# Patient Record
Sex: MALE | Race: BLACK OR AFRICAN AMERICAN | Employment: UNEMPLOYED | URBAN - METROPOLITAN AREA
[De-identification: names, ages, dates, MRNs, and addresses within clinical notes are randomized per-mention and may not be internally consistent; named-entity substitution may affect disease eponyms.]

---

## 2017-02-16 ENCOUNTER — APPOINTMENT (OUTPATIENT)
Dept: GENERAL RADIOLOGY | Age: 47
DRG: 638 | End: 2017-02-16
Attending: EMERGENCY MEDICINE
Payer: COMMERCIAL

## 2017-02-16 ENCOUNTER — HOSPITAL ENCOUNTER (INPATIENT)
Age: 47
LOS: 2 days | Discharge: LEFT BEFORE DISCHARGE | DRG: 638 | End: 2017-02-18
Attending: EMERGENCY MEDICINE | Admitting: INTERNAL MEDICINE
Payer: COMMERCIAL

## 2017-02-16 ENCOUNTER — APPOINTMENT (OUTPATIENT)
Dept: ULTRASOUND IMAGING | Age: 47
DRG: 638 | End: 2017-02-16
Attending: EMERGENCY MEDICINE
Payer: COMMERCIAL

## 2017-02-16 DIAGNOSIS — E78.00 HYPERCHOLESTEREMIA: ICD-10-CM

## 2017-02-16 DIAGNOSIS — E11.628 DIABETIC FOOT INFECTION (HCC): Primary | ICD-10-CM

## 2017-02-16 DIAGNOSIS — I10 ESSENTIAL HYPERTENSION: ICD-10-CM

## 2017-02-16 DIAGNOSIS — L08.9 DIABETIC FOOT INFECTION (HCC): Primary | ICD-10-CM

## 2017-02-16 DIAGNOSIS — R60.0 LEG EDEMA, LEFT: ICD-10-CM

## 2017-02-16 LAB
ALBUMIN SERPL BCP-MCNC: 2.3 G/DL (ref 3.5–5)
ALBUMIN/GLOB SERPL: 0.5 {RATIO} (ref 1.1–2.2)
ALP SERPL-CCNC: 87 U/L (ref 45–117)
ALT SERPL-CCNC: 27 U/L (ref 12–78)
ANION GAP BLD CALC-SCNC: 7 MMOL/L (ref 5–15)
AST SERPL W P-5'-P-CCNC: 35 U/L (ref 15–37)
BASOPHILS # BLD AUTO: 0 K/UL (ref 0–0.1)
BASOPHILS # BLD: 0 % (ref 0–1)
BILIRUB SERPL-MCNC: 0.2 MG/DL (ref 0.2–1)
BUN SERPL-MCNC: 17 MG/DL (ref 6–20)
BUN/CREAT SERPL: 12 (ref 12–20)
CALCIUM SERPL-MCNC: 8.1 MG/DL (ref 8.5–10.1)
CHLORIDE SERPL-SCNC: 107 MMOL/L (ref 97–108)
CO2 SERPL-SCNC: 26 MMOL/L (ref 21–32)
CREAT SERPL-MCNC: 1.39 MG/DL (ref 0.7–1.3)
EOSINOPHIL # BLD: 0.2 K/UL (ref 0–0.4)
EOSINOPHIL NFR BLD: 2 % (ref 0–7)
ERYTHROCYTE [DISTWIDTH] IN BLOOD BY AUTOMATED COUNT: 13.8 % (ref 11.5–14.5)
ERYTHROCYTE [SEDIMENTATION RATE] IN BLOOD: 48 MM/HR (ref 0–15)
GLOBULIN SER CALC-MCNC: 4.4 G/DL (ref 2–4)
GLUCOSE BLD STRIP.AUTO-MCNC: 135 MG/DL (ref 65–100)
GLUCOSE SERPL-MCNC: 138 MG/DL (ref 65–100)
HCT VFR BLD AUTO: 39.4 % (ref 36.6–50.3)
HGB BLD-MCNC: 12.6 G/DL (ref 12.1–17)
LACTATE SERPL-SCNC: 1.5 MMOL/L (ref 0.4–2)
LYMPHOCYTES # BLD AUTO: 26 % (ref 12–49)
LYMPHOCYTES # BLD: 2.8 K/UL (ref 0.8–3.5)
MCH RBC QN AUTO: 25.5 PG (ref 26–34)
MCHC RBC AUTO-ENTMCNC: 32 G/DL (ref 30–36.5)
MCV RBC AUTO: 79.8 FL (ref 80–99)
MONOCYTES # BLD: 0.6 K/UL (ref 0–1)
MONOCYTES NFR BLD AUTO: 6 % (ref 5–13)
NEUTS SEG # BLD: 7.3 K/UL (ref 1.8–8)
NEUTS SEG NFR BLD AUTO: 66 % (ref 32–75)
PLATELET # BLD AUTO: 453 K/UL (ref 150–400)
POTASSIUM SERPL-SCNC: 4.8 MMOL/L (ref 3.5–5.1)
PROT SERPL-MCNC: 6.7 G/DL (ref 6.4–8.2)
RBC # BLD AUTO: 4.94 M/UL (ref 4.1–5.7)
SERVICE CMNT-IMP: ABNORMAL
SODIUM SERPL-SCNC: 140 MMOL/L (ref 136–145)
WBC # BLD AUTO: 10.9 K/UL (ref 4.1–11.1)

## 2017-02-16 PROCEDURE — 80053 COMPREHEN METABOLIC PANEL: CPT | Performed by: EMERGENCY MEDICINE

## 2017-02-16 PROCEDURE — 36415 COLL VENOUS BLD VENIPUNCTURE: CPT | Performed by: EMERGENCY MEDICINE

## 2017-02-16 PROCEDURE — 87040 BLOOD CULTURE FOR BACTERIA: CPT | Performed by: EMERGENCY MEDICINE

## 2017-02-16 PROCEDURE — 83605 ASSAY OF LACTIC ACID: CPT | Performed by: EMERGENCY MEDICINE

## 2017-02-16 PROCEDURE — 73630 X-RAY EXAM OF FOOT: CPT

## 2017-02-16 PROCEDURE — 82962 GLUCOSE BLOOD TEST: CPT

## 2017-02-16 PROCEDURE — 99285 EMERGENCY DEPT VISIT HI MDM: CPT

## 2017-02-16 PROCEDURE — 93971 EXTREMITY STUDY: CPT

## 2017-02-16 PROCEDURE — 85025 COMPLETE CBC W/AUTO DIFF WBC: CPT | Performed by: EMERGENCY MEDICINE

## 2017-02-16 PROCEDURE — 65270000029 HC RM PRIVATE

## 2017-02-16 PROCEDURE — 85652 RBC SED RATE AUTOMATED: CPT | Performed by: EMERGENCY MEDICINE

## 2017-02-16 RX ORDER — SODIUM CHLORIDE 0.9 % (FLUSH) 0.9 %
5-10 SYRINGE (ML) INJECTION AS NEEDED
Status: DISCONTINUED | OUTPATIENT
Start: 2017-02-16 | End: 2017-02-18 | Stop reason: HOSPADM

## 2017-02-16 RX ORDER — MAGNESIUM SULFATE 100 %
4 CRYSTALS MISCELLANEOUS AS NEEDED
Status: DISCONTINUED | OUTPATIENT
Start: 2017-02-16 | End: 2017-02-18 | Stop reason: HOSPADM

## 2017-02-16 RX ORDER — FACIAL-BODY WIPES
10 EACH TOPICAL DAILY PRN
Status: DISCONTINUED | OUTPATIENT
Start: 2017-02-16 | End: 2017-02-18 | Stop reason: HOSPADM

## 2017-02-16 RX ORDER — GLUCOSAMINE SULFATE 1500 MG
2000 POWDER IN PACKET (EA) ORAL DAILY
COMMUNITY
End: 2017-08-24

## 2017-02-16 RX ORDER — GABAPENTIN 100 MG/1
100 CAPSULE ORAL 3 TIMES DAILY
Status: DISCONTINUED | OUTPATIENT
Start: 2017-02-17 | End: 2017-02-18 | Stop reason: HOSPADM

## 2017-02-16 RX ORDER — LISINOPRIL 20 MG/1
40 TABLET ORAL DAILY
Status: DISCONTINUED | OUTPATIENT
Start: 2017-02-17 | End: 2017-02-17

## 2017-02-16 RX ORDER — SODIUM CHLORIDE 0.9 % (FLUSH) 0.9 %
5-10 SYRINGE (ML) INJECTION EVERY 8 HOURS
Status: DISCONTINUED | OUTPATIENT
Start: 2017-02-16 | End: 2017-02-18 | Stop reason: HOSPADM

## 2017-02-16 RX ORDER — DEXTROSE 50 % IN WATER (D50W) INTRAVENOUS SYRINGE
12.5-25 AS NEEDED
Status: DISCONTINUED | OUTPATIENT
Start: 2017-02-16 | End: 2017-02-18 | Stop reason: HOSPADM

## 2017-02-16 RX ORDER — ENOXAPARIN SODIUM 100 MG/ML
40 INJECTION SUBCUTANEOUS DAILY
Status: DISCONTINUED | OUTPATIENT
Start: 2017-02-17 | End: 2017-02-18 | Stop reason: HOSPADM

## 2017-02-16 RX ORDER — ATORVASTATIN CALCIUM 40 MG/1
40 TABLET, FILM COATED ORAL DAILY
Status: DISCONTINUED | OUTPATIENT
Start: 2017-02-17 | End: 2017-02-18 | Stop reason: HOSPADM

## 2017-02-16 RX ORDER — INSULIN LISPRO 100 [IU]/ML
6 INJECTION, SOLUTION INTRAVENOUS; SUBCUTANEOUS
Status: DISCONTINUED | OUTPATIENT
Start: 2017-02-17 | End: 2017-02-18 | Stop reason: HOSPADM

## 2017-02-16 RX ORDER — INSULIN LISPRO 100 [IU]/ML
INJECTION, SOLUTION INTRAVENOUS; SUBCUTANEOUS
Status: DISCONTINUED | OUTPATIENT
Start: 2017-02-17 | End: 2017-02-18 | Stop reason: HOSPADM

## 2017-02-16 RX ORDER — ONDANSETRON 2 MG/ML
4 INJECTION INTRAMUSCULAR; INTRAVENOUS
Status: DISCONTINUED | OUTPATIENT
Start: 2017-02-16 | End: 2017-02-18 | Stop reason: HOSPADM

## 2017-02-16 RX ORDER — SODIUM CHLORIDE 9 MG/ML
75 INJECTION, SOLUTION INTRAVENOUS CONTINUOUS
Status: DISCONTINUED | OUTPATIENT
Start: 2017-02-16 | End: 2017-02-18 | Stop reason: HOSPADM

## 2017-02-16 RX ORDER — MELATONIN
1000 DAILY
Status: DISCONTINUED | OUTPATIENT
Start: 2017-02-17 | End: 2017-02-18 | Stop reason: HOSPADM

## 2017-02-16 RX ORDER — INSULIN GLARGINE 100 [IU]/ML
60 INJECTION, SOLUTION SUBCUTANEOUS
Status: DISCONTINUED | OUTPATIENT
Start: 2017-02-16 | End: 2017-02-18 | Stop reason: HOSPADM

## 2017-02-16 RX ORDER — NALOXONE HYDROCHLORIDE 0.4 MG/ML
0.4 INJECTION, SOLUTION INTRAMUSCULAR; INTRAVENOUS; SUBCUTANEOUS AS NEEDED
Status: DISCONTINUED | OUTPATIENT
Start: 2017-02-16 | End: 2017-02-18 | Stop reason: HOSPADM

## 2017-02-16 RX ORDER — ACETAMINOPHEN 325 MG/1
650 TABLET ORAL
Status: DISCONTINUED | OUTPATIENT
Start: 2017-02-16 | End: 2017-02-18 | Stop reason: HOSPADM

## 2017-02-17 ENCOUNTER — APPOINTMENT (OUTPATIENT)
Dept: MRI IMAGING | Age: 47
DRG: 638 | End: 2017-02-17
Attending: INTERNAL MEDICINE
Payer: COMMERCIAL

## 2017-02-17 PROBLEM — R60.0 EDEMA OF LEFT LOWER EXTREMITY: Status: ACTIVE | Noted: 2017-02-17

## 2017-02-17 LAB
ALBUMIN SERPL BCP-MCNC: 2 G/DL (ref 3.5–5)
ALBUMIN/GLOB SERPL: 0.5 {RATIO} (ref 1.1–2.2)
ALP SERPL-CCNC: 84 U/L (ref 45–117)
ALT SERPL-CCNC: 19 U/L (ref 12–78)
ANION GAP BLD CALC-SCNC: 8 MMOL/L (ref 5–15)
AST SERPL W P-5'-P-CCNC: 14 U/L (ref 15–37)
BASOPHILS # BLD AUTO: 0 K/UL (ref 0–0.1)
BASOPHILS # BLD: 1 % (ref 0–1)
BILIRUB SERPL-MCNC: 0.1 MG/DL (ref 0.2–1)
BUN SERPL-MCNC: 17 MG/DL (ref 6–20)
BUN/CREAT SERPL: 15 (ref 12–20)
CALCIUM SERPL-MCNC: 8 MG/DL (ref 8.5–10.1)
CHLORIDE SERPL-SCNC: 109 MMOL/L (ref 97–108)
CO2 SERPL-SCNC: 26 MMOL/L (ref 21–32)
CREAT SERPL-MCNC: 1.16 MG/DL (ref 0.7–1.3)
EOSINOPHIL # BLD: 0.2 K/UL (ref 0–0.4)
EOSINOPHIL NFR BLD: 2 % (ref 0–7)
ERYTHROCYTE [DISTWIDTH] IN BLOOD BY AUTOMATED COUNT: 13.8 % (ref 11.5–14.5)
EST. AVERAGE GLUCOSE BLD GHB EST-MCNC: 258 MG/DL
GLOBULIN SER CALC-MCNC: 3.9 G/DL (ref 2–4)
GLUCOSE BLD STRIP.AUTO-MCNC: 138 MG/DL (ref 65–100)
GLUCOSE BLD STRIP.AUTO-MCNC: 155 MG/DL (ref 65–100)
GLUCOSE BLD STRIP.AUTO-MCNC: 231 MG/DL (ref 65–100)
GLUCOSE BLD STRIP.AUTO-MCNC: 95 MG/DL (ref 65–100)
GLUCOSE SERPL-MCNC: 81 MG/DL (ref 65–100)
HBA1C MFR BLD: 10.6 % (ref 4.2–6.3)
HCT VFR BLD AUTO: 38 % (ref 36.6–50.3)
HGB BLD-MCNC: 12.1 G/DL (ref 12.1–17)
LYMPHOCYTES # BLD AUTO: 37 % (ref 12–49)
LYMPHOCYTES # BLD: 3.1 K/UL (ref 0.8–3.5)
MCH RBC QN AUTO: 25.4 PG (ref 26–34)
MCHC RBC AUTO-ENTMCNC: 31.8 G/DL (ref 30–36.5)
MCV RBC AUTO: 79.7 FL (ref 80–99)
MONOCYTES # BLD: 0.4 K/UL (ref 0–1)
MONOCYTES NFR BLD AUTO: 5 % (ref 5–13)
NEUTS SEG # BLD: 4.5 K/UL (ref 1.8–8)
NEUTS SEG NFR BLD AUTO: 55 % (ref 32–75)
PLATELET # BLD AUTO: 382 K/UL (ref 150–400)
POTASSIUM SERPL-SCNC: 3.5 MMOL/L (ref 3.5–5.1)
PROT SERPL-MCNC: 5.9 G/DL (ref 6.4–8.2)
RBC # BLD AUTO: 4.77 M/UL (ref 4.1–5.7)
SERVICE CMNT-IMP: ABNORMAL
SERVICE CMNT-IMP: NORMAL
SODIUM SERPL-SCNC: 143 MMOL/L (ref 136–145)
WBC # BLD AUTO: 8.2 K/UL (ref 4.1–11.1)

## 2017-02-17 PROCEDURE — 74011250637 HC RX REV CODE- 250/637: Performed by: INTERNAL MEDICINE

## 2017-02-17 PROCEDURE — 74011250637 HC RX REV CODE- 250/637: Performed by: PODIATRIST

## 2017-02-17 PROCEDURE — 85025 COMPLETE CBC W/AUTO DIFF WBC: CPT | Performed by: INTERNAL MEDICINE

## 2017-02-17 PROCEDURE — 80053 COMPREHEN METABOLIC PANEL: CPT | Performed by: INTERNAL MEDICINE

## 2017-02-17 PROCEDURE — 74011636637 HC RX REV CODE- 636/637: Performed by: INTERNAL MEDICINE

## 2017-02-17 PROCEDURE — 74011250636 HC RX REV CODE- 250/636: Performed by: INTERNAL MEDICINE

## 2017-02-17 PROCEDURE — 36415 COLL VENOUS BLD VENIPUNCTURE: CPT | Performed by: INTERNAL MEDICINE

## 2017-02-17 PROCEDURE — 73718 MRI LOWER EXTREMITY W/O DYE: CPT

## 2017-02-17 PROCEDURE — 82962 GLUCOSE BLOOD TEST: CPT

## 2017-02-17 PROCEDURE — 65270000029 HC RM PRIVATE

## 2017-02-17 PROCEDURE — 93922 UPR/L XTREMITY ART 2 LEVELS: CPT

## 2017-02-17 PROCEDURE — 83036 HEMOGLOBIN GLYCOSYLATED A1C: CPT | Performed by: INTERNAL MEDICINE

## 2017-02-17 RX ORDER — PANTOPRAZOLE SODIUM 40 MG/1
20 GRANULE, DELAYED RELEASE ORAL
Status: COMPLETED | OUTPATIENT
Start: 2017-02-17 | End: 2017-02-17

## 2017-02-17 RX ORDER — ZOLPIDEM TARTRATE 5 MG/1
5 TABLET ORAL
Status: DISCONTINUED | OUTPATIENT
Start: 2017-02-17 | End: 2017-02-18 | Stop reason: HOSPADM

## 2017-02-17 RX ORDER — PANTOPRAZOLE SODIUM 20 MG/1
20 TABLET, DELAYED RELEASE ORAL
Status: DISCONTINUED | OUTPATIENT
Start: 2017-02-18 | End: 2017-02-17

## 2017-02-17 RX ORDER — VALSARTAN 80 MG/1
80 TABLET ORAL DAILY
Status: DISCONTINUED | OUTPATIENT
Start: 2017-02-17 | End: 2017-02-18 | Stop reason: HOSPADM

## 2017-02-17 RX ORDER — PANTOPRAZOLE SODIUM 40 MG/1
40 TABLET, DELAYED RELEASE ORAL
Status: DISCONTINUED | OUTPATIENT
Start: 2017-02-17 | End: 2017-02-17

## 2017-02-17 RX ORDER — PANTOPRAZOLE SODIUM 40 MG/1
20 GRANULE, DELAYED RELEASE ORAL
Status: DISCONTINUED | OUTPATIENT
Start: 2017-02-18 | End: 2017-02-18 | Stop reason: SDUPTHER

## 2017-02-17 RX ADMIN — INSULIN LISPRO 6 UNITS: 100 INJECTION, SOLUTION INTRAVENOUS; SUBCUTANEOUS at 16:12

## 2017-02-17 RX ADMIN — VITAMIN D, TAB 1000IU (100/BT) 1000 UNITS: 25 TAB at 11:40

## 2017-02-17 RX ADMIN — INSULIN LISPRO 2 UNITS: 100 INJECTION, SOLUTION INTRAVENOUS; SUBCUTANEOUS at 12:28

## 2017-02-17 RX ADMIN — ENOXAPARIN SODIUM 40 MG: 40 INJECTION SUBCUTANEOUS at 11:39

## 2017-02-17 RX ADMIN — ZOLPIDEM TARTRATE 5 MG: 5 TABLET, FILM COATED ORAL at 22:13

## 2017-02-17 RX ADMIN — INSULIN GLARGINE 60 UNITS: 100 INJECTION, SOLUTION SUBCUTANEOUS at 22:13

## 2017-02-17 RX ADMIN — Medication 10 ML: at 22:14

## 2017-02-17 RX ADMIN — PANTOPRAZOLE SODIUM 20 MG: 40 GRANULE, DELAYED RELEASE ORAL at 20:38

## 2017-02-17 RX ADMIN — Medication 5 ML: at 06:13

## 2017-02-17 RX ADMIN — ZOLPIDEM TARTRATE 5 MG: 5 TABLET, FILM COATED ORAL at 00:56

## 2017-02-17 RX ADMIN — GABAPENTIN 100 MG: 100 CAPSULE ORAL at 22:13

## 2017-02-17 RX ADMIN — INSULIN LISPRO 2 UNITS: 100 INJECTION, SOLUTION INTRAVENOUS; SUBCUTANEOUS at 22:12

## 2017-02-17 RX ADMIN — SODIUM CHLORIDE 75 ML/HR: 900 INJECTION, SOLUTION INTRAVENOUS at 00:30

## 2017-02-17 RX ADMIN — VALSARTAN 80 MG: 80 TABLET, FILM COATED ORAL at 11:40

## 2017-02-17 RX ADMIN — INSULIN GLARGINE 60 UNITS: 100 INJECTION, SOLUTION SUBCUTANEOUS at 00:27

## 2017-02-17 RX ADMIN — ATORVASTATIN CALCIUM 40 MG: 40 TABLET, FILM COATED ORAL at 11:40

## 2017-02-17 RX ADMIN — INSULIN LISPRO 6 UNITS: 100 INJECTION, SOLUTION INTRAVENOUS; SUBCUTANEOUS at 11:39

## 2017-02-17 NOTE — ED NOTES
TRANSFER - OUT REPORT:    Verbal report given to Ben Rn(name) on Mirta [place]  being transferred to Ortho (unit) for routine progression of care       Report consisted of patients Situation, Background, Assessment and   Recommendations(SBAR). Information from the following report(s) SBAR, Kardex, ED Summary and MAR was reviewed with the receiving nurse. Lines:   Peripheral IV 02/16/17 Left Hand (Active)   Site Assessment Clean, dry, & intact 2/16/2017  9:58 PM   Phlebitis Assessment 0 2/16/2017  9:58 PM   Infiltration Assessment 0 2/16/2017  9:58 PM   Dressing Status Clean, dry, & intact 2/16/2017  9:58 PM   Dressing Type Tape;Transparent 2/16/2017  9:58 PM   Hub Color/Line Status Blue;Patent 2/16/2017  9:58 PM        Opportunity for questions and clarification was provided.       Patient transported with:   [employer]

## 2017-02-17 NOTE — PROGRESS NOTES
Occupational Therapy  Chart reviewed. Discussed pt with PT who reported pt is up ad mona and independent. Met with patient, introduced self, and explained role of OT. Pt indicated no difficulty with ADLs and is not in need of acute OT services. Will sign off at this time.  Carmenza Duarte MS, OTR/L

## 2017-02-17 NOTE — ED PROVIDER NOTES
HPI Comments: Virginia House is a 51 y.o. male with a history of diabetes, hyperlipidemia, hypertension, hypercholesterolemia, and amputation of the left toes who presents ambulatory to [facility] ED with a chief complaint of blisters to his left foot for the past week, which patient is concerned may be infected. Patient also complains of swelling to his left foot and ankle for the past few days. Patient notes his left toes were surgically removed over a number of operations two years ago, and has been having difficulties with blisters and callouses ever since that time due to shoe inserts and special shoes he wears due to his amputations. Patient reports he had a PE following his surgery two years ago, and notes he was on Eliquis for a while but has not been on it in over a year. Patient notes he wears heavy work boots at work. Patient reports his blood sugars typically run between 200-280, and notes his last HgBA1C was 14. Patient states he is on both insulin and oral medication for his diabetes, and denies any recent changes to his medications. Patient denies any recent use of antibiotics. Patient denies being evaluated prior to today for his symptoms. Patient denies any fevers, drainage from the wounds, or any other symptoms at this time. PCP: [address] [place], [place]    There are no other complaints, changes or physical findings at this time. The history is provided by the patient.         Past Medical History:   Diagnosis Date    Diabetes ([address]      since age 23    DM (diabetes mellitus) ([address]     HTN (hypertension)     Hypercholesteremia     Hyperlipidemia        Past Surgical History:   Procedure Laterality Date    Hx orthopaedic       boutinerre deformity    Hx bunionectomy      Hx amputation       toes- left foot    Hx tonsillectomy      Hx wisdom teeth extraction      Hx orthopaedic       fascia removed left foot         Family History:   Problem Relation Age of Onset    Hypertension Mother  High Cholesterol Mother        Social History     Social History    Marital status:      Spouse name: N/A    Number of children: N/A    Years of education: N/A     Occupational History    Not on file. Social History Main Topics    Smoking status: Former Smoker     Types: Cigars     Quit date: 4/22/2011    Smokeless tobacco: Never Used    Alcohol use 0.0 oz/week     1 Glasses of wine, 0 Standard drinks or equivalent per week      Comment: stop drinking 5 months ago    Drug use: No    Sexual activity: Yes     Partners: Female     Birth control/ protection: None     Other Topics Concern    Not on file     Social History Narrative    ** Merged History Encounter **          ALLERGIES: Morphine    Review of Systems   Constitutional: Negative. Negative for activity change, appetite change, chills and fever. HENT: Negative. Negative for congestion, rhinorrhea, sinus pressure, sneezing and sore throat. Eyes: Negative. Negative for pain, discharge and visual disturbance. Respiratory: Negative. Negative for cough and shortness of breath. Cardiovascular: Negative. Negative for chest pain. Gastrointestinal: Negative. Negative for abdominal pain, diarrhea, nausea and vomiting. Endocrine: Negative. Genitourinary: Negative. Negative for dysuria, flank pain, frequency and urgency. Musculoskeletal: Positive for joint swelling (left foot and ankle). Negative for arthralgias, back pain, gait problem, myalgias and neck pain. Skin: Positive for wound (see HPI). Negative for color change and rash. Allergic/Immunologic: Negative. Neurological: Negative. Negative for dizziness, speech difficulty, weakness, light-headedness, numbness and headaches. Hematological: Negative. Psychiatric/Behavioral: Negative. Negative for agitation, behavioral problems and confusion. All other systems reviewed and are negative.     Patient Vitals for the past 12 hrs:   Temp Pulse Resp BP SpO2 02/16/17 2230 - - - - 100 %   02/16/17 2215 - - - (!) 179/94 96 %   02/16/17 2149 - - - (!) 176/99 99 %   02/16/17 2135 - - - - 99 %   02/16/17 2134 - - - (!) 171/98 -   02/16/17 2045 - - - (!) 189/93 99 %   02/16/17 1928 99.6 °F (37.6 °C) 78 16 (!) 170/102 99 %      Physical Exam   Constitutional: He is oriented to person, place, and time. He appears well-developed and well-nourished. No distress. HENT:   Head: Normocephalic. Eyes: Conjunctivae are normal. Pupils are equal, round, and reactive to light. Neck: Normal range of motion. Neck supple. Cardiovascular: Normal rate, regular rhythm, normal heart sounds and intact distal pulses. Exam reveals no gallop and no friction rub. No murmur heard. Pulmonary/Chest: Effort normal and breath sounds normal. No stridor. No respiratory distress. He has no wheezes. He has no rales. He exhibits no tenderness. Abdominal: Soft. Bowel sounds are normal. He exhibits no distension. There is no tenderness. There is no rebound and no guarding. Genitourinary: Rectal exam shows guaiac negative stool. No penile tenderness. Musculoskeletal: He exhibits tenderness. Feet:    New ulcer right great toe with exposed tendon, foul smell    New ulcer medial left foot and chronic ulcer ball of left foot    Left leg diffuse swelling   Neurological: He is alert and oriented to person, place, and time. He has normal reflexes. He displays normal reflexes. No cranial nerve deficit. He exhibits normal muscle tone. Coordination normal.   Skin: Skin is warm and dry. No rash noted. He is not diaphoretic. No erythema. No pallor. Nursing note and vitals reviewed. MDM  Number of Diagnoses or Management Options  Diabetic foot infection [facility]):   Diagnosis management comments: DDx: DVT, ischemic extremity, diabetic foot ulcer, osteomyelitis, sepsis    Impression/Plan: History of diabetic foot infections with prior amputation two years ago.  Presents with one week of developing new ulcers on both feet and an xray concerning for osteomyelitis on the right foot. Not septic appearing, but will admit to the hospitalist and initiate broad-spectrum antibiotics. Amount and/or Complexity of Data Reviewed  Clinical lab tests: ordered and reviewed  Tests in the radiology section of CPT®: reviewed and ordered  Review and summarize past medical records: yes  Discuss the patient with other providers: yes (Hospitalist)  Independent visualization of images, tracings, or specimens: yes    Risk of Complications, Morbidity, and/or Mortality  Presenting problems: moderate  Diagnostic procedures: moderate  Management options: moderate    Patient Progress  Patient progress: stable      Procedures     Progress Note:  8:20 PM  Patient is declining pain medication at this time. Consult Note:  10:52 PM  Nayeli Hernandes MD spoke with Dr. An Dallas,   Specialty: Hospitalist  Discussed patient's history, disposition, and available diagnostic and imaging results. Reviewed care plans. Consultant will evaluate patient for admission. Progress Note:  10:53 PM  Paged patient's PCP to inform of admission, no response. LABORATORY TESTS:  Recent Results (from the past 12 hour(s))   CBC WITH AUTOMATED DIFF    Collection Time: 02/16/17  9:15 PM   Result Value Ref Range    WBC 10.9 4.1 - 11.1 K/uL    RBC 4.94 4.10 - 5.70 M/uL    HGB 12.6 12.1 - 17.0 g/dL    HCT 39.4 36.6 - 50.3 %    MCV 79.8 (L) 80.0 - 99.0 FL    MCH 25.5 (L) 26.0 - 34.0 PG    MCHC 32.0 30.0 - 36.5 g/dL    RDW 13.8 11.5 - 14.5 %    PLATELET 021 (H) 578 - 400 K/uL    NEUTROPHILS 66 32 - 75 %    LYMPHOCYTES 26 12 - 49 %    MONOCYTES 6 5 - 13 %    EOSINOPHILS 2 0 - 7 %    BASOPHILS 0 0 - 1 %    ABS. NEUTROPHILS 7.3 1.8 - 8.0 K/UL    ABS. LYMPHOCYTES 2.8 0.8 - 3.5 K/UL    ABS. MONOCYTES 0.6 0.0 - 1.0 K/UL    ABS. EOSINOPHILS 0.2 0.0 - 0.4 K/UL    ABS.  BASOPHILS 0.0 0.0 - 0.1 K/UL   METABOLIC PANEL, COMPREHENSIVE    Collection Time: 02/16/17  9:15 PM   Result Value Ref Range    Sodium 140 136 - 145 mmol/L    Potassium 4.8 3.5 - 5.1 mmol/L    Chloride 107 97 - 108 mmol/L    CO2 26 21 - 32 mmol/L    Anion gap 7 5 - 15 mmol/L    Glucose 138 (H) 65 - 100 mg/dL    BUN 17 6 - 20 MG/DL    Creatinine 1.39 (H) 0.70 - 1.30 MG/DL    BUN/Creatinine ratio 12 12 - 20      GFR est AA >60 >60 ml/min/1.73m2    GFR est non-AA 55 (L) >60 ml/min/1.73m2    Calcium 8.1 (L) 8.5 - 10.1 MG/DL    Bilirubin, total 0.2 0.2 - 1.0 MG/DL    ALT (SGPT) 27 12 - 78 U/L    AST (SGOT) 35 15 - 37 U/L    Alk. phosphatase 87 45 - 117 U/L    Protein, total 6.7 6.4 - 8.2 g/dL    Albumin 2.3 (L) 3.5 - 5.0 g/dL    Globulin 4.4 (H) 2.0 - 4.0 g/dL    A-G Ratio 0.5 (L) 1.1 - 2.2     LACTIC ACID, PLASMA    Collection Time: 02/16/17  9:15 PM   Result Value Ref Range    Lactic acid 1.5 0.4 - 2.0 MMOL/L   SED RATE (ESR)    Collection Time: 02/16/17  9:15 PM   Result Value Ref Range    Sed rate, automated 48 (H) 0 - 15 mm/hr   GLUCOSE, POC    Collection Time: 02/16/17 11:13 PM   Result Value Ref Range    Glucose (POC) 135 (H) 65 - 100 mg/dL    Performed by TJ ULRICH        IMAGING RESULTS:  XR FOOT RT MIN 3 V   Final Result     EXAM: XR FOOT RT MIN 3 V     INDICATION: infection.     COMPARISON: None.     FINDINGS: Three views of the right foot demonstrate degenerative and erosive  changes right first MTP joint. . There is soft tissue swelling.     IMPRESSION  IMPRESSION: There are erosive/cystic and degenerative changes right first MTP  joint which could represent osteomyelitis. XR FOOT LT MIN 3 V   Final Result     EXAM: XR FOOT LT MIN 3 V     INDICATION: infection. Diabetes and ulcers on the feet     COMPARISON: 2016     FINDINGS: Three views of the left foot demonstrate patient status post  transmetatarsal amputation. There is a small lucency distal aspect of the first  metatarsal where there is an ulceration.     IMPRESSION  IMPRESSION: She is status post transmetatarsal amputation. There is a soft  tissue ulcer left first digit where there is a small lucency in the distal first  metatarsal which could represent chronic osteomyelitis     DUPLEX LOWER EXT VENOUS LEFT   Final Result     History: Left lower extremity swelling.     Duplex venous Doppler examination was performed from the left inguinal ligament  to the mid-calf. Deep venous structures were well imaged and appeared  compressible throughout. Both wave form and color flow analysis demonstrated  normal flow patterns. Augmentation was demonstrable.     IMPRESSION  IMPRESSION:  NORMAL DUPLEX VENOUS DOPPLER EXAMINATION. MEDICATIONS GIVEN:  Medications   sodium chloride (NS) flush 5-10 mL (not administered)   sodium chloride (NS) flush 5-10 mL (not administered)   insulin glargine (LANTUS) injection 60 Units (not administered)   insulin lispro (HUMALOG) injection (not administered)   glucose chewable tablet 16 g (not administered)   dextrose (D50W) injection syrg 12.5-25 g (not administered)   glucagon (GLUCAGEN) injection 1 mg (not administered)   sodium chloride (NS) flush 5-10 mL (not administered)   sodium chloride (NS) flush 5-10 mL (not administered)   acetaminophen (TYLENOL) tablet 650 mg (not administered)   naloxone (NARCAN) injection 0.4 mg (not administered)   ondansetron (ZOFRAN) injection 4 mg (not administered)   bisacodyl (DULCOLAX) suppository 10 mg (not administered)   enoxaparin (LOVENOX) injection 40 mg (not administered)   gabapentin (NEURONTIN) capsule 100 mg (not administered)   cholecalciferol (VITAMIN D3) tablet 1,000 Units (not administered)   atorvastatin (LIPITOR) tablet 40 mg (not administered)   lisinopril (PRINIVIL, ZESTRIL) tablet 40 mg (not administered)   insulin lispro (HUMALOG) injection 6 Units (not administered)   0.9% sodium chloride infusion (not administered)       IMPRESSION:  1.  Diabetic foot infection ([facility] [address]        PLAN:  Admit to the main hospital    Admit Note:  10:52 PM  Patient is being admitted to the hospital by Dr. Dieter Huang. The results of their tests and reasons for their admission has been discussed with the patient and/or available family. They convey agreement and understanding for the need to be admitted and for their admission diagnosis. This note is prepared by Lawyer Thurman, acting as Scribe for Sabino Weber MD.    Sabino Weber MD: The scribe's documentation has been prepared under my direction and personally reviewed by me in its entirety. I confirm that the note above accurately reflects all work, treatment, procedures, and medical decision making performed by me.

## 2017-02-17 NOTE — DIABETES MGMT
[facility]    Elevated A1C Visit Note    Recommendations/ Comments: Met with pt for A1C >9%. Pt stated that he has difficulty managing his DM due to his job. He is away from home 4 days per week and sometimes runs out of insulin while he is away. Discussed ways to help pt have enough supplies while away from home. Pt also stated that he would like to lose 100# and is considering vegetarian diet. Reviewed vegetarian diet. Reinforced that taking insulin doses as prescribed is most important for improving glucose control. Pt also stated that he is not able to have routine eating pattern due to work schedule and frequently misses meals. Patient is a 51 y.o. male with known history of Type 2 Diabetes on intensive insulin injection program at home.         A1c:   Lab Results   Component Value Date/Time    Hemoglobin A1c 10.6 02/17/2017 04:25 AM    Hemoglobin A1c 12.8 10/06/2016 10:16 AM       Recent Glucose Results:   Lab Results   Component Value Date/Time    GLU 81 02/17/2017 04:25 AM     (H) 02/16/2017 09:15 PM    GLUCPOC 155 (H) 02/17/2017 11:48 AM    GLUCPOC 138 (H) 02/17/2017 07:44 AM    GLUCPOC 135 (H) 02/16/2017 11:13 PM        Lab Results   Component Value Date/Time    Creatinine 1.16 02/17/2017 04:25 AM       Active Orders   Diet    DIET DIABETIC CONSISTENT CARB Regular          Assessed and instructed patient on the following:   ·  blood sugar goals, complications of diabetes mellitus, hypoglycemia prevention and treatment, exercise, SMBG skills, nutrition, referred to Diabetes Educator, site rotation and use of insulin pen    Provided patient with the following: [x]          Diabetes Self-Care Guide               [x]          Insulin education materials               []          Diabetes survival skills handout               []          BG guidelines for post-op patients               []          \"Decreasing  the Cost of Diabetes Care\"               [x]          Outpatient DTC contact number          Patient to follow up with DTC after discharge. Will continue to follow as needed. Thank you.     Ban Zhong, [facility]  Office: [phone]

## 2017-02-17 NOTE — PROCEDURES
[facility]  *** FINAL REPORT ***    Name: Marquis Dias  MRN: [id number]    Inpatient  : 24 Aug 1970  HIS Order #: [id number]  [address] [facility] Visit #: [id number]  Date: 2017    TYPE OF TEST: Peripheral Arterial Testing    REASON FOR TEST  Extremity ulceration (both sides)    Right Leg  Segmentals: Noncompressible                     mmHg  Brachial         177  High thigh  Low thigh  Calf  Posterior tibial  Dorsalis pedis  Peroneal  Metatarsal       173  Toe pressure     173  Doppler:    Normal  PVR:  Ankle/Brachial:    Left Leg  Segmentals: Noncompressible                     mmHg  Brachial  High thigh  Low thigh  Calf  Posterior tibial  Dorsalis pedis  Peroneal  Metatarsal  Toe pressure  Doppler:    Normal  PVR:  Ankle/Brachial:    INTERPRETATION/FINDINGS  PROCEDURE:  Ankle, brachial and digital arterial pressures, CW Doppler   waveforms and digital PPG waveforms were performed. 1. Segmental pressures in both legs are not measurable due to  extensive arterial calcification. The ankle/brachial indexes are  therefore unreliable predictors of distal arterial perfusion. 2. The right great toe/brachial index is 0.98. ADDITIONAL COMMENTS    I have personally reviewed the data relevant to the interpretation of  this  study. TECHNOLOGIST: Cheyanne Cummins.  ALVARADO Augustin, RDMS  Signed: 2017 03:53 PM    PHYSICIAN: Abril Sesay MD  Signed: 2017 04:04 PM

## 2017-02-17 NOTE — PROGRESS NOTES
Physical Therapy    PT consult order rec'd. Chart review complete. Pt is currently off the floor for MRI. Pt's RN states pt is independent with ambulation without an assistive device at this time. Will f/u later this PM as able for PT screen/eval as appropriate. Thank you. Pt returned from [address] around 1115. Pt observed ambulating from transport chair and in room. Pt reports no mobility limitations at this time. Pt describes issues with new diabetic shoes and inserts. Pt would benefit from orthotist consult to assess for new diabetic shoes. Will complete PT order at this time. Please re-consult if mobility needs change (surgery, WB status changes). Thank you.     Mary Pugh, MS, PT

## 2017-02-17 NOTE — PROGRESS NOTES
This is a 51 yr old AAM who has been admitted for treatment of diabetic foot infection with ulcers and osteomyelitis of both feet with a history of HTN, DM and neuropathy. Patient has been going to the wound care clinic and is being followed by Dr. Rula Nascimento. Patient is self pay and has been provided the care card. Patient reports complete independence and PCP/demographics verified. Will follow should patient require discharge services to be arranged. Care Management Interventions  PCP Verified by CM: Yes  Mode of Transport at Discharge: Self  MyChart Signup: No  Discharge Durable Medical Equipment: No  Health Maintenance Reviewed: Yes  Physical Therapy Consult: Yes  Occupational Therapy Consult: Yes  Speech Therapy Consult: No  Current Support Network: Lives with Spouse  Confirm Follow Up Transport: Self  Plan discussed with Pt/Family/Caregiver: Yes  Freedom of Choice Offered:  Yes

## 2017-02-17 NOTE — H&P
Hayden Dwain Bunch, [address]   HISTORY AND PHYSICAL       Name:  Almas Up   MR#:  [id number]   :  1970   Account #:  [de-identified]        Date of Adm:  2017       MD RUFINO Shafer / Rajni Rainey   D:  2017   23:51   T:  2017   00:11   Job #:  [id number]

## 2017-02-17 NOTE — ED NOTES
Pt remains sleeping in ED bed at this time. Call light within reach. VS WNL. Pt in no sign of distress. Bed in low position. Side rails up x2.

## 2017-02-17 NOTE — ED NOTES
Pt reports \"he has diabetic foot ulcers bilaterally that have gotten worse with time and he has not been able to go see his diabetic wound care doctor in the past month due to issues with being able to pay his bill. \" Pt also reports he has been having issues having money to have diabetic management medications and blood glucose test strips.

## 2017-02-17 NOTE — ED NOTES
Pt sleeping in ED bed at this time. Call light within reach. Pt in no sign of distress or discomfort.

## 2017-02-17 NOTE — PROGRESS NOTES
Aware of admission will see pt at lunch time  Call during the day (before 5 pm) directly with any concerns [phone]  Sayra Cameron M.D.   Family Medicine       General Daily Progress Note    Admit Date: 2/16/2017  Hospital day 2    Subjective:     Patient has no complaint of     Medication side effects: none    Current Facility-Administered Medications   Medication Dose Route Frequency    zolpidem (AMBIEN) tablet 5 mg  5 mg Oral QHS PRN    valsartan (DIOVAN) tablet 80 mg  80 mg Oral DAILY    sodium chloride (NS) flush 5-10 mL  5-10 mL IntraVENous Q8H    sodium chloride (NS) flush 5-10 mL  5-10 mL IntraVENous PRN    insulin glargine (LANTUS) injection 60 Units  60 Units SubCUTAneous QHS    insulin lispro (HUMALOG) injection   SubCUTAneous AC&HS    glucose chewable tablet 16 g  4 Tab Oral PRN    dextrose (D50W) injection syrg 12.5-25 g  12.5-25 g IntraVENous PRN    glucagon (GLUCAGEN) injection 1 mg  1 mg IntraMUSCular PRN    sodium chloride (NS) flush 5-10 mL  5-10 mL IntraVENous Q8H    sodium chloride (NS) flush 5-10 mL  5-10 mL IntraVENous PRN    acetaminophen (TYLENOL) tablet 650 mg  650 mg Oral Q6H PRN    naloxone (NARCAN) injection 0.4 mg  0.4 mg IntraVENous PRN    ondansetron (ZOFRAN) injection 4 mg  4 mg IntraVENous Q4H PRN    bisacodyl (DULCOLAX) suppository 10 mg  10 mg Rectal DAILY PRN    enoxaparin (LOVENOX) injection 40 mg  40 mg SubCUTAneous DAILY    gabapentin (NEURONTIN) capsule 100 mg  100 mg Oral TID    cholecalciferol (VITAMIN D3) tablet 1,000 Units  1,000 Units Oral DAILY    atorvastatin (LIPITOR) tablet 40 mg  40 mg Oral DAILY    insulin lispro (HUMALOG) injection 6 Units  6 Units SubCUTAneous TIDAC    0.9% sodium chloride infusion  75 mL/hr IntraVENous CONTINUOUS        Review of Systems  Musculoskeletal:positive for arthralgias and edema states he has been using his tense hose     Objective:     Patient Vitals for the past 8 hrs:   BP Temp Pulse Resp SpO2   02/17/17 1146 143/85 98.6 °F (37 °C) 65 18 99 %   02/17/17 0834 158/83 98.6 °F (37 °C) 68 16 97 %   02/17/17 0615 150/90 97.9 °F (36.6 °C) 61 16 100 %             Physical Exam: General appearance: alert, cooperative, no distress, appears stated age, moderately obese  Lungs: clear to auscultation bilaterally  Abdomen: soft, non-tender. Bowel sounds normal. No masses,  no organomegaly  Extremities: toes amputated on left foot  venous stasis dermatitis noted, edema left >> right and pedal pulses  Skin: normal, temperature normal and chronic statis dermatitis noted in feet  or hyperpigmentation - feet bilateral           Data Review   Recent Results (from the past 24 hour(s))   CBC WITH AUTOMATED DIFF    Collection Time: 02/16/17  9:15 PM   Result Value Ref Range    WBC 10.9 4.1 - 11.1 K/uL    RBC 4.94 4.10 - 5.70 M/uL    HGB 12.6 12.1 - 17.0 g/dL    HCT 39.4 36.6 - 50.3 %    MCV 79.8 (L) 80.0 - 99.0 FL    MCH 25.5 (L) 26.0 - 34.0 PG    MCHC 32.0 30.0 - 36.5 g/dL    RDW 13.8 11.5 - 14.5 %    PLATELET 035 (H) 047 - 400 K/uL    NEUTROPHILS 66 32 - 75 %    LYMPHOCYTES 26 12 - 49 %    MONOCYTES 6 5 - 13 %    EOSINOPHILS 2 0 - 7 %    BASOPHILS 0 0 - 1 %    ABS. NEUTROPHILS 7.3 1.8 - 8.0 K/UL    ABS. LYMPHOCYTES 2.8 0.8 - 3.5 K/UL    ABS. MONOCYTES 0.6 0.0 - 1.0 K/UL    ABS. EOSINOPHILS 0.2 0.0 - 0.4 K/UL    ABS. BASOPHILS 0.0 0.0 - 0.1 K/UL   METABOLIC PANEL, COMPREHENSIVE    Collection Time: 02/16/17  9:15 PM   Result Value Ref Range    Sodium 140 136 - 145 mmol/L    Potassium 4.8 3.5 - 5.1 mmol/L    Chloride 107 97 - 108 mmol/L    CO2 26 21 - 32 mmol/L    Anion gap 7 5 - 15 mmol/L    Glucose 138 (H) 65 - 100 mg/dL    BUN 17 6 - 20 MG/DL    Creatinine 1.39 (H) 0.70 - 1.30 MG/DL    BUN/Creatinine ratio 12 12 - 20      GFR est AA >60 >60 ml/min/1.73m2    GFR est non-AA 55 (L) >60 ml/min/1.73m2    Calcium 8.1 (L) 8.5 - 10.1 MG/DL    Bilirubin, total 0.2 0.2 - 1.0 MG/DL    ALT (SGPT) 27 12 - 78 U/L    AST (SGOT) 35 15 - 37 U/L    Alk. phosphatase 87 45 - 117 U/L    Protein, total 6.7 6.4 - 8.2 g/dL    Albumin 2.3 (L) 3.5 - 5.0 g/dL    Globulin 4.4 (H) 2.0 - 4.0 g/dL    A-G Ratio 0.5 (L) 1.1 - 2.2     CULTURE, BLOOD, PAIRED    Collection Time: 02/16/17  9:15 PM   Result Value Ref Range    Special Requests: NO SPECIAL REQUESTS      Culture result: NO GROWTH AFTER 9 HOURS     LACTIC ACID, PLASMA    Collection Time: 02/16/17  9:15 PM   Result Value Ref Range    Lactic acid 1.5 0.4 - 2.0 MMOL/L   SED RATE (ESR)    Collection Time: 02/16/17  9:15 PM   Result Value Ref Range    Sed rate, automated 48 (H) 0 - 15 mm/hr   GLUCOSE, POC    Collection Time: 02/16/17 11:13 PM   Result Value Ref Range    Glucose (POC) 135 (H) 65 - 100 mg/dL    Performed by Delroy Pena    METABOLIC PANEL, COMPREHENSIVE    Collection Time: 02/17/17  4:25 AM   Result Value Ref Range    Sodium 143 136 - 145 mmol/L    Potassium 3.5 3.5 - 5.1 mmol/L    Chloride 109 (H) 97 - 108 mmol/L    CO2 26 21 - 32 mmol/L    Anion gap 8 5 - 15 mmol/L    Glucose 81 65 - 100 mg/dL    BUN 17 6 - 20 MG/DL    Creatinine 1.16 0.70 - 1.30 MG/DL    BUN/Creatinine ratio 15 12 - 20      GFR est AA >60 >60 ml/min/1.73m2    GFR est non-AA >60 >60 ml/min/1.73m2    Calcium 8.0 (L) 8.5 - 10.1 MG/DL    Bilirubin, total 0.1 (L) 0.2 - 1.0 MG/DL    ALT (SGPT) 19 12 - 78 U/L    AST (SGOT) 14 (L) 15 - 37 U/L    Alk.  phosphatase 84 45 - 117 U/L    Protein, total 5.9 (L) 6.4 - 8.2 g/dL    Albumin 2.0 (L) 3.5 - 5.0 g/dL    Globulin 3.9 2.0 - 4.0 g/dL    A-G Ratio 0.5 (L) 1.1 - 2.2     CBC WITH AUTOMATED DIFF    Collection Time: 02/17/17  4:25 AM   Result Value Ref Range    WBC 8.2 4.1 - 11.1 K/uL    RBC 4.77 4.10 - 5.70 M/uL    HGB 12.1 12.1 - 17.0 g/dL    HCT 38.0 36.6 - 50.3 %    MCV 79.7 (L) 80.0 - 99.0 FL    MCH 25.4 (L) 26.0 - 34.0 PG    MCHC 31.8 30.0 - 36.5 g/dL    RDW 13.8 11.5 - 14.5 %    PLATELET 047 496 - 042 K/uL    NEUTROPHILS 55 32 - 75 %    LYMPHOCYTES 37 12 - 49 %    MONOCYTES 5 5 - 13 % EOSINOPHILS 2 0 - 7 %    BASOPHILS 1 0 - 1 %    ABS. NEUTROPHILS 4.5 1.8 - 8.0 K/UL    ABS. LYMPHOCYTES 3.1 0.8 - 3.5 K/UL    ABS. MONOCYTES 0.4 0.0 - 1.0 K/UL    ABS. EOSINOPHILS 0.2 0.0 - 0.4 K/UL    ABS. BASOPHILS 0.0 0.0 - 0.1 K/UL   HEMOGLOBIN A1C WITH EAG    Collection Time: 02/17/17  4:25 AM   Result Value Ref Range    Hemoglobin A1c 10.6 (H) 4.2 - 6.3 %    Est. average glucose 258 mg/dL   GLUCOSE, POC    Collection Time: 02/17/17  7:44 AM   Result Value Ref Range    Glucose (POC) 138 (H) 65 - 100 mg/dL    Performed by Pauly Russ    GLUCOSE, POC    Collection Time: 02/17/17 11:48 AM   Result Value Ref Range    Glucose (POC) 155 (H) 65 - 100 mg/dL    Performed by Pauly Russ            Assessment:     Active Problems:    Diabetic infection of right foot ([address] (2/16/2017)        Plan: Will need picc no abx running yet picc team already aware    bp goal is <130/80 monitoring bp may need to adjust arb  bs have been under good control so far and improving a1c since November continue diabetic diet mri results pending at this time may need iv abx for discharge home  Significant edema in left leg pt travels for work to [employer]  No  d dimer may reviewed doppler neg for dvt would like to obtain vascular studies on pt as well   S/w case management pt will need application for care card to help with deductible Will monitor will likely discharge over the weekend   S/w PT as well no issues pt has equipment as well from last ov will need new rx for tense hose will address at follow up appt in office next week if discharged over the weekend  Kush Morales M.D.   Family Medicine call directly if needed  [phone] before 5 pm

## 2017-02-17 NOTE — PROGRESS NOTES
[facility]  Preliminary Report: Ankle Brachial Index    Pressure (mmHg) Right    Left    Brachial:  177   175  Ankle PTA:  nc   nc  Ankle DPA:  nc   nc  Great Toe:  173       Waveform:  Right   Left  CFA:   Triphasic  Triphasic  Popliteal:  Triphasic  Triphasic  PTA:   Triphasic  Triphasic  DPA:   Triphasic  Triphasic  Great Toe:  Pulsatile  amputated    Right JESSICA:   nc  Left JESSICA: Nc   Right TBI: 0.98  Left TBI:     Final report to follow.     CFA = Common Femoral Artery  PTA = Posterior Tibial Artery  DPA = Dorsalis Pedis Artery  JESSICA = Ankle Brachial Index  TBI = Toe Brachial Index  NC = Non-compressible

## 2017-02-17 NOTE — H&P
Hospitalist Admission Note    NAME:  Kimi Munoz   :   1970   MRN:   [id number]     Date of admit:  2017    PCP: [address] [place], MD    Assessment/Plan:     Diabetic foot infection with ulcers left foot  POA  Osteomyelitis changes of both feet on x-rays POA  Right foot with ulcer on dorsum of right foot with ? Osteomyelitis changes on X-ray   I am less concerned about the right foot being infected  Left foot with older deep ulcer below TMA site and newer ulcer on left lateral foot, positive foul smell   Leg is swollen with no DVT on US   X-ray with possible chronic osteo   I am more concerned about the left foot being infected  Admit  IVF  IV vanco, cefepime, flagyl  Culture wounds on the left  Check MRI left and right feet to assess for osteo  Ask podiatry to see in Am    DM type 2 with neuropathy, chronic insulin POA  Continue long acting insulin 60 units qHS  Reduce short acting insulin for 12-15 units to 6 units plus SSI  Check HgBa1c  Diabetic diet    Essential HTN POA uncontrolled  Stopping taking lisinopril due to cough  Start valsartan  PRN Hydralazine    Diabetic neuropathy POA  Not taking neurontin regularly     Hyperlipidemia POA statin    DVT prophylaxis: lovenox SQ     Code status: Full code    Next of Kin: wife, spoke with her in room    Subjective:   CHIEF COMPLAINT:  Ulcers on my feet and my left leg is swollen for 1 week    HISTORY OF PRESENT ILLNESS:  A 70-year-old    gentleman with history of diabetes, peripheral neuropathy, and prior   diabetic foot infections requiring a left transmetatarsal amputation in   the past. He also has hypertension, hyperlipidemia. He says he   stopped taking his lisinopril recently because of persistent cough and   he currently is not taking any antihypertensive medications. He said he   currently is compliant with his insulin.  He has had a chronic ulcer on   the dorsum of his left foot below his TMA wound that has been present for several months. It initially had healed up but began to recur. He has   been only intermittently following over at the wound care clinic. He was   wearing some shoe inserts that began to cause some foot trauma on   his feet bilaterally. About 10 days ago, he developed blisters on the   dorsum of his right foot above the first metatarsal. In addition, he   developed a larger ulcer with drainage over the lateral aspect of his left   foot. His left leg began to become increasingly swollen and mildly   warm. He became concerned about the swelling and the concern for a   possible infection that might require more surgery and he decided to   come to the emergency room. He denies any fevers or chills, chest   pain, shortness of breath, abdominal pain, nausea, vomiting, diarrhea,   melena, or bright red blood per rectum.       In the emergency room, the patient had foul-smelling ulcers on his left   foot. The left leg was swollen. Lower extremity Dopplers were negative   for DVT in the left leg as he does have a prior history of pulmonary   embolism following surgery. X-rays of the feet showed possible   osteomyelitis changes in both feet. We were called to admit the   patient.     Past Medical History   Diagnosis Date    Diabetes ([facility]      since age 23    DM (diabetes mellitus) ([facility]     HTN (hypertension)     Hypercholesteremia     Hyperlipidemia         Past Surgical History   Procedure Laterality Date    Hx orthopaedic       boutinerre deformity    Hx bunionectomy      Hx amputation       toes- left foot    Hx tonsillectomy      Hx wisdom teeth extraction      Hx orthopaedic       fascia removed left foot       Social History:  Social History   Substance Use Topics    Smoking status: Former Smoker     Types: Cigars     Quit date: 4/22/2011    Smokeless tobacco: Never Used    Alcohol use 0.0 oz/week     Drinks several days per week,.  Not a heavy drinker per his report        FAMILY HISTORY:  Family History   Problem Relation Age of Onset    Hypertension Mother     High Cholesterol Mother        Allergies   Allergen Reactions    Morphine Angioedema        Prior to Admission medications    Medication Sig Start Date End Date Taking? Authorizing Provider   cholecalciferol (VITAMIN D3) 1,000 unit cap Take  by mouth daily. Yes Trung Suazo MD   metFORMIN (GLUCOPHAGE) 500 mg tablet TAKE 1 TABLET BY MOUTH TWICE A DAY WITH MEALS AND INCREASE AS DIRECTED 1/3/17  Yes [address] [place], MD   gabapentin (NEURONTIN) 100 mg capsule Take 1 Cap by mouth three (3) times daily. Take as needed for pain -caution this may make you drowsy 12/16/16  Yes [address] [place], MD   insulin lispro (HUMALOG) 100 unit/mL kwikpen 12 Units by SubCUTAneous route Before breakfast, lunch, and dinner. DX: E11.65 11/18/16  Yes [address] [place], MD   insulin detemir (LEVEMIR FLEXTOUCH) 100 unit/mL (3 mL) inpn 0.6 mL by SubCUTAneous route nightly. 60 units QHS 10/6/16  Yes [address] [place], MD   esomeprazole (NEXIUM) 40 mg capsule Take 1 Cap by mouth daily. 10/6/16  Yes [address] [place], MD   lisinopril (PRINIVIL, ZESTRIL) 40 mg tablet Take 1 Tab by mouth daily. 5/24/16  Yes Willard Santana MD   Insulin Needles, Disposable, (LASHA PEN NEEDLE) 32 gauge x 5/32\" ndle USe to inject insulin Dx: 250.02 10/6/16   Erasmo Del Castillo MD   sodium chloride (SALINE NASAL) 0.65 % nasal spray 1 [place] by Both Nostrils route as needed for Congestion. 10/6/16   Erasmo Del Castillo MD   atorvastatin (LIPITOR) 40 mg tablet Take 1 Tab by mouth daily. 5/24/16   Willard Santana MD   glucose blood VI test strips (BLOOD GLUCOSE TEST) strip Checks qac and qhs.  Dx: E11.65 Pt uses the True-Trak strips 5/20/16   Ольга Law MD   insulin syringe-needle U-100 Dispense ultrafine 1 mL syringes with 31 gauge needle 9/1/15   Chloe Farr MD   Blood-Glucose Meter monitoring kit Use to check BS qid 2/23/15   Chloe Farr MD   Lancets Misc Use to check sugars 13   Lauryn Tariq MD       REVIEW OF SYSTEMS:  Bold equals positive   Constitutional: fever Chills  weight loss  Eyes:   Diplopia visual changes  eye pain  ENT:   coryza  Sore throat Dysphagia  Respiratory:  cough without sputum, stopped lisinopril    Hemoptysis  dyspnea  Cards:  chest pain  orthopnea LE edema on left  GI:   Nausea/vomiting Diarrhea abdominal pain    melena  BRBPR  Genitourinary:  frequency  dysuria hematuria  Integument:  Rash Ulcers On feet bilaterally Nodules  Hematologic:  Easy bruising, negative gum/nose bleeding  Endocrine: Polyuria/poldipsia heat/cold intolerance  Musculoskel: Myalgias Joint pain/swelling/redness Gout  Neurological:  Headaches  focal motor or sensory changes    Objective:   VITALS:    Patient Vitals for the past 24 hrs:   Temp Pulse Resp BP SpO2   17 2230 - - - - 100 %   17 2215 - - - (!) 179/94 96 %   17 2149 - - - (!) 176/99 99 %   17 - - - - 99 %   17 - - - (!) 171/98 -   17 - - - (!) 189/93 99 %   17 1928 99.6 °F (37.6 °C) 78 16 (!) 170/102 99 %     Temp (24hrs), Av.6 °F (37.6 °C), Min:99.6 °F (37.6 °C), Max:99.6 °F (37.6 °C)      O2 Device: Room air    PHYSICAL EXAM:   General:    Alert, cooperative in no distress     HEENT: Normocephalic, atraumatic    PERRL, EOMI  Sclera no icterus    Nasal mucosa without masses or discharge  Hearing intact to voice    Oropharynx without erythema or exudate  Neck:  No meningismus, trachea midline, no carotid bruits     Thyroid not enlarged, no nodules or tenderness  Lungs:   Clear to auscultation bilaterally. No wheezing or rales    No accessory muscle use or retractions. Heart:   Regular rate and rhythm,  no murmur or gallop. 2+  LE edema on left    Peripheral pulses 2+, symmetric  Abdomen:   Soft, non-tender. Not distended.   Bowel sounds normal.     No masses, No Hepatosplenomegaly, No Rebound or guarding  Lymph nodes: No cervical or inguinal PARKER  Musculoskeletal:  S/p left foot TMA  Skin:      Left calf and foot swollen, mildly warm    1-2 inch round full thickness ulcer extending into SQ tissues at bottom of left TMA wound, foul smell    1-2 inch oblong full thickness ulcer with drainage, left lateral mid-foot    < 1 inch superfical ulcer on dorsum of right 1st MTP    Not Jaundiced      Normal capillary refill  Neurologic: Alert and oriented X 3, follows commands     Cranial nerves 2 to 12 intact    Symmetric motor strength bilaterally         LAB DATA REVIEWED:    Recent Results (from the past 24 hour(s))   CBC WITH AUTOMATED DIFF    Collection Time: 02/16/17  9:15 PM   Result Value Ref Range    WBC 10.9 4.1 - 11.1 K/uL    RBC 4.94 4.10 - 5.70 M/uL    HGB 12.6 12.1 - 17.0 g/dL    HCT 39.4 36.6 - 50.3 %    MCV 79.8 (L) 80.0 - 99.0 FL    MCH 25.5 (L) 26.0 - 34.0 PG    MCHC 32.0 30.0 - 36.5 g/dL    RDW 13.8 11.5 - 14.5 %    PLATELET 037 (H) 076 - 400 K/uL    NEUTROPHILS 66 32 - 75 %    LYMPHOCYTES 26 12 - 49 %    MONOCYTES 6 5 - 13 %    EOSINOPHILS 2 0 - 7 %    BASOPHILS 0 0 - 1 %    ABS. NEUTROPHILS 7.3 1.8 - 8.0 K/UL    ABS. LYMPHOCYTES 2.8 0.8 - 3.5 K/UL    ABS. MONOCYTES 0.6 0.0 - 1.0 K/UL    ABS. EOSINOPHILS 0.2 0.0 - 0.4 K/UL    ABS. BASOPHILS 0.0 0.0 - 0.1 K/UL   METABOLIC PANEL, COMPREHENSIVE    Collection Time: 02/16/17  9:15 PM   Result Value Ref Range    Sodium 140 136 - 145 mmol/L    Potassium 4.8 3.5 - 5.1 mmol/L    Chloride 107 97 - 108 mmol/L    CO2 26 21 - 32 mmol/L    Anion gap 7 5 - 15 mmol/L    Glucose 138 (H) 65 - 100 mg/dL    BUN 17 6 - 20 MG/DL    Creatinine 1.39 (H) 0.70 - 1.30 MG/DL    BUN/Creatinine ratio 12 12 - 20      GFR est AA >60 >60 ml/min/1.73m2    GFR est non-AA 55 (L) >60 ml/min/1.73m2    Calcium 8.1 (L) 8.5 - 10.1 MG/DL    Bilirubin, total 0.2 0.2 - 1.0 MG/DL    ALT (SGPT) 27 12 - 78 U/L    AST (SGOT) 35 15 - 37 U/L    Alk.  phosphatase 87 45 - 117 U/L    Protein, total 6.7 6.4 - 8.2 g/dL    Albumin 2.3 (L) 3.5 - 5.0 g/dL    Globulin 4.4 (H) 2.0 - 4.0 g/dL    A-G Ratio 0.5 (L) 1.1 - 2.2     LACTIC ACID, PLASMA    Collection Time: 02/16/17  9:15 PM   Result Value Ref Range    Lactic acid 1.5 0.4 - 2.0 MMOL/L   SED RATE (ESR)    Collection Time: 02/16/17  9:15 PM   Result Value Ref Range    Sed rate, automated 48 (H) 0 - 15 mm/hr   GLUCOSE, POC    Collection Time: 02/16/17 11:13 PM   Result Value Ref Range    Glucose (POC) 135 (H) 65 - 100 mg/dL    Performed by Anamaria Clemens        I have reviewed the results of all available imaging studies. Yes I have personally reviewed the actual    xray      Left foot x-ray FINDINGS:   Three views of the left foot demonstrate patient status post  transmetatarsal amputation. There is a small lucency distal aspect of the first  metatarsal where there is an ulceration. IMPRESSION: She is status post transmetatarsal amputation. There is a soft  tissue ulcer left first digit where there is a small lucency in the distal first  metatarsal which could represent chronic osteomyelitis    Right foot X-ray FINDINGS:   Three views of the right foot demonstrate degenerative and erosive  changes right first MTP joint. . There is soft tissue swelling. IMPRESSION: There are erosive/cystic and degenerative changes right first MTP  joint which could represent osteomyelitis. .  ___________________________________________________    Inpatient is warranted for this patient because they presents with left leg swelling, multiple foot ulcers  I have a high level of concern for diabetic foot infection  I anticipate the stay in the hospital will span at least 2 midnights. My assessment of the clinical condition and my plan of care is as outlined above  __________________________________________________    Care Plan discussed with:    Patient, wife, ED Doc     I saw the patient personally, took a history and did a complete physical exam at the bedside.  I performed complex decision making in coming up with a diagnostic and treatment plan for the patient. I reviewed the patient's past medical records, current laboratory and radiology results, and actual Xray films/EKG. I have also discussed this case with the involved ED physician.     Risk of deterioration:  High    Total Time Coordinating Admission:   65   minutes    Total Critical Care Time:     Admitting Physician: Avelino Meyer MD

## 2017-02-18 VITALS
DIASTOLIC BLOOD PRESSURE: 84 MMHG | WEIGHT: 296.3 LBS | RESPIRATION RATE: 18 BRPM | SYSTOLIC BLOOD PRESSURE: 175 MMHG | BODY MASS INDEX: 36.84 KG/M2 | TEMPERATURE: 98 F | OXYGEN SATURATION: 96 % | HEART RATE: 69 BPM | HEIGHT: 75 IN

## 2017-02-18 LAB
GLUCOSE BLD STRIP.AUTO-MCNC: 134 MG/DL (ref 65–100)
GLUCOSE BLD STRIP.AUTO-MCNC: 72 MG/DL (ref 65–100)
GLUCOSE BLD STRIP.AUTO-MCNC: 98 MG/DL (ref 65–100)
SERVICE CMNT-IMP: ABNORMAL
SERVICE CMNT-IMP: NORMAL
SERVICE CMNT-IMP: NORMAL

## 2017-02-18 PROCEDURE — 74011250637 HC RX REV CODE- 250/637: Performed by: PODIATRIST

## 2017-02-18 PROCEDURE — 74011250636 HC RX REV CODE- 250/636: Performed by: INTERNAL MEDICINE

## 2017-02-18 PROCEDURE — 74011250637 HC RX REV CODE- 250/637: Performed by: INTERNAL MEDICINE

## 2017-02-18 PROCEDURE — 74011636637 HC RX REV CODE- 636/637: Performed by: INTERNAL MEDICINE

## 2017-02-18 PROCEDURE — 82962 GLUCOSE BLOOD TEST: CPT

## 2017-02-18 RX ORDER — PANTOPRAZOLE SODIUM 40 MG/1
40 TABLET, DELAYED RELEASE ORAL
Status: DISCONTINUED | OUTPATIENT
Start: 2017-02-18 | End: 2017-02-18 | Stop reason: HOSPADM

## 2017-02-18 RX ADMIN — ENOXAPARIN SODIUM 40 MG: 40 INJECTION SUBCUTANEOUS at 09:14

## 2017-02-18 RX ADMIN — SODIUM CHLORIDE 75 ML/HR: 900 INJECTION, SOLUTION INTRAVENOUS at 04:13

## 2017-02-18 RX ADMIN — Medication 10 ML: at 13:02

## 2017-02-18 RX ADMIN — VITAMIN D, TAB 1000IU (100/BT) 1000 UNITS: 25 TAB at 09:13

## 2017-02-18 RX ADMIN — INSULIN LISPRO 6 UNITS: 100 INJECTION, SOLUTION INTRAVENOUS; SUBCUTANEOUS at 12:59

## 2017-02-18 RX ADMIN — ATORVASTATIN CALCIUM 40 MG: 40 TABLET, FILM COATED ORAL at 09:13

## 2017-02-18 RX ADMIN — GABAPENTIN 100 MG: 100 CAPSULE ORAL at 09:15

## 2017-02-18 RX ADMIN — PANTOPRAZOLE SODIUM 40 MG: 40 TABLET, DELAYED RELEASE ORAL at 09:14

## 2017-02-18 RX ADMIN — VALSARTAN 80 MG: 80 TABLET, FILM COATED ORAL at 09:13

## 2017-02-18 NOTE — PROGRESS NOTES
Dr Genoveva Marin called back and stated that he will call Dr Cj Marcano to inquire about the patients. 1430  Paged Dr Genoveva Marin again and waiting for call back    1500  Dr Genoveva Marin called back and stated that he talked to Dr Cj Marcano who stated that he never received the consult. Dr Genoveva Marin stated that he can only come to see the patient tomorrow. Patient is unwilling to wait until tomorrow he stated \" I am not waiting until tomorrow, I work tomorrow, I work in [place] I am calling my wife now. Will paged Dr Randall John covering for Dr walker to inform him. [address]  Dr Randall John paged    [phone]  Dr Randall John called back and acknowledged the information.

## 2017-02-18 NOTE — PROGRESS NOTES
Bedside and Verbal shift change report given to Madelin Ring (oncoming nurse) by Bridgette Shah RN (offgoing nurse). Report included the following information SBAR, Kardex, MAR, Recent Results and Med Rec Status.

## 2017-02-18 NOTE — PROGRESS NOTES
Bedside and Verbal shift change report given to Sarah Francisco RN (oncoming nurse) by Daniela Phillip RN (offgoing nurse). Report included the following information SBAR, Kardex, Intake/Output and MAR.

## 2017-02-18 NOTE — PROGRESS NOTES
Paged Dr Yanely Rosales to inquire about patient and talk to answering office and paged will be assigned to Dr Meghna Liz. Waiting for his call.

## 2017-02-18 NOTE — PROGRESS NOTES
S: Mr. Margarita Holm is a patient of Dr. Sumeet Meehan, who was seen by me today during rounds. At this time, he is resting comfortably. The patient has no new complaints today. Insists he wants to go home. ROS otherwise unremarkable except as noted elsewhere. O: Blood pressure 164/90, pulse 66, temperature 98.6 °F (37 °C), resp. rate 18, height 6' 3\" (1.905 m), weight 296 lb 4.8 oz (134.4 kg), SpO2 97 %. Gen: Patient is an obese AAM, in no acute distress. Affect flat to slightly hostile. Lungs: CTAB. Heart: RRR. Abd: S, NT, ND, BS present. Extremities: Warm. Ulcer R dorsal foot. Ulcer L lateral foot. Recent Results (from the past 12 hour(s))   GLUCOSE, POC    Collection Time: 02/17/17  9:37 PM   Result Value Ref Range    Glucose (POC) 231 (H) 65 - 100 mg/dL    Performed by Rhett Cruz    GLUCOSE, POC    Collection Time: 02/18/17  7:30 AM   Result Value Ref Range    Glucose (POC) 72 65 - 100 mg/dL    Performed by Rio Wetzel    GLUCOSE, POC    Collection Time: 02/18/17  7:58 AM   Result Value Ref Range    Glucose (POC) 98 65 - 100 mg/dL    Performed by Rio Wetzel         XR foot Left: She is status post transmetatarsal amputation. There is a soft tissue ulcer left first digit where there is a small lucency in the distal first metatarsal which could represent chronic osteomyelitis    XR foot Right: There are erosive/cystic and degenerative changes right first MTP joint which could represent osteomyelitis. Jamie Jud MRI left foot: 1. Curvilinear collection distal to the residual first metatarsal which may represent abscess versus bursal collection 2. Reactive marrow changes residual distal first metatarsal tarsal and entire fifth metatarsal    MRI right foot: Findings first MTP joint are favored to be degenerative. No drainable abscess or evidence of osteomyelitis      A / P:  1. DM infection/ulcer of foot--left, POA: Concerning for osteomyelitis on initial imaging--follow up MRI less concerning. Continue IV antibiotics. Dr. Aby Munoz consulted. Patient advised to remain in hospital for now, on IV antibiotics. May still need surgery. 2. Diabetic infection of right foot ([facility] (2/16/2017): As above. 3. HTN (hypertension) (7/23/2013): BP high this morning. 4. Hypercholesteremia ()  5. IDDM (insulin dependent diabetes mellitus) ([facility] (10/6/2016): Glc improved.    6. Edema of left lower extremity (2/17/2017)

## 2017-02-20 ENCOUNTER — PATIENT OUTREACH (OUTPATIENT)
Dept: INTERNAL MEDICINE CLINIC | Age: 47
End: 2017-02-20

## 2017-02-20 NOTE — PROGRESS NOTES
[facility] Discharge Follow-Up      Date/Time:  2017 4:15 PM    Patient listed on discharge [facility]) report on 2017  Patient discharged from [facility] for left foot infection. RRAT score: 10 Low    Medical History:     Past Medical History   Diagnosis Date    Diabetes ([facility]      since age 23    DM (diabetes mellitus) ([facility]     HTN (hypertension)     Hypercholesteremia     Hyperlipidemia        Nurse Navigator(NN) contacted the patient by telephone to perform post hospital discharge assessment. Verified  and address with patient as identifiers. Provided introduction to self, and explanation of the Nurses Navigator role. The patient states that he is feeling a little bit better today. He has not had an opportunity to check his blood sugar. Diet:   Patient reports: Diabetic Diet    Activity:    Patient reports: mostly moving around the house    Medication:   Performed medication reconciliation with patient, and patient verbalizes understanding of administration of home medications. There were no barriers to obtaining medications identified at this time. Support system:  patient and other:  family    Discharge Instructions :  Reviewed discharge instructions with patient. Patient verbalizes understanding of discharge instructions and follow-up care. Red Flags:  New onset foot pain, increased drainage from Left foot    Labs Reviewed:  Results for Joni Linda (MRN [id number]) as of 2017 16:17   Ref.  Range 2017 04:25   WBC Latest Ref Range: 4.1 - 11.1 K/uL 8.2   RBC Latest Ref Range: 4.10 - 5.70 M/uL 4.77   HGB Latest Ref Range: 12.1 - 17.0 g/dL 12.1   HCT Latest Ref Range: 36.6 - 50.3 % 38.0   MCV Latest Ref Range: 80.0 - 99.0 FL 79.7 (L)   MCH Latest Ref Range: 26.0 - 34.0 PG 25.4 (L)   MCHC Latest Ref Range: 30.0 - 36.5 g/dL 31.8   RDW Latest Ref Range: 11.5 - 14.5 % 13.8   PLATELET Latest Ref Range: 150 - 400 K/uL 382     Results for [facility] (MRN [id number]) as of 2/20/2017 16:17   Ref. Range 2/17/2017 04:25   Sodium Latest Ref Range: 136 - 145 mmol/L 143   Potassium Latest Ref Range: 3.5 - 5.1 mmol/L 3.5   Chloride Latest Ref Range: 97 - 108 mmol/L 109 (H)   CO2 Latest Ref Range: 21 - 32 mmol/L 26   Anion gap Latest Ref Range: 5 - 15 mmol/L 8   Glucose Latest Ref Range: 65 - 100 mg/dL 81   BUN Latest Ref Range: 6 - 20 MG/DL 17   Creatinine Latest Ref Range: 0.70 - 1.30 MG/DL 1.16   BUN/Creatinine ratio Latest Ref Range: 12 - 20   15   Calcium Latest Ref Range: 8.5 - 10.1 MG/DL 8.0 (L)   GFR est non-AA Latest Ref Range: >60 ml/min/1.73m2 >60   GFR est AA Latest Ref Range: >60 ml/min/1.73m2 >60       Imaging results reviewed:  [id number] MRI LEFT FOOT  FINDINGS: Patient is post transmetatarsal amputation. There is reactive marrow  edema within the remaining residual fifth metatarsal without cortical  destruction and minimal reactive marrow change within the distal first  metatarsal. Mild degenerative changes are noted in the midfoot.     Distal to the first metatarsal is a 8 x 25 x 29 mm crescentic complex  collection. There is dorsal edema. No fluid extending proximally within the  tendon sheaths. Reactive marrow changes are noted within the muscles likely due  to denervation      IMPRESSION:     1. Curvilinear collection distal to the residual first metatarsal which may  represent abscess versus bursal collection  2. Reactive marrow changes residual distal first metatarsal tarsal and entire  fifth metatarsal     PCP/Specialist follow up: Patient scheduled to follow up with Beni Means MD on [id number]. The patient states that he is aware of scheduled wound care appointment on . Reviewed red flags with patient, and patient verbalizes understanding. Patient given an opportunity to ask questions. No other clinical/social/functional needs noted.    The patient agrees to contact the PCP office for questions related to their healthcare. The patient expressed thanks, offered no additional questions and ended the call. Case management plan: Attempt to contact the patient by telephone or during office visit within the next 7-10 days. Will continue to follow as necessary for the next 30 days. Will reassess for case management needs prior to discharge from case management service on or about 30 days.

## 2017-02-21 LAB
BACTERIA SPEC CULT: NORMAL
SERVICE CMNT-IMP: NORMAL

## 2017-02-24 ENCOUNTER — ANESTHESIA (OUTPATIENT)
Dept: SURGERY | Age: 47
DRG: 478 | End: 2017-02-24
Payer: COMMERCIAL

## 2017-02-24 ENCOUNTER — HOSPITAL ENCOUNTER (INPATIENT)
Age: 47
LOS: 6 days | Discharge: HOME | DRG: 478 | End: 2017-03-02
Attending: EMERGENCY MEDICINE | Admitting: INTERNAL MEDICINE
Payer: COMMERCIAL

## 2017-02-24 ENCOUNTER — APPOINTMENT (OUTPATIENT)
Dept: GENERAL RADIOLOGY | Age: 47
DRG: 478 | End: 2017-02-24
Attending: PHYSICIAN ASSISTANT
Payer: COMMERCIAL

## 2017-02-24 ENCOUNTER — HOSPITAL ENCOUNTER (OUTPATIENT)
Dept: WOUND CARE | Age: 47
End: 2017-02-24

## 2017-02-24 ENCOUNTER — ANESTHESIA EVENT (OUTPATIENT)
Dept: SURGERY | Age: 47
DRG: 478 | End: 2017-02-24
Payer: COMMERCIAL

## 2017-02-24 DIAGNOSIS — R60.0 EDEMA OF LEFT LOWER EXTREMITY: Primary | ICD-10-CM

## 2017-02-24 DIAGNOSIS — I10 ESSENTIAL HYPERTENSION: ICD-10-CM

## 2017-02-24 DIAGNOSIS — R22.0 JAW SWELLING: ICD-10-CM

## 2017-02-24 DIAGNOSIS — M86.172 OSTEOMYELITIS OF FOOT, LEFT, ACUTE (HCC): ICD-10-CM

## 2017-02-24 DIAGNOSIS — M86.672 CHRONIC OSTEOMYELITIS OF LEFT FOOT (HCC): ICD-10-CM

## 2017-02-24 DIAGNOSIS — E11.628 DIABETIC INFECTION OF RIGHT FOOT (HCC): ICD-10-CM

## 2017-02-24 DIAGNOSIS — L08.9 DIABETIC INFECTION OF RIGHT FOOT (HCC): ICD-10-CM

## 2017-02-24 PROBLEM — M86.9 OSTEOMYELITIS (HCC): Status: ACTIVE | Noted: 2017-02-24

## 2017-02-24 LAB
ALBUMIN SERPL BCP-MCNC: 2 G/DL (ref 3.5–5)
ALBUMIN/GLOB SERPL: 0.4 {RATIO} (ref 1.1–2.2)
ALP SERPL-CCNC: 78 U/L (ref 45–117)
ALT SERPL-CCNC: 23 U/L (ref 12–78)
ANION GAP BLD CALC-SCNC: 10 MMOL/L (ref 5–15)
AST SERPL W P-5'-P-CCNC: 21 U/L (ref 15–37)
BASOPHILS # BLD AUTO: 0 K/UL (ref 0–0.1)
BASOPHILS # BLD: 0 % (ref 0–1)
BILIRUB SERPL-MCNC: 0.5 MG/DL (ref 0.2–1)
BUN SERPL-MCNC: 13 MG/DL (ref 6–20)
BUN/CREAT SERPL: 10 (ref 12–20)
CALCIUM SERPL-MCNC: 8.3 MG/DL (ref 8.5–10.1)
CHLORIDE SERPL-SCNC: 103 MMOL/L (ref 97–108)
CO2 SERPL-SCNC: 26 MMOL/L (ref 21–32)
CREAT SERPL-MCNC: 1.28 MG/DL (ref 0.7–1.3)
DIFFERENTIAL METHOD BLD: ABNORMAL
EOSINOPHIL # BLD: 0.1 K/UL (ref 0–0.4)
EOSINOPHIL NFR BLD: 1 % (ref 0–7)
ERYTHROCYTE [DISTWIDTH] IN BLOOD BY AUTOMATED COUNT: 13.7 % (ref 11.5–14.5)
GLOBULIN SER CALC-MCNC: 5.1 G/DL (ref 2–4)
GLUCOSE BLD STRIP.AUTO-MCNC: 137 MG/DL (ref 65–100)
GLUCOSE BLD STRIP.AUTO-MCNC: 139 MG/DL (ref 65–100)
GLUCOSE BLD STRIP.AUTO-MCNC: 64 MG/DL (ref 65–100)
GLUCOSE BLD STRIP.AUTO-MCNC: 85 MG/DL (ref 65–100)
GLUCOSE SERPL-MCNC: 100 MG/DL (ref 65–100)
HCT VFR BLD AUTO: 36.3 % (ref 36.6–50.3)
HGB BLD-MCNC: 11.4 G/DL (ref 12.1–17)
LYMPHOCYTES # BLD AUTO: 21 % (ref 12–49)
LYMPHOCYTES # BLD: 2.4 K/UL (ref 0.8–3.5)
MCH RBC QN AUTO: 24.9 PG (ref 26–34)
MCHC RBC AUTO-ENTMCNC: 31.4 G/DL (ref 30–36.5)
MCV RBC AUTO: 79.4 FL (ref 80–99)
MONOCYTES # BLD: 0.9 K/UL (ref 0–1)
MONOCYTES NFR BLD AUTO: 8 % (ref 5–13)
NEUTS SEG # BLD: 7.8 K/UL (ref 1.8–8)
NEUTS SEG NFR BLD AUTO: 70 % (ref 32–75)
PLATELET # BLD AUTO: 311 K/UL (ref 150–400)
POTASSIUM SERPL-SCNC: 3.9 MMOL/L (ref 3.5–5.1)
PROT SERPL-MCNC: 7.1 G/DL (ref 6.4–8.2)
RBC # BLD AUTO: 4.57 M/UL (ref 4.1–5.7)
RBC MORPH BLD: ABNORMAL
SERVICE CMNT-IMP: ABNORMAL
SERVICE CMNT-IMP: NORMAL
SODIUM SERPL-SCNC: 139 MMOL/L (ref 136–145)
WBC # BLD AUTO: 11.2 K/UL (ref 4.1–11.1)

## 2017-02-24 PROCEDURE — 76010000138 HC OR TIME 0.5 TO 1 HR: Performed by: PODIATRIST

## 2017-02-24 PROCEDURE — 76060000032 HC ANESTHESIA 0.5 TO 1 HR: Performed by: PODIATRIST

## 2017-02-24 PROCEDURE — 74011250636 HC RX REV CODE- 250/636: Performed by: ANESTHESIOLOGY

## 2017-02-24 PROCEDURE — 87205 SMEAR GRAM STAIN: CPT | Performed by: PHYSICIAN ASSISTANT

## 2017-02-24 PROCEDURE — 87075 CULTR BACTERIA EXCEPT BLOOD: CPT | Performed by: EMERGENCY MEDICINE

## 2017-02-24 PROCEDURE — 65270000029 HC RM PRIVATE

## 2017-02-24 PROCEDURE — 74011000258 HC RX REV CODE- 258: Performed by: PHYSICIAN ASSISTANT

## 2017-02-24 PROCEDURE — 77030018836 HC SOL IRR NACL ICUM -A: Performed by: PODIATRIST

## 2017-02-24 PROCEDURE — 36415 COLL VENOUS BLD VENIPUNCTURE: CPT | Performed by: EMERGENCY MEDICINE

## 2017-02-24 PROCEDURE — 82962 GLUCOSE BLOOD TEST: CPT

## 2017-02-24 PROCEDURE — 77030031139 HC SUT VCRL2 J&J -A: Performed by: PODIATRIST

## 2017-02-24 PROCEDURE — 77030035129: Performed by: PODIATRIST

## 2017-02-24 PROCEDURE — 74011000258 HC RX REV CODE- 258: Performed by: INTERNAL MEDICINE

## 2017-02-24 PROCEDURE — 96361 HYDRATE IV INFUSION ADD-ON: CPT

## 2017-02-24 PROCEDURE — 87205 SMEAR GRAM STAIN: CPT | Performed by: EMERGENCY MEDICINE

## 2017-02-24 PROCEDURE — 74011250637 HC RX REV CODE- 250/637: Performed by: INTERNAL MEDICINE

## 2017-02-24 PROCEDURE — 74011250636 HC RX REV CODE- 250/636

## 2017-02-24 PROCEDURE — 74011000250 HC RX REV CODE- 250: Performed by: PHYSICIAN ASSISTANT

## 2017-02-24 PROCEDURE — 74011000250 HC RX REV CODE- 250

## 2017-02-24 PROCEDURE — 88311 DECALCIFY TISSUE: CPT | Performed by: PODIATRIST

## 2017-02-24 PROCEDURE — 87147 CULTURE TYPE IMMUNOLOGIC: CPT | Performed by: EMERGENCY MEDICINE

## 2017-02-24 PROCEDURE — 76210000000 HC OR PH I REC 2 TO 2.5 HR: Performed by: PODIATRIST

## 2017-02-24 PROCEDURE — 99285 EMERGENCY DEPT VISIT HI MDM: CPT

## 2017-02-24 PROCEDURE — 77030018846 HC SOL IRR STRL H20 ICUM -A: Performed by: PODIATRIST

## 2017-02-24 PROCEDURE — 80053 COMPREHEN METABOLIC PANEL: CPT | Performed by: EMERGENCY MEDICINE

## 2017-02-24 PROCEDURE — 96365 THER/PROPH/DIAG IV INF INIT: CPT

## 2017-02-24 PROCEDURE — 96375 TX/PRO/DX INJ NEW DRUG ADDON: CPT

## 2017-02-24 PROCEDURE — 77030011640 HC PAD GRND REM COVD -A: Performed by: PODIATRIST

## 2017-02-24 PROCEDURE — 77030019895 HC PCKNG STRP IODO -A: Performed by: PODIATRIST

## 2017-02-24 PROCEDURE — 77030012961 HC IRR KT CYSTO/TUR ICUM -A: Performed by: PODIATRIST

## 2017-02-24 PROCEDURE — 74011250636 HC RX REV CODE- 250/636: Performed by: INTERNAL MEDICINE

## 2017-02-24 PROCEDURE — 74011250636 HC RX REV CODE- 250/636: Performed by: PHYSICIAN ASSISTANT

## 2017-02-24 PROCEDURE — 73630 X-RAY EXAM OF FOOT: CPT

## 2017-02-24 PROCEDURE — 85025 COMPLETE CBC W/AUTO DIFF WBC: CPT | Performed by: EMERGENCY MEDICINE

## 2017-02-24 PROCEDURE — 88307 TISSUE EXAM BY PATHOLOGIST: CPT | Performed by: PODIATRIST

## 2017-02-24 PROCEDURE — 87147 CULTURE TYPE IMMUNOLOGIC: CPT | Performed by: PHYSICIAN ASSISTANT

## 2017-02-24 RX ORDER — ZOLPIDEM TARTRATE 5 MG/1
5 TABLET ORAL
Status: DISCONTINUED | OUTPATIENT
Start: 2017-02-24 | End: 2017-03-02 | Stop reason: HOSPADM

## 2017-02-24 RX ORDER — ATORVASTATIN CALCIUM 40 MG/1
40 TABLET, FILM COATED ORAL DAILY
Status: DISCONTINUED | OUTPATIENT
Start: 2017-02-25 | End: 2017-03-02 | Stop reason: HOSPADM

## 2017-02-24 RX ORDER — DEXTROSE 50 % IN WATER (D50W) INTRAVENOUS SYRINGE
12.5-25 AS NEEDED
Status: DISCONTINUED | OUTPATIENT
Start: 2017-02-24 | End: 2017-03-02 | Stop reason: HOSPADM

## 2017-02-24 RX ORDER — MIDAZOLAM HYDROCHLORIDE 1 MG/ML
INJECTION, SOLUTION INTRAMUSCULAR; INTRAVENOUS AS NEEDED
Status: DISCONTINUED | OUTPATIENT
Start: 2017-02-24 | End: 2017-02-24 | Stop reason: HOSPADM

## 2017-02-24 RX ORDER — OXYCODONE AND ACETAMINOPHEN 10; 325 MG/1; MG/1
1 TABLET ORAL
Status: DISCONTINUED | OUTPATIENT
Start: 2017-02-24 | End: 2017-03-02 | Stop reason: HOSPADM

## 2017-02-24 RX ORDER — DIPHENHYDRAMINE HYDROCHLORIDE 50 MG/ML
12.5 INJECTION, SOLUTION INTRAMUSCULAR; INTRAVENOUS AS NEEDED
Status: DISCONTINUED | OUTPATIENT
Start: 2017-02-24 | End: 2017-02-24 | Stop reason: HOSPADM

## 2017-02-24 RX ORDER — SODIUM CHLORIDE 0.9 % (FLUSH) 0.9 %
5-10 SYRINGE (ML) INJECTION EVERY 8 HOURS
Status: DISCONTINUED | OUTPATIENT
Start: 2017-02-24 | End: 2017-02-24 | Stop reason: HOSPADM

## 2017-02-24 RX ORDER — PROPOFOL 10 MG/ML
INJECTION, EMULSION INTRAVENOUS AS NEEDED
Status: DISCONTINUED | OUTPATIENT
Start: 2017-02-24 | End: 2017-02-24 | Stop reason: HOSPADM

## 2017-02-24 RX ORDER — DEXTROSE 50 % IN WATER (D50W) INTRAVENOUS SYRINGE
25
Status: COMPLETED | OUTPATIENT
Start: 2017-02-24 | End: 2017-02-24

## 2017-02-24 RX ORDER — SODIUM CHLORIDE, SODIUM LACTATE, POTASSIUM CHLORIDE, CALCIUM CHLORIDE 600; 310; 30; 20 MG/100ML; MG/100ML; MG/100ML; MG/100ML
25 INJECTION, SOLUTION INTRAVENOUS CONTINUOUS
Status: DISCONTINUED | OUTPATIENT
Start: 2017-02-24 | End: 2017-02-24 | Stop reason: HOSPADM

## 2017-02-24 RX ORDER — SODIUM CHLORIDE 0.9 % (FLUSH) 0.9 %
5-10 SYRINGE (ML) INJECTION AS NEEDED
Status: DISCONTINUED | OUTPATIENT
Start: 2017-02-24 | End: 2017-02-24 | Stop reason: HOSPADM

## 2017-02-24 RX ORDER — PANTOPRAZOLE SODIUM 40 MG/1
40 TABLET, DELAYED RELEASE ORAL
Status: DISCONTINUED | OUTPATIENT
Start: 2017-02-25 | End: 2017-03-02 | Stop reason: HOSPADM

## 2017-02-24 RX ORDER — HYDROMORPHONE HYDROCHLORIDE 1 MG/ML
0.2 INJECTION, SOLUTION INTRAMUSCULAR; INTRAVENOUS; SUBCUTANEOUS
Status: DISCONTINUED | OUTPATIENT
Start: 2017-02-24 | End: 2017-02-24 | Stop reason: HOSPADM

## 2017-02-24 RX ORDER — INSULIN LISPRO 100 [IU]/ML
INJECTION, SOLUTION INTRAVENOUS; SUBCUTANEOUS
Status: DISCONTINUED | OUTPATIENT
Start: 2017-02-24 | End: 2017-03-02 | Stop reason: HOSPADM

## 2017-02-24 RX ORDER — FENTANYL CITRATE 50 UG/ML
25 INJECTION, SOLUTION INTRAMUSCULAR; INTRAVENOUS
Status: DISCONTINUED | OUTPATIENT
Start: 2017-02-24 | End: 2017-02-24 | Stop reason: HOSPADM

## 2017-02-24 RX ORDER — SODIUM CHLORIDE 0.9 % (FLUSH) 0.9 %
5-10 SYRINGE (ML) INJECTION AS NEEDED
Status: DISCONTINUED | OUTPATIENT
Start: 2017-02-24 | End: 2017-03-02 | Stop reason: HOSPADM

## 2017-02-24 RX ORDER — INSULIN GLARGINE 100 [IU]/ML
30 INJECTION, SOLUTION SUBCUTANEOUS
Status: DISCONTINUED | OUTPATIENT
Start: 2017-02-24 | End: 2017-03-02 | Stop reason: HOSPADM

## 2017-02-24 RX ORDER — LIDOCAINE HYDROCHLORIDE 20 MG/ML
INJECTION, SOLUTION EPIDURAL; INFILTRATION; INTRACAUDAL; PERINEURAL AS NEEDED
Status: DISCONTINUED | OUTPATIENT
Start: 2017-02-24 | End: 2017-02-24 | Stop reason: HOSPADM

## 2017-02-24 RX ORDER — FENTANYL CITRATE 50 UG/ML
INJECTION, SOLUTION INTRAMUSCULAR; INTRAVENOUS AS NEEDED
Status: DISCONTINUED | OUTPATIENT
Start: 2017-02-24 | End: 2017-02-24 | Stop reason: HOSPADM

## 2017-02-24 RX ORDER — LIDOCAINE HYDROCHLORIDE 10 MG/ML
0.1 INJECTION, SOLUTION EPIDURAL; INFILTRATION; INTRACAUDAL; PERINEURAL AS NEEDED
Status: DISCONTINUED | OUTPATIENT
Start: 2017-02-24 | End: 2017-02-24 | Stop reason: HOSPADM

## 2017-02-24 RX ORDER — LISINOPRIL 20 MG/1
40 TABLET ORAL DAILY
Status: DISCONTINUED | OUTPATIENT
Start: 2017-02-25 | End: 2017-03-02 | Stop reason: HOSPADM

## 2017-02-24 RX ORDER — DOCUSATE SODIUM 100 MG/1
100 CAPSULE, LIQUID FILLED ORAL
Status: DISCONTINUED | OUTPATIENT
Start: 2017-02-24 | End: 2017-03-02 | Stop reason: HOSPADM

## 2017-02-24 RX ORDER — SODIUM CHLORIDE 0.9 % (FLUSH) 0.9 %
5-10 SYRINGE (ML) INJECTION EVERY 8 HOURS
Status: DISCONTINUED | OUTPATIENT
Start: 2017-02-24 | End: 2017-03-02 | Stop reason: HOSPADM

## 2017-02-24 RX ORDER — ONDANSETRON 2 MG/ML
4 INJECTION INTRAMUSCULAR; INTRAVENOUS
Status: DISCONTINUED | OUTPATIENT
Start: 2017-02-24 | End: 2017-03-02 | Stop reason: HOSPADM

## 2017-02-24 RX ORDER — ACETAMINOPHEN 325 MG/1
650 TABLET ORAL
Status: DISCONTINUED | OUTPATIENT
Start: 2017-02-24 | End: 2017-03-02 | Stop reason: HOSPADM

## 2017-02-24 RX ORDER — MAGNESIUM SULFATE 100 %
4 CRYSTALS MISCELLANEOUS AS NEEDED
Status: DISCONTINUED | OUTPATIENT
Start: 2017-02-24 | End: 2017-03-02 | Stop reason: HOSPADM

## 2017-02-24 RX ORDER — GABAPENTIN 100 MG/1
100 CAPSULE ORAL 3 TIMES DAILY
Status: DISCONTINUED | OUTPATIENT
Start: 2017-02-24 | End: 2017-03-02 | Stop reason: HOSPADM

## 2017-02-24 RX ORDER — SODIUM CHLORIDE 9 MG/ML
100 INJECTION, SOLUTION INTRAVENOUS CONTINUOUS
Status: DISCONTINUED | OUTPATIENT
Start: 2017-02-24 | End: 2017-03-02 | Stop reason: HOSPADM

## 2017-02-24 RX ADMIN — PROPOFOL 30 MG: 10 INJECTION, EMULSION INTRAVENOUS at 17:07

## 2017-02-24 RX ADMIN — PROPOFOL 30 MG: 10 INJECTION, EMULSION INTRAVENOUS at 16:59

## 2017-02-24 RX ADMIN — SODIUM CHLORIDE 100 ML/HR: 900 INJECTION, SOLUTION INTRAVENOUS at 15:53

## 2017-02-24 RX ADMIN — PROPOFOL 30 MG: 10 INJECTION, EMULSION INTRAVENOUS at 17:03

## 2017-02-24 RX ADMIN — GABAPENTIN 100 MG: 100 CAPSULE ORAL at 22:40

## 2017-02-24 RX ADMIN — FENTANYL CITRATE 25 MCG: 50 INJECTION, SOLUTION INTRAMUSCULAR; INTRAVENOUS at 18:35

## 2017-02-24 RX ADMIN — FENTANYL CITRATE 50 MCG: 50 INJECTION, SOLUTION INTRAMUSCULAR; INTRAVENOUS at 16:52

## 2017-02-24 RX ADMIN — PROPOFOL 30 MG: 10 INJECTION, EMULSION INTRAVENOUS at 17:16

## 2017-02-24 RX ADMIN — Medication 10 ML: at 22:45

## 2017-02-24 RX ADMIN — MIDAZOLAM HYDROCHLORIDE 2 MG: 1 INJECTION, SOLUTION INTRAMUSCULAR; INTRAVENOUS at 16:46

## 2017-02-24 RX ADMIN — ZOLPIDEM TARTRATE 5 MG: 5 TABLET, FILM COATED ORAL at 22:40

## 2017-02-24 RX ADMIN — LIDOCAINE HYDROCHLORIDE 40 MG: 20 INJECTION, SOLUTION EPIDURAL; INFILTRATION; INTRACAUDAL; PERINEURAL at 16:52

## 2017-02-24 RX ADMIN — PROPOFOL 30 MG: 10 INJECTION, EMULSION INTRAVENOUS at 16:55

## 2017-02-24 RX ADMIN — FENTANYL CITRATE 25 MCG: 50 INJECTION, SOLUTION INTRAMUSCULAR; INTRAVENOUS at 18:50

## 2017-02-24 RX ADMIN — SODIUM CHLORIDE 100 ML/HR: 900 INJECTION, SOLUTION INTRAVENOUS at 18:55

## 2017-02-24 RX ADMIN — PROPOFOL 30 MG: 10 INJECTION, EMULSION INTRAVENOUS at 17:10

## 2017-02-24 RX ADMIN — SODIUM CHLORIDE 1000 ML: 900 INJECTION, SOLUTION INTRAVENOUS at 12:28

## 2017-02-24 RX ADMIN — PIPERACILLIN SODIUM,TAZOBACTAM SODIUM 3.38 G: 3; .375 INJECTION, POWDER, FOR SOLUTION INTRAVENOUS at 11:47

## 2017-02-24 RX ADMIN — SODIUM CHLORIDE, POTASSIUM CHLORIDE, SODIUM LACTATE AND CALCIUM CHLORIDE: 600; 310; 30; 20 INJECTION, SOLUTION INTRAVENOUS at 16:45

## 2017-02-24 RX ADMIN — PROPOFOL 30 MG: 10 INJECTION, EMULSION INTRAVENOUS at 17:13

## 2017-02-24 RX ADMIN — OXYCODONE HYDROCHLORIDE AND ACETAMINOPHEN 1 TABLET: 10; 325 TABLET ORAL at 22:40

## 2017-02-24 RX ADMIN — PROPOFOL 30 MG: 10 INJECTION, EMULSION INTRAVENOUS at 16:53

## 2017-02-24 RX ADMIN — PIPERACILLIN SODIUM,TAZOBACTAM SODIUM 3.38 G: 3; .375 INJECTION, POWDER, FOR SOLUTION INTRAVENOUS at 22:39

## 2017-02-24 RX ADMIN — FENTANYL CITRATE 25 MCG: 50 INJECTION, SOLUTION INTRAMUSCULAR; INTRAVENOUS at 18:44

## 2017-02-24 RX ADMIN — PROPOFOL 20 MG: 10 INJECTION, EMULSION INTRAVENOUS at 17:19

## 2017-02-24 RX ADMIN — FENTANYL CITRATE 50 MCG: 50 INJECTION, SOLUTION INTRAMUSCULAR; INTRAVENOUS at 16:57

## 2017-02-24 RX ADMIN — DEXTROSE MONOHYDRATE 25 G: 25 INJECTION, SOLUTION INTRAVENOUS at 15:53

## 2017-02-24 NOTE — IP AVS SNAPSHOT
[address] [facility] 
[phone] Patient: Liborio Varela MRN: [id number] :1970 You are allergic to the following Allergen Reactions Morphine Angioedema Recent Documentation Height  
  
  
  
  
  
 1.905 m Emergency Contacts Name Discharge Info Relation Home Work Mobile Cori Díaz  Spouse [3] [phone] About your hospitalization You were admitted on:  2017 You last received care in the:  [facility] 2 GENERAL SURGERY You were discharged on:  2017 Unit phone number:  [phone] Why you were hospitalized Your primary diagnosis was:  Not on File Your diagnoses also included:  Osteomyelitis (Hcc), Iddm (Insulin Dependent Diabetes Mellitus) (Hcc), Diabetic Infection Of Right Foot (Hcc), Htn (Hypertension) Providers Seen During Your Hospitalizations Provider Role Specialty Primary office phone Eb Watts MD Attending Provider Emergency Medicine [phone] Jaymie Lopez MD Attending Provider Internal Medicine [phone] Paul Jang MD Attending Provider [facility] [phone] Your Primary Care Physician (PCP) Primary Care Physician Office Phone Office Fax Harriett Meigs [facility] [phone] Follow-up Information Follow up With Details Comments Contact Info [address] [place] [address] 
[phone] In 1 week recheck labs Dia Howard DPM   [address] [facility] 
[phone] Current Discharge Medication List  
  
START taking these medications Dose & Instructions Dispensing Information Comments Morning Noon Evening Bedtime  
 ampicillin 2 g, ADDaptor 1 Device Your next dose is: Today, Tomorrow Other:  _________ Dose:  2 g  
2 g by IntraVENous route every six (6) hours for 7 days. Quantity:  21 Dose Refills:  0  
     
   
   
   
  
 oxyCODONE-acetaminophen  mg per tablet Commonly known as:  PERCOCET 10 Your next dose is: Today, Tomorrow Other:  _________ Dose:  1 Tab Take 1 Tab by mouth every six (6) hours as needed. Max Daily Amount: 4 Tabs. Quantity:  14 Tab Refills:  0  
     
   
   
   
  
 vancomycin in 0.9% Sodium Cl 2 gram/500 mL Soln Commonly known as:  [employer] Your next dose is: Today, Tomorrow Other:  _________ Dose:  2000 mg  
500 mL by IntraVENous route every twelve (12) hours for 42 days. Quantity:  00583 mL Refills:  0 CONTINUE these medications which have CHANGED Dose & Instructions Dispensing Information Comments Morning Noon Evening Bedtime  
 gabapentin 100 mg capsule Commonly known as:  NEURONTIN What changed:  Another medication with the same name was removed. Continue taking this medication, and follow the directions you see here. Your next dose is: Today, Tomorrow Other:  _________ Dose:  100 mg Take 100 mg by mouth two (2) times daily as needed for Pain. Refills:  0  
     
   
   
   
  
 sodium chloride 0.65 % nasal spray Commonly known as:  SALINE NASAL What changed:  reasons to take this Your next dose is: Today, Tomorrow Other:  _________ Dose:  1 Spray 1 [place] by Both Nostrils route as needed for Congestion. Quantity:  15 mL Refills:  0 CONTINUE these medications which have NOT CHANGED Dose & Instructions Dispensing Information Comments Morning Noon Evening Bedtime  
 atorvastatin 40 mg tablet Commonly known as:  LIPITOR Your next dose is: Today, Tomorrow Other:  _________ Dose:  40 mg Take 1 Tab by mouth daily. Quantity:  90 Tab Refills:  3 esomeprazole 40 mg capsule Commonly known as:  NexIUM Your next dose is: Today, Tomorrow Other:  _________ Dose:  40 mg Take 1 Cap by mouth daily. Quantity:  90 Cap Refills:  3  
     
   
   
   
  
 insulin detemir 100 unit/mL (3 mL) Inpn Commonly known as:  Andres Calvillo Your next dose is: Today, Tomorrow Other:  _________ Dose:  60 Units 0.6 mL by SubCUTAneous route nightly. 60 units QHS Quantity:  20 Pen Refills:  11  
     
   
   
   
  
 insulin lispro 100 unit/mL kwikpen Commonly known as:  HUMALOG Your next dose is: Today, Tomorrow Other:  _________ Dose:  12 Units 12 Units by SubCUTAneous route Before breakfast, lunch, and dinner. DX: E11.65 Quantity:  1 Package Refills:  11 LIPASE CONCENTRATE-HP PO Your next dose is: Today, Tomorrow Other:  _________ Take  by mouth daily. Refills:  0  
     
   
   
   
  
 lisinopril 40 mg tablet Commonly known as:  Kaci Swift Your next dose is: Today, Tomorrow Other:  _________ Dose:  40 mg Take 1 Tab by mouth daily. Quantity:  90 Tab Refills:  3  
     
   
   
   
  
 metFORMIN 500 mg tablet Commonly known as:  GLUCOPHAGE Your next dose is: Today, Tomorrow Other:  _________ TAKE 1 TABLET BY MOUTH TWICE A DAY WITH MEALS AND INCREASE AS DIRECTED Quantity:  75 Tab Refills:  0  
     
   
   
   
  
 VITAMIN D3 1,000 unit Cap Generic drug:  cholecalciferol Your next dose is: Today, Tomorrow Other:  _________ Dose:  2000 Units Take 2,000 Units by mouth daily. Refills:  0 Where to Get Your Medications Information on where to get these meds will be given to you by the nurse or doctor. ! Ask your nurse or doctor about these medications ampicillin 2 g, ADDaptor 1 Device  
 oxyCODONE-acetaminophen  mg per tablet  
 vancomycin in 0.9% Sodium Cl 2 gram/500 mL Soln Discharge Instructions PATIENT DISCHARGE INSTRUCTIONS PATIENT DISCHARGE INSTRUCTIONS Wong Young / [id number] : 1970 Admitted 2017 Discharged: 3/2/2017 · It is important that you take the medication exactly as they are prescribed. · Keep your medication in the bottles provided by the pharmacist and keep a list of the medication names, dosages, and times to be taken in your wallet. · Do not take other medications without consulting your doctor. What to do at [facility] Recommended Diet: Cardiac Diet and Diabetic Diet Recommended Activity: Activity as tolerated, no working for 4 weeks  Or per your surgeon If you experience any concerns please contact your doctor. Call Dr Mana Curiel office at  to schedule wound care dressing changes forT Signed By: Tati Palmer MD   
 2017 Discharge Orders None [employer]2us Announcement We are excited to announce that we are making your provider's discharge notes available to you in Vir2us. You will see these notes when they are completed and signed by the physician that discharged you from your recent hospital stay. If you have any questions or concerns about any information you see in Vir2us, please call the Health Information Department where you were seen or reach out to your Primary Care Provider for more information about your plan of care. Introducing [facility]! Dear Vanesa Gomez: Thank you for requesting a Vir2us account. Our records indicate that you already have an active Vir2us account. You can access your account anytime at [email or web address] Did you know that you can access your hospital and ER discharge instructions at any time in MyChart? You can also review all of your test results from your hospital stay or ER visit. Additional Information If you have questions, please visit the Frequently Asked Questions section of the [employer] website at [email or web address] Remember, MyChart is NOT to be used for urgent needs. For medical emergencies, dial 911. Now available from your iPhone and Android! General Information Please provide this summary of care documentation to your next provider. Patient Signature:  ____________________________________________________________ Date:  ____________________________________________________________  
  
Jennifer Quinnesec Provider Signature:  ____________________________________________________________ Date:  ____________________________________________________________

## 2017-02-24 NOTE — ANESTHESIA POSTPROCEDURE EVALUATION
Post-Anesthesia Evaluation and Assessment    Patient: Bailee Javier MRN: [id number]  SSN: [id number]    YOB: 1970  Age: 51 y.o. Sex: male       Cardiovascular Function/Vital Signs  Visit Vitals    /77    Pulse 71    Temp 37.2 °C (98.9 °F)    Resp 19    Ht 6' 3\" (1.905 m)    Wt 132 kg (291 lb 0.1 oz)    SpO2 100%    BMI 36.37 kg/m2       Patient is status post MAC anesthesia for Procedure(s):  Incision and Drainage with Debridement of left foot with bone bx. Nausea/Vomiting: None    Postoperative hydration reviewed and adequate. Pain:  Pain Scale 1: Numeric (0 - 10) (02/24/17 1629)  Pain Intensity 1: 0 (02/24/17 1629)   Managed    Neurological Status:   Neuro (WDL): Within Defined Limits (02/24/17 1628)   At baseline    Mental Status and Level of Consciousness: Arousable    Pulmonary Status:   O2 Device: Nasal cannula (02/24/17 6613)   Adequate oxygenation and airway patent    Complications related to anesthesia: None    Post-anesthesia assessment completed.  No concerns    Signed By: Diony Duenas MD     February 24, 2017

## 2017-02-24 NOTE — ANESTHESIA PREPROCEDURE EVALUATION
Anesthetic History   No history of anesthetic complications            Review of Systems / Medical History  Patient summary reviewed, nursing notes reviewed and pertinent labs reviewed    Pulmonary                Comments: Former smoker   Neuro/Psych   Within defined limits           Cardiovascular    Hypertension: well controlled          Hyperlipidemia    Exercise tolerance: <4 METS     GI/Hepatic/Renal  Within defined limits              Endo/Other    Diabetes: poorly controlled, type 2, using insulin    Obesity and anemia     Other Findings            Physical Exam    Airway  Mallampati: I  TM Distance: > 6 cm  Neck ROM: normal range of motion   Mouth opening: Normal     Cardiovascular    Rhythm: regular  Rate: normal         Dental  No notable dental hx       Pulmonary  Breath sounds clear to auscultation               Abdominal  GI exam deferred       Other Findings            Anesthetic Plan    ASA: 3  Anesthesia type: MAC and total IV anesthesia    Monitoring Plan: BIS      Induction: Intravenous  Anesthetic plan and risks discussed with: Patient

## 2017-02-24 NOTE — BRIEF OP NOTE
BRIEF OPERATIVE NOTE    Date of Procedure: 2/24/2017   Preoperative Diagnosis: infected left foot  Postoperative Diagnosis: abscess left foot    Procedure(s):  Incision and Drainage with Debridement of left foot with bone bx  Surgeon(s) and Role:     * Modesto Montenegro DPM - Primary            Surgical Staff:  Circ-1: Sohan Olson RN  Scrub Tech-1: Derek Pleaez  Event Time In   Incision Start 1702   Incision Close 94 20 56     Anesthesia: MAC   Estimated Blood Loss: 50mL  Specimens:   ID Type Source Tests Collected by Time Destination   1 : bone bx left foot Fresh   Matthew Hall DPM 2/24/2017 1719 Pathology   1 : abscess left foot Wound  CULTURE, ANAEROBIC, CULTURE, WOUND W [address] [place] [address] 2/24/2017 1724 Microbiology      Findings: Soft tissue necrosis and wet gangrene of the left lateral midfoot and left plantar distal medial foot. Noted 5cc of purulent drainage and non viable tissues. Healthy bleeding to all skin edges upon excision. Noted exposed 5th met base which had bone biopsy from that site.    Complications: None  Implants: * No implants in log *

## 2017-02-24 NOTE — ED NOTES
TRANSFER - OUT REPORT:    Verbal report given to Giuliana Stevens RN(name) on Judy Ship  being transferred to OR(unit) for ordered procedure       Report consisted of patients Situation, Background, Assessment and   Recommendations(SBAR). Information from the following report(s) SBAR and ED Summary was reviewed with the receiving nurse. Lines:   Peripheral IV 02/24/17 Right Hand (Active)   Site Assessment Clean, dry, & intact 2/24/2017 11:52 AM   Phlebitis Assessment 0 2/24/2017 11:52 AM   Infiltration Assessment 0 2/24/2017 11:52 AM   Dressing Status Clean, dry, & intact 2/24/2017 11:52 AM   Hub Color/Line Status Pink 2/24/2017 11:52 AM        Opportunity for questions and clarification was provided.       Patient transported with:   [employer]

## 2017-02-24 NOTE — PERIOP NOTES
Patient: Danielle Taveras MRN: [id number]  SSN: [id number]   YOB: 1970  Age: 51 y.o. Sex: male     Patient is status post Procedure(s):  Incision and Drainage with Debridement of left foot with bone bx. Surgeon(s) and Role:     * Linwood Almaraz, DPM - Primary    Local/Dose/Irrigation:                  Peripheral IV 02/24/17 Right Hand (Active)   Site Assessment Clean, dry, & intact 2/24/2017  4:38 PM   Phlebitis Assessment 0 2/24/2017  4:38 PM   Infiltration Assessment 0 2/24/2017  4:38 PM   Dressing Status Clean, dry, & intact 2/24/2017  4:38 PM   Dressing Type Tape;Transparent 2/24/2017  4:38 PM   Hub Color/Line Status Pink; Infusing;Flushed;Patent 2/24/2017  4:38 PM                           Dressing/Packing:  Wound Foot Left-DRESSING TYPE: 4 x 4;Elastic bandage;Gauze wrap (padmaja) (1/2 inch iodoform packing) (02/24/17 1700)  Splint/Cast:  ]    Other:        Patient: Danielle Taveras MRN: [id number]  SSN: [id number]   YOB: 1970  Age: 51 y.o. Sex: male     Patient is status post Procedure(s):  Incision and Drainage with Debridement of left foot with bone bx. Surgeon(s) and Role:     * Linwood Almaraz DPM - Primary    Local/Dose/Irrigation:                    Peripheral IV 02/24/17 Right Hand (Active)   Site Assessment Clean, dry, & intact 2/24/2017  4:38 PM   Phlebitis Assessment 0 2/24/2017  4:38 PM   Infiltration Assessment 0 2/24/2017  4:38 PM   Dressing Status Clean, dry, & intact 2/24/2017  4:38 PM   Dressing Type Tape;Transparent 2/24/2017  4:38 PM   Hub Color/Line Status Pink; Infusing;Flushed;Patent 2/24/2017  4:38 PM                           Dressing/Packing:  Wound Foot Left-DRESSING TYPE: 4 x 4;Elastic bandage;Gauze wrap (padmaja) (1/2 inch iodoform packing) (02/24/17 1700)  Splint/Cast:  ]    Other:

## 2017-02-24 NOTE — PERIOP NOTES
1845: Paged on call hospitalist to notify in pacu and need for orders; awaiting return call. 1855: Spoke with Dr Keren Rivera and made aware that patient is out of surgery. She states that he may go to room. IVF ordered. Hospitalist will see as soon as they are able. Nursing supervisor made aware.

## 2017-02-24 NOTE — PERIOP NOTES
TRANSFER - IN REPORT:    Verbal report received from Kevin Dale RN on Margrett Breath  being received from [facility] for ordered procedure      Report consisted of patients Situation, Background, Assessment and   Recommendations(SBAR). Information from the following report(s) SBAR, Kardex, Intake/Output, MAR and Recent Results was reviewed with the receiving nurse. Opportunity for questions and clarification was provided. Assessment completed upon patients arrival to unit and care assumed.

## 2017-02-24 NOTE — IP AVS SNAPSHOT
Current Discharge Medication List  
  
Take these medications at their scheduled times Dose & Instructions Dispensing Information Comments Morning Noon Evening Bedtime  
 ampicillin 2 g, ADDaptor 1 Device Your next dose is: Today, Tomorrow Other:  ____________ Dose:  2 g  
2 g by IntraVENous route every six (6) hours for 7 days. Quantity:  21 Dose Refills:  0  
     
   
   
   
  
 atorvastatin 40 mg tablet Commonly known as:  LIPITOR Your next dose is: Today, Tomorrow Other:  ____________ Dose:  40 mg Take 1 Tab by mouth daily. Quantity:  90 Tab Refills:  3  
     
   
   
   
  
 esomeprazole 40 mg capsule Commonly known as:  NexIUM Your next dose is: Today, Tomorrow Other:  ____________ Dose:  40 mg Take 1 Cap by mouth daily. Quantity:  90 Cap Refills:  3  
     
   
   
   
  
 insulin detemir 100 unit/mL (3 mL) Inpn Commonly known as:  Floy Dace Your next dose is: Today, Tomorrow Other:  ____________ Dose:  60 Units 0.6 mL by SubCUTAneous route nightly. 60 units QHS Quantity:  20 Pen Refills:  11  
     
   
   
   
  
 insulin lispro 100 unit/mL kwikpen Commonly known as:  HUMALOG Your next dose is: Today, Tomorrow Other:  ____________ Dose:  12 Units 12 Units by SubCUTAneous route Before breakfast, lunch, and dinner. DX: E11.65 Quantity:  1 Package Refills:  11 LIPASE CONCENTRATE-HP PO Your next dose is: Today, Tomorrow Other:  ____________ Take  by mouth daily. Refills:  0  
     
   
   
   
  
 lisinopril 40 mg tablet Commonly known as:  Yuly Primmer Your next dose is: Today, Tomorrow Other:  ____________ Dose:  40 mg Take 1 Tab by mouth daily. Quantity:  90 Tab Refills:  3 vancomycin in 0.9% Sodium Cl 2 gram/500 mL Soln Commonly known as:  [employer] Your next dose is: Today, Tomorrow Other:  ____________ Dose:  2000 mg  
500 mL by IntraVENous route every twelve (12) hours for 42 days. Quantity:  12281 mL Refills:  0  
     
   
   
   
  
 VITAMIN D3 1,000 unit Cap Generic drug:  cholecalciferol Your next dose is: Today, Tomorrow Other:  ____________ Dose:  2000 Units Take 2,000 Units by mouth daily. Refills:  0 Take these medications as needed Dose & Instructions Dispensing Information Comments Morning Noon Evening Bedtime  
 gabapentin 100 mg capsule Commonly known as:  NEURONTIN Your next dose is: Today, Tomorrow Other:  ____________ Dose:  100 mg Take 100 mg by mouth two (2) times daily as needed for Pain. Refills:  0  
     
   
   
   
  
 oxyCODONE-acetaminophen  mg per tablet Commonly known as:  PERCOCET 10 Your next dose is: Today, Tomorrow Other:  ____________ Dose:  1 Tab Take 1 Tab by mouth every six (6) hours as needed. Max Daily Amount: 4 Tabs. Quantity:  14 Tab Refills:  0  
     
   
   
   
  
 sodium chloride 0.65 % nasal spray Commonly known as:  SALINE NASAL Your next dose is: Today, Tomorrow Other:  ____________ Dose:  1 Spray 1 [place] by Both Nostrils route as needed for Congestion. Quantity:  15 mL Refills:  0 Take these medications as directed Dose & Instructions Dispensing Information Comments Morning Noon Evening Bedtime  
 metFORMIN 500 mg tablet Commonly known as:  GLUCOPHAGE Your next dose is: Today, Tomorrow Other:  ____________ TAKE 1 TABLET BY MOUTH TWICE A DAY WITH MEALS AND INCREASE AS DIRECTED Quantity:  75 Tab Refills:  0 Where to Get Your Medications Information about where to get these medications is not yet available ! Ask your nurse or doctor about these medications  
  ampicillin 2 g, ADDaptor 1 Device  
 oxyCODONE-acetaminophen  mg per tablet  
 vancomycin in 0.9% Sodium Cl 2 gram/500 mL Soln

## 2017-02-24 NOTE — ED NOTES
When obtaining wound culture, swab went into tunneling wound to L. Great toe area. Foul smell very apparent.

## 2017-02-25 LAB
ANION GAP BLD CALC-SCNC: 7 MMOL/L (ref 5–15)
BUN SERPL-MCNC: 10 MG/DL (ref 6–20)
BUN/CREAT SERPL: 9 (ref 12–20)
CALCIUM SERPL-MCNC: 7.5 MG/DL (ref 8.5–10.1)
CHLORIDE SERPL-SCNC: 107 MMOL/L (ref 97–108)
CO2 SERPL-SCNC: 25 MMOL/L (ref 21–32)
CREAT SERPL-MCNC: 1.12 MG/DL (ref 0.7–1.3)
ERYTHROCYTE [DISTWIDTH] IN BLOOD BY AUTOMATED COUNT: 13.9 % (ref 11.5–14.5)
GLUCOSE BLD STRIP.AUTO-MCNC: 106 MG/DL (ref 65–100)
GLUCOSE BLD STRIP.AUTO-MCNC: 177 MG/DL (ref 65–100)
GLUCOSE BLD STRIP.AUTO-MCNC: 203 MG/DL (ref 65–100)
GLUCOSE BLD STRIP.AUTO-MCNC: 219 MG/DL (ref 65–100)
GLUCOSE SERPL-MCNC: 189 MG/DL (ref 65–100)
HCT VFR BLD AUTO: 32.7 % (ref 36.6–50.3)
HGB BLD-MCNC: 10.4 G/DL (ref 12.1–17)
MCH RBC QN AUTO: 25.3 PG (ref 26–34)
MCHC RBC AUTO-ENTMCNC: 31.8 G/DL (ref 30–36.5)
MCV RBC AUTO: 79.6 FL (ref 80–99)
PLATELET # BLD AUTO: 290 K/UL (ref 150–400)
POTASSIUM SERPL-SCNC: 3.7 MMOL/L (ref 3.5–5.1)
RBC # BLD AUTO: 4.11 M/UL (ref 4.1–5.7)
SERVICE CMNT-IMP: ABNORMAL
SODIUM SERPL-SCNC: 139 MMOL/L (ref 136–145)
WBC # BLD AUTO: 8.5 K/UL (ref 4.1–11.1)

## 2017-02-25 PROCEDURE — 36415 COLL VENOUS BLD VENIPUNCTURE: CPT | Performed by: INTERNAL MEDICINE

## 2017-02-25 PROCEDURE — 74011250636 HC RX REV CODE- 250/636: Performed by: INTERNAL MEDICINE

## 2017-02-25 PROCEDURE — 74011000258 HC RX REV CODE- 258: Performed by: FAMILY MEDICINE

## 2017-02-25 PROCEDURE — 65270000029 HC RM PRIVATE

## 2017-02-25 PROCEDURE — 74011000258 HC RX REV CODE- 258: Performed by: INTERNAL MEDICINE

## 2017-02-25 PROCEDURE — 74011636637 HC RX REV CODE- 636/637: Performed by: INTERNAL MEDICINE

## 2017-02-25 PROCEDURE — 80048 BASIC METABOLIC PNL TOTAL CA: CPT | Performed by: INTERNAL MEDICINE

## 2017-02-25 PROCEDURE — 0QBP0ZX EXCISION OF LEFT METATARSAL, OPEN APPROACH, DIAGNOSTIC: ICD-10-PCS | Performed by: PODIATRIST

## 2017-02-25 PROCEDURE — 74011250637 HC RX REV CODE- 250/637: Performed by: INTERNAL MEDICINE

## 2017-02-25 PROCEDURE — 74011250636 HC RX REV CODE- 250/636: Performed by: FAMILY MEDICINE

## 2017-02-25 PROCEDURE — 0KBW0ZZ EXCISION OF LEFT FOOT MUSCLE, OPEN APPROACH: ICD-10-PCS | Performed by: PODIATRIST

## 2017-02-25 PROCEDURE — 85027 COMPLETE CBC AUTOMATED: CPT | Performed by: INTERNAL MEDICINE

## 2017-02-25 PROCEDURE — 82962 GLUCOSE BLOOD TEST: CPT

## 2017-02-25 RX ORDER — VANCOMYCIN 2 GRAM/500 ML IN 0.9 % SODIUM CHLORIDE INTRAVENOUS
2000 EVERY 12 HOURS
Status: DISCONTINUED | OUTPATIENT
Start: 2017-02-25 | End: 2017-02-26

## 2017-02-25 RX ADMIN — INSULIN GLARGINE 30 UNITS: 100 INJECTION, SOLUTION SUBCUTANEOUS at 01:51

## 2017-02-25 RX ADMIN — VANCOMYCIN HYDROCHLORIDE 3000 MG: 10 INJECTION, POWDER, LYOPHILIZED, FOR SOLUTION INTRAVENOUS at 13:42

## 2017-02-25 RX ADMIN — GABAPENTIN 100 MG: 100 CAPSULE ORAL at 15:43

## 2017-02-25 RX ADMIN — PANTOPRAZOLE SODIUM 40 MG: 40 TABLET, DELAYED RELEASE ORAL at 06:43

## 2017-02-25 RX ADMIN — INSULIN LISPRO 3 UNITS: 100 INJECTION, SOLUTION INTRAVENOUS; SUBCUTANEOUS at 16:40

## 2017-02-25 RX ADMIN — LISINOPRIL 40 MG: 20 TABLET ORAL at 08:57

## 2017-02-25 RX ADMIN — Medication 10 ML: at 06:44

## 2017-02-25 RX ADMIN — INSULIN LISPRO 3 UNITS: 100 INJECTION, SOLUTION INTRAVENOUS; SUBCUTANEOUS at 12:55

## 2017-02-25 RX ADMIN — OXYCODONE HYDROCHLORIDE AND ACETAMINOPHEN 1 TABLET: 10; 325 TABLET ORAL at 22:40

## 2017-02-25 RX ADMIN — PIPERACILLIN SODIUM,TAZOBACTAM SODIUM 3.38 G: 3; .375 INJECTION, POWDER, FOR SOLUTION INTRAVENOUS at 03:49

## 2017-02-25 RX ADMIN — ACETAMINOPHEN 650 MG: 325 TABLET, FILM COATED ORAL at 08:57

## 2017-02-25 RX ADMIN — Medication 10 ML: at 22:43

## 2017-02-25 RX ADMIN — PIPERACILLIN SODIUM,TAZOBACTAM SODIUM 3.38 G: 3; .375 INJECTION, POWDER, FOR SOLUTION INTRAVENOUS at 22:40

## 2017-02-25 RX ADMIN — ATORVASTATIN CALCIUM 40 MG: 40 TABLET, FILM COATED ORAL at 08:58

## 2017-02-25 RX ADMIN — ZOLPIDEM TARTRATE 5 MG: 5 TABLET, FILM COATED ORAL at 22:40

## 2017-02-25 RX ADMIN — OXYCODONE HYDROCHLORIDE AND ACETAMINOPHEN 1 TABLET: 10; 325 TABLET ORAL at 12:47

## 2017-02-25 RX ADMIN — INSULIN GLARGINE 30 UNITS: 100 INJECTION, SOLUTION SUBCUTANEOUS at 22:47

## 2017-02-25 RX ADMIN — GABAPENTIN 100 MG: 100 CAPSULE ORAL at 08:57

## 2017-02-25 RX ADMIN — ACETAMINOPHEN 650 MG: 325 TABLET, FILM COATED ORAL at 15:41

## 2017-02-25 RX ADMIN — GABAPENTIN 100 MG: 100 CAPSULE ORAL at 22:40

## 2017-02-25 NOTE — PROGRESS NOTES
End of Shift Nursing Note    Bedside shift change report given to Melinda Cortez (oncoming nurse) by Walter Tavarez RN (offgoing nurse). Report included the following information SBAR, Kardex and Intake/Output. Zone Phone:       Significant changes during shift:    none   Non-emergent issues for physician to address:   none     Number times ambulated in hallway past shift: 0      Number of times OOB to chair past shift: 1    POD #:      Vital Signs:    Temp: 99.1 °F (37.3 °C)     Pulse (Heart Rate): 66     BP: 131/89     Resp Rate: 16     O2 Sat (%): 99 %    Lines & Drains:     Urinary Catheter? No   Placement Date:    Medical Necessity:   Central Line? No   Placement Date:    Medical Necessity:   PICC Line? No   Placement Date:    Medical Necessity:     NG tube [] in [] removed [x] not applicable   Drains [] in [] removed [x] not applicable     Skin Integrity:      Wounds: yes   Dressings Present: yes    Wound Concerns: no      GI:    Current diet:  DIET REGULAR    Nausea: NO  Vomiting: NO  Bowel Sounds: YES  Flatus: YES  Last Bowel Movement: yesterday   Appearance:     Respiratory:  Supplemental O2: No      Device:    via  Liters/min     Incentive Spirometer: YES  Volume:   Coughing and Deep Breathing: YES  Oral Care: YES  Understanding (patient/family education): YES   Getting out of bed: YES  Head of bed elevation: YES    Patient Safety:    Falls Score: 2  Mobility Score: 2  Bed Alarm On? No  Sitter? No      Opportunity for questions and clarification was given to oncoming nurse. Patient bed is in low position, side rails are up x 2, door & observation blinds open as needed, call bell within reach and patient not in distress.     Cecilia Damon

## 2017-02-25 NOTE — PROGRESS NOTES
End of Shift Nursing Note    Bedside shift change report given to Verena Chun RN (oncoming nurse) by Leandra Mathur RN (offgoing nurse). Report included the following information SBAR, Kardex, Intake/Output, MAR and Recent Results. Zone Phone:       Significant changes during shift:    none   Non-emergent issues for physician to address:   none     Number times ambulated in hallway past shift: 0      Number of times OOB to chair past shift: 0    POD #:      Vital Signs:    Temp: 99.1 °F (37.3 °C)     Pulse (Heart Rate): 66     BP: 131/89     Resp Rate: 16     O2 Sat (%): 99 %    Lines & Drains:     Urinary Catheter? No   Placement Date:    Medical Necessity:   Central Line? No   Placement Date:    Medical Necessity:   PICC Line? No   Placement Date:    Medical Necessity:     NG tube [] in [] removed [x] not applicable   Drains [] in [] removed [x] not applicable     Skin Integrity:      Wounds: yes   Dressings Present: yes    Wound Concerns: no      GI:    Current diet:  DIET REGULAR    Nausea: NO  Vomiting: NO  Bowel Sounds: YES  Flatus: YES  Last Bowel Movement:    Appearance:     Respiratory:  Supplemental O2: No      Device:    via  Liters/min     Incentive Spirometer: YES  Volume:   Coughing and Deep Breathing: YES  Oral Care: YES  Understanding (patient/family education): YES   Getting out of bed: YES  Head of bed elevation: YES    Patient Safety:    Falls Score: 1  Mobility Score: 3  Bed Alarm On? No  Sitter? No      Opportunity for questions and clarification was given to oncoming nurse. Patient bed is in low position, side rails are up x 3, door & observation blinds open as needed, call bell within reach and patient not in distress.     Es Reddy RN

## 2017-02-25 NOTE — OP NOTES
[id number]   OP NOTE       Name:  Fouzia Wayne   MR#:  [id number]   :  1970   Account #:  [de-identified]    Surgery Date:  2017   Date of Adm:  2017       PREOPERATIVE DIAGNOSIS: Left transmetatarsal amputation, with   necrosis to 2 wound sites of the left foot. POSTOPERATIVE DIAGNOSIS: Left transmetatarsal amputation, with   necrosis to 2 wound sites of the left foot. PROCEDURES PERFORMED: Debridement and irrigation of left foot   wounds x2 to level of muscle on the first one and bone on the second. SURGEON: Yuni Pepe DPM    ANESTHESIA: MAC, with no local.    ESTIMATED BLOOD LOSS: 50 mL. SPECIMENS REMOVED: Intraoperative cultures were obtained from   each wound, and they were sent for aerobic, anaerobic, Gram stain,   and culture. There was also a percutaneous bone biopsy of the left fifth   metatarsal, which was obtained intraoperative and sent for pathology   for evaluation of osteomyelitis. PREOPERATIVE INDICATIONS: The patient is a 40-year-old male   diabetic patient who has a history of a left transmetatarsal amputation   over the last week. Had been admitted at one point for possible foot   infection at this time. Had been evaluated and seen, for which a   consult was successfully placed to my office and was never relayed or   transmitted to me. The patient was never seen, was discharged home,   and returned back to the ER after worsening symptoms or infection of   the left foot. Upon evaluation of the patient yesterday in the emergency   room, there was noted to be a recent history of chills, as well as local   swelling to the left lower extremity up to the level of the knee, with   worsening pain, edema, cellulitis, and noted malodor and necrosis to   the left foot wound x2.  Discussed with the patient about the need for   urgent operative intervention for evaluation of the wounds and the   depth level. Discussed with the patient about the need for possible bone   biopsy, if exposed, to evaluate for osteomyelitis as the x-rays were   clean; they did not show any signs of that. After all risks, benefits, and   alternatives were discussed with the patient, the patient agreed to   proceed with the elective procedure. OPERATIVE PROCEDURE: The patient was rolled to the operating   room, was laid on the table in supine position. A first preoperative   timeout was performed and confirmed that the left foot was the correct   operative site. Local anesthesia was not used. A tourniquet was used. The patient was then sedated with MAC. The patient was then prepped   with Betadine paint and scrub. Next, our second timeout was   performed to confirm that the left foot was the correct operative site. Next, our attention was directed to the first wound on the left distal   plantar foot. There was noted to be a 4 cm round full-thickness wound   with muscle exposed. There was noted to be no bleeding tissues. There was malodorous, purulent drainage coming from within the   wound. This did probe deep to the dorsal foot, with probing to bone. Upon full-thickness excision of the wound edges with a 15   blade, healthy bleeding skin edges were observed, and a rongeur was   used to remove all nonviable tissues from within the wound bed itself   until healthy bleeding tissue was observed. We debrided muscle with   the blade. There was no bone debrided from this first wound. After this   site was copiously irrigated with Pulsavac and 1500 mL of saline, the   site was packed with one-half-inch iodoform packing and then 4 x 4   gauze. Next, our attention was directed to the left lateral midfoot wound,   where the skin was necrotic and malodorous, with purulent drainage. A 15 blade was then used to make a full-thickness excision of the   wound until healthy bleeding skin edges were observed.  A rongeur   was used to remove all nonviable tissues from the wound. There was   exposed fifth metatarsal lateral prominence. After all bleeding tissues   were observed, we were able to debride some of the fifth metatarsal,   which was necrotic, as well as the periosteum from within the bone. All   of this was debrided and a percutaneous bone biopsy was obtained of   the left fifth metatarsal lateral prominence. This was sent for evaluation   for osteomyelitis of the left foot fifth metatarsal base. We will likely also   obtain an MRI, if that comes back positive, to obtain for further   osteomyelitis of the foot. The site was copiously irrigated with Pulsavac   with 1500 mL of normal saline. The patient was then covered up with   half-inch iodoform packing, 4 x 4 gauze, ABD wrap, 4 x 4's, Kerlix, and   an Ace wrap, applied to the left lower extremity. The patient was then   awakened and taken to the PACU with all vitals stable and intact. DISPOSITION: The patient will remain heel weightbearing to the left   lower extremity as needed. He was transferred to the hospitalist   service. Will follow with Dr. Katerin Alfonso once admitted,   and we will continue to follow up. The patient will likely need a wound   VAC in the postoperative state. We will plan for application of this on   Monday, as well as application for a home health wound VAC on   Monday as well. COMPLICATIONS: There were no complications. IMPLANTS: None.         WIL Esquivel / CO   D:  02/25/2017   09:22   T:  02/25/2017   10:17   Job #:  [id number]

## 2017-02-25 NOTE — PROGRESS NOTES
General Daily Progress Note    Admit Date: 2/24/2017  Hospital day 2    Subjective:     Patient has improving rt foot pain. .   Medication side effects: none    Current Facility-Administered Medications   Medication Dose Route Frequency    0.9% sodium chloride infusion  100 mL/hr IntraVENous CONTINUOUS    atorvastatin (LIPITOR) tablet 40 mg  40 mg Oral DAILY    pantoprazole (PROTONIX) tablet 40 mg  40 mg Oral ACB    gabapentin (NEURONTIN) capsule 100 mg  100 mg Oral TID    insulin glargine (LANTUS) injection 30 Units  30 Units SubCUTAneous QHS    lisinopril (PRINIVIL, ZESTRIL) tablet 40 mg  40 mg Oral DAILY    zolpidem (AMBIEN) tablet 5 mg  5 mg Oral QHS PRN    sodium chloride (NS) flush 5-10 mL  5-10 mL IntraVENous Q8H    sodium chloride (NS) flush 5-10 mL  5-10 mL IntraVENous PRN    acetaminophen (TYLENOL) tablet 650 mg  650 mg Oral Q4H PRN    ondansetron (ZOFRAN) injection 4 mg  4 mg IntraVENous Q4H PRN    docusate sodium (COLACE) capsule 100 mg  100 mg Oral BID PRN    insulin lispro (HUMALOG) injection   SubCUTAneous AC&HS    glucose chewable tablet 16 g  4 Tab Oral PRN    dextrose (D50W) injection syrg 12.5-25 g  12.5-25 g IntraVENous PRN    glucagon (GLUCAGEN) injection 1 mg  1 mg IntraMUSCular PRN    oxyCODONE-acetaminophen (PERCOCET 10)  mg per tablet 1 Tab  1 Tab Oral Q6H PRN    piperacillin-tazobactam (ZOSYN) 3.375 g in 0.9% sodium chloride (MBP/ADV) 100 mL  3.375 g IntraVENous Q6H        Review of Systems  Constitutional: negative  Respiratory: negative  Cardiovascular: negative  Gastrointestinal: negative  Musculoskeletal:positive for postop pain    Objective:     Patient Vitals for the past 8 hrs:   BP Temp Pulse Resp SpO2   02/25/17 0726 131/89 99.1 °F (37.3 °C) 66 16 99 %   02/25/17 0411 127/81 98.9 °F (37.2 °C) 60 18 100 %   02/25/17 0351 127/81 98.9 °F (37.2 °C) 60 18 100 %        02/23 1901 - 02/25 0700  In: 900 [I.V.:900]  Out: [address] [Urine:1050]    Physical Exam:   Visit Vitals    /89 (BP 1 Location: Right arm, BP Patient Position: At rest)    Pulse 66    Temp 99.1 °F (37.3 °C)    Resp 16    Ht 6' 3\" (1.905 m)    Wt 291 lb 0.1 oz (132 kg)    SpO2 99%    BMI 36.37 kg/m2     General appearance: alert, fatigued, cooperative, no distress, appears stated age  Lungs: clear to auscultation bilaterally  Heart: regular rate and rhythm  Abdomen: soft, non-tender. Bowel sounds normal. No masses,  no organomegaly  Extremities: extremities normal, bandaged foot not inspected          Data Review   Recent Results (from the past 24 hour(s))   CBC WITH AUTOMATED DIFF    Collection Time: 02/24/17 10:16 AM   Result Value Ref Range    WBC 11.2 (H) 4.1 - 11.1 K/uL    RBC 4.57 4.10 - 5.70 M/uL    HGB 11.4 (L) 12.1 - 17.0 g/dL    HCT 36.3 (L) 36.6 - 50.3 %    MCV 79.4 (L) 80.0 - 99.0 FL    MCH 24.9 (L) 26.0 - 34.0 PG    MCHC 31.4 30.0 - 36.5 g/dL    RDW 13.7 11.5 - 14.5 %    PLATELET 038 824 - 512 K/uL    NEUTROPHILS 70 32 - 75 %    LYMPHOCYTES 21 12 - 49 %    MONOCYTES 8 5 - 13 %    EOSINOPHILS 1 0 - 7 %    BASOPHILS 0 0 - 1 %    ABS. NEUTROPHILS 7.8 1.8 - 8.0 K/UL    ABS. LYMPHOCYTES 2.4 0.8 - 3.5 K/UL    ABS. MONOCYTES 0.9 0.0 - 1.0 K/UL    ABS. EOSINOPHILS 0.1 0.0 - 0.4 K/UL    ABS. BASOPHILS 0.0 0.0 - 0.1 K/UL    RBC COMMENTS NORMOCYTIC, NORMOCHROMIC      DF SMEAR SCANNED     METABOLIC PANEL, COMPREHENSIVE    Collection Time: 02/24/17 10:16 AM   Result Value Ref Range    Sodium 139 136 - 145 mmol/L    Potassium 3.9 3.5 - 5.1 mmol/L    Chloride 103 97 - 108 mmol/L    CO2 26 21 - 32 mmol/L    Anion gap 10 5 - 15 mmol/L    Glucose 100 65 - 100 mg/dL    BUN 13 6 - 20 MG/DL    Creatinine 1.28 0.70 - 1.30 MG/DL    BUN/Creatinine ratio 10 (L) 12 - 20      GFR est AA >60 >60 ml/min/1.73m2    GFR est non-AA >60 >60 ml/min/1.73m2    Calcium 8.3 (L) 8.5 - 10.1 MG/DL    Bilirubin, total 0.5 0.2 - 1.0 MG/DL    ALT (SGPT) 23 12 - 78 U/L    AST (SGOT) 21 15 - 37 U/L    Alk.  phosphatase 78 45 - 117 U/L    Protein, total 7.1 6.4 - 8.2 g/dL    Albumin 2.0 (L) 3.5 - 5.0 g/dL    Globulin 5.1 (H) 2.0 - 4.0 g/dL    A-G Ratio 0.4 (L) 1.1 - 2.2     CULTURE, WOUND W GRAM STAIN    Collection Time: 02/24/17 11:24 AM   Result Value Ref Range    Special Requests: NO SPECIAL REQUESTS      GRAM STAIN RARE  WBCS SEEN        GRAM STAIN 4+  APPARENT  GRAM NEGATIVE RODS        GRAM STAIN        OCCASIONAL  APPARENT  GRAM POSITIVE COCCI  IN PAIRS AND SHORT CHAINS      Culture result: PENDING    GLUCOSE, POC    Collection Time: 02/24/17  3:44 PM   Result Value Ref Range    Glucose (POC) 64 (L) 65 - 100 mg/dL    Performed by Duffy Collette    GLUCOSE, POC    Collection Time: 02/24/17  4:15 PM   Result Value Ref Range    Glucose (POC) 137 (H) 65 - 100 mg/dL    Performed by Duffy Collette    CULTURE, WOUND [place] STAIN    Collection Time: 02/24/17  5:24 PM   Result Value Ref Range    Special Requests: NO SPECIAL REQUESTS      GRAM STAIN NO WBC'S SEEN      GRAM STAIN NO ORGANISMS SEEN      Culture result: PENDING    GLUCOSE, POC    Collection Time: 02/24/17  5:50 PM   Result Value Ref Range    Glucose (POC) 85 65 - 100 mg/dL    Performed by [facility] [address], POC    Collection Time: 02/24/17  9:48 PM   Result Value Ref Range    Glucose (POC) 139 (H) 65 - 100 mg/dL    Performed by Tony Bueno    METABOLIC PANEL, BASIC    Collection Time: 02/25/17  3:53 AM   Result Value Ref Range    Sodium 139 136 - 145 mmol/L    Potassium 3.7 3.5 - 5.1 mmol/L    Chloride 107 97 - 108 mmol/L    CO2 25 21 - 32 mmol/L    Anion gap 7 5 - 15 mmol/L    Glucose 189 (H) 65 - 100 mg/dL    BUN 10 6 - 20 MG/DL    Creatinine 1.12 0.70 - 1.30 MG/DL    BUN/Creatinine ratio 9 (L) 12 - 20      GFR est AA >60 >60 ml/min/1.73m2    GFR est non-AA >60 >60 ml/min/1.73m2    Calcium 7.5 (L) 8.5 - 10.1 MG/DL   CBC W/O DIFF    Collection Time: 02/25/17  3:53 AM   Result Value Ref Range    WBC 8.5 4.1 - 11.1 K/uL    RBC 4.11 4.10 - 5.70 M/uL    HGB 10.4 (L) 12.1 - 17.0 g/dL    HCT 32.7 (L) 36.6 - 50.3 %    MCV 79.6 (L) 80.0 - 99.0 FL    MCH 25.3 (L) 26.0 - 34.0 PG    MCHC 31.8 30.0 - 36.5 g/dL    RDW 13.9 11.5 - 14.5 %    PLATELET 238 794 - 673 K/uL   GLUCOSE, POC    Collection Time: 02/25/17  7:30 AM   Result Value Ref Range    Glucose (POC) 106 (H) 65 - 100 mg/dL    Performed by Kash Fleming            Assessment:     Active Problems:    HTN (hypertension) (7/23/2013)      IDDM (insulin dependent diabetes mellitus) ([facility] (10/6/2016)      Diabetic infection of right foot ([facility] (2/16/2017)      Osteomyelitis ([facility] (2/24/2017)        Plan:     Feeling better,will add vanc. Awaiting culture

## 2017-02-25 NOTE — PROGRESS NOTES
Primary Nurse Aaron Robert RN and Althea Azevedo RN performed a dual skin assessment on this patient Impairment noted- see wound doc flow sheet  Noé score is 20

## 2017-02-25 NOTE — H&P
Hospitalist Admission Note    NAME: August Chatman   :  1970   MRN:  [id number]     Date/Time:  2017 9:27 PM    Patient PCP: Charles Sarmiento MD  ________________________________________________________________________    My assessment of this patient's clinical condition and my plan of care is as follows. Assessment / Plan:  Osteomyelitis of left foot, admitted with wet gangrene now s/p debridment   -will start zosyn, gram stain showing GNR and gram pos cocci  -f/u bone biopsy  -continue IV fluids  -podiatry to follow  -percocet prn pain    DM2  -on detemir  -glucose lower here 80s  -will hold long acting tonight and order lantus for tomorrow  -SSI  -hold metformin  -cont gabapentin    HTN  -lisinopril    HLD  -statin    Code Status: full  Surrogate Decision Maker: wife    DVT Prophylaxis: SCD  GI Prophylaxis: not indicated    Baseline: independent        Subjective:   CHIEF COMPLAINT: foot infection    HISTORY OF PRESENT ILLNESS:     Sidra Owens is a 51 y.o.  male with history of DM2, HTN who presents with worsening foot infection. Patient was admitted last week for diabetic foot ulcers and concern for osteomyelitis, per chart plan was to continue IV abx and eval for surgery, however patient left AMA. Patient said that he was admitted for days and did not receive any antibiotics and the podiatrist did not see him, so that is why he left. His left foot infection has gotten worse, he says it started to hurt more with foul smell and swelling of his entire left lower leg. Has had some chills, no nausea or vomiting. Was following in wound care clinic. Has ulcer of right foot also but that is not as symptomatic. Patient was taken to OR from the ED for debridement and biopsy. We were asked to admit for work up and evaluation of the above problems.      Past Medical History:   Diagnosis Date    Diabetes [facility])     since age 23    [facility]     HTN (hypertension)     Hypercholesteremia     Hyperlipidemia         Past Surgical History:   Procedure Laterality Date    HX AMPUTATION      toes- left foot    HX BUNIONECTOMY      HX ORTHOPAEDIC      boutinerre deformity    HX ORTHOPAEDIC      fascia removed left foot    HX TONSILLECTOMY      HX WISDOM TEETH EXTRACTION         Social History   Substance Use Topics    Smoking status: Former Smoker     Types: Cigars     Quit date: 4/22/2011    Smokeless tobacco: Never Used    Alcohol use 0.0 oz/week     1 Glasses of wine, 0 Standard drinks or equivalent per week      Comment: stop drinking 5 months ago        Family History   Problem Relation Age of Onset    Hypertension Mother     High Cholesterol Mother      Allergies   Allergen Reactions    Morphine Angioedema        Prior to Admission medications    Medication Sig Start Date End Date Taking? Authorizing Provider   cholecalciferol (VITAMIN D3) 1,000 unit cap Take  by mouth daily. Yes Trung Suazo MD   metFORMIN (GLUCOPHAGE) 500 mg tablet TAKE 1 TABLET BY MOUTH TWICE A DAY WITH MEALS AND INCREASE AS DIRECTED 1/3/17  Yes Sancho Bates MD   gabapentin (NEURONTIN) 100 mg capsule Take 1 Cap by mouth three (3) times daily. Take as needed for pain -caution this may make you drowsy 12/16/16  Yes Sancho Bates MD   insulin lispro (HUMALOG) 100 unit/mL kwikpen 12 Units by SubCUTAneous route Before breakfast, lunch, and dinner. DX: E11.65 11/18/16  Yes Sancho Bates MD   insulin detemir (LEVEMIR FLEXTOUCH) 100 unit/mL (3 mL) inpn 0.6 mL by SubCUTAneous route nightly. 60 units QHS 10/6/16  Yes Sancho Bates MD   esomeprazole (NEXIUM) 40 mg capsule Take 1 Cap by mouth daily. 10/6/16  Yes Sancho Bates MD   sodium chloride (SALINE NASAL) 0.65 % nasal spray 1 [place] by Both Nostrils route as needed for Congestion.  10/6/16  Yes Sancho Bates MD   lisinopril (PRINIVIL, ZESTRIL) 40 mg tablet Take 1 Tab by mouth daily. 5/24/16  Yes EMILIA Santo MD   atorvastatin (LIPITOR) 40 mg tablet Take 1 Tab by mouth daily. 5/24/16  Yes EMILIA Santo MD   Insulin Needles, Disposable, (LASHA PEN NEEDLE) 32 gauge x 5/32\" ndle USe to inject insulin Dx: 250.02 10/6/16   [address] [place] [address] MD Immanuel   glucose blood VI test strips (BLOOD GLUCOSE TEST) strip Checks qac and qhs.  Dx: E11.65 Pt uses the True-Trak strips 5/20/16   Abdiel Mcdaniels MD   insulin syringe-needle U-100 Dispense ultrafine 1 mL syringes with 31 gauge needle 9/1/15   Kamryn Booth MD   Blood-Glucose Meter monitoring kit Use to check BS qid 2/23/15   Kamryn Booth MD   Lancets Misc Use to check sugars 11/1/13   Kamryn Booth MD       REVIEW OF SYSTEMS:         Total of 12 systems reviewed as follows:         General: + fever, + chills, no sweats, no generalized weakness, no weight loss, no weight gain, no loss of appetite   Eyes: no blurred vision, no eye pain, no loss of vision, no double vision  ENT: no rhinorrhea, no pharyngitis   Respiratory: no cough, no sputum production, no SOB, no GARCIA, no wheezing, no pleuritic pain   Cardiology: no chest pain, no palpitations, no orthopnea, no PND, +edema, no syncope   Gastrointestinal: no abdominal pain, no N/V, no diarrhea, no dysphagia, no constipation, no bleeding   Genitourinary: no frequency, no urgency, no dysuria, no hematuria, no incontinence   Muskuloskeletal : no arthralgia, no myalgia, no back pain  Hematology: no easy bruising, no nose or gum bleeding, no lymphadenopathy   Dermatological: +rash, +ulceration, no pruritis, no color change / jaundice  Endocrine: no hot flashes, no polydipsia   Neurological: no headache, no dizziness, no confusion, no focal weakness, no paresthesia, no speech difficulties, no memory loss, no gait difficulty  Psychological: no anxiety, no depression, no agitation      Objective:   VITALS:    Patient Vitals for the past 12 hrs:   Temp Pulse Resp BP SpO2   02/24/17 2019 99.4 °F (37.4 °C) 78 22 152/89 99 %   02/24/17 1956 - 80 22 - 98 %   02/24/17 1945 - 72 30 146/76 99 %   02/24/17 1930 99.1 °F (37.3 °C) 71 27 141/74 100 %   02/24/17 1915 - 83 (!) 34 145/77 99 %   02/24/17 1900 - 73 24 135/84 99 %   02/24/17 1845 - 71 27 142/83 100 %   02/24/17 1830 - 73 27 150/76 100 %   02/24/17 1815 - 72 23 145/87 100 %   02/24/17 1800 - 71 19 136/77 100 %   02/24/17 1755 - 74 19 136/74 100 %   02/24/17 1750 - 74 17 130/77 100 %   02/24/17 1745 - 73 14 130/75 100 %   02/24/17 1743 98.9 °F (37.2 °C) 75 24 130/71 99 %   02/24/17 1742 - - - 130/71 -   02/24/17 1629 99.5 °F (37.5 °C) 76 16 - 97 %   02/24/17 1552 - 85 16 (!) 144/91 100 %   02/24/17 1230 - 77 16 153/89 98 %         PHYSICAL EXAM:    General:    Alert, cooperative, no distress, appears stated age. HEENT: Atraumatic, anicteric sclerae, pink conjunctivae     No oral ulcers, oral mucosa moist, throat clear, dentition fair  Neck:  Supple, symmetrical  Lungs:   Clear to auscultation bilaterally, no wheezing, rhonchi, or rales. Chest wall:  No tenderness, no accessory muscle use. Heart:   Regular rhythm, no murmur, unilateral left leg edema  Abdomen:   Soft, non-tender, not distended, bowel sounds normal  Extremities: No cyanosis, no clubbing, warm, well perfused, radial pulse 2+, left foot in dressing, clear no drainage, non tender, right dorsal ulcer is dressed, LLE warm with mild edema  Skin:     Not pale, not jaundiced, no rashes   Psych:  Good insight, not depressed, not anxious or agitated.   Neurologic: EOMs intact, face symmetric, no aphasia or slurred speech, strength 5/5 in BUE, 5/5 in BLE, sensation grossly intact, alert and oriented x 4.     _______________________________________________________________________  Care Plan discussed with:    Comments   Patient x    Family  x    RN     Care Manager                    Consultant:      _______________________________________________________________________  Expected  Disposition: Home with Family x   HH/PT/OT/RN    SNF/LTC    CHARLIE    ________________________________________________________________________  TOTAL TIME:  72 Minutes    Critical Care Provided     Minutes non procedure based      Comments     Reviewed previous records   >50% of visit spent in counseling and coordination of care  Discussion with patient and/or family and questions answered       ________________________________________________________________________  Chidi Erickson MD    Procedures: see electronic medical records for all procedures/Xrays and details which were not copied into this note but were reviewed prior to creation of Plan. LAB DATA REVIEWED:    Recent Results (from the past 24 hour(s))   CBC WITH AUTOMATED DIFF    Collection Time: 02/24/17 10:16 AM   Result Value Ref Range    WBC 11.2 (H) 4.1 - 11.1 K/uL    RBC 4.57 4.10 - 5.70 M/uL    HGB 11.4 (L) 12.1 - 17.0 g/dL    HCT 36.3 (L) 36.6 - 50.3 %    MCV 79.4 (L) 80.0 - 99.0 FL    MCH 24.9 (L) 26.0 - 34.0 PG    MCHC 31.4 30.0 - 36.5 g/dL    RDW 13.7 11.5 - 14.5 %    PLATELET 288 735 - 826 K/uL    NEUTROPHILS 70 32 - 75 %    LYMPHOCYTES 21 12 - 49 %    MONOCYTES 8 5 - 13 %    EOSINOPHILS 1 0 - 7 %    BASOPHILS 0 0 - 1 %    ABS. NEUTROPHILS 7.8 1.8 - 8.0 K/UL    ABS. LYMPHOCYTES 2.4 0.8 - 3.5 K/UL    ABS. MONOCYTES 0.9 0.0 - 1.0 K/UL    ABS. EOSINOPHILS 0.1 0.0 - 0.4 K/UL    ABS.  BASOPHILS 0.0 0.0 - 0.1 K/UL    RBC COMMENTS NORMOCYTIC, NORMOCHROMIC      DF SMEAR SCANNED     METABOLIC PANEL, COMPREHENSIVE    Collection Time: 02/24/17 10:16 AM   Result Value Ref Range    Sodium 139 136 - 145 mmol/L    Potassium 3.9 3.5 - 5.1 mmol/L    Chloride 103 97 - 108 mmol/L    CO2 26 21 - 32 mmol/L    Anion gap 10 5 - 15 mmol/L    Glucose 100 65 - 100 mg/dL    BUN 13 6 - 20 MG/DL    Creatinine 1.28 0.70 - 1.30 MG/DL    BUN/Creatinine ratio 10 (L) 12 - 20      GFR est AA >60 >60 ml/min/1.73m2    GFR est non-AA >60 >60 ml/min/1.73m2    Calcium 8.3 (L) 8.5 - 10.1 MG/DL Bilirubin, total 0.5 0.2 - 1.0 MG/DL    ALT (SGPT) 23 12 - 78 U/L    AST (SGOT) 21 15 - 37 U/L    Alk.  phosphatase 78 45 - 117 U/L    Protein, total 7.1 6.4 - 8.2 g/dL    Albumin 2.0 (L) 3.5 - 5.0 g/dL    Globulin 5.1 (H) 2.0 - 4.0 g/dL    A-G Ratio 0.4 (L) 1.1 - 2.2     CULTURE, WOUND W GRAM STAIN    Collection Time: 02/24/17 11:24 AM   Result Value Ref Range    Special Requests: NO SPECIAL REQUESTS      GRAM STAIN RARE  WBCS SEEN        GRAM STAIN 4+  APPARENT  GRAM NEGATIVE RODS        GRAM STAIN        OCCASIONAL  APPARENT  GRAM POSITIVE COCCI  IN PAIRS AND SHORT CHAINS      Culture result: PENDING    GLUCOSE, POC    Collection Time: 02/24/17  3:44 PM   Result Value Ref Range    Glucose (POC) 64 (L) 65 - 100 mg/dL    Performed by [id number] [address], POC    Collection Time: 02/24/17  4:15 PM   Result Value Ref Range    Glucose (POC) 137 (H) 65 - 100 mg/dL    Performed by Duffy Collette    GLUCOSE, POC    Collection Time: 02/24/17  5:50 PM   Result Value Ref Range    Glucose (POC) 85 65 - 100 mg/dL    Performed by Veronica Adrian

## 2017-02-25 NOTE — PROGRESS NOTES
Pharmacy Automatic Renal Dosing Protocol - Antimicrobials    Indication for Antimicrobials: Osteomyelitis Left Foot, Wet gangrene, w/p Debridement     Current Regimen of Each Antimicrobial (Start Day & Day of Therapy):  Zosyn 3.375gm IV q6h; start ; Day #1  Vancomycin; start ; Day #1    Goal Vancomycin Level (if needed): 15-20  Level(s) Ordered (if needed): n/a  Measured / Extrapolated Vancomycin Levels (if drawn): n/a   / n/a    Significant Cultures:   : Foot Wound: GPC pairs & short chains (pending)  : Abscess: (pending)  : Abscess-Anaerobic: (pending)  : XRay Left Foot: Transmetatarsal amputations with soft tissue swelling and large  ulcer. No radiographic evidence of osteomyelitis. CAPD, Hemodialysis or Renal Replacement Therapy: n/a   Paralysis, amputations, malnutrition: n/a    Recent Labs      17   0353  17   1016   CREA  1.12  1.28   BUN  10  13   WBC  8.5  11.2*     Temp (24hrs), Av.1 °F (37.3 °C), Min:98.9 °F (37.2 °C), Max:99.5 °F (37.5 °C)    Creatinine Clearance (Creatinine Clearance (ml/min)): 97    Impression/Plan: Vancomycin 3gm IV x1, then 2gm IV q12h  Change Zosyn to 3.375gm IV q8h to infuse over 4hrs  Daily BMP       Pharmacy will follow daily and adjust medications as appropriate for renal function and/or serum levels.     Thank you,  Bob Le, [facility]     Renal Dosing Tables on [employer]

## 2017-02-25 NOTE — PROGRESS NOTES
Progress Note    Patient: Mandi Guillen MRN: [id number]  SSN: [id number]    YOB: 1970  Age: 51 y.o. Sex: male      Admit Date: 2/24/2017  1 Day Post-Op     Procedure:   Procedure(s):  Incision and Drainage with Debridement of left foot with bone bx    Subjective:     Patient seen resting quiet and comfortably and no apparent distress. Pain on scale of 4. Status post: ulcer and full thickness bone exposed to the left foot wounds x2    Objective:     Visit Vitals    /89 (BP 1 Location: Right arm, BP Patient Position: At rest)    Pulse 66    Temp 99.1 °F (37.3 °C)    Resp 16    Ht 6' 3\" (1.905 m)    Wt 132 kg (291 lb 0.1 oz)    SpO2 99%    BMI 36.37 kg/m2        Physical Exam:  Left foot lateral midfoot wound with 5th metatarsal base exposed. 80% granular. No purulence or malodor today. The left distal foot ulcer is full thickness and 80% granular. No signs of infection, or exposed bone. Healthy bleeding to tissues during surgery and today. Labs/Radiology Review: images and reports reviewed    Assessment:     Hospital Problems  Date Reviewed: 2/25/2017          Codes Class Noted POA    Osteomyelitis [facility]) ICD-10-CM: M86.9  ICD-9-CM: 730.20  2/24/2017 Unknown        Diabetic infection of right foot [facility]) ICD-10-CM: E11.69, L08.9  ICD-9-CM: 250.80, 686.9  2/16/2017 Yes        IDDM (insulin dependent diabetes mellitus) ([facility] ICD-10-CM: E11.9, Z79.4  ICD-9-CM: 250.00, V58.67  10/6/2016 Yes        HTN (hypertension) ICD-10-CM: I10  ICD-9-CM: 401.9  7/23/2013 Yes              Plan/Recommendations/Medical Decision Making:     Continue present treatment    Activity: left foot heel weight bearing in postop shoe    -Pending operative cultures of soft tissues, and bone biopsy of the left 5th metatarsal for osteomyelitis. -Bandage changed today.   -Will plan for wound vac on Monday  -Will likely need home wound vac upon discharge also.      Signed By: Ventura Kendall DPM February 25, 2017

## 2017-02-25 NOTE — PERIOP NOTES
TRANSFER - OUT REPORT:    Verbal report given to Brenna EDUARDO(name) on Ivan Rodriguez  being transferred to 2134  (unit) for routine post - op       Report consisted of patients Situation, Background, Assessment and   Recommendations(SBAR). Information from the following report(s) OR Summary, Intake/Output and MAR was reviewed with the receiving nurse. Opportunity for questions and clarification was provided.       Patient transported with:   O2 @ 2 liters  Tech

## 2017-02-26 LAB
ANION GAP BLD CALC-SCNC: 11 MMOL/L (ref 5–15)
BACTERIA SPEC CULT: ABNORMAL
BACTERIA SPEC CULT: NORMAL
BUN SERPL-MCNC: 7 MG/DL (ref 6–20)
BUN/CREAT SERPL: 6 (ref 12–20)
CALCIUM SERPL-MCNC: 7.3 MG/DL (ref 8.5–10.1)
CHLORIDE SERPL-SCNC: 109 MMOL/L (ref 97–108)
CO2 SERPL-SCNC: 23 MMOL/L (ref 21–32)
CREAT SERPL-MCNC: 1.13 MG/DL (ref 0.7–1.3)
GLUCOSE BLD STRIP.AUTO-MCNC: 110 MG/DL (ref 65–100)
GLUCOSE BLD STRIP.AUTO-MCNC: 124 MG/DL (ref 65–100)
GLUCOSE BLD STRIP.AUTO-MCNC: 157 MG/DL (ref 65–100)
GLUCOSE BLD STRIP.AUTO-MCNC: 200 MG/DL (ref 65–100)
GLUCOSE BLD STRIP.AUTO-MCNC: 217 MG/DL (ref 65–100)
GLUCOSE SERPL-MCNC: 197 MG/DL (ref 65–100)
GRAM STN SPEC: ABNORMAL
POTASSIUM SERPL-SCNC: 3.9 MMOL/L (ref 3.5–5.1)
SERVICE CMNT-IMP: ABNORMAL
SERVICE CMNT-IMP: NORMAL
SODIUM SERPL-SCNC: 143 MMOL/L (ref 136–145)

## 2017-02-26 PROCEDURE — 65270000029 HC RM PRIVATE

## 2017-02-26 PROCEDURE — 74011250636 HC RX REV CODE- 250/636: Performed by: FAMILY MEDICINE

## 2017-02-26 PROCEDURE — 80048 BASIC METABOLIC PNL TOTAL CA: CPT | Performed by: FAMILY MEDICINE

## 2017-02-26 PROCEDURE — 74011250637 HC RX REV CODE- 250/637: Performed by: FAMILY MEDICINE

## 2017-02-26 PROCEDURE — 82962 GLUCOSE BLOOD TEST: CPT

## 2017-02-26 PROCEDURE — 74011636637 HC RX REV CODE- 636/637: Performed by: INTERNAL MEDICINE

## 2017-02-26 PROCEDURE — 74011000258 HC RX REV CODE- 258: Performed by: FAMILY MEDICINE

## 2017-02-26 PROCEDURE — 74011250637 HC RX REV CODE- 250/637: Performed by: INTERNAL MEDICINE

## 2017-02-26 PROCEDURE — 74011250636 HC RX REV CODE- 250/636: Performed by: PHYSICIAN ASSISTANT

## 2017-02-26 PROCEDURE — 36415 COLL VENOUS BLD VENIPUNCTURE: CPT | Performed by: FAMILY MEDICINE

## 2017-02-26 RX ORDER — DIPHENHYDRAMINE HCL 25 MG
50 CAPSULE ORAL
Status: DISCONTINUED | OUTPATIENT
Start: 2017-02-26 | End: 2017-03-02 | Stop reason: HOSPADM

## 2017-02-26 RX ADMIN — ACETAMINOPHEN 650 MG: 325 TABLET, FILM COATED ORAL at 11:28

## 2017-02-26 RX ADMIN — INSULIN GLARGINE 30 UNITS: 100 INJECTION, SOLUTION SUBCUTANEOUS at 22:37

## 2017-02-26 RX ADMIN — GABAPENTIN 100 MG: 100 CAPSULE ORAL at 15:49

## 2017-02-26 RX ADMIN — PIPERACILLIN SODIUM,TAZOBACTAM SODIUM 3.38 G: 3; .375 INJECTION, POWDER, FOR SOLUTION INTRAVENOUS at 06:46

## 2017-02-26 RX ADMIN — Medication 10 ML: at 06:46

## 2017-02-26 RX ADMIN — PIPERACILLIN SODIUM,TAZOBACTAM SODIUM 3.38 G: 3; .375 INJECTION, POWDER, FOR SOLUTION INTRAVENOUS at 22:36

## 2017-02-26 RX ADMIN — DOCUSATE SODIUM 100 MG: 100 CAPSULE, LIQUID FILLED ORAL at 13:37

## 2017-02-26 RX ADMIN — PIPERACILLIN SODIUM,TAZOBACTAM SODIUM 3.38 G: 3; .375 INJECTION, POWDER, FOR SOLUTION INTRAVENOUS at 17:32

## 2017-02-26 RX ADMIN — GABAPENTIN 100 MG: 100 CAPSULE ORAL at 22:38

## 2017-02-26 RX ADMIN — PANTOPRAZOLE SODIUM 40 MG: 40 TABLET, DELAYED RELEASE ORAL at 06:48

## 2017-02-26 RX ADMIN — DIPHENHYDRAMINE HYDROCHLORIDE 50 MG: 25 CAPSULE ORAL at 20:34

## 2017-02-26 RX ADMIN — ZOLPIDEM TARTRATE 5 MG: 5 TABLET, FILM COATED ORAL at 22:38

## 2017-02-26 RX ADMIN — VANCOMYCIN HYDROCHLORIDE 2000 MG: 10 INJECTION, POWDER, LYOPHILIZED, FOR SOLUTION INTRAVENOUS at 13:15

## 2017-02-26 RX ADMIN — Medication 10 ML: at 22:42

## 2017-02-26 RX ADMIN — GABAPENTIN 100 MG: 100 CAPSULE ORAL at 08:23

## 2017-02-26 RX ADMIN — OXYCODONE HYDROCHLORIDE AND ACETAMINOPHEN 1 TABLET: 10; 325 TABLET ORAL at 15:49

## 2017-02-26 RX ADMIN — ATORVASTATIN CALCIUM 40 MG: 40 TABLET, FILM COATED ORAL at 08:23

## 2017-02-26 RX ADMIN — LISINOPRIL 40 MG: 20 TABLET ORAL at 08:23

## 2017-02-26 RX ADMIN — SODIUM CHLORIDE 100 ML/HR: 900 INJECTION, SOLUTION INTRAVENOUS at 13:37

## 2017-02-26 RX ADMIN — VANCOMYCIN HYDROCHLORIDE 2000 MG: 10 INJECTION, POWDER, LYOPHILIZED, FOR SOLUTION INTRAVENOUS at 03:28

## 2017-02-26 RX ADMIN — OXYCODONE HYDROCHLORIDE AND ACETAMINOPHEN 1 TABLET: 10; 325 TABLET ORAL at 08:23

## 2017-02-26 RX ADMIN — INSULIN LISPRO 3 UNITS: 100 INJECTION, SOLUTION INTRAVENOUS; SUBCUTANEOUS at 17:31

## 2017-02-26 RX ADMIN — OXYCODONE HYDROCHLORIDE AND ACETAMINOPHEN 1 TABLET: 10; 325 TABLET ORAL at 22:38

## 2017-02-26 NOTE — PROGRESS NOTES
End of Shift Nursing Note    Bedside shift change report given to Siomara Contreras (oncoming nurse) by Flor Yun RN (offgoing nurse). Report included the following information SBAR, Kardex and Intake/Output. Zone Phone:       Significant changes during shift:    none   Non-emergent issues for physician to address:   none     Number times ambulated in hallway past shift: 0, ambulates in room     Number of times OOB to chair past shift: 1    POD #:      Vital Signs:    Temp: 98.7 °F (37.1 °C)     Pulse (Heart Rate): 62     BP: 148/83     Resp Rate: 18     O2 Sat (%): 96 %    Lines & Drains:     Urinary Catheter? No   Placement Date:    Medical Necessity:   Central Line? No   Placement Date:    Medical Necessity:   PICC Line? No   Placement Date:    Medical Necessity:     NG tube [] in [] removed [x] not applicable   Drains [] in [] removed [x] not applicable     Skin Integrity:      Wounds: yes   Dressings Present: yes    Wound Concerns: no      GI:    Current diet:  DIET REGULAR    Nausea: NO  Vomiting: NO  Bowel Sounds: YES  Flatus: YES  Last Bowel Movement: yesterday   Appearance:     Respiratory:  Supplemental O2: No      Device:    via  Liters/min     Incentive Spirometer: YES  Volume:   Coughing and Deep Breathing: YES  Oral Care: YES  Understanding (patient/family education): YES   Getting out of bed: YES  Head of bed elevation: YES    Patient Safety:    Falls Score: 1  Mobility Score: 1  Bed Alarm On? No  Sitter? No      Opportunity for questions and clarification was given to oncoming nurse. Patient bed is in LOW position, side rails are up x 2, door & observation blinds open as needed, call bell within reach and patient not in distress.     Lili Gil

## 2017-02-26 NOTE — PROGRESS NOTES
General Daily Progress Note    Admit Date: 2/24/2017  Hospital day 3    Subjective:     Patient has improving pain. .   Medication side effects: none    Current Facility-Administered Medications   Medication Dose Route Frequency    vancomycin (VANCOCIN) 2000 mg in  ml infusion  2,000 mg IntraVENous Q12H    piperacillin-tazobactam (ZOSYN) 3.375 g in 0.9% sodium chloride (MBP/ADV) 100 mL  3.375 g IntraVENous Q8H    0.9% sodium chloride infusion  100 mL/hr IntraVENous CONTINUOUS    atorvastatin (LIPITOR) tablet 40 mg  40 mg Oral DAILY    pantoprazole (PROTONIX) tablet 40 mg  40 mg Oral ACB    gabapentin (NEURONTIN) capsule 100 mg  100 mg Oral TID    insulin glargine (LANTUS) injection 30 Units  30 Units SubCUTAneous QHS    lisinopril (PRINIVIL, ZESTRIL) tablet 40 mg  40 mg Oral DAILY    zolpidem (AMBIEN) tablet 5 mg  5 mg Oral QHS PRN    sodium chloride (NS) flush 5-10 mL  5-10 mL IntraVENous Q8H    sodium chloride (NS) flush 5-10 mL  5-10 mL IntraVENous PRN    acetaminophen (TYLENOL) tablet 650 mg  650 mg Oral Q4H PRN    ondansetron (ZOFRAN) injection 4 mg  4 mg IntraVENous Q4H PRN    docusate sodium (COLACE) capsule 100 mg  100 mg Oral BID PRN    insulin lispro (HUMALOG) injection   SubCUTAneous AC&HS    glucose chewable tablet 16 g  4 Tab Oral PRN    dextrose (D50W) injection syrg 12.5-25 g  12.5-25 g IntraVENous PRN    glucagon (GLUCAGEN) injection 1 mg  1 mg IntraMUSCular PRN    oxyCODONE-acetaminophen (PERCOCET 10)  mg per tablet 1 Tab  1 Tab Oral Q6H PRN        Review of Systems  Constitutional: negative  Cardiovascular: negative  Gastrointestinal: negative  Musculoskeletal:positive for postop pain    Objective:     Patient Vitals for the past 8 hrs:   BP Temp Pulse Resp SpO2   02/26/17 0728 159/86 98.6 °F (37 °C) 60 16 100 %   02/26/17 0334 (!) 141/91 98 °F (36.7 °C) 65 16 95 %     02/26 0701 - 02/26 1900  In: 240 [P.O.:240]  Out: 600 [Urine:600]  02/24 1901 - 02/26 0700  In: 2488.3 [I.V.:2488.3]  Out: 3200 [Urine:3200]    Physical Exam:   Visit Vitals    /86 (BP 1 Location: Left arm, BP Patient Position: At rest)    Pulse 60    Temp 98.6 °F (37 °C)    Resp 16    Ht 6' 3\" (1.905 m)    Wt 291 lb 0.1 oz (132 kg)    SpO2 100%    BMI 36.37 kg/m2     General appearance: alert, fatigued, cooperative, no distress, appears stated age  Lungs: clear to auscultation bilaterally  Heart: regular rate and rhythm, S1, S2 normal, no murmur, click, rub or gallop  Abdomen: soft, non-tender.  Bowel sounds normal. No masses,  no organomegaly  Extremities: l foot bandage not removed          Data Review   Recent Results (from the past 24 hour(s))   GLUCOSE, POC    Collection Time: 02/25/17 12:50 PM   Result Value Ref Range    Glucose (POC) 203 (H) 65 - 100 mg/dL    Performed by Alexandria Silva, POC    Collection Time: 02/25/17  4:30 PM   Result Value Ref Range    Glucose (POC) 219 (H) 65 - 100 mg/dL    Performed by [address] [place] [address], POC    Collection Time: 02/25/17 10:46 PM   Result Value Ref Range    Glucose (POC) 177 (H) 65 - 100 mg/dL    Performed by Katelyn Walker    METABOLIC PANEL, BASIC    Collection Time: 02/26/17  3:43 AM   Result Value Ref Range    Sodium 143 136 - 145 mmol/L    Potassium 3.9 3.5 - 5.1 mmol/L    Chloride 109 (H) 97 - 108 mmol/L    CO2 23 21 - 32 mmol/L    Anion gap 11 5 - 15 mmol/L    Glucose 197 (H) 65 - 100 mg/dL    BUN 7 6 - 20 MG/DL    Creatinine 1.13 0.70 - 1.30 MG/DL    BUN/Creatinine ratio 6 (L) 12 - 20      GFR est AA >60 >60 ml/min/1.73m2    GFR est non-AA >60 >60 ml/min/1.73m2    Calcium 7.3 (L) 8.5 - 10.1 MG/DL   GLUCOSE, POC    Collection Time: 02/26/17  8:13 AM   Result Value Ref Range    Glucose (POC) 124 (H) 65 - 100 mg/dL    Performed by Brandi Duvall*            Assessment:     Active Problems:    HTN (hypertension) (7/23/2013)      IDDM (insulin dependent diabetes mellitus) ([facility] (10/6/2016)      Diabetic infection of right foot ([facility] (2/16/2017)      [facility] (2/24/2017)        Plan:     Doing well,discomfort improving. Continue current meds and treatments.

## 2017-02-26 NOTE — PROGRESS NOTES
End of Shift Nursing Note    Bedside shift change report given to Jose (oncoming nurse) by Earle (offgoing nurse). Report included the following information SBAR. Zone Phone:   [phone]    Significant changes during shift:    none   Non-emergent issues for physician to address:  Pt had questions regarding flu status      Number times ambulated in hallway past shift: 0      Number of times OOB to chair past shift:     POD #:      Vital Signs:    Temp: 98.7 °F (37.1 °C)     Pulse (Heart Rate): 62     BP: 148/83     Resp Rate: 18     O2 Sat (%): 96 %    Lines & Drains:     Urinary Catheter? No   Placement Date:    Medical Necessity:   Central Line? No   Placement Date:    Medical Necessity:   PICC Line? No   Placement Date:    Medical Necessity:     NG tube [] in [] removed [] not applicable   Drains [] in [] removed [] not applicable     Skin Integrity:      Wounds: no   Dressings Present: no    Wound Concerns: no      GI:    Current diet:  DIET REGULAR    Nausea: NO  Vomiting: NO  Bowel Sounds: NO  Flatus: NO  Last Bowel Movement: yesterday   Appearance:     Respiratory:  Supplemental O2: No      Device:    via  Liters/min     Incentive Spirometer: YES  Volume:   Coughing and Deep Breathing: YES  Oral Care: YES  Understanding (patient/family education): YES   Getting out of bed: YES  Head of bed elevation: YES    Patient Safety:    Falls Score: 1  Mobility Score: 1  Bed Alarm On? No  Sitter? No      Opportunity for questions and clarification was given to oncoming nurse. Patient bed is in low position, side rails are up x 2, door & observation blinds open as needed, call bell within reach and patient not in distress.     Rowena Mitchell RN

## 2017-02-26 NOTE — PROGRESS NOTES
Problem: Falls - Risk of  Goal: *Absence of falls  Outcome: Progressing Towards Goal  Call bell within reach, bed in low position, room clutter free

## 2017-02-27 LAB
ANION GAP BLD CALC-SCNC: 9 MMOL/L (ref 5–15)
BUN SERPL-MCNC: 8 MG/DL (ref 6–20)
BUN/CREAT SERPL: 8 (ref 12–20)
CALCIUM SERPL-MCNC: 7.8 MG/DL (ref 8.5–10.1)
CHLORIDE SERPL-SCNC: 111 MMOL/L (ref 97–108)
CO2 SERPL-SCNC: 23 MMOL/L (ref 21–32)
CREAT SERPL-MCNC: 1.03 MG/DL (ref 0.7–1.3)
GLUCOSE BLD STRIP.AUTO-MCNC: 122 MG/DL (ref 65–100)
GLUCOSE BLD STRIP.AUTO-MCNC: 196 MG/DL (ref 65–100)
GLUCOSE BLD STRIP.AUTO-MCNC: 214 MG/DL (ref 65–100)
GLUCOSE BLD STRIP.AUTO-MCNC: 320 MG/DL (ref 65–100)
GLUCOSE SERPL-MCNC: 110 MG/DL (ref 65–100)
POTASSIUM SERPL-SCNC: 4.3 MMOL/L (ref 3.5–5.1)
SERVICE CMNT-IMP: ABNORMAL
SODIUM SERPL-SCNC: 143 MMOL/L (ref 136–145)

## 2017-02-27 PROCEDURE — 36415 COLL VENOUS BLD VENIPUNCTURE: CPT | Performed by: FAMILY MEDICINE

## 2017-02-27 PROCEDURE — 74011250637 HC RX REV CODE- 250/637: Performed by: INTERNAL MEDICINE

## 2017-02-27 PROCEDURE — 74011250636 HC RX REV CODE- 250/636: Performed by: PHYSICIAN ASSISTANT

## 2017-02-27 PROCEDURE — 74011250636 HC RX REV CODE- 250/636: Performed by: FAMILY MEDICINE

## 2017-02-27 PROCEDURE — 77030018836 HC SOL IRR NACL ICUM -A

## 2017-02-27 PROCEDURE — 80048 BASIC METABOLIC PNL TOTAL CA: CPT | Performed by: FAMILY MEDICINE

## 2017-02-27 PROCEDURE — 74011250637 HC RX REV CODE- 250/637: Performed by: FAMILY MEDICINE

## 2017-02-27 PROCEDURE — 74011636637 HC RX REV CODE- 636/637: Performed by: INTERNAL MEDICINE

## 2017-02-27 PROCEDURE — 77030019934 HC DRSG VAC ASST KCON -B

## 2017-02-27 PROCEDURE — 77030025162 HC DRSG VAC ASST 1 KCON -B

## 2017-02-27 PROCEDURE — 97605 NEG PRS WND THER DME<=50SQCM: CPT

## 2017-02-27 PROCEDURE — 86735 MUMPS ANTIBODY: CPT | Performed by: FAMILY MEDICINE

## 2017-02-27 PROCEDURE — 77030019952 HC CANSTR VAC ASST KCON -B

## 2017-02-27 PROCEDURE — 65270000029 HC RM PRIVATE

## 2017-02-27 PROCEDURE — 82962 GLUCOSE BLOOD TEST: CPT

## 2017-02-27 RX ORDER — GABAPENTIN 100 MG/1
100 CAPSULE ORAL
COMMUNITY
End: 2017-07-12 | Stop reason: SDUPTHER

## 2017-02-27 RX ADMIN — GABAPENTIN 100 MG: 100 CAPSULE ORAL at 15:11

## 2017-02-27 RX ADMIN — LISINOPRIL 40 MG: 20 TABLET ORAL at 08:33

## 2017-02-27 RX ADMIN — GABAPENTIN 100 MG: 100 CAPSULE ORAL at 08:33

## 2017-02-27 RX ADMIN — ATORVASTATIN CALCIUM 40 MG: 40 TABLET, FILM COATED ORAL at 08:34

## 2017-02-27 RX ADMIN — Medication 10 ML: at 03:52

## 2017-02-27 RX ADMIN — DIPHENHYDRAMINE HYDROCHLORIDE 50 MG: 25 CAPSULE ORAL at 03:16

## 2017-02-27 RX ADMIN — ZOLPIDEM TARTRATE 5 MG: 5 TABLET, FILM COATED ORAL at 22:37

## 2017-02-27 RX ADMIN — INSULIN GLARGINE 30 UNITS: 100 INJECTION, SOLUTION SUBCUTANEOUS at 22:27

## 2017-02-27 RX ADMIN — INSULIN LISPRO 2 UNITS: 100 INJECTION, SOLUTION INTRAVENOUS; SUBCUTANEOUS at 12:42

## 2017-02-27 RX ADMIN — Medication 10 ML: at 15:12

## 2017-02-27 RX ADMIN — OXYCODONE HYDROCHLORIDE AND ACETAMINOPHEN 1 TABLET: 10; 325 TABLET ORAL at 22:36

## 2017-02-27 RX ADMIN — METHYLPREDNISOLONE SODIUM SUCCINATE 40 MG: 40 INJECTION, POWDER, FOR SOLUTION INTRAMUSCULAR; INTRAVENOUS at 03:51

## 2017-02-27 RX ADMIN — SODIUM CHLORIDE 100 ML/HR: 900 INJECTION, SOLUTION INTRAVENOUS at 12:42

## 2017-02-27 RX ADMIN — PANTOPRAZOLE SODIUM 40 MG: 40 TABLET, DELAYED RELEASE ORAL at 06:41

## 2017-02-27 RX ADMIN — Medication 10 ML: at 06:42

## 2017-02-27 RX ADMIN — OXYCODONE HYDROCHLORIDE AND ACETAMINOPHEN 1 TABLET: 10; 325 TABLET ORAL at 15:11

## 2017-02-27 RX ADMIN — GABAPENTIN 100 MG: 100 CAPSULE ORAL at 22:27

## 2017-02-27 RX ADMIN — INSULIN LISPRO 4 UNITS: 100 INJECTION, SOLUTION INTRAVENOUS; SUBCUTANEOUS at 22:26

## 2017-02-27 RX ADMIN — INSULIN LISPRO 3 UNITS: 100 INJECTION, SOLUTION INTRAVENOUS; SUBCUTANEOUS at 17:00

## 2017-02-27 RX ADMIN — SODIUM CHLORIDE 100 ML/HR: 900 INJECTION, SOLUTION INTRAVENOUS at 22:36

## 2017-02-27 RX ADMIN — ACETAMINOPHEN 650 MG: 325 TABLET, FILM COATED ORAL at 17:00

## 2017-02-27 NOTE — PROGRESS NOTES
General Daily Progress Note    Admit Date: 2/24/2017  Hospital day several     Subjective:     Patient denies any pain in jaw but very swollen pain controlled s/w nursing   Medication side effects: none    Current Facility-Administered Medications   Medication Dose Route Frequency    diphenhydrAMINE (BENADRYL) capsule 50 mg  50 mg Oral Q6H PRN    0.9% sodium chloride infusion  100 mL/hr IntraVENous CONTINUOUS    atorvastatin (LIPITOR) tablet 40 mg  40 mg Oral DAILY    pantoprazole (PROTONIX) tablet 40 mg  40 mg Oral ACB    gabapentin (NEURONTIN) capsule 100 mg  100 mg Oral TID    insulin glargine (LANTUS) injection 30 Units  30 Units SubCUTAneous QHS    lisinopril (PRINIVIL, ZESTRIL) tablet 40 mg  40 mg Oral DAILY    zolpidem (AMBIEN) tablet 5 mg  5 mg Oral QHS PRN    sodium chloride (NS) flush 5-10 mL  5-10 mL IntraVENous Q8H    sodium chloride (NS) flush 5-10 mL  5-10 mL IntraVENous PRN    acetaminophen (TYLENOL) tablet 650 mg  650 mg Oral Q4H PRN    ondansetron (ZOFRAN) injection 4 mg  4 mg IntraVENous Q4H PRN    docusate sodium (COLACE) capsule 100 mg  100 mg Oral BID PRN    insulin lispro (HUMALOG) injection   SubCUTAneous AC&HS    glucose chewable tablet 16 g  4 Tab Oral PRN    dextrose (D50W) injection syrg 12.5-25 g  12.5-25 g IntraVENous PRN    glucagon (GLUCAGEN) injection 1 mg  1 mg IntraMUSCular PRN    oxyCODONE-acetaminophen (PERCOCET 10)  mg per tablet 1 Tab  1 Tab Oral Q6H PRN        Review of Systems  Constitutional: negative  Cardiovascular: negative  Gastrointestinal: negative  Musculoskeletal:positive for postop pain    Objective:     Patient Vitals for the past 8 hrs:   BP Temp Pulse Resp SpO2   02/27/17 0318 (!) 162/100 98.1 °F (36.7 °C) 61 16 97 %        02/25 1901 - 02/27 0700  In: 3085 [P.O.:240;  I.V.:2845]  Out: 3700 [Urine:3700]    Physical Exam:   Visit Vitals    BP (!) 162/100    Pulse 61    Temp 98.1 °F (36.7 °C)    Resp 16    Ht 6' 3\" (1.905 m)    Wt 291 lb 0.1 oz (132 kg)    SpO2 97%    BMI 36.37 kg/m2     General appearance: alert, fatigued, cooperative, no distress, appears stated age  Lungs: clear to auscultation bilaterally  Heart: regular rate and rhythm, S1, S2 normal, no murmur, click, rub or gallop  Abdomen: soft, non-tender.  Bowel sounds normal. No masses,  no organomegaly  Extremities: l foot bandage not removed          Data Review   Recent Results (from the past 24 hour(s))   GLUCOSE, POC    Collection Time: 02/26/17  8:13 AM   Result Value Ref Range    Glucose (POC) 124 (H) 65 - 100 mg/dL    Performed by Ashleigh Zhao*    GLUCOSE, POC    Collection Time: 02/26/17 11:19 AM   Result Value Ref Range    Glucose (POC) 110 (H) 65 - 100 mg/dL    Performed by Ashleigh Zhao*    GLUCOSE, POC    Collection Time: 02/26/17  3:29 PM   Result Value Ref Range    Glucose (POC) 217 (H) 65 - 100 mg/dL    Performed by Madelin Ring, POC    Collection Time: 02/26/17  5:19 PM   Result Value Ref Range    Glucose (POC) 200 (H) 65 - 100 mg/dL    Performed by Shabana Lucas    GLUCOSE, POC    Collection Time: 02/26/17  7:39 PM   Result Value Ref Range    Glucose (POC) 157 (H) 65 - 100 mg/dL    Performed by Mike BASIC    Collection Time: 02/27/17  3:43 AM   Result Value Ref Range    Sodium 143 136 - 145 mmol/L    Potassium 4.3 3.5 - 5.1 mmol/L    Chloride 111 (H) 97 - 108 mmol/L    CO2 23 21 - 32 mmol/L    Anion gap 9 5 - 15 mmol/L    Glucose 110 (H) 65 - 100 mg/dL    BUN 8 6 - 20 MG/DL    Creatinine 1.03 0.70 - 1.30 MG/DL    BUN/Creatinine ratio 8 (L) 12 - 20      GFR est AA >60 >60 ml/min/1.73m2    GFR est non-AA >60 >60 ml/min/1.73m2    Calcium 7.8 (L) 8.5 - 10.1 MG/DL           Assessment:     Active Problems:    HTN (hypertension) (7/23/2013)      IDDM (insulin dependent diabetes mellitus) ([facility] (10/6/2016)      Diabetic infection of right foot ([facility] (2/16/2017)      Osteomyelitis ([facility] (2/24/2017)        Plan:   Solumedrol given for swollen jaw  Will check xray and mumps titers  Doing well,discomfort improving. Continue current meds and treatments. Will consult case managment to help with home abx   Silvio Mahajan M.D.   Family Medicine call directly during the day  before 5 pm [phone]

## 2017-02-27 NOTE — PROGRESS NOTES
End of Shift Nursing Note    Bedside shift change report given to Miguel Ángel Lott RN (oncoming nurse) by Jose Rivas RN (offgoing nurse). Report included the following information SBAR, Kardex, Intake/Output, MAR and Recent Results. Zone Phone:       Significant changes during shift:    Allergic reaction to antibiotics, unclear which antibiotics caused reaction. Doctor was paged and orders were given to discontinue antibiotics. Non-emergent issues for physician to address:   none     Number times ambulated in hallway past shift: 0      Number of times OOB to chair past shift: 0    POD #:      Vital Signs:    Temp: 98.4 °F (36.9 °C)     Pulse (Heart Rate): 60     BP: 158/86     Resp Rate: 16     O2 Sat (%): 98 %    Lines & Drains:     Urinary Catheter? No   Placement Date:    Medical Necessity:   Central Line? No   Placement Date:    Medical Necessity:   PICC Line? No   Placement Date:    Medical Necessity:     NG tube [] in [] removed [x] not applicable   Drains [] in [] removed [] not applicable     Skin Integrity:      Wounds: yes   Dressings Present: yes    Wound Concerns: yes      GI:    Current diet:  DIET REGULAR    Nausea: NO  Vomiting: NO  Bowel Sounds: YES  Flatus: YES  Last Bowel Movement: yesterday   Appearance:     Respiratory:  Supplemental O2: No      Device:    via  Liters/min     Incentive Spirometer: YES  Volume:   Coughing and Deep Breathing: YES  Oral Care: YES  Understanding (patient/family education): YES   Getting out of bed: YES  Head of bed elevation: YES    Patient Safety:    Falls Score: 1  Mobility Score: 4  Bed Alarm On? No  Sitter? No      Opportunity for questions and clarification was given to oncoming nurse. Patient bed is in low position, side rails are up x 2, door & observation blinds open as needed, call bell within reach and patient not in distress.     Rembrandt ALESHA Knox

## 2017-02-27 NOTE — PROGRESS NOTES
End of Shift Nursing Note    Bedside shift change report given to Eduard Avila RN (oncoming nurse) by Donna Cabrera RN (offgoing nurse). Report included the following information SBAR, Kardex, Intake/Output, MAR and Recent Results. Zone Phone:   [phone]    Significant changes during shift:       Non-emergent issues for physician to address:        Number times ambulated in hallway past shift: 0. Up in room. Number of times OOB to chair past shift: 2    POD #:      Vital Signs:    Temp: 98.3 °F (36.8 °C)     Pulse (Heart Rate): 61     BP: 159/90     Resp Rate: 17     O2 Sat (%): 97 %    Lines & Drains:     Urinary Catheter? No   Placement Date:    Medical Necessity:   Central Line? No   Placement Date:    Medical Necessity:   PICC Line? No   Placement Date:    Medical Necessity:     NG tube [] in [] removed [] not applicable   Drains [] in [] removed [] not applicable     Skin Integrity:      Wounds: yes   Dressings Present: yes    Wound Concerns: no      GI:    Current diet:  DIET REGULAR    Nausea: NO  Vomiting: NO  Bowel Sounds: YES  Flatus: YES  Last Bowel Movement: today   Appearance:     Respiratory:  Supplemental O2: No      Device:    via  Liters/min     Incentive Spirometer: YES  Volume:   Coughing and Deep Breathing: YES  Oral Care: YES  Understanding (patient/family education): YES   Getting out of bed: YES  Head of bed elevation: YES    Patient Safety:    Falls Score: 1  Mobility Score: 0  Bed Alarm On? No  Sitter? No      Opportunity for questions and clarification was given to oncoming nurse. Patient bed is in upright position, side rails are up x 2, door & observation blinds open as needed, call bell within reach and patient not in distress.     Fredo Gómez

## 2017-02-27 NOTE — PROGRESS NOTES
Left side of patient's jaw along with left side of bottom lip are now swollen. Paged dr. Braden Canavan, order given, one time order of  solumedrol 40 mg IV.

## 2017-02-27 NOTE — PROGRESS NOTES
Patient stated around 2 or 3 hours ago when zosyn antibiotics was hung he noticed the left side of his lower lips became swollen. Upon further assessment swelling of the lower lip was noted. Patient is requesting Benadryl. Dr. Tj Rice was paged.  Dr. Taty Celaya called back order given, Benadryl 50mg PO QID PRN and Discontinue Vancomycin

## 2017-02-27 NOTE — PROGRESS NOTES
Pt was admitted through the ED for osteomyelitis of left foot. Pt was recently her for treatment of diabetic foot infection with ulcers and osteomyelitis of both feet. Pt has been going to the wound care clinic being seen by Dr. Galo Culp. Pt lives in a multi-level home with his spouse. He reports being independent with all ADLs and IADLs. He works outside the home. He drives. He is active with his PCP. Pt stated he has had [facility] in the past and been on home IV ABX. He does not recall the name of the agencies. He has a RW at home. Consult received to look into home IV ABX. Pt stated he has Rx coverage with his insurance. CM will be able to look into insurance coverage once home IV ABX therapy has been determined with name of med, dose and duration. CM will follow for d/c needs. Care Management Interventions  PCP Verified by CM:  Yes  Transition of Care Consult (CM Consult): Discharge Planning  Discharge Durable Medical Equipment: No (has a RW at home.)  Physical Therapy Consult: No  Occupational Therapy Consult: No  Speech Therapy Consult: No  Current Support Network: Lives with Spouse, Own Home (mulit level home)  Confirm Follow Up Transport: Self  Plan discussed with Pt/Family/Caregiver: Yes  Freedom of Choice Offered: Yes  Discharge Location  Discharge Placement: [address], [address] [facility]

## 2017-02-27 NOTE — WOUND CARE
Wound VAC consult from Dr. Sharlene Garcia to place wound VAC dressing left foot x2 wounds with settings at 150 mm/hg, continuous. 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 PMHX: Uncontrolled DM (2/17/17 HgbA1c=10.6), Htn., left TMA, former cigar smoker. Patient is 6'3\", 291 lbs also. Patient was admitted to [facility] aprox a week ago, for these left foot wounds, and left AMA. Patient said that he was admitted for days and did not receive any antibiotics and the podiatrist did not see him, so that is why he left. Dr Sharlene Garcia took patient from ER to OR on 2/24/17 to do an I&D of left TMA x2 wounds and a bone biopsy. Patient has a plantar wound and a lateral 5th metatarsal wound. There is bone exposure on lateral wound, both are full thickness. Wound Foot Left;Lateral (Active)   DRESSING STATUS Removed 2/27/2017  4:38 PM   DRESSING TYPE Wet to dry;Packing 2/27/2017  4:38 PM   Non-Pressure Ulcer Full thickness (subcut/muscle) 2/27/2017  4:38 PM   Wound Length (cm) 4 cm 2/27/2017  4:38 PM   Wound Width (cm) 4 cm 2/27/2017  4:38 PM   Wound Depth (cm) 1.2 2/27/2017  4:38 PM   Wound Surface area (cm^3) 19.2 cm^2 2/27/2017  4:38 PM   Condition of Base Bone exposed;Granulation 2/27/2017  4:38 PM   Condition of Edges Open 2/27/2017  4:38 PM   Epithelialization (%) 0 2/27/2017  4:38 PM   Tissue Type Pink; Other (comment) 2/27/2017  4:38 PM   Tissue Type Percent Pink 80 2/27/2017  4:38 PM   Tissue Type Percent Other (comment) 20 2/27/2017  4:38 PM   Direction of Undermining 5    oclock 2/27/2017  4:38 PM   Depth of Undermining (cm) 1 2/27/2017  4:38 PM   Drainage Amount  Small  2/27/2017  4:38 PM   Drainage Color Serosanguinous 2/27/2017  4:38 PM   Wound Odor None 2/27/2017  4:38 PM   Periwound Skin Condition Calloused 2/27/2017  4:38 PM   Cleansing and Cleansing Agents  Normal saline 2/27/2017  4:38 PM   Dressing Type Applied Vacuum dressing 2/27/2017  4:38 PM   Procedure Tolerated Well 2/27/2017  4:38 PM   Number of days:3       Wound Foot Left;Plantar (Active)   DRESSING STATUS Removed 2/27/2017  4:38 PM   DRESSING TYPE Wet to dry;Packing 2/27/2017  4:38 PM   Incision site well approximated? No 2/27/2017  4:38 PM   Non-Pressure Ulcer Full thickness (subcut/muscle) 2/27/2017  4:38 PM   Wound Length (cm) 3 cm 2/27/2017  4:38 PM   Wound Width (cm) 4 cm 2/27/2017  4:38 PM   Wound Depth (cm) 1 2/27/2017  4:38 PM   Wound Surface area (cm^3) 12 cm^2 2/27/2017  4:38 PM   Condition of Base Pink; Other (comment) 2/27/2017  4:38 PM   Condition of Edges Open 2/27/2017  4:38 PM   Epithelialization (%) 0 2/27/2017  4:38 PM   Tissue Type Pink 2/27/2017  4:38 PM   Tissue Type Percent Pink 90 2/27/2017  4:38 PM   Tissue Type Percent Other (comment) 10 2/27/2017  4:38 PM   Drainage Amount  Scant 2/27/2017  4:38 PM   Drainage Color Serosanguinous 2/27/2017  4:38 PM   Wound Odor None 2/27/2017  4:38 PM   Periwound Skin Condition Calloused 2/27/2017  4:38 PM   Cleansing and Cleansing Agents  Normal saline 2/27/2017  4:38 PM   Dressing Type Applied Vacuum dressing 2/27/2017  4:38 PM   Procedure Tolerated Well 2/27/2017  4:38 PM   Number of days:0          Wound VAC dressing application:  Skin prep and drape picture framing done around wounds  x1 piece of white over exposed bone in lateral wound  x4 pieces of black foam  To cover x2 wounds, bridge wounds together and bridge to left lower leg to place suction pad. Occlusively sealed and suction set to 150 mm/hg, continuous as ordered by MD.  Plan: Wound VAC application for [place] [id number] use filled out and placed on chart. Next dressing change scheduled for Thursday, unless Dr Basil Coffey wants to change him Wednesday.   Rose Flood RN, CWON, zone ph# [phone]

## 2017-02-27 NOTE — DIABETES MGMT
DTC Progress Note    Recommendations/ Comments: If appropriate, please consider  1) adding consistent carb restriction to diet to better manage prandial response to po intakes. 2) when po intakes are consistently >50% of po intakes, consider restarting prandial insulin coverage at 1/2 home dose (Lispro 6 units) to manage increasing BG values throughout day. Chart reviewed on [facility] Cintron. Patient is a 51 y.o. male with known DM Levemir 60 units daily HS and Lispro 12 units ac tid at home. A1c:   Lab Results   Component Value Date/Time    Hemoglobin A1c 10.6 02/17/2017 04:25 AM    Hemoglobin A1c 12.8 10/06/2016 10:16 AM       Recent Glucose Results: Lab Results   Component Value Date/Time     (H) 02/27/2017 03:43 AM    GLUCPOC 196 (H) 02/27/2017 11:48 AM    GLUCPOC 122 (H) 02/27/2017 08:23 AM    GLUCPOC 157 (H) 02/26/2017 07:39 PM        Lab Results   Component Value Date/Time    Creatinine 1.03 02/27/2017 03:43 AM       Active Orders   Diet    DIET REGULAR        PO intake: Patient Vitals for the past 72 hrs:   % Diet Eaten   02/26/17 0815 100 %       Current hospital DM medication: Lantus 30 units bid, Lispro correctional insulin - normal sensitivity. Will continue to follow as needed.     Thank you  Harvey Mcdowell RD, CDE

## 2017-02-27 NOTE — PROGRESS NOTES
Sandra Coon 2/27/2017 11:14     Pharmacy Medication Reconciliation      The patient was interviewed regarding current PTA medication list, use and drug allergies. The patient and [facility] were questioned regarding use of any other inhalers, topical products, over the counter medications, herbal medications, vitamin products or ophthalmic/nasal/otic medication use. Recommendations/Findings: The following amendments were made to the patient's active medication list on file at [address]:      1) Additions:  - Lipase Supplement: Patient reports taking an OTC supplement daily for enhancing metabolic breakdown of fat. Patient claims initiating this regimen approximately 10-20 days prior to today. Patient is not able to recall the components of this capsulated supplement, rather stating it's a lipase supplement advertised as a \"fat burner used by teachers\". 2) Changes:  - Dose for Vitamin D3 1000 unit caps was updated from \"Take PO daily\" to \"Take 2 capsules PO daily\" to reflect how patient actually takes this medication.  - Instructions for Gabapentin 100 mg was updated from written instructions of \"1 cap TID PRN pain\" to \"Takes 1 cap PO BID PRN pain\" to reflect how patient actually takes this medication.  - Instructions for saline nasal spray were updated to include PRN comment: \"Congestion (dry sinus symptoms, especially during winter time). \"        Clarified PTA med list with patient and [facility] [phone]. PTA medication list was corrected to the following:     Prior to Admission Medications   Prescriptions Last Dose Informant Patient Reported? Taking? ENZYMES,DIGESTIVE, PLANT, (LIPASE CONCENTRATE-HP PO) 2/24/2017 Self Yes Yes   Sig: Take  by mouth daily. atorvastatin (LIPITOR) 40 mg tablet 2/24/2017 at 0700  No Yes   Sig: Take 1 Tab by mouth daily. cholecalciferol (VITAMIN D3) 1,000 unit cap 2/24/2017 at 0700  Yes Yes   Sig: Take 2,000 Units by mouth daily.    esomeprazole (NEXIUM) 40 mg capsule 2017 at Unknown time  No Yes   Sig: Take 1 Cap by mouth daily. gabapentin (NEURONTIN) 100 mg capsule   Yes Yes   Sig: Take 100 mg by mouth two (2) times daily as needed for Pain. insulin detemir (LEVEMIR FLEXTOUCH) 100 unit/mL (3 mL) inpn 2017 at 0700  No Yes   Si.6 mL by SubCUTAneous route nightly. 60 units QHS   insulin lispro (HUMALOG) 100 unit/mL kwikpen 2017 at Unknown time Self No Yes   Si Units by SubCUTAneous route Before breakfast, lunch, and dinner. DX: E11.65   lisinopril (PRINIVIL, ZESTRIL) 40 mg tablet 2017 at 0700  No Yes   Sig: Take 1 Tab by mouth daily. metFORMIN (GLUCOPHAGE) 500 mg tablet 2017 at 0700  No Yes   Sig: TAKE 1 TABLET BY MOUTH TWICE A DAY WITH MEALS AND INCREASE AS DIRECTED   sodium chloride (SALINE NASAL) 0.65 % nasal spray Unknown at Unknown time  No No   Si [place] by Both Nostrils route as needed for Congestion. Patient taking differently: 1 Spray by Both Nostrils route as needed for Congestion (dry sinus symptoms, especially during winter time. ).       Facility-Administered Medications: None          Thank you,  Romana Sale, [facility]

## 2017-02-28 ENCOUNTER — APPOINTMENT (OUTPATIENT)
Dept: GENERAL RADIOLOGY | Age: 47
DRG: 478 | End: 2017-02-28
Attending: FAMILY MEDICINE
Payer: COMMERCIAL

## 2017-02-28 LAB
ANION GAP BLD CALC-SCNC: 10 MMOL/L (ref 5–15)
BUN SERPL-MCNC: 8 MG/DL (ref 6–20)
BUN/CREAT SERPL: 8 (ref 12–20)
CALCIUM SERPL-MCNC: 7.6 MG/DL (ref 8.5–10.1)
CHLORIDE SERPL-SCNC: 110 MMOL/L (ref 97–108)
CO2 SERPL-SCNC: 23 MMOL/L (ref 21–32)
CREAT SERPL-MCNC: 1.04 MG/DL (ref 0.7–1.3)
GLUCOSE BLD STRIP.AUTO-MCNC: 184 MG/DL (ref 65–100)
GLUCOSE BLD STRIP.AUTO-MCNC: 193 MG/DL (ref 65–100)
GLUCOSE BLD STRIP.AUTO-MCNC: 226 MG/DL (ref 65–100)
GLUCOSE BLD STRIP.AUTO-MCNC: 258 MG/DL (ref 65–100)
GLUCOSE SERPL-MCNC: 230 MG/DL (ref 65–100)
MUV IGG SER IA-ACNC: 49.3 AU/ML
MUV IGM SER-ACNC: <0.8 AU (ref 0–0.79)
POTASSIUM SERPL-SCNC: 3.9 MMOL/L (ref 3.5–5.1)
SERVICE CMNT-IMP: ABNORMAL
SODIUM SERPL-SCNC: 143 MMOL/L (ref 136–145)

## 2017-02-28 PROCEDURE — 36415 COLL VENOUS BLD VENIPUNCTURE: CPT | Performed by: FAMILY MEDICINE

## 2017-02-28 PROCEDURE — 65270000029 HC RM PRIVATE

## 2017-02-28 PROCEDURE — 82962 GLUCOSE BLOOD TEST: CPT

## 2017-02-28 PROCEDURE — C1751 CATH, INF, PER/CENT/MIDLINE: HCPCS

## 2017-02-28 PROCEDURE — 80048 BASIC METABOLIC PNL TOTAL CA: CPT | Performed by: FAMILY MEDICINE

## 2017-02-28 PROCEDURE — 77030018786 HC NDL GD F/USND BARD -B

## 2017-02-28 PROCEDURE — 76937 US GUIDE VASCULAR ACCESS: CPT

## 2017-02-28 PROCEDURE — 70110 X-RAY EXAM OF JAW 4/> VIEWS: CPT

## 2017-02-28 PROCEDURE — 74011636637 HC RX REV CODE- 636/637: Performed by: INTERNAL MEDICINE

## 2017-02-28 PROCEDURE — 36569 INSJ PICC 5 YR+ W/O IMAGING: CPT | Performed by: FAMILY MEDICINE

## 2017-02-28 PROCEDURE — 74011250636 HC RX REV CODE- 250/636: Performed by: FAMILY MEDICINE

## 2017-02-28 PROCEDURE — 74011000258 HC RX REV CODE- 258: Performed by: FAMILY MEDICINE

## 2017-02-28 PROCEDURE — 74011250637 HC RX REV CODE- 250/637: Performed by: INTERNAL MEDICINE

## 2017-02-28 RX ORDER — SODIUM CHLORIDE 0.9 % (FLUSH) 0.9 %
10-30 SYRINGE (ML) INJECTION AS NEEDED
Status: DISCONTINUED | OUTPATIENT
Start: 2017-02-28 | End: 2017-03-02 | Stop reason: HOSPADM

## 2017-02-28 RX ORDER — SODIUM CHLORIDE 0.9 % (FLUSH) 0.9 %
10-40 SYRINGE (ML) INJECTION EVERY 8 HOURS
Status: DISCONTINUED | OUTPATIENT
Start: 2017-02-28 | End: 2017-03-02 | Stop reason: HOSPADM

## 2017-02-28 RX ORDER — SODIUM CHLORIDE 0.9 % (FLUSH) 0.9 %
10 SYRINGE (ML) INJECTION EVERY 24 HOURS
Status: DISCONTINUED | OUTPATIENT
Start: 2017-02-28 | End: 2017-03-02 | Stop reason: HOSPADM

## 2017-02-28 RX ORDER — HEPARIN 100 UNIT/ML
300 SYRINGE INTRAVENOUS AS NEEDED
Status: DISCONTINUED | OUTPATIENT
Start: 2017-02-28 | End: 2017-03-02 | Stop reason: HOSPADM

## 2017-02-28 RX ORDER — VANCOMYCIN 2 GRAM/500 ML IN 0.9 % SODIUM CHLORIDE INTRAVENOUS
2000 EVERY 12 HOURS
Status: DISCONTINUED | OUTPATIENT
Start: 2017-02-28 | End: 2017-03-02 | Stop reason: HOSPADM

## 2017-02-28 RX ORDER — SODIUM CHLORIDE 0.9 % (FLUSH) 0.9 %
10 SYRINGE (ML) INJECTION AS NEEDED
Status: DISCONTINUED | OUTPATIENT
Start: 2017-02-28 | End: 2017-03-02 | Stop reason: HOSPADM

## 2017-02-28 RX ADMIN — LISINOPRIL 40 MG: 20 TABLET ORAL at 09:23

## 2017-02-28 RX ADMIN — Medication 10 ML: at 05:52

## 2017-02-28 RX ADMIN — Medication 10 ML: at 16:01

## 2017-02-28 RX ADMIN — AMPICILLIN SODIUM 2 G: 2 INJECTION, POWDER, FOR SOLUTION INTRAMUSCULAR; INTRAVENOUS at 11:38

## 2017-02-28 RX ADMIN — INSULIN LISPRO 2 UNITS: 100 INJECTION, SOLUTION INTRAVENOUS; SUBCUTANEOUS at 11:50

## 2017-02-28 RX ADMIN — INSULIN LISPRO 3 UNITS: 100 INJECTION, SOLUTION INTRAVENOUS; SUBCUTANEOUS at 21:21

## 2017-02-28 RX ADMIN — INSULIN LISPRO 3 UNITS: 100 INJECTION, SOLUTION INTRAVENOUS; SUBCUTANEOUS at 16:00

## 2017-02-28 RX ADMIN — Medication 10 ML: at 00:01

## 2017-02-28 RX ADMIN — VANCOMYCIN HYDROCHLORIDE 3000 MG: 10 INJECTION, POWDER, LYOPHILIZED, FOR SOLUTION INTRAVENOUS at 12:40

## 2017-02-28 RX ADMIN — Medication 10 ML: at 21:25

## 2017-02-28 RX ADMIN — GABAPENTIN 100 MG: 100 CAPSULE ORAL at 16:01

## 2017-02-28 RX ADMIN — INSULIN LISPRO 2 UNITS: 100 INJECTION, SOLUTION INTRAVENOUS; SUBCUTANEOUS at 09:22

## 2017-02-28 RX ADMIN — PANTOPRAZOLE SODIUM 40 MG: 40 TABLET, DELAYED RELEASE ORAL at 09:23

## 2017-02-28 RX ADMIN — AMPICILLIN SODIUM 2 G: 2 INJECTION, POWDER, FOR SOLUTION INTRAMUSCULAR; INTRAVENOUS at 18:23

## 2017-02-28 RX ADMIN — INSULIN GLARGINE 30 UNITS: 100 INJECTION, SOLUTION SUBCUTANEOUS at 21:20

## 2017-02-28 RX ADMIN — ZOLPIDEM TARTRATE 5 MG: 5 TABLET, FILM COATED ORAL at 21:21

## 2017-02-28 RX ADMIN — GABAPENTIN 100 MG: 100 CAPSULE ORAL at 22:00

## 2017-02-28 RX ADMIN — GABAPENTIN 100 MG: 100 CAPSULE ORAL at 09:23

## 2017-02-28 RX ADMIN — ATORVASTATIN CALCIUM 40 MG: 40 TABLET, FILM COATED ORAL at 09:23

## 2017-02-28 RX ADMIN — OXYCODONE HYDROCHLORIDE AND ACETAMINOPHEN 1 TABLET: 10; 325 TABLET ORAL at 21:21

## 2017-02-28 RX ADMIN — VANCOMYCIN HYDROCHLORIDE 2000 MG: 10 INJECTION, POWDER, LYOPHILIZED, FOR SOLUTION INTRAVENOUS at 21:36

## 2017-02-28 NOTE — PROGRESS NOTES
Consult to Pharmacy:  dosage recommendations for home vancomycin and pipercillin please    Per discussion with Dr. Aston Hernandez:    Change Zosyn to Ampicillin 2gm IV q6h    Vancomycin 2gm IV q12h to start this evening--to receive a 3gm load before discharge today (rec'd 3gm IV x1 only on Sat 2/25)    Thank you.   Gina Maldonado, [facility]

## 2017-02-28 NOTE — PROGRESS NOTES
12pm-- Called Dr. Yang Pay office to find out if he could sign the wound vac application, but had to leave a message. Then called Dr. Colonel Franco to ask how long the antbx. were being prescribed. She was in rounds and told me she would call me back. I also need to ask about getting a PICC  for the pt. I then , went in to ask the pt. about his choice of [facility] agencies. I told him that [facility] could put his home wound vac on here and he could go home with it on. If it is another agency, they may want to see the wound first, and put the vac on themselves, so he would go home with a wet to dry dsg. He told me that [facility] didn't take his insurance. I called and asked, and they do. He then asked which agency was cheaper. I told him I didn't know if there was a difference. I told him he would have to check with his insur. co.  He stated he would have to talk to his PCP, Dr. Colonel Franco. I did tell him that I did not think we could get everything set up today for him to be discharged. -- Michelle Can MT,CARMENN--[phone]  415pm-- Called Dr. Colonel Franco and she has ordered the PICC line. The Vancomycin 2 GMS Q12H will continue at home until 4/12/17 (6 wks. ), and the Ampicillin, 2GMS. Q6H will continue for 10 more days, through 3/10/17. [facility] has been sent a referral for home IV infusion and At [facility] was sent referral for [facility].  Dr. Colonel Franco stated she could sign the wound vac--[employer] form in the am.-- Erendira Corrigan

## 2017-02-28 NOTE — PROGRESS NOTES
PICC (Peripherally Inserted Central Catheter) line insertion  procedure note :     Procedure explained to patient along with risks and benefits  and patient agreed to proceed. Informed consent obtained from  patient. Patient teaching completed. Timeout completed. Pre-procedure assessment done. Maximum sterile barrier precautions observed throughout procedure. Lidocaine 1%  3.0    ml sq given prior to cannulation. Cannulated basilic  vein using ultrasound guidance and modified seldinger technique. Inserted 5  [place] double  lumen PICC to right arm using [employer] Tip Location System and  [address]. Pt has    sinus   rhythm. PICC tip location was confirmed by 3 CG tip positioning system, indicating tall P wave and no negative deflection before P wave which would indicate that the PICC tip is properly placed in the distal SVC or at the [place]. PICC tip location was  confirmed by 2 PICC nurses and 3CG printout placed on patient's chart. Blood return verified and flushed with 20 ml normal saline in each port. Sterile dressing applied with biopatch, statLock and occlusive dressing as per protocol. Curos caps applied to each port. Patient tolerated procedure well with minimal blood loss ( less than 5 ml.)   Patient education material provided. PICC procedure performed by  :  Miguel Galvan RN. PICC nurse  Assisted by : Velma Pack RN  PICC nurse  Reason for access : reliable access / MD order /  Josphine Sos term IV medication administration   Complications related to insertion  : none  X-Ray : not applicable  Notified primary nurse    RN  that  PICC line can be used. Total Trimmed Length :  47   cm   External Length : 0 cm   PICC line site arm circumference:  37    cm   PICC catheter occupies  27   % of vein  Type of PICC: Bard Solo Power PICC   Ref # :     [id number]     Lot # :  [id number]   Expiration Date :    2018-03-31     Miguel Galvan RN. BSN. CRNI,CMSRN. Clinician IV .  PICC Nurse, Vascular Access Team.

## 2017-02-28 NOTE — WOUND CARE
Wound Care follow up: Pt seen today for Wound VAC check to left foot. Pt and staff report no problems or concerns over night. Dressing remains occlusive, suction maintained at 150 mmhg and there is a scant amount  of sero/sang drainage in cannister. Extra cannisters available in CCU 3 if needed or in pt's room if replacement needed. VAC troubleshooting tip-sheet in room with instructions how to change cannister if needed. Plan: Change VAC dressing on Thursday with Dr Marci Bee present if possible. Chata Tracey RN.  BSN, Wound Care Nurse

## 2017-02-28 NOTE — PROGRESS NOTES
End of Shift Nursing Note    Bedside shift change report given to Juan Dhillon  (oncoming nurse) by Dennie Burn (offgoing nurse). Report included the following information SBAR, Kardex, Procedure Summary, Intake/Output, MAR and Recent Results. Zone Phone:   [phone]    Significant changes during shift:    0   Non-emergent issues for physician to address:   0     Number times ambulated in hallway past shift: 0      Number of times OOB to chair past shift: 1    POD #:      Vital Signs:    Temp: 98.4 °F (36.9 °C)     Pulse (Heart Rate): 61     BP: (!) 157/95     Resp Rate: 18     O2 Sat (%): 95 %    Lines & Drains:     Urinary Catheter? No   Placement Date:    Medical Necessity:   Central Line? No   Placement Date:    Medical Necessity:   PICC Line? No   Placement Date:    Medical Necessity:     NG tube [] in [] removed [] not applicable   Drains [] in [] removed [] not applicable     Skin Integrity:      Wounds: yes   Dressings Present: yes    Wound Concerns: no      GI:    Current diet:  DIET REGULAR    Nausea: NO  Vomiting: NO  Bowel Sounds: YES  Flatus: YES  Last Bowel Movement:    Appearance: wnl    Respiratory:  Supplemental O2: No      Device:    via  Liters/min     Incentive Spirometer: YES  Volume:   Coughing and Deep Breathing: YES  Oral Care: YES  Understanding (patient/family education): YES   Getting out of bed: YES  Head of bed elevation: YES    Patient Safety:    Falls Score: 1  Mobility Score: 1  Bed Alarm On? Not applicable  Sitter? Not applicable      Opportunity for questions and clarification was given to oncoming nurse. Patient bed is in semi position, side rails are up x 2, door & observation blinds open as needed, call bell within reach and patient not in distress.     Marc Barrett

## 2017-02-28 NOTE — PROGRESS NOTES
General Daily Progress Note    Admit Date: 2/24/2017  Hospital day several     Subjective:     Patient denies any pain in jaw less swollen pain controlled wound vac in place s/w nursing   Medication side effects: none    Current Facility-Administered Medications   Medication Dose Route Frequency    diphenhydrAMINE (BENADRYL) capsule 50 mg  50 mg Oral Q6H PRN    0.9% sodium chloride infusion  100 mL/hr IntraVENous CONTINUOUS    atorvastatin (LIPITOR) tablet 40 mg  40 mg Oral DAILY    pantoprazole (PROTONIX) tablet 40 mg  40 mg Oral ACB    gabapentin (NEURONTIN) capsule 100 mg  100 mg Oral TID    insulin glargine (LANTUS) injection 30 Units  30 Units SubCUTAneous QHS    lisinopril (PRINIVIL, ZESTRIL) tablet 40 mg  40 mg Oral DAILY    zolpidem (AMBIEN) tablet 5 mg  5 mg Oral QHS PRN    sodium chloride (NS) flush 5-10 mL  5-10 mL IntraVENous Q8H    sodium chloride (NS) flush 5-10 mL  5-10 mL IntraVENous PRN    acetaminophen (TYLENOL) tablet 650 mg  650 mg Oral Q4H PRN    ondansetron (ZOFRAN) injection 4 mg  4 mg IntraVENous Q4H PRN    docusate sodium (COLACE) capsule 100 mg  100 mg Oral BID PRN    insulin lispro (HUMALOG) injection   SubCUTAneous AC&HS    glucose chewable tablet 16 g  4 Tab Oral PRN    dextrose (D50W) injection syrg 12.5-25 g  12.5-25 g IntraVENous PRN    glucagon (GLUCAGEN) injection 1 mg  1 mg IntraMUSCular PRN    oxyCODONE-acetaminophen (PERCOCET 10)  mg per tablet 1 Tab  1 Tab Oral Q6H PRN        Review of Systems  Constitutional: negative  Cardiovascular: negative  Gastrointestinal: negative  Musculoskeletal:positive for postop pain    Objective:     No data found.        02/26 1901 - 02/28 0700  In: -   Out: 2350 [Urine:2350]    Physical Exam:   Visit Vitals    BP (!) 157/95 (BP 1 Location: Right arm, BP Patient Position: At rest)    Pulse 61    Temp 98.4 °F (36.9 °C)    Resp 18    Ht 6' 3\" (1.905 m)    Wt 291 lb 0.1 oz (132 kg)    SpO2 95%    BMI 36.37 kg/m2 General appearance: alert, fatigued, cooperative, no distress, appears stated age  Lungs: clear to auscultation bilaterally  heent jaw decreased swelling   Heart: regular rate and rhythm, S1, S2 normal, no murmur, click, rub or gallop  Abdomen: soft, non-tender.  Bowel sounds normal. No masses,  no organomegaly  Extremities: l foot bandage not removed wound vac in place           Data Review   Recent Results (from the past 24 hour(s))   GLUCOSE, POC    Collection Time: 02/27/17  8:23 AM   Result Value Ref Range    Glucose (POC) 122 (H) 65 - 100 mg/dL    Performed by Wellington Oneal (PCT)    MUMPS AB, IGG    Collection Time: 02/27/17 10:54 AM   Result Value Ref Range    Mumps Abs, IgG 49.3 Immune >10.9 AU/mL   GLUCOSE, POC    Collection Time: 02/27/17 11:48 AM   Result Value Ref Range    Glucose (POC) 196 (H) 65 - 100 mg/dL    Performed by Wellington Oneal (PCT)    GLUCOSE, POC    Collection Time: 02/27/17  3:50 PM   Result Value Ref Range    Glucose (POC) 214 (H) 65 - 100 mg/dL    Performed by Wellington Oneal (PCT)    GLUCOSE, POC    Collection Time: 02/27/17  9:30 PM   Result Value Ref Range    Glucose (POC) 320 (H) 65 - 100 mg/dL    [facility]    METABOLIC PANEL, BASIC    Collection Time: 02/28/17  6:00 AM   Result Value Ref Range    Sodium 143 136 - 145 mmol/L    Potassium 3.9 3.5 - 5.1 mmol/L    Chloride 110 (H) 97 - 108 mmol/L    CO2 23 21 - 32 mmol/L    Anion gap 10 5 - 15 mmol/L    Glucose 230 (H) 65 - 100 mg/dL    BUN 8 6 - 20 MG/DL    Creatinine 1.04 0.70 - 1.30 MG/DL    BUN/Creatinine ratio 8 (L) 12 - 20      GFR est AA >60 >60 ml/min/1.73m2    GFR est non-AA >60 >60 ml/min/1.73m2    Calcium 7.6 (L) 8.5 - 10.1 MG/DL           Assessment:     Active Problems:    HTN (hypertension) (7/23/2013)      IDDM (insulin dependent diabetes mellitus) ([facility] (10/6/2016)      Diabetic infection of right foot ([facility] (2/16/2017)      Osteomyelitis ([facility] (2/24/2017)        Plan:   Improved swollen jaw  Doing well Continue current meds and treatments. Appreciate case managment to help with home abx   Diet noncompliance addressed with pt large bag of chips in room empty! Deferred return to work questions to his surgeon   dispo today if ok with surgeon   Divya Foss M.D.   Family Medicine call directly during the day  before 5 pm [phone]

## 2017-02-28 NOTE — PROGRESS NOTES
End of Shift Nursing Note    Bedside shift change report given to Carlos Manuel Moran RN (oncoming nurse) by Naman Evans RN (offgoing nurse). Report included the following information SBAR, Kardex, Procedure Summary, Intake/Output and MAR. Zone Phone:   [phone]    Significant changes during shift:    PICC line done on shift; Patient denies of any adverse reactions to scheduled Antibiotic; No s/s of distress noted during administration   Non-emergent issues for physician to address:   none     Number times ambulated in hallway past shift: 0      Number of times OOB to chair past shift: 0    POD #: admitted 2-24-17     Vital Signs:    Temp: 98.4 °F (36.9 °C)     Pulse (Heart Rate): 65     BP: 162/84     Resp Rate: 18     O2 Sat (%): 100 %    Lines & Drains:     Urinary Catheter? No     Central Line? No     PICC Line? Yes   Placement Date: 2-28-17   Medical Necessity: IV antibiotic administration    NG tube [] in [] removed [x] not applicable   Drains [] in [] removed [x] not applicable     Skin Integrity:      Wounds: yes   Dressings Present: yes    Wound Concerns: no      GI:    Current diet:  DIET REGULAR    Nausea: NO  Vomiting: NO  Bowel Sounds: YES  Flatus: NO  Last Bowel Movement: 2-26-17   Respiratory:  Supplemental O2: No     Incentive Spirometer: YES    Coughing and Deep Breathing: YES  Oral Care: YES  Understanding (patient/family education): YES   Getting out of bed: YES  Head of bed elevation: YES    Patient Safety:    Falls Score: 1  Mobility Score: 1  Bed Alarm On? No  Sitter? No      Opportunity for questions and clarification was given to oncoming nurse. Patient bed is in supine position with HOB elevated  side rails are up x 2, call bell within reach and patient not in distress.     Kj Blanco

## 2017-03-01 LAB
ANION GAP BLD CALC-SCNC: 8 MMOL/L (ref 5–15)
BUN SERPL-MCNC: 10 MG/DL (ref 6–20)
BUN/CREAT SERPL: 10 (ref 12–20)
CALCIUM SERPL-MCNC: 8 MG/DL (ref 8.5–10.1)
CHLORIDE SERPL-SCNC: 110 MMOL/L (ref 97–108)
CO2 SERPL-SCNC: 27 MMOL/L (ref 21–32)
CREAT SERPL-MCNC: 1.02 MG/DL (ref 0.7–1.3)
GLUCOSE BLD STRIP.AUTO-MCNC: 122 MG/DL (ref 65–100)
GLUCOSE BLD STRIP.AUTO-MCNC: 138 MG/DL (ref 65–100)
GLUCOSE BLD STRIP.AUTO-MCNC: 189 MG/DL (ref 65–100)
GLUCOSE BLD STRIP.AUTO-MCNC: 200 MG/DL (ref 65–100)
GLUCOSE SERPL-MCNC: 133 MG/DL (ref 65–100)
POTASSIUM SERPL-SCNC: 4.2 MMOL/L (ref 3.5–5.1)
SERVICE CMNT-IMP: ABNORMAL
SODIUM SERPL-SCNC: 145 MMOL/L (ref 136–145)

## 2017-03-01 PROCEDURE — 74011250636 HC RX REV CODE- 250/636: Performed by: FAMILY MEDICINE

## 2017-03-01 PROCEDURE — 74011250637 HC RX REV CODE- 250/637: Performed by: INTERNAL MEDICINE

## 2017-03-01 PROCEDURE — 80048 BASIC METABOLIC PNL TOTAL CA: CPT | Performed by: FAMILY MEDICINE

## 2017-03-01 PROCEDURE — 36415 COLL VENOUS BLD VENIPUNCTURE: CPT | Performed by: FAMILY MEDICINE

## 2017-03-01 PROCEDURE — 74011000258 HC RX REV CODE- 258: Performed by: FAMILY MEDICINE

## 2017-03-01 PROCEDURE — 74011636637 HC RX REV CODE- 636/637: Performed by: INTERNAL MEDICINE

## 2017-03-01 PROCEDURE — 82962 GLUCOSE BLOOD TEST: CPT

## 2017-03-01 PROCEDURE — 74011250636 HC RX REV CODE- 250/636: Performed by: PHYSICIAN ASSISTANT

## 2017-03-01 PROCEDURE — 65270000029 HC RM PRIVATE

## 2017-03-01 RX ADMIN — ZOLPIDEM TARTRATE 5 MG: 5 TABLET, FILM COATED ORAL at 21:07

## 2017-03-01 RX ADMIN — AMPICILLIN SODIUM 2 G: 2 INJECTION, POWDER, FOR SOLUTION INTRAMUSCULAR; INTRAVENOUS at 12:50

## 2017-03-01 RX ADMIN — GABAPENTIN 100 MG: 100 CAPSULE ORAL at 09:18

## 2017-03-01 RX ADMIN — PANTOPRAZOLE SODIUM 40 MG: 40 TABLET, DELAYED RELEASE ORAL at 09:18

## 2017-03-01 RX ADMIN — GABAPENTIN 100 MG: 100 CAPSULE ORAL at 21:06

## 2017-03-01 RX ADMIN — LISINOPRIL 40 MG: 20 TABLET ORAL at 09:18

## 2017-03-01 RX ADMIN — ACETAMINOPHEN 650 MG: 325 TABLET, FILM COATED ORAL at 21:16

## 2017-03-01 RX ADMIN — GABAPENTIN 100 MG: 100 CAPSULE ORAL at 17:23

## 2017-03-01 RX ADMIN — AMPICILLIN SODIUM 2 G: 2 INJECTION, POWDER, FOR SOLUTION INTRAMUSCULAR; INTRAVENOUS at 17:14

## 2017-03-01 RX ADMIN — ATORVASTATIN CALCIUM 40 MG: 40 TABLET, FILM COATED ORAL at 09:18

## 2017-03-01 RX ADMIN — AMPICILLIN SODIUM 2 G: 2 INJECTION, POWDER, FOR SOLUTION INTRAMUSCULAR; INTRAVENOUS at 06:14

## 2017-03-01 RX ADMIN — OXYCODONE HYDROCHLORIDE AND ACETAMINOPHEN 1 TABLET: 10; 325 TABLET ORAL at 21:07

## 2017-03-01 RX ADMIN — Medication 10 ML: at 12:56

## 2017-03-01 RX ADMIN — Medication 10 ML: at 06:14

## 2017-03-01 RX ADMIN — VANCOMYCIN HYDROCHLORIDE 2000 MG: 10 INJECTION, POWDER, LYOPHILIZED, FOR SOLUTION INTRAVENOUS at 09:31

## 2017-03-01 RX ADMIN — INSULIN LISPRO 3 UNITS: 100 INJECTION, SOLUTION INTRAVENOUS; SUBCUTANEOUS at 12:54

## 2017-03-01 RX ADMIN — AMPICILLIN SODIUM 2 G: 2 INJECTION, POWDER, FOR SOLUTION INTRAMUSCULAR; INTRAVENOUS at 00:15

## 2017-03-01 RX ADMIN — INSULIN GLARGINE 30 UNITS: 100 INJECTION, SOLUTION SUBCUTANEOUS at 21:16

## 2017-03-01 RX ADMIN — INSULIN LISPRO 2 UNITS: 100 INJECTION, SOLUTION INTRAVENOUS; SUBCUTANEOUS at 17:23

## 2017-03-01 RX ADMIN — SODIUM CHLORIDE 100 ML/HR: 900 INJECTION, SOLUTION INTRAVENOUS at 17:27

## 2017-03-01 NOTE — PROGRESS NOTES
DEV faxed orders signed by Dr. Chelsi Kitchen for wound vac. DEV is now looking for a home health agency who has a contract with his insurance. [facility] has accepted the patient for IV infusions. The patient may be discharged tomorrow.  Lisa Joshi RN #[id number]

## 2017-03-01 NOTE — WOUND CARE
Wound VAC check: patient is being set up for discharge home today or tomorrow. Home wound VAC application filled out again for Dr Kwasi Odonnell and  looking for a [facility] [place]  Agency that will accept him and his insurance. Plan: wound VAC dressing change due tomorrow. If discharged today will need to do a wet to dry dressing/packing for discharge home and [facility] to accept vac and place on patient. Or possibly get home wound VAC here tomorrow and change dressing and place wound vac on him for discharge home.   Tammy Campo, RN, CWON, zone ph# [phone]

## 2017-03-01 NOTE — PROGRESS NOTES
I have filled out the wound vac [employer] form. Obviously the bone cultures came back as osteomyelitis and will need the 6 weeks of IV abx. I will change the vac dressing Thursday morning, and if good will clear for discharge from my standpoint. Please feel free to call or text me with any concerns.      Naomy Loya DPM     [phone]:RY

## 2017-03-01 NOTE — PROGRESS NOTES
At [facility] is out of network. CM spoke with the patient and sent referrals to [facility] and [facility]. The [facility] order for wound care vac will be sent once signed by Dr Connie Khan. They will require insurance pre authorization. CM informed the patient and attempted to contact Dr Angella Lind left a message to call CM. The patient had spoken to HR at his job who recommended FMLA. CM informed him he can have it faxed to the unit prior to discharge CM will bring it to him. CM will follow for discharge planning.  Charli Graham RN #[id number]

## 2017-03-02 VITALS
BODY MASS INDEX: 36.18 KG/M2 | HEIGHT: 75 IN | SYSTOLIC BLOOD PRESSURE: 175 MMHG | RESPIRATION RATE: 18 BRPM | OXYGEN SATURATION: 100 % | TEMPERATURE: 98.4 F | DIASTOLIC BLOOD PRESSURE: 90 MMHG | HEART RATE: 63 BPM | WEIGHT: 291.01 LBS

## 2017-03-02 LAB
ANION GAP BLD CALC-SCNC: 7 MMOL/L (ref 5–15)
BUN SERPL-MCNC: 15 MG/DL (ref 6–20)
BUN/CREAT SERPL: 13 (ref 12–20)
CALCIUM SERPL-MCNC: 7.8 MG/DL (ref 8.5–10.1)
CHLORIDE SERPL-SCNC: 108 MMOL/L (ref 97–108)
CO2 SERPL-SCNC: 27 MMOL/L (ref 21–32)
CREAT SERPL-MCNC: 1.17 MG/DL (ref 0.7–1.3)
GLUCOSE BLD STRIP.AUTO-MCNC: 140 MG/DL (ref 65–100)
GLUCOSE BLD STRIP.AUTO-MCNC: 194 MG/DL (ref 65–100)
GLUCOSE BLD STRIP.AUTO-MCNC: 247 MG/DL (ref 65–100)
GLUCOSE SERPL-MCNC: 175 MG/DL (ref 65–100)
POTASSIUM SERPL-SCNC: 3.8 MMOL/L (ref 3.5–5.1)
SERVICE CMNT-IMP: ABNORMAL
SODIUM SERPL-SCNC: 142 MMOL/L (ref 136–145)

## 2017-03-02 PROCEDURE — 74011636637 HC RX REV CODE- 636/637: Performed by: INTERNAL MEDICINE

## 2017-03-02 PROCEDURE — 74011000258 HC RX REV CODE- 258: Performed by: FAMILY MEDICINE

## 2017-03-02 PROCEDURE — 74011250636 HC RX REV CODE- 250/636: Performed by: FAMILY MEDICINE

## 2017-03-02 PROCEDURE — 82962 GLUCOSE BLOOD TEST: CPT

## 2017-03-02 PROCEDURE — 80048 BASIC METABOLIC PNL TOTAL CA: CPT | Performed by: FAMILY MEDICINE

## 2017-03-02 PROCEDURE — 74011250636 HC RX REV CODE- 250/636: Performed by: PHYSICIAN ASSISTANT

## 2017-03-02 PROCEDURE — 74011250637 HC RX REV CODE- 250/637: Performed by: INTERNAL MEDICINE

## 2017-03-02 PROCEDURE — 36415 COLL VENOUS BLD VENIPUNCTURE: CPT | Performed by: FAMILY MEDICINE

## 2017-03-02 RX ORDER — VANCOMYCIN 2 GRAM/500 ML IN 0.9 % SODIUM CHLORIDE INTRAVENOUS
2000 EVERY 12 HOURS
Qty: 42000 ML | Refills: 0 | Status: SHIPPED | OUTPATIENT
Start: 2017-03-02 | End: 2017-04-13

## 2017-03-02 RX ORDER — OXYCODONE AND ACETAMINOPHEN 10; 325 MG/1; MG/1
1 TABLET ORAL
Qty: 14 TAB | Refills: 0 | Status: ON HOLD | OUTPATIENT
Start: 2017-03-02 | End: 2017-09-02

## 2017-03-02 RX ADMIN — SODIUM CHLORIDE 100 ML/HR: 900 INJECTION, SOLUTION INTRAVENOUS at 06:51

## 2017-03-02 RX ADMIN — INSULIN LISPRO 3 UNITS: 100 INJECTION, SOLUTION INTRAVENOUS; SUBCUTANEOUS at 14:00

## 2017-03-02 RX ADMIN — PANTOPRAZOLE SODIUM 40 MG: 40 TABLET, DELAYED RELEASE ORAL at 06:52

## 2017-03-02 RX ADMIN — AMPICILLIN SODIUM 2 G: 2 INJECTION, POWDER, FOR SOLUTION INTRAMUSCULAR; INTRAVENOUS at 14:01

## 2017-03-02 RX ADMIN — VANCOMYCIN HYDROCHLORIDE 2000 MG: 10 INJECTION, POWDER, LYOPHILIZED, FOR SOLUTION INTRAVENOUS at 09:04

## 2017-03-02 RX ADMIN — AMPICILLIN SODIUM 2 G: 2 INJECTION, POWDER, FOR SOLUTION INTRAMUSCULAR; INTRAVENOUS at 00:16

## 2017-03-02 RX ADMIN — VANCOMYCIN HYDROCHLORIDE 2000 MG: 10 INJECTION, POWDER, LYOPHILIZED, FOR SOLUTION INTRAVENOUS at 00:16

## 2017-03-02 RX ADMIN — AMPICILLIN SODIUM 2 G: 2 INJECTION, POWDER, FOR SOLUTION INTRAMUSCULAR; INTRAVENOUS at 05:53

## 2017-03-02 RX ADMIN — LISINOPRIL 40 MG: 20 TABLET ORAL at 08:58

## 2017-03-02 RX ADMIN — ATORVASTATIN CALCIUM 40 MG: 40 TABLET, FILM COATED ORAL at 08:58

## 2017-03-02 RX ADMIN — GABAPENTIN 100 MG: 100 CAPSULE ORAL at 08:58

## 2017-03-02 NOTE — PROGRESS NOTES
General Daily Progress Note    Admit Date: 2/24/2017  Hospital day several     Subjective:     Patient denies any pain in jaw less swollen pain controlled wound vac in place s/w nursing   Medication side effects: none    Current Facility-Administered Medications   Medication Dose Route Frequency    ampicillin (OMNIPEN) 2 g in 0.9% sodium chloride (MBP/ADV) 50 mL  2 g IntraVENous Q6H    vancomycin (VANCOCIN) 2000 mg in  ml infusion  2,000 mg IntraVENous Q12H    sodium chloride (NS) flush 10-30 mL  10-30 mL InterCATHeter PRN    sodium chloride (NS) flush 10 mL  10 mL InterCATHeter Q24H    sodium chloride (NS) flush 10 mL  10 mL InterCATHeter PRN    sodium chloride (NS) flush 10-40 mL  10-40 mL InterCATHeter Q8H    heparin (porcine) pf 300 Units  300 Units InterCATHeter PRN    diphenhydrAMINE (BENADRYL) capsule 50 mg  50 mg Oral Q6H PRN    0.9% sodium chloride infusion  100 mL/hr IntraVENous CONTINUOUS    atorvastatin (LIPITOR) tablet 40 mg  40 mg Oral DAILY    pantoprazole (PROTONIX) tablet 40 mg  40 mg Oral ACB    gabapentin (NEURONTIN) capsule 100 mg  100 mg Oral TID    insulin glargine (LANTUS) injection 30 Units  30 Units SubCUTAneous QHS    lisinopril (PRINIVIL, ZESTRIL) tablet 40 mg  40 mg Oral DAILY    zolpidem (AMBIEN) tablet 5 mg  5 mg Oral QHS PRN    sodium chloride (NS) flush 5-10 mL  5-10 mL IntraVENous Q8H    sodium chloride (NS) flush 5-10 mL  5-10 mL IntraVENous PRN    acetaminophen (TYLENOL) tablet 650 mg  650 mg Oral Q4H PRN    ondansetron (ZOFRAN) injection 4 mg  4 mg IntraVENous Q4H PRN    docusate sodium (COLACE) capsule 100 mg  100 mg Oral BID PRN    insulin lispro (HUMALOG) injection   SubCUTAneous AC&HS    glucose chewable tablet 16 g  4 Tab Oral PRN    dextrose (D50W) injection syrg 12.5-25 g  12.5-25 g IntraVENous PRN    glucagon (GLUCAGEN) injection 1 mg  1 mg IntraMUSCular PRN    oxyCODONE-acetaminophen (PERCOCET 10)  mg per tablet 1 Tab  1 Tab Oral Q6H PRN        Review of Systems  Constitutional: negative  Cardiovascular: negative  Gastrointestinal: negative  Musculoskeletal:positive for postop pain    Objective:     Patient Vitals for the past 8 hrs:   BP Temp Pulse Resp SpO2   03/02/17 0410 160/82 99 °F (37.2 °C) 61 18 100 %        02/28 1901 - 03/02 0700  In: 4738.3 [P.O.:1110; I.V.:3628.3]  Out: 2925 [Urine:2900; Drains:25]    Physical Exam:   Visit Vitals    /82 (BP 1 Location: Left arm, BP Patient Position: At rest)    Pulse 61    Temp 99 °F (37.2 °C)    Resp 18    Ht 6' 3\" (1.905 m)    Wt 291 lb 0.1 oz (132 kg)    SpO2 100%    BMI 36.37 kg/m2     General appearance: alert, fatigued, cooperative, no distress, appears stated age  Lungs: clear to auscultation bilaterally  heent jaw decreased swelling   Heart: regular rate and rhythm, S1, S2 normal, no murmur, click, rub or gallop  Abdomen: soft, non-tender.  Bowel sounds normal. No masses,  no organomegaly  Extremities: l foot bandage not removed wound vac in place           Data Review   Recent Results (from the past 24 hour(s))   GLUCOSE, POC    Collection Time: 03/01/17  8:22 AM   Result Value Ref Range    Glucose (POC) 122 (H) 65 - 100 mg/dL    Performed by Chitra Coto (PCT)    GLUCOSE, POC    Collection Time: 03/01/17 12:10 PM   Result Value Ref Range    Glucose (POC) 200 (H) 65 - 100 mg/dL    Performed by Chitra Coto (PCT)    GLUCOSE, POC    Collection Time: 03/01/17  4:33 PM   Result Value Ref Range    Glucose (POC) 189 (H) 65 - 100 mg/dL    Performed by Chitra Rodman (PCT)    GLUCOSE, POC    Collection Time: 03/01/17  9:10 PM   Result Value Ref Range    Glucose (POC) 138 (H) 65 - 100 mg/dL    Performed by Teodora Townsend    METABOLIC PANEL, BASIC    Collection Time: 03/02/17  3:51 AM   Result Value Ref Range    Sodium 142 136 - 145 mmol/L    Potassium 3.8 3.5 - 5.1 mmol/L    Chloride 108 97 - 108 mmol/L    CO2 27 21 - 32 mmol/L    Anion gap 7 5 - 15 mmol/L    Glucose 175 (H) 65 - 100 mg/dL    BUN 15 6 - 20 MG/DL    Creatinine 1.17 0.70 - 1.30 MG/DL    BUN/Creatinine ratio 13 12 - 20      GFR est AA >60 >60 ml/min/1.73m2    GFR est non-AA >60 >60 ml/min/1.73m2    Calcium 7.8 (L) 8.5 - 10.1 MG/DL           Assessment:     Active Problems:    HTN (hypertension) (7/23/2013)      IDDM (insulin dependent diabetes mellitus) ([facility] (10/6/2016)      Diabetic infection of right foot ([facility] (2/16/2017)      Osteomyelitis ([facility] (2/24/2017)        Plan:   progressing toward d/c appreciate case managment helping with arranging iv abx xray reviewed   Stoney Blood M.D.   Family Medicine call directly during the day  before 5 pm [phone]

## 2017-03-02 NOTE — PROGRESS NOTES
Nutrition Services      Nutrition Screen:  Wt Readings from Last 10 Encounters:   02/24/17 132 kg (291 lb 0.1 oz)   02/16/17 134.4 kg (296 lb 4.8 oz)   02/17/17 133.4 kg (294 lb)   12/16/16 137.3 kg (302 lb 9.6 oz)   12/09/16 135 kg (297 lb 9.6 oz)   11/18/16 135.1 kg (297 lb 12.8 oz)   10/28/16 131.5 kg (290 lb)   10/06/16 131.8 kg (290 lb 8 oz)   04/22/16 138.3 kg (305 lb)   09/01/15 120.4 kg (265 lb 8 oz)     Body mass index is 36.37 kg/(m^2). Supplements:                        _____ ordered ______  declined. __ __  Pt is nutritionally stable at this time, will rescreen in 7 days. __ __    Pt is at nutritional risk and will be rescreened in  days. __x __  Pt is at moderate or high nutritional risk, will refer to RD for assessment.        Dirk Triana  Dietetic Technician, Registered

## 2017-03-02 NOTE — PROGRESS NOTES
End of Shift Nursing Note    Bedside shift change report given to Miriam Lynch RN (oncoming nurse) by Perla Desir RN (offgoing nurse). Report included the following information SBAR, Kardex, Procedure Summary, Intake/Output and MAR. Zone Phone:   [phone]    Significant changes during shift:    none   Non-emergent issues for physician to address:   None     Number times ambulated in hallway past shift: 0      Number of times OOB to chair past shift: 2    POD #: admitted 2-24-17     Vital Signs:    Temp: 99 °F (37.2 °C)     Pulse (Heart Rate): 62     BP: (!) 168/92     Resp Rate: 18     O2 Sat (%): 100 %    Lines & Drains:     PICC Line? Yes   Placement Date: 2-28-17   Medical Necessity: IV Antibiotics at home    NG tube [] in [] removed [x] not applicable   Drains [] in [] removed [x] not applicable     Skin Integrity:      Wounds: yes   Dressings Present: yes    Wound Concerns: no      GI:    Current diet:  DIET REGULAR No Conc. Sweets    Nausea: NO  Vomiting: NO  Bowel Sounds: YES  Flatus: YES  Last Bowel Movement: several days ago     Respiratory:  Incentive Spirometer: YES    Coughing and Deep Breathing: YES  Oral Care: YES  Understanding (patient/family education): YES   Getting out of bed: YES  Head of bed elevation: YES    Patient Safety:    Falls Score: 1  Mobility Score: 1  Bed Alarm On? No  Sitter? No      Opportunity for questions and clarification was given to oncoming nurse. Patient bed is in supine position with HOB elevated, side rails are up x 2, call bell within reach and patient not in distress.     Nanci Ruelas

## 2017-03-02 NOTE — PROGRESS NOTES
[address] declined accepting the patient. DEV called the insurance company and verified his home health benefits and the preferred home health is [facility]. DEV sent a referral and is waiting for response.  Dorene Clark RN #[id number]

## 2017-03-02 NOTE — PROGRESS NOTES
The home health agencies that are in network for his insurance all declined accepting the patient. Dr Cj Marcano informed and discharging him today with wet to dry dressings. He is to follow up with him next Tuesday. The office is closed and the patient given the number to call for appointment and address to go. CM will follow up with Dr. Jacqueline Juárez tomorrow regarding wound vac. CM called [facility] and the medications will be delivered to the hospital within the next 30 minutes. The nurse was informed. Care Management Interventions  PCP Verified by CM: Yes  Transition of Care Consult (CM Consult): Discharge Planning  Discharge Durable Medical Equipment: No (has a RW at home.)  Physical Therapy Consult: No  Occupational Therapy Consult: No  Speech Therapy Consult: No  Current Support Network: Lives with Spouse, Own Home (mulit level home)  Confirm Follow Up Transport: Family (by car)  Plan discussed with Pt/Family/Caregiver: Yes  Freedom of Choice Offered: Yes  Discharge Location  Discharge Placement: Home KEYANA Arce RN #[id number]

## 2017-03-02 NOTE — PROGRESS NOTES
Pharmacist Discharge Medication Reconciliation    Significant PMH:   Past Medical History:   Diagnosis Date    Diabetes ([facility]     since age 23    DM (diabetes mellitus) ([facility]     HTN (hypertension)     Hypercholesteremia     Hyperlipidemia      Chief Complaint for this Admission:   Chief Complaint   Patient presents with    Foot Pain     pt reports wound to left foot with drainage and odor, was admitted for infection and was to follow up with wound care, went today and was told he missed his appt     Allergies: Morphine    Discharge Medications:   Current Discharge Medication List        START taking these medications    Details   ampicillin 2 g, ADDaptor 1 Device 2 g by IntraVENous route every six (6) hours for 7 days. Qty: 21 Dose, Refills: 0      oxyCODONE-acetaminophen (PERCOCET 10)  mg per tablet Take 1 Tab by mouth every six (6) hours as needed. Max Daily Amount: 4 Tabs. Qty: 14 Tab, Refills: 0      vancomycin in 0.9% Sodium Cl (VANCOCIN) 2 gram/500 mL soln 500 mL by IntraVENous route every twelve (12) hours for 42 days. Qty: 15613 mL, Refills: 0           CONTINUE these medications which have NOT CHANGED    Details   ENZYMES,DIGESTIVE, PLANT, (LIPASE CONCENTRATE-HP PO) Take  by mouth daily. gabapentin (NEURONTIN) 100 mg capsule Take 100 mg by mouth two (2) times daily as needed for Pain. cholecalciferol (VITAMIN D3) 1,000 unit cap Take 2,000 Units by mouth daily. metFORMIN (GLUCOPHAGE) 500 mg tablet TAKE 1 TABLET BY MOUTH TWICE A DAY WITH MEALS AND INCREASE AS DIRECTED  Qty: 75 Tab, Refills: 0    Associated Diagnoses: IDDM (insulin dependent diabetes mellitus) ([facility])      insulin lispro (HUMALOG) 100 unit/mL kwikpen 12 Units by SubCUTAneous route Before breakfast, lunch, and dinner. DX: E11.65  Qty: 1 Package, Refills: 11      insulin detemir (LEVEMIR FLEXTOUCH) 100 unit/mL (3 mL) inpn 0.6 mL by SubCUTAneous route nightly.  60 units QHS  Qty: 20 Pen, Refills: 11      esomeprazole (NEXIUM) 40 mg capsule Take 1 Cap by mouth daily. Qty: 90 Cap, Refills: 3      lisinopril (PRINIVIL, ZESTRIL) 40 mg tablet Take 1 Tab by mouth daily. Qty: 90 Tab, Refills: 3      atorvastatin (LIPITOR) 40 mg tablet Take 1 Tab by mouth daily. Qty: 90 Tab, Refills: 3      sodium chloride (SALINE NASAL) 0.65 % nasal spray 1 [place] by Both Nostrils route as needed for Congestion. Qty: 15 mL, Refills: 0             The patient's chart, MAR and AVS were reviewed by Bob Le RPH. Discharging Provider: TEODORO Medina

## 2017-03-02 NOTE — WOUND CARE
Wound care talked with Dr Sharlene Garcia who removed wound VAC dressing this am and applied a packing/dressing to left foot x2 wounds. Dr Sharlene Garcia stated that wounds look good and patient can be discharged with wound VAC dressing once [facility] home wound VAC is available. Patient was positive for OM in the foot and will need OP abx infusions upon discharge.  having a very hard time finding a [place] [id number] agency that can handle this complex patient. Patient is inpatient to leave and has left AMA before. CM working diligently to arrange everything. Dr Gayla Thomason dressing is good until we get [facility] home wound VAC approval and delivery today.   Joslyn Ormond, RN, CWON, zone ph# [phone]

## 2017-03-02 NOTE — PROGRESS NOTES
End of Shift Nursing Note    Bedside shift change report given to Evette Willingham RN (oncoming nurse) by Karime Funes (offgoing nurse). Report included the following information SBAR, Kardex and Recent Results. Zone Phone:   [phone]    Significant changes during shift:    0   Non-emergent issues for physician to address:   0     Number times ambulated in hallway past shift: 0      Number of times OOB to chair past shift: 0    POD #: 0     Vital Signs:    Temp: 99 °F (37.2 °C)     Pulse (Heart Rate): 61     BP: 160/82     Resp Rate: 18     O2 Sat (%): 100 %    Lines & Drains:     Urinary Catheter? No   Placement Date: 0   Medical Necessity: 0  Central Line? No   Placement Date: 0   Medical Necessity: 0  PICC Line? Yes   Placement Date: 02/28   Medical Necessity: yes    NG tube [] in [] removed [] not applicable   Drains [] in [] removed [] not applicable     Skin Integrity:      Wounds: yes   Dressings Present: yes    Wound Concerns: no      GI:    Current diet:  DIET REGULAR No Conc. Sweets    Nausea: NO  Vomiting: NO  Bowel Sounds: YES  Flatus: YES  Last Bowel Movement: several days ago   Appearance: 0    Respiratory:  Supplemental O2: No      Device: 0   via 0 Liters/min     Incentive Spirometer: YES  Volume: 750  Coughing and Deep Breathing: YES  Oral Care: YES  Understanding (patient/family education): YES   Getting out of bed: YES  Head of bed elevation: YES    Patient Safety:    Falls Score: 1  Mobility Score: 1  Bed Alarm On? No  Sitter? No      Opportunity for questions and clarification was given to oncoming nurse. Patient bed is in low position, side rails are up x 2, door & observation blinds open as needed, call bell within reach and patient not in distress.     Juan Webster RN

## 2017-03-02 NOTE — PROGRESS NOTES
Discharge paperwork signed, copy placed on chart, patient going home with PICC, informed patient to call and schedule to follow up with Dr Amparo Diaz on Tuesday. Patient awaiting ride at the moment.

## 2017-03-02 NOTE — DISCHARGE INSTRUCTIONS
PATIENT DISCHARGE INSTRUCTIONS      PATIENT DISCHARGE INSTRUCTIONS    Isabela Evans / [id number] : 1970    Admitted 2017 Discharged: 3/2/2017       · It is important that you take the medication exactly as they are prescribed. · Keep your medication in the bottles provided by the pharmacist and keep a list of the medication names, dosages, and times to be taken in your wallet. · Do not take other medications without consulting your doctor. What to do at Home    Recommended Diet: Cardiac Diet and Diabetic Diet    Recommended Activity: Activity as tolerated, no working for 4 weeks  Or per your surgeon    If you experience any concerns please contact your doctor.     Call Dr Kathy Power office at  to schedule wound care dressing changes forT        Signed By: [address] [facility] [place], [place]     2017

## 2017-03-02 NOTE — DISCHARGE SUMMARY
Physician Discharge Summary       Patient: Mandi Guillen MRN: [id number]  SSN: [id number]    YOB: 1970  Age: 51 y.o. Sex: male    PCP: Bruno Narayanan MD    Admit date: 2/24/2017  Admitting Provider: Aylin Narayanan MD    Discharge date: 3/2/2017  Discharging Provider: Bruno Narayanan MD    * Admission Diagnoses: LEFT ;Osteomyelitis [facility])    * Discharge Diagnoses:    Hospital Problems as of 3/2/2017  Date Reviewed: 2/26/2017          Codes Class Noted - Resolved POA    Osteomyelitis ([facility] ICD-10-CM: M86.9  ICD-9-CM: 730.20  2/24/2017 - Present Unknown        Diabetic infection of right foot [facility]) ICD-10-CM: E11.69, L08.9  ICD-9-CM: 250.80, 686.9  2/16/2017 - Present Yes        IDDM (insulin dependent diabetes mellitus) ([facility] ICD-10-CM: E11.9, Z79.4  ICD-9-CM: 250.00, V58.67  10/6/2016 - Present Yes        HTN (hypertension) ICD-10-CM: I10  ICD-9-CM: 401.9  7/23/2013 - Present Yes              * Hospital Course:   Pt was admitted for     * Procedures:   Procedure(s):  Incision and Drainage with Debridement of left foot with bone bx      Consults: podiatry    Significant Diagnostic Studies: mri    Discharge Exam:  Visit Vitals    /90 (BP 1 Location: Left arm, BP Patient Position: At rest)    Pulse 63    Temp 98.4 °F (36.9 °C)    Resp 18    Ht 6' 3\" (1.905 m)    Wt 291 lb 0.1 oz (132 kg)    SpO2 100%    BMI 36.37 kg/m2     Lungs: clear to auscultation bilaterally  Heart: regular rate and rhythm  Abdomen: soft, non-tender. Bowel sounds normal. No masses,  no organomegaly  Extremities: bandage on foot remainder of extremities full rom   Neurologic: Grossly normal    * Discharge Condition: improved and fair  * Disposition: Home    Discharge Medications:  Current Discharge Medication List      START taking these medications    Details   ampicillin 2 g, ADDaptor 1 Device 2 g by IntraVENous route every six (6) hours for 7 days.   Qty: 21 Dose, Refills: 0 oxyCODONE-acetaminophen (PERCOCET 10)  mg per tablet Take 1 Tab by mouth every six (6) hours as needed. Max Daily Amount: 4 Tabs. Qty: 14 Tab, Refills: 0      vancomycin in 0.9% Sodium Cl (VANCOCIN) 2 gram/500 mL soln 500 mL by IntraVENous route every twelve (12) hours for 42 days. Qty: 66708 mL, Refills: 0         CONTINUE these medications which have NOT CHANGED    Details   ENZYMES,DIGESTIVE, PLANT, (LIPASE CONCENTRATE-HP PO) Take  by mouth daily. gabapentin (NEURONTIN) 100 mg capsule Take 100 mg by mouth two (2) times daily as needed for Pain. cholecalciferol (VITAMIN D3) 1,000 unit cap Take 2,000 Units by mouth daily. metFORMIN (GLUCOPHAGE) 500 mg tablet TAKE 1 TABLET BY MOUTH TWICE A DAY WITH MEALS AND INCREASE AS DIRECTED  Qty: 75 Tab, Refills: 0    Associated Diagnoses: IDDM (insulin dependent diabetes mellitus) ([facility])      insulin lispro (HUMALOG) 100 unit/mL kwikpen 12 Units by SubCUTAneous route Before breakfast, lunch, and dinner. DX: E11.65  Qty: 1 Package, Refills: 11      insulin detemir (LEVEMIR FLEXTOUCH) 100 unit/mL (3 mL) inpn 0.6 mL by SubCUTAneous route nightly. 60 units QHS  Qty: 20 Pen, Refills: 11      esomeprazole (NEXIUM) 40 mg capsule Take 1 Cap by mouth daily. Qty: 90 Cap, Refills: 3      lisinopril (PRINIVIL, ZESTRIL) 40 mg tablet Take 1 Tab by mouth daily. Qty: 90 Tab, Refills: 3      atorvastatin (LIPITOR) 40 mg tablet Take 1 Tab by mouth daily. Qty: 90 Tab, Refills: 3      sodium chloride (SALINE NASAL) 0.65 % nasal spray 1 [place] by Both Nostrils route as needed for Congestion. Qty: 15 mL, Refills: 0             * Follow-up Care/Patient Instructions:   Activity: Activity as tolerated  Diet: Cardiac Diet and Diabetic Diet  Wound Care: As directed by surgeon     Follow-up Information     Follow up With Details Comments Miracle Mckee 5 [phone]            Signed:  [address] [place] [address] Aspen Gambino MD  3/2/2017  7:46 AM

## 2017-03-02 NOTE — PROGRESS NOTES
[address] [facility] is considering taking the patient and pending response from her supervisor. CM called [facility] to check the status of wound vac, still pending and will call today with decision. CM called and informed Dr. Angela Amaya of challenge getting a home health agency. The [address] will not do wound vacs so that is not an option. He asked that CM keep him posted of outcome.    Bear Lobato RN #[id number]

## 2017-03-02 NOTE — PROGRESS NOTES
Spiritual Care Assessment/Progress Notes    [facility] [id number]  [id number]    1970  51 y.o.  male    Patient Telephone Number: [phone] (home)   Jew Affiliation: Presybeterian   Language: English   Extended Emergency Contact Information  Primary Emergency Contact: Luis Alberto Lawson Phone: [phone]  Relation: Spouse   Patient Active Problem List    Diagnosis Date Noted    Osteomyelitis ([facility] 02/24/2017    Edema of left lower extremity 02/17/2017    Diabetic infection of right foot ([facility] 02/16/2017    IDDM (insulin dependent diabetes mellitus) ([facility] 10/06/2016    Diabetes ([facility]     HTN (hypertension)     Hypercholesteremia     HTN (hypertension) 07/23/2013        Date: 3/2/2017       Level of Jew/Spiritual Activity:  []         Involved in carmelita tradition/spiritual practice    []         Not involved in carmelita tradition/spiritual practice  [x]         Spiritually oriented    []         Claims no spiritual orientation    []         seeking spiritual identity  []         Feels alienated from Mosque practice/tradition  []         Feels angry about Mosque practice/tradition  [x]         Spirituality/Mosque tradition is a resource for coping at this time.   []         Not able to assess due to medical condition    Services Provided Today:  []         crisis intervention    []         reading Scriptures  [x]         spiritual assessment    [x]         prayer  [x]         empathic listening/emotional support  []         rites and rituals (cite in comments)  []         life review     []         Mosque support  []         theological development    []         advocacy  []         ethical dialog     []         blessing  []         bereavement support    []         support to family  []         anticipatory grief support   []         help with AMD  []         spiritual guidance    []         meditation      Spiritual Care Needs  []         Emotional Support  []         Spiritual/Jewish Care  []         Loss/Adjustment  []         Advocacy/Referral                /Ethics  [x]         No needs expressed at               this time  []         Other: (note in               comments)  Spiritual Care Plan  []         Follow up visits with               pt/family  []         Provide materials  []         Schedule sacraments  []         Contact Community               Clergy  [x]         Follow up as needed  []         Other: (note in               comments)     Comments:  Initial visit on Gen Surg for spiritual assessment. No visitors present. Provided listening presence as patient shared he is frustrated. He is waiting on paperwork for discharge and said he wants to get home. He has good support from his wife. His mother and his aunt prayed with him before his surgery. Mr. Robby Membreno was receptive to my assurance of prayer for good recovery.   MANOJ Olivarez, [facility], [address] [facility] [address]     Paging Service  [phone]

## 2017-03-02 NOTE — PROGRESS NOTES
The patient has been declined by multiple home health agencies. CM sent referrals to several other agencies and pending response.  Fabi Recinos RN #[phone]

## 2017-03-03 ENCOUNTER — PATIENT OUTREACH (OUTPATIENT)
Dept: INTERNAL MEDICINE CLINIC | Age: 47
End: 2017-03-03

## 2017-03-03 ENCOUNTER — HOME HEALTH ADMISSION (OUTPATIENT)
Dept: HOME HEALTH SERVICES | Facility: HOME HEALTH | Age: 47
End: 2017-03-03
Payer: COMMERCIAL

## 2017-03-03 NOTE — PROGRESS NOTES
[facility] Discharge Follow-Up      Date/Time:  3/3/2017 3:00 PM    Patient listed on discharge [facility]) report on 3/3/2017  Patient discharge and readmit < 30 days from [facility] for diabetic foot infection. RRAT score: 10 Moderate     Nurse Navigator(NN) contacted the patient by telephone to perform post hospital discharge assessment. Verified  and address with patient as identifiers. Provided introduction to self, and explanation of the Nurses Navigator role. The patient states that he is feeling well today. No sensation of fever, SOB. Wound: the patient states that his left foot dressing is dry and intact at this time. He has been contacted by [facility] and his wound vac should be delivered tomorrow. Navigator provided the contact information for Nazia Monteiro so that he can coordinate the arrival of the visiting nurse. Diabetes  Labs and imaging results were reviewed  Discussed the patient's A1c trends over the past 3 years. I congratulated the patient's success with an A1c of 7% on . The patient expressed thanks and recalls that was really trying hard to control his blood sugars with his diet. He states that he has worked with a nutritionist at the [employer] store in the past. But this store is now closed. He is open to Diabetic education at the PCP office. Barriers to success: the patient works away from home 4 [place] airport. He has found it to be very difficult to purchase healthy choices of food while he is at work. He does not always have ready access to a refrigerator at his workplace. When he is at home his family does not always prepare diabetic meals for him. Medication:   Performed medication reconciliation with patient, and patient verbalizes understanding of administration of home medications. There were no barriers to obtaining medications identified at this time.  The patient states that he has had previous experience with managing home antibiotics through a PICC line in the past.     Red Flags:    significant increase in drainage with wound vac in place, sensation of fever at the wound site. PCP/Specialist follow up: Patient scheduled to follow up with Paul Jang MD and Dr. Lopez Doctor on [id number]. Reviewed red flags with patient, and patient verbalizes understanding. Patient given an opportunity to ask questions. No other clinical/social/functional needs noted. The patient agrees to contact the PCP office for questions related to their healthcare. The patient expressed thanks, offered no additional questions and ended the call. Case management impression/ plan:   The patient's motivation is very high to obtain and complete the treatment for his foot wound. I feel that his engagement for managing his diabetes is high at this time. He will benefit from additional information regarding a diabetic diet while away from. The patient seems open to a sliding scale instead of a standing dose at meal times. Will attempt to contact the patient by telephone or during office visit within the next 7-10 days. Will continue to follow as necessary for the next 30 days. Will reassess for case management needs prior to discharge from case management service on or about 30 days.

## 2017-03-04 ENCOUNTER — HOME CARE VISIT (OUTPATIENT)
Dept: SCHEDULING | Facility: HOME HEALTH | Age: 47
End: 2017-03-04
Payer: COMMERCIAL

## 2017-03-04 PROCEDURE — G0299 HHS/HOSPICE OF RN EA 15 MIN: HCPCS

## 2017-03-05 NOTE — DISCHARGE SUMMARY
Pt left hospital ama   Was not seen by me over the weekend and was not seen by my rounding partner either

## 2017-03-06 ENCOUNTER — HOME CARE VISIT (OUTPATIENT)
Dept: SCHEDULING | Facility: HOME HEALTH | Age: 47
End: 2017-03-06
Payer: COMMERCIAL

## 2017-03-06 ENCOUNTER — TELEPHONE (OUTPATIENT)
Dept: INTERNAL MEDICINE CLINIC | Age: 47
End: 2017-03-06

## 2017-03-06 PROCEDURE — G0300 HHS/HOSPICE OF LPN EA 15 MIN: HCPCS

## 2017-03-06 NOTE — TELEPHONE ENCOUNTER
Home Health nurse [address] called regarding trough order for patient to check Vancomycin  level. Virgen Villaseñor states she needs order to to have trough drawn   After pt's appointment with Dr Delroy Martínez tomorrow 3/7 at: 10:15. ... Also Virgen Villaseñor requesting order clarification of administration times to collection of trough.

## 2017-03-07 ENCOUNTER — HOME CARE VISIT (OUTPATIENT)
Dept: SCHEDULING | Facility: HOME HEALTH | Age: 47
End: 2017-03-07
Payer: COMMERCIAL

## 2017-03-07 ENCOUNTER — OFFICE VISIT (OUTPATIENT)
Dept: INTERNAL MEDICINE CLINIC | Age: 47
End: 2017-03-07

## 2017-03-07 VITALS
HEART RATE: 75 BPM | OXYGEN SATURATION: 98 % | RESPIRATION RATE: 18 BRPM | SYSTOLIC BLOOD PRESSURE: 158 MMHG | TEMPERATURE: 99.4 F | DIASTOLIC BLOOD PRESSURE: 100 MMHG

## 2017-03-07 VITALS
DIASTOLIC BLOOD PRESSURE: 97 MMHG | SYSTOLIC BLOOD PRESSURE: 161 MMHG | OXYGEN SATURATION: 97 % | TEMPERATURE: 98.3 F | BODY MASS INDEX: 37.75 KG/M2 | HEART RATE: 83 BPM | RESPIRATION RATE: 18 BRPM | HEIGHT: 75 IN | WEIGHT: 303.6 LBS

## 2017-03-07 DIAGNOSIS — I10 HYPERTENSION, UNCONTROLLED: Primary | ICD-10-CM

## 2017-03-07 DIAGNOSIS — M86.9 OSTEOMYELITIS OF LEFT FOOT, UNSPECIFIED CHRONICITY: ICD-10-CM

## 2017-03-07 DIAGNOSIS — Z91.14 NONCOMPLIANCE WITH MEDICATION REGIMEN: ICD-10-CM

## 2017-03-07 PROCEDURE — G0300 HHS/HOSPICE OF LPN EA 15 MIN: HCPCS

## 2017-03-07 RX ORDER — METOPROLOL TARTRATE 25 MG/1
12.5 TABLET, FILM COATED ORAL 2 TIMES DAILY
Qty: 30 TAB | Refills: 0 | Status: ON HOLD | OUTPATIENT
Start: 2017-03-07 | End: 2017-04-09 | Stop reason: SDUPTHER

## 2017-03-07 NOTE — PROGRESS NOTES
Chief Complaint   Patient presents with   [facility] Follow Up     osteomyelitis           Subjective:   Judy Yo 51 y.o.  male with a  past medical history reviewed see below. comes in today for hospital follow up  hh via [facility], he has been doing his own abx and has not missed any doses of medicaiton   He just had labs done by at [facility] for his vanc level and possible renal fxn. No diarrhea no abd pain   He has seen Dr Rula Nascimento today and and saw him last week as well. He is not wearing his wound vac as he had an appt. He missed a few days  of his bp meds  and states he took it today     ROS: otherwise feeling generally well. All other systems reviewed and are negative      Current Outpatient Prescriptions   Medication Sig Dispense Refill    metoprolol tartrate (LOPRESSOR) 25 mg tablet Take 0.5 Tabs by mouth two (2) times a day. 30 Tab 0    oxyCODONE-acetaminophen (PERCOCET 10)  mg per tablet Take 1 Tab by mouth every six (6) hours as needed. Max Daily Amount: 4 Tabs. 14 Tab 0    vancomycin in 0.9% Sodium Cl (VANCOCIN) 2 gram/500 mL soln 500 mL by IntraVENous route every twelve (12) hours for 42 days. 22119 mL 0    gabapentin (NEURONTIN) 100 mg capsule Take 100 mg by mouth two (2) times daily as needed for Pain.  cholecalciferol (VITAMIN D3) 1,000 unit cap Take 2,000 Units by mouth daily.  metFORMIN (GLUCOPHAGE) 500 mg tablet TAKE 1 TABLET BY MOUTH TWICE A DAY WITH MEALS AND INCREASE AS DIRECTED 75 Tab 0    insulin lispro (HUMALOG) 100 unit/mL kwikpen 12 Units by SubCUTAneous route Before breakfast, lunch, and dinner. DX: E11.65 1 Package 11    insulin detemir (LEVEMIR FLEXTOUCH) 100 unit/mL (3 mL) inpn 0.6 mL by SubCUTAneous route nightly. 60 units QHS 20 Pen 11    esomeprazole (NEXIUM) 40 mg capsule Take 1 Cap by mouth daily. 90 Cap 3    sodium chloride (SALINE NASAL) 0.65 % nasal spray 1 [place] by Both Nostrils route as needed for Congestion.  (Patient taking differently: 1 Spray by Both Nostrils route as needed for Congestion (dry sinus symptoms, especially during winter time. ).) 15 mL 0    lisinopril (PRINIVIL, ZESTRIL) 40 mg tablet Take 1 Tab by mouth daily. 90 Tab 3    atorvastatin (LIPITOR) 40 mg tablet Take 1 Tab by mouth daily. 90 Tab 3    HEPARIN SODIUM,PORCINE (HEPARIN LOCK FLUSH, PORCINE, IV) Take 3-5 mL by mouth four (4) times daily as needed (Per [facility] protocol ).  sodium chloride (NORMAL SALINE FLUSH) 0.9 % 5-10 mL by IntraVENous route four (4) times daily as needed (Per [facility] protocol ).        Allergies   Allergen Reactions    Morphine Angioedema     Past Medical History:   Diagnosis Date    Diabetes ([facility]     since age 23    [facility]     HTN (hypertension)     Hypercholesteremia     Hyperlipidemia      Past Surgical History:   Procedure Laterality Date    HX AMPUTATION      toes- left foot    HX BUNIONECTOMY      HX ORTHOPAEDIC      boutinerre deformity    HX ORTHOPAEDIC      fascia removed left foot    HX TONSILLECTOMY      HX WISDOM TEETH EXTRACTION       Family History   Problem Relation Age of Onset    Hypertension Mother     High Cholesterol Mother      Social History   Substance Use Topics    Smoking status: Former Smoker     Types: Cigars     Quit date: 4/22/2011    Smokeless tobacco: Never Used    Alcohol use 0.0 oz/week     1 Glasses of wine, 0 Standard drinks or equivalent per week      Comment: stop drinking 5 months ago          Objective:     Visit Vitals    BP (!) 161/97 (BP 1 Location: Left arm, BP Patient Position: Sitting)    Pulse 83    Temp 98.3 °F (36.8 °C) (Oral)    Resp 18    Ht 6' 3\" (1.905 m)    Wt 303 lb 9.6 oz (137.7 kg)    SpO2 97%    BMI 37.95 kg/m2     Gen: NAD, pleasant  HEENT: normal appearing head, nares patent, PERRLA, EOMI, oropharynx no erythema, no cervical lymphadenopathy neck supple   Cardio: RRR nl S1S2 no murmur  Lungs CTAB no wheeze no rales no rhonchi  ABD Soft non tender non distended + bowel sounds  Extremities: full ROM X 4 no clubbing no cyanosis foot in bandage no wound vac attached   Neuro: no gross focal deficits noted, alert and orientated X 3  Psych.: well groomed stable depression. Assessment/Plan:   Luis Angel Fung was seen today for hospital follow up. Diagnoses and all orders for this visit:    Hypertension, uncontrolled    Noncompliance with medication regimen    Osteomyelitis of left foot, unspecified chronicity ([facility]    Other orders  -     metoprolol tartrate (LOPRESSOR) 25 mg tablet; Take 0.5 Tabs by mouth two (2) times a day. Follow-up Disposition:  Return in about 3 weeks (around 3/28/2017) for recheck bp 15 min and review labs if needed . .  avs printed and given to the pt. Encouraged complainace with medications and monitoring diet and blood sugars   The patient voiced understanding of the above. Medication side effects were reviewed with the patient. Call with any concerns.

## 2017-03-07 NOTE — MR AVS SNAPSHOT
Visit Information Date & Time Provider Department Dept. Phone Encounter #  
 3/7/2017 12:15  [facility] [place], [address] [address] Cate Luo [id number] Follow-up Instructions Return in about 3 weeks (around 3/28/2017) for recheck bp 15 min and review labs if needed . Upcoming Health Maintenance Date Due MICROALBUMIN Q1 8/24/1979 Pneumococcal 19-64 Medium Risk (1 of 1 - PPSV23) 8/24/1988 FOOT EXAM Q1 11/11/2015 EYE EXAM RETINAL OR DILATED Q1 1/14/2016 HEMOGLOBIN A1C Q6M 8/17/2017 LIPID PANEL Q1 10/6/2017 DTaP/Tdap/Td series (2 - Td) 11/18/2021 Allergies as of 3/7/2017  Review Complete On: 3/7/2017 By: Janene Dalal Severity Noted Reaction Type Reactions Morphine High 10/20/2014   Systemic Angioedema Current Immunizations  Reviewed on 11/18/2016 Name Date Influenza Vaccine 11/1/2013 Influenza Vaccine Intradermal PF 10/28/2016 Tdap 11/18/2011 Not reviewed this visit You Were Diagnosed With   
  
 Codes Comments Hypertension, uncontrolled    -  Primary ICD-10-CM: I10 
ICD-9-CM: 401.9 Noncompliance with medication regimen     ICD-10-CM: Z91.14 
ICD-9-CM: V15.81 Osteomyelitis of left foot, unspecified chronicity (HCC)     ICD-10-CM: M86.9 ICD-9-CM: 730.27 Vitals BP Pulse Temp Resp Height(growth percentile) Weight(growth percentile) (!) 161/97 (BP 1 Location: Left arm, BP Patient Position: Sitting) 83 98.3 °F (36.8 °C) (Oral) 18 6' 3\" (1.905 m) 303 lb 9.6 oz (137.7 kg) SpO2 BMI Smoking Status 97% 37.95 kg/m2 Former Smoker Vitals History BMI and BSA Data Body Mass Index Body Surface Area  
 37.95 kg/m 2 2.7 m 2 Preferred Pharmacy Pharmacy Name Phone [id number] [place], [place] - [address] [phone] Your Updated Medication List  
  
   
 This list is accurate as of: 3/7/17 12:47 PM.  Always use your most recent med list.  
  
  
  
  
 ampicillin 2 g, ADDaptor 1 Device 2 g by IntraVENous route every six (6) hours for 7 days. atorvastatin 40 mg tablet Commonly known as:  LIPITOR Take 1 Tab by mouth daily. esomeprazole 40 mg capsule Commonly known as:  NexIUM Take 1 Cap by mouth daily. gabapentin 100 mg capsule Commonly known as:  NEURONTIN Take 100 mg by mouth two (2) times daily as needed for Pain. insulin detemir 100 unit/mL (3 mL) Inpn Commonly known as:  LEVEMIR FLEXTOUCH  
0.6 mL by SubCUTAneous route nightly. 60 units QHS  
  
 insulin lispro 100 unit/mL kwikpen Commonly known as:  HUMALOG  
12 Units by SubCUTAneous route Before breakfast, lunch, and dinner. DX: E11.65  
  
 lisinopril 40 mg tablet Commonly known as:  Saranya Reasons Take 1 Tab by mouth daily. metFORMIN 500 mg tablet Commonly known as:  GLUCOPHAGE  
TAKE 1 TABLET BY MOUTH TWICE A DAY WITH MEALS AND INCREASE AS DIRECTED  
  
 metoprolol tartrate 25 mg tablet Commonly known as:  LOPRESSOR Take 0.5 Tabs by mouth two (2) times a day. oxyCODONE-acetaminophen  mg per tablet Commonly known as:  PERCOCET 10 Take 1 Tab by mouth every six (6) hours as needed. Max Daily Amount: 4 Tabs.  
  
 sodium chloride 0.65 % nasal spray Commonly known as:  SALINE NASAL  
1 [place] by Both Nostrils route as needed for Congestion. vancomycin in 0.9% Sodium Cl 2 gram/500 mL Soln Commonly known as:  VANCOCIN  
500 mL by IntraVENous route every twelve (12) hours for 42 days. VITAMIN D3 1,000 unit Cap Generic drug:  cholecalciferol Take 2,000 Units by mouth daily. Prescriptions Sent to Pharmacy Refills  
 metoprolol tartrate (LOPRESSOR) 25 mg tablet 0 Sig: Take 0.5 Tabs by mouth two (2) times a day. Class: Normal  
 Pharmacy: [facility]/pharmacy [address]  Claudia Cramp AT [address] #: [phone] Route: Oral  
  
Follow-up Instructions Return in about 3 weeks (around 3/28/2017) for recheck bp 15 min and review labs if needed . To-Do List   
 03/07/2017 2:00 PM  
  Appointment with Faith Cuevas at [place] [address]  
  
 03/10/2017 To Be Determined Appointment with Ashishviola Huizar, BERTA at [place] [address]  
  
 03/15/2017 To Be Determined Appointment with Ashish Bhupendra, LPN at [place] [address]  
  
 03/22/2017 To Be Determined Appointment with Dallas Bhupendra, LPN at [place] [address]  
  
 03/29/2017 To Be Determined Appointment with Ashish Bhupendra, LPN at [place] [address]  
  
 04/05/2017 To Be Determined Appointment with Dallas Bhupendra, LPN at [place] [address]  
  
 04/12/2017 To Be Determined Appointment with Dallas Bhupendra, LPN at [place] [address]  
  
 04/19/2017 To Be Determined Appointment with Ashish Bhupendra, LPN at [place] [address]  
  
 04/26/2017 To Be Determined Appointment with Dallas Bhupendra, LPN at [place] [address]  
  
 05/02/2017 To Be Determined Appointment with Jose Ramon Jarrett RN at [place] [address] Patient Instructions Call with any concerns Introducing [facility] SERVICES! Dear Ceci Purcell: Thank you for requesting a [employer] account. Our records indicate that you already have an active [employer] account. You can access your account anytime at [email or web address] Did you know that you can access your hospital and ER discharge instructions at any time in [employer]? You can also review all of your test results from your hospital stay or ER visit. Additional Information If you have questions, please visit the Frequently Asked Questions section of the [email or web address] website at [email or web address] Remember, [employer] is NOT to be used for urgent needs. For medical emergencies, dial 911. Now available from your iPhone and Android! Please provide this summary of care documentation to your next provider. Your primary care clinician is listed as Eleni Billingsley. If you have any questions after today's visit, please call [phone].

## 2017-03-07 NOTE — PROGRESS NOTES
Chief Complaint   Patient presents with   [facility] Follow Up     osteomyelitis     Room 7

## 2017-03-09 ENCOUNTER — HOME CARE VISIT (OUTPATIENT)
Dept: HOME HEALTH SERVICES | Facility: HOME HEALTH | Age: 47
End: 2017-03-09
Payer: COMMERCIAL

## 2017-03-09 VITALS
DIASTOLIC BLOOD PRESSURE: 96 MMHG | HEART RATE: 99 BPM | SYSTOLIC BLOOD PRESSURE: 150 MMHG | RESPIRATION RATE: 18 BRPM | TEMPERATURE: 99.2 F | OXYGEN SATURATION: 98 %

## 2017-03-10 ENCOUNTER — HOME CARE VISIT (OUTPATIENT)
Dept: SCHEDULING | Facility: HOME HEALTH | Age: 47
End: 2017-03-10
Payer: COMMERCIAL

## 2017-03-10 PROCEDURE — G0300 HHS/HOSPICE OF LPN EA 15 MIN: HCPCS

## 2017-03-13 VITALS
RESPIRATION RATE: 14 BRPM | HEART RATE: 79 BPM | SYSTOLIC BLOOD PRESSURE: 126 MMHG | DIASTOLIC BLOOD PRESSURE: 84 MMHG | TEMPERATURE: 98.4 F | OXYGEN SATURATION: 97 %

## 2017-03-15 ENCOUNTER — HOME CARE VISIT (OUTPATIENT)
Dept: SCHEDULING | Facility: HOME HEALTH | Age: 47
End: 2017-03-15
Payer: COMMERCIAL

## 2017-03-15 VITALS
SYSTOLIC BLOOD PRESSURE: 156 MMHG | DIASTOLIC BLOOD PRESSURE: 88 MMHG | HEIGHT: 75 IN | OXYGEN SATURATION: 96 % | WEIGHT: 290 LBS | HEART RATE: 82 BPM | BODY MASS INDEX: 36.06 KG/M2 | TEMPERATURE: 98.2 F | RESPIRATION RATE: 20 BRPM

## 2017-03-15 PROCEDURE — G0299 HHS/HOSPICE OF RN EA 15 MIN: HCPCS

## 2017-03-17 ENCOUNTER — HOME CARE VISIT (OUTPATIENT)
Dept: SCHEDULING | Facility: HOME HEALTH | Age: 47
End: 2017-03-17
Payer: COMMERCIAL

## 2017-03-17 PROCEDURE — G0299 HHS/HOSPICE OF RN EA 15 MIN: HCPCS

## 2017-03-19 VITALS
RESPIRATION RATE: 16 BRPM | DIASTOLIC BLOOD PRESSURE: 60 MMHG | HEART RATE: 70 BPM | SYSTOLIC BLOOD PRESSURE: 128 MMHG | TEMPERATURE: 97.9 F | OXYGEN SATURATION: 98 %

## 2017-03-21 ENCOUNTER — HOSPITAL ENCOUNTER (OUTPATIENT)
Dept: WOUND CARE | Age: 47
Discharge: HOME | End: 2017-03-21
Payer: COMMERCIAL

## 2017-03-21 PROBLEM — M86.172 ACUTE OSTEOMYELITIS OF METATARSAL BONE OF LEFT FOOT (HCC): Status: ACTIVE | Noted: 2017-03-21

## 2017-03-21 PROCEDURE — 11043 DBRDMT MUSC&/FSCA 1ST 20/<: CPT | Performed by: PODIATRIST

## 2017-03-21 PROCEDURE — 11046 DBRDMT MUSC&/FSCA EA ADDL: CPT | Performed by: PODIATRIST

## 2017-03-21 RX ORDER — LIDOCAINE HYDROCHLORIDE 40 MG/ML
SOLUTION TOPICAL ONCE
Status: COMPLETED | OUTPATIENT
Start: 2017-03-21 | End: 2017-03-21

## 2017-03-21 RX ADMIN — LIDOCAINE HYDROCHLORIDE: 40 SOLUTION TOPICAL at 15:00

## 2017-03-21 NOTE — PROGRESS NOTES
Progress Note    Subjective:   Bill Franz is a 51 y.o.  male for follow up of Left   Surgical ulcer to the left TMA stump distal stump medially, and lateral midfoot To Bone  Ulcer has been present for/since 4 weeks since surgery on the left foot for ulcer improvement. Doing well. No concerns. Wound care going well. Offloading wound: yes  Appetite: fair  Wound associated pain: mild  Diabetic: yes  Type 2   Insulin Dependent  Smoker: no    ROS: no N/V, no T/chills; no local rash  There have been no changes in patient's medical history in the interim. Objective:   T: 100.1 P: 82  RR: 18  BP: 157/52   General: well developed, well nourished, pleasant , NAD. Hygiene good  Psych: cooperative. Pleasant. No anxiety or depression. Normal mood and affect. Neuro: alert and oriented to person/place/situation. Otherwise nonfocal.  HEENT: Normocephalic, atraumatic. EOMI. Conjunctiva clear. No scleral icterus. Neck: Normal range of motion. No masses. Chest: Good chest expansion bilat  Abdomen: Soft, nontender, nondistended, normoactive bowel sounds  Lower extremities: color normal; temperature normal. Hair growth is not present. Calves are supple, nontender, approximately equally sized in comparison. Capillary refill <3 sec  Focused Lower Extremity Exam:  Vascular exam:  Bilateral lower extremity: Non Pitting   edema, foot ,   DP pulse :1+  PT pulse: +1  Nails dystrophic       Ulcer Description:   Etiology:Surgical  Location: left plantar distal foot  Measurement: 3.4 x 3.5 x 0.3 cm  Ulcer bed: Granular/Healthy    Periwound: Normal  Exudate: Serous      Ulcer Description:   Etiology:Surgical  Location: left lateral midfoot  Measurement: 4.2 x 3.5 x 0.5 cm  Ulcer bed: Granular/Healthy  Visible Tendon  Visible Bone     Periwound: Normal  Exudate: Serous     Assessment/Plan   51 y.o. male with Surgical left foot TMA stump ulcer x2.     Dressing: Meli applied to the wounds  Frequency: three times a week, with application of Wound vac    Excisional debridement of the left foot ulcers x2 with a # 7mm curette skin to include muscle    Patient understood and agrees with plan. Questions answered. Weekly visits and serial debridements also discussed. Follow up with me in 1 week.

## 2017-03-22 ENCOUNTER — HOME CARE VISIT (OUTPATIENT)
Dept: HOME HEALTH SERVICES | Facility: HOME HEALTH | Age: 47
End: 2017-03-22
Payer: COMMERCIAL

## 2017-03-22 ENCOUNTER — HOME CARE VISIT (OUTPATIENT)
Dept: SCHEDULING | Facility: HOME HEALTH | Age: 47
End: 2017-03-22
Payer: COMMERCIAL

## 2017-03-22 VITALS
DIASTOLIC BLOOD PRESSURE: 72 MMHG | TEMPERATURE: 98.2 F | RESPIRATION RATE: 18 BRPM | HEART RATE: 70 BPM | SYSTOLIC BLOOD PRESSURE: 140 MMHG | OXYGEN SATURATION: 97 %

## 2017-03-22 PROCEDURE — A5120 SKIN BARRIER, WIPE OR SWAB: HCPCS

## 2017-03-22 PROCEDURE — A6021 COLLAGEN DRESSING <=16 SQ IN: HCPCS

## 2017-03-22 PROCEDURE — A6446 CONFORM BAND S W>=3" <5"/YD: HCPCS

## 2017-03-22 PROCEDURE — A4216 STERILE WATER/SALINE, 10 ML: HCPCS

## 2017-03-22 PROCEDURE — A6449 LT COMPRES BAND >=3" <5"/YD: HCPCS

## 2017-03-22 PROCEDURE — G0300 HHS/HOSPICE OF LPN EA 15 MIN: HCPCS

## 2017-03-22 PROCEDURE — A6260 WOUND CLEANSER ANY TYPE/SIZE: HCPCS

## 2017-03-23 LAB — CREATININE, EXTERNAL: 1.27

## 2017-03-24 ENCOUNTER — HOME CARE VISIT (OUTPATIENT)
Dept: SCHEDULING | Facility: HOME HEALTH | Age: 47
End: 2017-03-24
Payer: COMMERCIAL

## 2017-03-24 PROCEDURE — G0299 HHS/HOSPICE OF RN EA 15 MIN: HCPCS

## 2017-03-27 VITALS
TEMPERATURE: 99.1 F | HEART RATE: 70 BPM | RESPIRATION RATE: 18 BRPM | SYSTOLIC BLOOD PRESSURE: 150 MMHG | DIASTOLIC BLOOD PRESSURE: 90 MMHG | OXYGEN SATURATION: 98 %

## 2017-03-28 ENCOUNTER — HOSPITAL ENCOUNTER (OUTPATIENT)
Dept: WOUND CARE | Age: 47
Discharge: HOME | End: 2017-03-28
Payer: COMMERCIAL

## 2017-03-28 PROCEDURE — 11043 DBRDMT MUSC&/FSCA 1ST 20/<: CPT | Performed by: PODIATRIST

## 2017-03-28 RX ORDER — LIDOCAINE HYDROCHLORIDE 20 MG/ML
JELLY TOPICAL AS NEEDED
Status: DISCONTINUED | OUTPATIENT
Start: 2017-03-28 | End: 2017-04-01 | Stop reason: HOSPADM

## 2017-03-28 RX ADMIN — LIDOCAINE HYDROCHLORIDE: 20 JELLY TOPICAL at 15:29

## 2017-03-28 NOTE — PROGRESS NOTES
Progress Note    Subjective:   Quinton Burton is a 51 y.o.  male for follow up of Left   Surgical ulcer to the left TMA stump distal stump medially, and lateral midfoot To Bone  Ulcer has been present for/since 6 weeks since surgery on the left foot for ulcer improvement. Doing well. No concerns. Wound care going well. Offloading wound: yes  Appetite: fair  Wound associated pain: mild  Diabetic: yes  Type 2   Insulin Dependent  Smoker: no    ROS: no N/V, no T/chills; no local rash  There have been no changes in patient's medical history in the interim. Objective:   T: 98.7 P: 98  RR: 16  BP: 160/100   General: well developed, well nourished, pleasant , NAD. Hygiene good  Psych: cooperative. Pleasant. No anxiety or depression. Normal mood and affect. Neuro: alert and oriented to person/place/situation. Otherwise nonfocal.  HEENT: Normocephalic, atraumatic. EOMI. Conjunctiva clear. No scleral icterus. Neck: Normal range of motion. No masses. Chest: Good chest expansion bilat  Abdomen: Soft, nontender, nondistended, normoactive bowel sounds  Lower extremities: color normal; temperature normal. Hair growth is not present. Calves are supple, nontender, approximately equally sized in comparison. Capillary refill <3 sec  Focused Lower Extremity Exam:  Vascular exam:  Bilateral lower extremity: Non Pitting   edema, foot ,   DP pulse :1+  PT pulse: +1  Nails dystrophic       Ulcer Description:   Etiology:Surgical  Location: left plantar distal foot  Measurement: 2.1 x 3.3 x 0.2cm  Ulcer bed: Granular/Healthy    Periwound: Normal  Exudate: Serous      Ulcer Description:   Etiology:Surgical  Location: left lateral midfoot  Measurement: 2.8 x 3.5 x 0.5 cm  Ulcer bed: Granular/Healthy  Visible Tendon  Visible Bone     Periwound: Normal  Exudate: Serous     Assessment/Plan   51 y.o. male with Surgical left foot TMA stump ulcer x2.     Dressing: Meli applied to the wounds  Frequency: three times a week, with application of Wound vac    Excisional debridement of the left foot ulcers x2 with a # 7mm curette skin to include muscle    Will plan for grafix next week for plantar distal foot wound, will continue wound vac to the lateral wound. Rx for percocet 5/325    Patient understood and agrees with plan. Questions answered. Weekly visits and serial debridements also discussed. Follow up with me in 1 week.

## 2017-03-29 ENCOUNTER — HOME CARE VISIT (OUTPATIENT)
Dept: HOME HEALTH SERVICES | Facility: HOME HEALTH | Age: 47
End: 2017-03-29
Payer: COMMERCIAL

## 2017-03-31 ENCOUNTER — HOME CARE VISIT (OUTPATIENT)
Dept: SCHEDULING | Facility: HOME HEALTH | Age: 47
End: 2017-03-31
Payer: COMMERCIAL

## 2017-03-31 PROCEDURE — G0299 HHS/HOSPICE OF RN EA 15 MIN: HCPCS

## 2017-04-02 VITALS
DIASTOLIC BLOOD PRESSURE: 80 MMHG | OXYGEN SATURATION: 97 % | HEART RATE: 76 BPM | RESPIRATION RATE: 18 BRPM | TEMPERATURE: 97.8 F | SYSTOLIC BLOOD PRESSURE: 140 MMHG | OXYGEN SATURATION: 98 % | DIASTOLIC BLOOD PRESSURE: 80 MMHG | HEART RATE: 76 BPM | SYSTOLIC BLOOD PRESSURE: 160 MMHG | TEMPERATURE: 98.2 F | RESPIRATION RATE: 22 BRPM

## 2017-04-04 ENCOUNTER — HOSPITAL ENCOUNTER (OUTPATIENT)
Dept: WOUND CARE | Age: 47
Discharge: HOME | End: 2017-04-04
Payer: COMMERCIAL

## 2017-04-04 PROCEDURE — 97597 DBRDMT OPN WND 1ST 20 CM/<: CPT | Performed by: PODIATRIST

## 2017-04-04 NOTE — PROGRESS NOTES
Progress Note    Subjective:   Floresita Crow is a 51 y.o.  male for follow up of Left   Surgical ulcer to the left TMA stump distal stump medially, and lateral midfoot To Bone  Ulcer has been present for/since 6 weeks since surgery on the left foot for ulcer improvement. Doing well. No concerns. Wound care going well. Offloading wound: yes  Appetite: fair  Wound associated pain: mild  Diabetic: yes  Type 2   Insulin Dependent  Smoker: no    ROS: no N/V, no T/chills; no local rash  There have been no changes in patient's medical history in the interim. Objective:   T: 98.3 P: 86  RR: 18  BP: 162/92   General: well developed, well nourished, pleasant , NAD. Hygiene good  Psych: cooperative. Pleasant. No anxiety or depression. Normal mood and affect. Neuro: alert and oriented to person/place/situation. Otherwise nonfocal.  HEENT: Normocephalic, atraumatic. EOMI. Conjunctiva clear. No scleral icterus. Neck: Normal range of motion. No masses. Chest: Good chest expansion bilat  Abdomen: Soft, nontender, nondistended, normoactive bowel sounds  Lower extremities: color normal; temperature normal. Hair growth is not present. Calves are supple, nontender, approximately equally sized in comparison. Capillary refill <3 sec  Focused Lower Extremity Exam:  Vascular exam:  Bilateral lower extremity: Non Pitting   edema, foot ,   DP pulse :1+  PT pulse: +1  Nails dystrophic       Ulcer Description:   Etiology:Surgical  Location: left plantar distal foot  Measurement: 2.2 x2.3 x 0.2cm  Ulcer bed: Granular/Healthy    Periwound: Normal  Exudate: Serous      Ulcer Description:   Etiology:Surgical  Location: left lateral midfoot  Measurement: 4 x 2.9 x 0.4 cm  Ulcer bed: Granular/Healthy  Visible Tendon  Visible Bone     Periwound: Normal  Exudate: Serous     Assessment/Plan   51 y.o. male with Surgical left foot TMA stump ulcer x2.     Dressing: Meli applied to the wounds  Frequency: three times a week, with application of Wound vac    Excisional debridement of the left foot ulcers x2 with a # 7mm curette skin to include muscle    Continue wound vac, will plan for OR debridement and grafix application  Rx for percocet 5/325  Will plan for possible OR debridement in the next week and application of amniotic tissue graft. Patient understood and agrees with plan. Questions answered. Weekly visits and serial debridements also discussed. Follow up with me in 1 week.

## 2017-04-05 ENCOUNTER — HOME CARE VISIT (OUTPATIENT)
Dept: SCHEDULING | Facility: HOME HEALTH | Age: 47
End: 2017-04-05
Payer: COMMERCIAL

## 2017-04-05 LAB — VANCOMYCIN TROUGH SERPL-MCNC: 14.3 UG/ML (ref 10–15)

## 2017-04-05 PROCEDURE — G0299 HHS/HOSPICE OF RN EA 15 MIN: HCPCS

## 2017-04-07 ENCOUNTER — HOME CARE VISIT (OUTPATIENT)
Dept: SCHEDULING | Facility: HOME HEALTH | Age: 47
End: 2017-04-07
Payer: COMMERCIAL

## 2017-04-07 PROCEDURE — A6216 NON-STERILE GAUZE<=16 SQ IN: HCPCS

## 2017-04-07 PROCEDURE — G0299 HHS/HOSPICE OF RN EA 15 MIN: HCPCS

## 2017-04-07 PROCEDURE — A6446 CONFORM BAND S W>=3" <5"/YD: HCPCS

## 2017-04-07 PROCEDURE — A6252 ABSORPT DRG >16 <=48 W/O BDR: HCPCS

## 2017-04-08 VITALS
HEART RATE: 84 BPM | SYSTOLIC BLOOD PRESSURE: 142 MMHG | RESPIRATION RATE: 18 BRPM | TEMPERATURE: 97.9 F | RESPIRATION RATE: 18 BRPM | SYSTOLIC BLOOD PRESSURE: 128 MMHG | OXYGEN SATURATION: 99 % | DIASTOLIC BLOOD PRESSURE: 72 MMHG | HEART RATE: 76 BPM | DIASTOLIC BLOOD PRESSURE: 88 MMHG | OXYGEN SATURATION: 98 % | TEMPERATURE: 98 F

## 2017-04-11 ENCOUNTER — HOSPITAL ENCOUNTER (OUTPATIENT)
Dept: WOUND CARE | Age: 47
Discharge: HOME | End: 2017-04-11
Payer: COMMERCIAL

## 2017-04-11 ENCOUNTER — HOME CARE VISIT (OUTPATIENT)
Dept: HOME HEALTH SERVICES | Facility: HOME HEALTH | Age: 47
End: 2017-04-11
Payer: COMMERCIAL

## 2017-04-11 PROCEDURE — 11042 DBRDMT SUBQ TIS 1ST 20SQCM/<: CPT | Performed by: PODIATRIST

## 2017-04-11 RX ORDER — LIDOCAINE HYDROCHLORIDE 20 MG/ML
JELLY TOPICAL AS NEEDED
Status: DISCONTINUED | OUTPATIENT
Start: 2017-04-11 | End: 2017-04-15 | Stop reason: HOSPADM

## 2017-04-11 RX ADMIN — LIDOCAINE HYDROCHLORIDE: 20 JELLY TOPICAL at 13:01

## 2017-04-14 ENCOUNTER — ANESTHESIA EVENT (OUTPATIENT)
Dept: SURGERY | Age: 47
End: 2017-04-14
Payer: COMMERCIAL

## 2017-04-14 ENCOUNTER — HOME CARE VISIT (OUTPATIENT)
Dept: SCHEDULING | Facility: HOME HEALTH | Age: 47
End: 2017-04-14
Payer: COMMERCIAL

## 2017-04-14 PROCEDURE — G0299 HHS/HOSPICE OF RN EA 15 MIN: HCPCS

## 2017-04-17 ENCOUNTER — HOSPITAL ENCOUNTER (OUTPATIENT)
Age: 47
Setting detail: OUTPATIENT SURGERY
Discharge: HOME | End: 2017-04-17
Attending: PODIATRIST | Admitting: PODIATRIST
Payer: COMMERCIAL

## 2017-04-17 ENCOUNTER — ANESTHESIA (OUTPATIENT)
Dept: SURGERY | Age: 47
End: 2017-04-17
Payer: COMMERCIAL

## 2017-04-17 VITALS
HEIGHT: 75 IN | BODY MASS INDEX: 35.21 KG/M2 | WEIGHT: 283.2 LBS | TEMPERATURE: 98.1 F | OXYGEN SATURATION: 99 % | DIASTOLIC BLOOD PRESSURE: 57 MMHG | HEART RATE: 76 BPM | SYSTOLIC BLOOD PRESSURE: 113 MMHG | RESPIRATION RATE: 20 BRPM

## 2017-04-17 LAB
ANION GAP BLD CALC-SCNC: 18 MMOL/L (ref 5–15)
BUN BLD-MCNC: 24 MG/DL (ref 9–20)
CA-I BLD-MCNC: 1.18 MMOL/L (ref 1.12–1.32)
CHLORIDE BLD-SCNC: 103 MMOL/L (ref 98–107)
CO2 BLD-SCNC: 24 MMOL/L (ref 21–32)
CREAT BLD-MCNC: 1.6 MG/DL (ref 0.6–1.3)
GLUCOSE BLD STRIP.AUTO-MCNC: 139 MG/DL (ref 65–100)
GLUCOSE BLD STRIP.AUTO-MCNC: 359 MG/DL (ref 65–100)
GLUCOSE BLD STRIP.AUTO-MCNC: 77 MG/DL (ref 65–100)
GLUCOSE BLD STRIP.AUTO-MCNC: 81 MG/DL (ref 65–100)
GLUCOSE BLD-MCNC: 109 MG/DL (ref 65–100)
HCT VFR BLD CALC: 36 % (ref 36.6–50.3)
HGB BLD-MCNC: 12.2 GM/DL (ref 12.1–17)
POTASSIUM BLD-SCNC: 3.7 MMOL/L (ref 3.5–5.1)
SERVICE CMNT-IMP: ABNORMAL
SERVICE CMNT-IMP: NORMAL
SERVICE CMNT-IMP: NORMAL
SODIUM BLD-SCNC: 139 MMOL/L (ref 136–145)

## 2017-04-17 PROCEDURE — 76030000000 HC AMB SURG OR TIME 0.5 TO 1: Performed by: PODIATRIST

## 2017-04-17 PROCEDURE — 77030020255 HC SOL INJ LR 1000ML BG: Performed by: PODIATRIST

## 2017-04-17 PROCEDURE — 80047 BASIC METABLC PNL IONIZED CA: CPT

## 2017-04-17 PROCEDURE — 74011636637 HC RX REV CODE- 636/637

## 2017-04-17 PROCEDURE — 77030002916 HC SUT ETHLN J&J -A: Performed by: PODIATRIST

## 2017-04-17 PROCEDURE — 87077 CULTURE AEROBIC IDENTIFY: CPT | Performed by: PODIATRIST

## 2017-04-17 PROCEDURE — 76060000061 HC AMB SURG ANES 0.5 TO 1 HR: Performed by: PODIATRIST

## 2017-04-17 PROCEDURE — 74011000250 HC RX REV CODE- 250

## 2017-04-17 PROCEDURE — 87075 CULTR BACTERIA EXCEPT BLOOD: CPT | Performed by: PODIATRIST

## 2017-04-17 PROCEDURE — 74011250636 HC RX REV CODE- 250/636: Performed by: ANESTHESIOLOGY

## 2017-04-17 PROCEDURE — 77030012961 HC IRR KT CYSTO/TUR ICUM -A: Performed by: PODIATRIST

## 2017-04-17 PROCEDURE — 77030018836 HC SOL IRR NACL ICUM -A: Performed by: PODIATRIST

## 2017-04-17 PROCEDURE — 76210000037 HC AMBSU PH I REC 2 TO 2.5 HR: Performed by: PODIATRIST

## 2017-04-17 PROCEDURE — 76210000046 HC AMBSU PH II REC FIRST 0.5 HR: Performed by: PODIATRIST

## 2017-04-17 PROCEDURE — 74011250636 HC RX REV CODE- 250/636

## 2017-04-17 PROCEDURE — 74011250636 HC RX REV CODE- 250/636: Performed by: PODIATRIST

## 2017-04-17 PROCEDURE — 82962 GLUCOSE BLOOD TEST: CPT

## 2017-04-17 PROCEDURE — 87205 SMEAR GRAM STAIN: CPT | Performed by: PODIATRIST

## 2017-04-17 PROCEDURE — 77030020269 HC MISC IMPL: Performed by: PODIATRIST

## 2017-04-17 PROCEDURE — 87186 SC STD MICRODIL/AGAR DIL: CPT | Performed by: PODIATRIST

## 2017-04-17 RX ORDER — LIDOCAINE HYDROCHLORIDE 10 MG/ML
0.1 INJECTION, SOLUTION EPIDURAL; INFILTRATION; INTRACAUDAL; PERINEURAL AS NEEDED
Status: DISCONTINUED | OUTPATIENT
Start: 2017-04-17 | End: 2017-04-17 | Stop reason: HOSPADM

## 2017-04-17 RX ORDER — HYDROMORPHONE HYDROCHLORIDE 1 MG/ML
.2-.5 INJECTION, SOLUTION INTRAMUSCULAR; INTRAVENOUS; SUBCUTANEOUS ONCE
Status: DISCONTINUED | OUTPATIENT
Start: 2017-04-17 | End: 2017-04-17 | Stop reason: HOSPADM

## 2017-04-17 RX ORDER — DIPHENHYDRAMINE HYDROCHLORIDE 50 MG/ML
12.5 INJECTION, SOLUTION INTRAMUSCULAR; INTRAVENOUS AS NEEDED
Status: DISCONTINUED | OUTPATIENT
Start: 2017-04-17 | End: 2017-04-17 | Stop reason: HOSPADM

## 2017-04-17 RX ORDER — SODIUM CHLORIDE, SODIUM LACTATE, POTASSIUM CHLORIDE, CALCIUM CHLORIDE 600; 310; 30; 20 MG/100ML; MG/100ML; MG/100ML; MG/100ML
25 INJECTION, SOLUTION INTRAVENOUS CONTINUOUS
Status: DISCONTINUED | OUTPATIENT
Start: 2017-04-17 | End: 2017-04-17 | Stop reason: HOSPADM

## 2017-04-17 RX ORDER — OXYCODONE AND ACETAMINOPHEN 5; 325 MG/1; MG/1
1 TABLET ORAL ONCE
Status: DISCONTINUED | OUTPATIENT
Start: 2017-04-17 | End: 2017-04-17 | Stop reason: HOSPADM

## 2017-04-17 RX ORDER — ONDANSETRON 2 MG/ML
4 INJECTION INTRAMUSCULAR; INTRAVENOUS AS NEEDED
Status: DISCONTINUED | OUTPATIENT
Start: 2017-04-17 | End: 2017-04-17 | Stop reason: HOSPADM

## 2017-04-17 RX ORDER — SODIUM CHLORIDE 0.9 % (FLUSH) 0.9 %
5-10 SYRINGE (ML) INJECTION AS NEEDED
Status: DISCONTINUED | OUTPATIENT
Start: 2017-04-17 | End: 2017-04-17 | Stop reason: HOSPADM

## 2017-04-17 RX ORDER — SODIUM CHLORIDE 0.9 % (FLUSH) 0.9 %
5-10 SYRINGE (ML) INJECTION EVERY 8 HOURS
Status: DISCONTINUED | OUTPATIENT
Start: 2017-04-17 | End: 2017-04-17 | Stop reason: HOSPADM

## 2017-04-17 RX ORDER — FENTANYL CITRATE 50 UG/ML
25 INJECTION, SOLUTION INTRAMUSCULAR; INTRAVENOUS
Status: DISCONTINUED | OUTPATIENT
Start: 2017-04-17 | End: 2017-04-17 | Stop reason: HOSPADM

## 2017-04-17 RX ORDER — MIDAZOLAM HYDROCHLORIDE 1 MG/ML
INJECTION, SOLUTION INTRAMUSCULAR; INTRAVENOUS AS NEEDED
Status: DISCONTINUED | OUTPATIENT
Start: 2017-04-17 | End: 2017-04-17 | Stop reason: HOSPADM

## 2017-04-17 RX ORDER — PROPOFOL 10 MG/ML
INJECTION, EMULSION INTRAVENOUS
Status: DISCONTINUED | OUTPATIENT
Start: 2017-04-17 | End: 2017-04-17 | Stop reason: HOSPADM

## 2017-04-17 RX ORDER — ONDANSETRON 2 MG/ML
INJECTION INTRAMUSCULAR; INTRAVENOUS AS NEEDED
Status: DISCONTINUED | OUTPATIENT
Start: 2017-04-17 | End: 2017-04-17 | Stop reason: HOSPADM

## 2017-04-17 RX ORDER — DEXTROSE 50 % IN WATER (D50W) INTRAVENOUS SYRINGE
Status: COMPLETED
Start: 2017-04-17 | End: 2017-04-17

## 2017-04-17 RX ORDER — DEXTROSE 50 % IN WATER (D50W) INTRAVENOUS SYRINGE
25 ONCE
Status: COMPLETED | OUTPATIENT
Start: 2017-04-17 | End: 2017-04-17

## 2017-04-17 RX ORDER — FENTANYL CITRATE 50 UG/ML
INJECTION, SOLUTION INTRAMUSCULAR; INTRAVENOUS AS NEEDED
Status: DISCONTINUED | OUTPATIENT
Start: 2017-04-17 | End: 2017-04-17 | Stop reason: HOSPADM

## 2017-04-17 RX ADMIN — MEPERIDINE HYDROCHLORIDE 12.5 MG: 25 INJECTION, SOLUTION INTRAMUSCULAR; INTRAVENOUS; SUBCUTANEOUS at 08:58

## 2017-04-17 RX ADMIN — MIDAZOLAM HYDROCHLORIDE 2 MG: 1 INJECTION, SOLUTION INTRAMUSCULAR; INTRAVENOUS at 07:38

## 2017-04-17 RX ADMIN — DEXTROSE MONOHYDRATE 12.5 G: 25 INJECTION, SOLUTION INTRAVENOUS at 09:22

## 2017-04-17 RX ADMIN — INSULIN HUMAN 6 UNITS: 100 INJECTION, SOLUTION PARENTERAL at 07:30

## 2017-04-17 RX ADMIN — ONDANSETRON 4 MG: 2 INJECTION INTRAMUSCULAR; INTRAVENOUS at 08:20

## 2017-04-17 RX ADMIN — MEPERIDINE HYDROCHLORIDE 12.5 MG: 25 INJECTION, SOLUTION INTRAMUSCULAR; INTRAVENOUS; SUBCUTANEOUS at 09:06

## 2017-04-17 RX ADMIN — FENTANYL CITRATE 25 MCG: 50 INJECTION, SOLUTION INTRAMUSCULAR; INTRAVENOUS at 10:15

## 2017-04-17 RX ADMIN — CEFAZOLIN 3 G: 1 INJECTION, POWDER, FOR SOLUTION INTRAMUSCULAR; INTRAVENOUS; PARENTERAL at 07:58

## 2017-04-17 RX ADMIN — DEXTROSE 50 % IN WATER (D50W) INTRAVENOUS SYRINGE 12.5 G: at 09:22

## 2017-04-17 RX ADMIN — SODIUM CHLORIDE, SODIUM LACTATE, POTASSIUM CHLORIDE, AND CALCIUM CHLORIDE 25 ML/HR: 600; 310; 30; 20 INJECTION, SOLUTION INTRAVENOUS at 06:42

## 2017-04-17 RX ADMIN — FENTANYL CITRATE 50 MCG: 50 INJECTION, SOLUTION INTRAMUSCULAR; INTRAVENOUS at 07:50

## 2017-04-17 RX ADMIN — FENTANYL CITRATE 25 MCG: 50 INJECTION, SOLUTION INTRAMUSCULAR; INTRAVENOUS at 10:03

## 2017-04-17 RX ADMIN — PROPOFOL 150 MCG/KG/MIN: 10 INJECTION, EMULSION INTRAVENOUS at 07:44

## 2017-04-17 NOTE — BRIEF OP NOTE
BRIEF OPERATIVE NOTE    Date of Procedure: 4/17/2017   Preoperative Diagnosis: NON PRESSURE CHRONIC ULCER OF OTHER PART LEFT FOOT WITH NECROSIS OF MUSCLE  Postoperative Diagnosis: ON PRESSURE CHRONIC ULCER OF OTHER PART LEFT FOOT WITH NECROSIS OF MUSCLE    Procedure(s):  LEFT FOOT DEBRIDEMENT WOUNDS, LEFT APPLICATION TISSUE GRAFT  (MAC WITH LOCAL)  Surgeon(s) and Role:     * Maryellen Herrera DPM - Primary            Surgical Staff:  Circ-1: Lashell Mckeon  Scrub Tech-1: Dino Jain  Event Time In   Incision Start 0802   Incision Close      Anesthesia: MAC   Estimated Blood Loss: 20mL  Specimens:   ID Type Source Tests Collected by Time Destination   1 : Left foot lateral wound Wound Foot, left CULTURE, ANAEROBIC, AEROBIC BACTERIAL CULTURE AYAAN [facility] 4/17/2017 0813 Microbiology      Findings: Healthy bleeding granular tissues in both wounds. No exposed bone. No purulence or necrosis visible. Complications: None  Implants:   Implant Name Type Inv.  Item Serial No.  Lot No. LRB No. Used Action   CLARIX CYROPRESERVED HUMAN AMINOTIC MEMBRANE & UMBILICAL CORD REGENERATIVE MATRIX     [id number]     Left 1 Implanted

## 2017-04-17 NOTE — ANESTHESIA POSTPROCEDURE EVALUATION
Post-Anesthesia Evaluation and Assessment    Patient: Amandeep Mullen MRN: [id number]  SSN: [id number]    YOB: 1970  Age: 51 y.o. Sex: male       Cardiovascular Function/Vital Signs  Visit Vitals    /73    Pulse 81    Temp 36.5 °C (97.7 °F)    Resp 20    Ht 6' 3\" (1.905 m)    Wt 128.5 kg (283 lb 3.2 oz)    SpO2 99%    BMI 35.4 kg/m2       Patient is status post MAC, total IV anesthesia anesthesia for Procedure(s):  LEFT FOOT DEBRIDEMENT WOUNDS, LEFT APPLICATION TISSUE GRAFT  (MAC WITH LOCAL). Nausea/Vomiting: None    Postoperative hydration reviewed and adequate. Pain:  Pain Scale 1: Numeric (0 - 10) (04/17/17 0902)  Pain Intensity 1: 8 (04/17/17 0902)   Managed    Neurological Status:   Neuro (WDL): Exceptions to WDL (04/17/17 0831)  Neuro  Neurologic State: Drowsy; Alert (04/17/17 0902)   At baseline    Mental Status and Level of Consciousness: Arousable    Pulmonary Status:   O2 Device: Room air (04/17/17 0902)   Adequate oxygenation and airway patent    Complications related to anesthesia: None    Post-anesthesia assessment completed.  No concerns    Signed By: Brent Adams MD     April 17, 2017

## 2017-04-17 NOTE — H&P
Podiatry History and Physical    Subjective:           Patient is a 51 y.o.  male who is being seen for left foot chronic ulcerations. Workup has revealed improving with wound vac and homea health and weekly wound care center visits/ debridements. Will attempt to accelerate closure with tissue amniotic grafts. .    Patient Active Problem List    Diagnosis Date Noted    Acute osteomyelitis of metatarsal bone of left foot ([address] 03/21/2017    Osteomyelitis ([address] 02/24/2017    Edema of left lower extremity 02/17/2017    Diabetic infection of right foot ([address] 02/16/2017    IDDM (insulin dependent diabetes mellitus) ([address] 10/06/2016    Diabetes [facility]     HTN (hypertension)     Hypercholesteremia     HTN (hypertension) 07/23/2013     Past Medical History:   Diagnosis Date    Diabetes ([address]     since age 23   [facility] DM (diabetes mellitus) ([facility] [facility]     HTN (hypertension)     Hypercholesteremia     Hyperlipidemia       Family History   Problem Relation Age of Onset    Hypertension Mother     High Cholesterol Mother       Social History   Substance Use Topics    Smoking status: Former Smoker     Types: Cigars     Quit date: 4/22/2011    Smokeless tobacco: Never Used    Alcohol use 0.0 oz/week     1 Glasses of wine, 0 Standard drinks or equivalent per week      Comment: stop drinking 5 months ago     Past Surgical History:   Procedure Laterality Date    HX AMPUTATION      toes- left foot    HX BUNIONECTOMY      HX ORTHOPAEDIC      boutinerre deformity    HX ORTHOPAEDIC      fascia removed left foot    HX TONSILLECTOMY      HX WISDOM TEETH EXTRACTION        Prior to Admission medications    Medication Sig Start Date End Date Taking? Authorizing Provider   metoprolol tartrate (LOPRESSOR) 25 mg tablet Take 0.5 Tabs by mouth two (2) times a day. 3/7/17  Yes Sade Dickson MD   oxyCODONE-acetaminophen (PERCOCET 10)  mg per tablet Take 1 Tab by mouth every six (6) hours as needed.  Max Daily Amount: 4 Tabs. 3/2/17  Yes [address], MD   gabapentin (NEURONTIN) 100 mg capsule Take 100 mg by mouth two (2) times daily as needed for Pain. Yes Historical Provider   cholecalciferol (VITAMIN D3) 1,000 unit cap Take 2,000 Units by mouth daily. Yes Trung Suazo MD   metFORMIN (GLUCOPHAGE) 500 mg tablet TAKE 1 TABLET BY MOUTH TWICE A DAY WITH MEALS AND INCREASE AS DIRECTED 1/3/17  Yes [address], MD   insulin detemir (LEVEMIR FLEXTOUCH) 100 unit/mL (3 mL) inpn 0.6 mL by SubCUTAneous route nightly. 60 units QHS 10/6/16  Yes [address], MD   esomeprazole (NEXIUM) 40 mg capsule Take 1 Cap by mouth daily. 10/6/16  Yes [address], MD   lisinopril (PRINIVIL, ZESTRIL) 40 mg tablet Take 1 Tab by mouth daily. 5/24/16  Yes EMILIA Muñoz MD   atorvastatin (LIPITOR) 40 mg tablet Take 1 Tab by mouth daily. 5/24/16  Yes EMILIA Muñoz MD   HEPARIN SODIUM,PORCINE (HEPARIN LOCK FLUSH, PORCINE, IV) Take 3-5 mL by mouth four (4) times daily as needed (Per [facility] protocol ). Nisha Israel, DPM   sodium chloride (NORMAL SALINE FLUSH) 0.9 % 5-10 mL by IntraVENous route four (4) times daily as needed (Per [facility] protocol ). Historical Provider   insulin lispro (HUMALOG) 100 unit/mL kwikpen 12 Units by SubCUTAneous route Before breakfast, lunch, and dinner. DX: E11.65 11/18/16   Erasmo Maloney MD   sodium chloride (SALINE NASAL) 0.65 % nasal spray 1 Black by Both Nostrils route as needed for Congestion. Patient taking differently: 1 Spray by Both Nostrils route as needed for Congestion (dry sinus symptoms, especially during winter time. ). 10/6/16   Erasmo Maloney MD     Allergies   Allergen Reactions    Morphine Angioedema        Review of Systems:  A comprehensive review of systems was negative except for that written in the HPI.     Objective:     Patient Vitals for the past 8 hrs:   BP Temp Pulse Resp SpO2 Height Weight   04/17/17 0636 (!) 149/96 99.5 °F (37.5 °C) 89 10 98 % 6' 3\" (1.905 m) 128.5 kg (283 lb 3.2 oz)     Temp (24hrs), Av.5 °F (37.5 °C), Min:99.5 °F (37.5 °C), Max:99.5 °F (37.5 °C)      General:  Alert, cooperative, well noursished, well developed, appears stated age   Eyes:  Sclera anicteric. Pupils equally round and reactive to light. Mouth/Throat: Mucous membranes normal, oral pharynx clear   Neck: Supple   Lungs:   Clear to auscultation bilaterally, good effort   CV:  Regular rate and rhythm,no murmur, click, rub or gallop   Abdomen:   Soft, non-tender. bowel sounds normal. non-distended   Extremities: No cyanosis or edema   Skin: Left foot full thickness ulcerations x2   Lymph nodes: Cervical and supraclavicular normal   Musculoskeletal: No swelling or deformity   Lines/Devices:  Intact, no erythema, drainage or tenderness   Psych: Alert and oriented, normal mood affect given the setting           Data Review:   Recent Results (from the past 24 hour(s))   GLUCOSE, POC    Collection Time: 17  6:40 AM   Result Value Ref Range    Glucose (POC) 359 (H) 65 - 100 mg/dL    Performed by Zara Rizo.           Impression:     Chronic left foot ulcers x2    Recommendation:     -Consent obtained   -Left foot marked  -Plan for debridement of ulcers and application of tissue graft

## 2017-04-17 NOTE — DISCHARGE INSTRUCTIONS
Dr. Donita Preciado, [facility]. Phone: [phone]    Post-op Instructions    Proper care during the postoperative period is an integral part of your surgical treatment program. It is imperative that these instructions are followed to ensure proper healing and to obtain the best results. 1. Go directly home and keep your feet elevated on the way, Get plenty of rest over the next 3-4 days, drink plenty of fluids, and eat at least two well-balanced meals. 2. Due to the type of surgery you had, we ask that you:   ___ Do not put any weight on your foot   _X__ Only put weight on your heel   ___ You can put full weight on your foot as tolerated    3. Elevate your feet 6 inches above hip level by supporting feet and legs with pillows. 4. Apply an ice bag covered with a towel just above but not on the operative site for 30 minutes out of each hour for he first 48 hours (daytime). Icing can be continued after 48 hours, but it is most important during the first 2 days. 5. Swelling is expected. Occasionally, the skin may take on a bruised appearance. This is no cause for alarm. 6. Keep your bandage/cast clean & dry. DO NOT remove the bandages or inspect the wound. A small amount of blood on the bandage is normal.    7. Exercise your legs frequently by bending your knee to stimulate circulation and speed healing. You can bend your knee 20-30 times over a 5 minute period and repeat it 3-4 times per day. 8. Have prescriptions filled and take medication as directed. Be sure to eat before taking any pain medication, otherwise it may cause nausea. If medication causes stomach upset, headache, rash, or other abnormal reactions discontinue use and CALL THE DOCTOR. 9. You have been given several medications to take after surgery. · You may have been given an anti-inflammatory (such as ibuprofen) which you should take 2-3 times daily regardless of whether you have pain.  These medications help reduce inflammation and kill pain as well, allowing you to rely less on the narcotic pain medication. · You may also have been given a narcotic (such as Oxycodone, Hydrocodone, Meperidine, Hydromorphone, etc) \"pain medication\" to help with acute pain. You should take these as soon as you begin to have soreness, pins/needles, or tingling after your surgery, even if you are not having severe pain. This will allow you to \"stay ahead of the pain\". You do not want to get behind the pain and be chasing after it. It may be helpful to take 2 pain pills for the first few doses and 1 pain pill thereafter if you are having mild to moderate pain. You can take most narcotics every 3-4 hours if you need to over the first 3-4 days. Many of these narcotics are combined with Tylenol (acetaminophen), which also helps with pain relief; do not take extra Tylenol if it is in your medication. · You may also have been prescribed an anti-nausea medication (such as Phenergan, Compazine, or Zofran). This helps with nausea or stomach upset which often accompanies the use of narcotics. Take these as needed and know that nausea is a normal side effect of any pain medication. You will decrease the chance of having nausea if you take the pain medication after eating. Please call the office if it is excessive &/or uncontrolled with medication. · You may also have been prescribed an antibiotic (such as Cephalexin, Clindamycin, or Trimethoprim/smx). Your surgeon will determine if this is necessary based on your medical conditions, type of surgery, and length of surgery. You should take this as prescribed until finished. Stomach upset and diarrhea is a common side effect of antibiotics and can be dramatically reduced or eliminated with the use of a probiotic. Please ask your pharmacist about a probiotic if you experience any of these side effects with your antibiotic.     10. Your return appointment with your doctor is ____4 days_____________________ at the ____[place]____________ office. DO NOT TAKE TYLENOL/ACETAMINOPHEN WITH PERCOCET, [facility], [address] TAKE NARCOTIC PAIN MEDICATIONS WITH FOOD   Narcotics tend to be constipating, we suggest taking a stool softener such as Colace or Miralax (follow package instructions). DO NOT DRIVE WHILE TAKING NARCOTIC PAIN MEDICATIONS. DO NOT TAKE SLEEPING MEDICATIONS OR ANTIANXIETY MEDICATIONS WHILE TAKING NARCOTIC PAIN MEDICATIONS,  ESPECIALLY THE NIGHT OF ANESTHESIA. CPAP PATIENTS BE SURE TO WEAR MACHINE WHENEVER NAPPING OR SLEEPING. DISCHARGE SUMMARY from Nurse    The following personal items collected during your admission are returned to you:   Dental Appliance: Dental Appliances: Lowers, Uppers  Vision: Visual Aid: None  Hearing Aid:    Jewelry: Jewelry: None  Clothing: Clothing: Footwear, Pants, Shirt, Undergarments, With patient  Other Valuables: Other Valuables: Cell Phone (in belonging bag)  Valuables sent to safe:        PATIENT INSTRUCTIONS:    After General Anesthesia or Intravenous Sedation, for 24 hours or while taking prescription Narcotics:        Someone should be with you for the next 24 hours. For your own safety, a responsible adult must drive you home. · Limit your activities  · Recommended activity: Rest today, up with assistance today. Do not climb stairs or shower unattended for the next 24 hours. · Please start with a soft bland diet and advance as tolerated (no nausea) to regular diet. · If you have a sore throat you should try the following: fluids, warm salt water gargles, or throat lozenges. If it does not improve after several days please follow up with your primary physician.   · Do not drive and operate hazardous machinery  · Do not make important personal or business decisions  · Do  not drink alcoholic beverages  · If you have not urinated within 8 hours after discharge, please contact your surgeon on call.    Report the following to your surgeon:  · Excessive pain, swelling, redness or odor of or around the surgical area  · Temperature over 100.5  · Nausea and vomiting lasting longer than 4 hours or if unable to take medications  · Any signs of decreased circulation or nerve impairment to extremity: change in color, persistent  numbness, tingling, coldness or increase pain      · You will receive a Post Operative Call from one of the Recovery Room Nurses on the day after your surgery to check on you. It is very important for us to know how you are recovering after your surgery. If you have an issue or need to speak with someone, please call your surgeon, do not wait for the post operative call. · You may receive an e-mail or letter in the mail from [facility] regarding your experience with us in the Ambulatory Surgery Unit. Your feedback is valuable to us and we appreciate your participation in the survey. · If the above instructions are not adequate, please contact Karen Quesada RN, [place] anesthesia Nurse Manager or our Anesthesiologist, at [phone]. If you are having problems after your surgery, call the physician at his office number. · We wish you a speedy recovery ? What to do at Home:      *  Please give a list of your current medications to your Primary Care Provider. *  Please update this list whenever your medications are discontinued, doses are      changed, or new medications (including over-the-counter products) are added. *  Please carry medication information at all times in case of emergency situations. These are general instructions for a healthy lifestyle:    No smoking/ No tobacco products/ Avoid exposure to second hand smoke    Surgeon General's Warning:  Quitting smoking now greatly reduces serious risk to your health.     Obesity, smoking, and sedentary lifestyle greatly increases your risk for illness    A healthy diet, regular physical exercise & weight monitoring are important for maintaining a healthy lifestyle    You may be retaining fluid if you have a history of heart failure or if you experience any of the following symptoms:  Weight gain of 3 pounds or more overnight or 5 pounds in a week, increased swelling in our hands or feet or shortness of breath while lying flat in bed. Please call your doctor as soon as you notice any of these symptoms; do not wait until your next office visit. Recognize signs and symptoms of STROKE:    F-face looks uneven    A-arms unable to move or move even    S-speech slurred or non-existent    T-time-call 911 as soon as signs and symptoms begin-DO NOT go       Back to bed or wait to see if you get better-TIME IS BRAIN. If you have not received your influenza and/or pneumococcal vaccine, please follow up with your primary care physician. The discharge information has been reviewed with the patient and caregiver. The patient and guardian verbalized understanding.

## 2017-04-17 NOTE — IP AVS SNAPSHOT
[address] [facility] 
[phone] Patient: Miley Gonzalez MRN: [id number] :1970 You are allergic to the following Allergen Reactions Morphine Angioedema Recent Documentation Height Weight BMI Smoking Status 1.905 m 128.5 kg 35.4 kg/m2 Former Smoker Emergency Contacts Name Discharge Info Relation Home Work Mobile Zuleika Cotton  Spouse [3] [phone] About your hospitalization You were admitted on:  2017 You last received care in the:  [facility] A[facility] PACU You were discharged on:  2017 Unit phone number:  [phone] Why you were hospitalized Your primary diagnosis was:  Not on File Providers Seen During Your Hospitalizations Provider Role Specialty Primary office phone Miguel Mcqueen DPM Attending Provider Podiatry [phone] Your Primary Care Physician (PCP) Primary Care Physician Office Phone Office Fax Karri Hernandez [phone] Follow-up Information Follow up With Details Comments Contact Info Roma Byers [address] [place] [address] 
[phone] Your Appointments 2017  2:15 PM EDT  
WOUND CARE FOLLOW UP with Miguel Mcqueen DPM  
[facility] OP WOUND CARE ([address]) [address] [facility] [place] [address] [phone] 2017 To Be Determined ROUTINE with ALESHA Arizmendi [facility] SCHEDULING ([address]) [address] ([address]) 2017 To Be Determined ROUTINE with ALESHA Arizmendi HOME CARE SCHEDULING ([address]) [address] ([address]) Wednesday April 26, 2017 To Be Determined ROUTINE with Mani Adams RN  
[facility] HOME CARE SCHEDULING ([address]) [address] ([address]) Friday April 28, 2017 To Be Determined ROUTINE with Mani Adams RN  
[facility] CARE SCHEDULING ([address]) [address] ([address]) Wednesday May 03, 2017 To Be Determined ROUTINE with Mani Adams RN  
[facility] HOME CARE SCHEDULING ([address]) [address] ([address]) Current Discharge Medication List  
  
CONTINUE these medications which have CHANGED Dose & Instructions Dispensing Information Comments Morning Noon Evening Bedtime  
 sodium chloride 0.65 % nasal spray Commonly known as:  SALINE NASAL What changed:  reasons to take this Your last dose was: Your next dose is:    
   
   
 Dose:  1 Spray 1 [place] by Both Nostrils route as needed for Congestion. Quantity:  15 mL Refills:  0 CONTINUE these medications which have NOT CHANGED Dose & Instructions Dispensing Information Comments Morning Noon Evening Bedtime  
 atorvastatin 40 mg tablet Commonly known as:  LIPITOR Your last dose was: Your next dose is:    
   
   
 Dose:  40 mg Take 1 Tab by mouth daily. Quantity:  90 Tab Refills:  3  
     
   
   
   
  
 esomeprazole 40 mg capsule Commonly known as:  NexIUM Your last dose was: Your next dose is:    
   
   
 Dose:  40 mg Take 1 Cap by mouth daily. Quantity:  90 Cap Refills:  3  
     
   
   
   
  
 gabapentin 100 mg capsule Commonly known as:  NEURONTIN  
   
 Your last dose was: Your next dose is:    
   
   
 Dose:  100 mg Take 100 mg by mouth two (2) times daily as needed for Pain. Refills:  0 HEPARIN LOCK FLUSH (PORCINE) IV Your last dose was: Your next dose is:    
   
   
 Dose:  3-5 mL Take 3-5 mL by mouth four (4) times daily as needed (Per [facility] protocol ). Refills:  0  
     
   
   
   
  
 insulin detemir 100 unit/mL (3 mL) Inpn Commonly known as:  Nolia Runner Your last dose was: Your next dose is:    
   
   
 Dose:  60 Units 0.6 mL by SubCUTAneous route nightly. 60 units QHS Quantity:  20 Pen Refills:  11  
     
   
   
   
  
 insulin lispro 100 unit/mL kwikpen Commonly known as:  HUMALOG Your last dose was: Your next dose is:    
   
   
 Dose:  12 Units 12 Units by SubCUTAneous route Before breakfast, lunch, and dinner. DX: E11.65 Quantity:  1 Package Refills:  11  
     
   
   
   
  
 lisinopril 40 mg tablet Commonly known as:  Ruthie Gear Your last dose was: Your next dose is:    
   
   
 Dose:  40 mg Take 1 Tab by mouth daily. Quantity:  90 Tab Refills:  3  
     
   
   
   
  
 metFORMIN 500 mg tablet Commonly known as:  GLUCOPHAGE Your last dose was: Your next dose is: TAKE 1 TABLET BY MOUTH TWICE A DAY WITH MEALS AND INCREASE AS DIRECTED Quantity:  75 Tab Refills:  0  
     
   
   
   
  
 metoprolol tartrate 25 mg tablet Commonly known as:  LOPRESSOR Your last dose was: Your next dose is:    
   
   
 Dose:  12.5 mg Take 0.5 Tabs by mouth two (2) times a day. Quantity:  30 Tab Refills:  0  
     
   
   
   
  
 NORMAL SALINE FLUSH 0.9 % Generic drug:  sodium chloride Your last dose was: Your next dose is:    
   
   
 Dose:  5-10 mL  
5-10 mL by IntraVENous route four (4) times daily as needed (Per [facility] protocol ). Refills:  0 oxyCODONE-acetaminophen  mg per tablet Commonly known as:  PERCOCET 10 Your last dose was: Your next dose is:    
   
   
 Dose:  1 Tab Take 1 Tab by mouth every six (6) hours as needed. Max Daily Amount: 4 Tabs. Quantity:  14 Tab Refills:  0  
     
   
   
   
  
 VITAMIN D3 1,000 unit Cap Generic drug:  cholecalciferol Your last dose was: Your next dose is:    
   
   
 Dose:  2000 Units Take 2,000 Units by mouth daily. Refills:  0 Discharge Instructions Dr. Liberty Trujillo [address], [place]. Phone: [phone] Post-op Instructions Proper care during the postoperative period is an integral part of your surgical treatment program. It is imperative that these instructions are followed to ensure proper healing and to obtain the best results. 1. Go directly home and keep your feet elevated on the way, Get plenty of rest over the next 3-4 days, drink plenty of fluids, and eat at least two well-balanced meals. 2. Due to the type of surgery you had, we ask that you: 
 ___ Do not put any weight on your foot _X__ Only put weight on your heel 
 ___ You can put full weight on your foot as tolerated 3. Elevate your feet 6 inches above hip level by supporting feet and legs with pillows. 4. Apply an ice bag covered with a towel just above but not on the operative site for 30 minutes out of each hour for he first 48 hours (daytime). Icing can be continued after 48 hours, but it is most important during the first 2 days. 5. Swelling is expected. Occasionally, the skin may take on a bruised appearance. This is no cause for alarm. 6. Keep your bandage/cast clean & dry. DO NOT remove the bandages or inspect the wound.  A small amount of blood on the bandage is normal. 
 
7. Exercise your legs frequently by bending your knee to stimulate circulation and speed healing. You can bend your knee 20-30 times over a 5 minute period and repeat it 3-4 times per day. 8. Have prescriptions filled and take medication as directed. Be sure to eat before taking any pain medication, otherwise it may cause nausea. If medication causes stomach upset, headache, rash, or other abnormal reactions discontinue use and CALL THE DOCTOR. 9. You have been given several medications to take after surgery. · You may have been given an anti-inflammatory (such as ibuprofen) which you should take 2-3 times daily regardless of whether you have pain. These medications help reduce inflammation and kill pain as well, allowing you to rely less on the narcotic pain medication. · You may also have been given a narcotic (such as Oxycodone, Hydrocodone, Meperidine, Hydromorphone, etc) \"pain medication\" to help with acute pain. You should take these as soon as you begin to have soreness, pins/needles, or tingling after your surgery, even if you are not having severe pain. This will allow you to \"stay ahead of the pain\". You do not want to get behind the pain and be chasing after it. It may be helpful to take 2 pain pills for the first few doses and 1 pain pill thereafter if you are having mild to moderate pain. You can take most narcotics every 3-4 hours if you need to over the first 3-4 days. Many of these narcotics are combined with Tylenol (acetaminophen), which also helps with pain relief; do not take extra Tylenol if it is in your medication. · You may also have been prescribed an anti-nausea medication (such as Phenergan, Compazine, or Zofran). This helps with nausea or stomach upset which often accompanies the use of narcotics. Take these as needed and know that nausea is a normal side effect of any pain medication. You will decrease the chance of having nausea if you take the pain medication after eating.  Please call the office if it is excessive &/or uncontrolled with medication. · You may also have been prescribed an antibiotic (such as Cephalexin, Clindamycin, or Trimethoprim/smx). Your surgeon will determine if this is necessary based on your medical conditions, type of surgery, and length of surgery. You should take this as prescribed until finished. Stomach upset and diarrhea is a common side effect of antibiotics and can be dramatically reduced or eliminated with the use of a probiotic. Please ask your pharmacist about a probiotic if you experience any of these side effects with your antibiotic. 10. Your return appointment with your doctor is ____4 days_____________________ at the ____[place]____________ office. DO NOT TAKE TYLENOL/ACETAMINOPHEN WITH PERCOCET, [facility], [address] TAKE NARCOTIC PAIN MEDICATIONS WITH FOOD Narcotics tend to be constipating, we suggest taking a stool softener such as Colace or Miralax (follow package instructions). DO NOT DRIVE WHILE TAKING NARCOTIC PAIN MEDICATIONS. DO NOT TAKE SLEEPING MEDICATIONS OR ANTIANXIETY MEDICATIONS WHILE TAKING NARCOTIC PAIN MEDICATIONS,  ESPECIALLY THE NIGHT OF ANESTHESIA. CPAP PATIENTS BE SURE TO WEAR MACHINE WHENEVER NAPPING OR SLEEPING. DISCHARGE SUMMARY from Nurse The following personal items collected during your admission are returned to you:  
Dental Appliance: Dental Appliances: Lowers, Uppers Vision: Visual Aid: None Hearing Aid:   
Jewelry: Jewelry: None Clothing: Clothing: Footwear, Pants, Shirt, Undergarments, With patient Other Valuables: Other Valuables: Cell Phone (in belonging bag) Valuables sent to safe:   
 
 
PATIENT INSTRUCTIONS: 
 
 
F-face looks uneven A-arms unable to move or move even S-speech slurred or non-existent T-time-call 911 as soon as signs and symptoms begin-DO NOT go Back to bed or wait to see if you get better-TIME IS BRAIN. If you have not received your influenza and/or pneumococcal vaccine, please follow up with your primary care physician. The discharge information has been reviewed with the patient and caregiver. The patient and guardian verbalized understanding. Discharge Orders None Introducing [facility]! Dear Shalini Hannah: Thank you for requesting a [employer] account. Our records indicate that you already have an active As[employer] account.   You can access your account anytime at [email or web address] Did you know that you can access your hospital and ER discharge instructions at any time in Seer? You can also review all of your test results from your hospital stay or ER visit. Additional Information If you have questions, please visit the Frequently Asked Questions section of the Seer website at [email or web address] Remember, Seer is NOT to be used for urgent needs. For medical emergencies, dial 911. Now available from your iPhone and Android! General Information Please provide this summary of care documentation to your next provider. Patient Signature:  ____________________________________________________________ Date:  ____________________________________________________________  
  
Shilpa Burn Provider Signature:  ____________________________________________________________ Date:  ____________________________________________________________

## 2017-04-17 NOTE — PERIOP NOTES
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 Stefanie West    Assistant(s): Circ-1: Larissa Arrieta  Scrub Tech-1: Anil Yan    Specimens:   ID Type Source Tests Collected by Time Destination   1 : Left foot lateral wound Wound Foot, left CULTURE, ANAEROBIC, AEROBIC BACTERIAL CULTURE Mathew Wiggins DPM 4/17/2017 0813 Microbiology       Assessment:  Intra-procedure medications         Anesthesia gave intra-procedure sedation and medications, see anesthesia flow sheet     Intravenous fluids: LR@ KVO     Vital signs stable     Received insulin preop for   Recommendation:    Permission to share finding with family or friend yes

## 2017-04-17 NOTE — PERIOP NOTES
PACU~  Blood sugar re-checked = 81; Dr Shaina Ardon notified. Will treat with juice & peanutbutter crackers. 0855-Pt awake, drinking juice. Pt c/o bilat thigh pain 8/10. C/o being hot, bairpaws gown removed, hospital gown applied. 0859-Admin demerol for pain. 1929- Pt cont to c/o unrelieved thigh pain. Admin demerol. Pt drank 240ml apple juice and ate 4 peanutbutter ritz crackers. 0915- Pt remains drowsy. States pain is tolerable. Blood sugar re-checked = 77. Consulted with Dr Shaina Ardon.  Sara Luu 1/2 amp.  0940-Pt more awake, states he is feeling better. Wife here. 0950-Pt now c/o cramping pain in rt calf and rt side. Blood sugar re-checked = 139. BMP from 3/1 potassium was 4.2  1000-Updated Dr Shaina Ardon with pt status. Will check iStat Chem8  1003-Pt c/o rt calf cramp, rt side abd and rt forearm cramping pain. Admin fentanyl for comfort. 1015-Admin Fentanyl for continued cramping pains. Attempting to draw Chem 8 for i Stat, difficult stick. 1030- After 3 sticks successful at drawing blood (unsuccessful rt AC & rt wrist). Pt states pain is subsiding in arm & abd. Calf pain is more tolerable, though he states when he flexes his leg the pain comes back. 200- Dr Eyad Evans called & updated with course of events. He informed me to have the pt or wife call him if the pain comes back/increases. 1045-Pt states he is feeling better, able to flex and move rt leg without pain. States he is ready to go home. 1055-PIV removed. Assisted pt with dressing. Instructed pt and wife to monitor blood sugar at home today and to begin his diabetes medication this evening, skip morning doses. 1108- Post op shoe on left foot, instructed pt to elevate as much as possible for the next couple of days, and when ambulating postop shoe should be worn. Pt discharged home in stable condition via w/c to car.

## 2017-04-18 ENCOUNTER — HOSPITAL ENCOUNTER (OUTPATIENT)
Dept: WOUND CARE | Age: 47
End: 2017-04-18
Payer: COMMERCIAL

## 2017-04-18 VITALS
DIASTOLIC BLOOD PRESSURE: 80 MMHG | TEMPERATURE: 98.2 F | HEART RATE: 82 BPM | SYSTOLIC BLOOD PRESSURE: 160 MMHG | OXYGEN SATURATION: 98 % | RESPIRATION RATE: 20 BRPM

## 2017-04-19 ENCOUNTER — HOME CARE VISIT (OUTPATIENT)
Dept: HOME HEALTH SERVICES | Facility: HOME HEALTH | Age: 47
End: 2017-04-19
Payer: COMMERCIAL

## 2017-04-20 ENCOUNTER — PATIENT OUTREACH (OUTPATIENT)
Dept: INTERNAL MEDICINE CLINIC | Age: 47
End: 2017-04-20

## 2017-04-20 LAB
BACTERIA SPEC CULT: ABNORMAL
BACTERIA SPEC CULT: NORMAL
GRAM STN SPEC: ABNORMAL
SERVICE CMNT-IMP: ABNORMAL
SERVICE CMNT-IMP: NORMAL
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
## 2017-04-21 ENCOUNTER — HOME CARE VISIT (OUTPATIENT)
Dept: HOME HEALTH SERVICES | Facility: HOME HEALTH | Age: 47
End: 2017-04-21
Payer: COMMERCIAL

## 2017-05-10 RX ORDER — METFORMIN HYDROCHLORIDE 500 MG/1
TABLET ORAL
Qty: 75 TAB | Refills: 0 | Status: SHIPPED | OUTPATIENT
Start: 2017-05-10 | End: 2017-08-08 | Stop reason: SDUPTHER

## 2017-05-27 ENCOUNTER — APPOINTMENT (OUTPATIENT)
Dept: GENERAL RADIOLOGY | Age: 47
DRG: 854 | End: 2017-05-27
Attending: EMERGENCY MEDICINE
Payer: COMMERCIAL

## 2017-05-27 ENCOUNTER — HOSPITAL ENCOUNTER (INPATIENT)
Age: 47
LOS: 7 days | Discharge: HOME HEALTH | DRG: 854 | End: 2017-06-03
Attending: EMERGENCY MEDICINE | Admitting: INTERNAL MEDICINE
Payer: COMMERCIAL

## 2017-05-27 DIAGNOSIS — R15.9 INCONTINENCE OF FECES, UNSPECIFIED FECAL INCONTINENCE TYPE: ICD-10-CM

## 2017-05-27 DIAGNOSIS — E11.628 DIABETIC INFECTION OF RIGHT FOOT (HCC): ICD-10-CM

## 2017-05-27 DIAGNOSIS — R60.0 EDEMA OF LEFT LOWER EXTREMITY: ICD-10-CM

## 2017-05-27 DIAGNOSIS — L08.9 DIABETIC INFECTION OF RIGHT FOOT (HCC): ICD-10-CM

## 2017-05-27 DIAGNOSIS — A41.9 SEPSIS, DUE TO UNSPECIFIED ORGANISM: ICD-10-CM

## 2017-05-27 DIAGNOSIS — M86.172 ACUTE OSTEOMYELITIS OF METATARSAL BONE OF LEFT FOOT (HCC): ICD-10-CM

## 2017-05-27 DIAGNOSIS — M86.10 OTHER ACUTE OSTEOMYELITIS: ICD-10-CM

## 2017-05-27 DIAGNOSIS — M86.172 OTHER ACUTE OSTEOMYELITIS OF LEFT FOOT (HCC): Primary | ICD-10-CM

## 2017-05-27 DIAGNOSIS — I10 ESSENTIAL HYPERTENSION: ICD-10-CM

## 2017-05-27 PROBLEM — N17.9 ARF (ACUTE RENAL FAILURE) (HCC): Status: ACTIVE | Noted: 2017-05-27

## 2017-05-27 LAB
ALBUMIN SERPL BCP-MCNC: 1.8 G/DL (ref 3.5–5)
ALBUMIN/GLOB SERPL: 0.3 {RATIO} (ref 1.1–2.2)
ALP SERPL-CCNC: 110 U/L (ref 45–117)
ALT SERPL-CCNC: 66 U/L (ref 12–78)
ANION GAP BLD CALC-SCNC: 11 MMOL/L (ref 5–15)
APPEARANCE UR: CLEAR
AST SERPL W P-5'-P-CCNC: 67 U/L (ref 15–37)
BACTERIA URNS QL MICRO: NEGATIVE /HPF
BASOPHILS # BLD AUTO: 0 K/UL (ref 0–0.1)
BASOPHILS # BLD: 0 % (ref 0–1)
BILIRUB SERPL-MCNC: 0.5 MG/DL (ref 0.2–1)
BILIRUB UR QL: NEGATIVE
BUN SERPL-MCNC: 16 MG/DL (ref 6–20)
BUN/CREAT SERPL: 9 (ref 12–20)
CALCIUM SERPL-MCNC: 8.6 MG/DL (ref 8.5–10.1)
CHLORIDE SERPL-SCNC: 100 MMOL/L (ref 97–108)
CO2 SERPL-SCNC: 25 MMOL/L (ref 21–32)
COLOR UR: ABNORMAL
CREAT SERPL-MCNC: 1.75 MG/DL (ref 0.7–1.3)
DIFFERENTIAL METHOD BLD: ABNORMAL
EOSINOPHIL # BLD: 0 K/UL (ref 0–0.4)
EOSINOPHIL NFR BLD: 0 % (ref 0–7)
EPITH CASTS URNS QL MICRO: NORMAL /LPF
ERYTHROCYTE [DISTWIDTH] IN BLOOD BY AUTOMATED COUNT: 15 % (ref 11.5–14.5)
ERYTHROCYTE [SEDIMENTATION RATE] IN BLOOD: 49 MM/HR (ref 0–15)
GLOBULIN SER CALC-MCNC: 6.4 G/DL (ref 2–4)
GLUCOSE BLD STRIP.AUTO-MCNC: 265 MG/DL (ref 65–100)
GLUCOSE SERPL-MCNC: 309 MG/DL (ref 65–100)
GLUCOSE UR STRIP.AUTO-MCNC: >1000 MG/DL
HCT VFR BLD AUTO: 31.8 % (ref 36.6–50.3)
HGB BLD-MCNC: 10.1 G/DL (ref 12.1–17)
HGB UR QL STRIP: ABNORMAL
KETONES UR QL STRIP.AUTO: NEGATIVE MG/DL
LACTATE SERPL-SCNC: 2 MMOL/L (ref 0.4–2)
LEUKOCYTE ESTERASE UR QL STRIP.AUTO: NEGATIVE
LYMPHOCYTES # BLD AUTO: 6 % (ref 12–49)
LYMPHOCYTES # BLD: 1.3 K/UL (ref 0.8–3.5)
MCH RBC QN AUTO: 23.3 PG (ref 26–34)
MCHC RBC AUTO-ENTMCNC: 31.8 G/DL (ref 30–36.5)
MCV RBC AUTO: 73.4 FL (ref 80–99)
MONOCYTES # BLD: 1.6 K/UL (ref 0–1)
MONOCYTES NFR BLD AUTO: 7 % (ref 5–13)
NEUTS SEG # BLD: 19.4 K/UL (ref 1.8–8)
NEUTS SEG NFR BLD AUTO: 87 % (ref 32–75)
NITRITE UR QL STRIP.AUTO: NEGATIVE
PH UR STRIP: 6 [PH] (ref 5–8)
PLATELET # BLD AUTO: 388 K/UL (ref 150–400)
POTASSIUM SERPL-SCNC: 4.1 MMOL/L (ref 3.5–5.1)
PROT SERPL-MCNC: 8.2 G/DL (ref 6.4–8.2)
PROT UR STRIP-MCNC: >300 MG/DL
RBC # BLD AUTO: 4.33 M/UL (ref 4.1–5.7)
RBC #/AREA URNS HPF: NORMAL /HPF (ref 0–5)
RBC MORPH BLD: ABNORMAL
RBC MORPH BLD: ABNORMAL
SERVICE CMNT-IMP: ABNORMAL
SODIUM SERPL-SCNC: 136 MMOL/L (ref 136–145)
SP GR UR REFRACTOMETRY: 1.02 (ref 1–1.03)
UROBILINOGEN UR QL STRIP.AUTO: 1 EU/DL (ref 0.2–1)
WBC # BLD AUTO: 22.3 K/UL (ref 4.1–11.1)
WBC URNS QL MICRO: NORMAL /HPF (ref 0–4)

## 2017-05-27 PROCEDURE — 74011636637 HC RX REV CODE- 636/637: Performed by: INTERNAL MEDICINE

## 2017-05-27 PROCEDURE — 73620 X-RAY EXAM OF FOOT: CPT

## 2017-05-27 PROCEDURE — 74011250637 HC RX REV CODE- 250/637: Performed by: EMERGENCY MEDICINE

## 2017-05-27 PROCEDURE — 87077 CULTURE AEROBIC IDENTIFY: CPT | Performed by: EMERGENCY MEDICINE

## 2017-05-27 PROCEDURE — 74011250636 HC RX REV CODE- 250/636: Performed by: EMERGENCY MEDICINE

## 2017-05-27 PROCEDURE — 85652 RBC SED RATE AUTOMATED: CPT | Performed by: EMERGENCY MEDICINE

## 2017-05-27 PROCEDURE — 65660000000 HC RM CCU STEPDOWN

## 2017-05-27 PROCEDURE — 96375 TX/PRO/DX INJ NEW DRUG ADDON: CPT

## 2017-05-27 PROCEDURE — 74011000258 HC RX REV CODE- 258: Performed by: INTERNAL MEDICINE

## 2017-05-27 PROCEDURE — 36415 COLL VENOUS BLD VENIPUNCTURE: CPT | Performed by: EMERGENCY MEDICINE

## 2017-05-27 PROCEDURE — 87147 CULTURE TYPE IMMUNOLOGIC: CPT | Performed by: EMERGENCY MEDICINE

## 2017-05-27 PROCEDURE — 85025 COMPLETE CBC W/AUTO DIFF WBC: CPT | Performed by: EMERGENCY MEDICINE

## 2017-05-27 PROCEDURE — 87040 BLOOD CULTURE FOR BACTERIA: CPT | Performed by: EMERGENCY MEDICINE

## 2017-05-27 PROCEDURE — 83605 ASSAY OF LACTIC ACID: CPT | Performed by: EMERGENCY MEDICINE

## 2017-05-27 PROCEDURE — 96365 THER/PROPH/DIAG IV INF INIT: CPT

## 2017-05-27 PROCEDURE — 74011250636 HC RX REV CODE- 250/636: Performed by: INTERNAL MEDICINE

## 2017-05-27 PROCEDURE — 74011250637 HC RX REV CODE- 250/637: Performed by: FAMILY MEDICINE

## 2017-05-27 PROCEDURE — 81001 URINALYSIS AUTO W/SCOPE: CPT | Performed by: EMERGENCY MEDICINE

## 2017-05-27 PROCEDURE — 87186 SC STD MICRODIL/AGAR DIL: CPT | Performed by: EMERGENCY MEDICINE

## 2017-05-27 PROCEDURE — 74011250636 HC RX REV CODE- 250/636

## 2017-05-27 PROCEDURE — 74011250637 HC RX REV CODE- 250/637: Performed by: INTERNAL MEDICINE

## 2017-05-27 PROCEDURE — 74011000258 HC RX REV CODE- 258: Performed by: EMERGENCY MEDICINE

## 2017-05-27 PROCEDURE — 80053 COMPREHEN METABOLIC PANEL: CPT | Performed by: EMERGENCY MEDICINE

## 2017-05-27 PROCEDURE — 99285 EMERGENCY DEPT VISIT HI MDM: CPT

## 2017-05-27 PROCEDURE — 82962 GLUCOSE BLOOD TEST: CPT

## 2017-05-27 RX ORDER — ONDANSETRON 2 MG/ML
8 INJECTION INTRAMUSCULAR; INTRAVENOUS
Status: COMPLETED | OUTPATIENT
Start: 2017-05-27 | End: 2017-05-27

## 2017-05-27 RX ORDER — INSULIN LISPRO 100 [IU]/ML
10 INJECTION, SOLUTION INTRAVENOUS; SUBCUTANEOUS
Status: DISCONTINUED | OUTPATIENT
Start: 2017-05-27 | End: 2017-06-03 | Stop reason: HOSPADM

## 2017-05-27 RX ORDER — VANCOMYCIN 1.75 GRAM/500 ML IN 0.9 % SODIUM CHLORIDE INTRAVENOUS
1750
Status: DISCONTINUED | OUTPATIENT
Start: 2017-05-28 | End: 2017-05-30 | Stop reason: DRUGHIGH

## 2017-05-27 RX ORDER — HYDROMORPHONE HYDROCHLORIDE 1 MG/ML
1 INJECTION, SOLUTION INTRAMUSCULAR; INTRAVENOUS; SUBCUTANEOUS
Status: DISCONTINUED | OUTPATIENT
Start: 2017-05-27 | End: 2017-06-03 | Stop reason: HOSPADM

## 2017-05-27 RX ORDER — ACETAMINOPHEN 500 MG
1000 TABLET ORAL ONCE
Status: COMPLETED | OUTPATIENT
Start: 2017-05-27 | End: 2017-05-27

## 2017-05-27 RX ORDER — SODIUM CHLORIDE 9 MG/ML
100 INJECTION, SOLUTION INTRAVENOUS CONTINUOUS
Status: DISCONTINUED | OUTPATIENT
Start: 2017-05-27 | End: 2017-06-02

## 2017-05-27 RX ORDER — ATORVASTATIN CALCIUM 40 MG/1
40 TABLET, FILM COATED ORAL DAILY
Status: DISCONTINUED | OUTPATIENT
Start: 2017-05-28 | End: 2017-06-03 | Stop reason: HOSPADM

## 2017-05-27 RX ORDER — INSULIN GLARGINE 100 [IU]/ML
60 INJECTION, SOLUTION SUBCUTANEOUS
Status: DISCONTINUED | OUTPATIENT
Start: 2017-05-27 | End: 2017-06-02

## 2017-05-27 RX ORDER — DEXTROSE 50 % IN WATER (D50W) INTRAVENOUS SYRINGE
12.5-25 AS NEEDED
Status: DISCONTINUED | OUTPATIENT
Start: 2017-05-27 | End: 2017-05-27 | Stop reason: RX

## 2017-05-27 RX ORDER — METOPROLOL TARTRATE 25 MG/1
12.5 TABLET, FILM COATED ORAL 2 TIMES DAILY
Status: DISCONTINUED | OUTPATIENT
Start: 2017-05-27 | End: 2017-06-03 | Stop reason: HOSPADM

## 2017-05-27 RX ORDER — ONDANSETRON 2 MG/ML
4 INJECTION INTRAMUSCULAR; INTRAVENOUS
Status: DISCONTINUED | OUTPATIENT
Start: 2017-05-27 | End: 2017-06-03 | Stop reason: HOSPADM

## 2017-05-27 RX ORDER — PANTOPRAZOLE SODIUM 40 MG/1
40 TABLET, DELAYED RELEASE ORAL
Status: DISCONTINUED | OUTPATIENT
Start: 2017-05-28 | End: 2017-06-03 | Stop reason: HOSPADM

## 2017-05-27 RX ORDER — GABAPENTIN 100 MG/1
100 CAPSULE ORAL
Status: DISCONTINUED | OUTPATIENT
Start: 2017-05-27 | End: 2017-06-03 | Stop reason: HOSPADM

## 2017-05-27 RX ORDER — MAGNESIUM SULFATE 100 %
4 CRYSTALS MISCELLANEOUS AS NEEDED
Status: DISCONTINUED | OUTPATIENT
Start: 2017-05-27 | End: 2017-06-03 | Stop reason: HOSPADM

## 2017-05-27 RX ORDER — PANTOPRAZOLE SODIUM 40 MG/1
40 TABLET, DELAYED RELEASE ORAL
Status: COMPLETED | OUTPATIENT
Start: 2017-05-27 | End: 2017-05-27

## 2017-05-27 RX ORDER — ACETAMINOPHEN 325 MG/1
650 TABLET ORAL
Status: DISCONTINUED | OUTPATIENT
Start: 2017-05-27 | End: 2017-06-03 | Stop reason: HOSPADM

## 2017-05-27 RX ORDER — ONDANSETRON 2 MG/ML
INJECTION INTRAMUSCULAR; INTRAVENOUS
Status: COMPLETED
Start: 2017-05-27 | End: 2017-05-27

## 2017-05-27 RX ORDER — ZOLPIDEM TARTRATE 5 MG/1
5 TABLET ORAL
Status: DISCONTINUED | OUTPATIENT
Start: 2017-05-27 | End: 2017-06-03 | Stop reason: HOSPADM

## 2017-05-27 RX ORDER — OXYCODONE HYDROCHLORIDE 5 MG/1
10 TABLET ORAL
Status: DISCONTINUED | OUTPATIENT
Start: 2017-05-27 | End: 2017-06-03 | Stop reason: HOSPADM

## 2017-05-27 RX ORDER — HYDRALAZINE HYDROCHLORIDE 20 MG/ML
10 INJECTION INTRAMUSCULAR; INTRAVENOUS
Status: DISCONTINUED | OUTPATIENT
Start: 2017-05-27 | End: 2017-06-03 | Stop reason: HOSPADM

## 2017-05-27 RX ORDER — INSULIN LISPRO 100 [IU]/ML
INJECTION, SOLUTION INTRAVENOUS; SUBCUTANEOUS
Status: DISCONTINUED | OUTPATIENT
Start: 2017-05-27 | End: 2017-06-03 | Stop reason: HOSPADM

## 2017-05-27 RX ADMIN — SODIUM CHLORIDE 100 ML/HR: 900 INJECTION, SOLUTION INTRAVENOUS at 19:38

## 2017-05-27 RX ADMIN — ACETAMINOPHEN 650 MG: 325 TABLET, FILM COATED ORAL at 20:40

## 2017-05-27 RX ADMIN — METOPROLOL TARTRATE 12.5 MG: 25 TABLET ORAL at 20:39

## 2017-05-27 RX ADMIN — ONDANSETRON 8 MG: 2 INJECTION INTRAMUSCULAR; INTRAVENOUS at 17:07

## 2017-05-27 RX ADMIN — PANTOPRAZOLE SODIUM 40 MG: 40 TABLET, DELAYED RELEASE ORAL at 20:39

## 2017-05-27 RX ADMIN — SODIUM CHLORIDE 500 ML: 900 INJECTION, SOLUTION INTRAVENOUS at 16:33

## 2017-05-27 RX ADMIN — ZOLPIDEM TARTRATE 5 MG: 5 TABLET ORAL at 22:42

## 2017-05-27 RX ADMIN — SODIUM CHLORIDE 3 G: 900 INJECTION, SOLUTION INTRAVENOUS at 16:39

## 2017-05-27 RX ADMIN — VANCOMYCIN HYDROCHLORIDE 3000 MG: 10 INJECTION, POWDER, LYOPHILIZED, FOR SOLUTION INTRAVENOUS at 17:56

## 2017-05-27 RX ADMIN — INSULIN GLARGINE 60 UNITS: 100 INJECTION, SOLUTION SUBCUTANEOUS at 21:50

## 2017-05-27 RX ADMIN — SODIUM CHLORIDE 3000 ML: 900 INJECTION, SOLUTION INTRAVENOUS at 17:13

## 2017-05-27 RX ADMIN — INSULIN LISPRO 3 UNITS: 100 INJECTION, SOLUTION INTRAVENOUS; SUBCUTANEOUS at 21:50

## 2017-05-27 RX ADMIN — ACETAMINOPHEN 1000 MG: 500 TABLET ORAL at 16:33

## 2017-05-27 RX ADMIN — PIPERACILLIN SODIUM,TAZOBACTAM SODIUM 3.38 G: 3; .375 INJECTION, POWDER, FOR SOLUTION INTRAVENOUS at 22:42

## 2017-05-27 RX ADMIN — ONDANSETRON HYDROCHLORIDE 8 MG: 2 INJECTION, SOLUTION INTRAMUSCULAR; INTRAVENOUS at 17:07

## 2017-05-27 NOTE — ED NOTES
TRANSFER - OUT REPORT:    Verbal report given to [facility], RN(name) on Kike Hiss  being transferred to [facility], 2209(unit) for routine progression of care       Report consisted of patients Situation, Background, Assessment and   Recommendations(SBAR). Information from the following report(s) SBAR and ED Summary was reviewed with the receiving nurse. Lines:   Peripheral IV 05/27/17 Left Hand (Active)   Site Assessment Clean, dry, & intact 5/27/2017  4:14 PM   Phlebitis Assessment 0 5/27/2017  4:14 PM   Infiltration Assessment 0 5/27/2017  4:14 PM   Dressing Status Clean, dry, & intact 5/27/2017  4:14 PM   Hub Color/Line Status Pink 5/27/2017  4:14 PM        Opportunity for questions and clarification was provided.       Patient transported with:   [employer]

## 2017-05-27 NOTE — PROGRESS NOTES
Pharmacy Automatic Renal Dosing Protocol - Antimicrobials      Indication for Antimicrobials: SSTI  Current Regimen of Each Antimicrobial (Start Day & Day of Therapy):  Vancomycin 3000 mg IV x 1, following 1750 mg IV Q16H (Start 17 - Day 1)  Ampicillin 3g x 1, followed by 3g IV Q6H (Start 17 - Day 1)    Significant cultures:     17 Blood - In process      CAPD, Intermittent Hemodialysis or Renal Replacement Therapy: NA  Paralysis, amputations, malnutrition: None  Recent Labs      17   1616   CREA  1.75*   BUN  16   WBC  22.3*     Temp (24hrs), Av.5 °F (39.2 °C), Min:102.5 °F (39.2 °C), Max:102.5 °F (39.2 °C)    Creatinine Clearance (ml/min): 62 ml/min      Goal Vancomycin Level(s): 15-20 mcg/ml (Predicted 17.6 mcg/ml)      Impression/Plan:  Will dose Vancomycin and Unasyn as above for Cellulitis. Pharmacy will follow daily and adjust doses of monitored medications as appropriate for renal function and/or serum levels.     Thank you,  Chelo Phillip, PHARMD

## 2017-05-27 NOTE — H&P
Hospitalist Admission Note    NAME: Adron Moritz   :  1970   MRN:  [id number]     Date/Time:  2017 6:05 PM    Patient PCP: Radha Castro MD  ________________________________________________________________________    My assessment of this patient's clinical condition and my plan of care is as follows. Assessment / Plan:  Sepsis POA: fever, tachycardia, leukocytosis, wound infection, start IVF and ABX. Left Foot Infection/OM: had already toes amputated in that foot, may need another amputation, last time 4 different bacteria isolated (Pseudomonas, Staph, Citrobacter, Enterococcus), will start Zosyn and Vancomycin, get Podiatry and Vascular Surgery evaluation. DM: c/w Lantus, Lispro prandial and SSI, check HbA1C. Hold metformin  HTN: c/w Bb, hold ACE, use hydralazine prn. ARF: give IVF and hold ACE and Metformin, monitor. GERD: c/w PPI  Code Status: Full code  Surrogate Decision Maker: wife Kasey Steel [id number]  DVT Prophylaxis: SCD  GI Prophylaxis: on PPI  Baseline: fairly independent        Subjective:   CHIEF COMPLAINT: \"I have fever and the wound is leaking\"    HISTORY OF PRESENT ILLNESS:     Adelaida Land is a 51 y.o.  male  with PMHx significant for DM and HTN, presents ambulatory to [id number] [place] [address] ED with cc of progressively worsening left foot pain secondary to wounds to his left foot, diaphoresis, a mild headache, and a dry cough x 3 days. The patient is febrile in the ED. 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 He notes that he receives wound care weekly, and was evaluated by his Podiatrist 1 week ago with no significant findings. Per relative, the patient had a skin graft to his left foot 3/2017. Per relative, the patient's left foot dressing was changed this morning. The patient states that he is an , and he is on his feet frequently.  He denies any recent travel or exposure to insects. He specifically denies a sore throat, ear pain, back pain, nausea, vomiting, abdominal pain, or rashes at this time. At this time patient lying in bed is febrile, tachycardic, has a headache and has an infection in left foot, XR today reveal possible OM, denies chest pain, no N/V no diarrhea, no urinary symptoms, no other associated symptoms. We were asked to admit for work up and evaluation of the above problems. Past Medical History:   Diagnosis Date    Diabetes ([facility]     since age 23    DM (diabetes mellitus) ([facility]     HTN (hypertension)     Hypercholesteremia     Hyperlipidemia         Past Surgical History:   Procedure Laterality Date    HX AMPUTATION      toes- left foot    HX BUNIONECTOMY      HX ORTHOPAEDIC      boutinerre deformity    HX ORTHOPAEDIC      fascia removed left foot    HX OTHER SURGICAL  03/2017    Skin graft Surgery    HX TONSILLECTOMY      HX WISDOM TEETH EXTRACTION         Social History   Substance Use Topics    Smoking status: Former Smoker     Types: Cigars     Quit date: 4/22/2011    Smokeless tobacco: Never Used    Alcohol use 0.0 oz/week     1 Glasses of wine, 0 Standard drinks or equivalent per week      Comment: stop drinking 5 months ago        Family History   Problem Relation Age of Onset    Hypertension Mother     High Cholesterol Mother      Allergies   Allergen Reactions    Morphine Angioedema        Prior to Admission medications    Medication Sig Start Date End Date Taking? Authorizing Provider   metFORMIN (GLUCOPHAGE) 500 mg tablet TAKE 1 TABLET BY MOUTH TWICE A DAY WITH MEALS AND INCREASE AS DIRECTED 5/10/17   Erasmo Lopez MD   metoprolol tartrate (LOPRESSOR) 25 mg tablet Take 0.5 Tabs by mouth two (2) times a day. 3/7/17   Bonnie Belle MD   oxyCODONE-acetaminophen (PERCOCET 10)  mg per tablet Take 1 Tab by mouth every six (6) hours as needed. Max Daily Amount: 4 Tabs.  3/2/17   Bonnie Belle MD   gabapentin (NEURONTIN) 100 mg capsule Take 100 mg by mouth two (2) times daily as needed for Pain. Historical Provider   cholecalciferol (VITAMIN D3) 1,000 unit cap Take 2,000 Units by mouth daily. Trung Suazo MD   insulin lispro (HUMALOG) 100 unit/mL kwikpen 12 Units by SubCUTAneous route Before breakfast, lunch, and dinner. DX: E11.65 11/18/16   Erasmo Starr MD   insulin detemir (LEVEMIR FLEXTOUCH) 100 unit/mL (3 mL) inpn 0.6 mL by SubCUTAneous route nightly. 60 units QHS 10/6/16   Erasmo Starr MD   esomeprazole (NEXIUM) 40 mg capsule Take 1 Cap by mouth daily. 10/6/16   Erasmo Starr MD   sodium chloride (SALINE NASAL) 0.65 % nasal spray 1 [place] by Both Nostrils route as needed for Congestion. Patient taking differently: 1 Spray by Both Nostrils route as needed for Congestion (dry sinus symptoms, especially during winter time. ). 10/6/16   Tate Keane MD   lisinopril (PRINIVIL, ZESTRIL) 40 mg tablet Take 1 Tab by mouth daily. 5/24/16   Sergei Nazario MD   atorvastatin (LIPITOR) 40 mg tablet Take 1 Tab by mouth daily. 5/24/16   Sergei Nazario MD       REVIEW OF SYSTEMS:     I am not able to complete the review of systems because:    The patient is intubated and sedated    The patient has altered mental status due to his acute medical problems    The patient has baseline aphasia from prior stroke(s)    The patient has baseline dementia and is not reliable historian    The patient is in acute medical distress and unable to provide information           Total of 12 systems reviewed as follows:       POSITIVE= underlined text  Negative = text not underlined  General:  fever, chills, sweats, generalized weakness, weight loss/gain,      loss of appetite   Eyes:    blurred vision, eye pain, loss of vision, double vision  ENT:    rhinorrhea, pharyngitis   Respiratory:   cough, sputum production, SOB, GARCIA, wheezing, pleuritic pain   Cardiology:   chest pain, palpitations, orthopnea, PND, edema, syncope   Gastrointestinal:  abdominal pain , N/V, diarrhea, dysphagia, constipation, bleeding   Genitourinary:  frequency, urgency, dysuria, hematuria, incontinence   Muskuloskeletal :  arthralgia, myalgia, back pain  Hematology:  easy bruising, nose or gum bleeding, lymphadenopathy   Dermatological: rash, ulceration, pruritis, color change / jaundice  Endocrine:   hot flashes or polydipsia   Neurological:  headache, dizziness, confusion, focal weakness, paresthesia,     Speech difficulties, memory loss, gait difficulty  Psychological: Feelings of anxiety, depression, agitation    Objective:   VITALS:    Visit Vitals    /78    Pulse (!) 106    Temp (!) 102.8 °F (39.3 °C)    Resp 29    Ht 6' 3\" (1.905 m)    Wt 130.3 kg (287 lb 4.2 oz)    SpO2 97%    BMI 35.9 kg/m2       PHYSICAL EXAM:    General:    Alert, cooperative, febrile, appears stated age. HEENT: Atraumatic, anicteric sclerae, pink conjunctivae     No oral ulcers, mucosa moist, throat clear, dentition fair  Neck:  Supple, symmetrical,  thyroid: non tender  Lungs:   Coarse BS. No Wheezing or Rhonchi. No rales. Chest wall:  No tenderness  No Accessory muscle use. Heart:   Regular  rhythm,  No  murmur   No edema  Abdomen:   Soft, non-tender. Not distended. Bowel sounds normal  Extremities: No cyanosis. No clubbing, left foot wound with purulent discharge    Skin turgor normal, Capillary refill normal, Radial  pulse 2+  Skin:     Not pale. Not Jaundiced  No rashes   Psych:  Good insight. Not depressed. Not anxious or agitated. Neurologic: EOMs intact. No facial asymmetry. No aphasia or slurred speech. Symmetrical strength, Sensation grossly intact.  Alert and oriented X 4.     _______________________________________________________________________  Care Plan discussed with:    Comments   Patient y    Family  y wife   RN y    Care Manager                    Consultant: _______________________________________________________________________  Expected  Disposition:   Home with Family    HH/PT/OT/RN    SNF/LTC y   [facility]    ________________________________________________________________________  TOTAL TIME:  61 Minutes    Critical Care Provided     Minutes non procedure based      Comments    y Reviewed previous records   >50% of visit spent in counseling and coordination of care y Discussion with patient and/or family and questions answered       ________________________________________________________________________  Signed: Alexander Harden MD    Procedures: see electronic medical records for all procedures/Xrays and details which were not copied into this note but were reviewed prior to creation of Plan. LAB DATA REVIEWED:    Recent Results (from the past 24 hour(s))   CBC WITH AUTOMATED DIFF    Collection Time: 05/27/17  4:16 PM   Result Value Ref Range    WBC 22.3 (H) 4.1 - 11.1 K/uL    RBC 4.33 4.10 - 5.70 M/uL    HGB 10.1 (L) 12.1 - 17.0 g/dL    HCT 31.8 (L) 36.6 - 50.3 %    MCV 73.4 (L) 80.0 - 99.0 FL    MCH 23.3 (L) 26.0 - 34.0 PG    MCHC 31.8 30.0 - 36.5 g/dL    RDW 15.0 (H) 11.5 - 14.5 %    PLATELET 701 150 - 418 K/uL    NEUTROPHILS 87 (H) 32 - 75 %    LYMPHOCYTES 6 (L) 12 - 49 %    MONOCYTES 7 5 - 13 %    EOSINOPHILS 0 0 - 7 %    BASOPHILS 0 0 - 1 %    ABS. NEUTROPHILS 19.4 (H) 1.8 - 8.0 K/UL    ABS. LYMPHOCYTES 1.3 0.8 - 3.5 K/UL    ABS. MONOCYTES 1.6 (H) 0.0 - 1.0 K/UL    ABS. EOSINOPHILS 0.0 0.0 - 0.4 K/UL    ABS.  BASOPHILS 0.0 0.0 - 0.1 K/UL    RBC COMMENTS MICROCYTOSIS  1+        RBC COMMENTS HYPOCHROMIA  1+        DF SMEAR SCANNED     METABOLIC PANEL, COMPREHENSIVE    Collection Time: 05/27/17  4:16 PM   Result Value Ref Range    Sodium 136 136 - 145 mmol/L    Potassium 4.1 3.5 - 5.1 mmol/L    Chloride 100 97 - 108 mmol/L    CO2 25 21 - 32 mmol/L    Anion gap 11 5 - 15 mmol/L    Glucose 309 (H) 65 - 100 mg/dL    BUN 16 6 - 20 MG/DL    Creatinine 1.75 (H) 0.70 - 1.30 MG/DL    BUN/Creatinine ratio 9 (L) 12 - 20      GFR est AA 51 (L) >60 ml/min/1.73m2    GFR est non-AA 42 (L) >60 ml/min/1.73m2    Calcium 8.6 8.5 - 10.1 MG/DL    Bilirubin, total 0.5 0.2 - 1.0 MG/DL    ALT (SGPT) 66 12 - 78 U/L    AST (SGOT) 67 (H) 15 - 37 U/L    Alk.  phosphatase 110 45 - 117 U/L    Protein, total 8.2 6.4 - 8.2 g/dL    Albumin 1.8 (L) 3.5 - 5.0 g/dL    Globulin 6.4 (H) 2.0 - 4.0 g/dL    A-G Ratio 0.3 (L) 1.1 - 2.2     LACTIC ACID, PLASMA    Collection Time: 05/27/17  4:16 PM   Result Value Ref Range    Lactic acid 2.0 0.4 - 2.0 MMOL/L   URINALYSIS W/ RFLX MICROSCOPIC    Collection Time: 05/27/17  4:16 PM   Result Value Ref Range    Color YELLOW/STRAW      Appearance CLEAR CLEAR      Specific gravity 1.020 1.003 - 1.030      pH (UA) 6.0 5.0 - 8.0      Protein >300 (A) NEG mg/dL    Glucose >1000 (A) NEG mg/dL    Ketone NEGATIVE  NEG mg/dL    Bilirubin NEGATIVE  NEG      Blood LARGE (A) NEG      Urobilinogen 1.0 0.2 - 1.0 EU/dL    Nitrites NEGATIVE  NEG      Leukocyte Esterase NEGATIVE  NEG     SED RATE (ESR)    Collection Time: 05/27/17  4:16 PM   Result Value Ref Range    Sed rate, automated 49 (H) 0 - 15 mm/hr   URINE MICROSCOPIC ONLY    Collection Time: 05/27/17  4:16 PM   Result Value Ref Range    WBC 10-20 0 - 4 /hpf    RBC 5-10 0 - 5 /hpf    Epithelial cells FEW FEW /lpf    Bacteria NEGATIVE  NEG /hpf

## 2017-05-27 NOTE — ED PROVIDER NOTES
HPI Comments: Rukhsana Reynoso, 51 y.o. Male with PMHx significant for DM and HTN, presents ambulatory to ED [facility] ED with cc of progressively worsening left foot pain secondary to wounds to his left foot, diaphoresis, a mild headache, and a dry cough x 3 days. The patient is febrile in the ED. 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 He notes that he receives wound care weekly, and was evaluated by his Podiatrist 1 week ago with no significant findings. Per relative, the patient had a skin graft to his left foot 3/2017. Per relative, the patient's left foot dressing was changed this morning. The patient states that he is an , and he is on his feet frequently. He denies any recent travel or exposure to insects. He specifically denies a sore throat, ear pain, back pain, nausea, vomiting, abdominal pain, or rashes at this time. PCP: [address] [place], [place]  Podiatry: Cecil Lyons DPM    Social history significant for: - Tobacco (former), + EtOH, - Illicit Drug Use    There are no other complaints, changes, or physical findings at this time. Written by NIKIA Bernardo, as dictated by Elfego Sierra MD.    The history is provided by the patient. No  was used.         Past Medical History:   Diagnosis Date    Diabetes ([address]     since age 23    DM (diabetes mellitus) ([address]     HTN (hypertension)     Hypercholesteremia     Hyperlipidemia        Past Surgical History:   Procedure Laterality Date    HX AMPUTATION      toes- left foot    HX BUNIONECTOMY      HX ORTHOPAEDIC      boutinerre deformity    HX ORTHOPAEDIC      fascia removed left foot    HX OTHER SURGICAL  03/2017    Skin graft Surgery    HX TONSILLECTOMY      HX WISDOM TEETH EXTRACTION           Family History:   Problem Relation Age of Onset    Hypertension Mother     High Cholesterol Mother        Social History     Social History  Marital status:      Spouse name: N/A    Number of children: N/A    Years of education: N/A     Occupational History    Not on file. Social History Main Topics    Smoking status: Former Smoker     Types: Cigars     Quit date: 4/22/2011    Smokeless tobacco: Never Used    Alcohol use 0.0 oz/week     1 Glasses of wine, 0 Standard drinks or equivalent per week      Comment: stop drinking 5 months ago    Drug use: No    Sexual activity: Yes     Partners: Female     Birth control/ protection: None     Other Topics Concern    Not on file     Social History Narrative    ** Merged History Encounter **              ALLERGIES: Morphine    Review of Systems   Constitutional: Positive for diaphoresis and fever. Negative for activity change, appetite change, chills and unexpected weight change. HENT: Negative for congestion, ear pain and sore throat. Eyes: Negative for pain and visual disturbance. Respiratory: Positive for cough. Negative for shortness of breath. Cardiovascular: Negative for chest pain. Gastrointestinal: Negative for abdominal pain, diarrhea, nausea and vomiting. Genitourinary: Negative for dysuria. Musculoskeletal: Positive for arthralgias (L foot). Negative for back pain. Skin: Positive for wound. Negative for rash. Neurological: Positive for headaches. Vitals:    05/27/17 1537 05/27/17 1553 05/27/17 1634 05/27/17 1645   BP: 159/87 (!) 174/98 194/82 190/69   Pulse: (!) 112 (!) 111 (!) 117 (!) 116   Resp: 15 28 29 (!) 32   Temp: (!) 102.5 °F (39.2 °C)   (!) 102.5 °F (39.2 °C)   SpO2: 100% 100% 100% 100%   Weight: 130.3 kg (287 lb 4.2 oz)      Height: 6' 3\" (1.905 m)               Physical Exam   Constitutional: He is oriented to person, place, and time. He appears well-developed and well-nourished. HENT:   Head: Normocephalic and atraumatic. Mouth/Throat: Oropharynx is clear and moist. Mucous membranes are dry.    Eyes: Conjunctivae and EOM are normal. Pupils are equal, round, and reactive to light. Right eye exhibits no discharge. Left eye exhibits no discharge. Neck: Normal range of motion. Neck supple. Cardiovascular: Regular rhythm and normal heart sounds. Tachycardia present. No murmur heard. Pulmonary/Chest: Effort normal and breath sounds normal. No respiratory distress. He has no wheezes. He has no rales. Abdominal: Soft. Bowel sounds are normal. He exhibits no distension. There is no tenderness. Musculoskeletal: Normal range of motion. Neurological: He is alert and oriented to person, place, and time. No cranial nerve deficit. He exhibits normal muscle tone. Skin: Skin is warm and dry. No rash noted. He is not diaphoretic. Hot to the touch  8 cm x 7 cm wound to the left lateral foot with yellow and sanguinous drainage present - stage 3 to the bone; no fluctuance noted  Malodorous  All digits to left foot amputated  3 cm x 3 cm wound to the top of the left foot   Nursing note and vitals reviewed. Written by Rowena Sharp ED Scribe, as dictated by Grace Marin MD.    MDM  Number of Diagnoses or Management Options  Other acute osteomyelitis of left foot [facility]):   Diagnosis management comments: Evidence of sepsis, currently with normal BP. Rule out osteomyelitis. Amount and/or Complexity of Data Reviewed  Clinical lab tests: ordered and reviewed  Tests in the radiology section of CPT®: reviewed  Review and summarize past medical records: yes  Discuss the patient with other providers: yes (Hospitalist, Podiatry)      ED Course       Procedures    Consult Note:  5:51 PM  Grace Marin MD spoke with Dr. Javi Bliss,  Specialty: Podiatry  Discussed pt's hx, disposition, and available diagnostic and imaging results. Reviewed care plans. Consultant agrees with plans as outlined.   Written by Rowena Sharp ED Scribe, as dictated by Grace Marin MD.    CONSULT NOTE:   5:59 PM  Grace Marin MD spoke with Dr. Angel Carmen,   Specialty: Hospitalist  Discussed pt's hx, disposition, and available diagnostic and imaging results. Reviewed care plans. Consultant will evaluate pt for admission. Written by NIKIA Silva, as dictated by Tom Hager MD.    Progress Note:  6:00 PM  The patient and his family were informed of his lab and imaging results. They convey their understanding of these results and the need for admission at this time. Written by NIKIA Silva, as dictated by Tom Hager MD.    LABORATORY TESTS:  Recent Results (from the past 12 hour(s))   CBC WITH AUTOMATED DIFF    Collection Time: 05/27/17  4:16 PM   Result Value Ref Range    WBC 22.3 (H) 4.1 - 11.1 K/uL    RBC 4.33 4.10 - 5.70 M/uL    HGB 10.1 (L) 12.1 - 17.0 g/dL    HCT 31.8 (L) 36.6 - 50.3 %    MCV 73.4 (L) 80.0 - 99.0 FL    MCH 23.3 (L) 26.0 - 34.0 PG    MCHC 31.8 30.0 - 36.5 g/dL    RDW 15.0 (H) 11.5 - 14.5 %    PLATELET 207 570 - 364 K/uL    NEUTROPHILS 87 (H) 32 - 75 %    LYMPHOCYTES 6 (L) 12 - 49 %    MONOCYTES 7 5 - 13 %    EOSINOPHILS 0 0 - 7 %    BASOPHILS 0 0 - 1 %    ABS. NEUTROPHILS 19.4 (H) 1.8 - 8.0 K/UL    ABS. LYMPHOCYTES 1.3 0.8 - 3.5 K/UL    ABS. MONOCYTES 1.6 (H) 0.0 - 1.0 K/UL    ABS. EOSINOPHILS 0.0 0.0 - 0.4 K/UL    ABS.  BASOPHILS 0.0 0.0 - 0.1 K/UL    RBC COMMENTS MICROCYTOSIS  1+        RBC COMMENTS HYPOCHROMIA  1+        DF SMEAR SCANNED     METABOLIC PANEL, COMPREHENSIVE    Collection Time: 05/27/17  4:16 PM   Result Value Ref Range    Sodium 136 136 - 145 mmol/L    Potassium 4.1 3.5 - 5.1 mmol/L    Chloride 100 97 - 108 mmol/L    CO2 25 21 - 32 mmol/L    Anion gap 11 5 - 15 mmol/L    Glucose 309 (H) 65 - 100 mg/dL    BUN 16 6 - 20 MG/DL    Creatinine 1.75 (H) 0.70 - 1.30 MG/DL    BUN/Creatinine ratio 9 (L) 12 - 20      GFR est AA 51 (L) >60 ml/min/1.73m2    GFR est non-AA 42 (L) >60 ml/min/1.73m2    Calcium 8.6 8.5 - 10.1 MG/DL    Bilirubin, total 0.5 0.2 - 1.0 MG/DL    ALT (SGPT) 66 12 - 78 U/L    AST (SGOT) 67 (H) 15 - 37 U/L    Alk. phosphatase 110 45 - 117 U/L    Protein, total 8.2 6.4 - 8.2 g/dL    Albumin 1.8 (L) 3.5 - 5.0 g/dL    Globulin 6.4 (H) 2.0 - 4.0 g/dL    A-G Ratio 0.3 (L) 1.1 - 2.2     LACTIC ACID, PLASMA    Collection Time: 05/27/17  4:16 PM   Result Value Ref Range    Lactic acid 2.0 0.4 - 2.0 MMOL/L   URINALYSIS W/ RFLX MICROSCOPIC    Collection Time: 05/27/17  4:16 PM   Result Value Ref Range    Color YELLOW/STRAW      Appearance CLEAR CLEAR      Specific gravity 1.020 1.003 - 1.030      pH (UA) 6.0 5.0 - 8.0      Protein >300 (A) NEG mg/dL    Glucose >1000 (A) NEG mg/dL    Ketone NEGATIVE  NEG mg/dL    Bilirubin NEGATIVE  NEG      Blood LARGE (A) NEG      Urobilinogen 1.0 0.2 - 1.0 EU/dL    Nitrites NEGATIVE  NEG      Leukocyte Esterase NEGATIVE  NEG     SED RATE (ESR)    Collection Time: 05/27/17  4:16 PM   Result Value Ref Range    Sed rate, automated 49 (H) 0 - 15 mm/hr   URINE MICROSCOPIC ONLY    Collection Time: 05/27/17  4:16 PM   Result Value Ref Range    WBC 10-20 0 - 4 /hpf    RBC 5-10 0 - 5 /hpf    Epithelial cells FEW FEW /lpf    Bacteria NEGATIVE  NEG /hpf       IMAGING RESULTS:   EXAM: XR FOOT LT AP/LAT     INDICATION: Foot pain. Previous amputation. On antibiotics. Fever.     COMPARISON: 2/24/2017     FINDINGS: Two views of the left foot demonstrate amputation of the forefoot. There is a soft tissue defect along the lateral margin of the stump, however. There has been loss of bone of the remaining proximal portion of the fifth  metatarsal. There is also fragmentation. This is new when compared to the  previous exam. Possibility of osteomyelitis involving the remnant of the fifth  metatarsal cannot be excluded. . The soft tissues are within normal limits.     IMPRESSION  IMPRESSION: Possible osteomyelitis involving the remnant of the fifth  metatarsal..                    MEDICATIONS GIVEN:  Medications   vancomycin (VANCOCIN) 3,000 mg in 0.9% sodium chloride 500 mL IVPB (3,000 mg IntraVENous New Bag 5/27/17 1756)   vancomycin (VANCOCIN) 1750 mg in  ml infusion (not administered)   piperacillin-tazobactam (ZOSYN) 3.375 g in 0.9% sodium chloride (MBP/ADV) 100 mL (not administered)   sodium chloride 0.9 % bolus infusion 500 mL (0 mL IntraVENous IV Completed 5/27/17 1742)   acetaminophen (TYLENOL) tablet 1,000 mg (1,000 mg Oral Given 5/27/17 1633)   ampicillin-sulbactam (UNASYN) 3 g in 0.9% sodium chloride (MBP/ADV) 100 mL (0 g IntraVENous IV Completed 5/27/17 1739)   ondansetron (ZOFRAN) injection 8 mg (8 mg IntraVENous Given 5/27/17 1707)   sodium chloride 0.9 % bolus infusion 3,000 mL (3,000 mL IntraVENous New Bag 5/27/17 1713)       IMPRESSION:  1. Other acute osteomyelitis of left foot ([address]        PLAN:  1. Admit to Hospitalist.    Admit Note:  5:59 PM  Pt is being admitted by Dr. Tonny Cloud. The results of their tests and reason(s) for their admission have been discussed with pt and/or available family. They convey agreement and understanding for the need to be admitted and for admission diagnosis. This note is prepared by Darvin Berry, acting as a Scribe for Ariela Medina MD.    Ariela Medina MD: The scribe's documentation has been prepared under my direction and personally reviewed by me in its entirety. I confirm that the notes above accurately reflects all work, treatment, procedures, and medical decision making performed by me.

## 2017-05-27 NOTE — PROGRESS NOTES
Bedside and Verbal shift change report given to Asia Johansen (oncoming nurse) by [facility] RN (offgoing nurse). Report included the following information SBAR, Kardex, Intake/Output, MAR and Recent Results.

## 2017-05-27 NOTE — IP AVS SNAPSHOT
Current Discharge Medication List  
  
START taking these medications Dose & Instructions Dispensing Information Comments Morning Noon Evening Bedtime L. acidoph & paracasei- S therm- Bifido 8 billion cell Cap cap Commonly known as:  EMBER-Q/RISAQUAD Your next dose is:  tomorrow Dose:  1 Cap Take 1 Cap by mouth daily. To prevent diarrhea Quantity:  21 Cap Refills:  0 CONTINUE these medications which have CHANGED Dose & Instructions Dispensing Information Comments Morning Noon Evening Bedtime  
 insulin detemir 100 unit/mL (3 mL) Inpn Commonly known as:  Dorisann Block What changed:  how much to take Your next dose is: Tonight Dose:  50 Units 50 Units by SubCUTAneous route nightly. 60 units QHS Quantity:  20 Pen Refills:  11  
     
   
   
   
  
  
 metoprolol tartrate 25 mg tablet Commonly known as:  LOPRESSOR What changed:  See the new instructions. Your next dose is: Today TAKE 0.5 TABS BY MOUTH TWO (2) TIMES A DAY. Quantity:  30 Tab Refills:  0  
     
   
   
  
   
  
 sodium chloride 0.65 % nasal spray Commonly known as:  SALINE NASAL What changed:  reasons to take this Dose:  1 Spray 1 [place] by Both Nostrils route as needed for Congestion. Quantity:  15 mL Refills:  0 CONTINUE these medications which have NOT CHANGED Dose & Instructions Dispensing Information Comments Morning Noon Evening Bedtime  
 atorvastatin 40 mg tablet Commonly known as:  LIPITOR Your next dose is:  Tomorrow Dose:  40 mg Take 1 Tab by mouth daily. Quantity:  90 Tab Refills:  3  
     
  
   
   
   
  
 esomeprazole 40 mg capsule Commonly known as:  NexIUM Your next dose is:  Tomorrow Dose:  40 mg Take 1 Cap by mouth daily. Quantity:  90 Cap Refills:  3  
     
  
   
   
   
  
 gabapentin 100 mg capsule Commonly known as:  NEURONTIN Dose:  100 mg Take 100 mg by mouth two (2) times daily as needed for Pain. Refills:  0  
     
   
   
   
  
 insulin lispro 100 unit/mL kwikpen Commonly known as:  HUMALOG Dose:  12 Units 12 Units by SubCUTAneous route Before breakfast, lunch, and dinner. DX: E11.65 Quantity:  1 Package Refills:  11  
     
   
   
   
  
 lisinopril 40 mg tablet Commonly known as:  Yazmin Camel Your next dose is:  Tomorrow Dose:  40 mg Take 1 Tab by mouth daily. Quantity:  90 Tab Refills:  3  
     
  
   
   
   
  
 metFORMIN 500 mg tablet Commonly known as:  GLUCOPHAGE Your next dose is: Today TAKE 1 TABLET BY MOUTH TWICE A DAY WITH MEALS AND INCREASE AS DIRECTED Quantity:  75 Tab Refills:  0  
     
   
   
  
   
  
 oxyCODONE-acetaminophen  mg per tablet Commonly known as:  PERCOCET 10 Dose:  1 Tab Take 1 Tab by mouth every six (6) hours as needed. Max Daily Amount: 4 Tabs. Quantity:  14 Tab Refills:  0  
     
   
   
   
  
 VITAMIN D3 1,000 unit Cap Generic drug:  cholecalciferol Your next dose is:  Tomorrow Dose:  2000 Units Take 2,000 Units by mouth daily. Refills:  0 Where to Get Your Medications These medications [facility] to [address] [place] [address] [facility] [address] [place] [address] [address]. [address] [place] [address], [address] [place] [address] Hours:  24-hours Phone:  [phone]  
  insulin detemir 100 unit/mL (3 mL) Inpn  
 metoprolol tartrate 25 mg tablet Information on where to get these meds will be given to you by the nurse or doctor. ! Ask your nurse or doctor about these medications L. acidoph & paracasei- S therm- Bifido 8 billion cell Cap cap

## 2017-05-27 NOTE — ED NOTES
Pt presents to ED for headache, \"feeling bad\" cough and foot pain x days. Pt saw Genita Butt last Thursday in office. Pt had a skin graft surgery in March per family. Pt has large wound to L. Foot, HX of diabetes. Pt has a wound appx. 8 cm x 7 cm. Pt wound has yellow tissue surrounding with pink, white black tissue noted to center oval wound. Pt alert and oriented x 4. Pt just recently went back to work.

## 2017-05-27 NOTE — ED NOTES
Blood work obtained. Pt a difficult stick had to obtain an US IV. Messaged pharmacy about antibiotics.

## 2017-05-27 NOTE — IP AVS SNAPSHOT
[address] [facility] 
[phone] Patient: Lynn Bullard MRN: [id number] :1970 You are allergic to the following Allergen Reactions Ace Inhibitors Swelling Facial swelling cough Morphine Angioedema Recent Documentation Height Weight BMI Smoking Status 1.905 m 138.6 kg 38.18 kg/m2 Former Smoker Emergency Contacts Name Discharge Info Relation Home Work Mobile Richard Rivera  Spouse [3] [phone] About your hospitalization You were admitted on:  May 27, 2017 You last received care in the:  [facility] 2 CARDIOPULMONARY CARE You were discharged on:  Jade 3, 2017 Unit phone number:  [phone] Why you were hospitalized Your primary diagnosis was:  Not on File Your diagnoses also included:  Sepsis (Hcc), Htn (Hypertension), Diabetes (Hcc), Osteomyelitis (Hcc), Arf (Acute Renal Failure) (Hcc) Providers Seen During Your Hospitalizations Provider Role Specialty Primary office phone Gary Perry. MD Cdoy Attending Provider Emergency Medicine [phone] 200 Aaron Gambino MD Attending Provider [facility] [phone] Your Primary Care Physician (PCP) Primary Care Physician Office Phone Office Fax [facility] [phone] Follow-up Information Follow up With Details Comments Contact Info [facility] [place]   [address] [facility] [address] [id number] [phone] [address] [facility] [address] 
[phone] [address] [facility] [place], [place] [address] [place] [address] 
[phone] Your Appointments 2017  4:00 PM EDT  
START OF CARE with ALESHA [address] ([address] [place] [address]) [address] ([address] [facility]) Tuesday June 06, 2017  2:30 PM EDT  
WOUND CARE FOLLOW UP with Barbara Sapp DPM  
[facility] ([address]) [address] [facility] [address] [address] [phone] Current Discharge Medication List  
  
START taking these medications Dose & Instructions Dispensing Information Comments Morning Noon Evening Bedtime L. acidoph & paracasei- S therm- Bifido 8 billion cell Cap cap Commonly known as:  EMBER-Q/RISAQUAD Your next dose is:  tomorrow Dose:  1 Cap Take 1 Cap by mouth daily. To prevent diarrhea Quantity:  21 Cap Refills:  0 CONTINUE these medications which have CHANGED Dose & Instructions Dispensing Information Comments Morning Noon Evening Bedtime  
 insulin detemir 100 unit/mL (3 mL) Inpn Commonly known as:  Waylan Emerson What changed:  how much to take Your next dose is: Tonight Dose:  50 Units 50 Units by SubCUTAneous route nightly. 60 units QHS Quantity:  20 Pen Refills:  11  
     
   
   
   
  
  
 metoprolol tartrate 25 mg tablet Commonly known as:  LOPRESSOR What changed:  See the new instructions. Your next dose is: Today TAKE 0.5 TABS BY MOUTH TWO (2) TIMES A DAY. Quantity:  30 Tab Refills:  0  
     
   
   
  
   
  
 sodium chloride 0.65 % nasal spray Commonly known as:  SALINE NASAL What changed:  reasons to take this Dose:  1 Spray 1 [place] by Both Nostrils route as needed for Congestion. Quantity:  15 mL Refills:  0 CONTINUE these medications which have NOT CHANGED Dose & Instructions Dispensing Information Comments Morning Noon Evening Bedtime  
 atorvastatin 40 mg tablet Commonly known as:  LIPITOR Your next dose is:  Tomorrow  Dose:  40 mg  
 Take 1 Tab by mouth daily. Quantity:  90 Tab Refills:  3  
     
  
   
   
   
  
 esomeprazole 40 mg capsule Commonly known as:  NexIUM Your next dose is:  Tomorrow Dose:  40 mg Take 1 Cap by mouth daily. Quantity:  90 Cap Refills:  3  
     
  
   
   
   
  
 gabapentin 100 mg capsule Commonly known as:  NEURONTIN Dose:  100 mg Take 100 mg by mouth two (2) times daily as needed for Pain. Refills:  0  
     
   
   
   
  
 insulin lispro 100 unit/mL kwikpen Commonly known as:  HUMALOG Dose:  12 Units 12 Units by SubCUTAneous route Before breakfast, lunch, and dinner. DX: E11.65 Quantity:  1 Package Refills:  11  
     
   
   
   
  
 lisinopril 40 mg tablet Commonly known as:  Yazmin Camel Your next dose is:  Tomorrow Dose:  40 mg Take 1 Tab by mouth daily. Quantity:  90 Tab Refills:  3  
     
  
   
   
   
  
 metFORMIN 500 mg tablet Commonly known as:  GLUCOPHAGE Your next dose is: Today TAKE 1 TABLET BY MOUTH TWICE A DAY WITH MEALS AND INCREASE AS DIRECTED Quantity:  75 Tab Refills:  0  
     
   
   
  
   
  
 oxyCODONE-acetaminophen  mg per tablet Commonly known as:  PERCOCET 10 Dose:  1 Tab Take 1 Tab by mouth every six (6) hours as needed. Max Daily Amount: 4 Tabs. Quantity:  14 Tab Refills:  0  
     
   
   
   
  
 VITAMIN D3 1,000 unit Cap Generic drug:  cholecalciferol Your next dose is:  Tomorrow Dose:  2000 Units Take 2,000 Units by mouth daily. Refills:  0 Where to Get Your Medications These medications were sent to [id number] [place] [address] S. [address] [place] [address]. [address] [place] [address], [address] [place] [address] Hours:  24-hours Phone:  [phone]  
  insulin detemir 100 unit/mL (3 mL) Inpn  
 metoprolol tartrate 25 mg tablet Information on where to get these meds will be given to you by the nurse or doctor. ! Ask your nurse or doctor about these medications L. acidoph & paracasei- S therm- Bifido 8 billion cell Cap cap Discharge Instructions PATIENT DISCHARGE INSTRUCTIONS PATIENT DISCHARGE INSTRUCTIONS Bonny Saldaña / [id number] : 1970 Admitted 2017 Discharged: 6/3/2017 · It is important that you take the medication exactly as they are prescribed. · Keep your medication in the bottles provided by the pharmacist and keep a list of the medication names, dosages, and times to be taken in your wallet. · Do not take other medications without consulting your doctor. What to do at [facility] Recommended Diet: Regular Diet Recommended Activity: Activity as tolerated If you experience any of the following symptoms fever, chills, please follow up with Dr. Scar Patterson. Signed By: Ruthy Powell MD   
 Jade 3, 2017 General Daily Progress Note Admit Date: 2017 Hospital day several 
 
Subjective:  
 
Patient has no complaint of foot pain. Rosa Cheek Medication side effects: none Current Facility-Administered Medications Medication Dose Route Frequency  insulin glargine (LANTUS) injection 50 Units  50 Units SubCUTAneous QHS  lactobac ac& pc-s.therm-b.anim (EMBER Q/RISAQUAD)  1 Cap Oral DAILY  cefTARoline (TEFLARO) 600 mg in 0.9% sodium chloride (MBP/ADV) 50 mL  600 mg IntraVENous Q12H  
 sodium chloride (NS) flush 10-30 mL  10-30 mL InterCATHeter PRN  
 sodium chloride (NS) flush 10 mL  10 mL InterCATHeter Q24H  
 sodium chloride (NS) flush 10 mL  10 mL InterCATHeter PRN  
 sodium chloride (NS) flush 10-40 mL  10-40 mL InterCATHeter Q8H  
 heparin (porcine) pf 300 Units  300 Units InterCATHeter PRN  
 ALPRAZolam (XANAX) tablet 0.25 mg  0.25 mg Oral BID PRN  
 atorvastatin (LIPITOR) tablet 40 mg  40 mg Oral DAILY  pantoprazole (PROTONIX) tablet 40 mg  40 mg Oral ACB  gabapentin (NEURONTIN) capsule 100 mg  100 mg Oral BID PRN  
 metoprolol tartrate (LOPRESSOR) tablet 12.5 mg  12.5 mg Oral BID  acetaminophen (TYLENOL) tablet 650 mg  650 mg Oral Q4H PRN  
 oxyCODONE IR (ROXICODONE) tablet 10 mg  10 mg Oral Q4H PRN  
 HYDROmorphone (PF) (DILAUDID) injection 1 mg  1 mg IntraVENous Q6H PRN  
 ondansetron (ZOFRAN) injection 4 mg  4 mg IntraVENous Q6H PRN  
 insulin lispro (HUMALOG) injection 10 Units  10 Units SubCUTAneous TIDAC  insulin lispro (HUMALOG) injection   SubCUTAneous QID AFTER MEALS  glucose chewable tablet 16 g  4 Tab Oral PRN  
 glucagon (GLUCAGEN) injection 1 mg  1 mg IntraMUSCular PRN  
 hydrALAZINE (APRESOLINE) 20 mg/mL injection 10 mg  10 mg IntraVENous Q6H PRN  
 dextrose 10 % infusion 125-250 mL  125-250 mL IntraVENous PRN  
 zolpidem (AMBIEN) tablet 5 mg  5 mg Oral QHS PRN Review of Systems Pertinent items are noted in HPI. Objective:  
 
Patient Vitals for the past 8 hrs: 
 BP Temp Pulse Resp SpO2  
06/03/17 0848 (!) 175/110 98.5 °F (36.9 °C) 95 18 94 % 06/03/17 0445 (!) 156/94 - - - 100 % 06/03/17 0310 (!) 187/107 99.5 °F (37.5 °C) 87 20 96 % 06/01 1901 - 06/03 0700 In: 69176 [I.V.:46830] Out: - Physical Exam:  
Visit Vitals  BP (!) 175/110 (BP 1 Location: Right arm, BP Patient Position: At rest) Comment: will notify RN  
 Pulse 95  Temp 98.5 °F (36.9 °C)  Resp 18  Ht 6' 3\" (1.905 m)  Wt 305 lb 8 oz (138.6 kg)  SpO2 94%  BMI 38.18 kg/m2 Lungs: clear to auscultation bilaterally Heart: regular rate and rhythm, S1, S2 normal, no murmur, click, rub or gallop Abdomen: soft, non-tender. Bowel sounds normal. No masses,  no organomegaly Extremities: short leg cast on L foot ECG: normal sinus rhythm Data Review Recent Results (from the past 24 hour(s)) GLUCOSE, POC  Collection Time: 06/02/17 10:13 AM  
 Result Value Ref Range Glucose (POC) 72 65 - 100 mg/dL Performed by Nisa Nguyễn GLUCOSE, POC Collection Time: 06/02/17 10:38 AM  
Result Value Ref Range Glucose (POC) 70 65 - 100 mg/dL Performed by Amadeo Seaman GLUCOSE, POC Collection Time: 06/02/17 11:15 AM  
Result Value Ref Range Glucose (POC) 126 (H) 65 - 100 mg/dL Performed by Addie Blood (PCT) GLUCOSE, POC Collection Time: 06/02/17  4:53 PM  
Result Value Ref Range Glucose (POC) 170 (H) 65 - 100 mg/dL Performed by Addie Blood (PCT) GLUCOSE, POC Collection Time: 06/02/17  9:22 PM  
Result Value Ref Range Glucose (POC) 81 65 - 100 mg/dL Performed by [employer] METABOLIC PANEL, BASIC Collection Time: 06/03/17  3:15 AM  
Result Value Ref Range Sodium 139 136 - 145 mmol/L Potassium 3.3 (L) 3.5 - 5.1 mmol/L Chloride 107 97 - 108 mmol/L  
 CO2 25 21 - 32 mmol/L Anion gap 7 5 - 15 mmol/L Glucose 98 65 - 100 mg/dL BUN 7 6 - 20 MG/DL Creatinine 1.22 0.70 - 1.30 MG/DL  
 BUN/Creatinine ratio 6 (L) 12 - 20 GFR est AA >60 >60 ml/min/1.73m2 GFR est non-AA >60 >60 ml/min/1.73m2 Calcium 8.3 (L) 8.5 - 10.1 MG/DL  
GLUCOSE, POC Collection Time: 06/03/17  9:05 AM  
Result Value Ref Range Glucose (POC) 106 (H) 65 - 100 mg/dL Performed by Tio Hassan Assessment:  
 
Active Problems: 
  HTN (hypertension) (7/23/2013) Diabetes ([address] () Overview: since age 23 Osteomyelitis ([address] (2/24/2017) Sepsis ([address] (5/27/2017) ARF (acute renal failure) ([address] (5/27/2017) Plan: 1. Diabetic foot infection. Dc home today on home iv antibiotics. Discharge Orders None [facility] Announcement We are excited to announce that we are making your provider's discharge notes available to you in BIBA Apparelshart.   You will see these notes when they are completed and signed by the physician that discharged you from your recent hospital stay. If you have any questions or concerns about any information you see in [employer]uation App, please call the Health Information Department where you were seen or reach out to your Primary Care Provider for more information about your plan of care. Introducing [facility]! Dear Neva Saldana: Thank you for requesting a [employer]uation App account. Our records indicate that you already have an active [employer]uation App account. You can access your account anytime at [email or web address] Did you know that you can access your hospital and ER discharge instructions at any time in [employer]uation App? You can also review all of your test results from your hospital stay or ER visit. Additional Information If you have questions, please visit the Frequently Asked Questions section of the [employer]uation App website at [email or web address] Remember, [employer]uation App is NOT to be used for urgent needs. For medical emergencies, dial 911. Now available from your iPhone and Android! General Information Please provide this summary of care documentation to your next provider. Patient Signature:  ____________________________________________________________ Date:  ____________________________________________________________  
  
Crystal Clive Provider Signature:  ____________________________________________________________ Date:  ____________________________________________________________

## 2017-05-28 ENCOUNTER — ANESTHESIA (OUTPATIENT)
Dept: SURGERY | Age: 47
DRG: 854 | End: 2017-05-28
Payer: COMMERCIAL

## 2017-05-28 ENCOUNTER — ANESTHESIA EVENT (OUTPATIENT)
Dept: SURGERY | Age: 47
DRG: 854 | End: 2017-05-28
Payer: COMMERCIAL

## 2017-05-28 ENCOUNTER — APPOINTMENT (OUTPATIENT)
Dept: GENERAL RADIOLOGY | Age: 47
DRG: 854 | End: 2017-05-28
Attending: ANESTHESIOLOGY
Payer: COMMERCIAL

## 2017-05-28 LAB
ALBUMIN SERPL BCP-MCNC: 1.4 G/DL (ref 3.5–5)
ALBUMIN/GLOB SERPL: 0.3 {RATIO} (ref 1.1–2.2)
ALP SERPL-CCNC: 117 U/L (ref 45–117)
ALT SERPL-CCNC: 55 U/L (ref 12–78)
ANION GAP BLD CALC-SCNC: 7 MMOL/L (ref 5–15)
AST SERPL W P-5'-P-CCNC: 57 U/L (ref 15–37)
BASOPHILS # BLD AUTO: 0 K/UL (ref 0–0.1)
BASOPHILS # BLD: 0 % (ref 0–1)
BILIRUB SERPL-MCNC: 0.6 MG/DL (ref 0.2–1)
BUN SERPL-MCNC: 14 MG/DL (ref 6–20)
BUN/CREAT SERPL: 9 (ref 12–20)
CALCIUM SERPL-MCNC: 7.7 MG/DL (ref 8.5–10.1)
CHLORIDE SERPL-SCNC: 104 MMOL/L (ref 97–108)
CO2 SERPL-SCNC: 25 MMOL/L (ref 21–32)
CREAT SERPL-MCNC: 1.51 MG/DL (ref 0.7–1.3)
DIFFERENTIAL METHOD BLD: ABNORMAL
EOSINOPHIL # BLD: 0 K/UL (ref 0–0.4)
EOSINOPHIL NFR BLD: 0 % (ref 0–7)
ERYTHROCYTE [DISTWIDTH] IN BLOOD BY AUTOMATED COUNT: 15 % (ref 11.5–14.5)
EST. AVERAGE GLUCOSE BLD GHB EST-MCNC: 312 MG/DL
GLOBULIN SER CALC-MCNC: 5.6 G/DL (ref 2–4)
GLUCOSE BLD STRIP.AUTO-MCNC: 114 MG/DL (ref 65–100)
GLUCOSE BLD STRIP.AUTO-MCNC: 117 MG/DL (ref 65–100)
GLUCOSE BLD STRIP.AUTO-MCNC: 130 MG/DL (ref 65–100)
GLUCOSE BLD STRIP.AUTO-MCNC: 235 MG/DL (ref 65–100)
GLUCOSE SERPL-MCNC: 241 MG/DL (ref 65–100)
HBA1C MFR BLD: 12.5 % (ref 4.2–6.3)
HCT VFR BLD AUTO: 29 % (ref 36.6–50.3)
HGB BLD-MCNC: 9.1 G/DL (ref 12.1–17)
LYMPHOCYTES # BLD AUTO: 8 % (ref 12–49)
LYMPHOCYTES # BLD: 1.1 K/UL (ref 0.8–3.5)
MAGNESIUM SERPL-MCNC: 1.7 MG/DL (ref 1.6–2.4)
MCH RBC QN AUTO: 23.2 PG (ref 26–34)
MCHC RBC AUTO-ENTMCNC: 31.4 G/DL (ref 30–36.5)
MCV RBC AUTO: 73.8 FL (ref 80–99)
MONOCYTES # BLD: 0.8 K/UL (ref 0–1)
MONOCYTES NFR BLD AUTO: 6 % (ref 5–13)
NEUTS SEG # BLD: 12.1 K/UL (ref 1.8–8)
NEUTS SEG NFR BLD AUTO: 86 % (ref 32–75)
PLATELET # BLD AUTO: 271 K/UL (ref 150–400)
POTASSIUM SERPL-SCNC: 3.6 MMOL/L (ref 3.5–5.1)
PROT SERPL-MCNC: 7 G/DL (ref 6.4–8.2)
RBC # BLD AUTO: 3.93 M/UL (ref 4.1–5.7)
RBC MORPH BLD: ABNORMAL
RBC MORPH BLD: ABNORMAL
SERVICE CMNT-IMP: ABNORMAL
SODIUM SERPL-SCNC: 136 MMOL/L (ref 136–145)
WBC # BLD AUTO: 14 K/UL (ref 4.1–11.1)

## 2017-05-28 PROCEDURE — 87205 SMEAR GRAM STAIN: CPT | Performed by: FAMILY MEDICINE

## 2017-05-28 PROCEDURE — 77030018836 HC SOL IRR NACL ICUM -A: Performed by: PODIATRIST

## 2017-05-28 PROCEDURE — 77030013079 HC BLNKT BAIR HGGR 3M -A: Performed by: ANESTHESIOLOGY

## 2017-05-28 PROCEDURE — 74011250636 HC RX REV CODE- 250/636

## 2017-05-28 PROCEDURE — 36415 COLL VENOUS BLD VENIPUNCTURE: CPT | Performed by: INTERNAL MEDICINE

## 2017-05-28 PROCEDURE — 85025 COMPLETE CBC W/AUTO DIFF WBC: CPT | Performed by: INTERNAL MEDICINE

## 2017-05-28 PROCEDURE — 74011000250 HC RX REV CODE- 250: Performed by: PODIATRIST

## 2017-05-28 PROCEDURE — 88311 DECALCIFY TISSUE: CPT | Performed by: PODIATRIST

## 2017-05-28 PROCEDURE — 77030011640 HC PAD GRND REM COVD -A: Performed by: PODIATRIST

## 2017-05-28 PROCEDURE — 74011636637 HC RX REV CODE- 636/637: Performed by: INTERNAL MEDICINE

## 2017-05-28 PROCEDURE — 74011250637 HC RX REV CODE- 250/637: Performed by: INTERNAL MEDICINE

## 2017-05-28 PROCEDURE — 74011250636 HC RX REV CODE- 250/636: Performed by: EMERGENCY MEDICINE

## 2017-05-28 PROCEDURE — 0QBP0ZZ EXCISION OF LEFT METATARSAL, OPEN APPROACH: ICD-10-PCS | Performed by: PODIATRIST

## 2017-05-28 PROCEDURE — 74011250636 HC RX REV CODE- 250/636: Performed by: ANESTHESIOLOGY

## 2017-05-28 PROCEDURE — 36569 INSJ PICC 5 YR+ W/O IMAGING: CPT | Performed by: FAMILY MEDICINE

## 2017-05-28 PROCEDURE — 87077 CULTURE AEROBIC IDENTIFY: CPT | Performed by: FAMILY MEDICINE

## 2017-05-28 PROCEDURE — 74011250637 HC RX REV CODE- 250/637: Performed by: FAMILY MEDICINE

## 2017-05-28 PROCEDURE — 74011250636 HC RX REV CODE- 250/636: Performed by: INTERNAL MEDICINE

## 2017-05-28 PROCEDURE — 65660000000 HC RM CCU STEPDOWN

## 2017-05-28 PROCEDURE — 74011000258 HC RX REV CODE- 258: Performed by: INTERNAL MEDICINE

## 2017-05-28 PROCEDURE — 87075 CULTR BACTERIA EXCEPT BLOOD: CPT | Performed by: FAMILY MEDICINE

## 2017-05-28 PROCEDURE — 88307 TISSUE EXAM BY PATHOLOGIST: CPT | Performed by: PODIATRIST

## 2017-05-28 PROCEDURE — 80053 COMPREHEN METABOLIC PANEL: CPT | Performed by: INTERNAL MEDICINE

## 2017-05-28 PROCEDURE — 83036 HEMOGLOBIN GLYCOSYLATED A1C: CPT | Performed by: INTERNAL MEDICINE

## 2017-05-28 PROCEDURE — 71010 XR CHEST PORT: CPT

## 2017-05-28 PROCEDURE — 74011250636 HC RX REV CODE- 250/636: Performed by: PODIATRIST

## 2017-05-28 PROCEDURE — 76010000138 HC OR TIME 0.5 TO 1 HR: Performed by: PODIATRIST

## 2017-05-28 PROCEDURE — 87186 SC STD MICRODIL/AGAR DIL: CPT | Performed by: FAMILY MEDICINE

## 2017-05-28 PROCEDURE — 83735 ASSAY OF MAGNESIUM: CPT | Performed by: INTERNAL MEDICINE

## 2017-05-28 PROCEDURE — 76210000006 HC OR PH I REC 0.5 TO 1 HR: Performed by: PODIATRIST

## 2017-05-28 PROCEDURE — C1751 CATH, INF, PER/CENT/MIDLINE: HCPCS | Performed by: ANESTHESIOLOGY

## 2017-05-28 PROCEDURE — 87181 SC STD AGAR DILUTION PER AGT: CPT | Performed by: FAMILY MEDICINE

## 2017-05-28 PROCEDURE — 93005 ELECTROCARDIOGRAM TRACING: CPT

## 2017-05-28 PROCEDURE — 76060000032 HC ANESTHESIA 0.5 TO 1 HR: Performed by: PODIATRIST

## 2017-05-28 PROCEDURE — 82962 GLUCOSE BLOOD TEST: CPT

## 2017-05-28 RX ORDER — SODIUM CHLORIDE, SODIUM LACTATE, POTASSIUM CHLORIDE, CALCIUM CHLORIDE 600; 310; 30; 20 MG/100ML; MG/100ML; MG/100ML; MG/100ML
25 INJECTION, SOLUTION INTRAVENOUS CONTINUOUS
Status: DISCONTINUED | OUTPATIENT
Start: 2017-05-28 | End: 2017-05-28 | Stop reason: HOSPADM

## 2017-05-28 RX ORDER — FENTANYL CITRATE 50 UG/ML
25 INJECTION, SOLUTION INTRAMUSCULAR; INTRAVENOUS
Status: CANCELLED | OUTPATIENT
Start: 2017-05-28

## 2017-05-28 RX ORDER — HYDROMORPHONE HYDROCHLORIDE 1 MG/ML
.2-.5 INJECTION, SOLUTION INTRAMUSCULAR; INTRAVENOUS; SUBCUTANEOUS
Status: CANCELLED | OUTPATIENT
Start: 2017-05-28

## 2017-05-28 RX ORDER — FENTANYL CITRATE 50 UG/ML
50 INJECTION, SOLUTION INTRAMUSCULAR; INTRAVENOUS AS NEEDED
Status: DISCONTINUED | OUTPATIENT
Start: 2017-05-28 | End: 2017-05-28 | Stop reason: HOSPADM

## 2017-05-28 RX ORDER — MIDAZOLAM HYDROCHLORIDE 1 MG/ML
1 INJECTION, SOLUTION INTRAMUSCULAR; INTRAVENOUS AS NEEDED
Status: DISCONTINUED | OUTPATIENT
Start: 2017-05-28 | End: 2017-05-28 | Stop reason: HOSPADM

## 2017-05-28 RX ORDER — ONDANSETRON 2 MG/ML
4 INJECTION INTRAMUSCULAR; INTRAVENOUS AS NEEDED
Status: CANCELLED | OUTPATIENT
Start: 2017-05-28

## 2017-05-28 RX ORDER — ONDANSETRON 2 MG/ML
INJECTION INTRAMUSCULAR; INTRAVENOUS AS NEEDED
Status: DISCONTINUED | OUTPATIENT
Start: 2017-05-28 | End: 2017-05-28 | Stop reason: HOSPADM

## 2017-05-28 RX ORDER — BUPIVACAINE HYDROCHLORIDE 5 MG/ML
INJECTION, SOLUTION EPIDURAL; INTRACAUDAL AS NEEDED
Status: DISCONTINUED | OUTPATIENT
Start: 2017-05-28 | End: 2017-05-28 | Stop reason: HOSPADM

## 2017-05-28 RX ORDER — LIDOCAINE HYDROCHLORIDE 10 MG/ML
0.1 INJECTION, SOLUTION EPIDURAL; INFILTRATION; INTRACAUDAL; PERINEURAL AS NEEDED
Status: DISCONTINUED | OUTPATIENT
Start: 2017-05-28 | End: 2017-05-28 | Stop reason: HOSPADM

## 2017-05-28 RX ORDER — FENTANYL CITRATE 50 UG/ML
INJECTION, SOLUTION INTRAMUSCULAR; INTRAVENOUS AS NEEDED
Status: DISCONTINUED | OUTPATIENT
Start: 2017-05-28 | End: 2017-05-28 | Stop reason: HOSPADM

## 2017-05-28 RX ORDER — MIDAZOLAM HYDROCHLORIDE 1 MG/ML
INJECTION, SOLUTION INTRAMUSCULAR; INTRAVENOUS AS NEEDED
Status: DISCONTINUED | OUTPATIENT
Start: 2017-05-28 | End: 2017-05-28 | Stop reason: HOSPADM

## 2017-05-28 RX ORDER — ACETAMINOPHEN 10 MG/ML
INJECTION, SOLUTION INTRAVENOUS AS NEEDED
Status: DISCONTINUED | OUTPATIENT
Start: 2017-05-28 | End: 2017-05-28 | Stop reason: HOSPADM

## 2017-05-28 RX ORDER — ALPRAZOLAM 0.25 MG/1
0.25 TABLET ORAL
Status: DISCONTINUED | OUTPATIENT
Start: 2017-05-28 | End: 2017-06-03 | Stop reason: HOSPADM

## 2017-05-28 RX ORDER — DIPHENHYDRAMINE HYDROCHLORIDE 50 MG/ML
12.5 INJECTION, SOLUTION INTRAMUSCULAR; INTRAVENOUS AS NEEDED
Status: CANCELLED | OUTPATIENT
Start: 2017-05-28 | End: 2017-05-28

## 2017-05-28 RX ORDER — ACETAMINOPHEN 10 MG/ML
1000 INJECTION, SOLUTION INTRAVENOUS ONCE
Status: COMPLETED | OUTPATIENT
Start: 2017-05-28 | End: 2017-05-28

## 2017-05-28 RX ORDER — PROPOFOL 10 MG/ML
INJECTION, EMULSION INTRAVENOUS
Status: DISCONTINUED | OUTPATIENT
Start: 2017-05-28 | End: 2017-05-28 | Stop reason: HOSPADM

## 2017-05-28 RX ORDER — SODIUM CHLORIDE, SODIUM LACTATE, POTASSIUM CHLORIDE, CALCIUM CHLORIDE 600; 310; 30; 20 MG/100ML; MG/100ML; MG/100ML; MG/100ML
25 INJECTION, SOLUTION INTRAVENOUS CONTINUOUS
Status: CANCELLED | OUTPATIENT
Start: 2017-05-28

## 2017-05-28 RX ADMIN — ONDANSETRON 4 MG: 2 INJECTION INTRAMUSCULAR; INTRAVENOUS at 11:58

## 2017-05-28 RX ADMIN — SODIUM CHLORIDE 100 ML/HR: 900 INJECTION, SOLUTION INTRAVENOUS at 13:52

## 2017-05-28 RX ADMIN — ATORVASTATIN CALCIUM 40 MG: 40 TABLET, FILM COATED ORAL at 09:48

## 2017-05-28 RX ADMIN — FENTANYL CITRATE 50 MCG: 50 INJECTION, SOLUTION INTRAMUSCULAR; INTRAVENOUS at 11:53

## 2017-05-28 RX ADMIN — ACETAMINOPHEN 650 MG: 325 TABLET, FILM COATED ORAL at 01:08

## 2017-05-28 RX ADMIN — INSULIN GLARGINE 60 UNITS: 100 INJECTION, SOLUTION SUBCUTANEOUS at 21:26

## 2017-05-28 RX ADMIN — METOPROLOL TARTRATE 12.5 MG: 25 TABLET ORAL at 09:48

## 2017-05-28 RX ADMIN — FENTANYL CITRATE 50 MCG: 50 INJECTION, SOLUTION INTRAMUSCULAR; INTRAVENOUS at 11:57

## 2017-05-28 RX ADMIN — ACETAMINOPHEN 1000 MG: 10 INJECTION, SOLUTION INTRAVENOUS at 11:56

## 2017-05-28 RX ADMIN — ZOLPIDEM TARTRATE 5 MG: 5 TABLET ORAL at 21:28

## 2017-05-28 RX ADMIN — PROPOFOL 100 MCG/KG/MIN: 10 INJECTION, EMULSION INTRAVENOUS at 11:53

## 2017-05-28 RX ADMIN — METOPROLOL TARTRATE 12.5 MG: 25 TABLET ORAL at 17:11

## 2017-05-28 RX ADMIN — INSULIN LISPRO 10 UNITS: 100 INJECTION, SOLUTION INTRAVENOUS; SUBCUTANEOUS at 17:11

## 2017-05-28 RX ADMIN — VANCOMYCIN HYDROCHLORIDE 1750 MG: 10 INJECTION, POWDER, LYOPHILIZED, FOR SOLUTION INTRAVENOUS at 11:43

## 2017-05-28 RX ADMIN — INSULIN LISPRO 10 UNITS: 100 INJECTION, SOLUTION INTRAVENOUS; SUBCUTANEOUS at 07:30

## 2017-05-28 RX ADMIN — SODIUM CHLORIDE 100 ML/HR: 900 INJECTION, SOLUTION INTRAVENOUS at 05:42

## 2017-05-28 RX ADMIN — PIPERACILLIN SODIUM,TAZOBACTAM SODIUM 3.38 G: 3; .375 INJECTION, POWDER, FOR SOLUTION INTRAVENOUS at 05:41

## 2017-05-28 RX ADMIN — ACETAMINOPHEN 650 MG: 325 TABLET, FILM COATED ORAL at 05:40

## 2017-05-28 RX ADMIN — PANTOPRAZOLE SODIUM 40 MG: 40 TABLET, DELAYED RELEASE ORAL at 09:48

## 2017-05-28 RX ADMIN — ACETAMINOPHEN 650 MG: 325 TABLET, FILM COATED ORAL at 20:16

## 2017-05-28 RX ADMIN — ACETAMINOPHEN 1000 MG: 10 INJECTION, SOLUTION INTRAVENOUS at 11:44

## 2017-05-28 RX ADMIN — PIPERACILLIN SODIUM,TAZOBACTAM SODIUM 3.38 G: 3; .375 INJECTION, POWDER, FOR SOLUTION INTRAVENOUS at 21:23

## 2017-05-28 RX ADMIN — MIDAZOLAM HYDROCHLORIDE 2 MG: 1 INJECTION, SOLUTION INTRAMUSCULAR; INTRAVENOUS at 11:46

## 2017-05-28 RX ADMIN — SODIUM CHLORIDE, SODIUM LACTATE, POTASSIUM CHLORIDE, AND CALCIUM CHLORIDE 25 ML/HR: 600; 310; 30; 20 INJECTION, SOLUTION INTRAVENOUS at 11:44

## 2017-05-28 RX ADMIN — INSULIN LISPRO 3 UNITS: 100 INJECTION, SOLUTION INTRAVENOUS; SUBCUTANEOUS at 09:00

## 2017-05-28 RX ADMIN — PIPERACILLIN SODIUM,TAZOBACTAM SODIUM 3.38 G: 3; .375 INJECTION, POWDER, FOR SOLUTION INTRAVENOUS at 15:05

## 2017-05-28 NOTE — PERIOP NOTES
10:24 TRANSFER - IN REPORT:    Verbal report received from [facility]  on Sammie Jenkins  being received from 2209(unit) for ordered procedure      Report consisted of patients Situation, Background, Assessment and   Recommendations(SBAR). Information from the following report(s) SBAR and MAR was reviewed with the receiving nurse. Opportunity for questions and clarification was provided. Assessment completed upon patients arrival to unit and care assumed.

## 2017-05-28 NOTE — PERIOP NOTES
Called Dr. Blane Graham and clarified if patient could return back to room 2209. Per Dr. Blane Graham, \"It is ok to leave him in 2209. \"

## 2017-05-28 NOTE — PERIOP NOTES
Wound to right lower leg- POA. Per patient \"I hit my leg at work about 3 weeks ago. \"  Wound care consult placed per protocol.

## 2017-05-28 NOTE — PERIOP NOTES
Dr. Meir Rosales made aware of post central line insertion cxr results:    INDICATION: . central line insertion  COMPARISON: None. LIMITATIONS: Portable technique. Mei Altamirano FINDINGS: Single frontal view of the chest.   .  Lines/tubes/surgical: Right subclavian approach catheter which projects to  terminate in the mid SVC. Heart/mediastinum: Unremarkable. Lungs/pleura: Low lung volumes without visible focal consolidation. No  visualized pleural effusion or pneumothorax. Additional Comments: None. Mei Altamirano IMPRESSION  IMPRESSION:  1. Right subclavian approach catheter projects to terminate in the mid SVC.

## 2017-05-28 NOTE — PERIOP NOTES
11:25  Consent for central line insertion obtained & placed with chart. Time out performed, see flow chart for assessment & vitals. Patient tolerated procedure well. Portable cxr ordered, results pending.

## 2017-05-28 NOTE — CONSULTS
[address]       Name:  Kalyn Garcia   MR#:  [id number]   :  1970   Account #:  [de-identified]    Date of Consultation:  2017   Date of Adm:  2017       CONSULTING PHYSICIAN: Chase Mueller DPM    REFERRING PHYSICIAN: Alayna Rashid MD    REASON FOR CONSULTATION: Evaluation and treatment of left foot   abscess cellulitis in a patient with sepsis. HISTORY OF PRESENT ILLNESS: The patient is a pleasant 52-year-  old  male with past medical history that includes   diabetes mellitus, hypertension, multiple foot surgeries, left   transmetatarsal amputation, who presents to the [facility]   ED with worsening pain, swelling, redness, fever, sweats, and a   headache for 3 days. He relates that he last saw Dr. Sallie Valladares about   a week ago and that his foot was doing well. He relates that last   week he ran out of antibiotics and was unable to obtain an additional   prescription. Subsequently on Thursday, he started to have these   constitutional symptoms and eventually on Saturday came to the   emergency department. PAST MEDICAL HISTORY: As above. SOCIAL HISTORY: Positive for being a former smoker. Positive for   alcohol use. ALLERGIES: MORPHINE. MEDICATIONS   1. Lipitor 40 mg.   2. Vitamin D3. 3. Nexium 40 mg.   4. Neurontin 100 mg.   5. Levemir insulin. 6. Humalog insulin. 7. Prinivil 40 mg.   8. Metformin 500 mg 1 p.o. b.i.d.   9. Lopressor 25 mg half tab by mouth 2 times daily. 10. Percocet q.6h. p.r.n. pain. REVIEW OF SYSTEMS: Positive for fever, chills, sweats, dry cough. PHYSICAL EXAMINATION   VITAL SIGNS: Oral temperature 102.4, pulse 101, blood pressure   100/83, respiratory rate 20, pulse oximetry 95% on room air. GENERAL: The patient is awake, alert, and oriented, actively   diaphoretic.    EXTREMITIES: Examination of his left lower extremity reveals marked   edema with calor and a large neuropathic plantar lateral foot ulceration   with serosanguineous-type drainage, moderate malodor and probing to   deep soft tissues. Radiographic studies show probable osteomyelitis of the remaining fifth   metatarsal bone. The patient's white blood cell count is 14, down from   22.3 upon admission with a left shift with a neutrophil count of 86. Sedimentation rate of 49. ASSESSMENT AND PLAN: Left foot cellulitis in a patient with diabetes   mellitus, extensive peripheral neuropathy, and previous foot   amputation with probable underlying osteomyelitis and clinically signs   of sepsis. I spoke with the patient today about the severity of his   infection. I explained to him the need for incision and drainage and   debridement of bone from the left foot due to the severity of this   infection and the significant clinical signs. The patient understands. He has been made n.p.o. and consented. The operating room has been   called with plans for surgery today at noon. Thank you for this consultation. Thank you for allowing me   to participate in the care of the patient.         WIL Reyes / DAVID   D:  05/28/2017   07:48   T:  05/28/2017   09:56   Job #:  [id number]

## 2017-05-28 NOTE — ANESTHESIA PROCEDURE NOTES
Central Line Placement    Start time: 5/28/2017 11:29 AM  End time: 5/28/2017 11:39 AM  Performed by: Mirtha Liu by: Carlie Newsome     Indications: need for vasopressors and vascular access  Preanesthetic Checklist: patient identified, risks and benefits discussed, anesthesia consent, site marked, patient being monitored and timeout performed      Pre-procedure: All elements of maximal sterile barrier technique followed? Yes    Maximal barrier precautions followed, 2% Chlorhexidine for cutaneous antisepsis and Hand hygiene performed prior to catheter insertion            Procedure:   Prep:  ChloraPrep  Location:  Subclavian    Patient position:  Trendelenburg  Catheter type:  Triple lumen  Catheter size:  8 Fr  Catheter length:  16 cm  Number of attempts:  1  Successful placement: Yes      Assessment:   Post-procedure:  Catheter secured and sterile dressing applied  Assessment:  Blood return through all ports  Insertion:  Uncomplicated  Patient tolerance:  Patient tolerated the procedure well with no immediate complications  CXR pending.     Care turned over to covering Attending MD.

## 2017-05-28 NOTE — PROGRESS NOTES
General Daily Progress Note    Admit Date: 5/27/2017  Hospital day 2    Subjective:     Patient has no complaint of n/v/cp/ wife at bedside.  S/w nursing she is having difficulty obtaining an IV line no other issues   Medication side effects: none    Current Facility-Administered Medications   Medication Dose Route Frequency    vancomycin (VANCOCIN) 1750 mg in  ml infusion  1,750 mg IntraVENous Q16H    piperacillin-tazobactam (ZOSYN) 3.375 g in 0.9% sodium chloride (MBP/ADV) 100 mL  3.375 g IntraVENous Q8H    atorvastatin (LIPITOR) tablet 40 mg  40 mg Oral DAILY    pantoprazole (PROTONIX) tablet 40 mg  40 mg Oral ACB    gabapentin (NEURONTIN) capsule 100 mg  100 mg Oral BID PRN    metoprolol tartrate (LOPRESSOR) tablet 12.5 mg  12.5 mg Oral BID    0.9% sodium chloride infusion  100 mL/hr IntraVENous CONTINUOUS    acetaminophen (TYLENOL) tablet 650 mg  650 mg Oral Q4H PRN    oxyCODONE IR (ROXICODONE) tablet 10 mg  10 mg Oral Q4H PRN    HYDROmorphone (PF) (DILAUDID) injection 1 mg  1 mg IntraVENous Q6H PRN    ondansetron (ZOFRAN) injection 4 mg  4 mg IntraVENous Q6H PRN    insulin glargine (LANTUS) injection 60 Units  60 Units SubCUTAneous QHS    insulin lispro (HUMALOG) injection 10 Units  10 Units SubCUTAneous TIDAC    insulin lispro (HUMALOG) injection   SubCUTAneous QID AFTER MEALS    glucose chewable tablet 16 g  4 Tab Oral PRN    glucagon (GLUCAGEN) injection 1 mg  1 mg IntraMUSCular PRN    hydrALAZINE (APRESOLINE) 20 mg/mL injection 10 mg  10 mg IntraVENous Q6H PRN    dextrose 10 % infusion 125-250 mL  125-250 mL IntraVENous PRN    zolpidem (AMBIEN) tablet 5 mg  5 mg Oral QHS PRN        Review of Systems  A comprehensive review of systems was negative except for: Musculoskeletal: positive for bone pain    Objective:     Patient Vitals for the past 8 hrs:   BP Temp Pulse Resp SpO2   05/28/17 0752 124/69 99.4 °F (37.4 °C) 100 20 100 %   05/28/17 0642 - (!) 102.4 °F (39.1 °C) - - - 05/28/17 0540 - (!) 103.2 °F (39.6 °C) - - -   05/28/17 0308 100/83 (!) 102.4 °F (39.1 °C) (!) 101 20 95 %        05/26 1901 - 05/28 0700  In: -   Out: 1200 [Urine:1200]    Physical Exam: General appearance: alert, fatigued, cooperative, no distress, appears stated age  Lungs: clear to auscultation bilaterally  Heart: regular rate and rhythm  Abdomen: soft, non-tender. Bowel sounds normal. No masses,  no organomegaly  Extremities: full rom left foot bandaged did not remove per pt Podiatrist looked at foot this am   Neurologic: Grossly normal           Data Review   Recent Results (from the past 24 hour(s))   CBC WITH AUTOMATED DIFF    Collection Time: 05/27/17  4:16 PM   Result Value Ref Range    WBC 22.3 (H) 4.1 - 11.1 K/uL    RBC 4.33 4.10 - 5.70 M/uL    HGB 10.1 (L) 12.1 - 17.0 g/dL    HCT 31.8 (L) 36.6 - 50.3 %    MCV 73.4 (L) 80.0 - 99.0 FL    MCH 23.3 (L) 26.0 - 34.0 PG    MCHC 31.8 30.0 - 36.5 g/dL    RDW 15.0 (H) 11.5 - 14.5 %    PLATELET 224 530 - 837 K/uL    NEUTROPHILS 87 (H) 32 - 75 %    LYMPHOCYTES 6 (L) 12 - 49 %    MONOCYTES 7 5 - 13 %    EOSINOPHILS 0 0 - 7 %    BASOPHILS 0 0 - 1 %    ABS. NEUTROPHILS 19.4 (H) 1.8 - 8.0 K/UL    ABS. LYMPHOCYTES 1.3 0.8 - 3.5 K/UL    ABS. MONOCYTES 1.6 (H) 0.0 - 1.0 K/UL    ABS. EOSINOPHILS 0.0 0.0 - 0.4 K/UL    ABS.  BASOPHILS 0.0 0.0 - 0.1 K/UL    RBC COMMENTS MICROCYTOSIS  1+        RBC COMMENTS HYPOCHROMIA  1+        DF SMEAR SCANNED     METABOLIC PANEL, COMPREHENSIVE    Collection Time: 05/27/17  4:16 PM   Result Value Ref Range    Sodium 136 136 - 145 mmol/L    Potassium 4.1 3.5 - 5.1 mmol/L    Chloride 100 97 - 108 mmol/L    CO2 25 21 - 32 mmol/L    Anion gap 11 5 - 15 mmol/L    Glucose 309 (H) 65 - 100 mg/dL    BUN 16 6 - 20 MG/DL    Creatinine 1.75 (H) 0.70 - 1.30 MG/DL    BUN/Creatinine ratio 9 (L) 12 - 20      GFR est AA 51 (L) >60 ml/min/1.73m2    GFR est non-AA 42 (L) >60 ml/min/1.73m2    Calcium 8.6 8.5 - 10.1 MG/DL    Bilirubin, total 0.5 0.2 - 1.0 MG/DL    ALT (SGPT) 66 12 - 78 U/L    AST (SGOT) 67 (H) 15 - 37 U/L    Alk. phosphatase 110 45 - 117 U/L    Protein, total 8.2 6.4 - 8.2 g/dL    Albumin 1.8 (L) 3.5 - 5.0 g/dL    Globulin 6.4 (H) 2.0 - 4.0 g/dL    A-G Ratio 0.3 (L) 1.1 - 2.2     LACTIC ACID, PLASMA    Collection Time: 05/27/17  4:16 PM   Result Value Ref Range    Lactic acid 2.0 0.4 - 2.0 MMOL/L   CULTURE, BLOOD, PAIRED    Collection Time: 05/27/17  4:16 PM   Result Value Ref Range    Special Requests: NO SPECIAL REQUESTS      Culture result:        ONE OF FOUR BOTTLES HAS BEEN FLAGGED POSITIVE BY INSTRUMENT. BOTTLE HAS BEEN SENT TO [facility] TO ASSESS FOR POSSIBLE GROWTH. Culture result:        TWO OF FOUR BOTTLES HAVE BEEN FLAGGED POSITIVE BY INSTRUMENT. BOTTLES HAVE BEEN SENT TO [facility] TO ASSESS FOR POSSIBLE GROWTH. REMAINING BOTTLE(S) HAS/HAVE NO GROWTH SO FAR      Culture result:        THREE OF FOUR BOTTLES HAVE BEEN FLAGGED POSITIVE BY INSTRUMENT. BOTTLES HAVE BEEN SENT TO [facility] TO ASSESS FOR POSSIBLE GROWTH.   REMAINING BOTTLE(S) HAS/HAVE NO GROWTH SO FAR     URINALYSIS W/ RFLX MICROSCOPIC    Collection Time: 05/27/17  4:16 PM   Result Value Ref Range    Color YELLOW/STRAW      Appearance CLEAR CLEAR      Specific gravity 1.020 1.003 - 1.030      pH (UA) 6.0 5.0 - 8.0      Protein >300 (A) NEG mg/dL    Glucose >1000 (A) NEG mg/dL    Ketone NEGATIVE  NEG mg/dL    Bilirubin NEGATIVE  NEG      Blood LARGE (A) NEG      Urobilinogen 1.0 0.2 - 1.0 EU/dL    Nitrites NEGATIVE  NEG      Leukocyte Esterase NEGATIVE  NEG     SED RATE (ESR)    Collection Time: 05/27/17  4:16 PM   Result Value Ref Range    Sed rate, automated 49 (H) 0 - 15 mm/hr   URINE MICROSCOPIC ONLY    Collection Time: 05/27/17  4:16 PM   Result Value Ref Range    WBC 10-20 0 - 4 /hpf    RBC 5-10 0 - 5 /hpf    Epithelial cells FEW FEW /lpf    Bacteria NEGATIVE  NEG /hpf   GLUCOSE, POC    Collection Time: 05/27/17  8:59 PM   Result Value Ref Range Glucose (POC) 265 (H) 65 - 100 mg/dL    Performed by Kei Cassette    CBC WITH AUTOMATED DIFF    Collection Time: 05/28/17  3:18 AM   Result Value Ref Range    WBC 14.0 (H) 4.1 - 11.1 K/uL    RBC 3.93 (L) 4.10 - 5.70 M/uL    HGB 9.1 (L) 12.1 - 17.0 g/dL    HCT 29.0 (L) 36.6 - 50.3 %    MCV 73.8 (L) 80.0 - 99.0 FL    MCH 23.2 (L) 26.0 - 34.0 PG    MCHC 31.4 30.0 - 36.5 g/dL    RDW 15.0 (H) 11.5 - 14.5 %    PLATELET 122 529 - 417 K/uL    NEUTROPHILS 86 (H) 32 - 75 %    LYMPHOCYTES 8 (L) 12 - 49 %    MONOCYTES 6 5 - 13 %    EOSINOPHILS 0 0 - 7 %    BASOPHILS 0 0 - 1 %    ABS. NEUTROPHILS 12.1 (H) 1.8 - 8.0 K/UL    ABS. LYMPHOCYTES 1.1 0.8 - 3.5 K/UL    ABS. MONOCYTES 0.8 0.0 - 1.0 K/UL    ABS. EOSINOPHILS 0.0 0.0 - 0.4 K/UL    ABS. BASOPHILS 0.0 0.0 - 0.1 K/UL    RBC COMMENTS ANISOCYTOSIS  1+        RBC COMMENTS HYPOCHROMIA  1+        DF SMEAR SCANNED     MAGNESIUM    Collection Time: 05/28/17  3:18 AM   Result Value Ref Range    Magnesium 1.7 1.6 - 2.4 mg/dL   METABOLIC PANEL, COMPREHENSIVE    Collection Time: 05/28/17  3:18 AM   Result Value Ref Range    Sodium 136 136 - 145 mmol/L    Potassium 3.6 3.5 - 5.1 mmol/L    Chloride 104 97 - 108 mmol/L    CO2 25 21 - 32 mmol/L    Anion gap 7 5 - 15 mmol/L    Glucose 241 (H) 65 - 100 mg/dL    BUN 14 6 - 20 MG/DL    Creatinine 1.51 (H) 0.70 - 1.30 MG/DL    BUN/Creatinine ratio 9 (L) 12 - 20      GFR est AA >60 >60 ml/min/1.73m2    GFR est non-AA 50 (L) >60 ml/min/1.73m2    Calcium 7.7 (L) 8.5 - 10.1 MG/DL    Bilirubin, total 0.6 0.2 - 1.0 MG/DL    ALT (SGPT) 55 12 - 78 U/L    AST (SGOT) 57 (H) 15 - 37 U/L    Alk.  phosphatase 117 45 - 117 U/L    Protein, total 7.0 6.4 - 8.2 g/dL    Albumin 1.4 (L) 3.5 - 5.0 g/dL    Globulin 5.6 (H) 2.0 - 4.0 g/dL    A-G Ratio 0.3 (L) 1.1 - 2.2     GLUCOSE, POC    Collection Time: 05/28/17  7:00 AM   Result Value Ref Range    Glucose (POC) 235 (H) 65 - 100 mg/dL    Performed by [facility]            Assessment:     Active Problems: HTN (hypertension) (7/23/2013)      Diabetes ([facility] ()      Overview: since age 23      [facility]omyel[facility] (2/24/2017)      Sepsis ([facility] (5/27/2017)      ARF (acute renal failure) ([facility] (5/27/2017)        Plan:   Surgery planned for today  Will monitor bs and continue iss xanax prn to help with anxiety Pt will likely need iv abx long term will ask for a PICC to be placed  pt is well known to me and is quite non compliant with his diet as well as with office visits and is know to leave the hospital Emmy To M.D.   Family Medicine [phone]

## 2017-05-28 NOTE — PERIOP NOTES
Handoff Report from Operating Room to PACU    Report received from MARKELL Narvaez RN and Ronni Clark CRNA regarding Zach Suggs. Surgeon(s):  David Henao DPM  And Procedure(s) (LRB):  FOOT DEBRIDEMENT (Left)  confirmed   with allergies and dressings discussed. Anesthesia type, drugs, patient history, complications, estimated blood loss, vital signs, intake and output, and last pain medication, lines, reversal medications and temperature were reviewed.

## 2017-05-28 NOTE — ANESTHESIA POSTPROCEDURE EVALUATION
Post-Anesthesia Evaluation and Assessment    Patient: Cherry Salazar MRN: [id number]  SSN: [id number]    YOB: 1970  Age: 51 y.o. Sex: male       Cardiovascular Function/Vital Signs  Visit Vitals    /69 (BP 1 Location: Right arm, BP Patient Position: At rest)    Pulse 87    Temp 37.9 °C (100.2 °F)    Resp 29    Ht 6' 3\" (1.905 m)    Wt 130.3 kg (287 lb 4.2 oz)    SpO2 98%    BMI 35.9 kg/m2       Patient is status post MAC anesthesia for Procedure(s):  FOOT DEBRIDEMENT. Nausea/Vomiting: None    Postoperative hydration reviewed and adequate. Pain:  Pain Scale 1: Numeric (0 - 10) (05/28/17 1300)  Pain Intensity 1: 0 (05/28/17 1300)   Managed    Neurological Status:   Neuro (WDL): Within Defined Limits (05/28/17 1234)  Neuro  Neurologic State: Alert; Appropriate for age (05/28/17 1234)  Orientation Level: Appropriate for age;Oriented X4 (05/28/17 1234)  Cognition: Appropriate decision making; Appropriate for age attention/concentration; Appropriate safety awareness; Follows commands (05/28/17 1234)  Speech: Appropriate for age;Clear (05/28/17 1234)  LUE Motor Response: Purposeful;Spontaneous  (05/28/17 1234)  LLE Motor Response: Purposeful;Spontaneous ;Weak (05/28/17 1234)  RUE Motor Response: Purposeful;Spontaneous  (05/28/17 1234)  RLE Motor Response: Purposeful;Spontaneous  (05/28/17 1234)   At baseline    Mental Status and Level of Consciousness: Arousable    Pulmonary Status:   O2 Device: Nasal cannula (05/28/17 1300)   Adequate oxygenation and airway patent    Complications related to anesthesia: None    Post-anesthesia assessment completed.  No concerns    Signed By: Vincenzo Barrett MD     May 28, 2017

## 2017-05-28 NOTE — ANESTHESIA PREPROCEDURE EVALUATION
Anesthetic History   No history of anesthetic complications            Review of Systems / Medical History  Patient summary reviewed, nursing notes reviewed and pertinent labs reviewed    Pulmonary  Within defined limits                 Neuro/Psych   Within defined limits           Cardiovascular    Hypertension          PAD and hyperlipidemia    Exercise tolerance: <4 METS     GI/Hepatic/Renal         Renal disease: ARF       Endo/Other    Diabetes: type 1    Anemia     Other Findings            Physical Exam    Airway  Mallampati: II  TM Distance: 4 - 6 cm  Neck ROM: normal range of motion   Mouth opening: Normal     Cardiovascular  Regular rate and rhythm,  S1 and S2 normal,  no murmur, click, rub, or gallop             Dental    Dentition: Full lower dentures and Full upper dentures     Pulmonary  Breath sounds clear to auscultation               Abdominal  GI exam deferred       Other Findings            Anesthetic Plan    ASA: 3  Anesthesia type: MAC          Induction: Intravenous  Anesthetic plan and risks discussed with: Patient      GA backup

## 2017-05-28 NOTE — OP NOTES
[facility] [address]   OP NOTE       Name:  Charles Weller   MR#:  [id number]   :  1970   Account #:  [de-identified]    Surgery Date:  2017   Date of Adm:  2017       SURGEON: Gale Farfan. Julissa Meneses DPM     PREOPERATIVE DIAGNOSES    1. Abscess and cellulitis, left foot. 2. Osteomyelitis of the fifth metatarsal.       POSTOPERATIVE DIAGNOSES    1. Abscess and cellulitis, left foot. 2. Osteomyelitis of the fifth metatarsal.     PROCEDURES PERFORMED: Incision and drainage of left foot   abscess with resection of remaining and infected fifth metatarsal.    SPECIMENS REMOVED: Soft tissue cultures, both aerobic and   anaerobic from the left foot. Bone cultures, both aerobic and   anaerobic, from the left foot and bone for pathology from the left foot. ANESTHESIA: MAC. HEMOSTASIS: Pneumatic ankle tourniquet set at 250 mmHg. ESTIMATED BLOOD LOSS: Less than 10 mL. MATERIALS: 2-0 nylon, 1/4-inch iodoform gauze packing. INTRAOPERATIVE INJECTABLES: 20 mL of 1% lidocaine plain. COMPLICATIONS: None. DESCRIPTION OF PROCEDURE: After the patient was properly   identified and the correct extremity marked, the patient was taken from   the preoperative holding area to the operating room and placed on the   operating table in the normal supine fashion and fastened with a lap   belt. Dr. Julissa Meneses then entered the operating room and identified the   patient as aforementioned patient. Once MAC was initiated, the   patient's left foot was anesthetized with 20 mL of 1% lidocaine plain. The patient's left foot was then prepped and draped in a normal sterile   manner. On physical examination, the patient was noted to have a previous   transmetatarsal amputation of the left foot and subsequently has   developed a plantar lateral foot wound, which has been under the care   of Dr. Jorge Caballero as an outpatient.  The patient developed worsening   drainage, cellulitis and pain in the left foot, subsequently was admitted   to [facility], whereby it was noted that he was   septic with a white blood cell count of 22,000 and osteomyelitis on x-  ray of the fifth metatarsal. In light of this, I spoke with him this morning   at length about the need for incision and drainage as well as resection   of the infected bone to be sent for pathology. The patient understood   and was consented made n.p.o. Attention was turned to the patient's left foot, whereby a fresh 15 blade   was utilized to make a dorsal incision over the lateral aspect of the left   foot in the region of the fourth and fifth metatarsals. This incision was   deepened through full-thickness skin and subcutaneous tissues down   to the level of bone. The fifth metatarsal bone was noted to be soft. It   was prominent at the wound bed plantarly. There was noted to be a   large plantar lateral ulcer measuring approximately 50 x 50 mm in size   with a depth of approximately 10 mm with exposed bone, a   fiber granular and a necrotic base. As this incision was carried down to   bone, there was noted to be purulent drainage which was cultured,   both aerobically and anaerobically. The incision was carried out   proximally to ensure the purulence did not extends up the tendon   sheaths of the lateral ankle. There was noted to be copious amounts of   serous-type fluid, but no purulence at the ankle level. This was   consistent with the patient's marked edema clinically,  but there was no   gross purulence noted at the ankle level. Certainly around the fifth   metatarsal bone, there was noted to be extensive purulence and the   entire plantar wound was excised through full-thickness skin and   subcutaneous tissues directly down to bone. All fibrinous, necrotic and   infected soft tissue, muscle, tendon and capsule were removed.  The   fifth metatarsal bone was grasped with a rongeur and sharply   dissected away from the cuboid. The metatarsal was obviously   infected. Bone cultures, both aerobic and anaerobic, were taken from   the fifth metatarsal the remaining fifth metatarsal bone was sent for   pathology. The cuboid did not appear to be infected. There was viable   cartilage over the cuboid bone itself and the area was then copiously   irrigated with gentamicin solution with 1000 mL of normal sterile saline,   and once satisfied with amount of irrigation, the wound was packed   with 1/4-inch iodoform gauze packing and the dorsal and proximal   incision were partially reapproximated with 2-0 nylon. The wound was   then dressed with sterile 4 x 4's, fluffs, ABDs, Jairo and an Ace wrap. The patient tolerated the procedure well. The pneumatic ankle   tourniquet was released. The patient was transferred from the   operating room to the recovery room with vital signs stable and   vascular status intact to his left lower extremity. After spending ample   time in the PACU, the patient was transferred back to floor for further   medical management and IV antibiotic therapy.         WIL White   D:  05/28/2017   12:39   T:  05/28/2017   17:25   Job #:  [id number]

## 2017-05-28 NOTE — PROGRESS NOTES
Primary Nurse Gabriel Yang RN and Lazaro Up RN performed a dual skin assessment on this patient. Pt has amputation of all toes on Left foot. Pt has stage 3-4 wound on ball of foot. Minimal red drainage. Small healed ulcer top right bottom of foot. Noé score is 18    Cleaned left foot wound with NS. Applied wet to dry dressing to wound and covered with padmaja. No complaints of pain. Will put in wound care consult        2200 Spoke with Dr. Ghada Maurice. Dr. Ghada Maurice is aware of admission. Order also given for ambien 5mg po prn for sleep.  Order put in [facility]

## 2017-05-28 NOTE — PROGRESS NOTES
0700: [facility] [address] [facility], [facility], ED SUMMARY, [facility], RECENT RESULTS were discussed. Tito Sheehan, RN    6921: Unable to obtain IV access. New order placed for PICC line per Dr. Werner Zurita. 1025: Report given to Basilia Fisher in post-op. Pt sent down for I&D.   1326: Report received from [address] from post-op. Pt stable and will be returning to room shortly. 1400: Paged Dr. Rosemary Smith in regards to pt wound bleeding through surgical dressing and a need for a diet order. 1407: New orders for diabetic diet, MD aware of bleeding through surgical dressing. 1415: Surgical dressing reinforced. MD aware  1900: surgical dressing reinforced.

## 2017-05-28 NOTE — PERIOP NOTES
TRANSFER - OUT REPORT:    Verbal report given to Mirela EDUARDO(name) on Lynn Bullard  being transferred to 2209(unit) for routine post - op       Report consisted of patients Situation, Background, Assessment and   Recommendations(SBAR). Information from the following report(s) SBAR, Kardex, ED Summary, OR Summary, Procedure Summary, Intake/Output, MAR, Accordion, Recent Results, Med Rec Status, Cardiac Rhythm NSR and Alarm Parameters  was reviewed with the receiving nurse. Opportunity for questions and clarification was provided.       Patient transported with:   Monitor  O2 @ 2 liters  Registered Nurse  [facility]

## 2017-05-29 LAB
GLUCOSE BLD STRIP.AUTO-MCNC: 130 MG/DL (ref 65–100)
GLUCOSE BLD STRIP.AUTO-MCNC: 175 MG/DL (ref 65–100)
GLUCOSE BLD STRIP.AUTO-MCNC: 207 MG/DL (ref 65–100)
GLUCOSE BLD STRIP.AUTO-MCNC: 91 MG/DL (ref 65–100)
SERVICE CMNT-IMP: ABNORMAL
SERVICE CMNT-IMP: NORMAL

## 2017-05-29 PROCEDURE — 74011250637 HC RX REV CODE- 250/637: Performed by: FAMILY MEDICINE

## 2017-05-29 PROCEDURE — 74011250636 HC RX REV CODE- 250/636: Performed by: EMERGENCY MEDICINE

## 2017-05-29 PROCEDURE — 74011636637 HC RX REV CODE- 636/637: Performed by: INTERNAL MEDICINE

## 2017-05-29 PROCEDURE — 74011250637 HC RX REV CODE- 250/637: Performed by: INTERNAL MEDICINE

## 2017-05-29 PROCEDURE — 97165 OT EVAL LOW COMPLEX 30 MIN: CPT | Performed by: OCCUPATIONAL THERAPIST

## 2017-05-29 PROCEDURE — G8988 SELF CARE GOAL STATUS: HCPCS | Performed by: OCCUPATIONAL THERAPIST

## 2017-05-29 PROCEDURE — G8979 MOBILITY GOAL STATUS: HCPCS | Performed by: PHYSICAL THERAPIST

## 2017-05-29 PROCEDURE — G8989 SELF CARE D/C STATUS: HCPCS | Performed by: OCCUPATIONAL THERAPIST

## 2017-05-29 PROCEDURE — 74011000258 HC RX REV CODE- 258: Performed by: INTERNAL MEDICINE

## 2017-05-29 PROCEDURE — G8978 MOBILITY CURRENT STATUS: HCPCS | Performed by: PHYSICAL THERAPIST

## 2017-05-29 PROCEDURE — G8987 SELF CARE CURRENT STATUS: HCPCS | Performed by: OCCUPATIONAL THERAPIST

## 2017-05-29 PROCEDURE — 97161 PT EVAL LOW COMPLEX 20 MIN: CPT | Performed by: PHYSICAL THERAPIST

## 2017-05-29 PROCEDURE — 74011250636 HC RX REV CODE- 250/636: Performed by: INTERNAL MEDICINE

## 2017-05-29 PROCEDURE — 65660000000 HC RM CCU STEPDOWN

## 2017-05-29 PROCEDURE — 82962 GLUCOSE BLOOD TEST: CPT

## 2017-05-29 RX ADMIN — PIPERACILLIN SODIUM,TAZOBACTAM SODIUM 3.38 G: 3; .375 INJECTION, POWDER, FOR SOLUTION INTRAVENOUS at 06:14

## 2017-05-29 RX ADMIN — METOPROLOL TARTRATE 12.5 MG: 25 TABLET ORAL at 13:54

## 2017-05-29 RX ADMIN — INSULIN GLARGINE 60 UNITS: 100 INJECTION, SOLUTION SUBCUTANEOUS at 21:28

## 2017-05-29 RX ADMIN — ZOLPIDEM TARTRATE 5 MG: 5 TABLET ORAL at 21:28

## 2017-05-29 RX ADMIN — PIPERACILLIN SODIUM,TAZOBACTAM SODIUM 3.38 G: 3; .375 INJECTION, POWDER, FOR SOLUTION INTRAVENOUS at 13:50

## 2017-05-29 RX ADMIN — INSULIN LISPRO 10 UNITS: 100 INJECTION, SOLUTION INTRAVENOUS; SUBCUTANEOUS at 12:23

## 2017-05-29 RX ADMIN — OXYCODONE HYDROCHLORIDE 10 MG: 5 TABLET ORAL at 15:54

## 2017-05-29 RX ADMIN — INSULIN LISPRO 10 UNITS: 100 INJECTION, SOLUTION INTRAVENOUS; SUBCUTANEOUS at 16:30

## 2017-05-29 RX ADMIN — ACETAMINOPHEN 650 MG: 325 TABLET, FILM COATED ORAL at 08:04

## 2017-05-29 RX ADMIN — INSULIN LISPRO 2 UNITS: 100 INJECTION, SOLUTION INTRAVENOUS; SUBCUTANEOUS at 12:23

## 2017-05-29 RX ADMIN — SODIUM CHLORIDE 100 ML/HR: 900 INJECTION, SOLUTION INTRAVENOUS at 08:07

## 2017-05-29 RX ADMIN — PIPERACILLIN SODIUM,TAZOBACTAM SODIUM 3.38 G: 3; .375 INJECTION, POWDER, FOR SOLUTION INTRAVENOUS at 21:22

## 2017-05-29 RX ADMIN — INSULIN LISPRO 2 UNITS: 100 INJECTION, SOLUTION INTRAVENOUS; SUBCUTANEOUS at 21:29

## 2017-05-29 RX ADMIN — ALPRAZOLAM 0.25 MG: 0.25 TABLET ORAL at 03:49

## 2017-05-29 RX ADMIN — ALPRAZOLAM 0.25 MG: 0.25 TABLET ORAL at 21:27

## 2017-05-29 RX ADMIN — ALPRAZOLAM 0.25 MG: 0.25 TABLET ORAL at 12:26

## 2017-05-29 RX ADMIN — METOPROLOL TARTRATE 12.5 MG: 25 TABLET ORAL at 21:28

## 2017-05-29 RX ADMIN — ATORVASTATIN CALCIUM 40 MG: 40 TABLET, FILM COATED ORAL at 08:04

## 2017-05-29 RX ADMIN — PANTOPRAZOLE SODIUM 40 MG: 40 TABLET, DELAYED RELEASE ORAL at 08:04

## 2017-05-29 RX ADMIN — VANCOMYCIN HYDROCHLORIDE 1750 MG: 10 INJECTION, POWDER, LYOPHILIZED, FOR SOLUTION INTRAVENOUS at 18:25

## 2017-05-29 RX ADMIN — VANCOMYCIN HYDROCHLORIDE 1750 MG: 10 INJECTION, POWDER, LYOPHILIZED, FOR SOLUTION INTRAVENOUS at 02:45

## 2017-05-29 NOTE — PROGRESS NOTES
Occupational Therapy EVALUATION/discharge  Patient: Rukhsana Reynoso (15 y.o. male)  Date: 5/29/2017  Primary Diagnosis: Sepsis ([facility] [address]  infected foot  Left foot infection  Procedure(s) (LRB):  LEFT FOOT DEBRIDEMENT (Left) 1 Day Post-Op   Precautions:  NWB ( L LE)    ASSESSMENT:   Based on the objective data described below, the patient presents at an overall CG to Independent level for ADL. His primary limitation is related to functional mobility (due to NWB L LE), and he is being seen by physical therapy to address this. Patient does not feel like OT treatment is necessary, that as his mobility improves, he will be independent with ADL. Further skilled acute occupational therapy is not indicated at this time. Discharge Recommendations: None  Further Equipment Recommendations for Discharge: None      SUBJECTIVE:   Patient stated Let's do whatever you need me to do.     OBJECTIVE DATA SUMMARY:   HISTORY:   Past Medical History:   Diagnosis Date    Diabetes ([facility] [address]     since age 23   [employer] DM (diabetes mellitus) ([facility] [address]     HTN (hypertension)     Hypercholesteremia     Hyperlipidemia      Past Surgical History:   Procedure Laterality Date    HX AMPUTATION      toes- left foot    HX BUNIONECTOMY      HX ORTHOPAEDIC      boutinerre deformity    HX ORTHOPAEDIC      fascia removed left foot    HX OTHER SURGICAL  03/2017    Skin graft Surgery    HX TONSILLECTOMY      HX WISDOM TEETH EXTRACTION         Prior Level of Function/Home Situation: Mod I to I with ADL; still working and driving  Expanded or extensive additional review of patient history:     Home Situation  Home Environment: Private residence  # Steps to Enter: 10  One/Two Story Residence: Other (Comment) (three story)  Living Alone: No  Support Systems: Spouse/Significant Other/Partner  Patient Expects to be Discharged to[de-identified] Apartment  Current DME Used/Available at Home: None  [x]  Right hand dominant   []  Left hand dominant    EXAMINATION OF PERFORMANCE DEFICITS:  Cognitive/Behavioral Status:  Neurologic State: Alert  Orientation Level: Oriented X4     Perception: Appears intact  Perseveration: No perseveration noted  Safety/Judgement: Awareness of environment; Insight into deficits      Range of Motion:  AROM: Within functional limits  PROM: Within functional limits                      Strength:  Strength: Within functional limits                Coordination:  Coordination: Within functional limits            Tone & Sensation:  Tone: Normal  Sensation:  (not tested)                      Balance:  Sitting: Intact  Standing: Intact; With support    Functional Mobility and Transfers for ADLs:  Bed Mobility:  Rolling: Independent  Supine to Sit: Independent  Sit to Supine: Independent  Scooting: Independent    Transfers:  Sit to Stand: Contact guard assistance  Stand to Sit: Stand-by asssistance  Bed to Chair: Contact guard assistance  Toilet Transfer : Contact guard assistance    ADL Assessment:  Feeding: Independent    Oral Facial Hygiene/Grooming: Independent    Bathing: Contact guard assistance    Upper Body Dressing: Modified independent    Lower Body Dressing: Contact guard assistance    Toileting: Contact guard assistance                Functional Measure:  Barthel Index:    Bathin  Bladder: 10  Bowels: 10  Groomin  Dressing: 10  Feeding: 10  Mobility: 10  Stairs: 0  Toilet Use: 5  Transfer (Bed to Chair and Back): 10  Total: 75       Barthel and G-code impairment scale:  Percentage of impairment CH  0% CI  1-19% CJ  20-39% CK  40-59% CL  60-79% CM  80-99% CN  100%   Barthel Score 0-100 100 99-80 79-60 59-40 20-39 1-19   0   Barthel Score 0-20 20 17-19 13-16 9-12 5-8 1-4 0      The Barthel ADL Index: Guidelines  1. The index should be used as a record of what a patient does, not as a record of what a patient could do. 2. The main aim is to establish degree of independence from any help, physical or verbal, however minor and for whatever reason.   3. The need for supervision renders the patient not independent. 4. A patient's performance should be established using the best available evidence. Asking the patient, friends/relatives and nurses are the usual sources, but direct observation and common sense are also important. However direct testing is not needed. 5. Usually the patient's performance over the preceding 24-48 hours is important, but occasionally longer periods will be relevant. 6. Middle categories imply that the patient supplies over 50 per cent of the effort. 7. Use of aids to be independent is allowed. Mena Remy., Barthel, TEODOROW. ([id number]). Functional evaluation: the Barthel Index. [facility] (14)2. Hawk Cuello justin NINA Alva, Nikunj Crystal, Mook Grant., [place], [address] [place] [address] (1999). Measuring the change indisability after inpatient rehabilitation; comparison of the responsiveness of the Barthel Index and Functional [place] Measure. Journal of Neurology, Neurosurgery, and Psychiatry, 66(4), 910-071. Nik Mckeon, N.J.A, AWILDA Baum, & Miguel Conklin M.A. (2004.) Assessment of post-stroke quality of life in cost-effectiveness studies: The usefulness of the Barthel Index and the EuroQoL-5D. Quality of Life Research, 13, 208-58       G codes: In compliance with CMSs Claims Based Outcome Reporting, the following G-code set was chosen for this patient based on their primary functional limitation being treated: The outcome measure chosen to determine the severity of the functional limitation was the Barthel Index with a score of 75/100 which was correlated with the impairment scale. ?  Self Care:     - CURRENT STATUS: CJ - 20%-39% impaired, limited or restricted    - GOAL STATUS: CJ - 20%-39% impaired, limited or restricted    - D/C STATUS:  CJ - 20%-39% impaired, limited or restricted     Occupational Therapy Evaluation Charge Determination   History Examination Decision-Making   LOW Complexity : Brief history review LOW Complexity : 1-3 performance deficits relating to physical, cognitive , or psychosocial skils that result in activity limitations and / or participation restrictions  LOW Complexity : No comorbidities that affect functional and no verbal or physical assistance needed to complete eval tasks       Based on the above components, the patient evaluation is determined to be of the following complexity level: LOW   Pain:  Pain Scale 1: Numeric (0 - 10)  Pain Intensity 1: 0              Activity Tolerance:   Good  After treatment:   [x]  Patient left in no apparent distress sitting up in chair  []  Patient left in no apparent distress in bed  [x]  Call bell left within reach  [x]  Nursing notified  [x]  Caregiver present  []  Bed alarm activated    COMMUNICATION/EDUCATION:   Communication/Collaboration:  [x]      Home safety education was provided and the patient/caregiver indicated understanding. [x]      Patient/family have participated as able and agree with findings and recommendations. []      Patient is unable to participate in plan of care at this time.   Findings and recommendations were discussed with: Physical Therapist and Registered Nurse    ELI Son/L  Time Calculation: 12 mins

## 2017-05-29 NOTE — PROGRESS NOTES
0700: Report received from Shahram Chen., RN [address], ED SUMMARY, [facility], RECENT RESULTS were discussed. Gurwinder Terry RN      8400: Spoke with Dr. Rachna Ramirez in regards to consult, he will see pt tomorrow afternoon. Pt already on Vanc so no new orders placed at this time.

## 2017-05-29 NOTE — PROGRESS NOTES
General Daily Progress Note    Admit Date: 5/27/2017  Hospital day 2    Subjective:     Patient has no complaint of n/v/cp/ wife at bedside.  Pt states he had spontaneous loss of bowel yesterday x2 denies any numbness or tingling in legs denies any back pain controlling bladder fine  S/w nursing no issues   Medication side effects: none    Current Facility-Administered Medications   Medication Dose Route Frequency    ALPRAZolam (XANAX) tablet 0.25 mg  0.25 mg Oral BID PRN    vancomycin (VANCOCIN) 1750 mg in  ml infusion  1,750 mg IntraVENous Q16H    piperacillin-tazobactam (ZOSYN) 3.375 g in 0.9% sodium chloride (MBP/ADV) 100 mL  3.375 g IntraVENous Q8H    atorvastatin (LIPITOR) tablet 40 mg  40 mg Oral DAILY    pantoprazole (PROTONIX) tablet 40 mg  40 mg Oral ACB    gabapentin (NEURONTIN) capsule 100 mg  100 mg Oral BID PRN    metoprolol tartrate (LOPRESSOR) tablet 12.5 mg  12.5 mg Oral BID    0.9% sodium chloride infusion  100 mL/hr IntraVENous CONTINUOUS    acetaminophen (TYLENOL) tablet 650 mg  650 mg Oral Q4H PRN    oxyCODONE IR (ROXICODONE) tablet 10 mg  10 mg Oral Q4H PRN    HYDROmorphone (PF) (DILAUDID) injection 1 mg  1 mg IntraVENous Q6H PRN    ondansetron (ZOFRAN) injection 4 mg  4 mg IntraVENous Q6H PRN    insulin glargine (LANTUS) injection 60 Units  60 Units SubCUTAneous QHS    insulin lispro (HUMALOG) injection 10 Units  10 Units SubCUTAneous TIDAC    insulin lispro (HUMALOG) injection   SubCUTAneous QID AFTER MEALS    glucose chewable tablet 16 g  4 Tab Oral PRN    glucagon (GLUCAGEN) injection 1 mg  1 mg IntraMUSCular PRN    hydrALAZINE (APRESOLINE) 20 mg/mL injection 10 mg  10 mg IntraVENous Q6H PRN    dextrose 10 % infusion 125-250 mL  125-250 mL IntraVENous PRN    zolpidem (AMBIEN) tablet 5 mg  5 mg Oral QHS PRN        Review of Systems  A comprehensive review of systems was negative except for: Musculoskeletal: positive for bone pain    Objective:     Patient Vitals for the past 8 hrs:   BP Temp Pulse Resp SpO2   05/29/17 0737 158/86 (!) 102.4 °F (39.1 °C) (!) 105 18 91 %   05/29/17 0410 163/87 - (!) 102 18 96 %   05/29/17 0353 - 99 °F (37.2 °C) - - -        05/27 1901 - 05/29 0700  In: 3086.7 [I.V.:3086.7]  Out: 1200 [Urine:1200]    Physical Exam: General appearance: alert, fatigued, cooperative, no distress, appears stated age  Lungs: clear to auscultation bilaterally  Heart: regular rate and rhythm  Abdomen: soft, non-tender.  Bowel sounds normal. No masses,  no organomegaly no cva tenderness no spinal tenderness of cervical thru lumbar spine   Extremities: full rom left foot bandaged did not remove per  foot this am   Neurologic: Grossly normal           Data Review   Recent Results (from the past 24 hour(s))   EKG, 12 LEAD, INITIAL    Collection Time: 05/28/17  9:58 AM   Result Value Ref Range    Ventricular Rate 99 BPM    Atrial Rate 99 BPM    P-R Interval 148 ms    QRS Duration 86 ms    Q-T Interval 344 ms    QTC Calculation (Bezet) 441 ms    Calculated P Axis 56 degrees    Calculated R Axis 54 degrees    Calculated T Axis 63 degrees    Diagnosis       Normal sinus rhythm  Possible Left atrial enlargement  Left ventricular hypertrophy     CULTURE, TISSUE Cesario Riser STAIN    Collection Time: 05/28/17 12:20 PM   Result Value Ref Range    Special Requests: NO SPECIAL REQUESTS      GRAM STAIN 1+  WBCS SEEN        GRAM STAIN 4+  GRAM POSITIVE COCCI  IN PAIRS        GRAM STAIN 2+  GRAM POSITIVE RODS        Culture result: PENDING    GLUCOSE, POC    Collection Time: 05/28/17 12:37 PM   Result Value Ref Range    Glucose (POC) 117 (H) 65 - 100 mg/dL    Performed by Miranda Zapata    CULTURE, WOUND Cesario Riser STAIN    Collection Time: 05/28/17  2:07 PM   Result Value Ref Range    Special Requests: NO SPECIAL REQUESTS      GRAM STAIN RARE  WBCS SEEN        GRAM STAIN OCCASIONAL  GRAM POSITIVE COCCI        Culture result: PENDING    GLUCOSE, POC    Collection Time: 05/28/17  4:59 PM   Result Value Ref Range    Glucose (POC) 114 (H) 65 - 100 mg/dL    Performed by Rubens Forde    GLUCOSE, POC    Collection Time: 05/28/17  8:39 PM   Result Value Ref Range    Glucose (POC) 130 (H) 65 - 100 mg/dL    Performed by [address], POC    Collection Time: 05/29/17  7:40 AM   Result Value Ref Range    Glucose (POC) 91 65 - 100 mg/dL    Performed by Kailey LOUIE(CON)            Assessment:     Active Problems:    HTN (hypertension) (7/23/2013)      Diabetes ([address] ()      Overview: since age 23      Osteomyelitis ([facility] [address] (2/24/2017)      Sepsis ([address] (5/27/2017)      ARF (acute renal failure) ([facility] [address] (5/27/2017)        Plan:   POD #1  Surgery done yesterday reviewed OR note abscess and cellulitis of left foot with OM of the left 5th metatarsal would be good candidate for HBO d/w pt   Bone and wound cx pending will likely need long term IV abx will clarify with podiatry will need picc  bs under adequate control   Monitor spontaneous  loss of stool if continues will need MRI asked nursing staff to contact me if it should occur    Miryam Aviles M.D.   Family Medicine [phone]

## 2017-05-29 NOTE — PROGRESS NOTES
Problem: Mobility Impaired (Adult and Pediatric)  Goal: *Acute Goals and Plan of Care (Insert Text)  Physical Therapy Goals  Initiated 5/29/2017  1. Patient will transfer from bed to chair and chair to bed with independence using the least restrictive device within 7 day(s). 2. Patient will perform sit to stand with independence within 7 day(s). 3. Patient will ambulate with supervision/set-up for 40 feet with the least restrictive device within 7 day(s). 4. Patient will ascend/descend 13 stairs with 2 handrail(s) with minimal assistance/contact guard assist within 7 day(s). PHYSICAL THERAPY EVALUATION  Patient: Kemi Shrestha (71 y.o. male)  Date: 5/29/2017  Primary Diagnosis: Sepsis ([facility])  infected foot  Left foot infection  Procedure(s) (LRB):  LEFT FOOT DEBRIDEMENT (Left) 1 Day Post-Op   Precautions: NWB L LE         ASSESSMENT :  Based on the objective data described below, the patient presents with good strength, balance, decreased functional mobility skills (ambulation) due to NWB status L LE. Bed mobility independent. Sit to stand with CGA. Patient able to hop a few steps on right LE using walker and CGA. Patient steady with no LOB. Patient declined LE elevation but will recline shortly. Prior to admission, patient was independent with all mobility and did not use assistive device. Patient lives with significant other in three story home and does not own any equipment. Expect patient to make good progress with mobility; biggest challenge will be safely navigating stairs. Patient may not need further PT at discharge pending progress. .     Patient will benefit from skilled intervention to address the above impairments.   Patients rehabilitation potential is considered to be Excellent  Factors which may influence rehabilitation potential include:   [ ]         None noted  [ ]         Mental ability/status  [x ]         Medical condition  [ ]         Home/family situation and support systems  [ ] Safety awareness  [ ]         Pain tolerance/management  [ ]         Other:        PLAN :  Recommendations and Planned Interventions:  [ ]           Bed Mobility Training             [ ]    Neuromuscular Re-Education  [x ]           Transfer Training                   [ ]    Orthotic/Prosthetic Training  x[ ]           Gait Training                         [ ]    Modalities  [x ]           Therapeutic Exercises           [ ]    Edema Management/Control  [x ]           Therapeutic Activities            [ ]    Patient and Family Training/Education  [ ]           Other (comment):     Frequency/Duration: Patient will be followed by physical therapy  5 times a week to address goals. Discharge Recommendations: None vs. HHPT  Further Equipment Recommendations for Discharge: rolling walker/cruthes       SUBJECTIVE:   Patient stated I can get up.       OBJECTIVE DATA SUMMARY:   HISTORY:    Past Medical History:   Diagnosis Date    Diabetes ([facility] [address]       since age 23   [facility] DM (diabetes mellitus) ([facility] [address]      HTN (hypertension)      Hypercholesteremia      Hyperlipidemia       Past Surgical History:   Procedure Laterality Date    HX AMPUTATION         toes- left foot    HX BUNIONECTOMY        HX ORTHOPAEDIC         boutinerre deformity    HX ORTHOPAEDIC         fascia removed left foot    HX OTHER SURGICAL   03/2017     Skin graft Surgery    HX TONSILLECTOMY        HX WISDOM TEETH EXTRACTION         Prior Level of Function/Home Situation: patient was independent with all mobility with no assistive device; lives with wife  Personal factors and/or comorbidities impacting plan of care: NWB status left LE     Home Situation  Home Environment: Private residence  # Steps to Enter: 10  One/Two Story Residence: Other (Comment) (three story)  Living Alone: No  Support Systems: Spouse/Significant Other/Partner  Patient Expects to be Discharged to[de-identified] Apartment  Current DME Used/Available at Home: None EXAMINATION/PRESENTATION/DECISION MAKING:   Critical Behavior:  Neurologic State: Alert  Orientation Level: Oriented X4  Cognition: Appropriate decision making, Appropriate for age attention/concentration, Appropriate safety awareness, Follows commands  Safety/Judgement: Awareness of environment, Insight into deficits  Hearing: Auditory  Auditory Impairment: None  Skin:  Dressing just changed and dry and intact  Edema: none noted  Range Of Motion:  AROM: Within functional limits           PROM: Within functional limits           Strength:    Strength: Within functional limits                    Tone & Sensation:   Tone: Normal              Sensation:  (not tested)               Coordination:  Coordination: Within functional limits  Vision:      Functional Mobility:  Bed Mobility:  Rolling: Independent  Supine to Sit: Independent  Sit to Supine: Independent  Scooting: Independent  Transfers:  Sit to Stand: Contact guard assistance  Stand to Sit: Stand-by asssistance        Bed to Chair: Contact guard assistance              Balance:   Sitting: Intact  Standing: Intact; With support  Ambulation/Gait Training:  Distance (ft): 5 Feet (ft)  Assistive Device: Gait belt;Walker, rolling  Ambulation - Level of Assistance: Contact guard assistance           Right Side Weight Bearing: Full  Left Side Weight Bearing: Non-weight bearing           Step Length: Right shortened                                                                           Functional Measure:  Barthel Index:      Bathin  Bladder: 10  Bowels: 10  Groomin  Dressing: 10  Feeding: 10  Mobility: 10  Stairs: 0  Toilet Use: 5  Transfer (Bed to Chair and Back): 10  Total: 75         Barthel and G-code impairment scale:  Percentage of impairment CH  0% CI  1-19% CJ  20-39% CK  40-59% CL  60-79% CM  80-99% CN  100%   Barthel Score 0-100 100 99-80 79-60 59-40 20-39 1-19    0   Barthel Score 0-20 20 17-19 13-16 9-12 5-8 1-4 0      The Barthel ADL Index: Guidelines  1. The index should be used as a record of what a patient does, not as a record of what a patient could do. 2. The main aim is to establish degree of independence from any help, physical or verbal, however minor and for whatever reason. 3. The need for supervision renders the patient not independent. 4. A patient's performance should be established using the best available evidence. Asking the patient, friends/relatives and nurses are the usual sources, but direct observation and common sense are also important. However direct testing is not needed. 5. Usually the patient's performance over the preceding 24-48 hours is important, but occasionally longer periods will be relevant. 6. Middle categories imply that the patient supplies over 50 per cent of the effort. 7. Use of aids to be independent is allowed. Clifton Brush., Barthel, D.W. ([id number]). Functional evaluation: the Barthel Index. [address] [facility] (14)2. Justyna Curly justin [place], J.J.M.F, Roel Isabel, Dawson Becerra., [facility], [address] [place] [address] (1999). Measuring the change indisability after inpatient rehabilitation; comparison of the responsiveness of the Barthel Index and Functional [place] Measure. Journal of Neurology, Neurosurgery, and Psychiatry, 66(4), 531-533. Jameel Hernandez, N.J.A, AWILDA Baum, & Deirdre Lincoln MEMILE. (2004.) Assessment of post-stroke quality of life in cost-effectiveness studies: The usefulness of the Barthel Index and the EuroQoL-5D. Quality of Life Research, 13, 108-27            G codes: In compliance with CMSs Claims Based Outcome Reporting, the following G-code set was chosen for this patient based on their primary functional limitation being treated: The outcome measure chosen to determine the severity of the functional limitation was the Barthel Index with a score of 75/10 which was correlated with the impairment scale.       · Mobility - Walking and Moving Around:               - CURRENT STATUS:    CJ - 20%-39% impaired, limited or restricted               - GOAL STATUS:           CI - 1%-19% impaired, limited or restricted               - D/C STATUS:                       ---------------To be determined---------------      Physical Therapy Evaluation Charge Determination   History Examination Presentation Decision-Making   MEDIUM  Complexity : 1-2 comorbidities / personal factors will impact the outcome/ POC  LOW Complexity : 1-2 Standardized tests and measures addressing body structure, function, activity limitation and / or participation in recreation  LOW Complexity : Stable, uncomplicated  LOW Complexity : FOTO score of       Based on the above components, the patient evaluation is determined to be of the following complexity level: LOW      Pain:  Pain Scale 1: Numeric (0 - 10)  Pain Intensity 1: 0              Activity Tolerance:   fair  Please refer to the flowsheet for vital signs taken during this treatment. After treatment:   [X]         Patient left in no apparent distress sitting up in chair  [ ]         Patient left in no apparent distress in bed  [X]         Call bell left within reach  [X]         Nursing notified  [X]         Caregiver present  [ ]         Bed alarm activated      COMMUNICATION/EDUCATION:   The patients plan of care was discussed with: Registered Nurse.  [X]         Fall prevention education was provided and the patient/caregiver indicated understanding. [ ]         Patient/family have participated as able in goal setting and plan of care. [X]         Patient/family agree to work toward stated goals and plan of care. [ ]         Patient understands intent and goals of therapy, but is neutral about his/her participation. [ ]         Patient is unable to participate in goal setting and plan of care.      Thank you for this referral.  Abbe Zepeda, PT   Time Calculation: 10 mins

## 2017-05-29 NOTE — PROGRESS NOTES
Progress Note    Patient: Zach Suggs MRN: [id number]  SSN: [id number]    YOB: 1970  Age: 51 y.o. Sex: male      Admit Date: 5/27/2017  1 Day Post-Op     Procedure:   Procedure(s):  LEFT FOOT DEBRIDEMENT    Subjective:     Patient seen resting quiet and comfortably and no apparent distress. Pain on scale of 1-10 is 0 due to neuropathy.      Status post: left 5th ray resection/ wound debridement and I and D    Objective:     Visit Vitals    BP (!) 155/92 (BP 1 Location: Right arm, BP Patient Position: At rest)    Pulse 98    Temp 98.9 °F (37.2 °C)    Resp 18    Ht 6' 3\" (1.905 m)    Wt 130.3 kg (287 lb 4.2 oz)    SpO2 98%    BMI 35.9 kg/m2        Physical Exam:  normal DP and PT pulses and reduced sensation at both feet;  Left plantarlateral foot ulcer with fibrogranular base, no purulence or malodor; diminished edema and erythema with  Dorsal incision with sutures intact    Labs/Radiology Review: MRSA blood cultures and GPC and GPR from OR cultures    Assessment:     Hospital Problems  Date Reviewed: 5/27/2017          Codes Class Noted POA    Sepsis ([facility] ICD-10-CM: A41.9  ICD-9-CM: 038.9, 995.91  5/27/2017 Yes        ARF (acute renal failure) (HCC) ICD-10-CM: N17.9  ICD-9-CM: 584.9  5/27/2017 Yes        Osteomyelitis ([facility] ICD-10-CM: M86.9  ICD-9-CM: 730.20  2/24/2017 Yes        Diabetes ([facility] ICD-10-CM: E11.9  ICD-9-CM: 250.00  Unknown Yes    Overview Signed 4/22/2016  2:47 PM by Piedad Mcfarlane MD     since age 23             HTN (hypertension) ICD-10-CM: I10  ICD-9-CM: 401.9  7/23/2013 Yes              Plan/Recommendations/Medical Decision Making:     Continue present treatment    Activity: non-weight bearing    Discontinue: n/a    Diet: diabetic    Education: Diabetic patient education   Dressing changed  I had an extensive conversation with the patient and his wife today about the potential for healing this wound, under the circumstances   Of him needing to work and unable to be NWB, his uncontrolled blood sugars and him already having a TMA. I explained that at some point,  He will have to make the decision to try to salvage his foot versus proceeding with a BKA. This is very complicated and should involve his PCP,  ID doctor, Dr. Emelia Irizarry, and Vascular Surgery. Dr. Emelia Irizarry will discuss with patient tomorrow.   Continue ABX    Signed By: Josue Heard DPM     May 29, 2017

## 2017-05-30 LAB
ANION GAP BLD CALC-SCNC: 8 MMOL/L (ref 5–15)
ATRIAL RATE: 99 BPM
BACTERIA SPEC CULT: ABNORMAL
BACTERIA SPEC CULT: NORMAL
BUN SERPL-MCNC: 10 MG/DL (ref 6–20)
BUN/CREAT SERPL: 7 (ref 12–20)
CALCIUM SERPL-MCNC: 7.7 MG/DL (ref 8.5–10.1)
CALCULATED P AXIS, ECG09: 56 DEGREES
CALCULATED R AXIS, ECG10: 54 DEGREES
CALCULATED T AXIS, ECG11: 63 DEGREES
CHLORIDE SERPL-SCNC: 108 MMOL/L (ref 97–108)
CO2 SERPL-SCNC: 27 MMOL/L (ref 21–32)
CREAT SERPL-MCNC: 1.39 MG/DL (ref 0.7–1.3)
DATE LAST DOSE: ABNORMAL
DIAGNOSIS, 93000: NORMAL
GLUCOSE BLD STRIP.AUTO-MCNC: 107 MG/DL (ref 65–100)
GLUCOSE BLD STRIP.AUTO-MCNC: 142 MG/DL (ref 65–100)
GLUCOSE BLD STRIP.AUTO-MCNC: 164 MG/DL (ref 65–100)
GLUCOSE BLD STRIP.AUTO-MCNC: 96 MG/DL (ref 65–100)
GLUCOSE SERPL-MCNC: 134 MG/DL (ref 65–100)
GRAM STN SPEC: ABNORMAL
P-R INTERVAL, ECG05: 148 MS
POTASSIUM SERPL-SCNC: 3.2 MMOL/L (ref 3.5–5.1)
Q-T INTERVAL, ECG07: 344 MS
QRS DURATION, ECG06: 86 MS
QTC CALCULATION (BEZET), ECG08: 441 MS
REPORTED DOSE,DOSE: ABNORMAL UNITS
REPORTED DOSE/TIME,TMG: ABNORMAL
SERVICE CMNT-IMP: ABNORMAL
SERVICE CMNT-IMP: NORMAL
SERVICE CMNT-IMP: NORMAL
SODIUM SERPL-SCNC: 143 MMOL/L (ref 136–145)
VANCOMYCIN TROUGH SERPL-MCNC: 34.9 UG/ML (ref 5–10)
VENTRICULAR RATE, ECG03: 99 BPM

## 2017-05-30 PROCEDURE — 65660000000 HC RM CCU STEPDOWN

## 2017-05-30 PROCEDURE — 80048 BASIC METABOLIC PNL TOTAL CA: CPT | Performed by: FAMILY MEDICINE

## 2017-05-30 PROCEDURE — 74011636637 HC RX REV CODE- 636/637: Performed by: INTERNAL MEDICINE

## 2017-05-30 PROCEDURE — 74011250637 HC RX REV CODE- 250/637: Performed by: FAMILY MEDICINE

## 2017-05-30 PROCEDURE — 77030018719 HC DRSG PTCH ANTIMIC J&J -A

## 2017-05-30 PROCEDURE — 74011250637 HC RX REV CODE- 250/637: Performed by: INTERNAL MEDICINE

## 2017-05-30 PROCEDURE — 80202 ASSAY OF VANCOMYCIN: CPT | Performed by: FAMILY MEDICINE

## 2017-05-30 PROCEDURE — 74011250636 HC RX REV CODE- 250/636: Performed by: INTERNAL MEDICINE

## 2017-05-30 PROCEDURE — 77030011256 HC DRSG MEPILEX <16IN NO BORD MOLN -A

## 2017-05-30 PROCEDURE — 74011250636 HC RX REV CODE- 250/636: Performed by: EMERGENCY MEDICINE

## 2017-05-30 PROCEDURE — 76937 US GUIDE VASCULAR ACCESS: CPT

## 2017-05-30 PROCEDURE — 74011000258 HC RX REV CODE- 258: Performed by: INTERNAL MEDICINE

## 2017-05-30 PROCEDURE — 36415 COLL VENOUS BLD VENIPUNCTURE: CPT | Performed by: FAMILY MEDICINE

## 2017-05-30 PROCEDURE — C1751 CATH, INF, PER/CENT/MIDLINE: HCPCS

## 2017-05-30 PROCEDURE — 77030018786 HC NDL GD F/USND BARD -B

## 2017-05-30 PROCEDURE — 82962 GLUCOSE BLOOD TEST: CPT

## 2017-05-30 RX ORDER — SODIUM CHLORIDE 0.9 % (FLUSH) 0.9 %
10 SYRINGE (ML) INJECTION EVERY 24 HOURS
Status: DISCONTINUED | OUTPATIENT
Start: 2017-05-30 | End: 2017-06-03 | Stop reason: HOSPADM

## 2017-05-30 RX ORDER — SODIUM CHLORIDE 0.9 % (FLUSH) 0.9 %
10-40 SYRINGE (ML) INJECTION EVERY 8 HOURS
Status: DISCONTINUED | OUTPATIENT
Start: 2017-05-30 | End: 2017-06-03 | Stop reason: HOSPADM

## 2017-05-30 RX ORDER — SODIUM CHLORIDE 0.9 % (FLUSH) 0.9 %
10-30 SYRINGE (ML) INJECTION AS NEEDED
Status: DISCONTINUED | OUTPATIENT
Start: 2017-05-30 | End: 2017-06-03 | Stop reason: HOSPADM

## 2017-05-30 RX ORDER — SODIUM CHLORIDE 0.9 % (FLUSH) 0.9 %
10 SYRINGE (ML) INJECTION AS NEEDED
Status: DISCONTINUED | OUTPATIENT
Start: 2017-05-30 | End: 2017-06-03 | Stop reason: HOSPADM

## 2017-05-30 RX ORDER — HEPARIN 100 UNIT/ML
300 SYRINGE INTRAVENOUS AS NEEDED
Status: DISCONTINUED | OUTPATIENT
Start: 2017-05-30 | End: 2017-06-03 | Stop reason: HOSPADM

## 2017-05-30 RX ADMIN — VANCOMYCIN HYDROCHLORIDE 1750 MG: 10 INJECTION, POWDER, LYOPHILIZED, FOR SOLUTION INTRAVENOUS at 10:42

## 2017-05-30 RX ADMIN — Medication 10 ML: at 12:40

## 2017-05-30 RX ADMIN — SODIUM CHLORIDE 100 ML/HR: 900 INJECTION, SOLUTION INTRAVENOUS at 02:49

## 2017-05-30 RX ADMIN — PIPERACILLIN SODIUM,TAZOBACTAM SODIUM 3.38 G: 3; .375 INJECTION, POWDER, FOR SOLUTION INTRAVENOUS at 20:27

## 2017-05-30 RX ADMIN — ATORVASTATIN CALCIUM 40 MG: 40 TABLET, FILM COATED ORAL at 08:49

## 2017-05-30 RX ADMIN — METOPROLOL TARTRATE 12.5 MG: 25 TABLET ORAL at 08:49

## 2017-05-30 RX ADMIN — ALPRAZOLAM 0.25 MG: 0.25 TABLET ORAL at 20:32

## 2017-05-30 RX ADMIN — INSULIN LISPRO 10 UNITS: 100 INJECTION, SOLUTION INTRAVENOUS; SUBCUTANEOUS at 08:49

## 2017-05-30 RX ADMIN — INSULIN LISPRO 2 UNITS: 100 INJECTION, SOLUTION INTRAVENOUS; SUBCUTANEOUS at 12:41

## 2017-05-30 RX ADMIN — Medication 10 ML: at 20:33

## 2017-05-30 RX ADMIN — INSULIN GLARGINE 60 UNITS: 100 INJECTION, SOLUTION SUBCUTANEOUS at 20:30

## 2017-05-30 RX ADMIN — Medication 10 ML: at 13:18

## 2017-05-30 RX ADMIN — SODIUM CHLORIDE 100 ML/HR: 900 INJECTION, SOLUTION INTRAVENOUS at 18:03

## 2017-05-30 RX ADMIN — PIPERACILLIN SODIUM,TAZOBACTAM SODIUM 3.38 G: 3; .375 INJECTION, POWDER, FOR SOLUTION INTRAVENOUS at 05:39

## 2017-05-30 RX ADMIN — PIPERACILLIN SODIUM,TAZOBACTAM SODIUM 3.38 G: 3; .375 INJECTION, POWDER, FOR SOLUTION INTRAVENOUS at 13:12

## 2017-05-30 RX ADMIN — ZOLPIDEM TARTRATE 5 MG: 5 TABLET ORAL at 20:32

## 2017-05-30 RX ADMIN — PANTOPRAZOLE SODIUM 40 MG: 40 TABLET, DELAYED RELEASE ORAL at 08:49

## 2017-05-30 RX ADMIN — INSULIN LISPRO 10 UNITS: 100 INJECTION, SOLUTION INTRAVENOUS; SUBCUTANEOUS at 17:40

## 2017-05-30 RX ADMIN — INSULIN LISPRO 10 UNITS: 100 INJECTION, SOLUTION INTRAVENOUS; SUBCUTANEOUS at 12:40

## 2017-05-30 RX ADMIN — METOPROLOL TARTRATE 12.5 MG: 25 TABLET ORAL at 17:41

## 2017-05-30 NOTE — PROGRESS NOTES
Pharmacy Automatic Renal Dosing Protocol - Antimicrobials      Indication for Antimicrobials: SSTI; s/p debridement of left foot, possible osteomyelitis    Current Regimen of Each Antimicrobial (Start Day & Day of Therapy):  Vancomycin 3000 mg IV x 1, following 1750 mg IV Q16H (Start 17 - Day 4)  Zosyn 3.375 g IV Q8H (Start 17 - Day 4)      Goal Vancomycin Level (if needed): 15-20  Level(s) Ordered (if needed):   Measured / Extrapolated Vancomycin Levels (if drawn): 34.9   / 33.4  Significant cultures:     17 - xray foot shows possible osteomyelitis involving the remnant of the fifth  metatarsal..  17 Blood - MRSA  FINAL  17 Anaerobic x 2 - possible anaerobes 17 Tissue - MRSA FINAL  17 Wound - MRSA FINAL      CAPD, Intermittent Hemodialysis or Renal Replacement Therapy: NA  Paralysis, amputations, malnutrition: None  Recent Labs      17   0255  17   0318  17   1616   CREA  1.39*  1.51*  1.75*   BUN  10  14  16   WBC   --   14.0*  22.3*     Temp (24hrs), Av.2 °F (37.3 °C), Min:98.7 °F (37.1 °C), Max:99.7 °F (37.6 °C)    Creatinine Clearance (ml/min): 79 ml/min      Impression/Plan:    -Vancomycin trough of 33.4 is higher than expected. Patient has smaller volume of distribution and a prolonged drug half life than population averages  -HOLD vancomycin  -Will draw random level morning of  and assess  -Continue daily BMP  -Continue zosyn 3.735 gm every 8 hr        Pharmacy will follow daily and adjust doses of monitored medications as appropriate for renal function and/or serum levels.     Thank you,  PRISCILLA Bryson     Input Last Name of Patient adis     Input First Name of Patient [facility] Record Number      Input Age (Years) 47.0 51    Select Height Units (Drop Down Box) Inches  Select Treatment Diagnosis (DB) Cellulitis   Input Height 75.0     Height Used (Inches) 75 75 Left Arm Amputation   Select Weight Units (Drop Box Box) Kgs  Not Applicable   Input Weight 578.40  Not Applicable   Weight Used (kgs) 138 138 Left Leg Amputation   Select Sex (Drop Down Box) Male Male Not Applicable   Input Most Recent Serum Creatinine (mg/dl) 2.94 7.06 Not Applicable   Input Previous Serum Creatinine (mg/dl) 1.39 1.39 Malnutrition   Time Between Serum Creatinines (hours) 61.2 24 Not Applicable   Select Drug (Drop Down Box) Vancomycin Vancomycin Not Applicable   Input Infusion Period (hours) 2.0     Calculated Lean Body Weight (kg) 84.5     Calculated Dosing Weight (kg) 138.0     Calculated Vd (Liters) 89.7     Body Surface Area Calculated Meters2 2.62     Calculated Creatinine Clearance (ml/min) 78.52     Calculated T1/2 (Hours) for Creatinine 12.2 Solver Based Calulations Volume of Distribution (Liters)   Calc.  Eliminaton Rate Constant (hr-1) Drug 0.0606 0.0295 89.7   Calculated Half-Life (Hours) for Drug 11.4 23.5    Loading Dose Aminoglycosides 1,905     Loading Dose Vancomycin 3,810     Calculated Loading Dose Calculated (mg) 3,810  Maximum Vancomycin Loading Dose is 3 grams   Aminoglycoside Interval Gentamicin 30.0 61.3    Aminoglycoside Interval Amikacin 38.5 78.6    Calculated Dosage Interval (Hours) 18 35    Input Rounded Dosage Interval (Hours) 16 16    Calculated Maintenance Dose (mg) 2070 2070    Input Rounded MaintenanceDose (mg) 1750 1750    Calculated Peak (mcg/ml) 29.6 50.4    Calculated Trough (mcg/ml) 12.66 33.36    AUC (mg/hour/liter per day) 418.7 867.1

## 2017-05-30 NOTE — WOUND CARE
Wound Care consult: Chart reviewed and patient assessed for his RIGHT LEG wound that was present on admission. Podiatry is seeing this patient for the Left foot osteomyelitis. Dr. Renetta Wilkinson and Dr. Bibi Kat wrote orders for reinforcing the left foot dressing only. No other dressing changes. Pt. Will be going to the hyperbaric chamber at the wound care center upon discharge. Assessment of the Right lower leg wound: Dry wound that measures 2.5 cm x 4 cm. The wound bed is completely dry but not yet healed. The wound formed a scab over it due to dry dressings at home and it came off pre-maturely. There is no drainage or odor. WOUND POA CONDITIONS    Wound Leg Right (Active)   DRESSING TYPE Open to air 5/30/2017  4:56 PM   Non-Pressure Injury Partial thickness (epider/derm) 5/30/2017  4:56 PM   Condition of Base Other (comment) 5/30/2017  4:56 PM   Condition of Edges Rolled/curled 5/30/2017  4:56 PM   Tissue Type Pink; Other (comment) 5/30/2017  4:56 PM   Drainage Amount  None 5/30/2017  4:56 PM   Wound Odor None 5/30/2017  4:56 PM   Periwound Skin Condition Intact 5/30/2017  4:56 PM   Cleansing and Cleansing Agents  Dermal wound cleanser 5/30/2017  4:56 PM   Dressing Type Applied Xeroform; Foam 5/30/2017  4:56 PM   Procedure Tolerated Well 5/30/2017  4:56 PM        Recommendation / Wound Care orders: This wound needs moist wound care in order to heal better and \"prettier\". Patient wants to do this. Discussed how to do this at home but for now nursing will do a daily wound dressing. Orders: Cleanse the Right leg wound with Car[place] spray and wipe the wound bed with gauze. Apply the Xeroform gauze and then cover with a large foam dressing.    Pari Lunsford, RN, BSN, CWON  [phone]

## 2017-05-30 NOTE — PROGRESS NOTES
Attempted to see pt this pm and pt stated he has been getting around well for 6 months and really doesn't need PT at this time. He has a RW and when he goes home he is staying on the first floor.   Will d/c PT at this time per pt request.

## 2017-05-30 NOTE — PROGRESS NOTES
This is a 51 yr old AAM who has been admitted for treatment of HTN, ARF,  sepsis and osteomyelitis. Patient reports prior to hospitalization, independence and lives with his spouse. Patient will need IV ABX at discharge and may need a rolling walker depending on therapy final recommendation. Podiatry to make IV ABX recommendation. CM to follow and check coverage for home infusion medication is known. Care Management Interventions  PCP Verified by CM: Yes  MyChart Signup: No  Physical Therapy Consult: Yes  Occupational Therapy Consult: Yes  Speech Therapy Consult: No  Current Support Network: Lives with Spouse  Confirm Follow Up Transport: Family  Plan discussed with Pt/Family/Caregiver: Yes  Freedom of Choice Offered:  Yes

## 2017-05-30 NOTE — PROGRESS NOTES
PICC (Peripherally Inserted Central Catheter) line insertion  procedure note :     Procedure explained to patient along with risks and benefits and patient and agreed to proceed. Informed consent obtained from Patient. Patient teaching completed. Timeout completed. Pre-procedure assessment done. Maximum sterile barrier precautions observed throughout procedure. Lidocaine 1%  3.0 ml sq given prior to cannulation. Cannulated Basilic  vein using ultrasound guidance and modified seldinger technique. Inserted 5  Citizen [place] double  lumen PICC to Left arm using OneMorePallet Tip Location System and  [address]. Pt has  sinus   rhythm. PICC tip location was confirmed by 3 CG tip positioning system, indicating tall P wave and no negative deflection before P wave which would indicate that the PICC tip is properly placed in the distal SVC or at the [place]. PICC tip location was  confirmed by 2 PICC nurses and 3CG printout placed on patient's chart. Blood return verified and flushed with 20 ml normal saline in each port. Sterile dressing applied with biopatch, statLock and occlusive dressing as per protocol. Curos caps applied to each port. Patient tolerated procedure well with minimal blood loss ( less than 5 ml.)   Patient education material provided. PICC procedure performed by  : Maria Alejandra Valadez RN. PICC nurse  Assisted by :   Abdulaziz Payne RN, [place] [address],   PICC nurse  Reason for access :Poor vascular access  Complications related to insertion  : none  X-Ray : not applicable  Notified primary nurse    that  PICC line can be used.    Total Trimmed Length :  50  cm   External Length : 0  cm   PICC line site arm circumference:  39    cm   PICC catheter occupies  15 % of vein  Type of PICC: Bard Solo Power PICC   Ref # :    [id number]     Lot # :  [id number]   Expiration Date : 05/31/2018    Maria Alejandra Valadez [address], PICC Nurse, Vascular Access Team.

## 2017-05-30 NOTE — CONSULTS
Infectious Disease Consult    I am asked to see this patient by Dr. Trista Hernandez for advice/opinion related to evaluating abscess and osteomyelitis of a left diabetic foot. The patient was interviewed and examined. All available records were reviewed in detail. The patient is a 51 y.o. male admitted to [facility] on 5/27/2017 with several days of increasing pain, drainage, and swelling of his left foot. He is a 51 MBM,  from [place], [place]. He has DM, HTN, and dyslipidemia and is S/P a left TMA in 2014 secondary to infection. He developed chronic plantar ulcerations since then with Podiatric I&D on 2/24/27 followed by a second I&D and skin graft on 4/17/17 to try and facilitate wound closure and healing. He was admitted this time with gross infection of the foot and SIRS. He was taken to the OR on 5/28 by Dr. Trista Hernandez for I&D of an abscess and excision of an infected remaining 5th metatarsal. Both blood and wound cultures are growing MRSA to date. He has defervesced and clinically improved on vancomycin and Zosyn. WBC has fallen from 22,300 to 14,000, and he has been afebrile for over 24 hrs. Prior left foot wound cultures and operative specimens have grown a variety of pathogens including staph lugdensis, enterococci, pseudomonas aeruginosa, citrobacter freundii, and group B streptococci. He states that he was on antibiotics for several weeks over the last month or so. No antibiotic allergies. Comorbid conditions include: DH, HTN, GERD, dyslipidemia, Vit D deficiency, and foot infections as above. Medication list, past medical history, and social history were all reviewed. Impression:    1. 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  S/P I&D and excision of remaining 5th metatarsal.  Primary pathogen appears to be MRSA, but cultures not final and has had many pathogens isolated from left foot in recent past.    Clearly is a risk for limb loss, ie BKA in the near future if this progresses or recurs. Agree with current antibiotics pending final culture results. 2. Poorly controlled DM, ie HBA1C = 12.5    3. HTN. Plan:    Continue current IV antibiotics pending final culture results and MICHELLE data. Check baseline procalctonin. Will need prolonged IV antibiotics as an outpatient due to risk of ascending infection and potential limb loss. Agree with vascular evaluation and ABIs. Will follow with you.                Subjective:   Date of Consultation:  May 30, 2017  Referring Physician: Clifton Matias        Patient Active Problem List   Diagnosis Code    Diabetes ([address] E11.9    HTN (hypertension) I10    Hypercholesteremia E78.00    HTN (hypertension) I10    IDDM (insulin dependent diabetes mellitus) ([address] E11.9, Z79.4    Diabetic infection of right foot ([address] E11.69, L08.9    Edema of left lower extremity R60.0    Osteomyelitis ([address] M86.9    Acute osteomyelitis of metatarsal bone of left foot ([address] M86.172    Sepsis ([address] A41.9    ARF (acute renal failure) ([address] N17.9     Past Medical History:   Diagnosis Date    Diabetes ([address]     since age 23   Sharlene Sunil DM (diabetes mellitus) ([address]     HTN (hypertension)     Hypercholesteremia     Hyperlipidemia       Family History   Problem Relation Age of Onset    Hypertension Mother     High Cholesterol Mother       Social History   Substance Use Topics    Smoking status: Former Smoker     Types: Cigars     Quit date: 4/22/2011    Smokeless tobacco: Never Used    Alcohol use 0.0 oz/week     1 Glasses of wine, 0 Standard drinks or equivalent per week      Comment: stop drinking 5 months ago     Past Surgical History:   Procedure Laterality Date    HX AMPUTATION      toes- left foot    HX BUNIONECTOMY      HX ORTHOPAEDIC      boutinerre deformity    HX ORTHOPAEDIC      fascia removed left foot    HX OTHER SURGICAL  03/2017    Skin graft Surgery    HX TONSILLECTOMY      HX WISDOM TEETH EXTRACTION Prior to Admission medications    Medication Sig Start Date End Date Taking? Authorizing Provider   metFORMIN (GLUCOPHAGE) 500 mg tablet TAKE 1 TABLET BY MOUTH TWICE A DAY WITH MEALS AND INCREASE AS DIRECTED 5/10/17   Erasmo Campo MD   metoprolol tartrate (LOPRESSOR) 25 mg tablet Take 0.5 Tabs by mouth two (2) times a day. 3/7/17   Andre Roman MD   oxyCODONE-acetaminophen (PERCOCET 10)  mg per tablet Take 1 Tab by mouth every six (6) hours as needed. Max Daily Amount: 4 Tabs. 3/2/17   Erasmo Campo MD   gabapentin (NEURONTIN) 100 mg capsule Take 100 mg by mouth two (2) times daily as needed for Pain. Historical Provider   cholecalciferol (VITAMIN D3) 1,000 unit cap Take 2,000 Units by mouth daily. Trung Suazo MD   insulin lispro (HUMALOG) 100 unit/mL kwikpen 12 Units by SubCUTAneous route Before breakfast, lunch, and dinner. DX: E11.65 11/18/16   Erasmo Campo MD   insulin detemir (LEVEMIR FLEXTOUCH) 100 unit/mL (3 mL) inpn 0.6 mL by SubCUTAneous route nightly. 60 units QHS 10/6/16   Erasmo Campo MD   esomeprazole (NEXIUM) 40 mg capsule Take 1 Cap by mouth daily. 10/6/16   Erasmo Campo MD   sodium chloride (SALINE NASAL) 0.65 % nasal spray 1 [place] by Both Nostrils route as needed for Congestion. Patient taking differently: 1 Spray by Both Nostrils route as needed for Congestion (dry sinus symptoms, especially during winter time. ). 10/6/16   Andre Roman MD   lisinopril (PRINIVIL, ZESTRIL) 40 mg tablet Take 1 Tab by mouth daily. 5/24/16   Tad Khan MD   atorvastatin (LIPITOR) 40 mg tablet Take 1 Tab by mouth daily. 5/24/16   Tad Khan MD     Allergies   Allergen Reactions    Morphine Angioedema        Review of Systems:  Works on his feet all day. + fever to 103 on admission, now without significant fever. PICC in place. +fatigue/malasie. Merribeth Starla glands.  -Oral lesions.  -Photophobia. -Jaundice.   - SOB. - cough. - abdominal pain or tenderness.  - Nausea or vomiting.  - Diarrhea. + left foot pain, swelling and drainage. No headache or AMS. ROS otherwise negative with greater than 10 systems reviewed. Objective:   Blood pressure (!) 143/95, pulse 98, temperature 98.8 °F (37.1 °C), resp. rate 18, height 6' 3\" (1.905 m), weight 304 lb 3.2 oz (138 kg), SpO2 99 %. Temp (24hrs), Av.1 °F (37.3 °C), Min:98.7 °F (37.1 °C), Max:99.7 °F (37.6 °C)  Exam:    General:Minimally febrile and not toxic. No exanthem or enanthem. No peripheral adenopathy. Alert and oriented x3. All IV sites are clean and dry. HEENT: EOMI. Anicteric. Oral mucosa normal. Conjunctivae normal. Neck supple. No thrush. Chest: Lungs clear to A&P. Regular rhythm without murmurs. No chest wall tenderness. Abdomen: Soft without organomegaly, tenderness, or masses. Nondistended. Bowel sounds normal.  Musculoskeletal: Left foot dressed. No ascending cellulitis or lymphangitis. .  +edema. Nail beds normal.  Neurologic: Nonfocal exam.        Data Review: WBC = 14,000. H/H = 9.1/29. Plts = 271,000. Creat = 1.39. LA = 2.0. ESR = 49. HB A1C = 12.5. Cultures as per HPI. CXR neg. Plain film foot with osteomyelitis of 5th metatarsal remnant.       Current Facility-Administered Medications   Medication Dose Route Frequency    sodium chloride (NS) flush 10-30 mL  10-30 mL InterCATHeter PRN    sodium chloride (NS) flush 10 mL  10 mL InterCATHeter Q24H    sodium chloride (NS) flush 10 mL  10 mL InterCATHeter PRN    sodium chloride (NS) flush 10-40 mL  10-40 mL InterCATHeter Q8H    heparin (porcine) pf 300 Units  300 Units InterCATHeter PRN    [START ON 2017] VANCOMYCIN INFORMATION NOTE   Other DAILY    [START ON 2017] Vancomycin Random- Please draw with AM labs  1 Each Other ONCE    ALPRAZolam (XANAX) tablet 0.25 mg  0.25 mg Oral BID PRN    piperacillin-tazobactam (ZOSYN) 3.375 g in 0.9% sodium chloride (MBP/ADV) 100 mL  3.375 g IntraVENous Q8H  atorvastatin (LIPITOR) tablet 40 mg  40 mg Oral DAILY    pantoprazole (PROTONIX) tablet 40 mg  40 mg Oral ACB    gabapentin (NEURONTIN) capsule 100 mg  100 mg Oral BID PRN    metoprolol tartrate (LOPRESSOR) tablet 12.5 mg  12.5 mg Oral BID    0.9% sodium chloride infusion  100 mL/hr IntraVENous CONTINUOUS    acetaminophen (TYLENOL) tablet 650 mg  650 mg Oral Q4H PRN    oxyCODONE IR (ROXICODONE) tablet 10 mg  10 mg Oral Q4H PRN    HYDROmorphone (PF) (DILAUDID) injection 1 mg  1 mg IntraVENous Q6H PRN    ondansetron (ZOFRAN) injection 4 mg  4 mg IntraVENous Q6H PRN    insulin glargine (LANTUS) injection 60 Units  60 Units SubCUTAneous QHS    insulin lispro (HUMALOG) injection 10 Units  10 Units SubCUTAneous TIDAC    insulin lispro (HUMALOG) injection   SubCUTAneous QID AFTER MEALS    glucose chewable tablet 16 g  4 Tab Oral PRN    glucagon (GLUCAGEN) injection 1 mg  1 mg IntraMUSCular PRN    hydrALAZINE (APRESOLINE) 20 mg/mL injection 10 mg  10 mg IntraVENous Q6H PRN    dextrose 10 % infusion 125-250 mL  125-250 mL IntraVENous PRN    zolpidem (AMBIEN) tablet 5 mg  5 mg Oral QHS PRN            Signed By: Marquez Bell MD     May 30, 2017

## 2017-05-30 NOTE — PROGRESS NOTES
General Daily Progress Note    Admit Date: 5/27/2017  Hospital day 2    Subjective:     Patient has no complaint of n/v/cp/ wife at bedside.  Pt states he had spontaneous loss of bowel yesterday x2 denies any numbness or tingling in legs denies any back pain controlling bladder fine  S/w nursing no issues   Medication side effects: none    Current Facility-Administered Medications   Medication Dose Route Frequency    Vancomycin Trough  1 Each Other ONCE    ALPRAZolam (XANAX) tablet 0.25 mg  0.25 mg Oral BID PRN    vancomycin (VANCOCIN) 1750 mg in  ml infusion  1,750 mg IntraVENous Q16H    piperacillin-tazobactam (ZOSYN) 3.375 g in 0.9% sodium chloride (MBP/ADV) 100 mL  3.375 g IntraVENous Q8H    atorvastatin (LIPITOR) tablet 40 mg  40 mg Oral DAILY    pantoprazole (PROTONIX) tablet 40 mg  40 mg Oral ACB    gabapentin (NEURONTIN) capsule 100 mg  100 mg Oral BID PRN    metoprolol tartrate (LOPRESSOR) tablet 12.5 mg  12.5 mg Oral BID    0.9% sodium chloride infusion  100 mL/hr IntraVENous CONTINUOUS    acetaminophen (TYLENOL) tablet 650 mg  650 mg Oral Q4H PRN    oxyCODONE IR (ROXICODONE) tablet 10 mg  10 mg Oral Q4H PRN    HYDROmorphone (PF) (DILAUDID) injection 1 mg  1 mg IntraVENous Q6H PRN    ondansetron (ZOFRAN) injection 4 mg  4 mg IntraVENous Q6H PRN    insulin glargine (LANTUS) injection 60 Units  60 Units SubCUTAneous QHS    insulin lispro (HUMALOG) injection 10 Units  10 Units SubCUTAneous TIDAC    insulin lispro (HUMALOG) injection   SubCUTAneous QID AFTER MEALS    glucose chewable tablet 16 g  4 Tab Oral PRN    glucagon (GLUCAGEN) injection 1 mg  1 mg IntraMUSCular PRN    hydrALAZINE (APRESOLINE) 20 mg/mL injection 10 mg  10 mg IntraVENous Q6H PRN    dextrose 10 % infusion 125-250 mL  125-250 mL IntraVENous PRN    zolpidem (AMBIEN) tablet 5 mg  5 mg Oral QHS PRN        Review of Systems  A comprehensive review of systems was negative except for: Musculoskeletal: positive for bone pain    Objective:     Patient Vitals for the past 8 hrs:   BP Temp Pulse Resp SpO2 Weight   05/30/17 0339 144/88 99.2 °F (37.3 °C) 95 18 99 % -   05/30/17 0313 - - - - - 304 lb 3.2 oz (138 kg)   05/29/17 2335 127/79 99.7 °F (37.6 °C) 95 18 98 % -     05/29 1901 - 05/30 0700  In: 4441.7 [P.O.:170; I.V.:4271.7]  Out: 1100 [Urine:1100]  05/28 0701 - 05/29 1900  In: 4086.7 [I.V.:4086.7]  Out: -     Physical Exam: General appearance: alert, fatigued, cooperative, no distress, appears stated age  Lungs: clear to auscultation bilaterally  Heart: regular rate and rhythm  Abdomen: soft, non-tender.  Bowel sounds normal. No masses,  no organomegaly no cva tenderness no spinal tenderness of cervical thru lumbar spine   Extremities: full rom left foot bandaged did not remove per  foot this am   Neurologic: Grossly normal           Data Review   Recent Results (from the past 24 hour(s))   GLUCOSE, POC    Collection Time: 05/29/17  7:40 AM   Result Value Ref Range    Glucose (POC) 91 65 - 100 mg/dL    Performed by COOPER LOUIE(CON)    GLUCOSE, POC    Collection Time: 05/29/17 11:05 AM   Result Value Ref Range    Glucose (POC) 175 (H) 65 - 100 mg/dL    Performed by COOPER LOUIE(CON)    GLUCOSE, POC    Collection Time: 05/29/17  4:14 PM   Result Value Ref Range    Glucose (POC) 130 (H) 65 - 100 mg/dL    Performed by COOPER LOUIE(CON)    GLUCOSE, POC    Collection Time: 05/29/17  8:47 PM   Result Value Ref Range    Glucose (POC) 207 (H) 65 - 100 mg/dL    Performed by [address] [place] [address]    Collection Time: 05/30/17  2:55 AM   Result Value Ref Range    Sodium 143 136 - 145 mmol/L    Potassium 3.2 (L) 3.5 - 5.1 mmol/L    Chloride 108 97 - 108 mmol/L    CO2 27 21 - 32 mmol/L    Anion gap 8 5 - 15 mmol/L    Glucose 134 (H) 65 - 100 mg/dL    BUN 10 6 - 20 MG/DL    Creatinine 1.39 (H) 0.70 - 1.30 MG/DL    BUN/Creatinine ratio 7 (L) 12 - 20      GFR est AA >60 >60 ml/min/1.73m2    GFR est non-AA 55 (L) >60 ml/min/1.73m2    Calcium 7.7 (L) 8.5 - 10.1 MG/DL           Assessment:     Active Problems:    HTN (hypertension) (7/23/2013)      Diabetes ([facility] ()      Overview: since age 23      Osteomyelitis ([facility] (2/24/2017)      Sepsis ([facility] (5/27/2017)      ARF (acute renal failure) ([facility] (5/27/2017)        Plan:   POD #2  Surgery done yesterday reviewed OR note abscess and cellulitis of left foot with OM of the left 5th metatarsal would be good candidate for HBO d/w pt   Bone and wound cx pending will  need long term IV abx will clarify with podiatry will need picc  bs under adequate control   Monitor spontaneous  loss of stool if continues will need MRI asked nursing staff to contact me if it should occur    Lay Saavedra M.D.   Family Medicine [phone]

## 2017-05-30 NOTE — PROGRESS NOTES
7:41 AM  Received bedside report from Jesus 10    1120 AM    infromed  2[address] Sun[address] Reddy about the critical Vanc trough. MD ordered to inform Pharmacy. Pharmacy notified. Pharmacy ordered to stop the medication for now. 4:41 PM    There is no wound care order for the L foot after the debribment was done. Verbal message has been passed down by the nurses that only reinforcement needs to be done if the dressing is wet. 1360 Eli Reddy SHIFT NURSING NOTE    Bedside and Verbal shift change report given to Edyta Reaves RN (oncoming nurse) by Wen Swift (offgoing nurse).  Report included the following information SBAR, Kardex, Recent Results and Cardiac Rhythm NSR.    SHIFT SUMMARY:         Admission Date 5/27/2017   Admission Diagnosis Sepsis ([address]  infected foot  Left foot infection   Consults IP CONSULT TO PODIATRY  IP CONSULT TO PRIMARY CARE PROVIDER  IP CONSULT TO INFECTIOUS DISEASES  IP CONSULT TO VASCULAR SURGERY        Consults   [x] PT   [x] OT   [] Speech   [] Palliative      [] Hospice    [x] Case Management   [] None   Cardiac Monitoring   [x] Yes   [] No     Antibiotics   [x] Yes   [] No   GI Prophylaxis  (Ex: Protonix, Pepcid, etc,.)   [] Yes   [x] No          DVT Prophylaxis   SCDs:  Sequential Compression Device: Bilateral     Patient Refused VTE Prophylaxis: Yes    Jeff stockings:         [] Medication (Ex: Lovenox, Eliquis, Brilinta, Coumadin,  Heparin, etc..)   [] Contraindicated   [] No VTE needed       Urinary Catheter             LDAs         PICC Double Lumen 46/15/81 Left;Basilic (Active)   Central Line Being Utilized Yes 5/30/2017  3:30 PM   Criteria for Appropriate Use Long term IV/antibiotic administration 5/30/2017  3:30 PM   Site Assessment Clean, dry, & intact 5/30/2017  3:30 PM   Phlebitis Assessment 0 5/30/2017 10:07 AM   Infiltration Assessment 0 5/30/2017 10:07 AM   Arm Circumference (cm) 39 cm 5/30/2017 10:07 AM   Date of Last Dressing Change 05/30/17 5/30/2017 10:07 AM   Dressing Status Clean, dry, & intact 5/30/2017  3:30 PM   Action Taken Other (comment) 5/30/2017 10:07 AM   External Catheter Length (cm) 0 centimeters 5/30/2017 10:07 AM   Dressing Type Disk with Chlorhexadine gluconate (CHG) 5/30/2017  3:30 PM   Hub Color/Line Status Purple; Infusing 5/30/2017  3:30 PM   Positive Blood Return (Site #1) Yes 5/30/2017  3:30 PM   Hub Color/Line Status Red;Flushed 5/30/2017  3:30 PM   Positive Blood Return (Site #2) Yes 5/30/2017  3:30 PM   Alcohol Cap Used Yes 5/30/2017 10:07 AM                            I/Os   Intake/Output Summary (Last 24 hours) at 05/30/17 1841  Last data filed at 05/30/17 1719   Gross per 24 hour   Intake          5041.67 ml   Output             3100 ml   Net          1941.67 ml         Activity Level Activity Level: Head of bed elevated (degrees)     Activity Assistance: Partial (one person)   Diet Active Orders   Diet    DIET DIABETIC CONSISTENT CARB Regular      Purposeful Rounding every 1-2 hour? [x] Yes    Lee Score  Total Score: 2   Bed Alarm (If score 3 or >)   [] Yes    [] Refused (See signed refusal form in chart)   Noé Score  Noé Score: 19       Noé Score (if score 14 or less)   [] PMT consult   [] Nutrition consult   [x] Wound Care consult      [x]  Specialty bed         Influenza Vaccine Received Flu Vaccine for Current Season (usually Sept-March): Not Flu Season               Needs prior to discharge:   Home O2 required:    [] Yes   [x] No     If yes, how much O2 required?     Other:    Last Bowel Movement Date: 05/29/17   Readmission Risk Assessment Tool Score Low Risk            10       Total Score        3 Relationship with PCP    2 Patient Living Status    4 More than 1 Admission in calendar year    1 Charlson Comorbidity Score        Criteria that do not apply:    Patient Length of Stay > 5    Patient Insurance is Medicare, Medicaid or Self Pay       Expected Length of Stay 6d 9h   Actual Length of Stay 3

## 2017-05-30 NOTE — PROGRESS NOTES
1930 Received report from [facility], [address] [place] [address]. Assumed patient care. 1360 [facility] SHIFT NURSING NOTE    Bedside shift change report given to Karl Rodriguez RN (oncoming nurse) by Rajani Gibson RN (offgoing nurse). Report included the following information SBAR, Kardex, Intake/Output, MAR, Med Rec Status and Cardiac Rhythm NSR-STach. SHIFT SUMMARY:         Admission Date 5/27/2017   Admission Diagnosis Sepsis ([address]  infected foot  Left foot infection   Consults IP CONSULT TO PODIATRY  IP CONSULT TO PRIMARY CARE PROVIDER  IP CONSULT TO INFECTIOUS DISEASES  IP CONSULT TO VASCULAR SURGERY        Consults   [x] PT   [x] OT   [] Speech   [] Palliative      [] Hospice    [] Case Management   [] None   Cardiac Monitoring   [x] Yes   [] No     Antibiotics   [x] Yes   [] No   GI Prophylaxis  (Ex: Protonix, Pepcid, etc,.)   [x] Yes   [] No          DVT Prophylaxis   SCDs:  Sequential Compression Device: Bilateral     Patient Refused VTE Prophylaxis: Yes    Jeff stockings:         [] Medication (Ex: Lovenox, Eliquis, Brilinta, Coumadin,  Heparin, etc..)   [] Contraindicated   [] No VTE needed       Urinary Catheter             LDAs           Triple Lumen 05/28/17 Subclavian (Active)   Central Line Being Utilized Yes 5/29/2017  8:00 PM   Criteria for Appropriate Use Limited/no vessel suitable for conventional peripheral access 5/29/2017  8:00 PM   Site Assessment Clean, dry, & intact 5/29/2017  8:00 PM   Infiltration Assessment 0 5/29/2017  8:00 PM   Affected Extremity/Extremities Color distal to insertion site pink (or appropriate for race) 5/29/2017  8:00 PM   Date of Last Dressing Change 05/28/17 5/29/2017  8:00 PM   Dressing Status Clean, dry, & intact 5/29/2017  8:00 PM   Dressing Type Disk with Chlorhexadine gluconate (CHG) 5/29/2017  8:00 PM   Proximal Hub Color/Line Status White;Patent; Infusing 5/29/2017  8:00 PM   Positive Blood Return (Medial Site) Yes 5/29/2017  8:00 PM   Medial Hub Color/Line Status Blue;Flushed;Patent 5/29/2017 8:00 PM   Positive Blood Return (Lateral Site) Yes 5/29/2017  8:00 PM   Distal Hub Color/Line Status Brown;Flushed;Patent 5/29/2017  8:00 PM   Positive Blood Return (Site #3) Yes 5/29/2017  8:00 PM   Alcohol Cap Used Yes 5/29/2017  8:00 PM                          I/Os   Intake/Output Summary (Last 24 hours) at 05/30/17 0225  Last data filed at 05/29/17 2122   Gross per 24 hour   Intake             4670 ml   Output                0 ml   Net             4670 ml         Activity Level Activity Level: Head of bed elevated (degrees)     Activity Assistance: Partial (one person)   Diet Active Orders   Diet    DIET DIABETIC CONSISTENT CARB Regular      Purposeful Rounding every 1-2 hour? [x] Yes    Lee Score  Total Score: 1   Bed Alarm (If score 3 or >)   [x] Yes    [] Refused (See signed refusal form in chart)   Noé Score  Noé Score: 18       Noé Score (if score 14 or less)   [] PMT consult   [] Nutrition consult   [x] Wound Care consult      []  Specialty bed         Influenza Vaccine Received Flu Vaccine for Current Season (usually Sept-March): Not Flu Season               Needs prior to discharge:   Home O2 required:    [] Yes   [x] No     If yes, how much O2 required?     Other:    Last Bowel Movement Date: 05/29/17   Readmission Risk Assessment Tool Score Low Risk            10       Total Score        3 Relationship with PCP    2 Patient Living Status    4 More than 1 Admission in calendar year    1 Charlson Comorbidity Score        Criteria that do not apply:    Patient Length of Stay > 5    Patient Insurance is Medicare, Medicaid or Self Pay       Expected Length of Stay - - -   Actual Length of Stay 3

## 2017-05-31 LAB
ANION GAP BLD CALC-SCNC: 7 MMOL/L (ref 5–15)
BACTERIA SPEC CULT: ABNORMAL
BACTERIA SPEC CULT: NORMAL
BASOPHILS # BLD AUTO: 0.1 K/UL (ref 0–0.1)
BASOPHILS # BLD: 1 % (ref 0–1)
BUN SERPL-MCNC: 9 MG/DL (ref 6–20)
BUN/CREAT SERPL: 7 (ref 12–20)
CALCIUM SERPL-MCNC: 8 MG/DL (ref 8.5–10.1)
CHLORIDE SERPL-SCNC: 109 MMOL/L (ref 97–108)
CO2 SERPL-SCNC: 24 MMOL/L (ref 21–32)
CREAT SERPL-MCNC: 1.21 MG/DL (ref 0.7–1.3)
DIFFERENTIAL METHOD BLD: ABNORMAL
EOSINOPHIL # BLD: 0.3 K/UL (ref 0–0.4)
EOSINOPHIL NFR BLD: 3 % (ref 0–7)
ERYTHROCYTE [DISTWIDTH] IN BLOOD BY AUTOMATED COUNT: 15.6 % (ref 11.5–14.5)
GLUCOSE BLD STRIP.AUTO-MCNC: 112 MG/DL (ref 65–100)
GLUCOSE BLD STRIP.AUTO-MCNC: 121 MG/DL (ref 65–100)
GLUCOSE BLD STRIP.AUTO-MCNC: 87 MG/DL (ref 65–100)
GLUCOSE BLD STRIP.AUTO-MCNC: 96 MG/DL (ref 65–100)
GLUCOSE SERPL-MCNC: 75 MG/DL (ref 65–100)
GRAM STN SPEC: ABNORMAL
GRAM STN SPEC: ABNORMAL
HCT VFR BLD AUTO: 23 % (ref 36.6–50.3)
HGB BLD-MCNC: 7.4 G/DL (ref 12.1–17)
LYMPHOCYTES # BLD AUTO: 30 % (ref 12–49)
LYMPHOCYTES # BLD: 2.6 K/UL (ref 0.8–3.5)
MCH RBC QN AUTO: 23.5 PG (ref 26–34)
MCHC RBC AUTO-ENTMCNC: 32.2 G/DL (ref 30–36.5)
MCV RBC AUTO: 73 FL (ref 80–99)
MONOCYTES # BLD: 0.6 K/UL (ref 0–1)
MONOCYTES NFR BLD AUTO: 7 % (ref 5–13)
NEUTS SEG # BLD: 5.2 K/UL (ref 1.8–8)
NEUTS SEG NFR BLD AUTO: 59 % (ref 32–75)
PLATELET # BLD AUTO: 289 K/UL (ref 150–400)
PLATELET COMMENTS,PCOM: ABNORMAL
POTASSIUM SERPL-SCNC: 3.1 MMOL/L (ref 3.5–5.1)
RBC # BLD AUTO: 3.15 M/UL (ref 4.1–5.7)
RBC MORPH BLD: ABNORMAL
SERVICE CMNT-IMP: ABNORMAL
SERVICE CMNT-IMP: NORMAL
SODIUM SERPL-SCNC: 140 MMOL/L (ref 136–145)
VANCOMYCIN SERPL-MCNC: 8.1 UG/ML
WBC # BLD AUTO: 8.8 K/UL (ref 4.1–11.1)

## 2017-05-31 PROCEDURE — 74011250636 HC RX REV CODE- 250/636: Performed by: INTERNAL MEDICINE

## 2017-05-31 PROCEDURE — 80202 ASSAY OF VANCOMYCIN: CPT | Performed by: FAMILY MEDICINE

## 2017-05-31 PROCEDURE — 93922 UPR/L XTREMITY ART 2 LEVELS: CPT

## 2017-05-31 PROCEDURE — 80048 BASIC METABOLIC PNL TOTAL CA: CPT | Performed by: INTERNAL MEDICINE

## 2017-05-31 PROCEDURE — 74011000258 HC RX REV CODE- 258: Performed by: INTERNAL MEDICINE

## 2017-05-31 PROCEDURE — 77030022298 HC DRSG ANTIMIC ACTICT S&N -A

## 2017-05-31 PROCEDURE — 82962 GLUCOSE BLOOD TEST: CPT

## 2017-05-31 PROCEDURE — 65660000000 HC RM CCU STEPDOWN

## 2017-05-31 PROCEDURE — 77030018836 HC SOL IRR NACL ICUM -A

## 2017-05-31 PROCEDURE — 77030019934 HC DRSG VAC ASST KCON -B

## 2017-05-31 PROCEDURE — 77030025162 HC DRSG VAC ASST 1 KCON -B

## 2017-05-31 PROCEDURE — 36415 COLL VENOUS BLD VENIPUNCTURE: CPT | Performed by: INTERNAL MEDICINE

## 2017-05-31 PROCEDURE — 74011250637 HC RX REV CODE- 250/637: Performed by: INTERNAL MEDICINE

## 2017-05-31 PROCEDURE — 74011636637 HC RX REV CODE- 636/637: Performed by: INTERNAL MEDICINE

## 2017-05-31 PROCEDURE — 85025 COMPLETE CBC W/AUTO DIFF WBC: CPT | Performed by: INTERNAL MEDICINE

## 2017-05-31 PROCEDURE — 84145 PROCALCITONIN (PCT): CPT | Performed by: INTERNAL MEDICINE

## 2017-05-31 RX ORDER — METOPROLOL TARTRATE 25 MG/1
TABLET, FILM COATED ORAL
Qty: 30 TAB | Refills: 0 | Status: SHIPPED | OUTPATIENT
Start: 2017-05-31 | End: 2017-07-26

## 2017-05-31 RX ORDER — VANCOMYCIN 1.75 GRAM/500 ML IN 0.9 % SODIUM CHLORIDE INTRAVENOUS
1750
Status: DISCONTINUED | OUTPATIENT
Start: 2017-05-31 | End: 2017-05-31

## 2017-05-31 RX ADMIN — PIPERACILLIN SODIUM,TAZOBACTAM SODIUM 3.38 G: 3; .375 INJECTION, POWDER, FOR SOLUTION INTRAVENOUS at 15:10

## 2017-05-31 RX ADMIN — METOPROLOL TARTRATE 12.5 MG: 25 TABLET ORAL at 17:37

## 2017-05-31 RX ADMIN — ATORVASTATIN CALCIUM 40 MG: 40 TABLET, FILM COATED ORAL at 08:57

## 2017-05-31 RX ADMIN — OXYCODONE HYDROCHLORIDE 10 MG: 5 TABLET ORAL at 14:02

## 2017-05-31 RX ADMIN — HYDROMORPHONE HYDROCHLORIDE 1 MG: 1 INJECTION, SOLUTION INTRAMUSCULAR; INTRAVENOUS; SUBCUTANEOUS at 21:19

## 2017-05-31 RX ADMIN — PANTOPRAZOLE SODIUM 40 MG: 40 TABLET, DELAYED RELEASE ORAL at 09:01

## 2017-05-31 RX ADMIN — Medication 10 ML: at 21:18

## 2017-05-31 RX ADMIN — INSULIN LISPRO 10 UNITS: 100 INJECTION, SOLUTION INTRAVENOUS; SUBCUTANEOUS at 07:30

## 2017-05-31 RX ADMIN — INSULIN LISPRO 10 UNITS: 100 INJECTION, SOLUTION INTRAVENOUS; SUBCUTANEOUS at 17:36

## 2017-05-31 RX ADMIN — VANCOMYCIN HYDROCHLORIDE 1750 MG: 10 INJECTION, POWDER, LYOPHILIZED, FOR SOLUTION INTRAVENOUS at 14:09

## 2017-05-31 RX ADMIN — HYDROMORPHONE HYDROCHLORIDE 1 MG: 1 INJECTION, SOLUTION INTRAMUSCULAR; INTRAVENOUS; SUBCUTANEOUS at 15:09

## 2017-05-31 RX ADMIN — Medication 10 ML: at 09:11

## 2017-05-31 RX ADMIN — PIPERACILLIN SODIUM,TAZOBACTAM SODIUM 3.38 G: 3; .375 INJECTION, POWDER, FOR SOLUTION INTRAVENOUS at 06:17

## 2017-05-31 RX ADMIN — SODIUM CHLORIDE 100 ML/HR: 900 INJECTION, SOLUTION INTRAVENOUS at 03:16

## 2017-05-31 RX ADMIN — OXYCODONE HYDROCHLORIDE 10 MG: 5 TABLET ORAL at 18:51

## 2017-05-31 RX ADMIN — Medication 10 ML: at 06:18

## 2017-05-31 RX ADMIN — Medication 10 ML: at 14:12

## 2017-05-31 RX ADMIN — HYDROMORPHONE HYDROCHLORIDE 1 MG: 1 INJECTION, SOLUTION INTRAMUSCULAR; INTRAVENOUS; SUBCUTANEOUS at 03:08

## 2017-05-31 RX ADMIN — HYDROMORPHONE HYDROCHLORIDE 1 MG: 1 INJECTION, SOLUTION INTRAMUSCULAR; INTRAVENOUS; SUBCUTANEOUS at 09:08

## 2017-05-31 RX ADMIN — METOPROLOL TARTRATE 12.5 MG: 25 TABLET ORAL at 08:59

## 2017-05-31 RX ADMIN — INSULIN GLARGINE 60 UNITS: 100 INJECTION, SOLUTION SUBCUTANEOUS at 21:18

## 2017-05-31 NOTE — PROGRESS NOTES
General Daily Progress Note    Admit Date: 5/27/2017  Hospital day 2    Subjective:     Patient has no complaint of n/v/cp/ wife at bedside.  Pt states he had spontaneous loss of bowel yesterday x2 denies any numbness or tingling in legs denies any back pain controlling bladder fine  S/w nursing no issues   Medication side effects: none    Current Facility-Administered Medications   Medication Dose Route Frequency    sodium chloride (NS) flush 10-30 mL  10-30 mL InterCATHeter PRN    sodium chloride (NS) flush 10 mL  10 mL InterCATHeter Q24H    sodium chloride (NS) flush 10 mL  10 mL InterCATHeter PRN    sodium chloride (NS) flush 10-40 mL  10-40 mL InterCATHeter Q8H    heparin (porcine) pf 300 Units  300 Units InterCATHeter PRN    VANCOMYCIN INFORMATION NOTE   Other DAILY    ALPRAZolam (XANAX) tablet 0.25 mg  0.25 mg Oral BID PRN    piperacillin-tazobactam (ZOSYN) 3.375 g in 0.9% sodium chloride (MBP/ADV) 100 mL  3.375 g IntraVENous Q8H    atorvastatin (LIPITOR) tablet 40 mg  40 mg Oral DAILY    pantoprazole (PROTONIX) tablet 40 mg  40 mg Oral ACB    gabapentin (NEURONTIN) capsule 100 mg  100 mg Oral BID PRN    metoprolol tartrate (LOPRESSOR) tablet 12.5 mg  12.5 mg Oral BID    0.9% sodium chloride infusion  100 mL/hr IntraVENous CONTINUOUS    acetaminophen (TYLENOL) tablet 650 mg  650 mg Oral Q4H PRN    oxyCODONE IR (ROXICODONE) tablet 10 mg  10 mg Oral Q4H PRN    HYDROmorphone (PF) (DILAUDID) injection 1 mg  1 mg IntraVENous Q6H PRN    ondansetron (ZOFRAN) injection 4 mg  4 mg IntraVENous Q6H PRN    insulin glargine (LANTUS) injection 60 Units  60 Units SubCUTAneous QHS    insulin lispro (HUMALOG) injection 10 Units  10 Units SubCUTAneous TIDAC    insulin lispro (HUMALOG) injection   SubCUTAneous QID AFTER MEALS    glucose chewable tablet 16 g  4 Tab Oral PRN    glucagon (GLUCAGEN) injection 1 mg  1 mg IntraMUSCular PRN    hydrALAZINE (APRESOLINE) 20 mg/mL injection 10 mg  10 mg IntraVENous Q6H PRN    dextrose 10 % infusion 125-250 mL  125-250 mL IntraVENous PRN    zolpidem (AMBIEN) tablet 5 mg  5 mg Oral QHS PRN        Review of Systems  A comprehensive review of systems was negative except for: Musculoskeletal: positive for bone pain    Objective:     Patient Vitals for the past 8 hrs:   BP Temp Pulse Resp SpO2   05/31/17 0318 158/85 98.2 °F (36.8 °C) 82 20 98 %        05/29 1901 - 05/31 0700  In: 5041.7 [P.O.:770; I.V.:4271.7]  Out: 3700 [Urine:3700]    Physical Exam: General appearance: alert, fatigued, cooperative, no distress, appears stated age  Lungs: clear to auscultation bilaterally  Heart: regular rate and rhythm  Abdomen: soft, non-tender.  Bowel sounds normal. No masses,  no organomegaly no cva tenderness no spinal tenderness of cervical thru lumbar spine   Extremities: full rom left foot bandaged did not remove per  foot this am   Neurologic: Grossly normal           Data Review   Recent Results (from the past 24 hour(s))   GLUCOSE, POC    Collection Time: 05/30/17  7:20 AM   Result Value Ref Range    Glucose (POC) 96 65 - 100 mg/dL    Performed by Carmelina Machado, TROUGH    Collection Time: 05/30/17  8:53 AM   Result Value Ref Range    Vancomycin,trough 34.9 (HH) 5.0 - 10.0 ug/mL    Reported dose date: NOT PROVIDED      Reported dose time: NOT PROVIDED      Reported dose: NOT PROVIDED UNITS   GLUCOSE, POC    Collection Time: 05/30/17 10:59 AM   Result Value Ref Range    Glucose (POC) 164 (H) 65 - 100 mg/dL    Performed by [address], POC    Collection Time: 05/30/17  5:00 PM   Result Value Ref Range    Glucose (POC) 107 (H) 65 - 100 mg/dL    Performed by [address], POC    Collection Time: 05/30/17  8:16 PM   Result Value Ref Range    Glucose (POC) 142 (H) 65 - 100 mg/dL    Performed by Melinda Post    CBC WITH AUTOMATED DIFF    Collection Time: 05/31/17  3:00 AM   Result Value Ref Range    WBC 8.8 4.1 - 11.1 K/uL    RBC 3.15 (L) 4.10 - 5.70 M/uL    HGB 7.4 (L) 12.1 - 17.0 g/dL    HCT 23.0 (L) 36.6 - 50.3 %    MCV 73.0 (L) 80.0 - 99.0 FL    MCH 23.5 (L) 26.0 - 34.0 PG    MCHC 32.2 30.0 - 36.5 g/dL    RDW 15.6 (H) 11.5 - 14.5 %    PLATELET 060 001 - 181 K/uL    NEUTROPHILS 59 32 - 75 %    LYMPHOCYTES 30 12 - 49 %    MONOCYTES 7 5 - 13 %    EOSINOPHILS 3 0 - 7 %    BASOPHILS 1 0 - 1 %    ABS. NEUTROPHILS 5.2 1.8 - 8.0 K/UL    ABS. LYMPHOCYTES 2.6 0.8 - 3.5 K/UL    ABS. MONOCYTES 0.6 0.0 - 1.0 K/UL    ABS. EOSINOPHILS 0.3 0.0 - 0.4 K/UL    ABS. BASOPHILS 0.1 0.0 - 0.1 K/UL    PLATELET COMMENTS LARGE PLATELETS      RBC COMMENTS MICROCYTOSIS  PRESENT        DF SMEAR SCANNED     METABOLIC PANEL, BASIC    Collection Time: 05/31/17  3:00 AM   Result Value Ref Range    Sodium 140 136 - 145 mmol/L    Potassium 3.1 (L) 3.5 - 5.1 mmol/L    Chloride 109 (H) 97 - 108 mmol/L    CO2 24 21 - 32 mmol/L    Anion gap 7 5 - 15 mmol/L    Glucose 75 65 - 100 mg/dL    BUN 9 6 - 20 MG/DL    Creatinine 1.21 0.70 - 1.30 MG/DL    BUN/Creatinine ratio 7 (L) 12 - 20      GFR est AA >60 >60 ml/min/1.73m2    GFR est non-AA >60 >60 ml/min/1.73m2    Calcium 8.0 (L) 8.5 - 10.1 MG/DL           Assessment:     Active Problems:    HTN (hypertension) (7/23/2013)      Diabetes ([facility]) ()      Overview: since age 23      Osteomyelitis ([facility] (2/24/2017)      Sepsis ([facility] (5/27/2017)      ARF (acute renal failure) ([facility] (5/27/2017)        Plan:   POD  Sreviewed OR note abscess and cellulitis of left foot with OM of the left 5th metatarsal would be good candidate for HBO d/w pt   Bone and wound cx pending will  need long term IV abx picc placed   bs under adequate control   Monitor spontaneous  loss of stool if continues will need MRI asked nursing staff to contact me if it should occur    Akbar May M.D.   Family Medicine [phone]

## 2017-05-31 NOTE — PROGRESS NOTES
[facility] Vascular  Preliminary Report: Ankle Brachial Index    Pressure (mmHg) Right    Left    Brachial:  154   150  Ankle PTA:  nc   nc  Ankle DPA:  Nc   nc  Great Toe:  117       Waveform:  Right   Left  CFA:       Popliteal:  Triphasic  Triphasic  PTA:   Triphasic  Triphasic  DPA:   Triphasic  Triphasic  Great Toe:  Pulsatile      Right JESSICA:   nc  Left JESSICA: nc  Right TBI: 0.76  Left TBI: Toes amp. Final report to follow.     CFA = Common Femoral Artery  PTA = Posterior Tibial Artery  DPA = Dorsalis Pedis Artery  JESSICA = Ankle Brachial Index  TBI = Toe Brachial Index  NC = Non-compressible

## 2017-05-31 NOTE — PROGRESS NOTES
Pharmacy Automatic Renal Dosing Protocol - Antimicrobials    Indication for Antimicrobials: SSTI; s/p debridement of left foot, possible osteomyelitis  Current Regimen of Each Antimicrobial (Start Day & Day of Therapy):  Vancomycin 3000 mg IV x 1, following 1750 mg IV Q16H (Start 17 - Day 5)  Zosyn 3.375 g IV Q8H (Start 17 - Day 5)    Goal Vancomycin Level (if needed): 15-20 mcg/mL  Level(s) Ordered (if needed): None at this time  Measured / Extrapolated Vancomycin Levels (if drawn):   : 34.9   / 33.4  : 8.1    Significant Cultures:   17 - xray foot shows possible osteomyelitis involving the remnant of the fifth  metatarsal..  17 Blood - MRSA  FINAL  17 Anaerobic x 2 - possible anaerobes 17 Tissue - MRSA FINAL  17 Wound - MRSA FINAL  CAPD, Hemodialysis or Renal Replacement Therapy: None   Paralysis, amputations, malnutrition: None  Recent Labs      17   0300  17   0255   CREA  1.21  1.39*   BUN  9  10   WBC  8.8   --      Temp (24hrs), Av.4 °F (36.9 °C), Min:97.8 °F (36.6 °C), Max:98.8 °F (37.1 °C)    Creatinine Clearance (Creatinine Clearance (ml/min)): 90 mL/min      Impression/Plan: Will resume vancomycin every 16 hours as the original dosing regimen had gotten off schedule which likely resulted in the elevated trough then the low trough. Will check a trough as soon as is appropriate. Pharmacy will follow daily and adjust medications as appropriate for renal function and/or serum levels.     Thank you,  Mike Guzman, PHARMD

## 2017-05-31 NOTE — PROGRESS NOTES
ID Progress Note    Subjective:   Daily Progress Note: 2017 3:23 PM    DX:     1. 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 S/P I&D and excision of remaining 5th metatarsal. Primary pathogen appears to be MRSA,. 2. MRSA bacteremia. 3.  DM,              Review of Systems: Totally afebrile now. Feels better. Wound vac in place. Sherill Aver glands. -Oral lesions. -Photophobia. -Jaundice. - SOB. - cough. - abdominal pain or tenderness. - Nausea or vomiting. - Diarrhea. + left foot pain. No headache or AMS. ROS otherwise negative. CURRENT ANTIMICROBIALS: vancomycin and Zosyn. Objective:     Visit Vitals    BP (!) 141/95 (BP 1 Location: Left arm, BP Patient Position: At rest)    Pulse 93    Temp 98.7 °F (37.1 °C)    Resp 18    Ht 6' 3\" (1.905 m)    Wt 304 lb 3.2 oz (138 kg)    SpO2 95%    BMI 38.02 kg/m2    O2 Flow Rate (L/min): 2 l/min O2 Device: Room air    Temp (24hrs), Av.4 °F (36.9 °C), Min:97.8 °F (36.6 °C), Max:98.8 °F (37.1 °C)  Exam:   General:Afebrile and not toxic. No exanthem or enanthem. No peripheral adenopathy. Alert and oriented x3. All IV sites are clean and dry. HEENT: EOMI. Anicteric. Oral mucosa normal. Conjunctivae normal. Neck supple. No thrush. Chest: Lungs clear to A&P. Regular rhythm without murmurs. No chest wall tenderness. Abdomen: Soft without organomegaly, tenderness, or masses. Nondistended. Bowel sounds normal.  Musculoskeletal: Left foot with wound vac in place. + edema. Neurologic: Nonfocal exam      Data Review: Cultures of foot and blood + for MRSA, sensitive to ceftaroline. WBC = 8800. Creat = 1.21.       Recent Results (from the past 24 hour(s))   GLUCOSE, POC    Collection Time: 17  5:00 PM   Result Value Ref Range    Glucose (POC) 107 (H) 65 - 100 mg/dL    Performed by Mike Smart 86, POC    Collection Time: 17  8:16 PM   Result Value Ref Range    Glucose (POC) 142 (H) 65 - 100 mg/dL Performed by Sula Najjar, RANDOM    Collection Time: 05/31/17  3:00 AM   Result Value Ref Range    Vancomycin, random 8.1 UG/ML   CBC WITH AUTOMATED DIFF    Collection Time: 05/31/17  3:00 AM   Result Value Ref Range    WBC 8.8 4.1 - 11.1 K/uL    RBC 3.15 (L) 4.10 - 5.70 M/uL    HGB 7.4 (L) 12.1 - 17.0 g/dL    HCT 23.0 (L) 36.6 - 50.3 %    MCV 73.0 (L) 80.0 - 99.0 FL    MCH 23.5 (L) 26.0 - 34.0 PG    MCHC 32.2 30.0 - 36.5 g/dL    RDW 15.6 (H) 11.5 - 14.5 %    PLATELET 355 396 - 176 K/uL    NEUTROPHILS 59 32 - 75 %    LYMPHOCYTES 30 12 - 49 %    MONOCYTES 7 5 - 13 %    EOSINOPHILS 3 0 - 7 %    BASOPHILS 1 0 - 1 %    ABS. NEUTROPHILS 5.2 1.8 - 8.0 K/UL    ABS. LYMPHOCYTES 2.6 0.8 - 3.5 K/UL    ABS. MONOCYTES 0.6 0.0 - 1.0 K/UL    ABS. EOSINOPHILS 0.3 0.0 - 0.4 K/UL    ABS. BASOPHILS 0.1 0.0 - 0.1 K/UL    PLATELET COMMENTS LARGE PLATELETS      RBC COMMENTS MICROCYTOSIS  PRESENT        DF SMEAR SCANNED     METABOLIC PANEL, BASIC    Collection Time: 05/31/17  3:00 AM   Result Value Ref Range    Sodium 140 136 - 145 mmol/L    Potassium 3.1 (L) 3.5 - 5.1 mmol/L    Chloride 109 (H) 97 - 108 mmol/L    CO2 24 21 - 32 mmol/L    Anion gap 7 5 - 15 mmol/L    Glucose 75 65 - 100 mg/dL    BUN 9 6 - 20 MG/DL    Creatinine 1.21 0.70 - 1.30 MG/DL    BUN/Creatinine ratio 7 (L) 12 - 20      GFR est AA >60 >60 ml/min/1.73m2    GFR est non-AA >60 >60 ml/min/1.73m2    Calcium 8.0 (L) 8.5 - 10.1 MG/DL   GLUCOSE, POC    Collection Time: 05/31/17  7:41 AM   Result Value Ref Range    Glucose (POC) 121 (H) 65 - 100 mg/dL    Performed by Del Barrera, POC    Collection Time: 05/31/17 11:44 AM   Result Value Ref Range    Glucose (POC) 87 65 - 100 mg/dL    Performed by Del Barrera, POC    Collection Time: 05/31/17  2:07 PM   Result Value Ref Range    Glucose (POC) 96 65 - 100 mg/dL    Performed by John Mattson          Assessment/Plan:    Doing better across the board.  No fever and WBC now normal.    MRSA is primary pathogen this time. No anaerobes or GNRs isolated. However, he has recently grown a variety of GNRs from his foot wound. I would therefore like to change his antibiotic Tx to monotherapy with ceftaroline which covers all of these organisms. Also more conducive to outpatient Tx. Will need at least several weeks of IV tx as an outpatient. Also recommend a daily probiotic.

## 2017-05-31 NOTE — PROGRESS NOTES
PICC site and dressing check 24 hours post insertion :     PICC line site and dressing clean, dry and intact. No complications noted.     Corrinne Sakai RN  Vascular Access Team

## 2017-05-31 NOTE — PROGRESS NOTES
Received report from 2 [facility], [address] [place] [address]. Assumed care of patient.

## 2017-05-31 NOTE — WOUND CARE
Wound Care Consulted to place a post surgical wound (per Dr. Glenn Cueto) vac to this patient's left foot wounds x 2 openings. Assessment: Overall, the left leg is edematous. The previous amputations are healed well. Both wounds are clean and drainage is serosanguinous. Silver dressing was removed from the wound bed. Details of the assessment and treatment listed in the wound flowsheet below. Wound Vac application: Wounds cleansed with NS and then the benjamin-wound skin was protected with Marathon skin glue as well as the vac drape. Black Granufoam sponge was placed in each opening and occlusively sealed with the Drape tape. Trac Pad attached to a lateral leg bridge that included the sutures to strengthen the closure. Patient tolerated well. WOUND POA CONDITIONS    Wound Foot Left;Plantar (Active)   DRESSING STATUS Removed 5/31/2017  2:52 PM   DRESSING TYPE Packing; Absorptive;ABD pad 5/31/2017  2:52 PM   Non-Pressure Injury Full thickness (subcut/muscle) 5/31/2017  2:52 PM   Wound Length (cm) 6.3 cm 5/31/2017  2:52 PM   Wound Width (cm) 7 cm 5/31/2017  2:52 PM   Wound Depth (cm) 2.6 5/31/2017  2:52 PM   Wound Surface area (cm^3) 114.66 cm^2 5/31/2017  2:52 PM   Condition of Base Adipose exposed;Granulation;Pink; White; Other (comment) 5/31/2017  2:52 PM   Condition of Edges Rolled/curled 5/31/2017  2:52 PM   Tissue Type Pink; Other (comment) 5/31/2017  2:52 PM   Drainage Amount  Moderate 5/31/2017  2:52 PM   Drainage Color Serosanguinous 5/31/2017  2:52 PM   Wound Odor None 5/31/2017  2:52 PM   Periwound Skin Condition Macerated 5/31/2017  2:52 PM   Cleansing and Cleansing Agents  Normal saline 5/31/2017  2:52 PM   Dressing Type Applied Vacuum dressing 5/31/2017  2:52 PM   Procedure Tolerated Well 5/31/2017  2:52 PM   Number of days:3       Wound Leg Right (Active)   DRESSING TYPE Open to air 5/30/2017  4:56 PM   Non-Pressure Injury Partial thickness (epider/derm) 5/30/2017  4:56 PM   Condition of Base Other (comment) 5/30/2017  4:56 PM   Condition of Edges Rolled/curled 5/30/2017  4:56 PM   Tissue Type Pink; Other (comment) 5/30/2017  4:56 PM   Drainage Amount  None 5/30/2017  4:56 PM   Wound Odor None 5/30/2017  4:56 PM   Periwound Skin Condition Intact 5/30/2017  4:56 PM   Cleansing and Cleansing Agents  Dermal wound cleanser 5/30/2017  4:56 PM   Dressing Type Applied Xeroform; Foam 5/30/2017  4:56 PM   Procedure Tolerated Well 5/30/2017  4:56 PM   Number of days:3       Wound Foot Lateral;Left (Active)   DRESSING STATUS Removed 5/31/2017  2:52 PM   DRESSING TYPE Packing 5/31/2017  2:52 PM   Non-Pressure Injury Full thickness (subcut/muscle) 5/31/2017  2:52 PM   Wound Length (cm) 1 cm 5/31/2017  2:52 PM   Wound Width (cm) 3 cm 5/31/2017  2:52 PM   Wound Depth (cm) 2 5/31/2017  2:52 PM   Wound Surface area (cm^3) 6 cm^2 5/31/2017  2:52 PM   Condition of Base Rowena;Slough 5/31/2017  2:52 PM   Condition of Edges Rolled/curled 5/31/2017  2:52 PM   Epithelialization (%) 0 5/31/2017  2:52 PM   Tissue Type Pink;Yellow 5/31/2017  2:52 PM   Tissue Type Percent Yellow 5 5/31/2017  2:52 PM   Drainage Amount  Small  5/31/2017  2:52 PM   Drainage Color Serosanguinous 5/31/2017  2:52 PM   Wound Odor None 5/31/2017  2:52 PM   Periwound Skin Condition Intact;Edematous; Macerated 5/31/2017  2:52 PM   Cleansing and Cleansing Agents  Normal saline 5/31/2017  2:52 PM   Dressing Type Applied Vacuum dressing 5/31/2017  2:52 PM   Procedure Tolerated Well 5/31/2017  2:52 PM   Plan: Dr. Evan Zhou will take this patient to the OR for a procedure on Friday. Nursing to check the Vac Canister and record output from the wound. This patient really needs diabetic education as his A1C is 12.5. This wound has a much better chance of healing if his glucose becomes controlled.    Osvaldo Betty, RN, BSN, [employer]

## 2017-05-31 NOTE — ROUTINE PROCESS
Report received from Miguel Angel Rawls , [facility]. SBAR were discussed.     Benita Squires RN

## 2017-05-31 NOTE — PROGRESS NOTES
Discussed with patient yesterday about the left foot which looks very good after Dr. Alex Viramontes debridement.     -Will begin wound vac to the foot, and may consider amniox grafting on Friday morning while in house.   -Continue on IV antibiotics at this time.   -Heel weight bearing only to the left foot      Slava Coto DPM

## 2017-06-01 ENCOUNTER — ANESTHESIA EVENT (OUTPATIENT)
Dept: SURGERY | Age: 47
DRG: 854 | End: 2017-06-01
Payer: COMMERCIAL

## 2017-06-01 LAB
ANION GAP BLD CALC-SCNC: 7 MMOL/L (ref 5–15)
BUN SERPL-MCNC: 8 MG/DL (ref 6–20)
BUN/CREAT SERPL: 7 (ref 12–20)
CALCIUM SERPL-MCNC: 8 MG/DL (ref 8.5–10.1)
CHLORIDE SERPL-SCNC: 109 MMOL/L (ref 97–108)
CO2 SERPL-SCNC: 24 MMOL/L (ref 21–32)
CREAT SERPL-MCNC: 1.19 MG/DL (ref 0.7–1.3)
GLUCOSE BLD STRIP.AUTO-MCNC: 101 MG/DL (ref 65–100)
GLUCOSE BLD STRIP.AUTO-MCNC: 101 MG/DL (ref 65–100)
GLUCOSE BLD STRIP.AUTO-MCNC: 104 MG/DL (ref 65–100)
GLUCOSE BLD STRIP.AUTO-MCNC: 110 MG/DL (ref 65–100)
GLUCOSE BLD STRIP.AUTO-MCNC: 75 MG/DL (ref 65–100)
GLUCOSE BLD STRIP.AUTO-MCNC: 76 MG/DL (ref 65–100)
GLUCOSE BLD STRIP.AUTO-MCNC: 79 MG/DL (ref 65–100)
GLUCOSE BLD STRIP.AUTO-MCNC: 98 MG/DL (ref 65–100)
GLUCOSE SERPL-MCNC: 76 MG/DL (ref 65–100)
POTASSIUM SERPL-SCNC: 3.1 MMOL/L (ref 3.5–5.1)
PROCALCITONIN SERPL-MCNC: 0.75 NG/ML (ref 0–0.08)
SERVICE CMNT-IMP: ABNORMAL
SERVICE CMNT-IMP: NORMAL
SODIUM SERPL-SCNC: 140 MMOL/L (ref 136–145)

## 2017-06-01 PROCEDURE — 74011250636 HC RX REV CODE- 250/636: Performed by: INTERNAL MEDICINE

## 2017-06-01 PROCEDURE — 36415 COLL VENOUS BLD VENIPUNCTURE: CPT | Performed by: FAMILY MEDICINE

## 2017-06-01 PROCEDURE — 74011250637 HC RX REV CODE- 250/637: Performed by: INTERNAL MEDICINE

## 2017-06-01 PROCEDURE — 74011000258 HC RX REV CODE- 258: Performed by: INTERNAL MEDICINE

## 2017-06-01 PROCEDURE — 77030011256 HC DRSG MEPILEX <16IN NO BORD MOLN -A

## 2017-06-01 PROCEDURE — 65660000000 HC RM CCU STEPDOWN

## 2017-06-01 PROCEDURE — 82962 GLUCOSE BLOOD TEST: CPT

## 2017-06-01 PROCEDURE — 74011636637 HC RX REV CODE- 636/637: Performed by: INTERNAL MEDICINE

## 2017-06-01 PROCEDURE — 80048 BASIC METABOLIC PNL TOTAL CA: CPT | Performed by: FAMILY MEDICINE

## 2017-06-01 RX ADMIN — HYDROMORPHONE HYDROCHLORIDE 1 MG: 1 INJECTION, SOLUTION INTRAMUSCULAR; INTRAVENOUS; SUBCUTANEOUS at 03:37

## 2017-06-01 RX ADMIN — ATORVASTATIN CALCIUM 40 MG: 40 TABLET, FILM COATED ORAL at 09:41

## 2017-06-01 RX ADMIN — PANTOPRAZOLE SODIUM 40 MG: 40 TABLET, DELAYED RELEASE ORAL at 09:38

## 2017-06-01 RX ADMIN — HYDROMORPHONE HYDROCHLORIDE 1 MG: 1 INJECTION, SOLUTION INTRAMUSCULAR; INTRAVENOUS; SUBCUTANEOUS at 22:09

## 2017-06-01 RX ADMIN — METOPROLOL TARTRATE 12.5 MG: 25 TABLET ORAL at 17:59

## 2017-06-01 RX ADMIN — Medication 10 ML: at 05:07

## 2017-06-01 RX ADMIN — Medication 20 ML: at 22:08

## 2017-06-01 RX ADMIN — SODIUM CHLORIDE 100 ML/HR: 900 INJECTION, SOLUTION INTRAVENOUS at 11:02

## 2017-06-01 RX ADMIN — CEFTAROLINE FOSAMIL 600 MG: 600 POWDER, FOR SOLUTION INTRAVENOUS at 05:07

## 2017-06-01 RX ADMIN — OXYCODONE HYDROCHLORIDE 10 MG: 5 TABLET ORAL at 13:50

## 2017-06-01 RX ADMIN — SODIUM CHLORIDE 100 ML/HR: 900 INJECTION, SOLUTION INTRAVENOUS at 23:33

## 2017-06-01 RX ADMIN — HYDROMORPHONE HYDROCHLORIDE 1 MG: 1 INJECTION, SOLUTION INTRAMUSCULAR; INTRAVENOUS; SUBCUTANEOUS at 09:49

## 2017-06-01 RX ADMIN — OXYCODONE HYDROCHLORIDE 10 MG: 5 TABLET ORAL at 05:11

## 2017-06-01 RX ADMIN — CEFTAROLINE FOSAMIL 600 MG: 600 POWDER, FOR SOLUTION INTRAVENOUS at 17:57

## 2017-06-01 RX ADMIN — HYDROMORPHONE HYDROCHLORIDE 1 MG: 1 INJECTION, SOLUTION INTRAMUSCULAR; INTRAVENOUS; SUBCUTANEOUS at 16:09

## 2017-06-01 RX ADMIN — METOPROLOL TARTRATE 12.5 MG: 25 TABLET ORAL at 09:37

## 2017-06-01 RX ADMIN — INSULIN GLARGINE 60 UNITS: 100 INJECTION, SOLUTION SUBCUTANEOUS at 22:09

## 2017-06-01 RX ADMIN — OXYCODONE HYDROCHLORIDE 10 MG: 5 TABLET ORAL at 19:57

## 2017-06-01 RX ADMIN — Medication 10 ML: at 11:00

## 2017-06-01 NOTE — PROGRESS NOTES
[address] SHIFT NURSING NOTE    0730: Bedside and Verbal shift change report received from Shelli Jones RN. Report included the following information SBAR, Kardex, Procedure Summary, MAR, Recent Results and Cardiac Rhythm NSR.     SHIFT SUMMARY:   0700:  Patient sitting up in bed with no complaint voiced  1115: Wound care performed by Sudhadanitza Angeles  9779:  Blood sugar 75, given OJ   1151: Blood sugar rechecked = 79 given PB crackers   1215: Patient refused blodd sugar to be checked. 1300: Called and left a message at Dr. Latoya Evansr office to call back. Also called Dr. Liliana Painter at [phone], but unable to leave a message. 1530: Bedside and Verbal shift change report given to Pierce Silveira RN (oncoming nurse). Report included the following information SBAR, Kardex, Intake/Output, MAR, Recent Results and Cardiac Rhythm NSR.        Admission Date 5/27/2017   Admission Diagnosis Sepsis ([facility] [address]  infected foot  Left foot infection  ULCER LEFT FOOT   Consults IP CONSULT TO PODIATRY  IP CONSULT TO PRIMARY CARE PROVIDER  IP CONSULT TO INFECTIOUS DISEASES  IP CONSULT TO VASCULAR SURGERY        Consults   [] PT   [] OT   [] Speech   [] Palliative      [] Hospice    [] Case Management   [] None   Cardiac Monitoring   [x] Yes   [] No     Antibiotics   [] Yes   [x] No   GI Prophylaxis  (Ex: Protonix, Pepcid, etc,.)   [x] Yes   [] No          DVT Prophylaxis   SCDs:  Sequential Compression Device: Bilateral     Patient Refused VTE Prophylaxis: Yes    Jeff stockings:         [x] Medication (Ex: Lovenox, Eliquis, Brilinta, Coumadin,  Heparin, etc..)   [] Contraindicated   [] No VTE needed       Urinary Catheter             LDAs         PICC Double Lumen 15/88/86 Left;Basilic (Active)   Central Line Being Utilized Yes 6/1/2017  8:00 AM   Criteria for Appropriate Use Limited/no vessel suitable for conventional peripheral access 6/1/2017  8:00 AM   Site Assessment Clean, dry, & intact 6/1/2017  8:00 AM   Phlebitis Assessment 0 6/1/2017  8:00 AM Infiltration Assessment 0 6/1/2017  8:00 AM   Arm Circumference (cm) 39 cm 5/30/2017 10:07 AM   Date of Last Dressing Change 05/30/17 6/1/2017  3:22 AM   Dressing Status Clean, dry, & intact 6/1/2017  8:00 AM   Action Taken Other (comment) 5/30/2017 10:07 AM   External Catheter Length (cm) 0 centimeters 5/30/2017 10:07 AM   Dressing Type Disk with Chlorhexadine gluconate (CHG); Transparent 6/1/2017  3:22 AM   Hub Color/Line Status Purple;Capped;Flushed;Patent 6/1/2017  8:00 AM   Positive Blood Return (Site #1) Yes 6/1/2017  8:00 AM   Hub Color/Line Status Red; Infusing;Flushed;Patent 6/1/2017  8:00 AM   Positive Blood Return (Site #2) Yes 6/1/2017  8:00 AM   Alcohol Cap Used Yes 6/1/2017  3:22 AM                Vacuum Assisted Closure Left Foot (Active)   Vac Status Suction, continuous 6/1/2017  3:23 AM   Suction (mmHg) 150 6/1/2017  3:23 AM   Site Assessment Clean, dry, & intact 6/1/2017  3:23 AM   Dressing Status Clean, dry, & intact 6/1/2017  3:23 AM   Drainage Description Serosanguinous 6/1/2017  3:23 AM   Drainage Chamber Level (ml) 0 ml 6/1/2017  3:23 AM                I/Os   Intake/Output Summary (Last 24 hours) at 06/01/17 1111  Last data filed at 06/01/17 0800   Gross per 24 hour   Intake          6356.66 ml   Output             1600 ml   Net          4756.66 ml         Activity Level Activity Level: Head of bed elevated (degrees), Up with Assistance     Activity Assistance: Partial (one person)   Diet Active Orders   Diet    DIET DIABETIC CONSISTENT CARB Regular      Purposeful Rounding every 1-2 hour?    [x] Yes    Lee Score  Total Score: 2   Bed Alarm (If score 3 or >)   [] Yes    [] Refused (See signed refusal form in chart)   Noé Score  Noé Score: 17       Noé Score (if score 14 or less)   [] PMT consult   [] Nutrition consult   [x] Wound Care consult      []  Specialty bed         Influenza Vaccine Received Flu Vaccine for Current Season (usually Sept-March): Not Flu Season Needs prior to discharge:   Home O2 required:    [] Yes   [x] No     If yes, how much O2 required?     Other:    Last Bowel Movement Date: 05/31/17   Readmission Risk Assessment Tool Score Low Risk            10       Total Score        3 Relationship with PCP    2 Patient Living Status    4 More than 1 Admission in calendar year    1 Charlson Comorbidity Score        Criteria that do not apply:    Patient Length of Stay > 5    Patient Insurance is Medicare, Medicaid or Self Pay       Expected Length of Stay 6d 9h   Actual Length of Stay 5

## 2017-06-01 NOTE — ROUTINE PROCESS
[address] SHIFT NURSING NOTE    Bedside shift change report given to Steph Aquino  (oncoming nurse) by [place]  (offgoing nurse). Report included the following information SBAR. SHIFT SUMMARY: patient has not gotten out of bed today. Wound vac to right foot. Medicated with prn pain meds frequently. Instructed on incentive spirometer and did well.         Admission Date 5/27/2017   Admission Diagnosis Sepsis ([address]  infected foot  Left foot infection   Consults IP CONSULT TO PODIATRY  IP CONSULT TO PRIMARY CARE PROVIDER  IP CONSULT TO INFECTIOUS DISEASES  IP CONSULT TO VASCULAR SURGERY        Consults   [] PT   [] OT   [] Speech   [] Palliative      [] Hospice    [] Case Management   [] None   Cardiac Monitoring   [] Yes   [] No     Antibiotics   [] Yes   [] No   GI Prophylaxis  (Ex: Protonix, Pepcid, etc,.)   [] Yes   [] No          DVT Prophylaxis   SCDs:  Sequential Compression Device: Bilateral     Patient Refused VTE Prophylaxis: Yes    Jeff stockings:         [] Medication (Ex: Lovenox, Eliquis, Brilinta, Coumadin,  Heparin, etc..)   [] Contraindicated   [] No VTE needed       Urinary Catheter             LDAs         PICC Double Lumen 04/99/70 Left;Basilic (Active)   Central Line Being Utilized Yes 5/31/2017  3:24 PM   Criteria for Appropriate Use Limited/no vessel suitable for conventional peripheral access 5/31/2017  3:24 PM   Site Assessment Clean, dry, & intact 5/31/2017  3:24 PM   Phlebitis Assessment 0 5/31/2017  3:24 PM   Infiltration Assessment 0 5/31/2017  3:24 PM   Arm Circumference (cm) 39 cm 5/30/2017 10:07 AM   Date of Last Dressing Change 05/30/17 5/31/2017  3:30 AM   Dressing Status Clean, dry, & intact 5/31/2017  3:30 AM   Action Taken Other (comment) 5/30/2017 10:07 AM   External Catheter Length (cm) 0 centimeters 5/30/2017 10:07 AM   Dressing Type Disk with Chlorhexadine gluconate (CHG) 5/31/2017  3:30 AM   Hub Color/Line Status Purple 5/31/2017  3:24 PM   Positive Blood Return (Site #1) Yes 5/31/2017  3:24 PM   Hub Color/Line Status Red 5/31/2017  3:24 PM   Positive Blood Return (Site #2) Yes 5/31/2017  3:24 PM   Alcohol Cap Used Yes 5/31/2017  3:30 AM                Vacuum Assisted Closure Left Foot (Active)   Vac Status Suction, continuous 5/31/2017  3:22 PM   Suction (mmHg) 150 5/31/2017  3:22 PM   Site Assessment Clean, dry, & intact 5/31/2017  3:22 PM   Dressing Status New;Occlusive 5/31/2017  3:22 PM   Drainage Description Serosanguinous 5/31/2017  3:22 PM   Drainage Chamber Level (ml) 0 ml 5/31/2017  3:22 PM                I/Os   Intake/Output Summary (Last 24 hours) at 05/31/17 2001  Last data filed at 05/31/17 1509   Gross per 24 hour   Intake              960 ml   Output             1600 ml   Net             -640 ml         Activity Level Activity Level: Head of bed elevated (degrees), Up with Assistance     Activity Assistance: Partial (two people)   Diet Active Orders   Diet    DIET DIABETIC CONSISTENT CARB Regular      Purposeful Rounding every 1-2 hour? [] Yes    Lee Score  Total Score: 2   Bed Alarm (If score 3 or >)   [] Yes    [] Refused (See signed refusal form in chart)   Noé Score  Noé Score: 17       Noé Score (if score 14 or less)   [] PMT consult   [] Nutrition consult   [] Wound Care consult      []  Specialty bed         Influenza Vaccine Received Flu Vaccine for Current Season (usually Sept-March): Not Flu Season               Needs prior to discharge:   Home O2 required:    [] Yes   [] No     If yes, how much O2 required?     Other:    Last Bowel Movement Date: 05/29/17   Readmission Risk Assessment Tool Score Low Risk            10       Total Score        3 Relationship with PCP    2 Patient Living Status    4 More than 1 Admission in calendar year    1 Charlson Comorbidity Score        Criteria that do not apply:    Patient Length of Stay > 5    Patient Insurance is Medicare, Medicaid or Self Pay       Expected Length of Stay 6d 9h   Actual Length of Stay 4

## 2017-06-01 NOTE — PROGRESS NOTES
Wound care on the right foot done as per order. Patient tolerated it well. Patient resting quietly on the bed watching TV.

## 2017-06-01 NOTE — PROCEDURES
[facility]  *** FINAL REPORT ***    Name: Georges Rodriguez  MRN: [id number]    Inpatient  : 24 Aug 1970  HIS Order #: [id number]  [id number] [facility] Visit #: [id number]  Date: 31 May 2017    TYPE OF TEST: Peripheral Arterial Testing    REASON FOR TEST  Adquacy of blood flow left foot for healing    Right Leg  Doppler:  Ankle/Brachial:    Left Leg  Doppler:  Ankle/Brachial:    INTERPRETATION/FINDINGS  PROCEDURE:  Ankle, brachial and digital arterial pressures, CW Doppler   waveforms and digital PPG waveforms were performed. 1. Distal arteries in both legs are non-compressible therefore the  ankle/brachial indexes may not accurately reflect the extent of  peripheral perfusion. 2. The right great toe/brachial index is 0.76. ADDITIONAL COMMENTS    I have personally reviewed the data relevant to the interpretation of  this  study. TECHNOLOGIST: Verner Pun. Perrino, RVT, RDMS  Signed: 2017 10:03 AM    PHYSICIAN: Mayela Martinez MD  Signed: 2017 09:34 AM

## 2017-06-01 NOTE — PROGRESS NOTES
Nutrition Services      Nutrition Screen:  Wt Readings from Last 10 Encounters:   06/01/17 138.6 kg (305 lb 8 oz)   04/17/17 128.5 kg (283 lb 3.2 oz)   03/07/17 137.7 kg (303 lb 9.6 oz)   03/04/17 131.5 kg (290 lb)   02/24/17 132 kg (291 lb 0.1 oz)   02/16/17 134.4 kg (296 lb 4.8 oz)   02/17/17 133.4 kg (294 lb)   12/16/16 137.3 kg (302 lb 9.6 oz)   12/09/16 135 kg (297 lb 9.6 oz)   11/18/16 135.1 kg (297 lb 12.8 oz)     Body mass index is 38.18 kg/(m^2). Supplements:                        _____ ordered ______  declined. __ __  Pt is nutritionally stable at this time, will rescreen in 7 days. __ __    Pt is at nutritional risk and will be rescreened in  days. __x __  Pt is at moderate or high nutritional risk, will refer to RD for assessment.        Paul Snowball  Dietetic Technician, Registered

## 2017-06-01 NOTE — PROGRESS NOTES
[address] SHIFT NURSING NOTE    Bedside shift change report given to Georgia (oncoming nurse) by Neri Gómez (offgoing nurse). Report included the following information SBAR, Kardex, Intake/Output, MAR and Recent Results. SHIFT SUMMARY:         Admission Date 5/27/2017   Admission Diagnosis Sepsis ([address]  infected foot  Left foot infection   Consults IP CONSULT TO PODIATRY  IP CONSULT TO PRIMARY CARE PROVIDER  IP CONSULT TO INFECTIOUS DISEASES  IP CONSULT TO VASCULAR SURGERY        Consults   [] PT   [] OT   [] Speech   [] Palliative      [] Hospice    [x] Case Management   [] None   Cardiac Monitoring   [x] Yes   [] No     Antibiotics   [x] Yes   [] No   GI Prophylaxis  (Ex: Protonix, Pepcid, etc,.)   [x] Yes   [] No          DVT Prophylaxis   SCDs:  Sequential Compression Device: Bilateral     Patient Refused VTE Prophylaxis: Yes    Jeff stockings:         [] Medication (Ex: Lovenox, Eliquis, Brilinta, Coumadin,  Heparin, etc..)   [] Contraindicated   [x] No VTE needed       Urinary Catheter             LDAs         PICC Double Lumen 62/17/58 Left;Basilic (Active)   Central Line Being Utilized Yes 5/31/2017  8:06 PM   Criteria for Appropriate Use Limited/no vessel suitable for conventional peripheral access 5/31/2017  8:06 PM   Site Assessment Clean, dry, & intact 5/31/2017  8:06 PM   Phlebitis Assessment 0 5/31/2017  8:06 PM   Infiltration Assessment 0 5/31/2017  8:06 PM   Arm Circumference (cm) 39 cm 5/30/2017 10:07 AM   Date of Last Dressing Change 05/30/17 5/31/2017  8:06 PM   Dressing Status Clean, dry, & intact 5/31/2017  8:06 PM   Action Taken Other (comment) 5/30/2017 10:07 AM   External Catheter Length (cm) 0 centimeters 5/30/2017 10:07 AM   Dressing Type Disk with Chlorhexadine gluconate (CHG); Transparent 5/31/2017  8:06 PM   Hub Color/Line Status Purple;Capped 5/31/2017  8:06 PM   Positive Blood Return (Site #1) Yes 5/31/2017  3:24 PM   Hub Color/Line Status Red; Infusing 5/31/2017  8:06 PM   Positive Blood Return (Site #2) Yes 5/31/2017  3:24 PM   Alcohol Cap Used Yes 5/31/2017  8:06 PM                Vacuum Assisted Closure Left Foot (Active)   Vac Status Suction, continuous 5/31/2017  3:22 PM   Suction (mmHg) 150 5/31/2017  3:22 PM   Site Assessment Clean, dry, & intact 5/31/2017  3:22 PM   Dressing Status New;Occlusive 5/31/2017  3:22 PM   Drainage Description Serosanguinous 5/31/2017  3:22 PM   Drainage Chamber Level (ml) 0 ml 5/31/2017  3:22 PM                I/Os   Intake/Output Summary (Last 24 hours) at 05/31/17 2009  Last data filed at 05/31/17 2007   Gross per 24 hour   Intake          5288.33 ml   Output             1600 ml   Net          3688.33 ml         Activity Level Activity Level: Head of bed elevated (degrees), Up with Assistance     Activity Assistance: Partial (two people)   Diet Active Orders   Diet    DIET DIABETIC CONSISTENT CARB Regular      Purposeful Rounding every 1-2 hour? [x] Yes    Lee Score  Total Score: 2   Bed Alarm (If score 3 or >)   [] Yes    [] Refused (See signed refusal form in chart)   Noé Score  Noé Score: 17       Noé Score (if score 14 or less)   [] PMT consult   [] Nutrition consult   [x] Wound Care consult      []  Specialty bed         Influenza Vaccine Received Flu Vaccine for Current Season (usually Sept-March): Not Flu Season               Needs prior to discharge:   Home O2 required:    [] Yes   [x] No     If yes, how much O2 required?     Other:    Last Bowel Movement Date: 05/29/17   Readmission Risk Assessment Tool Score Low Risk            10       Total Score        3 Relationship with PCP    2 Patient Living Status    4 More than 1 Admission in calendar year    1 Charlson Comorbidity Score        Criteria that do not apply:    Patient Length of Stay > 5    Patient Insurance is Medicare, Medicaid or Self Pay       Expected Length of Stay 6d 9h   Actual Length of Stay 4

## 2017-06-01 NOTE — PROGRESS NOTES
ID Progress Note    Subjective:   Daily Progress Note: 2017 3:23 PM    DX:     1. 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 S/P I&D and excision of remaining 5th metatarsal. Primary pathogen appears to be MRSA,. 2. MRSA bacteremia. 3.  DM,              Review of Systems: afebrile. Feels better. Wound vac in place. [place] Gums glands. -Oral lesions. -Photophobia. -Jaundice. - SOB. - cough. - abdominal pain or tenderness. - Nausea or vomiting. - Diarrhea. + left foot pain. No headache or AMS. ROS otherwise negative. CURRENT ANTIMICROBIALS: ceftaroline    Objective:     Visit Vitals    BP (!) 160/98 (BP 1 Location: Right arm, BP Patient Position: At rest)    Pulse 87    Temp 98.6 °F (37 °C)    Resp 16    Ht 6' 3\" (1.905 m)    Wt 305 lb 8 oz (138.6 kg)    SpO2 99%    BMI 38.18 kg/m2    O2 Flow Rate (L/min): 2 l/min O2 Device: Room air    Temp (24hrs), Av.9 °F (37.2 °C), Min:98.2 °F (36.8 °C), Max:99.4 °F (37.4 °C)  Exam:   General:Afebrile and not toxic. No exanthem or enanthem. No peripheral adenopathy. Alert and oriented x3. All IV sites are clean and dry. HEENT: EOMI. Anicteric. Oral mucosa normal. Conjunctivae normal. Neck supple. No thrush. Chest: Lungs clear to A&P. Regular rhythm without murmurs. No chest wall tenderness. Abdomen: Soft without organomegaly, tenderness, or masses. Nondistended. Bowel sounds normal.  Musculoskeletal: Left foot with wound vac in place. + edema. Neurologic: Nonfocal exam      Data Review: Cultures of foot and blood + for MRSA, sensitive to ceftaroline. WBC = 8800. Creat = 1.19.       Recent Results (from the past 24 hour(s))   GLUCOSE, POC    Collection Time: 17  8:04 PM   Result Value Ref Range    Glucose (POC) 112 (H) 65 - 100 mg/dL    Performed by [address], BASIC    Collection Time: 17  3:33 AM   Result Value Ref Range    Sodium 140 136 - 145 mmol/L    Potassium 3.1 (L) 3.5 - 5.1 mmol/L    Chloride 109 (H) 97 - 108 mmol/L    CO2 24 21 - 32 mmol/L    Anion gap 7 5 - 15 mmol/L    Glucose 76 65 - 100 mg/dL    BUN 8 6 - 20 MG/DL    Creatinine 1.19 0.70 - 1.30 MG/DL    BUN/Creatinine ratio 7 (L) 12 - 20      GFR est AA >60 >60 ml/min/1.73m2    GFR est non-AA >60 >60 ml/min/1.73m2    Calcium 8.0 (L) 8.5 - 10.1 MG/DL   GLUCOSE, POC    Collection Time: 06/01/17  5:37 AM   Result Value Ref Range    Glucose (POC) 76 65 - 100 mg/dL    Performed by Donaldeffie Danette    GLUCOSE, POC    Collection Time: 06/01/17  6:04 AM   Result Value Ref Range    Glucose (POC) 110 (H) 65 - 100 mg/dL    Performed by Rogelio Danette    GLUCOSE, POC    Collection Time: 06/01/17  8:28 AM   Result Value Ref Range    Glucose (POC) 98 65 - 100 mg/dL    Performed by Va Kovacs (PCT)    GLUCOSE, POC    Collection Time: 06/01/17 11:32 AM   Result Value Ref Range    Glucose (POC) 75 65 - 100 mg/dL    Performed by [address], POC    Collection Time: 06/01/17 11:51 AM   Result Value Ref Range    Glucose (POC) 79 65 - 100 mg/dL    Performed by [address], POC    Collection Time: 06/01/17  1:41 PM   Result Value Ref Range    Glucose (POC) 101 (H) 65 - 100 mg/dL    Performed by Laura Kramer          Assessment/Plan:    Doing better across the board. No fever and WBC now normal. Creatinine falling. MRSA is primary pathogen this time. No anaerobes or GNRs isolated. However, he has recently grown a variety of GNRs from his foot wound. I would therefore like to change his antibiotic Tx to monotherapy with ceftaroline which covers all of these organisms. Also more conducive to outpatient Tx. Will need at least several weeks of IV tx as an outpatient. Also recommend a daily probiotic. From an ID viewpoint can go home any time on ceftaroline for at least 2-3 wks.

## 2017-06-01 NOTE — DIABETES MGMT
DTC Consult Note    Recommendations/ Comments: If appropriate, would consider decreasing Lantus dose to 48 units at bedtime given pt hypoglycemia this morning and discontinue mealtime insulin at this time. Pt has not received any prandial insulin today and pt has had continued hypoglycemia. Met with pt for consult, pt previously seen by DTC on 2/16/17. Spent extensive amount of time with pt discussing diet and he reports that all of his complications with his feet arose after he was fitted improperly for diabetic shoes. Discussed with pt importance of decreasing BG numbers to aid in wound healing. Upon further discussion with pt he revealed that when he is working he typically keeps his insulin in his pocket or in his book bag at work. Suspect insulin is getting too hot and degrading, which in turn pt is injecting ineffective insulin. Pt with good BG control during hospital stay and some hypoglycemic events which further reinforces that his insulin is not being properly stored, leading to the insulin degrading. Pt voiced frustration over how his family has poor perception of him because of his complications. Pt very interested in getting put on insulin pump for better BG management. Briefly discussed insulin pump with pt and provided contact information for [facility] Office. If pt does not pursue insulin pump he is still interested in being seen by an endo for additional insight into DM management. Pt also voiced interest in possibly participating in some type of DM related study, will provide resources. Pt may also benefit from endo consult. Pt plans to schedule DTC outpatient education after meeting with an endo.                Consult received for:  [x]             Assessment of home management                [x]      Medication Recommendations                []             Meter/monitoring     []             Insulin instruction     []             New diagnosis     [] Outpatient education     []             Insulin pump patient     []             Insulin infusion     []             DKA/HHS    Chart reviewed and initial evaluation complete on [facility] [place]. Patient is a 51 y.o. male with known history of Type 2 Diabetes on Levemir 60 units nightly, Humalog 12 units ac tid, and Metformin 500mg bid  at home. Assessed and instructed patient on the following:   ·  interpretation of lab results, blood sugar goals, complications of diabetes mellitus, hypoglycemia prevention and treatment, SMBG skills, nutrition, referred to Diabetes Educator, site rotation, use of insulin pen and self-injection of insulin    Encouraged the following:   · Proper storage of insulin   · Eating every 4-5 hours even while at work  · Well balanced meals    Provided patient with the following: []             Survival skills education materials               []             Insulin education materials               []             CHO counting education materials               []             Outpatient DTC contact number               []             Glucometer               Discussed with patient and/or family need for follow up appointment for diabetes management after discharge. A1c:   Lab Results   Component Value Date/Time    Hemoglobin A1c 12.5 05/28/2017 03:18 AM       Recent Glucose Results: Lab Results   Component Value Date/Time    GLU 76 06/01/2017 03:33 AM    GLUCPOC 98 06/01/2017 08:28 AM    GLUCPOC 110 (H) 06/01/2017 06:04 AM    GLUCPOC 76 06/01/2017 05:37 AM        Lab Results   Component Value Date/Time    Creatinine 1.19 06/01/2017 03:33 AM     Estimated Creatinine Clearance: 115.2 mL/min (based on Cr of 1.19).     Active Orders   Diet    DIET DIABETIC CONSISTENT CARB Regular        PO intake: Patient Vitals for the past 72 hrs:   % Diet Eaten   05/31/17 1409 100 %   05/31/17 0853 100 %       Current hospital DM medication:  -Lantus 60 units at bedtime  -Humalog normal sensitivity correction  -Humalog 10 units ac tid    Will continue to follow as needed. Thank you.     Minor Murphy, [address] [facility]  Office: [phone]

## 2017-06-02 ENCOUNTER — HOME HEALTH ADMISSION (OUTPATIENT)
Dept: HOME HEALTH SERVICES | Facility: HOME HEALTH | Age: 47
End: 2017-06-02
Payer: COMMERCIAL

## 2017-06-02 ENCOUNTER — ANESTHESIA (OUTPATIENT)
Dept: SURGERY | Age: 47
DRG: 854 | End: 2017-06-02
Payer: COMMERCIAL

## 2017-06-02 LAB
ABO + RH BLD: NORMAL
ANION GAP BLD CALC-SCNC: 8 MMOL/L (ref 5–15)
BLOOD GROUP ANTIBODIES SERPL: NORMAL
BUN SERPL-MCNC: 8 MG/DL (ref 6–20)
BUN/CREAT SERPL: 7 (ref 12–20)
CALCIUM SERPL-MCNC: 7.6 MG/DL (ref 8.5–10.1)
CHLORIDE SERPL-SCNC: 108 MMOL/L (ref 97–108)
CO2 SERPL-SCNC: 24 MMOL/L (ref 21–32)
CREAT SERPL-MCNC: 1.18 MG/DL (ref 0.7–1.3)
ERYTHROCYTE [DISTWIDTH] IN BLOOD BY AUTOMATED COUNT: 15.8 % (ref 11.5–14.5)
GLUCOSE BLD STRIP.AUTO-MCNC: 107 MG/DL (ref 65–100)
GLUCOSE BLD STRIP.AUTO-MCNC: 126 MG/DL (ref 65–100)
GLUCOSE BLD STRIP.AUTO-MCNC: 170 MG/DL (ref 65–100)
GLUCOSE BLD STRIP.AUTO-MCNC: 66 MG/DL (ref 65–100)
GLUCOSE BLD STRIP.AUTO-MCNC: 70 MG/DL (ref 65–100)
GLUCOSE BLD STRIP.AUTO-MCNC: 72 MG/DL (ref 65–100)
GLUCOSE BLD STRIP.AUTO-MCNC: 81 MG/DL (ref 65–100)
GLUCOSE SERPL-MCNC: 115 MG/DL (ref 65–100)
HCT VFR BLD AUTO: 22.9 % (ref 36.6–50.3)
HGB BLD-MCNC: 7.2 G/DL (ref 12.1–17)
MCH RBC QN AUTO: 23.1 PG (ref 26–34)
MCHC RBC AUTO-ENTMCNC: 31.4 G/DL (ref 30–36.5)
MCV RBC AUTO: 73.4 FL (ref 80–99)
PLATELET # BLD AUTO: 364 K/UL (ref 150–400)
POTASSIUM SERPL-SCNC: 3.2 MMOL/L (ref 3.5–5.1)
RBC # BLD AUTO: 3.12 M/UL (ref 4.1–5.7)
SERVICE CMNT-IMP: ABNORMAL
SERVICE CMNT-IMP: NORMAL
SODIUM SERPL-SCNC: 140 MMOL/L (ref 136–145)
SPECIMEN EXP DATE BLD: NORMAL
WBC # BLD AUTO: 10.9 K/UL (ref 4.1–11.1)

## 2017-06-02 PROCEDURE — 86900 BLOOD TYPING SEROLOGIC ABO: CPT | Performed by: PODIATRIST

## 2017-06-02 PROCEDURE — 85027 COMPLETE CBC AUTOMATED: CPT | Performed by: PODIATRIST

## 2017-06-02 PROCEDURE — 76060000032 HC ANESTHESIA 0.5 TO 1 HR: Performed by: PODIATRIST

## 2017-06-02 PROCEDURE — 36415 COLL VENOUS BLD VENIPUNCTURE: CPT | Performed by: FAMILY MEDICINE

## 2017-06-02 PROCEDURE — 76210000006 HC OR PH I REC 0.5 TO 1 HR: Performed by: PODIATRIST

## 2017-06-02 PROCEDURE — 77030019908 HC STETH ESOPH SIMS -A: Performed by: NURSE ANESTHETIST, CERTIFIED REGISTERED

## 2017-06-02 PROCEDURE — 74011250636 HC RX REV CODE- 250/636: Performed by: INTERNAL MEDICINE

## 2017-06-02 PROCEDURE — 77030031139 HC SUT VCRL2 J&J -A: Performed by: PODIATRIST

## 2017-06-02 PROCEDURE — 82962 GLUCOSE BLOOD TEST: CPT

## 2017-06-02 PROCEDURE — 77030008467 HC STPLR SKN COVD -B: Performed by: PODIATRIST

## 2017-06-02 PROCEDURE — 0HRNXK3 REPLACEMENT OF LEFT FOOT SKIN WITH NONAUTOLOGOUS TISSUE SUBSTITUTE, FULL THICKNESS, EXTERNAL APPROACH: ICD-10-PCS | Performed by: PODIATRIST

## 2017-06-02 PROCEDURE — 74011250636 HC RX REV CODE- 250/636: Performed by: ANESTHESIOLOGY

## 2017-06-02 PROCEDURE — 74011250637 HC RX REV CODE- 250/637: Performed by: INTERNAL MEDICINE

## 2017-06-02 PROCEDURE — 74011000258 HC RX REV CODE- 258: Performed by: INTERNAL MEDICINE

## 2017-06-02 PROCEDURE — 76010000138 HC OR TIME 0.5 TO 1 HR: Performed by: PODIATRIST

## 2017-06-02 PROCEDURE — 77030020782 HC GWN BAIR PAWS FLX 3M -B

## 2017-06-02 PROCEDURE — 74011250636 HC RX REV CODE- 250/636

## 2017-06-02 PROCEDURE — 74011636637 HC RX REV CODE- 636/637: Performed by: INTERNAL MEDICINE

## 2017-06-02 PROCEDURE — 77030020269 HC MISC IMPL: Performed by: PODIATRIST

## 2017-06-02 PROCEDURE — 74011000250 HC RX REV CODE- 250

## 2017-06-02 PROCEDURE — 77030011256 HC DRSG MEPILEX <16IN NO BORD MOLN -A

## 2017-06-02 PROCEDURE — 0KBW0ZZ EXCISION OF LEFT FOOT MUSCLE, OPEN APPROACH: ICD-10-PCS | Performed by: PODIATRIST

## 2017-06-02 PROCEDURE — 36592 COLLECT BLOOD FROM PICC: CPT

## 2017-06-02 PROCEDURE — 74011000250 HC RX REV CODE- 250: Performed by: INTERNAL MEDICINE

## 2017-06-02 PROCEDURE — 80048 BASIC METABOLIC PNL TOTAL CA: CPT | Performed by: FAMILY MEDICINE

## 2017-06-02 PROCEDURE — 65660000000 HC RM CCU STEPDOWN

## 2017-06-02 RX ORDER — SODIUM CHLORIDE, SODIUM LACTATE, POTASSIUM CHLORIDE, CALCIUM CHLORIDE 600; 310; 30; 20 MG/100ML; MG/100ML; MG/100ML; MG/100ML
25 INJECTION, SOLUTION INTRAVENOUS CONTINUOUS
Status: DISCONTINUED | OUTPATIENT
Start: 2017-06-02 | End: 2017-06-02 | Stop reason: HOSPADM

## 2017-06-02 RX ORDER — MIDAZOLAM HYDROCHLORIDE 1 MG/ML
INJECTION, SOLUTION INTRAMUSCULAR; INTRAVENOUS AS NEEDED
Status: DISCONTINUED | OUTPATIENT
Start: 2017-06-02 | End: 2017-06-02 | Stop reason: HOSPADM

## 2017-06-02 RX ORDER — SODIUM CHLORIDE 0.9 % (FLUSH) 0.9 %
5-10 SYRINGE (ML) INJECTION AS NEEDED
Status: DISCONTINUED | OUTPATIENT
Start: 2017-06-02 | End: 2017-06-02 | Stop reason: HOSPADM

## 2017-06-02 RX ORDER — SODIUM CHLORIDE 0.9 % (FLUSH) 0.9 %
5-10 SYRINGE (ML) INJECTION EVERY 8 HOURS
Status: DISCONTINUED | OUTPATIENT
Start: 2017-06-02 | End: 2017-06-02 | Stop reason: HOSPADM

## 2017-06-02 RX ORDER — LIDOCAINE HYDROCHLORIDE 10 MG/ML
0.1 INJECTION, SOLUTION EPIDURAL; INFILTRATION; INTRACAUDAL; PERINEURAL AS NEEDED
Status: DISCONTINUED | OUTPATIENT
Start: 2017-06-02 | End: 2017-06-02 | Stop reason: HOSPADM

## 2017-06-02 RX ORDER — FENTANYL CITRATE 50 UG/ML
INJECTION, SOLUTION INTRAMUSCULAR; INTRAVENOUS AS NEEDED
Status: DISCONTINUED | OUTPATIENT
Start: 2017-06-02 | End: 2017-06-02 | Stop reason: HOSPADM

## 2017-06-02 RX ORDER — FENTANYL CITRATE 50 UG/ML
25 INJECTION, SOLUTION INTRAMUSCULAR; INTRAVENOUS
Status: DISCONTINUED | OUTPATIENT
Start: 2017-06-02 | End: 2017-06-02 | Stop reason: HOSPADM

## 2017-06-02 RX ORDER — INSULIN GLARGINE 100 [IU]/ML
50 INJECTION, SOLUTION SUBCUTANEOUS
Status: DISCONTINUED | OUTPATIENT
Start: 2017-06-02 | End: 2017-06-03 | Stop reason: HOSPADM

## 2017-06-02 RX ORDER — MORPHINE SULFATE 10 MG/ML
2 INJECTION, SOLUTION INTRAMUSCULAR; INTRAVENOUS
Status: DISCONTINUED | OUTPATIENT
Start: 2017-06-02 | End: 2017-06-02 | Stop reason: HOSPADM

## 2017-06-02 RX ORDER — DIPHENHYDRAMINE HYDROCHLORIDE 50 MG/ML
12.5 INJECTION, SOLUTION INTRAMUSCULAR; INTRAVENOUS
Status: DISCONTINUED | OUTPATIENT
Start: 2017-06-02 | End: 2017-06-02 | Stop reason: HOSPADM

## 2017-06-02 RX ORDER — LIDOCAINE HYDROCHLORIDE 20 MG/ML
INJECTION, SOLUTION EPIDURAL; INFILTRATION; INTRACAUDAL; PERINEURAL AS NEEDED
Status: DISCONTINUED | OUTPATIENT
Start: 2017-06-02 | End: 2017-06-02 | Stop reason: HOSPADM

## 2017-06-02 RX ORDER — PROPOFOL 10 MG/ML
INJECTION, EMULSION INTRAVENOUS AS NEEDED
Status: DISCONTINUED | OUTPATIENT
Start: 2017-06-02 | End: 2017-06-02 | Stop reason: HOSPADM

## 2017-06-02 RX ORDER — HYDROMORPHONE HYDROCHLORIDE 1 MG/ML
.2-.5 INJECTION, SOLUTION INTRAMUSCULAR; INTRAVENOUS; SUBCUTANEOUS
Status: DISCONTINUED | OUTPATIENT
Start: 2017-06-02 | End: 2017-06-02 | Stop reason: HOSPADM

## 2017-06-02 RX ORDER — POTASSIUM CHLORIDE 7.45 MG/ML
10 INJECTION INTRAVENOUS
Status: COMPLETED | OUTPATIENT
Start: 2017-06-02 | End: 2017-06-02

## 2017-06-02 RX ADMIN — FENTANYL CITRATE 50 MCG: 50 INJECTION, SOLUTION INTRAMUSCULAR; INTRAVENOUS at 10:10

## 2017-06-02 RX ADMIN — Medication 10 ML: at 12:24

## 2017-06-02 RX ADMIN — Medication 20 ML: at 03:39

## 2017-06-02 RX ADMIN — FENTANYL CITRATE 25 MCG: 50 INJECTION, SOLUTION INTRAMUSCULAR; INTRAVENOUS at 09:20

## 2017-06-02 RX ADMIN — HYDROMORPHONE HYDROCHLORIDE 1 MG: 1 INJECTION, SOLUTION INTRAMUSCULAR; INTRAVENOUS; SUBCUTANEOUS at 18:13

## 2017-06-02 RX ADMIN — CEFTAROLINE FOSAMIL 600 MG: 600 POWDER, FOR SOLUTION INTRAVENOUS at 06:07

## 2017-06-02 RX ADMIN — PROPOFOL 20 MG: 10 INJECTION, EMULSION INTRAVENOUS at 09:24

## 2017-06-02 RX ADMIN — DEXTROSE MONOHYDRATE 125 ML: 10 INJECTION, SOLUTION INTRAVENOUS at 07:23

## 2017-06-02 RX ADMIN — PROPOFOL 20 MG: 10 INJECTION, EMULSION INTRAVENOUS at 09:28

## 2017-06-02 RX ADMIN — PROPOFOL 20 MG: 10 INJECTION, EMULSION INTRAVENOUS at 09:14

## 2017-06-02 RX ADMIN — FENTANYL CITRATE 25 MCG: 50 INJECTION, SOLUTION INTRAMUSCULAR; INTRAVENOUS at 09:41

## 2017-06-02 RX ADMIN — SODIUM CHLORIDE, SODIUM LACTATE, POTASSIUM CHLORIDE, AND CALCIUM CHLORIDE 25 ML/HR: 600; 310; 30; 20 INJECTION, SOLUTION INTRAVENOUS at 09:07

## 2017-06-02 RX ADMIN — Medication 10 ML: at 04:46

## 2017-06-02 RX ADMIN — Medication 20 ML: at 14:00

## 2017-06-02 RX ADMIN — INSULIN LISPRO 2 UNITS: 100 INJECTION, SOLUTION INTRAVENOUS; SUBCUTANEOUS at 18:03

## 2017-06-02 RX ADMIN — HYDROMORPHONE HYDROCHLORIDE 1 MG: 1 INJECTION, SOLUTION INTRAMUSCULAR; INTRAVENOUS; SUBCUTANEOUS at 04:46

## 2017-06-02 RX ADMIN — PROPOFOL 20 MG: 10 INJECTION, EMULSION INTRAVENOUS at 09:32

## 2017-06-02 RX ADMIN — POTASSIUM CHLORIDE 10 MEQ: 10 INJECTION, SOLUTION INTRAVENOUS at 07:14

## 2017-06-02 RX ADMIN — METOPROLOL TARTRATE 12.5 MG: 25 TABLET ORAL at 08:18

## 2017-06-02 RX ADMIN — MIDAZOLAM HYDROCHLORIDE 2 MG: 1 INJECTION, SOLUTION INTRAMUSCULAR; INTRAVENOUS at 09:10

## 2017-06-02 RX ADMIN — PROPOFOL 20 MG: 10 INJECTION, EMULSION INTRAVENOUS at 09:36

## 2017-06-02 RX ADMIN — HYDRALAZINE HYDROCHLORIDE 10 MG: 20 INJECTION INTRAMUSCULAR; INTRAVENOUS at 12:15

## 2017-06-02 RX ADMIN — FENTANYL CITRATE 25 MCG: 50 INJECTION, SOLUTION INTRAMUSCULAR; INTRAVENOUS at 09:10

## 2017-06-02 RX ADMIN — METOPROLOL TARTRATE 12.5 MG: 25 TABLET ORAL at 18:03

## 2017-06-02 RX ADMIN — Medication 30 ML: at 03:30

## 2017-06-02 RX ADMIN — INSULIN LISPRO 10 UNITS: 100 INJECTION, SOLUTION INTRAVENOUS; SUBCUTANEOUS at 18:02

## 2017-06-02 RX ADMIN — HYDROMORPHONE HYDROCHLORIDE 1 MG: 1 INJECTION, SOLUTION INTRAMUSCULAR; INTRAVENOUS; SUBCUTANEOUS at 12:13

## 2017-06-02 RX ADMIN — Medication 1 CAPSULE: at 12:11

## 2017-06-02 RX ADMIN — HYDRALAZINE HYDROCHLORIDE 10 MG: 20 INJECTION INTRAMUSCULAR; INTRAVENOUS at 03:39

## 2017-06-02 RX ADMIN — PROPOFOL 20 MG: 10 INJECTION, EMULSION INTRAVENOUS at 09:44

## 2017-06-02 RX ADMIN — PROPOFOL 20 MG: 10 INJECTION, EMULSION INTRAVENOUS at 09:20

## 2017-06-02 RX ADMIN — POTASSIUM CHLORIDE 10 MEQ: 10 INJECTION, SOLUTION INTRAVENOUS at 08:18

## 2017-06-02 RX ADMIN — SODIUM CHLORIDE: 900 INJECTION, SOLUTION INTRAVENOUS at 09:10

## 2017-06-02 RX ADMIN — Medication 10 ML: at 22:49

## 2017-06-02 RX ADMIN — PANTOPRAZOLE SODIUM 40 MG: 40 TABLET, DELAYED RELEASE ORAL at 12:11

## 2017-06-02 RX ADMIN — PROPOFOL 20 MG: 10 INJECTION, EMULSION INTRAVENOUS at 09:16

## 2017-06-02 RX ADMIN — LIDOCAINE HYDROCHLORIDE 60 MG: 20 INJECTION, SOLUTION EPIDURAL; INFILTRATION; INTRACAUDAL; PERINEURAL at 09:14

## 2017-06-02 RX ADMIN — CEFTAROLINE FOSAMIL 600 MG: 600 POWDER, FOR SOLUTION INTRAVENOUS at 18:14

## 2017-06-02 RX ADMIN — PROPOFOL 20 MG: 10 INJECTION, EMULSION INTRAVENOUS at 09:40

## 2017-06-02 RX ADMIN — PROPOFOL 20 MG: 10 INJECTION, EMULSION INTRAVENOUS at 09:46

## 2017-06-02 RX ADMIN — ATORVASTATIN CALCIUM 40 MG: 40 TABLET, FILM COATED ORAL at 12:12

## 2017-06-02 RX ADMIN — FENTANYL CITRATE 25 MCG: 50 INJECTION, SOLUTION INTRAMUSCULAR; INTRAVENOUS at 09:31

## 2017-06-02 NOTE — PROGRESS NOTES
Progress Note    Patient: Amandeep Mullen MRN: [id number]  SSN: [id number]    YOB: 1970  Age: 51 y.o. Sex: male      Admit Date: 5/27/2017  5 Days Post-Op     Procedure:   Procedure(s):  LEFT FOOT DEBRIDEMENT    Subjective:     Patient seen resting quiet and comfortably and no apparent distress. Pain on scale of 1-10 is 0 due to neuropathy. Wound vac is in place and functioning well. Planning for surgery this AM.     Status post: left 5th ray resection/ wound debridement and I and D    Objective:     Visit Vitals    BP (!) 184/105 (BP 1 Location: Right arm, BP Patient Position: At rest)    Pulse 85    Temp 98.7 °F (37.1 °C)    Resp 16    Ht 6' 3\" (1.905 m)    Wt 138.6 kg (305 lb 8 oz)    SpO2 99%    BMI 38.18 kg/m2        Physical Exam:  normal DP and PT pulses and reduced sensation at both feet;  Left plantarlateral foot ulcer with fibrogranular base, no purulence or malodor; diminished edema and erythema with  Dorsal incision with sutures intact    Labs/Radiology Review: MRSA blood cultures and GPC and GPR from OR cultures    Assessment:     Hospital Problems  Date Reviewed: 6/2/2017          Codes Class Noted POA    Seps[facility] ([facility] ICD-10-CM: A41.9  ICD-9-CM: 038.9, 995.91  5/27/2017 Yes        ARF (acute renal failure) ([facility] ICD-10-CM: N17.9  ICD-9-CM: 584.9  5/27/2017 Yes        Osteomyelitis ([facility] ICD-10-CM: M86.9  ICD-9-CM: 730.20  2/24/2017 Yes        Diabetes ([facility] ICD-10-CM: E11.9  ICD-9-CM: 250.00  Unknown Yes    Overview Signed 4/22/2016  2:47 PM by Becky Us MD     since age 23             HTN (hypertension) ICD-10-CM: I10  ICD-9-CM: 401.9  7/23/2013 Yes              Plan/Recommendations/Medical Decision Making:     Continue present treatment    Activity: non-weight bearing    Discontinue: n/a    Diet: diabetic    NPO for surgery this morning for debridement of the left foot wound, with application of Amniox chorion graft.    Consent signed and witnessed, left foot marked    If ok, can be discharged today heel weight bearing in postop shoe, on IV Ceftaroline for 3 weeks.   Will follow up on Tuesday Afternoon at the [address]    Signed By: Ann Caceres 26 June 2, 2017

## 2017-06-02 NOTE — PROGRESS NOTES
General Daily Progress Note    Admit Date: 5/27/2017  Hospital day 2    Subjective:     Patient has no complaint of n/v/cp/d S/w nursing no issues   Medication side effects: none    Current Facility-Administered Medications   Medication Dose Route Frequency    insulin glargine (LANTUS) injection 50 Units  50 Units SubCUTAneous QHS    lactobac ac& pc-s.therm-b.anim (EMBER Q/RISAQUAD)  1 Cap Oral DAILY    cefTARoline (TEFLARO) 600 mg in 0.9% sodium chloride (MBP/ADV) 50 mL  600 mg IntraVENous Q12H    sodium chloride (NS) flush 10-30 mL  10-30 mL InterCATHeter PRN    sodium chloride (NS) flush 10 mL  10 mL InterCATHeter Q24H    sodium chloride (NS) flush 10 mL  10 mL InterCATHeter PRN    sodium chloride (NS) flush 10-40 mL  10-40 mL InterCATHeter Q8H    heparin (porcine) pf 300 Units  300 Units InterCATHeter PRN    ALPRAZolam (XANAX) tablet 0.25 mg  0.25 mg Oral BID PRN    atorvastatin (LIPITOR) tablet 40 mg  40 mg Oral DAILY    pantoprazole (PROTONIX) tablet 40 mg  40 mg Oral ACB    gabapentin (NEURONTIN) capsule 100 mg  100 mg Oral BID PRN    metoprolol tartrate (LOPRESSOR) tablet 12.5 mg  12.5 mg Oral BID    acetaminophen (TYLENOL) tablet 650 mg  650 mg Oral Q4H PRN    oxyCODONE IR (ROXICODONE) tablet 10 mg  10 mg Oral Q4H PRN    HYDROmorphone (PF) (DILAUDID) injection 1 mg  1 mg IntraVENous Q6H PRN    ondansetron (ZOFRAN) injection 4 mg  4 mg IntraVENous Q6H PRN    insulin lispro (HUMALOG) injection 10 Units  10 Units SubCUTAneous TIDAC    insulin lispro (HUMALOG) injection   SubCUTAneous QID AFTER MEALS    glucose chewable tablet 16 g  4 Tab Oral PRN    glucagon (GLUCAGEN) injection 1 mg  1 mg IntraMUSCular PRN    hydrALAZINE (APRESOLINE) 20 mg/mL injection 10 mg  10 mg IntraVENous Q6H PRN    dextrose 10 % infusion 125-250 mL  125-250 mL IntraVENous PRN    zolpidem (AMBIEN) tablet 5 mg  5 mg Oral QHS PRN        Review of Systems  A comprehensive review of systems was negative except for: Musculoskeletal: positive for bone pain    Objective:     Patient Vitals for the past 8 hrs:   BP Temp Pulse Resp SpO2   06/02/17 1514 (!) 162/95 98.8 °F (37.1 °C) 96 16 96 %   06/02/17 1116 (!) 179/96 98.4 °F (36.9 °C) 80 18 100 %   06/02/17 1100 148/88 98.6 °F (37 °C) 82 18 99 %     06/02 0701 - 06/02 1900  In: 1000 [I.V.:1000]  Out: -   05/31 1901 - 06/02 0700  In: 7071.7 [P.O.:600; I.V.:6471.7]  Out: 1200 [Urine:1200]    Physical Exam: General appearance: alert, fatigued, cooperative, no distress, appears stated age  Lungs: clear to auscultation bilaterally  Heart: regular rate and rhythm  Abdomen: soft, non-tender.  Bowel sounds normal. No masses,  no organomegaly no cva tenderness no spinal tenderness of cervical thru lumbar spine   Extremities: full rom left foot bandaged did not remove per  foot this am   Neurologic: Grossly normal           Data Review   Recent Results (from the past 24 hour(s))   GLUCOSE, POC    Collection Time: 06/01/17  8:59 PM   Result Value Ref Range    Glucose (POC) 101 (H) 65 - 100 mg/dL    Performed by [address], BASIC    Collection Time: 06/02/17  3:33 AM   Result Value Ref Range    Sodium 140 136 - 145 mmol/L    Potassium 3.2 (L) 3.5 - 5.1 mmol/L    Chloride 108 97 - 108 mmol/L    CO2 24 21 - 32 mmol/L    Anion gap 8 5 - 15 mmol/L    Glucose 115 (H) 65 - 100 mg/dL    BUN 8 6 - 20 MG/DL    Creatinine 1.18 0.70 - 1.30 MG/DL    BUN/Creatinine ratio 7 (L) 12 - 20      GFR est AA >60 >60 ml/min/1.73m2    GFR est non-AA >60 >60 ml/min/1.73m2    Calcium 7.6 (L) 8.5 - 10.1 MG/DL   GLUCOSE, POC    Collection Time: 06/02/17  7:15 AM   Result Value Ref Range    Glucose (POC) 66 65 - 100 mg/dL    Performed by Yi Aguirre    GLUCOSE, POC    Collection Time: 06/02/17  7:45 AM   Result Value Ref Range    Glucose (POC) 107 (H) 65 - 100 mg/dL    Performed by Yi Aguirre    TYPE & SCREEN    Collection Time: 06/02/17  9:06 AM   Result Value Ref Range    Crossmatch Expiration 06/05/2017     ABO/Rh(D) Ceiba Meehan POSITIVE     Antibody screen NEG    CBC W/O DIFF    Collection Time: 06/02/17  9:06 AM   Result Value Ref Range    WBC 10.9 4.1 - 11.1 K/uL    RBC 3.12 (L) 4.10 - 5.70 M/uL    HGB 7.2 (L) 12.1 - 17.0 g/dL    HCT 22.9 (L) 36.6 - 50.3 %    MCV 73.4 (L) 80.0 - 99.0 FL    MCH 23.1 (L) 26.0 - 34.0 PG    MCHC 31.4 30.0 - 36.5 g/dL    RDW 15.8 (H) 11.5 - 14.5 %    PLATELET 629 153 - 361 K/uL   GLUCOSE, POC    Collection Time: 06/02/17 10:13 AM   Result Value Ref Range    Glucose (POC) 72 65 - 100 mg/dL    Performed by [address] [place] [address], POC    Collection Time: 06/02/17 10:38 AM   Result Value Ref Range    Glucose (POC) 70 65 - 100 mg/dL    Performed by Sujey Alt    GLUCOSE, POC    Collection Time: 06/02/17 11:15 AM   Result Value Ref Range    Glucose (POC) 126 (H) 65 - 100 mg/dL    Performed by Amalia Doll (PCT)    GLUCOSE, POC    Collection Time: 06/02/17  4:53 PM   Result Value Ref Range    Glucose (POC) 170 (H) 65 - 100 mg/dL    Performed by Amalia Doll (PCT)            Assessment:     Active Problems:    HTN (hypertension) (7/23/2013)      Diabetes ([address] ()      Overview: since age 23      Osteomyelitis ([address] (2/24/2017)      Sepsis ([address] (5/27/2017)      ARF (acute renal failure) ([facility] (5/27/2017)        Plan:   Low bs noted adusted lantus  S/w nursing  S/w casemanagement pt getting ivabx arrainged   Will d/c home in the am   Lay Saavedra M.D.   [facility] [phone]

## 2017-06-02 NOTE — PROGRESS NOTES
Initial Nutrition Assessment:    INTERVENTIONS/RECOMMENDATIONS:   · Meals/Snacks: General/healthful diet:  Continue CCD for BG management with emphasis on protein intake to support wound healing. ASSESSMENT:   Chart reviewed; med noted for DM foot abscess. Wound vac. Planned discharge tomorrow. PO intake has been good. Diet Order: NPO  % Eaten:  Patient Vitals for the past 72 hrs:   % Diet Eaten   05/31/17 1409 100 %   05/31/17 0853 100 %     Pertinent Medications: [x]Reviewed []Other  Pertinent Labs: [x]Reviewed []Other  Food Allergies: [x]None []Other   Last BM:    [x]Active     []Hyperactive  []Hypoactive       [] Absent BS  Skin:    [] Intact   [] Incision  [] Breakdown  [x] Other: DM left foot abscess    Anthropometrics:   Height: 6' 3\" (190.5 cm) Weight: 138.6 kg (305 lb 8 oz)   IBW (%IBW):   ( ) UBW (%UBW):   (  %)   Last Weight Metrics:  Weight Loss Metrics 6/1/2017 4/17/2017 3/7/2017 3/4/2017 2/24/2017 2/16/2017 12/16/2016   Today's Wt 305 lb 8 oz 283 lb 3.2 oz 303 lb 9.6 oz 290 lb 291 lb 0.1 oz 296 lb 4.8 oz 302 lb 9.6 oz   BMI 38.18 kg/m2 35.4 kg/m2 37.95 kg/m2 36.25 kg/m2 36.37 kg/m2 37.03 kg/m2 38.85 kg/m2       BMI: Body mass index is 38.18 kg/(m^2). This BMI is indicative of:   []Underweight    []Normal    [x]Overweight    [] Obesity   [] Extreme Obesity (BMI>40)     Estimated Nutrition Needs (Based on):   2560 Kcals/day (BMR (2343) x 1.2AF (-250 kcals)) , 166 g (1.2 g/kg bw) Protein  Carbohydrate:  At Least 130 g/day  Fluids: 2500 mL/day (1ml/kcal)    Pt expected to meet estimated nutrient needs: [x]Yes []No    NUTRITION DIAGNOSES:   Problem:  Increased protein needs      Etiology: related to foot abscess     Signs/Symptoms: as evidenced by increased needs to support wound healing      NUTRITION INTERVENTIONS:  Meals/Snacks: General/healthful diet                  GOAL:   Resume oral diet within 24-48 hrs    LEARNING NEEDS (Diet, Food/Nutrient-Drug Interaction):    [x] None Identified   [] Identified and Education Provided/Documented   [] Identified and Pt declined/was not appropriate     Cultureal, Yazidism, OR Ethnic Dietary Needs:    [x] None Identified   [] Identified and Addressed     [x] Interdisciplinary Care Plan Reviewed/Documented    [x] Discharge Planning:  CCD for BG management       MONITORING /EVALUATION:      Food/Nutrient Intake Outcomes:  Total energy intake  Physical Signs/Symptoms Outcomes: Weight/weight change    NUTRITION RISK:    [] High              [x] Moderate           []  Low  []  Minimal/Uncompromised    PT SEEN FOR:    []  MD Consult: []Calorie Count      []Diabetic Diet Education        []Diet Education     []Electrolyte Management     []General Nutrition Management and Supplements     []Management of Tube Feeding     []TPN Recommendations    []  RN Referral:  []MST score >=2     []Enteral/Parenteral Nutrition PTA     []Pregnant: Gestational DM or Multigestation     []Pressure Ulcer/Wound Care needs        []  Low BMI  [x]  DTR Referral       Abdulaziz Romero, [address] [place] [address]  Pager [phone]  Weekend Pager [phone]

## 2017-06-02 NOTE — PROGRESS NOTES
ID Progress Note    Subjective:   Daily Progress Note: 2017 3:23 PM    DX:     1. 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 S/P I&D and excision of remaining 5th metatarsal. Primary pathogen appears to be MRSA,. 2. MRSA bacteremia. 3.  DM,              Review of Systems: afebrile. Feels better. Wound vac in place. S/P skin graft today. [place] Dials glands. -Oral lesions. -Photophobia. -Jaundice. - SOB. - cough. - abdominal pain or tenderness. - Nausea or vomiting. - Diarrhea. + left foot pain. No headache or AMS. ROS otherwise negative. CURRENT ANTIMICROBIALS: ceftaroline    Objective:     Visit Vitals    BP (!) 179/96    Pulse 80    Temp 98.4 °F (36.9 °C)    Resp 18    Ht 6' 3\" (1.905 m)    Wt 305 lb 8 oz (138.6 kg)    SpO2 100%    BMI 38.18 kg/m2    O2 Flow Rate (L/min): 2 l/min O2 Device: Room air    Temp (24hrs), Av.7 °F (37.1 °C), Min:98.4 °F (36.9 °C), Max:99.2 °F (37.3 °C)  Exam:   General:Afebrile and not toxic. No exanthem or enanthem. No peripheral adenopathy. Alert and oriented x3. All IV sites are clean and dry. HEENT: EOMI. Anicteric. Oral mucosa normal. Conjunctivae normal. Neck supple. No thrush. Chest: Lungs clear to A&P. Regular rhythm without murmurs. No chest wall tenderness. Abdomen: Soft without organomegaly, tenderness, or masses. Nondistended. Bowel sounds normal.  Musculoskeletal: Left foot with wound vac in place. + edema. Neurologic: Nonfocal exam      Data Review: Cultures of foot and blood + for MRSA, sensitive to ceftaroline. WBC = 10,900. Creat = 1.18.       Recent Results (from the past 24 hour(s))   GLUCOSE, POC    Collection Time: 17  1:41 PM   Result Value Ref Range    Glucose (POC) 101 (H) 65 - 100 mg/dL    Performed by Verona Campo, POC    Collection Time: 17  4:08 PM   Result Value Ref Range    Glucose (POC) 104 (H) 65 - 100 mg/dL    Performed by [address] [place] [address] Collection Time: 06/01/17  8:59 PM   Result Value Ref Range    Glucose (POC) 101 (H) 65 - 100 mg/dL    [facility] [address] [facility]    Collection Time: 06/02/17  3:33 AM   Result Value Ref Range    Sodium 140 136 - 145 mmol/L    Potassium 3.2 (L) 3.5 - 5.1 mmol/L    Chloride 108 97 - 108 mmol/L    CO2 24 21 - 32 mmol/L    Anion gap 8 5 - 15 mmol/L    Glucose 115 (H) 65 - 100 mg/dL    BUN 8 6 - 20 MG/DL    Creatinine 1.18 0.70 - 1.30 MG/DL    BUN/Creatinine ratio 7 (L) 12 - 20      GFR est AA >60 >60 ml/min/1.73m2    GFR est non-AA >60 >60 ml/min/1.73m2    Calcium 7.6 (L) 8.5 - 10.1 MG/DL   GLUCOSE, POC    Collection Time: 06/02/17  7:15 AM   Result Value Ref Range    Glucose (POC) 66 65 - 100 mg/dL    Performed by Lori Carrizales    GLUCOSE, POC    Collection Time: 06/02/17  7:45 AM   Result Value Ref Range    Glucose (POC) 107 (H) 65 - 100 mg/dL    Performed by Lori Carrizales    TYPE & SCREEN    Collection Time: 06/02/17  9:06 AM   Result Value Ref Range    Crossmatch Expiration 06/05/2017     ABO/Rh(D) Marcelo [place]isle POSITIVE     Antibody screen NEG    CBC W/O DIFF    Collection Time: 06/02/17  9:06 AM   Result Value Ref Range    WBC 10.9 4.1 - 11.1 K/uL    RBC 3.12 (L) 4.10 - 5.70 M/uL    HGB 7.2 (L) 12.1 - 17.0 g/dL    HCT 22.9 (L) 36.6 - 50.3 %    MCV 73.4 (L) 80.0 - 99.0 FL    MCH 23.1 (L) 26.0 - 34.0 PG    MCHC 31.4 30.0 - 36.5 g/dL    RDW 15.8 (H) 11.5 - 14.5 %    PLATELET 790 598 - 156 K/uL   GLUCOSE, POC    Collection Time: 06/02/17 10:13 AM   Result Value Ref Range    Glucose (POC) 72 65 - 100 mg/dL    Performed by [address], POC    Collection Time: 06/02/17 10:38 AM   Result Value Ref Range    Glucose (POC) 70 65 - 100 mg/dL    Performed by Modesta Cruz    GLUCOSE, POC    Collection Time: 06/02/17 11:15 AM   Result Value Ref Range    Glucose (POC) 126 (H) 65 - 100 mg/dL    Performed by Shaun Cordero (PCT)          Assessment/Plan:    Doing better across the board.  No fever and WBC now normal. Creatinine stable. MRSA is primary pathogen this time. No anaerobes or GNRs isolated. Will need at least several weeks of IV tx as an outpatient. Also recommend a daily probiotic. From an ID viewpoint can go home any time on ceftaroline for 3 wks. Follow up with me in the office in 3 wks. Call [phone] for appt. Labs should be done weekly and include: CBC with diff, bmp, and sed rate and faxed to me at [phone]. Will sign off as an inpatient.

## 2017-06-02 NOTE — PERIOP NOTES
TRANSFER - OUT REPORT:    Verbal report given to Akash RN(name) on Isiah Boyer  being transferred to [facility](unit) for routine post - op       Report consisted of patients Situation, Background, Assessment and   Recommendations(SBAR). Information from the following report(s) SBAR, OR Summary, Intake/Output, MAR and Recent Results was reviewed with the receiving nurse. Opportunity for questions and clarification was provided. Patient transported with:   Monitor  Registered Nurse  Tech     Volunteer in [place] notified of transfer.

## 2017-06-02 NOTE — DIABETES MGMT
DTC Progress Note    Recommendations/ Comments: If appropriate, please consider:  1)  decreasing pt's Lantus dose 55 units due to hypoglycemia 66 mg/dl with NPO status suggesting basal coverage is too high. 2) if pt to remain npo or po intakes remain <50%, would consider stopping prandial insulin at this time and use correctional coverage to reduce hypoglycemia risk. Chart reviewed on Froy Hansen. Patient is a 51 y.o. male with known DM - Metformin 500 mg bid, Humalog 12 units ac tid and Levemir 60 units daily at home. A1c:   Lab Results   Component Value Date/Time    Hemoglobin A1c 12.5 05/28/2017 03:18 AM    Hemoglobin A1c 10.6 02/17/2017 04:25 AM       Recent Glucose Results: Lab Results   Component Value Date/Time     (H) 06/02/2017 03:33 AM    GLUCPOC 126 (H) 06/02/2017 11:15 AM    GLUCPOC 70 06/02/2017 10:38 AM    GLUCPOC 72 06/02/2017 10:13 AM        Lab Results   Component Value Date/Time    Creatinine 1.18 06/02/2017 03:33 AM     Estimated Creatinine Clearance: 116.1 mL/min (based on Cr of 1.18). Active Orders   Diet    DIET DIABETIC CONSISTENT CARB Regular    DIET NPO        PO intake: Patient Vitals for the past 72 hrs:   % Diet Eaten   05/31/17 1409 100 %   05/31/17 0853 100 %       Current hospital DM medication: Lantus 60 units daily HS; Lispro 10 units ac tid and correctional insulin - normal sensitivity. Will continue to follow as needed.     Thank you  Irma Tucker RD CDE

## 2017-06-02 NOTE — PROGRESS NOTES
Verbal bedside report received from Mahnaz Cheng RN.    8:39 AM    Paged Dr. Catie Echevarria to get permission for the patient to travel without a tele box for the skin graft. MD said its ok for the patient to travel without a tele box. Orders put in.    5:10 PM    Got the telephone order with verbal readback to discontinue the fluids.

## 2017-06-02 NOTE — OP NOTES
[id number]   OP NOTE       Name:  Lianna Mary   MR#:  [id number]   :  1970   Account #:  [de-identified]    Surgery Date:  2017   Date of Adm:  2017       PREOPERATIVE DIAGNOSIS: Chronic ulcer, left foot. POSTOPERATIVE DIAGNOSIS: Chronic ulcer, left foot. PROCEDURE PERFORMED: Application of amniotic tissue graft to   left foot wound. PREOPERATIVE INDICATIONS: The patient is a 67-year-old male   with a midfoot level transmetatarsal amputation with a chronic ulcer to   the plantar lateral aspect. Had previous incision and drainage as well   as ray resection by Dr. Blane Graham approximately 5 days ago. Had a wound   VAC in place while in hospital, had been doing well, and attempting tosalvage and heal   the wound before amputation can be performed. Discussed with the   patient about the need for operative intervention for application of the   soft tissue graft, advanced tissue graft or rapid skin closure. Will begin   the process of nonweightbearing to the front of the foot in a   postoperative shoe or likely in a below-knee cast.    DESCRIPTION OF PROCEDURE: The patient was rolled to the   operating room, laid on the table in the supine position. A first   preoperative time-out was performed to confirm that the left foot was   the correct operative site. The patient was sedated with monitored anesthesia. The patient was prepped with Betadine paint   and scrub, had a second preoperative time-out performed to confirm   that the left foot was the correct operative site. Next, our attention was directed to the left plantar foot wound where   there was approximately a 6 x 5 cm x 1cm full-thickness ulceration to the left   plantar midfoot with healthy granulation tissue present and previous   sutures from Dr. Apolinar Alford surgery.  The site was then used with a   curette and excisionally debride to muscle nonviable tissue until healthy bleeding was observed   in all areas of the wound. After this, the Amniox CLARIX cord graft x2 was   pie crusted with a 15 blade to allow drainage through the thing and   then the graft was sutured in place with a staple. A bolster   dressing with 4 x 4 gauze and a nonstick dressing were used and the   graft was placed. The site was then copiously irrigated with normal   saline and then Mepitel dressing, 4 x 4's, roll gauze, ABD, and an fiberglass cast  were applied to the left lower extremity. The patient was then put   on the stretcher and taken to the PACU with all vitals stable and intact. DISPOSITION: The patient will remain nonweightbearing to the left   lower extremity and minimal to the heel. Will follow up in clinic in 1   week. Will likely be discharged today on IV ceftaroline. SURGEON: Gilford Eve, DPM    ANESTHESIA: None. IMPLANTS: Amniox CLARIX cord graft x 2. ESTIMATED BLOOD LOSS: 5 mL. TOURNIQUET TIME: No tourniquet was used. SPECIMENS REMOVED: No specimens were obtained. No cultures   were obtained.         WIL Nichole / Taurus George   D:  06/02/2017   08:52   T:  06/02/2017   09:27   Job #:  [id number]

## 2017-06-02 NOTE — PERIOP NOTES
Handoff Report from Operating Room to PACU    Report received from Franchesca Coker RN and Gabby Ambrose CRNA regarding Connie Ground. Surgeon(s):  JG Lovell DPM  And Procedure(s) (LRB):  LEFT FOOT APPLICATION OF GRAFT (Left)  confirmed   with allergies and dressings discussed. Anesthesia type, drugs, patient history, complications, estimated blood loss, vital signs, intake and output, and last pain medication, lines and temperature were reviewed.

## 2017-06-02 NOTE — ANESTHESIA PREPROCEDURE EVALUATION
Anesthetic History   No history of anesthetic complications            Review of Systems / Medical History  Patient summary reviewed, nursing notes reviewed and pertinent labs reviewed    Pulmonary          Smoker      Comments: Former smoker   Neuro/Psych   Within defined limits           Cardiovascular    Hypertension: well controlled          Hyperlipidemia    Exercise tolerance: <4 METS     GI/Hepatic/Renal         Renal disease: ARF       Endo/Other    Diabetes: poorly controlled, type 2, using insulin    Obesity and anemia     Other Findings              Physical Exam    Airway  Mallampati: I  TM Distance: > 6 cm  Neck ROM: normal range of motion   Mouth opening: Normal     Cardiovascular    Rhythm: regular  Rate: normal         Dental  No notable dental hx       Pulmonary  Breath sounds clear to auscultation               Abdominal  GI exam deferred       Other Findings            Anesthetic Plan    ASA: 3  Anesthesia type: MAC and total IV anesthesia    Monitoring Plan: BIS      Induction: Intravenous  Anesthetic plan and risks discussed with: Patient

## 2017-06-02 NOTE — PROGRESS NOTES
ID Progress Note     Subjective:   Daily Progress Note: 2017 3:23 PM     DX:      1. 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 S/P I&D and excision of remaining 5th metatarsal. Primary pathogen appears to be MRSA,.     2. MRSA bacteremia.     3. DM,                   Review of Systems: afebrile. Feels better. Wound vac in place. S/P skin graft today. Tono Hillock glands. -Oral lesions. -Photophobia. -Jaundice. - SOB. - cough. - abdominal pain or tenderness. - Nausea or vomiting. - Diarrhea. + left foot pain. No headache or AMS. ROS otherwise negative.        CURRENT ANTIMICROBIALS: ceftaroline     Objective:           Visit Vitals    BP (!) 179/96    Pulse 80    Temp 98.4 °F (36.9 °C)    Resp 18    Ht 6' 3\" (1.905 m)    Wt 305 lb 8 oz (138.6 kg)    SpO2 100%    BMI 38.18 kg/m2    O2 Flow Rate (L/min): 2 l/min O2 Device: Room air     Temp (24hrs), Av.7 °F (37.1 °C), Min:98.4 °F (36.9 °C), Max:99.2 °F (37.3 °C)  Exam:   General:Afebrile and not toxic. No exanthem or enanthem. No peripheral adenopathy. Alert and oriented x3. All IV sites are clean and dry. HEENT: EOMI. Anicteric. Oral mucosa normal. Conjunctivae normal. Neck supple. No thrush. Chest: Lungs clear to A&P. Regular rhythm without murmurs. No chest wall tenderness. Abdomen: Soft without organomegaly, tenderness, or masses. Nondistended. Bowel sounds normal.  Musculoskeletal: Left foot with wound vac in place. + edema. Neurologic: Nonfocal exam        Data Review: Cultures of foot and blood + for MRSA, sensitive to ceftaroline. WBC = 10,900.  Creat = 1.18.         Recent Results          Recent Results (from the past 24 hour(s))   GLUCOSE, POC     Collection Time: 17 1:41 PM   Result Value Ref Range     Glucose (POC) 101 (H) 65 - 100 mg/dL     Performed by Liya Perdomo     GLUCOSE, POC     Collection Time: 17 4:08 PM   Result Value Ref Range     Glucose (POC) 104 (H) 65 - 100 mg/dL     Performed by Amrita Francis     GLUCOSE, POC     Collection Time: 06/01/17 8:59 PM   Result Value Ref Range     Glucose (POC) 101 (H) 65 - 100 mg/dL     Performed by Rohit Palafox     METABOLIC PANEL, BASIC     Collection Time: 06/02/17 3:33 AM   Result Value Ref Range     Sodium 140 136 - 145 mmol/L     Potassium 3.2 (L) 3.5 - 5.1 mmol/L     Chloride 108 97 - 108 mmol/L     CO2 24 21 - 32 mmol/L     Anion gap 8 5 - 15 mmol/L     Glucose 115 (H) 65 - 100 mg/dL     BUN 8 6 - 20 MG/DL     Creatinine 1.18 0.70 - 1.30 MG/DL     BUN/Creatinine ratio 7 (L) 12 - 20      GFR est AA >60 >60 ml/min/1.73m2     GFR est non-AA >60 >60 ml/min/1.73m2     Calcium 7.6 (L) 8.5 - 10.1 MG/DL   GLUCOSE, POC     Collection Time: 06/02/17 7:15 AM   Result Value Ref Range     Glucose (POC) 66 65 - 100 mg/dL     Performed by [employer]     GLUCOSE, POC     Collection Time: 06/02/17 7:45 AM   Result Value Ref Range     Glucose (POC) 107 (H) 65 - 100 mg/dL     Performed by [employer] ([employer])     TYPE & SCREEN     Collection Time: 06/02/17 9:06 AM   Result Value Ref Range     Crossmatch Expiration 06/05/2017       ABO/Rh(D) Venson Sable POSITIVE       Antibody screen NEG     CBC W/O DIFF     Collection Time: 06/02/17 9:06 AM   Result Value Ref Range     WBC 10.9 4.1 - 11.1 K/uL     RBC 3.12 (L) 4.10 - 5.70 M/uL     HGB 7.2 (L) 12.1 - 17.0 g/dL     HCT 22.9 (L) 36.6 - 50.3 %     MCV 73.4 (L) 80.0 - 99.0 FL     MCH 23.1 (L) 26.0 - 34.0 PG     MCHC 31.4 30.0 - 36.5 g/dL     RDW 15.8 (H) 11.5 - 14.5 %     PLATELET 402 157 - 454 K/uL   GLUCOSE, POC     Collection Time: 06/02/17 10:13 AM   Result Value Ref Range     Glucose (POC) 72 65 - 100 mg/dL     Performed by Duncan De Los Santos     GLUCOSE, POC     Collection Time: 06/02/17 10:38 AM   Result Value Ref Range     Glucose (POC) 70 65 - 100 mg/dL     Performed by Adan Echeverria     GLUCOSE, POC     Collection Time: 06/02/17 11:15 AM   Result Value Ref Range     Glucose (POC) 126 (H) 65 - 100 mg/dL     Performed by Lupe Guillermoo (PCT)                 Assessment/Plan:     Doing better across the board. No fever and WBC now normal. Creatinine stable.     MRSA is primary pathogen this time. No anaerobes or GNRs isolated.     Will need at least several weeks of IV tx as an outpatient.     Also recommend a daily probiotic.     From an ID viewpoint can go home any time on ceftaroline 600 mg q 12 hrs for 3 wks.      Follow up with me in the office in 3 wks. Call [phone] for appt.     Labs should be done weekly and include: CBC with diff, bmp, and sed rate and faxed to me at [phone].  Will sign off as an inpatient.

## 2017-06-02 NOTE — ANESTHESIA POSTPROCEDURE EVALUATION
Post-Anesthesia Evaluation and Assessment    Patient: Madelaine Foster MRN: [id number]  SSN: [id number]    YOB: 1970  Age: 51 y.o. Sex: male       Cardiovascular Function/Vital Signs  Visit Vitals    /87    Pulse 80    Temp 37.1 °C (98.7 °F)    Resp 22    Ht 6' 3\" (1.905 m)    Wt 138.6 kg (305 lb 8 oz)    SpO2 99%    BMI 38.18 kg/m2       Patient is status post total IV anesthesia, general anesthesia for Procedure(s):  LEFT FOOT APPLICATION OF GRAFT. Nausea/Vomiting: None    Postoperative hydration reviewed and adequate. Pain:  Pain Scale 1: Numeric (0 - 10) (06/02/17 1030)  Pain Intensity 1: 2 (06/02/17 1030)   Managed    Neurological Status:   Neuro (WDL): Within Defined Limits (06/02/17 1466)  Neuro  Neurologic State: Drowsy (06/02/17 0335)  Orientation Level: Oriented X4 (06/01/17 1959)  Cognition: Appropriate decision making; Appropriate for age attention/concentration; Appropriate safety awareness; Follows commands (06/01/17 1959)  Speech: Clear (06/01/17 1959)  LUE Motor Response: Purposeful;Spontaneous  (06/01/17 1959)  LLE Motor Response: Purposeful;Spontaneous  (06/01/17 1959)  RUE Motor Response: Purposeful;Spontaneous  (06/01/17 1959)  RLE Motor Response: Purposeful;Spontaneous  (06/01/17 1959)   At baseline    Mental Status and Level of Consciousness: Arousable    Pulmonary Status:   O2 Device: Room air (06/02/17 1030)   Adequate oxygenation and airway patent    Complications related to anesthesia: None    Post-anesthesia assessment completed.  No concerns    Signed By: Fabby House MD     June 2, 2017

## 2017-06-02 NOTE — PERIOP NOTES
Dr. Taet Matter notified of FSBS = 72. Patient is alert & oriented. Advised to treat with PO fruit juice & recheck.

## 2017-06-02 NOTE — BRIEF OP NOTE
BRIEF OPERATIVE NOTE    Date of Procedure: 6/2/2017   Preoperative Diagnosis: ULCER LEFT FOOT  Postoperative Diagnosis: ULCER LEFT FOOT    Procedure(s):  LEFT FOOT APPLICATION OF GRAFT  Surgeon(s) and Role:     * Selene Naqvi DPM - Primary         Assistant Staff:       Surgical Staff:  Circ-1: Luis Ayala RN  Scrub Tech-1: Nathan Bradley Staff: Carlota Rodriguez RN; Blair Longoria RN  Event Time In   Incision Start 7325   Incision Close 2710     Anesthesia: MAC   Estimated Blood Loss: 10mL  Specimens: * No specimens in log *   Findings: Healthy bleeding ulcer to left plantar lateral foot. Complications: None  Implants:   Implant Name Type Inv.  Item Serial No.  Lot No. LRB No. Used Action   Clarix Cord 1K 6 x 3 cm   [id number] [employer] NA Left 1 Implanted   Clarix Cord 1K 6 x 3 cm     [id number] [employer]   Left 1 Implanted

## 2017-06-02 NOTE — PERIOP NOTES
TRANSFER - IN REPORT:    Verbal report received from [address] RN (name) on [facility]  being received from [address] (unit) for ordered procedure      Report consisted of patients Situation, Background, Assessment and   Recommendations(SBAR). Information from the following report(s) SBAR, Kardex, MAR, Recent Results and Cardiac Rhythm NSR was reviewed with the receiving nurse. Opportunity for questions and clarification was provided. Assessment completed upon patients arrival to unit and care assumed.

## 2017-06-02 NOTE — PROGRESS NOTES
-Patient has a cast on , and can be discharged today if ready  -I have an appointment scheduled in the wound care center next Tuesday for a Total contact cast application at 6:61EW  -Wound looked very healthy and bleeding nicely.      Gerri Smith DPM

## 2017-06-02 NOTE — PROGRESS NOTES
Patient will be discharged on ceftaroline 600 mg q 12 hrs for 3 wks and has 100% coverage through his insurance. Patient had no preference for either a home infusion or home health agency. [facility] to provide the infusion supplies and [facility] has been consulted for the skilled nursing. Dr. Dustin Chaudhary has been notified of the above arrangements and once she speaks with Dr. Irvin Males there is a possibility of discharge today. The Home infusion nurse has been alerted to the potential discharge so to plan a visit with patient for teaching today prior to discharge home. Care Management Interventions  PCP Verified by CM:  Yes  Transition of Care Consult (CM Consult): [address]: Yes  MyChart Signup: No  Physical Therapy Consult: Yes  Occupational Therapy Consult: Yes  Speech Therapy Consult: No  Current Support Network: Lives with Spouse  Confirm Follow Up Transport: Family  Plan discussed with Pt/Family/Caregiver: Yes  Freedom of Choice Offered: Yes  Discharge Location  Discharge Placement: Home with home health

## 2017-06-02 NOTE — PROGRESS NOTES
General Daily Progress Note    Admit Date: 5/27/2017  Hospital day several     Subjective:     Patient has no complaint of n/v/cp/d S/w nursing no issues   Medication side effects: none    Current Facility-Administered Medications   Medication Dose Route Frequency    insulin glargine (LANTUS) injection 50 Units  50 Units SubCUTAneous QHS    lactobac ac& pc-s.therm-b.anim (EMBER Q/RISAQUAD)  1 Cap Oral DAILY    cefTARoline (TEFLARO) 600 mg in 0.9% sodium chloride (MBP/ADV) 50 mL  600 mg IntraVENous Q12H    sodium chloride (NS) flush 10-30 mL  10-30 mL InterCATHeter PRN    sodium chloride (NS) flush 10 mL  10 mL InterCATHeter Q24H    sodium chloride (NS) flush 10 mL  10 mL InterCATHeter PRN    sodium chloride (NS) flush 10-40 mL  10-40 mL InterCATHeter Q8H    heparin (porcine) pf 300 Units  300 Units InterCATHeter PRN    ALPRAZolam (XANAX) tablet 0.25 mg  0.25 mg Oral BID PRN    atorvastatin (LIPITOR) tablet 40 mg  40 mg Oral DAILY    pantoprazole (PROTONIX) tablet 40 mg  40 mg Oral ACB    gabapentin (NEURONTIN) capsule 100 mg  100 mg Oral BID PRN    metoprolol tartrate (LOPRESSOR) tablet 12.5 mg  12.5 mg Oral BID    acetaminophen (TYLENOL) tablet 650 mg  650 mg Oral Q4H PRN    oxyCODONE IR (ROXICODONE) tablet 10 mg  10 mg Oral Q4H PRN    HYDROmorphone (PF) (DILAUDID) injection 1 mg  1 mg IntraVENous Q6H PRN    ondansetron (ZOFRAN) injection 4 mg  4 mg IntraVENous Q6H PRN    insulin lispro (HUMALOG) injection 10 Units  10 Units SubCUTAneous TIDAC    insulin lispro (HUMALOG) injection   SubCUTAneous QID AFTER MEALS    glucose chewable tablet 16 g  4 Tab Oral PRN    glucagon (GLUCAGEN) injection 1 mg  1 mg IntraMUSCular PRN    hydrALAZINE (APRESOLINE) 20 mg/mL injection 10 mg  10 mg IntraVENous Q6H PRN    dextrose 10 % infusion 125-250 mL  125-250 mL IntraVENous PRN    zolpidem (AMBIEN) tablet 5 mg  5 mg Oral QHS PRN        Review of Systems  A comprehensive review of systems was negative except for: Musculoskeletal: positive for bone pain    Objective:     Patient Vitals for the past 8 hrs:   BP Temp Pulse Resp SpO2   06/02/17 1514 (!) 162/95 98.8 °F (37.1 °C) 96 16 96 %   06/02/17 1116 (!) 179/96 98.4 °F (36.9 °C) 80 18 100 %   06/02/17 1100 148/88 98.6 °F (37 °C) 82 18 99 %     06/02 0701 - 06/02 1900  In: 1000 [I.V.:1000]  Out: -   05/31 1901 - 06/02 0700  In: 7071.7 [P.O.:600; I.V.:6471.7]  Out: 1200 [Urine:1200]    Physical Exam: General appearance: alert, fatigued, cooperative, no distress, appears stated age  Lungs: clear to auscultation bilaterally  Heart: regular rate and rhythm  Abdomen: soft, non-tender.  Bowel sounds normal. No masses,  no organomegaly no cva tenderness no spinal tenderness of cervical thru lumbar spine   Extremities: full rom left foot bandaged did not remove per  foot this am   Neurologic: Grossly normal           Data Review   Recent Results (from the past 24 hour(s))   GLUCOSE, POC    Collection Time: 06/01/17  8:59 PM   Result Value Ref Range    Glucose (POC) 101 (H) 65 - 100 mg/dL    Performed by Naun W Carol Prado, BASIC    Collection Time: 06/02/17  3:33 AM   Result Value Ref Range    Sodium 140 136 - 145 mmol/L    Potassium 3.2 (L) 3.5 - 5.1 mmol/L    Chloride 108 97 - 108 mmol/L    CO2 24 21 - 32 mmol/L    Anion gap 8 5 - 15 mmol/L    Glucose 115 (H) 65 - 100 mg/dL    BUN 8 6 - 20 MG/DL    Creatinine 1.18 0.70 - 1.30 MG/DL    BUN/Creatinine ratio 7 (L) 12 - 20      GFR est AA >60 >60 ml/min/1.73m2    GFR est non-AA >60 >60 ml/min/1.73m2    Calcium 7.6 (L) 8.5 - 10.1 MG/DL   GLUCOSE, POC    Collection Time: 06/02/17  7:15 AM   Result Value Ref Range    Glucose (POC) 66 65 - 100 mg/dL    Performed by Love Joel    GLUCOSE, POC    Collection Time: 06/02/17  7:45 AM   Result Value Ref Range    Glucose (POC) 107 (H) 65 - 100 mg/dL    Performed by Love Joel    TYPE & SCREEN    Collection Time: 06/02/17  9:06 AM   Result Value Ref Range    Crossmatch Expiration 06/05/2017     ABO/Rh(D) Medina Denney POSITIVE     Antibody screen NEG    CBC W/O DIFF    Collection Time: 06/02/17  9:06 AM   Result Value Ref Range    WBC 10.9 4.1 - 11.1 K/uL    RBC 3.12 (L) 4.10 - 5.70 M/uL    HGB 7.2 (L) 12.1 - 17.0 g/dL    HCT 22.9 (L) 36.6 - 50.3 %    MCV 73.4 (L) 80.0 - 99.0 FL    MCH 23.1 (L) 26.0 - 34.0 PG    MCHC 31.4 30.0 - 36.5 g/dL    RDW 15.8 (H) 11.5 - 14.5 %    PLATELET 781 164 - 049 K/uL   GLUCOSE, POC    Collection Time: 06/02/17 10:13 AM   Result Value Ref Range    Glucose (POC) 72 65 - 100 mg/dL    Performed by [address] [place] [address], POC    Collection Time: 06/02/17 10:38 AM   Result Value Ref Range    Glucose (POC) 70 65 - 100 mg/dL    Performed by Nina Shrestha    GLUCOSE, POC    Collection Time: 06/02/17 11:15 AM   Result Value Ref Range    Glucose (POC) 126 (H) 65 - 100 mg/dL    Performed by Love Walker (PCT)    GLUCOSE, POC    Collection Time: 06/02/17  4:53 PM   Result Value Ref Range    Glucose (POC) 170 (H) 65 - 100 mg/dL    Performed by Love Walker (PCT)            Assessment:     Active Problems:    HTN (hypertension) (7/23/2013)      Diabetes ([address] ()      Overview: since age 23      Osteomyelitis ([address] (2/24/2017)      Sepsis ([address] (5/27/2017)      ARF (acute renal failure) ([address] (5/27/2017)        Plan:   Continue abx  Surgery planned again for today  Appreciate ID consultation   Amanda Alvarez M.D.   [facility] [phone]

## 2017-06-02 NOTE — PERIOP NOTES
Recheck of FSBS = 70. Again treated with juice per Dr. Corrinne Chance & proceed with transfer to floor.

## 2017-06-02 NOTE — PROGRESS NOTES
9534 Patient's BP remains elevated despite administration of PRN hydralazine and PRN IV dilaudid for pain management. Serum K also noted to be low this AM. Paging on call physician for Dr. Ghada Maurice. 3040 Dr. Marquise Yusuf returned page and updated on patient situation and condition. Order obtained for potassium chloride 10 mEq IV Q 1 hr x 2 doses. Regarding patient's sustained elevated BP- Dr. Marquise Yusuf will have Dr. Ghada Maurice call if necessary.

## 2017-06-03 VITALS
BODY MASS INDEX: 37.98 KG/M2 | OXYGEN SATURATION: 94 % | WEIGHT: 305.5 LBS | HEIGHT: 75 IN | DIASTOLIC BLOOD PRESSURE: 110 MMHG | TEMPERATURE: 98.5 F | HEART RATE: 95 BPM | RESPIRATION RATE: 18 BRPM | SYSTOLIC BLOOD PRESSURE: 175 MMHG

## 2017-06-03 LAB
ANION GAP BLD CALC-SCNC: 7 MMOL/L (ref 5–15)
BUN SERPL-MCNC: 7 MG/DL (ref 6–20)
BUN/CREAT SERPL: 6 (ref 12–20)
CALCIUM SERPL-MCNC: 8.3 MG/DL (ref 8.5–10.1)
CHLORIDE SERPL-SCNC: 107 MMOL/L (ref 97–108)
CO2 SERPL-SCNC: 25 MMOL/L (ref 21–32)
CREAT SERPL-MCNC: 1.22 MG/DL (ref 0.7–1.3)
GLUCOSE BLD STRIP.AUTO-MCNC: 106 MG/DL (ref 65–100)
GLUCOSE SERPL-MCNC: 98 MG/DL (ref 65–100)
POTASSIUM SERPL-SCNC: 3.3 MMOL/L (ref 3.5–5.1)
SERVICE CMNT-IMP: ABNORMAL
SODIUM SERPL-SCNC: 139 MMOL/L (ref 136–145)

## 2017-06-03 PROCEDURE — 74011250636 HC RX REV CODE- 250/636: Performed by: INTERNAL MEDICINE

## 2017-06-03 PROCEDURE — 74011250637 HC RX REV CODE- 250/637: Performed by: INTERNAL MEDICINE

## 2017-06-03 PROCEDURE — 82962 GLUCOSE BLOOD TEST: CPT

## 2017-06-03 PROCEDURE — 36415 COLL VENOUS BLD VENIPUNCTURE: CPT | Performed by: FAMILY MEDICINE

## 2017-06-03 PROCEDURE — 74011000258 HC RX REV CODE- 258: Performed by: INTERNAL MEDICINE

## 2017-06-03 PROCEDURE — 80048 BASIC METABOLIC PNL TOTAL CA: CPT | Performed by: FAMILY MEDICINE

## 2017-06-03 RX ORDER — OXYCODONE AND ACETAMINOPHEN 10; 325 MG/1; MG/1
1 TABLET ORAL
Qty: 30 TAB | Refills: 0 | Status: SHIPPED | OUTPATIENT
Start: 2017-06-03 | End: 2017-07-26

## 2017-06-03 RX ADMIN — METOPROLOL TARTRATE 12.5 MG: 25 TABLET ORAL at 09:37

## 2017-06-03 RX ADMIN — PANTOPRAZOLE SODIUM 40 MG: 40 TABLET, DELAYED RELEASE ORAL at 09:38

## 2017-06-03 RX ADMIN — HYDROMORPHONE HYDROCHLORIDE 1 MG: 1 INJECTION, SOLUTION INTRAMUSCULAR; INTRAVENOUS; SUBCUTANEOUS at 05:44

## 2017-06-03 RX ADMIN — Medication 10 ML: at 05:44

## 2017-06-03 RX ADMIN — HYDROMORPHONE HYDROCHLORIDE 1 MG: 1 INJECTION, SOLUTION INTRAMUSCULAR; INTRAVENOUS; SUBCUTANEOUS at 00:05

## 2017-06-03 RX ADMIN — CEFTAROLINE FOSAMIL 600 MG: 600 POWDER, FOR SOLUTION INTRAVENOUS at 05:47

## 2017-06-03 RX ADMIN — Medication 1 CAPSULE: at 09:37

## 2017-06-03 RX ADMIN — HYDRALAZINE HYDROCHLORIDE 10 MG: 20 INJECTION INTRAMUSCULAR; INTRAVENOUS at 03:23

## 2017-06-03 RX ADMIN — ATORVASTATIN CALCIUM 40 MG: 40 TABLET, FILM COATED ORAL at 09:38

## 2017-06-03 NOTE — PROGRESS NOTES
Lab orders have been faxed to [facility] at [facility] at [phone]. [address]

## 2017-06-03 NOTE — DISCHARGE INSTRUCTIONS
PATIENT DISCHARGE INSTRUCTIONS      PATIENT DISCHARGE INSTRUCTIONS    Denise Mahoney / [id number] : 1970    Admitted 2017 Discharged: 6/3/2017       · It is important that you take the medication exactly as they are prescribed. · Keep your medication in the bottles provided by the pharmacist and keep a list of the medication names, dosages, and times to be taken in your wallet. · Do not take other medications without consulting your doctor. What to do at Home    Recommended Diet: Regular Diet    Recommended Activity: Activity as tolerated    If you experience any of the following symptoms fever, chills, please follow up with Dr. Catie Echevarria. Signed By: Emily Ash MD     Jade 3, 2017         General Daily Progress Note    Admit Date: 2017  Hospital day several    Subjective:     Patient has no complaint of foot pain. .   Medication side effects: none    Current Facility-Administered Medications   Medication Dose Route Frequency    insulin glargine (LANTUS) injection 50 Units  50 Units SubCUTAneous QHS    lactobac ac& pc-s.therm-b.anim (EMBER Q/RISAQUAD)  1 Cap Oral DAILY    cefTARoline (TEFLARO) 600 mg in 0.9% sodium chloride (MBP/ADV) 50 mL  600 mg IntraVENous Q12H    sodium chloride (NS) flush 10-30 mL  10-30 mL InterCATHeter PRN    sodium chloride (NS) flush 10 mL  10 mL InterCATHeter Q24H    sodium chloride (NS) flush 10 mL  10 mL InterCATHeter PRN    sodium chloride (NS) flush 10-40 mL  10-40 mL InterCATHeter Q8H    heparin (porcine) pf 300 Units  300 Units InterCATHeter PRN    ALPRAZolam (XANAX) tablet 0.25 mg  0.25 mg Oral BID PRN    atorvastatin (LIPITOR) tablet 40 mg  40 mg Oral DAILY    pantoprazole (PROTONIX) tablet 40 mg  40 mg Oral ACB    gabapentin (NEURONTIN) capsule 100 mg  100 mg Oral BID PRN    metoprolol tartrate (LOPRESSOR) tablet 12.5 mg  12.5 mg Oral BID    acetaminophen (TYLENOL) tablet 650 mg  650 mg Oral Q4H PRN    oxyCODONE IR (ROXICODONE) tablet 10 mg  10 mg Oral Q4H PRN    HYDROmorphone (PF) (DILAUDID) injection 1 mg  1 mg IntraVENous Q6H PRN    ondansetron (ZOFRAN) injection 4 mg  4 mg IntraVENous Q6H PRN    insulin lispro (HUMALOG) injection 10 Units  10 Units SubCUTAneous TIDAC    insulin lispro (HUMALOG) injection   SubCUTAneous QID AFTER MEALS    glucose chewable tablet 16 g  4 Tab Oral PRN    glucagon (GLUCAGEN) injection 1 mg  1 mg IntraMUSCular PRN    hydrALAZINE (APRESOLINE) 20 mg/mL injection 10 mg  10 mg IntraVENous Q6H PRN    dextrose 10 % infusion 125-250 mL  125-250 mL IntraVENous PRN    zolpidem (AMBIEN) tablet 5 mg  5 mg Oral QHS PRN        Review of Systems  Pertinent items are noted in HPI. Objective:     Patient Vitals for the past 8 hrs:   BP Temp Pulse Resp SpO2   06/03/17 0848 (!) 175/110 98.5 °F (36.9 °C) 95 18 94 %   06/03/17 0445 (!) 156/94 - - - 100 %   06/03/17 0310 (!) 187/107 99.5 °F (37.5 °C) 87 20 96 %        06/01 1901 - 06/03 0700  In: 03390 [I.V.:08610]  Out: -     Physical Exam:   Visit Vitals    BP (!) 175/110 (BP 1 Location: Right arm, BP Patient Position: At rest)  Comment: will notify RN    Pulse 95    Temp 98.5 °F (36.9 °C)    Resp 18    Ht 6' 3\" (1.905 m)    Wt 305 lb 8 oz (138.6 kg)    SpO2 94%    BMI 38.18 kg/m2     Lungs: clear to auscultation bilaterally  Heart: regular rate and rhythm, S1, S2 normal, no murmur, click, rub or gallop  Abdomen: soft, non-tender.  Bowel sounds normal. No masses,  no organomegaly  Extremities: short leg cast on L foot        ECG: normal sinus rhythm     Data Review   Recent Results (from the past 24 hour(s))   GLUCOSE, POC    Collection Time: 06/02/17 10:13 AM   Result Value Ref Range    Glucose (POC) 72 65 - 100 mg/dL    Performed by [address] [place] [address], POC    Collection Time: 06/02/17 10:38 AM   Result Value Ref Range    Glucose (POC) 70 65 - 100 mg/dL    Performed by Vickkdario Nose    GLUCOSE, POC    Collection Time: 06/02/17 11:15 AM   Result Value Ref Range    Glucose (POC) 126 (H) 65 - 100 mg/dL    Performed by Alcira Sanz (PCT)    GLUCOSE, POC    Collection Time: 06/02/17  4:53 PM   Result Value Ref Range    Glucose (POC) 170 (H) 65 - 100 mg/dL    Performed by Alcira Sanz (PCT)    GLUCOSE, POC    Collection Time: 06/02/17  9:22 PM   Result Value Ref Range    Glucose (POC) 81 65 - 100 mg/dL    Performed by Deyanira Harrington    METABOLIC PANEL, BASIC    Collection Time: 06/03/17  3:15 AM   Result Value Ref Range    Sodium 139 136 - 145 mmol/L    Potassium 3.3 (L) 3.5 - 5.1 mmol/L    Chloride 107 97 - 108 mmol/L    CO2 25 21 - 32 mmol/L    Anion gap 7 5 - 15 mmol/L    Glucose 98 65 - 100 mg/dL    BUN 7 6 - 20 MG/DL    Creatinine 1.22 0.70 - 1.30 MG/DL    BUN/Creatinine ratio 6 (L) 12 - 20      GFR est AA >60 >60 ml/min/1.73m2    GFR est non-AA >60 >60 ml/min/1.73m2    Calcium 8.3 (L) 8.5 - 10.1 MG/DL   GLUCOSE, POC    Collection Time: 06/03/17  9:05 AM   Result Value Ref Range    Glucose (POC) 106 (H) 65 - 100 mg/dL    Performed by Raimundo 99            Assessment:     Active Problems:    HTN (hypertension) (7/23/2013)      Diabetes (HCC) ()      Overview: since age 23      Osteomyelitis ([facility] (2/24/2017)      Sepsis ([facility] (5/27/2017)      ARF (acute renal failure) ([facility] (5/27/2017)        Plan:     1. Diabetic foot infection. Dc home today on home iv antibiotics.

## 2017-06-03 NOTE — DISCHARGE SUMMARY
[facility] [id number] SUMMARY       Name:  Jett eRyes   MR#:  [id number]   :  1970   Account #:  [de-identified]        Date of Adm:  2017       FINAL DIAGNOSES   1. Osteomyelitis, left metatarsal head and diabetic foot ulcer. 2. Diabetes. 3. Hypertension. 4. Sepsis. 5. Acute renal failure. DISCHARGE MEDICATIONS ARE   1. Atorvastatin 40 mg daily. 2. Vitamin D 1000 units daily. 3. Nexium 40 mg daily. 4. Gabapentin 100 mg b.i.d.   5. Levemir 50 units daily. 6. Sliding scale Humalog before meals. 7. Shruthi-Q 1 tablet daily. 8. Lisinopril 40 mg daily. 9. Metformin 500 mg b.i.d. 10. Metoprolol 25 mg half tab b.i.d.   11. Percocet p.r.n. pain. 12. Ceftaroline 600 mg IV q.12 h. for 3 weeks. CONSULTATIONS    1. Infectious disease, Dr. Love Moise. 2. Podiatry, Dr. Jefferson Nava. FOLLOWUP: Follow up with Dr. Ghada Maurice in 1 week. HISTORY OF PRESENT ILLNESS: The patient is a very pleasant 51  year-old [facility] American male with diabetes. He came to the   emergency room with progressively worsening left foot pain, and   wound to the left foot. He also had diaphoresis, mild headache and dry   cough for 3 days. The patient was febrile in the emergency room. The   patient with complete amputation to the toes of the left foot in . The patient had a skin graft to his left foot in 2017. The patient   works as an , and is on his feet frequently. When   seen in the emergency room, afebrile, tachycardic, had a headache   and infection of the left foot. X-ray revealed possible osteomyelitis of   the 5th metatarsal head. No nausea, vomiting, diarrhea or urinary   symptoms. PAST MEDICAL HISTORY: Significant for diabetes since age 23,   hypertension, hypercholesterolemia and hyperlipidemia.     PAST SURGICAL HISTORY: Includes amputation of the toes of the   left foot, history of bunionectomy, history of boutonniere deformity of   the left foot, history of skin graft surgery, tonsillectomy and wisdom   teeth extraction. SOCIAL HISTORY: Smoking, quit in 2011. Alcohol use, 1 glass of wine   per week. ALLERGIES TO: MORPHINE, CAUSES ANGIOEDEMA. MEDICATIONS ON ADMISSION   1. Metformin 500 mg b.i.d.   2. Metoprolol 25 mg half tab b.i.d.   3. Percocet p.r.n. pain. 4. Neurontin 100 mg b.i.d.   5. Vitamin D3 1000 units daily. 6. Humalog 12 units before meals. 7. Levemir 60 units daily. 8. Nexium 40 mg daily. 9. Saline nasal spray. 10. Lisinopril 40 mg daily. 11. Atorvastatin 40 mg daily. REVIEW OF SYSTEMS: Please see HPI. PHYSICAL EXAMINATION   VITAL SIGNS: On admission, temperature 102.8, pulse 106, blood   pressure 159/78, height 6 feet 3 inches, weight 287. GENERAL: Alert, cooperative. NECK: Supple, symmetrical. Thyroid nontender. LUNGS: Coarse breath sounds bilaterally. No wheezing or rhonchi. CARDIOVASCULAR: Regular rhythm and rate. No murmurs, rubs,   thrills or gallops. ABDOMEN: Soft, nontender, nondistended. EXTREMITIES: The patient is status post amputation of all his toes. There is marked edema of the left lower extremity, a large neuropathic   plantar  ulceration with serosanguineous-type discharge. DIAGNOSTIC DATA: X-ray showed probable osteomyelitis of the 5th   metatarsal bone. LABORATORY DATA: White count on admission was 22,300,   hemoglobin 10.1. Blood sugar 209, BUN 16, creatinine 1.75. HOSPITAL COURSE: The patient was admitted, treated with IV   antibiotics, seen in consultation by Podiatry. The patient was taken to   the OR, had debridement of the left foot. He was seen in consultation   by Dr. Hiwot Rudd, who managed his antibiotics. He had further surgery, had a   tissue graft applied to the left foot wound.  He was followed by Dr. Hiwot Rudd   while in the hospital, was treated with ceftaroline, and was discharged   home on this for 3 weeks. Also, his blood sugar ran somewhat low. His   Lantus was decreased from 60 to 50 units daily. He was stable and   discharged home on IV antibiotics on 06/03/2017.         Ángela Valerio MD      MS / [facility]   D:  06/03/2017   10:03   T:  06/03/2017   12:20   Job #:  [id number]

## 2017-06-03 NOTE — PROGRESS NOTES
7:28 AM    Received bedside report from ALESHA Nye.    10:22 AM    Talked to Dr. Merrick Webster for the lab orders for the home health. MD gave verbal orders to put on as it was also mentioned in Dr. Priya Leigh note. Lab orders placed. Talked to the Cm and the home health Nurse to notify it. CM is going to fax it to the [facility]. Home health Nurse is supposed to do lab draws weekly as ordered and fax the result to Dr. Gabriele Florez office @ [phone].    11:09 AM    Reviewed discharge instructions and medications with the patient. Ample time was given for any questions or concerns. Removed tele box. Patient is going home with the picc line for long term antibiotics. Patient stable for discharge. Patient was escorted down to his car by the nurse.

## 2017-06-04 ENCOUNTER — HOME CARE VISIT (OUTPATIENT)
Dept: SCHEDULING | Facility: HOME HEALTH | Age: 47
End: 2017-06-04
Payer: COMMERCIAL

## 2017-06-04 VITALS
OXYGEN SATURATION: 98 % | TEMPERATURE: 98 F | WEIGHT: 290 LBS | DIASTOLIC BLOOD PRESSURE: 84 MMHG | HEART RATE: 79 BPM | BODY MASS INDEX: 36.06 KG/M2 | HEIGHT: 75 IN | SYSTOLIC BLOOD PRESSURE: 128 MMHG

## 2017-06-04 PROCEDURE — G0299 HHS/HOSPICE OF RN EA 15 MIN: HCPCS

## 2017-06-04 PROCEDURE — 400013 HH SOC

## 2017-06-05 ENCOUNTER — PATIENT OUTREACH (OUTPATIENT)
Dept: INTERNAL MEDICINE CLINIC | Age: 47
End: 2017-06-05

## 2017-06-05 NOTE — PROGRESS NOTES
[facility] Discharge Follow-Up      Date/Time:  2017 4:05 PM    Patient listed on discharge [facility]) report on 2017  Patient discharged from [facility] for osteomylitis RLE   RRAT score: 8 Low    Medical History:     Past Medical History:   Diagnosis Date    Diabetes ([facility]     since age 23    DM (diabetes mellitus) ([facility]     HTN (hypertension)     Hypercholesteremia     Hyperlipidemia        Nurse Navigator(NN) contacted the patient by telephone to perform post hospital discharge assessment. Verified  and address with patient as identifiers. Provided introduction to self, and explanation of the Nurses Navigator role. The patient states that he is feeling somewhat better. His pain is well controlled. [facility] and home care have been in contact with him to start services. Medication:   Performed medication reconciliation with patient, and patient verbalizes understanding of administration of home medications. There were no barriers to obtaining medications identified at this time. Support system:  patient and spouse    Discharge Instructions:  Reviewed discharge instructions with patient. Patient verbalizes understanding of discharge instructions and follow-up care. Red Flags:  New onset right foot pain, bleeding/ excessive drainage from the surgical site. Labs Reviewed:     Recent Labs      17   0315   NA  139   K  3.3*   CL  107   CO2  25   GLU  98   BUN  7   CREA  1.22   CA  8.3*          PCP/Specialist follow up: Patient scheduled to follow up with Dr. Meagan Sandoval on . Reviewed red flags with patient, and patient verbalizes understanding. Patient given an opportunity to ask questions. No other clinical/social/functional needs noted. The patient agrees to contact the PCP office for questions related to their healthcare. The patient expressed thanks, offered no additional questions and ended the call.      Case management plan: Attempt to contact the patient by telephone or during office visit within the next 7-10 days. Will continue to follow as necessary for the next 30 days. Will reassess for case management needs prior to discharge from case management service on or about 30 days.

## 2017-06-06 ENCOUNTER — HOSPITAL ENCOUNTER (OUTPATIENT)
Dept: WOUND CARE | Age: 47
Discharge: HOME | End: 2017-06-06
Payer: COMMERCIAL

## 2017-06-06 ENCOUNTER — HOME CARE VISIT (OUTPATIENT)
Dept: HOME HEALTH SERVICES | Facility: HOME HEALTH | Age: 47
End: 2017-06-06
Payer: COMMERCIAL

## 2017-06-06 PROCEDURE — 11045 DBRDMT SUBQ TISS EACH ADDL: CPT | Performed by: PODIATRIST

## 2017-06-06 PROCEDURE — 11042 DBRDMT SUBQ TIS 1ST 20SQCM/<: CPT | Performed by: PODIATRIST

## 2017-06-06 NOTE — PROGRESS NOTES
Progress Note    Subjective:   Dalton Gutierrez is a 51 y.o.  male for follow up of Left   Surgical ulcer to the left TMA stump distal stump medially, and lateral midfoot , patient had 5th metatarsal excision , debridement and application of amniox umbilical graft to the left foot 4 days ago with hard casting. Doing well. No concerns. Wound care going well. Offloading wound: yes  Appetite: fair  Wound associated pain: mild  Diabetic: yes  Type 2   Insulin Dependent  Smoker: no    ROS: no N/V, no T/chills; no local rash  There have been no changes in patient's medical history in the interim. Objective:   T: 100.1 P: 88  RR: 20  BP: 160/98   General: well developed, well nourished, pleasant , NAD. Hygiene good  Psych: cooperative. Pleasant. No anxiety or depression. Normal mood and affect. Neuro: alert and oriented to person/place/situation. Otherwise nonfocal.  HEENT: Normocephalic, atraumatic. EOMI. Conjunctiva clear. No scleral icterus. Neck: Normal range of motion. No masses. Chest: Good chest expansion bilat  Abdomen: Soft, nontender, nondistended, normoactive bowel sounds  Lower extremities: color normal; temperature normal. Hair growth is not present. Calves are supple, nontender, approximately equally sized in comparison.  Capillary refill <3 sec  Focused Lower Extremity Exam:  Vascular exam:  Bilateral lower extremity: Non Pitting   edema, foot ,   DP pulse :1+  PT pulse: +1  Nails dystrophic       Ulcer Description:   Etiology:Surgical  Location: left plantar lateral mid foot  Measurement: 6.2 x 7 x 0.2cm  Ulcer bed: Amniox graft with staples, healthy bleeding on skin edges   Periwound: Normal  Exudate: Serous      Ulcer Description:   Etiology:Surgical  Location: left distal plantar foot  Measurement: 1 x 1 x 0.1 cm  Ulcer bed: Granular/Healthy      Assessment/Plan   51 y.o. male with Surgical left foot TMA stump ulcer x2, s/p debridement with amniox grafting    Dressing: Mepitel, TCC applied today    Excisional debridement of the left foot ulcers x2 with a # 7mm curette skin to include SubQ to rim of ulcer only   Orders for HBO filled out for today due to active osteomyelitis of the left foot, with chronic ulcer x 8 weeks. Patient understood and agrees with plan. Questions answered. Weekly visits and serial debridements and TCC. Follow up with me in 1 week.

## 2017-06-07 ENCOUNTER — HOME CARE VISIT (OUTPATIENT)
Dept: SCHEDULING | Facility: HOME HEALTH | Age: 47
End: 2017-06-07
Payer: COMMERCIAL

## 2017-06-07 ENCOUNTER — HOSPITAL ENCOUNTER (OUTPATIENT)
Dept: WOUND CARE | Age: 47
Discharge: HOME | End: 2017-06-07
Payer: COMMERCIAL

## 2017-06-07 LAB
GLUCOSE BLD STRIP.AUTO-MCNC: 139 MG/DL (ref 65–100)
GLUCOSE BLD STRIP.AUTO-MCNC: 186 MG/DL (ref 65–100)
SERVICE CMNT-IMP: ABNORMAL
SERVICE CMNT-IMP: ABNORMAL

## 2017-06-09 ENCOUNTER — HOSPITAL ENCOUNTER (OUTPATIENT)
Dept: WOUND CARE | Age: 47
Discharge: HOME | End: 2017-06-09
Payer: COMMERCIAL

## 2017-06-09 ENCOUNTER — HOME CARE VISIT (OUTPATIENT)
Dept: SCHEDULING | Facility: HOME HEALTH | Age: 47
End: 2017-06-09
Payer: COMMERCIAL

## 2017-06-09 LAB
GLUCOSE BLD STRIP.AUTO-MCNC: 106 MG/DL (ref 65–100)
GLUCOSE BLD STRIP.AUTO-MCNC: 144 MG/DL (ref 65–100)
SERVICE CMNT-IMP: ABNORMAL
SERVICE CMNT-IMP: ABNORMAL

## 2017-06-09 PROCEDURE — G0299 HHS/HOSPICE OF RN EA 15 MIN: HCPCS

## 2017-06-12 ENCOUNTER — HOSPITAL ENCOUNTER (OUTPATIENT)
Dept: WOUND CARE | Age: 47
Discharge: HOME | End: 2017-06-12
Payer: COMMERCIAL

## 2017-06-12 LAB
GLUCOSE BLD STRIP.AUTO-MCNC: 201 MG/DL (ref 65–100)
GLUCOSE BLD STRIP.AUTO-MCNC: 206 MG/DL (ref 65–100)
SERVICE CMNT-IMP: ABNORMAL
SERVICE CMNT-IMP: ABNORMAL

## 2017-06-13 ENCOUNTER — HOSPITAL ENCOUNTER (OUTPATIENT)
Dept: WOUND CARE | Age: 47
Discharge: HOME | End: 2017-06-13
Payer: COMMERCIAL

## 2017-06-13 VITALS
SYSTOLIC BLOOD PRESSURE: 152 MMHG | OXYGEN SATURATION: 98 % | TEMPERATURE: 98 F | RESPIRATION RATE: 18 BRPM | DIASTOLIC BLOOD PRESSURE: 98 MMHG | HEART RATE: 92 BPM

## 2017-06-13 LAB
GLUCOSE BLD STRIP.AUTO-MCNC: 101 MG/DL (ref 65–100)
GLUCOSE BLD STRIP.AUTO-MCNC: 101 MG/DL (ref 65–100)
GLUCOSE BLD STRIP.AUTO-MCNC: 105 MG/DL (ref 65–100)
GLUCOSE BLD STRIP.AUTO-MCNC: 89 MG/DL (ref 65–100)
SERVICE CMNT-IMP: ABNORMAL
SERVICE CMNT-IMP: NORMAL

## 2017-06-13 PROCEDURE — 11046 DBRDMT MUSC&/FSCA EA ADDL: CPT | Performed by: PODIATRIST

## 2017-06-13 PROCEDURE — 11043 DBRDMT MUSC&/FSCA 1ST 20/<: CPT | Performed by: PODIATRIST

## 2017-06-13 RX ORDER — LIDOCAINE HYDROCHLORIDE 40 MG/ML
SOLUTION TOPICAL ONCE
Status: COMPLETED | OUTPATIENT
Start: 2017-06-13 | End: 2017-06-13

## 2017-06-13 RX ADMIN — LIDOCAINE HYDROCHLORIDE: 40 SOLUTION TOPICAL at 15:00

## 2017-06-13 NOTE — PROGRESS NOTES
Progress Note    Subjective:   Miley Gonzalez is a 51 y.o.  male for follow up of Left   Surgical ulcer to the left TMA stump distal stump medially, and lateral midfoot , patient had 5th metatarsal excision , debridement and application of amniox umbilical graft to the left foot 10 days ago with hard casting. Has been in a TCC since last visit. Doing well. No concerns. Wound care going well. Offloading wound: yes  Appetite: fair  Wound associated pain: mild  Diabetic: yes  Type 2   Insulin Dependent  Smoker: no    ROS: no N/V, no T/chills; no local rash  There have been no changes in patient's medical history in the interim. Objective:   T: 98.7 P: 72  RR: 14  BP: 168/104   General: well developed, well nourished, pleasant , NAD. Hygiene good  Psych: cooperative. Pleasant. No anxiety or depression. Normal mood and affect. Neuro: alert and oriented to person/place/situation. Otherwise nonfocal.  HEENT: Normocephalic, atraumatic. EOMI. Conjunctiva clear. No scleral icterus. Neck: Normal range of motion. No masses. Chest: Good chest expansion bilat  Abdomen: Soft, nontender, nondistended, normoactive bowel sounds  Lower extremities: color normal; temperature normal. Hair growth is not present. Calves are supple, nontender, approximately equally sized in comparison.  Capillary refill <3 sec  Focused Lower Extremity Exam:  Vascular exam:  Bilateral lower extremity: Non Pitting   edema, foot ,   DP pulse :1+  PT pulse: +1  Nails dystrophic       Ulcer Description:   Etiology:Surgical  Location: left plantar lateral mid foot  Measurement: 6.2 x 5.3 x 0.2cm  Ulcer bed: Amniox graft with staples, healthy bleeding on skin edges   Periwound: Normal  Exudate: Serous      Ulcer Description:   Etiology:Surgical  Location: left distal plantar foot  Measurement: 0.1 x 0.1 x 0.1 cm  Ulcer bed: Granular/Healthy      Assessment/Plan   51 y.o. male with Surgical left foot TMA stump ulcer x2, s/p debridement with amniox grafting    Dressing: Mepitel, TCC applied today    Excisional debridement of the left foot ulcers x2 with a # 7mm curette skin to include SubQ to rim of ulcer only   Orders for HBO filled out for today due to active osteomyelitis of the left foot, with chronic ulcer x 8 weeks. Unable to do HBO for today to low glucose. Will retry on thursday    Patient understood and agrees with plan. Questions answered. Weekly visits and serial debridements and TCC. Follow up with me in 1 week.

## 2017-06-14 ENCOUNTER — TELEPHONE (OUTPATIENT)
Dept: INTERNAL MEDICINE CLINIC | Age: 47
End: 2017-06-14

## 2017-06-14 ENCOUNTER — HOME CARE VISIT (OUTPATIENT)
Dept: SCHEDULING | Facility: HOME HEALTH | Age: 47
End: 2017-06-14
Payer: COMMERCIAL

## 2017-06-14 VITALS
SYSTOLIC BLOOD PRESSURE: 156 MMHG | RESPIRATION RATE: 20 BRPM | WEIGHT: 298.6 LBS | HEART RATE: 74 BPM | OXYGEN SATURATION: 98 % | BODY MASS INDEX: 37.32 KG/M2 | TEMPERATURE: 97.2 F | DIASTOLIC BLOOD PRESSURE: 98 MMHG

## 2017-06-14 DIAGNOSIS — M86.172 ACUTE OSTEOMYELITIS OF METATARSAL BONE OF LEFT FOOT (HCC): Primary | ICD-10-CM

## 2017-06-14 PROCEDURE — G0299 HHS/HOSPICE OF RN EA 15 MIN: HCPCS

## 2017-06-14 NOTE — TELEPHONE ENCOUNTER
----- Message from Galen Class sent at 6/14/2017 10:02 AM EDT -----  Regarding: Dr. Rachna Ramirez [facility] orders-pat.//not seen in office  Contact: [phone]  [facility]/S[facility] [address]akat states she's with patient now & needs a call back in reference to Dr. Rachna Ramirez orders to have labs drawn that she was unable/unsuccessful to get done. Please call back right away to advise if patient can go to [facility] to have these done.  Thank you    Saima advises that felicita's phone number if need to call patientis [phone]

## 2017-06-14 NOTE — TELEPHONE ENCOUNTER
Per Rambo Chand, [facility], she was only to get about 2cc in a lavender top tube. Advised Saima & patient I will put in orders for patient to go to [facility] for CBC with diff, BMP & ESR today. They are aware Dr. Michael Fernandez is out of the office & if there are any further problems to let me know.  Lab req will be faxed to [phone] [place] [address]. per patient request.

## 2017-06-15 ENCOUNTER — HOSPITAL ENCOUNTER (OUTPATIENT)
Dept: WOUND CARE | Age: 47
Discharge: HOME | End: 2017-06-15
Payer: COMMERCIAL

## 2017-06-15 LAB
BASOPHILS # BLD AUTO: 0.1 X10E3/UL (ref 0–0.2)
BASOPHILS NFR BLD AUTO: 1 %
BUN SERPL-MCNC: 18 MG/DL (ref 6–24)
BUN/CREAT SERPL: 13 (ref 9–20)
CALCIUM SERPL-MCNC: 8.4 MG/DL (ref 8.7–10.2)
CHLORIDE SERPL-SCNC: 102 MMOL/L (ref 96–106)
CO2 SERPL-SCNC: 23 MMOL/L (ref 18–29)
CREAT SERPL-MCNC: 1.42 MG/DL (ref 0.76–1.27)
EOSINOPHIL # BLD AUTO: 0.2 X10E3/UL (ref 0–0.4)
EOSINOPHIL NFR BLD AUTO: 3 %
ERYTHROCYTE [DISTWIDTH] IN BLOOD BY AUTOMATED COUNT: 17.6 % (ref 12.3–15.4)
ERYTHROCYTE [SEDIMENTATION RATE] IN BLOOD BY WESTERGREN METHOD: 45 MM/HR (ref 0–15)
GLUCOSE BLD STRIP.AUTO-MCNC: 146 MG/DL (ref 65–100)
GLUCOSE BLD STRIP.AUTO-MCNC: 164 MG/DL (ref 65–100)
GLUCOSE SERPL-MCNC: 77 MG/DL (ref 65–99)
HCT VFR BLD AUTO: 32.8 % (ref 37.5–51)
HGB BLD-MCNC: 10.1 G/DL (ref 12.6–17.7)
IMM GRANULOCYTES # BLD: 0 X10E3/UL (ref 0–0.1)
IMM GRANULOCYTES NFR BLD: 0 %
LYMPHOCYTES # BLD AUTO: 2 X10E3/UL (ref 0.7–3.1)
LYMPHOCYTES NFR BLD AUTO: 27 %
MCH RBC QN AUTO: 22.5 PG (ref 26.6–33)
MCHC RBC AUTO-ENTMCNC: 30.8 G/DL (ref 31.5–35.7)
MCV RBC AUTO: 73 FL (ref 79–97)
MONOCYTES # BLD AUTO: 0.4 X10E3/UL (ref 0.1–0.9)
MONOCYTES NFR BLD AUTO: 5 %
NEUTROPHILS # BLD AUTO: 4.6 X10E3/UL (ref 1.4–7)
NEUTROPHILS NFR BLD AUTO: 64 %
PLATELET # BLD AUTO: 463 X10E3/UL (ref 150–379)
POTASSIUM SERPL-SCNC: 3.8 MMOL/L (ref 3.5–5.2)
RBC # BLD AUTO: 4.48 X10E6/UL (ref 4.14–5.8)
SERVICE CMNT-IMP: ABNORMAL
SERVICE CMNT-IMP: ABNORMAL
SODIUM SERPL-SCNC: 141 MMOL/L (ref 134–144)
WBC # BLD AUTO: 7.2 X10E3/UL (ref 3.4–10.8)

## 2017-06-16 ENCOUNTER — HOSPITAL ENCOUNTER (OUTPATIENT)
Dept: WOUND CARE | Age: 47
Discharge: HOME | End: 2017-06-16
Payer: COMMERCIAL

## 2017-06-16 LAB
GLUCOSE BLD STRIP.AUTO-MCNC: 112 MG/DL (ref 65–100)
GLUCOSE BLD STRIP.AUTO-MCNC: 153 MG/DL (ref 65–100)
GLUCOSE BLD STRIP.AUTO-MCNC: 199 MG/DL (ref 65–100)
SERVICE CMNT-IMP: ABNORMAL

## 2017-06-17 NOTE — PROGRESS NOTES
[facility] [address]   WOUND CARE PROGRESS NOTE       Name:  Delilah Snellen   MR#:  [id number]   :  1970   Account #:  [de-identified]        Date of Adm:  2017       DATE OF SERVICE: 2017     HISTORY OF CHIEF COMPLAINT: The patient is typically seen in the   wound care by Dr. Herman Buckley. The patient presents today   stating that the total contact cast that he had placed on his left foot 2   days ago has become malodorous to drainage through the cast. The   patient also states that he is on IV antibiotics, monitored by Dr. Ventura Fitzpatrick. PHYSICAL EXAMINATION   VITAL SIGNS: Temperature 99.3, pulse rate 89, respirations 14, blood   pressure 191/108. GENERAL: He has palpable pedal pulses with fairly good muscle   strength, lower extremities. Diminished epicritic sensation, lower   extremities. Previous transmetatarsal amputation of the left foot. Plantar lateral aspect of the left foot is with a significantly malodorous   grade 3 ulcer with surrounding macerated tissues, a beefy red   granulating base with yellow exudate. It measures 5.2 x 5.5 x 0.6 cm. There is some tunneling at the 1 o'clock position that measures 1.1 cm   and at the 5 o'clock position tunneling 1 cm. Neither probes bone. Distal medial aspect of the left foot, there is a grade 2 granulating pink   ulceration that measures 0.3 x 0.3 x 0.1 cm. Dorsal lateral left foot,   there is a grade 2 wound with a pale granulating base that measures 2   x 0.3 x 0.5 cm. There is significant serous drainage from the plantar   lateral wound. There is mild warmth and swelling, left lower leg. ASSESSMENT: Insulin-dependent diabetic with neuropathy and with   multiple diabetic ulcers, left foot. PLAN: Upon review of patient's last wound cultures, the patient had   cultured MRSA. The patient is currently being treated by Dr. Ventura Fitzpatrick with   IV antibiotics; ceftaroline.  With the suggestion of pending low-grade   infection, I did not think it was wise to place the patient back into his   total contact cast today. His wound was cleansed with Dakin's solution   and dressed with Meli, followed by calcium alginate, followed by dry   gauze. The patient is to change wound every other day. The patient is   to monitor his temperature. If it goes above 100, he is to present to the   emergency room. If he feels any warmth in his leg, he is to report to   the emergency room. If he has any feelings of malaise, he is to report   to the emergency room. Otherwise, the patient is to follow up with Dr. Cintia Walter in 4 days.          WIL Cam / Mila Mejia   D:  06/16/2017   17:19   T:  06/16/2017   22:20   Job #:  [id number]

## 2017-06-19 ENCOUNTER — HOSPITAL ENCOUNTER (OUTPATIENT)
Dept: WOUND CARE | Age: 47
Discharge: HOME | End: 2017-06-19
Payer: COMMERCIAL

## 2017-06-19 LAB
GLUCOSE BLD STRIP.AUTO-MCNC: 128 MG/DL (ref 65–100)
GLUCOSE BLD STRIP.AUTO-MCNC: 233 MG/DL (ref 65–100)
SERVICE CMNT-IMP: ABNORMAL
SERVICE CMNT-IMP: ABNORMAL

## 2017-06-20 ENCOUNTER — HOSPITAL ENCOUNTER (OUTPATIENT)
Dept: WOUND CARE | Age: 47
Discharge: HOME | End: 2017-06-20
Payer: COMMERCIAL

## 2017-06-20 LAB
GLUCOSE BLD STRIP.AUTO-MCNC: 251 MG/DL (ref 65–100)
GLUCOSE BLD STRIP.AUTO-MCNC: 255 MG/DL (ref 65–100)
SERVICE CMNT-IMP: ABNORMAL
SERVICE CMNT-IMP: ABNORMAL

## 2017-06-20 PROCEDURE — 11042 DBRDMT SUBQ TIS 1ST 20SQCM/<: CPT | Performed by: PODIATRIST

## 2017-06-20 PROCEDURE — 99183 HYPERBARIC OXYGEN THERAPY: CPT

## 2017-06-20 NOTE — PROGRESS NOTES
Progress Note    Subjective:   Kike Hiss is a 51 y.o.  male for follow up of Left   Surgical ulcer to the left TMA stump distal stump medially, and lateral midfoot , patient had 5th metatarsal excision , debridement and application of amniox umbilical graft to the left foot 17 days ago with hard casting. Has been in a TCC since last visit. Doing well. No concerns. Wound care going well. Offloading wound: yes  Appetite: fair  Wound associated pain: mild  Diabetic: yes  Type 2   Insulin Dependent  Smoker: no    ROS: no N/V, no T/chills; no local rash  There have been no changes in patient's medical history in the interim. Objective:   T: 98.7 P: 88  RR: 14  BP: 154/102   General: well developed, well nourished, pleasant , NAD. Hygiene good  Psych: cooperative. Pleasant. No anxiety or depression. Normal mood and affect. Neuro: alert and oriented to person/place/situation. Otherwise nonfocal.  HEENT: Normocephalic, atraumatic. EOMI. Conjunctiva clear. No scleral icterus. Neck: Normal range of motion. No masses. Chest: Good chest expansion bilat  Abdomen: Soft, nontender, nondistended, normoactive bowel sounds  Lower extremities: color normal; temperature normal. Hair growth is not present. Calves are supple, nontender, approximately equally sized in comparison.  Capillary refill <3 sec  Focused Lower Extremity Exam:  Vascular exam:  Bilateral lower extremity: Non Pitting   edema, foot ,   DP pulse :1+  PT pulse: +1  Nails dystrophic       Ulcer Description:   Etiology:Surgical  Location: left plantar lateral mid foot  Measurement: 5.4 x 5.5 x 0.7cm  Ulcer bed: granular  Periwound: Normal  Exudate: Serous      Ulcer Description:   Etiology:Surgical  Location: left distal plantar foot  Measurement: 0.1 x 0.1 x 0.1 cm  Ulcer bed: Granular/Healthy      Assessment/Plan   51 y.o. male with Surgical left foot TMA stump ulcer x2, s/p debridement with amniox grafting    Dressing: [address] [place]    Excisional debridement of the left foot ulcers x2 with a # 7mm curette skin to include SubQ to rim of ulcer only   Continue HBo treatments  Will consider TCC again, and repeat grafting      Patient understood and agrees with plan. Questions answered. Weekly visits and serial debridements and TCC. Follow up with me in 1 week.

## 2017-06-21 ENCOUNTER — HOSPITAL ENCOUNTER (OUTPATIENT)
Dept: WOUND CARE | Age: 47
Discharge: HOME | End: 2017-06-21
Payer: COMMERCIAL

## 2017-06-21 ENCOUNTER — HOME CARE VISIT (OUTPATIENT)
Dept: SCHEDULING | Facility: HOME HEALTH | Age: 47
End: 2017-06-21
Payer: COMMERCIAL

## 2017-06-21 ENCOUNTER — TELEPHONE (OUTPATIENT)
Dept: INTERNAL MEDICINE CLINIC | Age: 47
End: 2017-06-21

## 2017-06-21 VITALS
TEMPERATURE: 97.5 F | SYSTOLIC BLOOD PRESSURE: 142 MMHG | HEART RATE: 80 BPM | DIASTOLIC BLOOD PRESSURE: 80 MMHG | OXYGEN SATURATION: 98 %

## 2017-06-21 DIAGNOSIS — M86.172 ACUTE OSTEOMYELITIS OF METATARSAL BONE OF LEFT FOOT (HCC): Primary | ICD-10-CM

## 2017-06-21 LAB
GLUCOSE BLD STRIP.AUTO-MCNC: 344 MG/DL (ref 65–100)
GLUCOSE BLD STRIP.AUTO-MCNC: 379 MG/DL (ref 65–100)
SERVICE CMNT-IMP: ABNORMAL
SERVICE CMNT-IMP: ABNORMAL

## 2017-06-21 PROCEDURE — G0299 HHS/HOSPICE OF RN EA 15 MIN: HCPCS

## 2017-06-21 PROCEDURE — 99183 HYPERBARIC OXYGEN THERAPY: CPT

## 2017-06-21 NOTE — TELEPHONE ENCOUNTER
Prakash Rivera will be seeing pt today to clean picc line. She couldn't draw labs last visit and pt went to [facility] at the hospital.    She is asking that lab orders be faxed to [facility] again this morning for pt to go.   Any questions, call Saima

## 2017-06-22 ENCOUNTER — HOSPITAL ENCOUNTER (OUTPATIENT)
Dept: WOUND CARE | Age: 47
Discharge: HOME | End: 2017-06-22
Payer: COMMERCIAL

## 2017-06-22 LAB
BUN SERPL-MCNC: 17 MG/DL (ref 6–24)
BUN/CREAT SERPL: 13 (ref 9–20)
CALCIUM SERPL-MCNC: 8.6 MG/DL (ref 8.7–10.2)
CHLORIDE SERPL-SCNC: 97 MMOL/L (ref 96–106)
CO2 SERPL-SCNC: 20 MMOL/L (ref 18–29)
CREAT SERPL-MCNC: 1.3 MG/DL (ref 0.76–1.27)
ERYTHROCYTE [DISTWIDTH] IN BLOOD BY AUTOMATED COUNT: 16.8 % (ref 12.3–15.4)
ERYTHROCYTE [SEDIMENTATION RATE] IN BLOOD BY WESTERGREN METHOD: 46 MM/HR (ref 0–15)
GLUCOSE BLD STRIP.AUTO-MCNC: 316 MG/DL (ref 65–100)
GLUCOSE BLD STRIP.AUTO-MCNC: 323 MG/DL (ref 65–100)
GLUCOSE SERPL-MCNC: 312 MG/DL (ref 65–99)
HCT VFR BLD AUTO: 37.3 % (ref 37.5–51)
HGB BLD-MCNC: 11.1 G/DL (ref 12.6–17.7)
MCH RBC QN AUTO: 22.5 PG (ref 26.6–33)
MCHC RBC AUTO-ENTMCNC: 29.8 G/DL (ref 31.5–35.7)
MCV RBC AUTO: 76 FL (ref 79–97)
PLATELET # BLD AUTO: 323 X10E3/UL (ref 150–379)
POTASSIUM SERPL-SCNC: 4.9 MMOL/L (ref 3.5–5.2)
RBC # BLD AUTO: 4.93 X10E6/UL (ref 4.14–5.8)
SERVICE CMNT-IMP: ABNORMAL
SERVICE CMNT-IMP: ABNORMAL
SODIUM SERPL-SCNC: 132 MMOL/L (ref 134–144)
WBC # BLD AUTO: 6.2 X10E3/UL (ref 3.4–10.8)

## 2017-06-23 ENCOUNTER — HOSPITAL ENCOUNTER (OUTPATIENT)
Dept: WOUND CARE | Age: 47
Discharge: HOME | End: 2017-06-23
Payer: COMMERCIAL

## 2017-06-23 LAB
GLUCOSE BLD STRIP.AUTO-MCNC: 326 MG/DL (ref 65–100)
GLUCOSE BLD STRIP.AUTO-MCNC: 352 MG/DL (ref 65–100)
SERVICE CMNT-IMP: ABNORMAL
SERVICE CMNT-IMP: ABNORMAL

## 2017-06-23 PROCEDURE — 99183 HYPERBARIC OXYGEN THERAPY: CPT

## 2017-06-27 ENCOUNTER — HOSPITAL ENCOUNTER (OUTPATIENT)
Dept: WOUND CARE | Age: 47
Discharge: HOME | End: 2017-06-27
Payer: COMMERCIAL

## 2017-06-27 PROCEDURE — 11043 DBRDMT MUSC&/FSCA 1ST 20/<: CPT | Performed by: PODIATRIST

## 2017-06-27 PROCEDURE — 11046 DBRDMT MUSC&/FSCA EA ADDL: CPT | Performed by: PODIATRIST

## 2017-06-27 NOTE — PROGRESS NOTES
Progress Note    Subjective:   Harsh Epps is a 51 y.o.  male for follow up of Left   Surgical ulcer to the left TMA stump distal stump medially, and lateral midfoot , patient had 5th metatarsal excision , debridement and application of amniox umbilical graft to the left foot 17 days ago with hard casting. Has been in a TCC since last visit. Doing well. No concerns. Wound care going well. Offloading wound: yes  Appetite: fair  Wound associated pain: mild  Diabetic: yes  Type 2   Insulin Dependent  Smoker: no    ROS: no N/V, no T/chills; no local rash  There have been no changes in patient's medical history in the interim. Objective:   T: 98.4 P: 98  RR:   BP: 134/89   General: well developed, well nourished, pleasant , NAD. Hygiene good  Psych: cooperative. Pleasant. No anxiety or depression. Normal mood and affect. Neuro: alert and oriented to person/place/situation. Otherwise nonfocal.  HEENT: Normocephalic, atraumatic. EOMI. Conjunctiva clear. No scleral icterus. Neck: Normal range of motion. No masses. Chest: Good chest expansion bilat  Abdomen: Soft, nontender, nondistended, normoactive bowel sounds  Lower extremities: color normal; temperature normal. Hair growth is not present. Calves are supple, nontender, approximately equally sized in comparison.  Capillary refill <3 sec  Focused Lower Extremity Exam:  Vascular exam:  Bilateral lower extremity: Non Pitting   edema, foot ,   DP pulse :1+  PT pulse: +1  Nails dystrophic       Ulcer Description:   Etiology:Surgical  Location: left plantar lateral mid foot  Measurement: 6 x 5.5 x 0.4cm  Ulcer bed: granular  Periwound: Normal  Exudate: Serous          Assessment/Plan   51 y.o. male with Surgical left foot TMA stump ulcer x1, s/p debridement with amniox grafting    Dressing: Mepitel,medihoney alginate    Excisional debridement of the left foot ulcers x2 with a # 7mm curette skin to include SubQ to rim of ulcer only   Continue HBO treatments  Discussed with patient about need for taking insulin since glucose was great er than 500 today and had to forego HBO        Patient understood and agrees with plan. Questions answered. Weekly visits and serial debridements and HBO  Follow up with me in 1 week.

## 2017-06-28 ENCOUNTER — HOSPITAL ENCOUNTER (OUTPATIENT)
Dept: WOUND CARE | Age: 47
Discharge: HOME | End: 2017-06-28
Payer: COMMERCIAL

## 2017-06-28 LAB
GLUCOSE BLD STRIP.AUTO-MCNC: 110 MG/DL (ref 65–100)
GLUCOSE BLD STRIP.AUTO-MCNC: 156 MG/DL (ref 65–100)
GLUCOSE BLD STRIP.AUTO-MCNC: 206 MG/DL (ref 65–100)
SERVICE CMNT-IMP: ABNORMAL

## 2017-06-28 PROCEDURE — 11043 DBRDMT MUSC&/FSCA 1ST 20/<: CPT

## 2017-06-29 ENCOUNTER — HOSPITAL ENCOUNTER (OUTPATIENT)
Dept: WOUND CARE | Age: 47
End: 2017-06-29
Payer: COMMERCIAL

## 2017-06-30 ENCOUNTER — HOME CARE VISIT (OUTPATIENT)
Dept: SCHEDULING | Facility: HOME HEALTH | Age: 47
End: 2017-06-30
Payer: COMMERCIAL

## 2017-06-30 ENCOUNTER — HOSPITAL ENCOUNTER (OUTPATIENT)
Dept: WOUND CARE | Age: 47
Discharge: HOME | End: 2017-06-30
Payer: COMMERCIAL

## 2017-06-30 LAB
GLUCOSE BLD STRIP.AUTO-MCNC: 163 MG/DL (ref 65–100)
GLUCOSE BLD STRIP.AUTO-MCNC: 205 MG/DL (ref 65–100)
SERVICE CMNT-IMP: ABNORMAL
SERVICE CMNT-IMP: ABNORMAL

## 2017-06-30 PROCEDURE — 99183 HYPERBARIC OXYGEN THERAPY: CPT

## 2017-06-30 PROCEDURE — 82962 GLUCOSE BLOOD TEST: CPT

## 2017-06-30 PROCEDURE — G0299 HHS/HOSPICE OF RN EA 15 MIN: HCPCS

## 2017-07-01 VITALS
DIASTOLIC BLOOD PRESSURE: 74 MMHG | SYSTOLIC BLOOD PRESSURE: 130 MMHG | RESPIRATION RATE: 20 BRPM | OXYGEN SATURATION: 98 % | TEMPERATURE: 97.6 F | HEART RATE: 68 BPM

## 2017-07-03 ENCOUNTER — OFFICE VISIT (OUTPATIENT)
Dept: INTERNAL MEDICINE CLINIC | Age: 47
End: 2017-07-03

## 2017-07-03 ENCOUNTER — HOSPITAL ENCOUNTER (OUTPATIENT)
Dept: WOUND CARE | Age: 47
End: 2017-07-03
Payer: COMMERCIAL

## 2017-07-03 VITALS
SYSTOLIC BLOOD PRESSURE: 137 MMHG | HEART RATE: 115 BPM | HEIGHT: 75 IN | DIASTOLIC BLOOD PRESSURE: 92 MMHG | TEMPERATURE: 97.9 F | RESPIRATION RATE: 20 BRPM | OXYGEN SATURATION: 98 % | BODY MASS INDEX: 33.92 KG/M2 | WEIGHT: 272.8 LBS

## 2017-07-03 DIAGNOSIS — M86.072 ACUTE HEMATOGENOUS OSTEOMYELITIS OF LEFT FOOT (HCC): Primary | ICD-10-CM

## 2017-07-03 RX ORDER — ALPRAZOLAM 0.5 MG/1
TABLET ORAL
COMMUNITY
End: 2017-08-24

## 2017-07-03 NOTE — MR AVS SNAPSHOT
Visit Information Date & Time Provider Department Dept. Phone Encounter #  
 7/3/2017 10:45 AM Liset Herrera, [address] [phone] [id number] Follow-up Instructions Return in about 2 weeks (around 7/17/2017). Upcoming Health Maintenance Date Due MICROALBUMIN Q1 8/24/1979 Pneumococcal 19-64 Highest Risk (1 of 3 - PCV13) 8/24/1988 FOOT EXAM Q1 11/11/2015 EYE EXAM RETINAL OR DILATED Q1 1/14/2016 INFLUENZA AGE 9 TO ADULT 8/1/2017 LIPID PANEL Q1 10/6/2017 HEMOGLOBIN A1C Q6M 11/28/2017 DTaP/Tdap/Td series (2 - Td) 11/18/2021 Allergies as of 7/3/2017  Review Complete On: 7/3/2017 By: Liset Herrera MD  
  
 Severity Noted Reaction Type Reactions Ace Inhibitors High 06/01/2017    Swelling Facial swelling cough Morphine High 10/20/2014   Systemic Angioedema Current Immunizations  Reviewed on 11/18/2016 Name Date Influenza Vaccine 11/1/2013 Influenza Vaccine Intradermal PF 10/28/2016 Tdap 11/18/2011 Not reviewed this visit Vitals BP Pulse Temp Resp Height(growth percentile) Weight(growth percentile) (!) 137/92 (BP 1 Location: Right arm, BP Patient Position: Sitting) (!) 115 97.9 °F (36.6 °C) (Oral) 20 6' 3\" (1.905 m) 272 lb 12.8 oz (123.7 kg) SpO2 BMI Smoking Status 98% 34.1 kg/m2 Former Smoker BMI and BSA Data Body Mass Index Body Surface Area  
 34.1 kg/m 2 2.56 m 2 Preferred Pharmacy Pharmacy Name Phone [id number], [place] - [address] [phone] Your Updated Medication List  
  
   
This list is accurate as of: 7/3/17 11:37 AM.  Always use your most recent med list.  
  
  
  
  
 ALLERGY RELIEF(DIPHENHYDRAMIN) 25 mg capsule Generic drug:  diphenhydrAMINE Take 25 mg by mouth nightly as needed. atorvastatin 40 mg tablet Commonly known as:  LIPITOR Take 1 Tab by mouth daily. cefTARoline 600 mg, ADDaptor 1 Each IVPB  
600 mg by IntraVENous route every twelve (12) hours every twelve (12) hours. esomeprazole 40 mg capsule Commonly known as:  NexIUM Take 1 Cap by mouth daily. gabapentin 100 mg capsule Commonly known as:  NEURONTIN Take 100 mg by mouth two (2) times daily as needed for Pain. HEPARIN FLUSH IV  
3 mL by IntraVENous route daily. insulin detemir 100 unit/mL (3 mL) Inpn Commonly known as:  LEVEMIR FLEXTOUCH  
50 Units by SubCUTAneous route nightly. 60 units QHS  
  
 insulin lispro 100 unit/mL kwikpen Commonly known as:  HUMALOG  
12 Units by SubCUTAneous route Before breakfast, lunch, and dinner. DX: E11.65  
  
 L. acidoph & paracasei- S therm- Bifido 8 billion cell Cap cap Commonly known as:  EMBER-Q/RISAQUAD Take 1 Cap by mouth daily. To prevent diarrhea  
  
 lisinopril 40 mg tablet Commonly known as:  John Bernard Take 1 Tab by mouth daily. metFORMIN 500 mg tablet Commonly known as:  GLUCOPHAGE  
TAKE 1 TABLET BY MOUTH TWICE A DAY WITH MEALS AND INCREASE AS DIRECTED  
  
 metoprolol tartrate 25 mg tablet Commonly known as:  LOPRESSOR  
TAKE 0.5 TABS BY MOUTH TWO (2) TIMES A DAY. NORMAL SALINE FLUSH 0.9 % Generic drug:  sodium chloride 5-10 mL by IntraVENous route daily. * oxyCODONE-acetaminophen  mg per tablet Commonly known as:  PERCOCET 10 Take 1 Tab by mouth every six (6) hours as needed. Max Daily Amount: 4 Tabs. * oxyCODONE-acetaminophen  mg per tablet Commonly known as:  PERCOCET Take 1 Tab by mouth every six (6) hours as needed for Pain. Max Daily Amount: 4 Tabs.  
  
 sodium chloride 0.65 % nasal spray Commonly known as:  SALINE NASAL  
1 [place] by Both Nostrils route as needed for Congestion. VITAMIN D3 1,000 unit Cap Generic drug:  cholecalciferol Take 2,000 Units by mouth daily. XANAX 0.5 mg tablet Generic drug:  ALPRAZolam  
Take  by mouth. * Notice: This list has 2 medication(s) that are the same as other medications prescribed for you. Read the directions carefully, and ask your doctor or other care provider to review them with you. Follow-up Instructions Return in about 2 weeks (around 7/17/2017). To-Do List   
 07/05/2017 To Be Determined Appointment with Guillermina Woods RN at [place] [address]  
  
 07/12/2017 To Be Determined Appointment with Guillermina Woods RN at [place] [address]  
  
 07/19/2017 To Be Determined Appointment with Guillermina Woods RN at [place] [address]  
  
 07/26/2017 To Be Determined Appointment with Guillermina Woods RN at [place] [address]  
  
 08/01/2017 To Be Determined Appointment with Guillermina Woods RN at [address]! Dear Melisa Mcfadden: Thank you for requesting a [facility] account. Our records indicate that you already have an active [employer] account. You can access your account anytime at [email or web address] Did you know that you can access your hospital and ER discharge instructions at any time in [employer]? You can also review all of your test results from your hospital stay or ER visit. Additional Information If you have questions, please visit the Frequently Asked Questions section of the [employer] website at [email or web address] Remember, [employer] is NOT to be used for urgent needs. For medical emergencies, dial 911. Now available from your iPhone and Android! Please provide this summary of care documentation to your next provider. Your primary care clinician is listed as Amari Diggs. If you have any questions after today's visit, please call [phone].

## 2017-07-03 NOTE — PROGRESS NOTES
The patient is here today for follow up of inpatient Infectious Disease consultation. 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 Nazanin Andersen. Date of discharge from [facility] was 6/2/17. The patient was discharged to home and is followed by [facility] and [facility]. He has been followed daily at the wound care clinic by Dr. Nazanin Andersen and is currently undergoing HBO. He is a 51 MBM,  from [address]. He has DM, HTN, and dyslipidemia and is S/P a left TMA in 2014 secondary to infection. He developed chronic plantar ulcerations since then with Podiatric I&D on 2/24/27 followed by a second I&D and skin graft on 4/17/17 to try and facilitate wound closure and healing. He was then admitted this time with gross infection of the foot and SIRS. He was taken to the OR on 5/28 by Dr. Mely Sharp for I&D of an abscess and excision of an infected remaining 5th metatarsal. Both blood and wound cultures are growing MRSA to date. He has defervesced and clinically improved on vancomycin and Zosyn. Prior to discharge, application of an amniotic tissue graft was placed by Dr. Nazanin Andersen. A PICC line was place in this left arm and he was discharged on ceftaroline 600 mg IV q 12 hrs which he has been on without problem for 4 wks post op. Prior left foot wound cultures and operative specimens have grown a variety of pathogens including staph lugdensis, enterococci, pseudomonas aeruginosa, citrobacter freundii, and group B streptococci. No antibiotic allergies.     Comorbid conditions include: DH, HTN, GERD, dyslipidemia, Vit D deficiency, and foot infections as above.     All meds were reviewed. ROS: BS in reasonable control. Minimal discomfort in granulating wound. Edema has decreased. Drainage is serous. - fever, sweats, or chills. -weight loss.  -fatigue/malasie. -Rash.  -Swollen glands.  -Oral lesions.  -Photophobia. -Jaundice.   - SOB. - cough. - abdominal pain or tenderness.  - Nausea or vomiting.   -Diarrhea. No headache or AMS. ROS otherwise negative. EXAM:  General:Afebrile and not toxic. No exanthem or enanthem. No peripheral adenopathy. Alert and oriented. PICC site is clean and dry. HEENT: EOMI. Anicteric. Oral mucosa normal. Conjunctivae normal. Neck supple. No thrush. Chest: Lungs clear to A&P. Regular rhythm without murmurs. No chest wall tenderness. Abdomen: Soft without organomegaly, tenderness, or masses. Nondistended. Bowel sounds normal.  Musculoskeletal: Left foot with open ulcerated area with good granulation base. NO pus at site. No ascending cellulitis or lymphangitis. 2+ edema. Neurologic: Nonfocal exam.    Data Review: 6/30/17: WBC = 12,400. Hb = 10.8. BS = 170. Creat = 1.49. ESR = 97.        ASSESSMENT AND PLAN    Doing reasonably well clinically with IV antibiotics and HBO. Still a long way to go re wound healing. In view of labs, will continue IV ceftaroline for at least 2 more wks before considering conversion to oral Tx. Recheck labs in 10 days, follow up with me in 2 wks.

## 2017-07-05 ENCOUNTER — HOSPITAL ENCOUNTER (OUTPATIENT)
Dept: WOUND CARE | Age: 47
Discharge: HOME | End: 2017-07-05
Payer: COMMERCIAL

## 2017-07-05 LAB
GLUCOSE BLD STRIP.AUTO-MCNC: 244 MG/DL (ref 65–100)
GLUCOSE BLD STRIP.AUTO-MCNC: 268 MG/DL (ref 65–100)
SERVICE CMNT-IMP: ABNORMAL
SERVICE CMNT-IMP: ABNORMAL

## 2017-07-05 PROCEDURE — 11042 DBRDMT SUBQ TIS 1ST 20SQCM/<: CPT

## 2017-07-05 PROCEDURE — 82962 GLUCOSE BLOOD TEST: CPT

## 2017-07-05 PROCEDURE — 99183 HYPERBARIC OXYGEN THERAPY: CPT

## 2017-07-05 PROCEDURE — 11045 DBRDMT SUBQ TISS EACH ADDL: CPT

## 2017-07-05 RX ORDER — LIDOCAINE HYDROCHLORIDE 20 MG/ML
JELLY TOPICAL
Status: COMPLETED | OUTPATIENT
Start: 2017-07-05 | End: 2017-07-05

## 2017-07-05 RX ADMIN — LIDOCAINE HYDROCHLORIDE: 20 JELLY TOPICAL at 11:27

## 2017-07-05 NOTE — PROGRESS NOTES
Wound Care Center Progress Note  DOS: 7/5/2017     Subjective:   Aubree Hawkins is a 51 y.o.  male for follow up of Left   Surgical ulcer to the left TMA stump distal stump medially, and lateral midfoot , patient had 5th metatarsal excision , debridement and application of amniox umbilical graft to the left foot 17 days ago with hard casting. Is attending HBO somewhat sporadically.      Doing well. No concerns. Wound care going well.     Offloading wound: yes  Appetite: fair  Wound associated pain: mild  Diabetic: yes  Type 2   Insulin Dependent  Smoker: no     ROS: no N/V, no T/chills; no local rash  There have been no changes in patient's medical history in the interim.     Objective:   VSS, Afebrile  General: well developed, well nourished, pleasant , NAD. Hygiene good  Psych: cooperative. Pleasant. No anxiety or depression. Normal mood and affect. Neuro: alert and oriented to person/place/situation. Otherwise nonfocal.  HEENT: Normocephalic, atraumatic. EOMI. Conjunctiva clear. No scleral icterus. Neck: Normal range of motion. No masses. Chest: Good chest expansion bilat  Abdomen: nondistended  Focused Lower Extremity Exam:     Ulcer Description:   Etiology:Surgical  Location: left plantar lateral mid foot  Measurement: 5.8 x 4.6 x 0.4 cm  Ulcer bed: granular  Periwound: Normal  Exudate: Serous    Skin edges grown over.        Assessment/Plan   51 y.o. male with Surgical left foot TMA stump ulcer x1, s/p debridement with amniox grafting. Undergoing HBO.      Excisional debridement of the left foot ulcer with a # 7mm curette skin to include SubQ to rim of ulcer only. No complications, minimal bleeding. Dressing: Medihoney alginate, gauze. Also took off some callous. Continue HBO treatments     Patient understood and agrees with plan. Questions answered.     Gave new script for Xanax 1 mg, #25. Needs 1 mg dose.     Weekly visits and serial debridements and HBO  Follow up in 1 week.      Raine Becker MD

## 2017-07-06 ENCOUNTER — HOSPITAL ENCOUNTER (OUTPATIENT)
Dept: WOUND CARE | Age: 47
End: 2017-07-06
Payer: COMMERCIAL

## 2017-07-07 ENCOUNTER — HOME CARE VISIT (OUTPATIENT)
Dept: SCHEDULING | Facility: HOME HEALTH | Age: 47
End: 2017-07-07
Payer: COMMERCIAL

## 2017-07-07 PROCEDURE — G0299 HHS/HOSPICE OF RN EA 15 MIN: HCPCS

## 2017-07-12 ENCOUNTER — DOCUMENTATION ONLY (OUTPATIENT)
Dept: INTERNAL MEDICINE CLINIC | Age: 47
End: 2017-07-12

## 2017-07-12 ENCOUNTER — HOSPITAL ENCOUNTER (OUTPATIENT)
Dept: CT IMAGING | Age: 47
Discharge: HOME | End: 2017-07-12
Attending: FAMILY MEDICINE
Payer: COMMERCIAL

## 2017-07-12 ENCOUNTER — TELEPHONE (OUTPATIENT)
Dept: INTERNAL MEDICINE CLINIC | Age: 47
End: 2017-07-12

## 2017-07-12 ENCOUNTER — OFFICE VISIT (OUTPATIENT)
Dept: INTERNAL MEDICINE CLINIC | Age: 47
End: 2017-07-12

## 2017-07-12 VITALS
BODY MASS INDEX: 33.62 KG/M2 | TEMPERATURE: 97.4 F | DIASTOLIC BLOOD PRESSURE: 85 MMHG | SYSTOLIC BLOOD PRESSURE: 122 MMHG | HEART RATE: 100 BPM | HEIGHT: 75 IN | OXYGEN SATURATION: 99 % | WEIGHT: 270.4 LBS | RESPIRATION RATE: 17 BRPM

## 2017-07-12 DIAGNOSIS — R19.5 STRONG ODOR OF STOOLS: ICD-10-CM

## 2017-07-12 DIAGNOSIS — M86.072 ACUTE HEMATOGENOUS OSTEOMYELITIS OF LEFT FOOT (HCC): Primary | ICD-10-CM

## 2017-07-12 DIAGNOSIS — Z79.2 ANTIBIOTIC LONG-TERM USE: ICD-10-CM

## 2017-07-12 DIAGNOSIS — I10 ESSENTIAL HYPERTENSION: ICD-10-CM

## 2017-07-12 DIAGNOSIS — R19.7 DIARRHEA OF PRESUMED INFECTIOUS ORIGIN: ICD-10-CM

## 2017-07-12 DIAGNOSIS — R10.9 LEFT SIDED ABDOMINAL PAIN: ICD-10-CM

## 2017-07-12 DIAGNOSIS — R19.7 DIARRHEA OF PRESUMED INFECTIOUS ORIGIN: Primary | ICD-10-CM

## 2017-07-12 PROCEDURE — 74011636320 HC RX REV CODE- 636/320: Performed by: FAMILY MEDICINE

## 2017-07-12 PROCEDURE — 74011000255 HC RX REV CODE- 255: Performed by: FAMILY MEDICINE

## 2017-07-12 PROCEDURE — 74177 CT ABD & PELVIS W/CONTRAST: CPT

## 2017-07-12 PROCEDURE — 74011250636 HC RX REV CODE- 250/636: Performed by: FAMILY MEDICINE

## 2017-07-12 RX ORDER — GABAPENTIN 300 MG/1
300 CAPSULE ORAL 3 TIMES DAILY
Qty: 90 CAP | Refills: 0 | Status: SHIPPED | OUTPATIENT
Start: 2017-07-12 | End: 2017-08-21 | Stop reason: SDUPTHER

## 2017-07-12 RX ORDER — METRONIDAZOLE 500 MG/1
500 TABLET ORAL 3 TIMES DAILY
Qty: 30 TAB | Refills: 0 | Status: SHIPPED | OUTPATIENT
Start: 2017-07-12 | End: 2017-07-31 | Stop reason: SDUPTHER

## 2017-07-12 RX ORDER — SODIUM CHLORIDE 0.9 % (FLUSH) 0.9 %
10 SYRINGE (ML) INJECTION
Status: COMPLETED | OUTPATIENT
Start: 2017-07-12 | End: 2017-07-12

## 2017-07-12 RX ORDER — BARIUM SULFATE 20 MG/ML
900 SUSPENSION ORAL
Status: COMPLETED | OUTPATIENT
Start: 2017-07-12 | End: 2017-07-12

## 2017-07-12 RX ORDER — SODIUM CHLORIDE 9 MG/ML
50 INJECTION, SOLUTION INTRAVENOUS
Status: COMPLETED | OUTPATIENT
Start: 2017-07-12 | End: 2017-07-12

## 2017-07-12 RX ADMIN — Medication 10 ML: at 14:53

## 2017-07-12 RX ADMIN — SODIUM CHLORIDE 50 ML/HR: 900 INJECTION, SOLUTION INTRAVENOUS at 14:52

## 2017-07-12 RX ADMIN — IOPAMIDOL 100 ML: 755 INJECTION, SOLUTION INTRAVENOUS at 14:52

## 2017-07-12 RX ADMIN — BARIUM SULFATE 900 ML: 21 SUSPENSION ORAL at 14:52

## 2017-07-12 NOTE — PROGRESS NOTES
Chief Complaint   Patient presents with    Diarrhea     approximately 3 days no nausea           Subjective:   Roby Dunn 51 y.o.  male with a  past medical history reviewed see below. comes in today c/o: diarrhea for 4 days and severe merle in the left side and no nausea and he has been taking   He has not been getting hbo due to bs uncontrolled. he has been drinking pedialyte and feels beeter not as dehydrated and going every hour or so and lots of pain in his abd h ehas lost 20lbs   He notes his bs have been up and down he did not bring in his meter as directed from the wound care center. He was not taking his diabetic medication. Denies any fevers but having pain not just in the left side of the abd but in the right side its a constant pain its relieved a little bit with defecation  denies any urinary symptoms + lower back pain not new episodic. He would like a refill on his percocet as well. He saw Dr Delfina Barahona last week and was given an rx for xanax. ROS: otherwise feeling generally well. All other systems reviewed and are negative      Current Outpatient Prescriptions   Medication Sig Dispense Refill    gabapentin (NEURONTIN) 300 mg capsule Take 1 Cap by mouth three (3) times daily. 90 Cap 0    metroNIDAZOLE (FLAGYL) 500 mg tablet Take 1 Tab by mouth three (3) times daily. 30 Tab 0    ALPRAZolam (XANAX) 0.5 mg tablet Take  by mouth.  diphenhydrAMINE (ALLERGY RELIEF,DIPHENHYDRAMIN,) 25 mg capsule Take 25 mg by mouth nightly as needed.  insulin detemir (LEVEMIR FLEXTOUCH) 100 unit/mL (3 mL) inpn 50 Units by SubCUTAneous route nightly. 60 units QHS 20 Pen 11    L. acidoph & paracasei- S therm- Bifido (EMBER-Q/RISAQUAD) 8 billion cell cap cap Take 1 Cap by mouth daily.  To prevent diarrhea 21 Cap 0    metFORMIN (GLUCOPHAGE) 500 mg tablet TAKE 1 TABLET BY MOUTH TWICE A DAY WITH MEALS AND INCREASE AS DIRECTED 75 Tab 0    oxyCODONE-acetaminophen (PERCOCET 10)  mg per tablet Take 1 Tab by mouth every six (6) hours as needed. Max Daily Amount: 4 Tabs. (Patient taking differently: Take 1 Tab by mouth every six (6) hours as needed for Pain.) 14 Tab 0    cholecalciferol (VITAMIN D3) 1,000 unit cap Take 2,000 Units by mouth daily.  insulin lispro (HUMALOG) 100 unit/mL kwikpen 12 Units by SubCUTAneous route Before breakfast, lunch, and dinner. DX: E11.65 1 Package 11    esomeprazole (NEXIUM) 40 mg capsule Take 1 Cap by mouth daily. 90 Cap 3    sodium chloride (SALINE NASAL) 0.65 % nasal spray 1 High View by Both Nostrils route as needed for Congestion. (Patient taking differently: 1 Spray by Both Nostrils route as needed for Congestion (dry sinus symptoms, especially during winter time. ).) 15 mL 0    atorvastatin (LIPITOR) 40 mg tablet Take 1 Tab by mouth daily. 90 Tab 3    apixaban (ELIQUIS) 5 mg tablet Take 2 Tabs by mouth every twelve (12) hours. 60 Tab 1    carvedilol (COREG) 6.25 mg tablet Take 1 Tab by mouth two (2) times daily (with meals). 60 Tab 1    hydrALAZINE (APRESOLINE) 25 mg tablet Take 1 Tab by mouth three (3) times daily. 90 Tab 0    isosorbide mononitrate ER (IMDUR) 30 mg tablet Take 1 Tab by mouth daily. 30 Tab 0    spironolactone (ALDACTONE) 25 mg tablet Take 1 Tab by mouth daily.  30 Tab 0     Allergies   Allergen Reactions    Ace Inhibitors Swelling     Facial swelling cough     Morphine Angioedema     Past Medical History:   Diagnosis Date    Diabetes ([facility] [address]     since age 23   [employer] DM (diabetes mellitus) ([facility] [address]     HTN (hypertension)     Hypercholesteremia     Hyperlipidemia      Past Surgical History:   Procedure Laterality Date    HX AMPUTATION      toes- left foot    HX BUNIONECTOMY      HX ORTHOPAEDIC      boutinerre deformity    HX ORTHOPAEDIC      fascia removed left foot    HX OTHER SURGICAL  03/2017    Skin graft Surgery    HX TONSILLECTOMY      HX WISDOM TEETH EXTRACTION       Family History   Problem Relation Age of Onset    Hypertension Mother    [employer] High Cholesterol Mother      Social History   Substance Use Topics    Smoking status: Former Smoker     Types: Cigars     Quit date: 4/22/2011    Smokeless tobacco: Never Used    Alcohol use 0.0 oz/week     1 Glasses of wine, 0 Standard drinks or equivalent per week      Comment: stop drinking 5 months ago          Objective:     Visit Vitals    /85 (BP 1 Location: Right arm, BP Patient Position: Sitting)    Pulse 100    Temp 97.4 °F (36.3 °C) (Oral)    Resp 17    Ht 6' 3\" (1.905 m)    Wt 270 lb 6.4 oz (122.7 kg)    SpO2 99%    BMI 33.8 kg/m2     Gen: NAD, pleasant  HEENT: normal appearing head, nares patent, PERRLA, EOMI, oropharynx no erythema, no cervical lymphadenopathy neck supple   Cardio: RRR nl S1S2 no murmur  Lungs CTAB no wheeze no rales no rhonchi  ABD Soft  +tender lower quad non distended + bowel sounds  Extremities: full ROM X 4 no clubbing no cyanosis foot banaged did not remove   Neuro: no gross focal deficits noted, alert and orientated X 3  Psych.: well groomed no outward signs of depression. Assessment/Plan:   Diagnoses and all orders for this visit:    1. Diarrhea of presumed infectious origin  -     CULTURE, STOOL  -     C DIFFICILE TOXIN A & B BY EIA  -     METABOLIC PANEL, COMPREHENSIVE  -     CBC WITH AUTOMATED DIFF  -     LIPASE    2. IDDM (insulin dependent diabetes mellitus) ([facility])  -     CULTURE, STOOL  -     C DIFFICILE TOXIN A & B BY EIA  -     METABOLIC PANEL, COMPREHENSIVE  -     CBC WITH AUTOMATED DIFF  -     LIPASE  -     HEMOGLOBIN A1C WITH EAG    3. Antibiotic long-term use  -     CULTURE, STOOL  -     C DIFFICILE TOXIN A & B BY EIA  -     METABOLIC PANEL, COMPREHENSIVE  -     CBC WITH AUTOMATED DIFF  -     LIPASE    4. Left sided abdominal pain  -     CULTURE, STOOL  -     C DIFFICILE TOXIN A & B BY EIA  -     METABOLIC PANEL, COMPREHENSIVE  -     CBC WITH AUTOMATED DIFF  -     LIPASE    5. Strong odor of stools    6.  Essential hypertension    Other orders  - gabapentin (NEURONTIN) 300 mg capsule; Take 1 Cap by mouth three (3) times daily. -     metroNIDAZOLE (FLAGYL) 500 mg tablet; Take 1 Tab by mouth three (3) times daily. -     CKD REPORT  -     DIABETES PATIENT EDUCATION  -     DIABETES PATIENT EDUCATION  -     C DIFFICILE TOXIN A & B BY EIA      Follow-up Disposition:  Return in about 1 week (around 7/19/2017) for f/up diarrhea and review labs and rechekc bs . .  avs printed and given to the pt. .  Increased gabapentin declined request for percocet and pt has been getting xanax from the wound care cetner. will check stat ct possible c diff. He states he was released from the wound care center. D/w pt he should be admitted for his abd pain he declines and wants to work up as an outpt   The patient voiced understanding of the above. Medication side effects were reviewed with the patient. Call with any concerns.

## 2017-07-12 NOTE — MR AVS SNAPSHOT
Visit Information Date & Time Provider Department Dept. Phone Encounter #  
 7/12/2017  9:45 AM Miracle Brody [id number] Follow-up Instructions Return in about 1 week (around 7/19/2017) for f/up diarrhea and review labs and rechekc bs . Upcoming Health Maintenance Date Due MICROALBUMIN Q1 8/24/1979 Pneumococcal 19-64 Highest Risk (1 of 3 - PCV13) 8/24/1988 FOOT EXAM Q1 11/11/2015 EYE EXAM RETINAL OR DILATED Q1 1/14/2016 INFLUENZA AGE 9 TO ADULT 8/1/2017 LIPID PANEL Q1 10/6/2017 HEMOGLOBIN A1C Q6M 11/28/2017 DTaP/Tdap/Td series (2 - Td) 11/18/2021 Allergies as of 7/12/2017  Review Complete On: 7/12/2017 By: Kalpesh Paula Severity Noted Reaction Type Reactions Ace Inhibitors High 06/01/2017    Swelling Facial swelling cough Morphine High 10/20/2014   Systemic Angioedema Current Immunizations  Reviewed on 11/18/2016 Name Date Influenza Vaccine 11/1/2013 Influenza Vaccine Intradermal PF 10/28/2016 Tdap 11/18/2011 Not reviewed this visit You Were Diagnosed With   
  
 Codes Comments Diarrhea of presumed infectious origin    -  Primary ICD-10-CM: A09 ICD-9-CM: 009.3 IDDM (insulin dependent diabetes mellitus) ([facility].)     ICD-10-CM: E11.9, Z79.4 ICD-9-CM: 250.00, V58.67 Antibiotic long-term use     ICD-10-CM: Z79.2 ICD-9-CM: V58.62 Left sided abdominal pain     ICD-10-CM: R10.9 ICD-9-CM: 789.09 Strong odor of stools     ICD-10-CM: R19.5 ICD-9-CM: 792.1 Essential hypertension     ICD-10-CM: I10 
ICD-9-CM: 401.9 Vitals BP Pulse Temp Resp Height(growth percentile) Weight(growth percentile) 122/85 (BP 1 Location: Right arm, BP Patient Position: Sitting) 100 97.4 °F (36.3 °C) (Oral) 17 6' 3\" (1.905 m) 270 lb 6.4 oz (122.7 kg) SpO2 BMI Smoking Status 99% 33.8 kg/m2 Former Smoker Vitals History BMI and BSA Data Body Mass Index Body Surface Area  
 33.8 kg/m 2 2.55 m 2 Preferred Pharmacy Pharmacy Name Phone [id number]- [place], [place] - [address] [phone] Your Updated Medication List  
  
   
This list is accurate as of: 7/12/17 10:59 AM.  Always use your most recent med list.  
  
  
  
  
 ALLERGY RELIEF(DIPHENHYDRAMIN) 25 mg capsule Generic drug:  diphenhydrAMINE Take 25 mg by mouth nightly as needed. atorvastatin 40 mg tablet Commonly known as:  LIPITOR Take 1 Tab by mouth daily. cefTARoline 600 mg, ADDaptor 1 Each IVPB  
600 mg by IntraVENous route every twelve (12) hours every twelve (12) hours. esomeprazole 40 mg capsule Commonly known as:  NexIUM Take 1 Cap by mouth daily. gabapentin 300 mg capsule Commonly known as:  NEURONTIN Take 1 Cap by mouth three (3) times daily. HEPARIN FLUSH IV  
3 mL by IntraVENous route daily. insulin detemir 100 unit/mL (3 mL) Inpn Commonly known as:  LEVEMIR FLEXTOUCH  
50 Units by SubCUTAneous route nightly. 60 units QHS  
  
 insulin lispro 100 unit/mL kwikpen Commonly known as:  HUMALOG  
12 Units by SubCUTAneous route Before breakfast, lunch, and dinner. DX: E11.65  
  
 L. acidoph & paracasei- S therm- Bifido 8 billion cell Cap cap Commonly known as:  EMBER-Q/RISAQUAD Take 1 Cap by mouth daily. To prevent diarrhea  
  
 lisinopril 40 mg tablet Commonly known as:  Jennifer Chika Take 1 Tab by mouth daily. metFORMIN 500 mg tablet Commonly known as:  GLUCOPHAGE  
TAKE 1 TABLET BY MOUTH TWICE A DAY WITH MEALS AND INCREASE AS DIRECTED  
  
 metoprolol tartrate 25 mg tablet Commonly known as:  LOPRESSOR  
TAKE 0.5 TABS BY MOUTH TWO (2) TIMES A DAY. metroNIDAZOLE 500 mg tablet Commonly known as:  FLAGYL Take 1 Tab by mouth three (3) times daily. NORMAL SALINE FLUSH 0.9 % Generic drug:  sodium chloride 5-10 mL by IntraVENous route daily. * oxyCODONE-acetaminophen  mg per tablet Commonly known as:  PERCOCET 10 Take 1 Tab by mouth every six (6) hours as needed. Max Daily Amount: 4 Tabs. * oxyCODONE-acetaminophen  mg per tablet Commonly known as:  PERCOCET Take 1 Tab by mouth every six (6) hours as needed for Pain. Max Daily Amount: 4 Tabs.  
  
 sodium chloride 0.65 % nasal spray Commonly known as:  SALINE NASAL  
1 [place] by Both Nostrils route as needed for Congestion. VITAMIN D3 1,000 unit Cap Generic drug:  cholecalciferol Take 2,000 Units by mouth daily. XANAX 0.5 mg tablet Generic drug:  ALPRAZolam  
Take  by mouth. * Notice: This list has 2 medication(s) that are the same as other medications prescribed for you. Read the directions carefully, and ask your doctor or other care provider to review them with you. Prescriptions Sent to Pharmacy Refills  
 gabapentin (NEURONTIN) 300 mg capsule 0 Sig: Take 1 Cap by mouth three (3) times daily. Class: Normal  
 Pharmacy: [facility]/pharmacy [address] [place] [address] [address] [place] [address] Ph #: [phone] Route: Oral  
 metroNIDAZOLE (FLAGYL) 500 mg tablet 0 Sig: Take 1 Tab by mouth three (3) times daily. Class: Normal  
 Pharmacy: [facility]/pharmacy [address] [place] [address] [address] [place] [address] Ph #: [phone] Route: Oral  
  
We Performed the Following C DIFFICILE TOXIN A & B BY EIA [86384 CPT(R)] CBC WITH AUTOMATED DIFF [45764 CPT(R)] CULTURE, STOOL X2008322 CPT(R)] HEMOGLOBIN A1C WITH EAG [81234 CPT(R)] LIPASE [id number] CPT(R)] METABOLIC PANEL, COMPREHENSIVE [85768 CPT(R)] Follow-up Instructions Return in about 1 week (around 7/19/2017) for f/up diarrhea and review labs and rechekc bs . To-Do List   
 07/12/2017 To Be Determined Appointment with Jess Kolb RN at [place] [address]  
  
 07/12/2017 Imaging:  CT ABD PELV W WO CONT   
  
 07/19/2017 To Be Determined Appointment with Adi Perez RN at [place] [address]  
  
 07/26/2017 To Be Determined Appointment with Adi Perez RN at [place] [address]  
  
 08/01/2017 To Be Determined Appointment with Adi Perez RN at [place] [address] Patient Instructions Check bs in the am and 2 hrs after  (do not test 4 times a day) eating bring in a blood sugar log to the next appt. fasting blood sugar first thing in the am __________   __________  ___________ 
 
2 hrs after break fast _______     ___________    ___________ 
 
2 hrs after lunch _______  ________  __________ 
 
2 hrs after dinner ________  _________ _________ Random one around 10 pm  _________ ___________    _______ Introducing [facility]! Dear Ariel Finney: Thank you for requesting a [employer] account. Our records indicate that you already have an active [employer] account. You can access your account anytime at [email or web address] Did you know that you can access your hospital and ER discharge instructions at any time in [employer]? You can also review all of your test results from your hospital stay or ER visit. Additional Information If you have questions, please visit the Frequently Asked Questions section of the [employer] website at [email or web address] Remember, [employer] is NOT to be used for urgent needs. For medical emergencies, dial 911. Now available from your iPhone and Android! Please provide this summary of care documentation to your next provider. Your primary care clinician is listed as Heriberto Vazquez. If you have any questions after today's visit, please call [phone].

## 2017-07-12 NOTE — TELEPHONE ENCOUNTER
Saima Mishra//[facility] states she has an appt for his Pic Dressing change tomorrow. Dada Braxtonacacia states she needs an order for his labs to be sent to [facility] location that was sent to in the past as patient can't get draws done at home. Patient advised to go tomorrow or the latest this Friday. Please call if any questions.

## 2017-07-12 NOTE — TELEPHONE ENCOUNTER
Call completed to Kareem Zamorano with [facility], two patient identifiers verified. Saima advised that lab orders have been placed for Jimy Enriquez 127Shanta verbalized an understanding.

## 2017-07-13 ENCOUNTER — HOME CARE VISIT (OUTPATIENT)
Dept: SCHEDULING | Facility: HOME HEALTH | Age: 47
End: 2017-07-13
Payer: COMMERCIAL

## 2017-07-13 VITALS
OXYGEN SATURATION: 99 % | TEMPERATURE: 98.1 F | HEART RATE: 76 BPM | BODY MASS INDEX: 32.75 KG/M2 | WEIGHT: 262 LBS | DIASTOLIC BLOOD PRESSURE: 82 MMHG | SYSTOLIC BLOOD PRESSURE: 118 MMHG | RESPIRATION RATE: 18 BRPM

## 2017-07-13 LAB
ALBUMIN SERPL-MCNC: 2.7 G/DL (ref 3.5–5.5)
ALBUMIN/GLOB SERPL: 0.8 {RATIO} (ref 1.2–2.2)
ALP SERPL-CCNC: 96 IU/L (ref 39–117)
ALT SERPL-CCNC: 12 IU/L (ref 0–44)
AST SERPL-CCNC: 10 IU/L (ref 0–40)
BASOPHILS # BLD AUTO: 0 X10E3/UL (ref 0–0.2)
BASOPHILS NFR BLD AUTO: 0 %
BILIRUB SERPL-MCNC: <0.2 MG/DL (ref 0–1.2)
BUN SERPL-MCNC: 12 MG/DL (ref 6–24)
BUN/CREAT SERPL: 9 (ref 9–20)
CALCIUM SERPL-MCNC: 8.5 MG/DL (ref 8.7–10.2)
CHLORIDE SERPL-SCNC: 101 MMOL/L (ref 96–106)
CO2 SERPL-SCNC: 20 MMOL/L (ref 18–29)
CREAT SERPL-MCNC: 1.4 MG/DL (ref 0.76–1.27)
EOSINOPHIL # BLD AUTO: 0.2 X10E3/UL (ref 0–0.4)
EOSINOPHIL NFR BLD AUTO: 2 %
ERYTHROCYTE [DISTWIDTH] IN BLOOD BY AUTOMATED COUNT: 16.2 % (ref 12.3–15.4)
EST. AVERAGE GLUCOSE BLD GHB EST-MCNC: 272 MG/DL
GLOBULIN SER CALC-MCNC: 3.5 G/DL (ref 1.5–4.5)
GLUCOSE SERPL-MCNC: 301 MG/DL (ref 65–99)
HBA1C MFR BLD: 11.1 % (ref 4.8–5.6)
HCT VFR BLD AUTO: 38.3 % (ref 37.5–51)
HGB BLD-MCNC: 11.5 G/DL (ref 12.6–17.7)
IMM GRANULOCYTES # BLD: 0.1 X10E3/UL (ref 0–0.1)
IMM GRANULOCYTES NFR BLD: 1 %
INTERPRETATION: NORMAL
LIPASE SERPL-CCNC: 23 U/L (ref 0–59)
LYMPHOCYTES # BLD AUTO: 1.5 X10E3/UL (ref 0.7–3.1)
LYMPHOCYTES NFR BLD AUTO: 16 %
Lab: NORMAL
Lab: NORMAL
MCH RBC QN AUTO: 22.6 PG (ref 26.6–33)
MCHC RBC AUTO-ENTMCNC: 30 G/DL (ref 31.5–35.7)
MCV RBC AUTO: 75 FL (ref 79–97)
MONOCYTES # BLD AUTO: 0.5 X10E3/UL (ref 0.1–0.9)
MONOCYTES NFR BLD AUTO: 5 %
NEUTROPHILS # BLD AUTO: 7 X10E3/UL (ref 1.4–7)
NEUTROPHILS NFR BLD AUTO: 76 %
PLATELET # BLD AUTO: 428 X10E3/UL (ref 150–379)
POTASSIUM SERPL-SCNC: 3.8 MMOL/L (ref 3.5–5.2)
PROT SERPL-MCNC: 6.2 G/DL (ref 6–8.5)
RBC # BLD AUTO: 5.09 X10E6/UL (ref 4.14–5.8)
SODIUM SERPL-SCNC: 136 MMOL/L (ref 134–144)
WBC # BLD AUTO: 9.2 X10E3/UL (ref 3.4–10.8)

## 2017-07-13 PROCEDURE — A4216 STERILE WATER/SALINE, 10 ML: HCPCS

## 2017-07-13 PROCEDURE — A4452 WATERPROOF TAPE: HCPCS

## 2017-07-13 PROCEDURE — G0299 HHS/HOSPICE OF RN EA 15 MIN: HCPCS

## 2017-07-13 PROCEDURE — A6446 CONFORM BAND S W>=3" <5"/YD: HCPCS

## 2017-07-13 PROCEDURE — A6021 COLLAGEN DRESSING <=16 SQ IN: HCPCS

## 2017-07-13 PROCEDURE — A6197 ALGINATE DRSG >16 <=48 SQ IN: HCPCS

## 2017-07-13 PROCEDURE — A6216 NON-STERILE GAUZE<=16 SQ IN: HCPCS

## 2017-07-16 LAB
C DIFF TOX A+B STL QL IA: POSITIVE
CAMPYLOBACTER STL CULT: NORMAL
E COLI SXT STL QL IA: NEGATIVE
SALM + SHIG STL CULT: NORMAL

## 2017-07-17 ENCOUNTER — OFFICE VISIT (OUTPATIENT)
Dept: INTERNAL MEDICINE CLINIC | Age: 47
End: 2017-07-17

## 2017-07-17 VITALS
OXYGEN SATURATION: 97 % | RESPIRATION RATE: 17 BRPM | SYSTOLIC BLOOD PRESSURE: 155 MMHG | BODY MASS INDEX: 35.52 KG/M2 | HEART RATE: 92 BPM | DIASTOLIC BLOOD PRESSURE: 95 MMHG | WEIGHT: 285.7 LBS | HEIGHT: 75 IN | TEMPERATURE: 98.4 F

## 2017-07-17 VITALS
OXYGEN SATURATION: 98 % | RESPIRATION RATE: 18 BRPM | HEART RATE: 76 BPM | TEMPERATURE: 98.1 F | DIASTOLIC BLOOD PRESSURE: 78 MMHG | SYSTOLIC BLOOD PRESSURE: 138 MMHG

## 2017-07-17 DIAGNOSIS — Z71.2 ENCOUNTER TO DISCUSS TEST RESULTS: ICD-10-CM

## 2017-07-17 DIAGNOSIS — Z91.14 NON COMPLIANCE W MEDICATION REGIMEN: ICD-10-CM

## 2017-07-17 DIAGNOSIS — M86.172 ACUTE OSTEOMYELITIS OF METATARSAL BONE OF LEFT FOOT (HCC): ICD-10-CM

## 2017-07-17 DIAGNOSIS — R10.30 LOWER ABDOMINAL PAIN: ICD-10-CM

## 2017-07-17 DIAGNOSIS — A04.72 C. DIFFICILE COLITIS: Primary | ICD-10-CM

## 2017-07-17 DIAGNOSIS — I10 UNCONTROLLED HYPERTENSION: ICD-10-CM

## 2017-07-17 RX ORDER — VANCOMYCIN HYDROCHLORIDE 125 MG/1
125 CAPSULE ORAL 4 TIMES DAILY
Qty: 40 CAP | Refills: 0 | Status: CANCELLED | OUTPATIENT
Start: 2017-07-17 | End: 2017-07-27

## 2017-07-17 NOTE — PROGRESS NOTES
Chief Complaint   Patient presents with    Hypertension     (RM 5) Did not take BP meds today    Diabetes    Results           Subjective:   David Vitale 51 y.o.  male with a  past medical history reviewed see below. comes in today for f/up abnl xray he was called last week and told to come in Friday however he showed up today. His ct scan was c/w c diff collitis. He did take the flagyl yesterday with the probiotic but reports not taking any meds today he states his abd pain is improved as well as stools starting to normalize his bs this am was ? 263 and he denies fever cihlls light headed or dizziness     ROS: otherwise feeling generally well. All other systems reviewed and are negative      Current Outpatient Prescriptions   Medication Sig Dispense Refill    gabapentin (NEURONTIN) 300 mg capsule Take 1 Cap by mouth three (3) times daily. 90 Cap 0    insulin detemir (LEVEMIR FLEXTOUCH) 100 unit/mL (3 mL) inpn 50 Units by SubCUTAneous route nightly. 60 units QHS 20 Pen 11    L. acidoph & paracasei- S therm- Bifido (EMBER-Q/RISAQUAD) 8 billion cell cap cap Take 1 Cap by mouth daily. To prevent diarrhea 21 Cap 0    metFORMIN (GLUCOPHAGE) 500 mg tablet TAKE 1 TABLET BY MOUTH TWICE A DAY WITH MEALS AND INCREASE AS DIRECTED 75 Tab 0    insulin lispro (HUMALOG) 100 unit/mL kwikpen 12 Units by SubCUTAneous route Before breakfast, lunch, and dinner. DX: E11.65 1 Package 11    esomeprazole (NEXIUM) 40 mg capsule Take 1 Cap by mouth daily. 90 Cap 3    sodium chloride (SALINE NASAL) 0.65 % nasal spray 1 [place] by Both Nostrils route as needed for Congestion. (Patient taking differently: 1 Spray by Both Nostrils route as needed for Congestion (dry sinus symptoms, especially during winter time. ).) 15 mL 0    atorvastatin (LIPITOR) 40 mg tablet Take 1 Tab by mouth daily. 90 Tab 3    metroNIDAZOLE (FLAGYL) 500 mg tablet Take 1 Tab by mouth three (3) times daily for 5 days.  15 Tab 0    apixaban (ELIQUIS) 5 mg tablet Take 2 Tabs by mouth every twelve (12) hours. 60 Tab 1    carvedilol (COREG) 6.25 mg tablet Take 1 Tab by mouth two (2) times daily (with meals). 60 Tab 1    hydrALAZINE (APRESOLINE) 25 mg tablet Take 1 Tab by mouth three (3) times daily. 90 Tab 0    isosorbide mononitrate ER (IMDUR) 30 mg tablet Take 1 Tab by mouth daily. 30 Tab 0    spironolactone (ALDACTONE) 25 mg tablet Take 1 Tab by mouth daily. 30 Tab 0    ALPRAZolam (XANAX) 0.5 mg tablet Take  by mouth.  diphenhydrAMINE (ALLERGY RELIEF,DIPHENHYDRAMIN,) 25 mg capsule Take 25 mg by mouth nightly as needed.  oxyCODONE-acetaminophen (PERCOCET 10)  mg per tablet Take 1 Tab by mouth every six (6) hours as needed. Max Daily Amount: 4 Tabs. (Patient taking differently: Take 1 Tab by mouth every six (6) hours as needed for Pain.) 14 Tab 0    cholecalciferol (VITAMIN D3) 1,000 unit cap Take 2,000 Units by mouth daily.        Allergies   Allergen Reactions    Ace Inhibitors Swelling     Facial swelling cough     Morphine Angioedema     Past Medical History:   Diagnosis Date    Diabetes ([facility]     since age 23   24 [facility] DM (diabetes mellitus) ([facility]     HTN (hypertension)     Hypercholesteremia     Hyperlipidemia      Past Surgical History:   Procedure Laterality Date    HX AMPUTATION      toes- left foot    HX BUNIONECTOMY      HX ORTHOPAEDIC      boutinerre deformity    HX ORTHOPAEDIC      fascia removed left foot    HX OTHER SURGICAL  03/2017    Skin graft Surgery    HX TONSILLECTOMY      HX WISDOM TEETH EXTRACTION       Family History   Problem Relation Age of Onset    Hypertension Mother     High Cholesterol Mother      Social History   Substance Use Topics    Smoking status: Former Smoker     Types: Cigars     Quit date: 4/22/2011    Smokeless tobacco: Never Used    Alcohol use 0.0 oz/week     1 Glasses of wine, 0 Standard drinks or equivalent per week      Comment: stop drinking 5 months ago          Objective:     Visit Vitals    BP (!) 155/95 (BP 1 Location: Right arm, BP Patient Position: Sitting)    Pulse 92    Temp 98.4 °F (36.9 °C) (Oral)    Resp 17    Ht 6' 3\" (1.905 m)    Wt 285 lb 11.2 oz (129.6 kg)    SpO2 97%    BMI 35.71 kg/m2     Gen: NAD, pleasant  HEENT: normal appearing head, nares patent, PERRLA, EOMI, oropharynx no erythema, no cervical lymphadenopathy neck supple   Cardio: RRR nl S1S2 no murmur  Lungs CTAB no wheeze no rales no rhonchi  ABD Soft improved tenderness  non distended + bowel sounds  Extremities: full ROM X 4 no clubbing no cyanosis no picc line in place did not remove bandage from foot   Neuro: no gross focal deficits noted, alert and orientated X 3  Psych.: well groomed no outward signs of depression. Assessment/Plan:   Diagnoses and all orders for this visit:    1. C. difficile colitis    2. Encounter to discuss test results    3. Acute osteomyelitis of metatarsal bone of left foot (HCC)  -     SED RATE (ESR)    4. Non compliance w medication regimen    5. Lower abdominal pain-improved/resolved     6. Uncontrolled hypertension      Follow-up Disposition:  Return in about 2 weeks (around 7/31/2017) for recheck bs and diarrhea . .  avs printed and given to the pt. .    The patient voiced understanding of the above. Medication side effects were reviewed with the patient. Call with any concerns.

## 2017-07-17 NOTE — PATIENT INSTRUCTIONS
Clostridium Difficile Colitis: Care Instructions  Your Care Instructions  Clostridium difficile (also called C. difficile) are bacteria that can cause swelling and irritation of the large intestine, or colon. This inflammation is also called colitis. It can cause diarrhea, fever, and belly cramps. You may get C. difficile colitis if you take antibiotics. The infection is most common in people who are taking antibiotics while in the hospital. It is also common in older people in hospitals and nursing homes. Severe disease could cause the colon to swell to many times its normal size (toxic megacolon). This can cause death and needs emergency treatment. You may have a swollen belly that is painful or tender, a rapid heartbeat, and a fever. Follow-up care is a key part of your treatment and safety. Be sure to make and go to all appointments, and call your doctor if you are having problems. It's also a good idea to know your test results and keep a list of the medicines you take. How can you care for yourself at home? · Your doctor may give you antibiotics to treat C. difficile colitis. If your doctor prescribes an antibiotic, he or she will give you a different antibiotic than the one that caused your infection. Take your antibiotics as directed. Do not stop taking them just because you feel better. You need to take the full course of antibiotics. · To prevent dehydration, drink plenty of fluids, enough so that your urine is light yellow or clear like water. Choose water and other caffeine-free clear liquids until you feel better. If you have kidney, heart, or liver disease and have to limit fluids, talk with your doctor before you increase the amount of fluids you drink. · Begin eating small amounts of mild foods, if you feel like it. Try yogurt that has live cultures of lactobacillus (check the label). ¨ Avoid spicy foods, fruits, alcohol, and caffeine until 48 hours after all symptoms go away.   ¨ Avoid chewing gum that contains sorbitol. ¨ Avoid dairy products (except for yogurt with lactobacillus) while you have diarrhea and for 3 days after symptoms go away. · To prevent the spread of C. difficile, practice good hygiene. Keep your hands clean by washing them well and often with soap and clean, running water. Alcohol-based hand sanitizers do not kill C. difficile. When should you call for help? Call 911 if:  · You passed out (lost consciousness). Call your doctor now or seek immediate medical care if:  · You have a fever over 101°F or shaking chills. · You feel lightheaded or have a fast heart rate. · You pass stools that are almost always bloody. · You have signs of needing more fluids. You have sunken eyes and a dry mouth, and you pass only a little dark urine. · You have severe belly pain with or without bloating. · You have severe vomiting and cannot keep down liquids. · You are not passing any stools or gas. Watch closely for changes in your health, and be sure to contact your doctor if:  · You do not get better as expected. Where can you learn more? Go to http://lori-bella.info/. Enter ([phone] in the search box to learn more about \"Clostridium Difficile Colitis: Care Instructions. \"  Current as of: March 3, 2017  Content Version: 11.3  © 6068-5200 [employer]. Care instructions adapted under license by [employer]â„¢ (which disclaims liability or warranty for this information). If you have questions about a medical condition or this instruction, always ask your healthcare professional. [place] [address] any warranty or liability for your use of this information.

## 2017-07-17 NOTE — MR AVS SNAPSHOT
Visit Information Date & Time Provider Department Dept. Phone Encounter #  
 7/17/2017  1:30  [facility], [address] [address] - [place] [id number] Follow-up Instructions Return in about 2 weeks (around 7/31/2017) for recheck bs and diarrhea . Your Appointments 7/18/2017  9:30 AM  
ROUTINE CARE with Glenroy Rios MD  
[facility] Appt Note: Follow-up [facility] [address] [phone]  
[address] [facility] [address] [facility] Upcoming Health Maintenance Date Due MICROALBUMIN Q1 8/24/1979 Pneumococcal 19-64 Highest Risk (1 of 3 - PCV13) 8/24/1988 FOOT EXAM Q1 11/11/2015 EYE EXAM RETINAL OR DILATED Q1 1/14/2016 INFLUENZA AGE 9 TO ADULT 8/1/2017 LIPID PANEL Q1 10/6/2017 HEMOGLOBIN A1C Q6M 1/12/2018 DTaP/Tdap/Td series (2 - Td) 11/18/2021 Allergies as of 7/17/2017  Review Complete On: 7/17/2017 By: Sandra Mccord Severity Noted Reaction Type Reactions Ace Inhibitors High 06/01/2017    Swelling Facial swelling cough Morphine High 10/20/2014   Systemic Angioedema Current Immunizations  Reviewed on 11/18/2016 Name Date Influenza Vaccine 11/1/2013 Influenza Vaccine Intradermal PF 10/28/2016 Tdap 11/18/2011 Not reviewed this visit You Were Diagnosed With   
  
 Codes Comments C. difficile colitis    -  Primary ICD-10-CM: A04.7 ICD-9-CM: 008.45 Encounter to discuss test results     ICD-10-CM: Z71.89 ICD-9-CM: V65.49 Acute osteomyelitis of metatarsal bone of left foot ([facility]     ICD-10-CM: G56.371 ICD-9-CM: 730.07 Non compliance w medication regimen     ICD-10-CM: Z91.14 
ICD-9-CM: V15.81 Lower abdominal pain     ICD-10-CM: R10.30 ICD-9-CM: 789.09 Vitals BP Pulse Temp Resp Height(growth percentile) Weight(growth percentile) (!) 155/95 (BP 1 Location: Right arm, BP Patient Position: Sitting) 92 98.4 °F (36.9 °C) (Oral) 17 6' 3\" (1.905 m) 285 lb 11.2 oz (129.6 kg) SpO2 BMI Smoking Status 97% 35.71 kg/m2 Former Smoker BMI and BSA Data Body Mass Index Body Surface Area 35.71 kg/m 2 2.62 m 2 Preferred Pharmacy Pharmacy Name Phone [facility] #[id number]- [facility] [address] [phone] Your Updated Medication List  
  
   
This list is accurate as of: 7/17/17  2:00 PM.  Always use your most recent med list.  
  
  
  
  
 ALLERGY RELIEF(DIPHENHYDRAMIN) 25 mg capsule Generic drug:  diphenhydrAMINE Take 25 mg by mouth nightly as needed. atorvastatin 40 mg tablet Commonly known as:  LIPITOR Take 1 Tab by mouth daily. cefTARoline 600 mg, ADDaptor 1 Each IVPB  
600 mg by IntraVENous route every twelve (12) hours every twelve (12) hours. esomeprazole 40 mg capsule Commonly known as:  NexIUM Take 1 Cap by mouth daily. gabapentin 300 mg capsule Commonly known as:  NEURONTIN Take 1 Cap by mouth three (3) times daily. HEPARIN FLUSH IV  
3 mL by IntraVENous route daily. insulin detemir 100 unit/mL (3 mL) Inpn Commonly known as:  LEVEMIR FLEXTOUCH  
50 Units by SubCUTAneous route nightly. 60 units QHS  
  
 insulin lispro 100 unit/mL kwikpen Commonly known as:  HUMALOG  
12 Units by SubCUTAneous route Before breakfast, lunch, and dinner. DX: E11.65  
  
 L. acidoph & paracasei- S therm- Bifido 8 billion cell Cap cap Commonly known as:  EMBER-Q/RISAQUAD Take 1 Cap by mouth daily. To prevent diarrhea  
  
 lisinopril 40 mg tablet Commonly known as:  Wayne Handing Take 1 Tab by mouth daily. metFORMIN 500 mg tablet Commonly known as:  GLUCOPHAGE  
TAKE 1 TABLET BY MOUTH TWICE A DAY WITH MEALS AND INCREASE AS DIRECTED  
  
 metoprolol tartrate 25 mg tablet Commonly known as:  LOPRESSOR  
TAKE 0.5 TABS BY MOUTH TWO (2) TIMES A DAY. metroNIDAZOLE 500 mg tablet Commonly known as:  FLAGYL Take 1 Tab by mouth three (3) times daily. NORMAL SALINE FLUSH 0.9 % Generic drug:  sodium chloride 5-10 mL by IntraVENous route daily. * oxyCODONE-acetaminophen  mg per tablet Commonly known as:  PERCOCET 10 Take 1 Tab by mouth every six (6) hours as needed. Max Daily Amount: 4 Tabs. * oxyCODONE-acetaminophen  mg per tablet Commonly known as:  PERCOCET Take 1 Tab by mouth every six (6) hours as needed for Pain. Max Daily Amount: 4 Tabs.  
  
 sodium chloride 0.65 % nasal spray Commonly known as:  SALINE NASAL  
1 [place] by Both Nostrils route as needed for Congestion. VITAMIN D3 1,000 unit Cap Generic drug:  cholecalciferol Take 2,000 Units by mouth daily. XANAX 0.5 mg tablet Generic drug:  ALPRAZolam  
Take  by mouth. * Notice: This list has 2 medication(s) that are the same as other medications prescribed for you. Read the directions carefully, and ask your doctor or other care provider to review them with you. We Performed the Following SED RATE (ESR) [id number] CPT(R)] Comments:  
 ADD ON  Please Follow-up Instructions Return in about 2 weeks (around 7/31/2017) for recheck bs and diarrhea . To-Do List   
 07/21/2017 To Be Determined Appointment with Justine Whitfield RN at [place] [address]  
  
 07/26/2017 To Be Determined Appointment with Justine Whitfield RN at [place] [address]  
  
 08/01/2017 To Be Determined Appointment with Justine Whitfield RN at [place] [address] Patient Instructions Clostridium Difficile Colitis: Care Instructions Your Care Instructions Clostridium difficile (also called C. difficile) are bacteria that can cause swelling and irritation of the large intestine, or colon. This inflammation is also called colitis. It can cause diarrhea, fever, and belly cramps. You may get C. difficile colitis if you take antibiotics. The infection is most common in people who are taking antibiotics while in the hospital. It is also common in older people in hospitals and nursing homes. Severe disease could cause the colon to swell to many times its normal size (toxic megacolon). This can cause death and needs emergency treatment. You may have a swollen belly that is painful or tender, a rapid heartbeat, and a fever. Follow-up care is a key part of your treatment and safety. Be sure to make and go to all appointments, and call your doctor if you are having problems. It's also a good idea to know your test results and keep a list of the medicines you take. How can you care for yourself at home? · Your doctor may give you antibiotics to treat C. difficile colitis. If your doctor prescribes an antibiotic, he or she will give you a different antibiotic than the one that caused your infection. Take your antibiotics as directed. Do not stop taking them just because you feel better. You need to take the full course of antibiotics. · To prevent dehydration, drink plenty of fluids, enough so that your urine is light yellow or clear like water. Choose water and other caffeine-free clear liquids until you feel better. If you have kidney, heart, or liver disease and have to limit fluids, talk with your doctor before you increase the amount of fluids you drink. · Begin eating small amounts of mild foods, if you feel like it. Try yogurt that has live cultures of lactobacillus (check the label). ¨ Avoid spicy foods, fruits, alcohol, and caffeine until 48 hours after all symptoms go away. ¨ Avoid chewing gum that contains sorbitol.  
¨ Avoid dairy products (except for yogurt with lactobacillus) while you have diarrhea and for 3 days after symptoms go away. · To prevent the spread of C. difficile, practice good hygiene. Keep your hands clean by washing them well and often with soap and clean, running water. Alcohol-based hand sanitizers do not kill C. difficile. When should you call for help? Call 911 if: 
· You passed out (lost consciousness). Call your doctor now or seek immediate medical care if: 
· You have a fever over 101°F or shaking chills. · You feel lightheaded or have a fast heart rate. · You pass stools that are almost always bloody. · You have signs of needing more fluids. You have sunken eyes and a dry mouth, and you pass only a little dark urine. · You have severe belly pain with or without bloating. · You have severe vomiting and cannot keep down liquids. · You are not passing any stools or gas. Watch closely for changes in your health, and be sure to contact your doctor if: 
· You do not get better as expected. Where can you learn more? Go to http://lori-bella.info/. Enter ([phone] in the search box to learn more about \"Clostridium Difficile Colitis: Care Instructions. \" Current as of: March 3, 2017 Content Version: 11.3 © 9816-9759 [employer]. Care instructions adapted under license by [employer] (which disclaims liability or warranty for this information). If you have questions about a medical condition or this instruction, always ask your healthcare professional. [place] [address] any warranty or liability for your use of this information. Introducing \[facility] [place] [facility]\"" & HEALTH SERVICES! Dear Shiloh Coffman: Thank you for requesting a Streamline account. Our records indicate that you already have an active Streamline account. You can access your account anytime at [email or web address] Did you know that you can access your hospital and ER discharge instructions at any time in Streamline?   You can also review all of your test results from your hospital stay or ER visit. Additional Information If you have questions, please visit the Frequently Asked Questions section of the [employer] website at [email or web address] Remember, [facility] is NOT to be used for urgent needs. For medical emergencies, dial 911. Now available from your iPhone and Android! Please provide this summary of care documentation to your next provider. Your primary care clinician is listed as Marbella Montague. If you have any questions after today's visit, please call [phone].

## 2017-07-17 NOTE — PROGRESS NOTES
Chief Complaint   Patient presents with    Hypertension     (RM 5) Did not take BP meds today    Diabetes    Results

## 2017-07-18 ENCOUNTER — OFFICE VISIT (OUTPATIENT)
Dept: INTERNAL MEDICINE CLINIC | Age: 47
End: 2017-07-18

## 2017-07-18 VITALS
RESPIRATION RATE: 16 BRPM | SYSTOLIC BLOOD PRESSURE: 148 MMHG | DIASTOLIC BLOOD PRESSURE: 92 MMHG | WEIGHT: 280 LBS | TEMPERATURE: 98.6 F | HEIGHT: 75 IN | OXYGEN SATURATION: 98 % | HEART RATE: 85 BPM | BODY MASS INDEX: 34.82 KG/M2

## 2017-07-18 DIAGNOSIS — M86.00 ACUTE HEMATOGENOUS OSTEOMYELITIS, UNSPECIFIED SITE (HCC): Primary | ICD-10-CM

## 2017-07-18 NOTE — PROGRESS NOTES
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 Celia Bear. Date of discharge from [facility] was 6/2/17. Date of surgery was 5/28/17. The patient was discharged to home and is followed by [facility] and [facility]. He has completed 7 wks of IV ceftaroline post op via a right arm PICC line. Unfortunately he developed C Diff + colitis which has responded well to oral metronidazole per Dr. Cami Osborne. .   No antibiotic allergies.     Comorbid conditions include: DH, HTN, GERD, dyslipidemia, Vit D deficiency, and foot infections as above.      All meds were reviewed.        ROS:. Minimal discomfort in granulating wound. Edema has decreased. Drainage is serous. Diarrhea has abated. - fever, sweats, or chills. -weight loss.  -fatigue/malasie.  -Rash.  -Swollen glands.  -Oral lesions.  -Photophobia. -Jaundice.   - SOB. - cough. - abdominal pain or tenderness.  - Nausea or vomiting.   -Diarrhea. No headache or AMS. ROS otherwise negative.     EXAM:  General:Afebrile and not toxic. No exanthem or enanthem. No peripheral adenopathy. Alert and oriented. PICC site is clean and dry. HEENT: EOMI. Anicteric. Oral mucosa normal. Conjunctivae normal. Neck supple. No thrush. Chest: Lungs clear to A&P. Regular rhythm without murmurs. No chest wall tenderness. Abdomen: Soft without organomegaly, tenderness, or masses. Nondistended. Bowel sounds normal.  Musculoskeletal: Left foot with open ulcerated area with good granulation base. NO pus at site. No ascending cellulitis or lymphangitis. 2+ edema. Neurologic: Nonfocal exam.     Data Review: 7/12/17: WBC = 9200. Hb = 11.5. Creat = 1.40. Last ESR on 6/21 was 46 (stable).          ASSESSMENT AND PLAN     Doing reasonably well clinically with IV antibiotics and HBO. Still a long way to go re wound healing. Treated for C diff + colitis with metronidazole with resolution of abdominal pain and diarrhea. Will stop all antibiotics now. [facility] notified by phone. Remove PICC line also. Repeat ESR in one week. Hopefully he is done with antibiotic Tx after 7 wks of post op Tx.      Continue wound care per Dr. Evan Paniagua.

## 2017-07-18 NOTE — MR AVS SNAPSHOT
Visit Information Date & Time Provider Department Dept. Phone Encounter #  
 7/18/2017  9:30 AM Doreen Lucas, [address] [place] [address],[address] [phone] [id number] Follow-up Instructions Return if symptoms worsen or fail to improve. Your Appointments 7/31/2017  9:15 AM  
ROUTINE CARE with Zarina Lindo MD  
[address] [facility] ([facility] Appt Note: 2 week fu  
 [address] [address]  
[phone]  
  
   
 [address] [address] Upcoming Health Maintenance Date Due MICROALBUMIN Q1 8/24/1979 Pneumococcal 19-64 Highest Risk (1 of 3 - PCV13) 8/24/1988 FOOT EXAM Q1 11/11/2015 EYE EXAM RETINAL OR DILATED Q1 1/14/2016 INFLUENZA AGE 9 TO ADULT 8/1/2017 LIPID PANEL Q1 10/6/2017 HEMOGLOBIN A1C Q6M 1/12/2018 DTaP/Tdap/Td series (2 - Td) 11/18/2021 Allergies as of 7/18/2017  Review Complete On: 7/18/2017 By: Doreen Lucas MD  
  
 Severity Noted Reaction Type Reactions Ace Inhibitors High 06/01/2017    Swelling Facial swelling cough Morphine High 10/20/2014   Systemic Angioedema Current Immunizations  Reviewed on 11/18/2016 Name Date Influenza Vaccine 11/1/2013 Influenza Vaccine Intradermal PF 10/28/2016 Tdap 11/18/2011 Not reviewed this visit You Were Diagnosed With   
  
 Codes Comments Acute hematogenous osteomyelitis, unspecified site [facility])    -  Primary ICD-10-CM: M86.00 
ICD-9-CM: 730.00 Vitals BP Pulse Temp Resp Height(growth percentile) Weight(growth percentile) (!) 148/92 (BP 1 Location: Left arm, BP Patient Position: Sitting) 85 98.6 °F (37 °C) (Oral) 16 6' 3\" (1.905 m) 280 lb (127 kg) SpO2 BMI Smoking Status 98% 35 kg/m2 Former Smoker Vitals History BMI and BSA Data Body Mass Index Body Surface Area 35 kg/m 2 2.59 m 2 Preferred Pharmacy Pharmacy Name Phone [facility]/PHARMACY [id number], [place] - [address] [phone] Your Updated Medication List  
  
   
This list is accurate as of: 7/18/17  9:51 AM.  Always use your most recent med list.  
  
  
  
  
 ALLERGY RELIEF(DIPHENHYDRAMIN) 25 mg capsule Generic drug:  diphenhydrAMINE Take 25 mg by mouth nightly as needed. atorvastatin 40 mg tablet Commonly known as:  LIPITOR Take 1 Tab by mouth daily. cefTARoline 600 mg, ADDaptor 1 Each IVPB  
600 mg by IntraVENous route every twelve (12) hours every twelve (12) hours. esomeprazole 40 mg capsule Commonly known as:  NexIUM Take 1 Cap by mouth daily. gabapentin 300 mg capsule Commonly known as:  NEURONTIN Take 1 Cap by mouth three (3) times daily. HEPARIN FLUSH IV  
3 mL by IntraVENous route daily. insulin detemir 100 unit/mL (3 mL) Inpn Commonly known as:  LEVEMIR FLEXTOUCH  
50 Units by SubCUTAneous route nightly. 60 units QHS  
  
 insulin lispro 100 unit/mL kwikpen Commonly known as:  HUMALOG  
12 Units by SubCUTAneous route Before breakfast, lunch, and dinner. DX: E11.65  
  
 L. acidoph & paracasei- S therm- Bifido 8 billion cell Cap cap Commonly known as:  EMBER-Q/RISAQUAD Take 1 Cap by mouth daily. To prevent diarrhea  
  
 lisinopril 40 mg tablet Commonly known as:  Kumar Meeker Take 1 Tab by mouth daily. metFORMIN 500 mg tablet Commonly known as:  GLUCOPHAGE  
TAKE 1 TABLET BY MOUTH TWICE A DAY WITH MEALS AND INCREASE AS DIRECTED  
  
 metoprolol tartrate 25 mg tablet Commonly known as:  LOPRESSOR  
TAKE 0.5 TABS BY MOUTH TWO (2) TIMES A DAY. metroNIDAZOLE 500 mg tablet Commonly known as:  FLAGYL Take 1 Tab by mouth three (3) times daily. NORMAL SALINE FLUSH 0.9 % Generic drug:  sodium chloride 5-10 mL by IntraVENous route daily. * oxyCODONE-acetaminophen  mg per tablet Commonly known as:  PERCOCET 10  
 Take 1 Tab by mouth every six (6) hours as needed. Max Daily Amount: 4 Tabs. * oxyCODONE-acetaminophen  mg per tablet Commonly known as:  PERCOCET Take 1 Tab by mouth every six (6) hours as needed for Pain. Max Daily Amount: 4 Tabs.  
  
 sodium chloride 0.65 % nasal spray Commonly known as:  SALINE NASAL  
1 [place] by Both Nostrils route as needed for Congestion. VITAMIN D3 1,000 unit Cap Generic drug:  cholecalciferol Take 2,000 Units by mouth daily. XANAX 0.5 mg tablet Generic drug:  ALPRAZolam  
Take  by mouth. * Notice: This list has 2 medication(s) that are the same as other medications prescribed for you. Read the directions carefully, and ask your doctor or other care provider to review them with you. We Performed the Following SED RATE (ESR) [id number] CPT(R)] Follow-up Instructions Return if symptoms worsen or fail to improve. To-Do List   
 07/21/2017 To Be Determined Appointment with Bernis Spatz, RN at [place] [address]  
  
 07/26/2017 To Be Determined Appointment with Bernis Spatz, RN at [place] [address]  
  
 08/01/2017 To Be Determined Appointment with Bernis Spatz, RN at [address] & [facility] SERVICES! Dear Chey Swanson: Thank you for requesting a Shopography account. Our records indicate that you already have an active Shopography account. You can access your account anytime at [email or web address] Did you know that you can access your hospital and ER discharge instructions at any time in Shopography? You can also review all of your test results from your hospital stay or ER visit. Additional Information If you have questions, please visit the Frequently Asked Questions section of the Shopography website at [email or web address] Remember, Shopography is NOT to be used for urgent needs.  For medical emergencies, dial 911. Now available from your iPhone and Android! Please provide this summary of care documentation to your next provider. Your primary care clinician is listed as Mary Hodge. If you have any questions after today's visit, please call [phone].

## 2017-07-19 ENCOUNTER — HOME CARE VISIT (OUTPATIENT)
Dept: SCHEDULING | Facility: HOME HEALTH | Age: 47
End: 2017-07-19
Payer: COMMERCIAL

## 2017-07-19 VITALS
OXYGEN SATURATION: 99 % | DIASTOLIC BLOOD PRESSURE: 90 MMHG | RESPIRATION RATE: 18 BRPM | TEMPERATURE: 95.8 F | HEART RATE: 87 BPM | SYSTOLIC BLOOD PRESSURE: 134 MMHG

## 2017-07-19 PROCEDURE — G0299 HHS/HOSPICE OF RN EA 15 MIN: HCPCS

## 2017-07-20 ENCOUNTER — TELEPHONE (OUTPATIENT)
Dept: INTERNAL MEDICINE CLINIC | Age: 47
End: 2017-07-20

## 2017-07-20 NOTE — TELEPHONE ENCOUNTER
Aware- pt's noncompliance makes treatment difficult he has also been non complaint and was dismissed for no shows for HBO treatment     FYI Dr Mayra Gtz He was recently dx with c diff as well treated with flagyl + stool and abd ct abnl

## 2017-07-20 NOTE — TELEPHONE ENCOUNTER
Ronit Mishra//[employer] state she needs a call back to discuss patient's pick line removal & Ronit states patient is requesting to be discharged from [address] [place] [address]. Ronit states patient has been very difficult to be able to see as patient is never home & ronit advise she does not think patient is [address]. Please call to discuss & advise.  Thank you

## 2017-07-20 NOTE — TELEPHONE ENCOUNTER
Call completed to Julio C Reyes with [facility], two patient identifiers verified. Saima advised that patient was seen in office of 07/18/2017. Explained that Dr. Aubree Diehl provided orders to [facility] to d/c his picc line and d/c home health. Saima verbalized an understanding.

## 2017-07-20 NOTE — TELEPHONE ENCOUNTER
Saima from home [facility] called to advise that Mr Harrison Wolf requested infusion meds be d/c's and pic line removed as well as d/c from home health. To homor this request, Dr mac/radha'd meds and picc line removed by [facility] nurse and d/c from health has been done.

## 2017-07-21 ENCOUNTER — HOSPITAL ENCOUNTER (INPATIENT)
Age: 47
LOS: 5 days | Discharge: HOME | DRG: 176 | End: 2017-07-26
Attending: EMERGENCY MEDICINE | Admitting: INTERNAL MEDICINE
Payer: COMMERCIAL

## 2017-07-21 ENCOUNTER — APPOINTMENT (OUTPATIENT)
Dept: CT IMAGING | Age: 47
DRG: 176 | End: 2017-07-21
Attending: EMERGENCY MEDICINE
Payer: COMMERCIAL

## 2017-07-21 ENCOUNTER — APPOINTMENT (OUTPATIENT)
Dept: GENERAL RADIOLOGY | Age: 47
DRG: 176 | End: 2017-07-21
Attending: EMERGENCY MEDICINE
Payer: COMMERCIAL

## 2017-07-21 ENCOUNTER — APPOINTMENT (OUTPATIENT)
Dept: ULTRASOUND IMAGING | Age: 47
DRG: 176 | End: 2017-07-21
Attending: INTERNAL MEDICINE
Payer: COMMERCIAL

## 2017-07-21 DIAGNOSIS — R77.8 ELEVATED TROPONIN: ICD-10-CM

## 2017-07-21 DIAGNOSIS — Z91.199 NON-COMPLIANCE WITH TREATMENT: ICD-10-CM

## 2017-07-21 DIAGNOSIS — E87.6 HYPOKALEMIA: ICD-10-CM

## 2017-07-21 DIAGNOSIS — I10 UNCONTROLLED HYPERTENSION: ICD-10-CM

## 2017-07-21 DIAGNOSIS — Z86.19 HISTORY OF CLOSTRIDIUM DIFFICILE INFECTION: ICD-10-CM

## 2017-07-21 DIAGNOSIS — Z91.119 DIETARY NONCOMPLIANCE: ICD-10-CM

## 2017-07-21 DIAGNOSIS — I26.99 PULMONARY EMBOLISM, OTHER: Primary | ICD-10-CM

## 2017-07-21 DIAGNOSIS — I26.99 BILATERAL PULMONARY EMBOLISM (HCC): ICD-10-CM

## 2017-07-21 LAB
ALBUMIN SERPL BCP-MCNC: 1.7 G/DL (ref 3.5–5)
ALBUMIN/GLOB SERPL: 0.3 {RATIO} (ref 1.1–2.2)
ALP SERPL-CCNC: 127 U/L (ref 45–117)
ALT SERPL-CCNC: 22 U/L (ref 12–78)
ANION GAP BLD CALC-SCNC: 11 MMOL/L (ref 5–15)
AST SERPL W P-5'-P-CCNC: 15 U/L (ref 15–37)
BASOPHILS # BLD AUTO: 0 K/UL (ref 0–0.1)
BASOPHILS # BLD: 0 % (ref 0–1)
BILIRUB SERPL-MCNC: 0.2 MG/DL (ref 0.2–1)
BNP SERPL-MCNC: 4074 PG/ML (ref 0–125)
BUN SERPL-MCNC: 13 MG/DL (ref 6–20)
BUN/CREAT SERPL: 9 (ref 12–20)
CALCIUM SERPL-MCNC: 7.7 MG/DL (ref 8.5–10.1)
CHLORIDE SERPL-SCNC: 101 MMOL/L (ref 97–108)
CK MB CFR SERPL CALC: 1.2 % (ref 0–2.5)
CK MB SERPL-MCNC: 1.7 NG/ML (ref 5–25)
CK SERPL-CCNC: 147 U/L (ref 39–308)
CO2 SERPL-SCNC: 25 MMOL/L (ref 21–32)
CREAT SERPL-MCNC: 1.45 MG/DL (ref 0.7–1.3)
DIFFERENTIAL METHOD BLD: ABNORMAL
EOSINOPHIL # BLD: 0.1 K/UL (ref 0–0.4)
EOSINOPHIL NFR BLD: 1 % (ref 0–7)
ERYTHROCYTE [DISTWIDTH] IN BLOOD BY AUTOMATED COUNT: 16.6 % (ref 11.5–14.5)
GLOBULIN SER CALC-MCNC: 4.9 G/DL (ref 2–4)
GLUCOSE BLD STRIP.AUTO-MCNC: 101 MG/DL (ref 65–100)
GLUCOSE SERPL-MCNC: 163 MG/DL (ref 65–100)
HCT VFR BLD AUTO: 32.4 % (ref 36.6–50.3)
HGB BLD-MCNC: 9.9 G/DL (ref 12.1–17)
LYMPHOCYTES # BLD AUTO: 20 % (ref 12–49)
LYMPHOCYTES # BLD: 2.6 K/UL (ref 0.8–3.5)
MCH RBC QN AUTO: 22.3 PG (ref 26–34)
MCHC RBC AUTO-ENTMCNC: 30.6 G/DL (ref 30–36.5)
MCV RBC AUTO: 73.1 FL (ref 80–99)
MONOCYTES # BLD: 0.4 K/UL (ref 0–1)
MONOCYTES NFR BLD AUTO: 3 % (ref 5–13)
NEUTS SEG # BLD: 9.9 K/UL (ref 1.8–8)
NEUTS SEG NFR BLD AUTO: 76 % (ref 32–75)
PLATELET # BLD AUTO: 292 K/UL (ref 150–400)
POTASSIUM SERPL-SCNC: 3.2 MMOL/L (ref 3.5–5.1)
PROT SERPL-MCNC: 6.6 G/DL (ref 6.4–8.2)
RBC # BLD AUTO: 4.43 M/UL (ref 4.1–5.7)
RBC MORPH BLD: ABNORMAL
SERVICE CMNT-IMP: ABNORMAL
SODIUM SERPL-SCNC: 137 MMOL/L (ref 136–145)
TROPONIN I SERPL-MCNC: 0.1 NG/ML
WBC # BLD AUTO: 13 K/UL (ref 4.1–11.1)

## 2017-07-21 PROCEDURE — 84484 ASSAY OF TROPONIN QUANT: CPT | Performed by: EMERGENCY MEDICINE

## 2017-07-21 PROCEDURE — 74011250637 HC RX REV CODE- 250/637: Performed by: INTERNAL MEDICINE

## 2017-07-21 PROCEDURE — 74011250637 HC RX REV CODE- 250/637: Performed by: EMERGENCY MEDICINE

## 2017-07-21 PROCEDURE — 83880 ASSAY OF NATRIURETIC PEPTIDE: CPT | Performed by: EMERGENCY MEDICINE

## 2017-07-21 PROCEDURE — 82550 ASSAY OF CK (CPK): CPT | Performed by: EMERGENCY MEDICINE

## 2017-07-21 PROCEDURE — 80053 COMPREHEN METABOLIC PANEL: CPT | Performed by: EMERGENCY MEDICINE

## 2017-07-21 PROCEDURE — 93970 EXTREMITY STUDY: CPT

## 2017-07-21 PROCEDURE — 71010 XR CHEST PORT: CPT

## 2017-07-21 PROCEDURE — 82962 GLUCOSE BLOOD TEST: CPT

## 2017-07-21 PROCEDURE — 74011250636 HC RX REV CODE- 250/636: Performed by: EMERGENCY MEDICINE

## 2017-07-21 PROCEDURE — 96372 THER/PROPH/DIAG INJ SC/IM: CPT

## 2017-07-21 PROCEDURE — 65660000000 HC RM CCU STEPDOWN

## 2017-07-21 PROCEDURE — 99285 EMERGENCY DEPT VISIT HI MDM: CPT

## 2017-07-21 PROCEDURE — 85025 COMPLETE CBC W/AUTO DIFF WBC: CPT | Performed by: EMERGENCY MEDICINE

## 2017-07-21 PROCEDURE — 36415 COLL VENOUS BLD VENIPUNCTURE: CPT | Performed by: EMERGENCY MEDICINE

## 2017-07-21 PROCEDURE — 74011636320 HC RX REV CODE- 636/320: Performed by: EMERGENCY MEDICINE

## 2017-07-21 PROCEDURE — 71275 CT ANGIOGRAPHY CHEST: CPT

## 2017-07-21 PROCEDURE — 71020 XR CHEST PA LAT: CPT

## 2017-07-21 PROCEDURE — 93005 ELECTROCARDIOGRAM TRACING: CPT

## 2017-07-21 RX ORDER — INSULIN LISPRO 100 [IU]/ML
INJECTION, SOLUTION INTRAVENOUS; SUBCUTANEOUS
Status: DISCONTINUED | OUTPATIENT
Start: 2017-07-21 | End: 2017-07-26 | Stop reason: HOSPADM

## 2017-07-21 RX ORDER — ENOXAPARIN SODIUM 100 MG/ML
100 INJECTION SUBCUTANEOUS
Status: DISCONTINUED | OUTPATIENT
Start: 2017-07-21 | End: 2017-07-21

## 2017-07-21 RX ORDER — ACETAMINOPHEN 325 MG/1
650 TABLET ORAL
Status: DISCONTINUED | OUTPATIENT
Start: 2017-07-21 | End: 2017-07-26 | Stop reason: HOSPADM

## 2017-07-21 RX ORDER — GABAPENTIN 300 MG/1
300 CAPSULE ORAL 3 TIMES DAILY
Status: DISCONTINUED | OUTPATIENT
Start: 2017-07-22 | End: 2017-07-26 | Stop reason: HOSPADM

## 2017-07-21 RX ORDER — NALOXONE HYDROCHLORIDE 0.4 MG/ML
0.4 INJECTION, SOLUTION INTRAMUSCULAR; INTRAVENOUS; SUBCUTANEOUS AS NEEDED
Status: DISCONTINUED | OUTPATIENT
Start: 2017-07-21 | End: 2017-07-26 | Stop reason: HOSPADM

## 2017-07-21 RX ORDER — SODIUM CHLORIDE 0.9 % (FLUSH) 0.9 %
10 SYRINGE (ML) INJECTION
Status: COMPLETED | OUTPATIENT
Start: 2017-07-21 | End: 2017-07-21

## 2017-07-21 RX ORDER — INSULIN GLARGINE 100 [IU]/ML
35 INJECTION, SOLUTION SUBCUTANEOUS
Status: DISCONTINUED | OUTPATIENT
Start: 2017-07-21 | End: 2017-07-26 | Stop reason: HOSPADM

## 2017-07-21 RX ORDER — METOPROLOL TARTRATE 25 MG/1
12.5 TABLET, FILM COATED ORAL 2 TIMES DAILY
Status: DISCONTINUED | OUTPATIENT
Start: 2017-07-22 | End: 2017-07-24

## 2017-07-21 RX ORDER — DEXTROSE 50 % IN WATER (D50W) INTRAVENOUS SYRINGE
12.5-25 AS NEEDED
Status: DISCONTINUED | OUTPATIENT
Start: 2017-07-21 | End: 2017-07-22 | Stop reason: SDUPTHER

## 2017-07-21 RX ORDER — OXYCODONE AND ACETAMINOPHEN 10; 325 MG/1; MG/1
1 TABLET ORAL
Status: DISCONTINUED | OUTPATIENT
Start: 2017-07-21 | End: 2017-07-26 | Stop reason: HOSPADM

## 2017-07-21 RX ORDER — MAGNESIUM SULFATE 100 %
4 CRYSTALS MISCELLANEOUS AS NEEDED
Status: DISCONTINUED | OUTPATIENT
Start: 2017-07-21 | End: 2017-07-26 | Stop reason: HOSPADM

## 2017-07-21 RX ORDER — SODIUM CHLORIDE 0.9 % (FLUSH) 0.9 %
5-10 SYRINGE (ML) INJECTION EVERY 8 HOURS
Status: DISCONTINUED | OUTPATIENT
Start: 2017-07-21 | End: 2017-07-26 | Stop reason: HOSPADM

## 2017-07-21 RX ORDER — SODIUM CHLORIDE 9 MG/ML
50 INJECTION, SOLUTION INTRAVENOUS
Status: COMPLETED | OUTPATIENT
Start: 2017-07-21 | End: 2017-07-21

## 2017-07-21 RX ORDER — SODIUM CHLORIDE 0.9 % (FLUSH) 0.9 %
5-10 SYRINGE (ML) INJECTION AS NEEDED
Status: DISCONTINUED | OUTPATIENT
Start: 2017-07-21 | End: 2017-07-26 | Stop reason: HOSPADM

## 2017-07-21 RX ORDER — ONDANSETRON 2 MG/ML
4 INJECTION INTRAMUSCULAR; INTRAVENOUS
Status: DISCONTINUED | OUTPATIENT
Start: 2017-07-21 | End: 2017-07-26 | Stop reason: HOSPADM

## 2017-07-21 RX ORDER — ASPIRIN 325 MG
325 TABLET ORAL ONCE
Status: COMPLETED | OUTPATIENT
Start: 2017-07-21 | End: 2017-07-21

## 2017-07-21 RX ADMIN — ENOXAPARIN SODIUM 140 MG: 100 INJECTION SUBCUTANEOUS at 21:20

## 2017-07-21 RX ADMIN — OXYCODONE HYDROCHLORIDE AND ACETAMINOPHEN 1 TABLET: 10; 325 TABLET ORAL at 23:28

## 2017-07-21 RX ADMIN — SODIUM CHLORIDE 50 ML/HR: 900 INJECTION, SOLUTION INTRAVENOUS at 20:19

## 2017-07-21 RX ADMIN — IOPAMIDOL 100 ML: 755 INJECTION, SOLUTION INTRAVENOUS at 20:19

## 2017-07-21 RX ADMIN — ASPIRIN 325 MG ORAL TABLET 325 MG: 325 PILL ORAL at 18:39

## 2017-07-21 RX ADMIN — GABAPENTIN 300 MG: 300 CAPSULE ORAL at 23:28

## 2017-07-21 RX ADMIN — Medication 10 ML: at 20:19

## 2017-07-21 NOTE — IP AVS SNAPSHOT
[address] [facility] 
[phone] Patient: Sofi Mcdonald MRN: [id number] :1970 You are allergic to the following Allergen Reactions Ace Inhibitors Swelling Facial swelling cough Morphine Angioedema Recent Documentation Height Weight BMI Smoking Status 1.905 m 131 kg 36.11 kg/m2 Former Smoker Emergency Contacts Name Discharge Info Relation Home Work Mobile Jaqueline Merino  Spouse [3] [phone] About your hospitalization You were admitted on:  2017 You last received care in the:  [facility] 2 CARDIOPULMONARY CARE You were discharged on:  2017 Unit phone number:  [phone] Why you were hospitalized Your primary diagnosis was:  Not on File Your diagnoses also included:  Bilateral Pulmonary Embolism (Hcc), History Of Clostridium Difficile Infection, Htn (Hypertension), Iddm (Insulin Dependent Diabetes Mellitus) (Hcc), Non-Compliance With Treatment, Hypokalemia Providers Seen During Your Hospitalizations Provider Role Specialty Primary office phone Owen Wade DO Attending Provider Emergency Medicine [phone] Reilly Willard MD Attending Provider [facility] [phone] Your Primary Care Physician (PCP) Primary Care Physician Office Phone Office Fax [facility]anti Luevano [facility] [phone] Follow-up Information Follow up With Details Comments Contact Info [address] Conn [address] [place] [address] 
[phone] Your Appointments 2017  9:15 AM EDT ROUTINE CARE with Reilly Willard MD  
[address] [facility] ([facility]) [address] [place] [address],[address] [place] [id number]  
[phone] Current Discharge Medication List  
  
 START taking these medications Dose & Instructions Dispensing Information Comments Morning Noon Evening Bedtime  
 apixaban 5 mg tablet Commonly known as:  Alvera Gouge Your last dose was: Your next dose is:    
   
   
 Dose:  10 mg Take 2 Tabs by mouth every twelve (12) hours. Quantity:  60 Tab Refills:  1  
     
   
   
   
  
 carvedilol 6.25 mg tablet Commonly known as:  Shruthi Ready Your last dose was: Your next dose is:    
   
   
 Dose:  6.25 mg Take 1 Tab by mouth two (2) times daily (with meals). Quantity:  60 Tab Refills:  1  
     
   
   
   
  
 hydrALAZINE 25 mg tablet Commonly known as:  APRESOLINE Your last dose was: Your next dose is:    
   
   
 Dose:  25 mg Take 1 Tab by mouth three (3) times daily. Quantity:  90 Tab Refills:  0  
     
   
   
   
  
 isosorbide mononitrate ER 30 mg tablet Commonly known as:  IMDUR Your last dose was: Your next dose is:    
   
   
 Dose:  30 mg Take 1 Tab by mouth daily. Quantity:  30 Tab Refills:  0  
     
   
   
   
  
 spironolactone 25 mg tablet Commonly known as:  ALDACTONE Your last dose was: Your next dose is:    
   
   
 Dose:  25 mg Take 1 Tab by mouth daily. Quantity:  30 Tab Refills:  0 CONTINUE these medications which have CHANGED Dose & Instructions Dispensing Information Comments Morning Noon Evening Bedtime  
 oxyCODONE-acetaminophen  mg per tablet Commonly known as:  PERCOCET 10 What changed:   
- reasons to take this - Another medication with the same name was removed. Continue taking this medication, and follow the directions you see here. Your last dose was: Your next dose is:    
   
   
 Dose:  1 Tab Take 1 Tab by mouth every six (6) hours as needed. Max Daily Amount: 4 Tabs. Quantity:  14 Tab Refills:  0 sodium chloride 0.65 % nasal spray Commonly known as:  SALINE NASAL What changed:  reasons to take this Your last dose was: Your next dose is:    
   
   
 Dose:  1 Spray 1 [place] by Both Nostrils route as needed for Congestion. Quantity:  15 mL Refills:  0 CONTINUE these medications which have NOT CHANGED Dose & Instructions Dispensing Information Comments Morning Noon Evening Bedtime ALLERGY RELIEF(DIPHENHYDRAMIN) 25 mg capsule Generic drug:  diphenhydrAMINE Your last dose was: Your next dose is:    
   
   
 Dose:  25 mg Take 25 mg by mouth nightly as needed. Refills:  0  
     
   
   
   
  
 atorvastatin 40 mg tablet Commonly known as:  LIPITOR Your last dose was: Your next dose is:    
   
   
 Dose:  40 mg Take 1 Tab by mouth daily. Quantity:  90 Tab Refills:  3  
     
   
   
   
  
 esomeprazole 40 mg capsule Commonly known as:  NexIUM Your last dose was: Your next dose is:    
   
   
 Dose:  40 mg Take 1 Cap by mouth daily. Quantity:  90 Cap Refills:  3  
     
   
   
   
  
 gabapentin 300 mg capsule Commonly known as:  NEURONTIN Your last dose was: Your next dose is:    
   
   
 Dose:  300 mg Take 1 Cap by mouth three (3) times daily. Quantity:  90 Cap Refills:  0  
     
   
   
   
  
 insulin detemir 100 unit/mL (3 mL) Inpn Commonly known as:  Rainell Docker Your last dose was: Your next dose is:    
   
   
 Dose:  50 Units 50 Units by SubCUTAneous route nightly. 60 units QHS Quantity:  20 Pen Refills:  11  
     
   
   
   
  
 insulin lispro 100 unit/mL kwikpen Commonly known as:  HUMALOG Your last dose was: Your next dose is:    
   
   
 Dose:  12 Units 12 Units by SubCUTAneous route Before breakfast, lunch, and dinner. DX: E11.65 Quantity:  1 Package Refills:  11  
     
   
   
   
  
 L. acidoph & paracasei- S therm- Bifido 8 billion cell Cap cap Commonly known as:  EMBER-Q/RISAQUAD Your last dose was: Your next dose is:    
   
   
 Dose:  1 Cap Take 1 Cap by mouth daily. To prevent diarrhea Quantity:  21 Cap Refills:  0  
     
   
   
   
  
 metFORMIN 500 mg tablet Commonly known as:  GLUCOPHAGE Your last dose was: Your next dose is: TAKE 1 TABLET BY MOUTH TWICE A DAY WITH MEALS AND INCREASE AS DIRECTED Quantity:  75 Tab Refills:  0  
     
   
   
   
  
 metroNIDAZOLE 500 mg tablet Commonly known as:  FLAGYL Your last dose was: Your next dose is:    
   
   
 Dose:  500 mg Take 1 Tab by mouth three (3) times daily. Quantity:  30 Tab Refills:  0  
     
   
   
   
  
 VITAMIN D3 1,000 unit Cap Generic drug:  cholecalciferol Your last dose was: Your next dose is:    
   
   
 Dose:  2000 Units Take 2,000 Units by mouth daily. Refills:  0  
     
   
   
   
  
 XANAX 0.5 mg tablet Generic drug:  ALPRAZolam  
   
Your last dose was: Your next dose is: Take  by mouth. Refills:  0 STOP taking these medications   
 cefTARoline 600 mg, ADDaptor 1 Each IVPB HEPARIN FLUSH IV  
   
  
 lisinopril 40 mg tablet Commonly known as:  PRINIVIL, ZESTRIL  
   
  
 metoprolol tartrate 25 mg tablet Commonly known as:  LOPRESSOR  
   
  
 NORMAL SALINE FLUSH 0.9 % Generic drug:  sodium chloride Where to Get Your Medications These medications were sent to [address] [place] [address] S[address]  [address] [facility], [address] [place] [address] Hours:  24-hours Phone:  [phone]  
  apixaban 5 mg tablet  
 carvedilol 6.25 mg tablet  
 hydrALAZINE 25 mg tablet  
 isosorbide mononitrate ER 30 mg tablet  
 spironolactone 25 mg tablet Discharge Instructions PATIENT DISCHARGE INSTRUCTIONS PATIENT DISCHARGE INSTRUCTIONS Isi Calvo / [id number] : 1970 Admitted 2017 Discharged: 2017 · It is important that you take the medication exactly as they are prescribed. · Keep your medication in the bottles provided by the pharmacist and keep a list of the medication names, dosages, and times to be taken in your wallet. · Do not take other medications without consulting your doctor. What to do at [facility] Recommended Diet: Cardiac Diet, Diabetic Diet and Low fat, Low cholesterol Recommended Activity: Activity as tolerated If you experience any concerns contact your doctor Signed By: Gaetano Gomez MD   
 2017 Discharge Orders None [place] Announcement We are excited to announce that we are making your provider's discharge notes available to you in [facility]. You will see these notes when they are completed and signed by the physician that discharged you from your recent hospital stay. If you have any questions or concerns about any information you see in [facility], please call the Health Information Department where you were seen or reach out to your Primary Care Provider for more information about your plan of care. Introducing [facility]! Dear Kaur Doherty: Thank you for requesting a [employer] account. Our records indicate that you already have an active [employer] account. You can access your account anytime at [email or web address] Did you know that you can access your hospital and ER discharge instructions at any time in [employer]? You can also review all of your test results from your hospital stay or ER visit. Additional Information If you have questions, please visit the Frequently Asked Questions section of the [employer] website at [email or web address] Remember, MyChart is NOT to be used for urgent needs. For medical emergencies, dial 911. Now available from your iPhone and Android! General Information Please provide this summary of care documentation to your next provider. Patient Signature:  ____________________________________________________________ Date:  ____________________________________________________________  
  
Lavanda Ferries Provider Signature:  ____________________________________________________________ Date:  ____________________________________________________________

## 2017-07-21 NOTE — CONSULTS
Cardiology Consult Note    CC: dyspnea    Brenna Batista MD  Reason for consult: Indeterminate trop  Requesting MD:  Dr. Adela Funes. Today's Date: 7/21/2017      Cardiac Assessment/Plan:   Dyspnea: ?pulm vs cardiac. Rec: Certainly needs PE/LE DVTs excluded (per ER/primary team); Needs serial cardiac enzymes/echo. If no PE, would add lasix for LE edema. If normal echo and feeling better with Rx, could have outpt stress cardiolite. If abnl echo, will need to consider cath. HTN: Cont prior Rx; up-titrate as needed; with acei angioedema, would avoid ARBs. Rhythm: sinus    Volume status: looks euvolemic other than LE edema. Will need to watch Cr. Low albumin (1.7) certainly could be playing a role in LE edema. Elevated proBNP certainly could be related to CKD: check BNP. ID (osteo/C diff), DM, CKD, Dispo: per primary team.     He's being Rx for recent osteo: was on IV ABx, off now; Had C diff starting a few weeks ago. He had a post-op PE in 2014 after transmet resection. ____________________________________________________________________    The patient reports no chest pain/pressure; He reports compliance with meds. No PND, orthopnea, palpitations, pre-syncope, syncope. No current complaints. He notes intermittent LE edema for the last few months. He developed new exertional GARCIA for the last 2 days (again no CP). ECG: sinus; LVH TWI right-precordial leads. Tele: sinus  CXR: no CHF/PNA. Notable labs: Trop 0.1; Cr 1.45; WBC 13; Hg 9.9; k 3.2. HgA1c 11! Notable prior cardiac history:  None; PE in 2014. He is from [place] Family from [place].      ______________________________________________________________________  Patient Active Problem List    Diagnosis Date Noted    Sepsis ([facility] 05/27/2017    ARF (acute renal failure) ([facility] 05/27/2017    Acute osteomyelitis of metatarsal bone of left foot ([facility] 03/21/2017    Osteomyelitis ([facility] 02/24/2017    Edema of left lower extremity 02/17/2017    Diabetic infection of right foot ([facility] 02/16/2017    IDDM (insulin dependent diabetes mellitus) ([facility] 10/06/2016    Diabetes ([facility]     HTN (hypertension)     Hypercholesteremia     HTN (hypertension) 07/23/2013        Past Medical History:   Diagnosis Date    Diabetes ([facility]     since age 23   [employer] DM (diabetes mellitus) ([facility]     HTN (hypertension)     Hypercholesteremia     Hyperlipidemia       Past Surgical History:   Procedure Laterality Date    HX AMPUTATION      toes- left foot    HX BUNIONECTOMY      HX ORTHOPAEDIC      boutinerre deformity    HX ORTHOPAEDIC      fascia removed left foot    HX OTHER SURGICAL  03/2017    Skin graft Surgery    HX TONSILLECTOMY      HX WISDOM TEETH EXTRACTION       Allergies   Allergen Reactions    Ace Inhibitors Swelling     Facial swelling cough     Morphine Angioedema      Family History   Problem Relation Age of Onset    Hypertension Mother     High Cholesterol Mother       Social History     Social History    Marital status:      Spouse name: N/A    Number of children: N/A    Years of education: N/A     Occupational History    Not on file.      Social History Main Topics    Smoking status: Former Smoker     Types: Cigars     Quit date: 4/22/2011    Smokeless tobacco: Never Used    Alcohol use 0.0 oz/week     1 Glasses of wine, 0 Standard drinks or equivalent per week      Comment: stop drinking 5 months ago    Drug use: No    Sexual activity: Yes     Partners: Female     Birth control/ protection: None     Other Topics Concern    Not on file     Social History Narrative    ** Merged History Encounter **          Current Facility-Administered Medications   Medication Dose Route Frequency    0.9% sodium chloride infusion  50 mL/hr IntraVENous RAD ONCE    iopamidol (ISOVUE-370) 76 % injection 100 mL  100 mL IntraVENous RAD ONCE    sodium chloride (NS) flush 10 mL  10 mL IntraVENous RAD ONCE     Current Outpatient Prescriptions   Medication Sig    gabapentin (NEURONTIN) 300 mg capsule Take 1 Cap by mouth three (3) times daily.  metroNIDAZOLE (FLAGYL) 500 mg tablet Take 1 Tab by mouth three (3) times daily.  ALPRAZolam (XANAX) 0.5 mg tablet Take  by mouth.  cefTARoline 600 mg, ADDaptor 1 Each IVPB 600 mg by IntraVENous route every twelve (12) hours every twelve (12) hours.  diphenhydrAMINE (ALLERGY RELIEF,DIPHENHYDRAMIN,) 25 mg capsule Take 25 mg by mouth nightly as needed.  sodium chloride (NORMAL SALINE FLUSH) 0.9 % 5-10 mL by IntraVENous route daily.  HEPARIN SOD,PORCINE/0.9 % NACL (HEPARIN FLUSH IV) 3 mL by IntraVENous route daily.  insulin detemir (LEVEMIR FLEXTOUCH) 100 unit/mL (3 mL) inpn 50 Units by SubCUTAneous route nightly. 60 units QHS    L. acidoph & paracasei- S therm- Bifido (EMBER-Q/RISAQUAD) 8 billion cell cap cap Take 1 Cap by mouth daily. To prevent diarrhea    oxyCODONE-acetaminophen (PERCOCET)  mg per tablet Take 1 Tab by mouth every six (6) hours as needed for Pain. Max Daily Amount: 4 Tabs.  metoprolol tartrate (LOPRESSOR) 25 mg tablet TAKE 0.5 TABS BY MOUTH TWO (2) TIMES A DAY.  metFORMIN (GLUCOPHAGE) 500 mg tablet TAKE 1 TABLET BY MOUTH TWICE A DAY WITH MEALS AND INCREASE AS DIRECTED    oxyCODONE-acetaminophen (PERCOCET 10)  mg per tablet Take 1 Tab by mouth every six (6) hours as needed. Max Daily Amount: 4 Tabs. (Patient taking differently: Take 1 Tab by mouth every six (6) hours as needed for Pain.)    cholecalciferol (VITAMIN D3) 1,000 unit cap Take 2,000 Units by mouth daily.  insulin lispro (HUMALOG) 100 unit/mL kwikpen 12 Units by SubCUTAneous route Before breakfast, lunch, and dinner. DX: E11.65    esomeprazole (NEXIUM) 40 mg capsule Take 1 Cap by mouth daily.  sodium chloride (SALINE NASAL) 0.65 % nasal spray 1 [place] by Both Nostrils route as needed for Congestion.  (Patient taking differently: 1 Spray by Both Nostrils route as needed for Congestion (dry sinus symptoms, especially during winter time. ).)         atorvastatin (LIPITOR) 40 mg tablet Take 1 Tab by mouth daily. Prior to Admission Medications:  Prior to Admission medications    Medication Sig Start Date End Date Taking? Authorizing Provider   gabapentin (NEURONTIN) 300 mg capsule Take 1 Cap by mouth three (3) times daily. 7/12/17   Erasmo Feng MD   metroNIDAZOLE (FLAGYL) 500 mg tablet Take 1 Tab by mouth three (3) times daily. 7/12/17   [address] [place], MD   ALPRAZolam Nasir Ruth) 0.5 mg tablet Take  by mouth. Historical Provider   cefTARoline 600 mg, ADDaptor 1 Each IVPB 600 mg by IntraVENous route every twelve (12) hours every twelve (12) hours. Historical Provider   diphenhydrAMINE (ALLERGY RELIEF,DIPHENHYDRAMIN,) 25 mg capsule Take 25 mg by mouth nightly as needed. Historical Provider   sodium chloride (NORMAL SALINE FLUSH) 0.9 % 5-10 mL by IntraVENous route daily. Historical Provider   HEPARIN SOD,PORCINE/0.9 % NACL (HEPARIN FLUSH IV) 3 mL by IntraVENous route daily. Historical Provider   insulin detemir (LEVEMIR FLEXTOUCH) 100 unit/mL (3 mL) inpn 50 Units by SubCUTAneous route nightly. 60 units QHS 6/3/17   Meng Bowling MD   L. acidoph & paracasei- S therm- Bifido (EMBER-Q/RISAQUAD) 8 billion cell cap cap Take 1 Cap by mouth daily. To prevent diarrhea 6/3/17   Meng Bowling MD   oxyCODONE-acetaminophen (PERCOCET)  mg per tablet Take 1 Tab by mouth every six (6) hours as needed for Pain. Max Daily Amount: 4 Tabs.  6/3/17   Meng Bowling MD   metoprolol tartrate (LOPRESSOR) 25 mg tablet TAKE 0.5 TABS BY MOUTH TWO (2) TIMES A DAY. 5/31/17   [address] [place], MD   metFORMIN (GLUCOPHAGE) 500 mg tablet TAKE 1 TABLET BY MOUTH TWICE A DAY WITH MEALS AND INCREASE AS DIRECTED 5/10/17   Erasmo Feng MD   oxyCODONE-acetaminophen (PERCOCET 10)  mg per tablet Take 1 Tab by mouth every six (6) hours as needed. Max Daily Amount: 4 Tabs. Patient taking differently: Take 1 Tab by mouth every six (6) hours as needed for Pain. 3/2/17   Yamile Wen MD   cholecalciferol (VITAMIN D3) 1,000 unit cap Take 2,000 Units by mouth daily. Trung Suazo MD   insulin lispro (HUMALOG) 100 unit/mL kwikpen 12 Units by SubCUTAneous route Before breakfast, lunch, and dinner. DX: E11.65 16   Erasmo Alexis MD   esomeprazole (NEXIUM) 40 mg capsule Take 1 Cap by mouth daily. 10/6/16   Erasmo Alexis MD   sodium chloride (SALINE NASAL) 0.65 % nasal spray 1 [place] by Both Nostrils route as needed for Congestion. Patient taking differently: 1 Spray by Both Nostrils route as needed for Congestion (dry sinus symptoms, especially during winter time. ). 10/6/16   Erasmo Alexis MD   atorvastatin (LIPITOR) 40 mg tablet Take 1 Tab by mouth daily. 16   David Boateng MD        Review of Symptoms: Except as noted in HPI, patient denies recent fever or chills, nausea, vomiting, diarrhea, hemoptysis, hematemesis, dysuria, myalgias, focal neurologic symptoms, ecchymosis, angioedema, odynophagia, dysphagia, sore throat, earache,rash, melena, hematochezia, depression, GERD, cold intolerance, petechia, bleeding gums, or significant weight loss.      24 hr VS reviewed, overall VSSAF  Temp (24hrs), Av.7 °F (37.1 °C), Min:98.7 °F (37.1 °C), Max:98.7 °F (37.1 °C)    Patient Vitals for the past 8 hrs:   Pulse   17 1830 96   17 1815 97   17 1800 99   17 1745 99   17 1730 98   17 1715 97   17 1700 98   17 1658 98    Patient Vitals for the past 8 hrs:   Resp   17 1830 23   17 1815 18   17 1800 25   17 1745 25   17 1730 23   17 1715 18   17 1700 26   17 1658 26   17 1643 26    Patient Vitals for the past 8 hrs:   BP   17 1830 144/89   17 1815 (!) 142/91   17 1800 (!) 141/96   17 1745 (!) 136/94 07/21/17 1730 (!) 139/94   07/21/17 1715 (!) 144/93   07/21/17 1700 (!) 149/102   07/21/17 1658 (!) 138/95   07/21/17 1643 (!) 147/94        No intake or output data in the 24 hours ending 07/21/17 1915      Physical Exam (complete single organ system exam)    Cons: The patient is no distress. Appears stated age. HEENT: Normal conjunctivae and palate. No xanthelasma. Neck: Flat JVP without appreciable HJR. Resp: Normal respiratory effort with clear lungs bilaterally. CV: Regular rate and rhythm. PMI not palpated. Normal S1,S2  No gallop or rubs appreciated. No murmur appreciated. Intact carotid upstroke bilaterally without appreciated bruits. Abdominal aorta not palpated; no abdominal bruit noted. Intact pedal pulses. 2+ peripheral edema. GI: No abd mass noted, soft; no organomegaly noted. Bowel sounds present. Muscular:  No significant kyphosis. Strength WNL for age. Ext: No cyanosis, clubbing, or stigmata of peripheral embolization. Derm: No ulcers or stasis dermatitis of lower extremities. Neuro: Alert and oriented x 3;  Grossly non-focal. Normal mood and affect.        Labs:   Recent Results (from the past 24 hour(s))   EKG, 12 LEAD, INITIAL    Collection Time: 07/21/17  4:52 PM   Result Value Ref Range    Ventricular Rate 99 BPM    Atrial Rate 99 BPM    P-R Interval 132 ms    QRS Duration 86 ms    Q-T Interval 386 ms    QTC Calculation (Bezet) 495 ms    Calculated P Axis 55 degrees    Calculated R Axis 50 degrees    Calculated T Axis 52 degrees    Diagnosis       Normal sinus rhythm  Minimal voltage criteria for LVH, may be normal variant  ST & T wave abnormality, consider anterior ischemia  Prolonged QT  Abnormal ECG  When compared with ECG of 28-MAY-2017 09:58,  T wave inversion now evident in Anterior leads  QT has lengthened     METABOLIC PANEL, COMPREHENSIVE    Collection Time: 07/21/17  5:09 PM   Result Value Ref Range    Sodium 137 136 - 145 mmol/L    Potassium 3.2 (L) 3.5 - 5.1 mmol/L    Chloride 101 97 - 108 mmol/L    CO2 25 21 - 32 mmol/L    Anion gap 11 5 - 15 mmol/L    Glucose 163 (H) 65 - 100 mg/dL    BUN 13 6 - 20 MG/DL    Creatinine 1.45 (H) 0.70 - 1.30 MG/DL    BUN/Creatinine ratio 9 (L) 12 - 20      GFR est AA >60 >60 ml/min/1.73m2    GFR est non-AA 52 (L) >60 ml/min/1.73m2    Calcium 7.7 (L) 8.5 - 10.1 MG/DL    Bilirubin, total 0.2 0.2 - 1.0 MG/DL    ALT (SGPT) 22 12 - 78 U/L    AST (SGOT) 15 15 - 37 U/L    Alk. phosphatase 127 (H) 45 - 117 U/L    Protein, total 6.6 6.4 - 8.2 g/dL    Albumin 1.7 (L) 3.5 - 5.0 g/dL    Globulin 4.9 (H) 2.0 - 4.0 g/dL    A-G Ratio 0.3 (L) 1.1 - 2.2     CK W/ CKMB & INDEX    Collection Time: 07/21/17  5:09 PM   Result Value Ref Range     39 - 308 U/L    CK - MB 1.7 <3.6 NG/ML    CK-MB Index 1.2 0 - 2.5     CBC WITH AUTOMATED DIFF    Collection Time: 07/21/17  5:09 PM   Result Value Ref Range    WBC 13.0 (H) 4.1 - 11.1 K/uL    RBC 4.43 4.10 - 5.70 M/uL    HGB 9.9 (L) 12.1 - 17.0 g/dL    HCT 32.4 (L) 36.6 - 50.3 %    MCV 73.1 (L) 80.0 - 99.0 FL    MCH 22.3 (L) 26.0 - 34.0 PG    MCHC 30.6 30.0 - 36.5 g/dL    RDW 16.6 (H) 11.5 - 14.5 %    PLATELET 466 313 - 212 K/uL    NEUTROPHILS 76 (H) 32 - 75 %    LYMPHOCYTES 20 12 - 49 %    MONOCYTES 3 (L) 5 - 13 %    EOSINOPHILS 1 0 - 7 %    BASOPHILS 0 0 - 1 %    ABS. NEUTROPHILS 9.9 (H) 1.8 - 8.0 K/UL    ABS. LYMPHOCYTES 2.6 0.8 - 3.5 K/UL    ABS. MONOCYTES 0.4 0.0 - 1.0 K/UL    ABS. EOSINOPHILS 0.1 0.0 - 0.4 K/UL    ABS.  BASOPHILS 0.0 0.0 - 0.1 K/UL    DF SMEAR SCANNED      RBC COMMENTS ANISOCYTOSIS  1+        RBC COMMENTS MICROCYTOSIS  1+        RBC COMMENTS HYPOCHROMIA  1+        RBC COMMENTS SCHISTOCYTES  1+       NT-PRO BNP    Collection Time: 07/21/17  5:09 PM   Result Value Ref Range    NT pro-BNP 4074 (H) 0 - 125 PG/ML   TROPONIN I    Collection Time: 07/21/17  5:09 PM   Result Value Ref Range    Troponin-I, Qt. 0.10 (H) <0.05 ng/mL     Recent Labs      07/21/17   1709   CPK  147   CKMB  1.7   CKNDX 1. 2   TROIQ  0.10*       Krystal Ray MD

## 2017-07-22 PROBLEM — Z91.199 NON-COMPLIANCE WITH TREATMENT: Status: ACTIVE | Noted: 2017-07-22

## 2017-07-22 PROBLEM — Z86.19 HISTORY OF CLOSTRIDIUM DIFFICILE INFECTION: Status: ACTIVE | Noted: 2017-07-22

## 2017-07-22 LAB
ANION GAP BLD CALC-SCNC: 7 MMOL/L (ref 5–15)
ANION GAP BLD CALC-SCNC: 8 MMOL/L (ref 5–15)
ATRIAL RATE: 99 BPM
BASOPHILS # BLD AUTO: 0.1 K/UL (ref 0–0.1)
BASOPHILS # BLD: 1 % (ref 0–1)
BNP SERPL-MCNC: 419 PG/ML (ref 0–100)
BUN SERPL-MCNC: 13 MG/DL (ref 6–20)
BUN SERPL-MCNC: 13 MG/DL (ref 6–20)
BUN/CREAT SERPL: 10 (ref 12–20)
BUN/CREAT SERPL: 11 (ref 12–20)
CALCIUM SERPL-MCNC: 7.4 MG/DL (ref 8.5–10.1)
CALCIUM SERPL-MCNC: 7.5 MG/DL (ref 8.5–10.1)
CALCULATED P AXIS, ECG09: 55 DEGREES
CALCULATED R AXIS, ECG10: 50 DEGREES
CALCULATED T AXIS, ECG11: 52 DEGREES
CHLORIDE SERPL-SCNC: 103 MMOL/L (ref 97–108)
CHLORIDE SERPL-SCNC: 104 MMOL/L (ref 97–108)
CHOLEST SERPL-MCNC: 150 MG/DL
CK MB CFR SERPL CALC: NORMAL % (ref 0–2.5)
CK MB SERPL-MCNC: <1 NG/ML (ref 5–25)
CK SERPL-CCNC: 97 U/L (ref 39–308)
CO2 SERPL-SCNC: 25 MMOL/L (ref 21–32)
CO2 SERPL-SCNC: 26 MMOL/L (ref 21–32)
CREAT SERPL-MCNC: 1.2 MG/DL (ref 0.7–1.3)
CREAT SERPL-MCNC: 1.24 MG/DL (ref 0.7–1.3)
DIAGNOSIS, 93000: NORMAL
DIFFERENTIAL METHOD BLD: ABNORMAL
EOSINOPHIL # BLD: 0.1 K/UL (ref 0–0.4)
EOSINOPHIL NFR BLD: 1 % (ref 0–7)
ERYTHROCYTE [DISTWIDTH] IN BLOOD BY AUTOMATED COUNT: 16.6 % (ref 11.5–14.5)
GLUCOSE BLD STRIP.AUTO-MCNC: 167 MG/DL (ref 65–100)
GLUCOSE BLD STRIP.AUTO-MCNC: 209 MG/DL (ref 65–100)
GLUCOSE BLD STRIP.AUTO-MCNC: 275 MG/DL (ref 65–100)
GLUCOSE BLD STRIP.AUTO-MCNC: 283 MG/DL (ref 65–100)
GLUCOSE BLD STRIP.AUTO-MCNC: 299 MG/DL (ref 65–100)
GLUCOSE SERPL-MCNC: 205 MG/DL (ref 65–100)
GLUCOSE SERPL-MCNC: 210 MG/DL (ref 65–100)
HCT VFR BLD AUTO: 29.8 % (ref 36.6–50.3)
HDLC SERPL-MCNC: 45 MG/DL
HDLC SERPL: 3.3 {RATIO} (ref 0–5)
HGB BLD-MCNC: 9.2 G/DL (ref 12.1–17)
INR PPP: 1.1 (ref 0.9–1.1)
LDLC SERPL CALC-MCNC: 78 MG/DL (ref 0–100)
LIPID PROFILE,FLP: NORMAL
LYMPHOCYTES # BLD AUTO: 38 % (ref 12–49)
LYMPHOCYTES # BLD: 3.7 K/UL (ref 0.8–3.5)
MCH RBC QN AUTO: 22.4 PG (ref 26–34)
MCHC RBC AUTO-ENTMCNC: 30.9 G/DL (ref 30–36.5)
MCV RBC AUTO: 72.7 FL (ref 80–99)
MONOCYTES # BLD: 0.3 K/UL (ref 0–1)
MONOCYTES NFR BLD AUTO: 3 % (ref 5–13)
NEUTS SEG # BLD: 5.5 K/UL (ref 1.8–8)
NEUTS SEG NFR BLD AUTO: 57 % (ref 32–75)
P-R INTERVAL, ECG05: 132 MS
PLATELET # BLD AUTO: 266 K/UL (ref 150–400)
PLATELET COMMENTS,PCOM: ABNORMAL
POTASSIUM SERPL-SCNC: 3.1 MMOL/L (ref 3.5–5.1)
POTASSIUM SERPL-SCNC: 3.1 MMOL/L (ref 3.5–5.1)
PROTHROMBIN TIME: 11.4 SEC (ref 9–11.1)
Q-T INTERVAL, ECG07: 386 MS
QRS DURATION, ECG06: 86 MS
QTC CALCULATION (BEZET), ECG08: 495 MS
RBC # BLD AUTO: 4.1 M/UL (ref 4.1–5.7)
RBC MORPH BLD: ABNORMAL
RBC MORPH BLD: ABNORMAL
SERVICE CMNT-IMP: ABNORMAL
SODIUM SERPL-SCNC: 136 MMOL/L (ref 136–145)
SODIUM SERPL-SCNC: 137 MMOL/L (ref 136–145)
TRIGL SERPL-MCNC: 135 MG/DL (ref ?–150)
TROPONIN I SERPL-MCNC: 0.09 NG/ML
VENTRICULAR RATE, ECG03: 99 BPM
VLDLC SERPL CALC-MCNC: 27 MG/DL
WBC # BLD AUTO: 9.7 K/UL (ref 4.1–11.1)

## 2017-07-22 PROCEDURE — 74011250637 HC RX REV CODE- 250/637: Performed by: INTERNAL MEDICINE

## 2017-07-22 PROCEDURE — 93306 TTE W/DOPPLER COMPLETE: CPT

## 2017-07-22 PROCEDURE — 65660000000 HC RM CCU STEPDOWN

## 2017-07-22 PROCEDURE — 74011636637 HC RX REV CODE- 636/637: Performed by: INTERNAL MEDICINE

## 2017-07-22 PROCEDURE — 82962 GLUCOSE BLOOD TEST: CPT

## 2017-07-22 PROCEDURE — 85610 PROTHROMBIN TIME: CPT | Performed by: FAMILY MEDICINE

## 2017-07-22 PROCEDURE — 74011250637 HC RX REV CODE- 250/637: Performed by: FAMILY MEDICINE

## 2017-07-22 PROCEDURE — 80048 BASIC METABOLIC PNL TOTAL CA: CPT | Performed by: INTERNAL MEDICINE

## 2017-07-22 PROCEDURE — 83880 ASSAY OF NATRIURETIC PEPTIDE: CPT | Performed by: INTERNAL MEDICINE

## 2017-07-22 PROCEDURE — 84484 ASSAY OF TROPONIN QUANT: CPT | Performed by: INTERNAL MEDICINE

## 2017-07-22 PROCEDURE — 85025 COMPLETE CBC W/AUTO DIFF WBC: CPT | Performed by: INTERNAL MEDICINE

## 2017-07-22 PROCEDURE — 80061 LIPID PANEL: CPT | Performed by: INTERNAL MEDICINE

## 2017-07-22 PROCEDURE — 82550 ASSAY OF CK (CPK): CPT | Performed by: INTERNAL MEDICINE

## 2017-07-22 PROCEDURE — 74011250636 HC RX REV CODE- 250/636: Performed by: FAMILY MEDICINE

## 2017-07-22 PROCEDURE — 36415 COLL VENOUS BLD VENIPUNCTURE: CPT | Performed by: FAMILY MEDICINE

## 2017-07-22 RX ORDER — WARFARIN SODIUM 5 MG/1
5 TABLET ORAL EVERY EVENING
Status: DISCONTINUED | OUTPATIENT
Start: 2017-07-22 | End: 2017-07-23

## 2017-07-22 RX ORDER — ZOLPIDEM TARTRATE 5 MG/1
10 TABLET ORAL
Status: DISCONTINUED | OUTPATIENT
Start: 2017-07-22 | End: 2017-07-26 | Stop reason: HOSPADM

## 2017-07-22 RX ORDER — DEXTROSE MONOHYDRATE 100 MG/ML
125-250 INJECTION, SOLUTION INTRAVENOUS AS NEEDED
Status: DISCONTINUED | OUTPATIENT
Start: 2017-07-22 | End: 2017-07-26 | Stop reason: HOSPADM

## 2017-07-22 RX ADMIN — WARFARIN SODIUM 5 MG: 5 TABLET ORAL at 21:50

## 2017-07-22 RX ADMIN — ZOLPIDEM TARTRATE 10 MG: 5 TABLET ORAL at 21:50

## 2017-07-22 RX ADMIN — METOPROLOL TARTRATE 12.5 MG: 25 TABLET ORAL at 17:39

## 2017-07-22 RX ADMIN — GABAPENTIN 300 MG: 300 CAPSULE ORAL at 17:39

## 2017-07-22 RX ADMIN — GABAPENTIN 300 MG: 300 CAPSULE ORAL at 09:57

## 2017-07-22 RX ADMIN — Medication 10 ML: at 21:52

## 2017-07-22 RX ADMIN — INSULIN LISPRO 2 UNITS: 100 INJECTION, SOLUTION INTRAVENOUS; SUBCUTANEOUS at 09:56

## 2017-07-22 RX ADMIN — OXYCODONE HYDROCHLORIDE AND ACETAMINOPHEN 1 TABLET: 10; 325 TABLET ORAL at 10:12

## 2017-07-22 RX ADMIN — INSULIN GLARGINE 35 UNITS: 100 INJECTION, SOLUTION SUBCUTANEOUS at 21:50

## 2017-07-22 RX ADMIN — GABAPENTIN 300 MG: 300 CAPSULE ORAL at 21:50

## 2017-07-22 RX ADMIN — INSULIN LISPRO 5 UNITS: 100 INJECTION, SOLUTION INTRAVENOUS; SUBCUTANEOUS at 13:54

## 2017-07-22 RX ADMIN — METOPROLOL TARTRATE 12.5 MG: 25 TABLET ORAL at 09:55

## 2017-07-22 RX ADMIN — INSULIN LISPRO 5 UNITS: 100 INJECTION, SOLUTION INTRAVENOUS; SUBCUTANEOUS at 17:41

## 2017-07-22 RX ADMIN — Medication 10 ML: at 13:55

## 2017-07-22 RX ADMIN — ENOXAPARIN SODIUM 140 MG: 100 INJECTION SUBCUTANEOUS at 11:23

## 2017-07-22 RX ADMIN — INSULIN LISPRO 3 UNITS: 100 INJECTION, SOLUTION INTRAVENOUS; SUBCUTANEOUS at 21:51

## 2017-07-22 RX ADMIN — ACETAMINOPHEN 650 MG: 325 TABLET, FILM COATED ORAL at 14:01

## 2017-07-22 NOTE — PROGRESS NOTES
Cardiology Progress Note  7/22/2017     Admit Date: 7/21/2017  Admit Diagnosis: Bilateral pulmonary embolism (HCC)  CC: none currently   Cardiac Assessment/Plan:   Dyspnea, lightheaded ness; indeterminate troponin: All related to extensive PE. Echo pending. HTN: Cont prior Rx; up-titrate as needed; with acei angioedema, would avoid ARBs.     Rhythm: sinus     Volume status: looks euvolemic other than LE edema. Will need to watch Cr. Low albumin (1.7) certainly could be playing a role in LE edema. Elevated proBNP certainly could be related to CKD.     ID (osteo/C diff), DM, CKD, Dispo: per primary team.    If active cardiac issues (f/u echo); will sign-off; Please call if questions/issues.       He's being Rx for recent osteo: was on IV ABx, off now; Had C diff starting a few weeks ago. He had a post-op PE in 2014 after transmet resection. ____________________________________________________________________     The patient reports no chest pain/pressure; He reports compliance with meds.     No PND, orthopnea, palpitations, pre-syncope, syncope. No current complaints.     He notes intermittent LE edema for the last few months.     He developed new exertional GARCIA for the last 2 days (again no CP).    ECG: sinus; LVH TWI right-precordial leads. Tele: sinus  CXR: no CHF/PNA. Notable labs: Trop 0.1; Cr 1.45; WBC 13; Hg 9.9; k 3.2. HgA1c 11! CTA: bilateral extensive PE.  LE U/S: right pop DVT.     Notable prior cardiac history:  None; PE in 2014.     [facility] Family from [place]. For other plans, see orders.   Hospital problem list   Active Hospital Problems    Diagnosis Date Noted    History of Clostridium difficile infection 07/22/2017    Non-compliance with treatment 07/22/2017    Bilateral pulmonary embolism ([address] 07/21/2017    IDDM (insulin dependent diabetes mellitus) ([facility] 10/06/2016    HTN (hypertension)         Subjective: Robin Kelly Pamela Appiah reports   Chest pain X none  consistent with:  Non-cardiac CP         Atypical CP     None now  On going  Anginal CP     Dyspnea  none  at rest  with exertion        x improved  unchanged  worse              PND X none  overnight       Orthopnea X none  improved  unchanged  worse   Presyncope X none  improved  unchanged  worse     Ambulated in hallway without symptoms   Yes   Ambulated in room without symptoms  Yes   Objective:    Physical Exam:  Overall VSSAF;    Visit Vitals    BP (!) 146/94 (BP 1 Location: Right arm, BP Patient Position: At rest)    Pulse 83    Temp 97.4 °F (36.3 °C)    Resp 20    Ht 6' 3\" (1.905 m)    Wt 130.3 kg (287 lb 4.2 oz)    SpO2 98%    BMI 35.9 kg/m2     Temp (24hrs), Av.1 °F (36.7 °C), Min:97.4 °F (36.3 °C), Max:98.7 °F (37.1 °C)    Patient Vitals for the past 8 hrs:   Pulse   17 0712 83   17 0500 96    Patient Vitals for the past 8 hrs:   Resp   17 0712 20   17 0500 22    Patient Vitals for the past 8 hrs:   BP   17 0712 (!) 146/94   17 0500 148/82      No intake or output data in the 24 hours ending 17 1119  General Appearance: Well developed, well nourished, no acute distress. Ears/Nose/Mouth/Throat:   Normal MM; anicteric. JVP: WNL   Resp:   clear to auscultation bilaterally. Nl resp effort. Cardiovascular:  RRR, S1, S2 normal, no new murmur. No gallop or rub. Abdomen:   Soft, non-tender, bowel sounds are present. Extremities: 1-2+ edema bilaterally. Skin:  Neuro: Warm and dry. A/O x3, grossly nonfocal   cath site intact w/o hematoma or new bruit; distal pulse unchanged  Yes   Data Review:     Telemetry independently reviewed x sinus  chronic afib  parox afib  NSVT     ECG independently reviewed  NSR  afib  no significant changes  NSST-Tw chgs   x no new ECG provided for review   Lab results reviewed as noted below. Current medications reviewed as noted below.     No results for input(s): PH, PCO2, PO2 in the last 72 hours. Recent Labs      07/22/17   0423  07/21/17   1709   CPK  97  147   CKMB  <1.0  1.7   CKNDX  Cannot be calulated  1.2   TROIQ  0.09*  0.10*     Recent Labs      07/22/17   0423  07/21/17   1709   NA  136  137  137   K  3.1*  3.1*  3.2*   CL  103  104  101   CO2  25  26  25   BUN  13  13  13   CREA  1.24  1.20  1.45*   GLU  210*  205*  163*   CA  7.4*  7.5*  7.7*   ALB   --   1.7*   WBC  9.7  13.0*   HGB  9.2*  9.9*   HCT  29.8*  32.4*   PLT  266  292     Lab Results   Component Value Date/Time    Cholesterol, total 231 04/22/2016 12:00 AM    Cholesterol, Total 170 10/06/2016 10:16 AM    HDL Cholesterol 45 04/22/2016 12:00 AM    LDL, calculated 137 04/22/2016 12:00 AM    Triglyceride 245 04/22/2016 12:00 AM     Recent Labs      07/21/17   1709   SGOT  15   AP  127*   TP  6.6   ALB  1.7*   GLOB  4.9*     No results for input(s): INR, PTP, APTT in the last 72 hours.     No lab exists for component: INREXT   No components found for: [place]    Current Facility-Administered Medications   Medication Dose Route Frequency    dextrose 10% infusion 125-250 mL  125-250 mL IntraVENous PRN    warfarin (COUMADIN) tablet 5 mg  5 mg Oral QPM    enoxaparin (LOVENOX) 140 mg  140 mg SubCUTAneous Q12H    gabapentin (NEURONTIN) capsule 300 mg  300 mg Oral TID    metoprolol tartrate (LOPRESSOR) tablet 12.5 mg  12.5 mg Oral BID    oxyCODONE-acetaminophen (PERCOCET 10)  mg per tablet 1 Tab  1 Tab Oral Q6H PRN    sodium chloride (NS) flush 5-10 mL  5-10 mL IntraVENous Q8H    sodium chloride (NS) flush 5-10 mL  5-10 mL IntraVENous PRN    acetaminophen (TYLENOL) tablet 650 mg  650 mg Oral Q4H PRN    naloxone (NARCAN) injection 0.4 mg  0.4 mg IntraVENous PRN    ondansetron (ZOFRAN) injection 4 mg  4 mg IntraVENous Q4H PRN    insulin lispro (HUMALOG) injection   SubCUTAneous AC&HS    glucose chewable tablet 16 g  4 Tab Oral PRN    glucagon (GLUCAGEN) injection 1 mg  1 mg IntraMUSCular PRN    insulin glargine (LANTUS) injection 35 Units  35 Units SubCUTAneous QHS        Derick John MD

## 2017-07-22 NOTE — PROGRESS NOTES
General Daily Progress Note    Admit Date: 7/21/2017  Hospital day  2    Subjective:     Patient has no complaint of sob or cp or diarrhea    Medication side effects: none     Current Facility-Administered Medications   Medication Dose Route Frequency    dextrose 10% infusion 125-250 mL  125-250 mL IntraVENous PRN    gabapentin (NEURONTIN) capsule 300 mg  300 mg Oral TID    metoprolol tartrate (LOPRESSOR) tablet 12.5 mg  12.5 mg Oral BID    oxyCODONE-acetaminophen (PERCOCET 10)  mg per tablet 1 Tab  1 Tab Oral Q6H PRN    sodium chloride (NS) flush 5-10 mL  5-10 mL IntraVENous Q8H    sodium chloride (NS) flush 5-10 mL  5-10 mL IntraVENous PRN    acetaminophen (TYLENOL) tablet 650 mg  650 mg Oral Q4H PRN    naloxone (NARCAN) injection 0.4 mg  0.4 mg IntraVENous PRN    ondansetron (ZOFRAN) injection 4 mg  4 mg IntraVENous Q4H PRN    insulin lispro (HUMALOG) injection   SubCUTAneous AC&HS    glucose chewable tablet 16 g  4 Tab Oral PRN    glucagon (GLUCAGEN) injection 1 mg  1 mg IntraMUSCular PRN    insulin glargine (LANTUS) injection 35 Units  35 Units SubCUTAneous QHS    enoxaparin (LOVENOX) 140 mg  140 mg SubCUTAneous Q12H        Review of Systems  A comprehensive review of systems was negative except for that written in the HPI. Objective:     Patient Vitals for the past 8 hrs:   BP Temp Pulse Resp SpO2   07/22/17 0712 (!) 146/94 97.4 °F (36.3 °C) 83 20 -   07/22/17 0500 148/82 98.3 °F (36.8 °C) 96 22 98 %           Physical Exam: General appearance: alert, fatigued, cooperative, no distress, appears stated age  Lungs: clear to auscultation bilaterally  Heart: regular rate and rhythm  Abdomen: soft, non-tender.  Bowel sounds normal. No masses,  no organomegaly  Extremities: extremities normal, atraumatic, no cyanosis or edema did not removed bandages   Neurologic: Grossly normal          Data Review   Recent Results (from the past 24 hour(s))   EKG, 12 LEAD, INITIAL    Collection Time: 07/21/17 4:52 PM   Result Value Ref Range    Ventricular Rate 99 BPM    Atrial Rate 99 BPM    P-R Interval 132 ms    QRS Duration 86 ms    Q-T Interval 386 ms    QTC Calculation (Bezet) 495 ms    Calculated P Axis 55 degrees    Calculated R Axis 50 degrees    Calculated T Axis 52 degrees    Diagnosis       Normal sinus rhythm  Minimal voltage criteria for LVH, may be normal variant  ST & T wave abnormality, consider anterior ischemia  Prolonged QT  Abnormal ECG  When compared with ECG of 28-MAY-2017 09:58,  T wave inversion now evident in Anterior leads  QT has lengthened     METABOLIC PANEL, COMPREHENSIVE    Collection Time: 07/21/17  5:09 PM   Result Value Ref Range    Sodium 137 136 - 145 mmol/L    Potassium 3.2 (L) 3.5 - 5.1 mmol/L    Chloride 101 97 - 108 mmol/L    CO2 25 21 - 32 mmol/L    Anion gap 11 5 - 15 mmol/L    Glucose 163 (H) 65 - 100 mg/dL    BUN 13 6 - 20 MG/DL    Creatinine 1.45 (H) 0.70 - 1.30 MG/DL    BUN/Creatinine ratio 9 (L) 12 - 20      GFR est AA >60 >60 ml/min/1.73m2    GFR est non-AA 52 (L) >60 ml/min/1.73m2    Calcium 7.7 (L) 8.5 - 10.1 MG/DL    Bilirubin, total 0.2 0.2 - 1.0 MG/DL    ALT (SGPT) 22 12 - 78 U/L    AST (SGOT) 15 15 - 37 U/L    Alk.  phosphatase 127 (H) 45 - 117 U/L    Protein, total 6.6 6.4 - 8.2 g/dL    Albumin 1.7 (L) 3.5 - 5.0 g/dL    Globulin 4.9 (H) 2.0 - 4.0 g/dL    A-G Ratio 0.3 (L) 1.1 - 2.2     CK W/ CKMB & INDEX    Collection Time: 07/21/17  5:09 PM   Result Value Ref Range     39 - 308 U/L    CK - MB 1.7 <3.6 NG/ML    CK-MB Index 1.2 0 - 2.5     CBC WITH AUTOMATED DIFF    Collection Time: 07/21/17  5:09 PM   Result Value Ref Range    WBC 13.0 (H) 4.1 - 11.1 K/uL    RBC 4.43 4.10 - 5.70 M/uL    HGB 9.9 (L) 12.1 - 17.0 g/dL    HCT 32.4 (L) 36.6 - 50.3 %    MCV 73.1 (L) 80.0 - 99.0 FL    MCH 22.3 (L) 26.0 - 34.0 PG    MCHC 30.6 30.0 - 36.5 g/dL    RDW 16.6 (H) 11.5 - 14.5 %    PLATELET 791 683 - 839 K/uL    NEUTROPHILS 76 (H) 32 - 75 %    LYMPHOCYTES 20 12 - 49 % MONOCYTES 3 (L) 5 - 13 %    EOSINOPHILS 1 0 - 7 %    BASOPHILS 0 0 - 1 %    ABS. NEUTROPHILS 9.9 (H) 1.8 - 8.0 K/UL    ABS. LYMPHOCYTES 2.6 0.8 - 3.5 K/UL    ABS. MONOCYTES 0.4 0.0 - 1.0 K/UL    ABS. EOSINOPHILS 0.1 0.0 - 0.4 K/UL    ABS. BASOPHILS 0.0 0.0 - 0.1 K/UL    DF SMEAR SCANNED      RBC COMMENTS ANISOCYTOSIS  1+        RBC COMMENTS MICROCYTOSIS  1+        RBC COMMENTS HYPOCHROMIA  1+        RBC COMMENTS SCHISTOCYTES  1+       NT-PRO BNP    Collection Time: 07/21/17  5:09 PM   Result Value Ref Range    NT pro-BNP 4074 (H) 0 - 125 PG/ML   TROPONIN I    Collection Time: 07/21/17  5:09 PM   Result Value Ref Range    Troponin-I, Qt. 0.10 (H) <0.05 ng/mL   GLUCOSE, POC    Collection Time: 07/21/17 11:31 PM   Result Value Ref Range    Glucose (POC) 101 (H) 65 - 100 mg/dL    Performed by "[employer]"    GLUCOSE, POC    Collection Time: 07/22/17  3:09 AM   Result Value Ref Range    Glucose (POC) 209 (H) 65 - 100 mg/dL    Performed by "[employer]"    TROPONIN I    Collection Time: 07/22/17  4:23 AM   Result Value Ref Range    Troponin-I, Qt. 0.09 (H) <0.05 ng/mL   CK W/ CKMB & INDEX    Collection Time: 07/22/17  4:23 AM   Result Value Ref Range    CK 97 39 - 308 U/L    CK - MB <1.0 <3.6 NG/ML    CK-MB Index Cannot be calulated 0 - 2.5     METABOLIC PANEL, BASIC    Collection Time: 07/22/17  4:23 AM   Result Value Ref Range    Sodium 136 136 - 145 mmol/L    Potassium 3.1 (L) 3.5 - 5.1 mmol/L    Chloride 103 97 - 108 mmol/L    CO2 25 21 - 32 mmol/L    Anion gap 8 5 - 15 mmol/L    Glucose 210 (H) 65 - 100 mg/dL    BUN 13 6 - 20 MG/DL    Creatinine 1.24 0.70 - 1.30 MG/DL    BUN/Creatinine ratio 10 (L) 12 - 20      GFR est AA >60 >60 ml/min/1.73m2    GFR est non-AA >60 >60 ml/min/1.73m2    Calcium 7.4 (L) 8.5 - 10.1 MG/DL   CBC WITH AUTOMATED DIFF    Collection Time: 07/22/17  4:23 AM   Result Value Ref Range    WBC 9.7 4.1 - 11.1 K/uL    RBC 4.10 4. 10 - 5.70 M/uL    HGB 9.2 (L) 12.1 - 17.0 g/dL    HCT 29.8 (L) 36.6 - 50.3 %    MCV 72.7 (L) 80.0 - 99.0 FL    MCH 22.4 (L) 26.0 - 34.0 PG    MCHC 30.9 30.0 - 36.5 g/dL    RDW 16.6 (H) 11.5 - 14.5 %    PLATELET 744 419 - 219 K/uL    NEUTROPHILS 57 32 - 75 %    LYMPHOCYTES 38 12 - 49 %    MONOCYTES 3 (L) 5 - 13 %    EOSINOPHILS 1 0 - 7 %    BASOPHILS 1 0 - 1 %    ABS. NEUTROPHILS 5.5 1.8 - 8.0 K/UL    ABS. LYMPHOCYTES 3.7 (H) 0.8 - 3.5 K/UL    ABS. MONOCYTES 0.3 0.0 - 1.0 K/UL    ABS. EOSINOPHILS 0.1 0.0 - 0.4 K/UL    ABS.  BASOPHILS 0.1 0.0 - 0.1 K/UL    PLATELET COMMENTS LARGE PLATELETS      RBC COMMENTS ANISOCYTOSIS  1+        RBC COMMENTS HYPOCHROMIA  1+        DF SMEAR SCANNED     METABOLIC PANEL, BASIC    Collection Time: 07/22/17  4:23 AM   Result Value Ref Range    Sodium 137 136 - 145 mmol/L    Potassium 3.1 (L) 3.5 - 5.1 mmol/L    Chloride 104 97 - 108 mmol/L    CO2 26 21 - 32 mmol/L    Anion gap 7 5 - 15 mmol/L    Glucose 205 (H) 65 - 100 mg/dL    BUN 13 6 - 20 MG/DL    Creatinine 1.20 0.70 - 1.30 MG/DL    BUN/Creatinine ratio 11 (L) 12 - 20      GFR est AA >60 >60 ml/min/1.73m2    GFR est non-AA >60 >60 ml/min/1.73m2    Calcium 7.5 (L) 8.5 - 10.1 MG/DL   GLUCOSE, POC    Collection Time: 07/22/17  7:44 AM   Result Value Ref Range    Glucose (POC) 167 (H) 65 - 100 mg/dL    Performed by Howard Pettit            Assessment:     Active Problems:    Bilateral pulmonary embolism (HCC) (7/21/2017)        Plan:     Continue lovenox and start coumadin   continue ISS and diabetic diet  noncompliant pt makes it quite difficult to care for pt   Contact isolation recent dx of c diff   Will follow

## 2017-07-22 NOTE — PROGRESS NOTES
Bedside and Verbal shift change report given to Nader Acosta RN   (oncoming nurse) by Jacob Zuniga RN (offgoing nurse). Report included the following information SBAR, Kardex, ED Summary, Intake/Output, MAR, Accordion and Recent Results.

## 2017-07-22 NOTE — ED NOTES
TRANSFER - OUT REPORT:    Verbal report given to ALESHA Reyes(name) on Jen Das  being transferred to [facility](unit) for routine progression of care       Report consisted of patients Situation, Background, Assessment and   Recommendations(SBAR). Information from the following report(s) SBAR, ED Summary, Procedure Summary, MAR, Recent Results and Med Rec Status was reviewed with the receiving nurse. Lines:   Peripheral IV 07/21/17 Right Antecubital (Active)   Site Assessment Clean, dry, & intact 7/21/2017  4:58 PM   Phlebitis Assessment 0 7/21/2017  4:58 PM   Infiltration Assessment 0 7/21/2017  4:58 PM   Dressing Status Clean, dry, & intact 7/21/2017  4:58 PM   Dressing Type Transparent 7/21/2017  4:58 PM   Hub Color/Line Status Flushed 7/21/2017  4:58 PM        Opportunity for questions and clarification was provided.       Patient transported with:   [place]

## 2017-07-22 NOTE — PROGRESS NOTES
Primary Nurse Prerna Matos RN and Eula Junior., RN performed a dual skin assessment on this patient Impairment noted- see wound doc flow sheet.   Pt aslo has old heel ulcer to side of right foot  Noé score is 20

## 2017-07-22 NOTE — H&P
Hospitalist Admission Note    NAME: Sj Valentin   :  1970   MRN:  [id number]     Date/Time:  2017 9:32 PM    Patient PCP: Cynthia Zhou MD  ________________________________________________________________________    My assessment of this patient's clinical condition and my plan of care is as follows. Assessment / Plan:    Acute Bilateral Pulmonary Embolism  -CTA chest Extensive bilateral PE  -worsening leg edema - check bilateral leg dopplers  -start lovenox q12. Transition to one of the NOAC or coumadin in AM if stable  -oxygen prn    DM  -hold metformin x 48 hours  -continue Lantus (sub for levemir) with SSI prn    HTN  -continue metoprolol. No longer on ACEi    Left Diabetic Foot ulcer with OM  S/P left TMA   -s/p 6 weeks of IV antibiotics. PICC line has been removed. -wound care consult. Code Status:   Full  Baseline:   , 3 children.  - currently out of work dealing with his foot problem          Subjective:   CHIEF COMPLAINT:  SOB    HISTORY OF PRESENT ILLNESS:     Hollie Jain is a 51 y.o.  male with history of HTN, DM, diabetic foot ulcer and OM who presents with SOB. Patient was last admitted 2 months ago for left diabetic foot infection and OM. He was discharged with a PICC and completed 6 weeks of IV abx. He denies having problems with his arm and the PICC line has now been removed. He has always had swelling of his left leg but lately he developed swelling of his right leg.  2 days ago, he was walking across a parking lot and felt very short winded and lightheaded. Today he climbed a flight of stairs and felt extremely SOB and dizzy. Denies CP, cough, fevers or chills. ER evaluation involved a CTA chest which showed extensive bilateral PE     We were asked to admit for work up and evaluation of the above problems.      Past Medical History:   Diagnosis Date    Diabetes [facility])     since age 23  DM (diabetes mellitus) ([facility] [address]     HTN (hypertension)     Hypercholesteremia     Hyperlipidemia         Past Surgical History:   Procedure Laterality Date    HX AMPUTATION      toes- left foot    HX BUNIONECTOMY      HX ORTHOPAEDIC      boutinerre deformity    HX ORTHOPAEDIC      fascia removed left foot    HX OTHER SURGICAL  03/2017    Skin graft Surgery    HX TONSILLECTOMY      HX WISDOM TEETH EXTRACTION         Social History   Substance Use Topics    Smoking status: Former Smoker     Types: Cigars     Quit date: 4/22/2011    Smokeless tobacco: Never Used    Alcohol use 0.0 oz/week     1 Glasses of wine, 0 Standard drinks or equivalent per week      Comment: stop drinking 5 months ago        Family History   Problem Relation Age of Onset    Hypertension Mother     High Cholesterol Mother      Allergies   Allergen Reactions    Ace Inhibitors Swelling     Facial swelling cough     Morphine Angioedema        Prior to Admission medications    Medication Sig Start Date End Date Taking? Authorizing Provider   gabapentin (NEURONTIN) 300 mg capsule Take 1 Cap by mouth three (3) times daily. 7/12/17   Erasom Correa MD   metroNIDAZOLE (FLAGYL) 500 mg tablet Take 1 Tab by mouth three (3) times daily. 7/12/17   Zarina Lindo MD   ALPRAZolam Asael Budds) 0.5 mg tablet Take  by mouth. Historical Provider   cefTARoline 600 mg, ADDaptor 1 Each IVPB 600 mg by IntraVENous route every twelve (12) hours every twelve (12) hours. Historical Provider   diphenhydrAMINE (ALLERGY RELIEF,DIPHENHYDRAMIN,) 25 mg capsule Take 25 mg by mouth nightly as needed. Historical Provider   sodium chloride (NORMAL SALINE FLUSH) 0.9 % 5-10 mL by IntraVENous route daily. Historical Provider   HEPARIN SOD,PORCINE/0.9 % NACL (HEPARIN FLUSH IV) 3 mL by IntraVENous route daily. Historical Provider   insulin detemir (LEVEMIR FLEXTOUCH) 100 unit/mL (3 mL) inpn 50 Units by SubCUTAneous route nightly.  60 units QHS 6/3/17   Charline Andrade MD   L. acidoph & paracasei- S therm- Bifido (EMBER-Q/RISAQUAD) 8 billion cell cap cap Take 1 Cap by mouth daily. To prevent diarrhea 6/3/17   Charline Andrade MD   oxyCODONE-acetaminophen (PERCOCET)  mg per tablet Take 1 Tab by mouth every six (6) hours as needed for Pain. Max Daily Amount: 4 Tabs. 6/3/17   Charline Andrade MD   metoprolol tartrate (LOPRESSOR) 25 mg tablet TAKE 0.5 TABS BY MOUTH TWO (2) TIMES A DAY. 5/31/17   [address] [place], MD   metFORMIN (GLUCOPHAGE) 500 mg tablet TAKE 1 TABLET BY MOUTH TWICE A DAY WITH MEALS AND INCREASE AS DIRECTED 5/10/17   Erasmo Henriquez MD   oxyCODONE-acetaminophen (PERCOCET 10)  mg per tablet Take 1 Tab by mouth every six (6) hours as needed. Max Daily Amount: 4 Tabs. Patient taking differently: Take 1 Tab by mouth every six (6) hours as needed for Pain. 3/2/17   Julia Gambino MD   cholecalciferol (VITAMIN D3) 1,000 unit cap Take 2,000 Units by mouth daily. Trung Suazo MD   insulin lispro (HUMALOG) 100 unit/mL kwikpen 12 Units by SubCUTAneous route Before breakfast, lunch, and dinner. DX: E11.65 11/18/16   Erasmo Henriquez MD   esomeprazole (NEXIUM) 40 mg capsule Take 1 Cap by mouth daily. 10/6/16   Erasmo Henriquez MD   sodium chloride (SALINE NASAL) 0.65 % nasal spray 1 [place] by Both Nostrils route as needed for Congestion. Patient taking differently: 1 Spray by Both Nostrils route as needed for Congestion (dry sinus symptoms, especially during winter time. ). 10/6/16   [address] [place], MD   lisinopril (PRINIVIL, ZESTRIL) 40 mg tablet Take 1 Tab by mouth daily. Patient not taking: Reported on 7/13/2017 5/24/16   Stevie Franklin MD   atorvastatin (LIPITOR) 40 mg tablet Take 1 Tab by mouth daily.  5/24/16   Stevie Franklin MD       REVIEW OF SYSTEMS:         Total of 12 systems reviewed as follows:       POSITIVE= underlined text  Negative = text not underlined  General:  fever, chills, sweats, generalized weakness, weight loss/gain,      loss of appetite   Eyes:    blurred vision, eye pain, loss of vision, double vision  ENT:    rhinorrhea, pharyngitis   Respiratory:   cough, sputum production, SOB, GARCIA, wheezing, pleuritic pain   Cardiology:   chest pain, palpitations, orthopnea, PND, edema, syncope   Gastrointestinal:  abdominal pain , N/V, diarrhea, dysphagia, constipation, bleeding   Genitourinary:  frequency, urgency, dysuria, hematuria, incontinence   Muskuloskeletal :  arthralgia, myalgia, back pain  Hematology:  easy bruising, nose or gum bleeding, lymphadenopathy   Dermatological: rash, ulceration, pruritis, color change / jaundice  Endocrine:   hot flashes or polydipsia   Neurological:  headache, dizziness, confusion, focal weakness, paresthesia,     Speech difficulties, memory loss, gait difficulty  Psychological: Feelings of anxiety, depression, agitation    Objective:   VITALS:    Visit Vitals    /89    Pulse 96    Temp 98.7 °F (37.1 °C)    Resp 23    Ht 6' 3\" (1.905 m)    Wt 130.3 kg (287 lb 4.2 oz)    SpO2 95%    BMI 35.9 kg/m2       PHYSICAL EXAM:    General:    Alert, cooperative, no distress, appears stated age. HEENT: Atraumatic, anicteric sclerae, pink conjunctivae     No oral ulcers, mucosa moist, throat clear, dentition fair  Neck:  Supple, symmetrical,  thyroid: non tender  Lungs:   Clear to auscultation bilaterally. No Wheezing or Rhonchi. No rales. Chest wall:  No tenderness  No Accessory muscle use. Heart:   Regular  rhythm,  No  murmur   (+) bilateral pitting edema  Abdomen:   Soft, non-tender. Not distended. Bowel sounds normal  Extremities: No cyanosis. No clubbing,  Skin turgor normal, Capillary refill normal, Radial dial pulse 2+  Skin:     Not pale. Not Jaundiced  No rashes  (+) left foot TMA and wound covered with dressing  Psych:  Good insight. Not depressed. Not anxious or agitated. Neurologic: EOMs intact. No facial asymmetry.  No aphasia or slurred speech. Symmetrical strength, Sensation grossly intact. Alert and oriented X 4.     _______________________________________________________________________  Care Plan discussed with:    Comments   Patient y    [facility]:      _______________________________________________________________________  Expected  Disposition:   Home with Family y   HH/PT/OT/RN y   SNF/LTC    CHARLIE    ________________________________________________________________________  TOTAL TIME:  48    Minutes    Critical Care Provided     Minutes non procedure based      Comments     Reviewed previous records   >50% of visit spent in counseling and coordination of care  Discussion with patient and/or family and questions answered       Given the patient's current clinical presentation, I have a high level of concern for decompensation if discharged from the ED. Complex decision making was performed which includes reviewing the patient's available past medical records, laboratory results, and Xray films. I have also directly communicated my plan and discussed this case with the involved ED physician.     ____________________________________________________________________  Gianni Mays MD    Procedures: see electronic medical records for all procedures/Xrays and details which were not copied into this note but were reviewed prior to creation of Plan.     LAB DATA REVIEWED:    Recent Results (from the past 24 hour(s))   EKG, 12 LEAD, INITIAL    Collection Time: 07/21/17  4:52 PM   Result Value Ref Range    Ventricular Rate 99 BPM    Atrial Rate 99 BPM    P-R Interval 132 ms    QRS Duration 86 ms    Q-T Interval 386 ms    QTC Calculation (Bezet) 495 ms    Calculated P Axis 55 degrees    Calculated R Axis 50 degrees    Calculated T Axis 52 degrees    Diagnosis       Normal sinus rhythm  Minimal voltage criteria for LVH, may be normal variant  ST & T wave abnormality, consider anterior ischemia  Prolonged QT  Abnormal ECG  When compared with ECG of 28-MAY-2017 09:58,  T wave inversion now evident in Anterior leads  QT has lengthened     METABOLIC PANEL, COMPREHENSIVE    Collection Time: 07/21/17  5:09 PM   Result Value Ref Range    Sodium 137 136 - 145 mmol/L    Potassium 3.2 (L) 3.5 - 5.1 mmol/L    Chloride 101 97 - 108 mmol/L    CO2 25 21 - 32 mmol/L    Anion gap 11 5 - 15 mmol/L    Glucose 163 (H) 65 - 100 mg/dL    BUN 13 6 - 20 MG/DL    Creatinine 1.45 (H) 0.70 - 1.30 MG/DL    BUN/Creatinine ratio 9 (L) 12 - 20      GFR est AA >60 >60 ml/min/1.73m2    GFR est non-AA 52 (L) >60 ml/min/1.73m2    Calcium 7.7 (L) 8.5 - 10.1 MG/DL    Bilirubin, total 0.2 0.2 - 1.0 MG/DL    ALT (SGPT) 22 12 - 78 U/L    AST (SGOT) 15 15 - 37 U/L    Alk. phosphatase 127 (H) 45 - 117 U/L    Protein, total 6.6 6.4 - 8.2 g/dL    Albumin 1.7 (L) 3.5 - 5.0 g/dL    Globulin 4.9 (H) 2.0 - 4.0 g/dL    A-G Ratio 0.3 (L) 1.1 - 2.2     CK W/ CKMB & INDEX    Collection Time: 07/21/17  5:09 PM   Result Value Ref Range     39 - 308 U/L    CK - MB 1.7 <3.6 NG/ML    CK-MB Index 1.2 0 - 2.5     CBC WITH AUTOMATED DIFF    Collection Time: 07/21/17  5:09 PM   Result Value Ref Range    WBC 13.0 (H) 4.1 - 11.1 K/uL    RBC 4.43 4.10 - 5.70 M/uL    HGB 9.9 (L) 12.1 - 17.0 g/dL    HCT 32.4 (L) 36.6 - 50.3 %    MCV 73.1 (L) 80.0 - 99.0 FL    MCH 22.3 (L) 26.0 - 34.0 PG    MCHC 30.6 30.0 - 36.5 g/dL    RDW 16.6 (H) 11.5 - 14.5 %    PLATELET 944 475 - 465 K/uL    NEUTROPHILS 76 (H) 32 - 75 %    LYMPHOCYTES 20 12 - 49 %    MONOCYTES 3 (L) 5 - 13 %    EOSINOPHILS 1 0 - 7 %    BASOPHILS 0 0 - 1 %    ABS. NEUTROPHILS 9.9 (H) 1.8 - 8.0 K/UL    ABS. LYMPHOCYTES 2.6 0.8 - 3.5 K/UL    ABS. MONOCYTES 0.4 0.0 - 1.0 K/UL    ABS. EOSINOPHILS 0.1 0.0 - 0.4 K/UL    ABS.  BASOPHILS 0.0 0.0 - 0.1 K/UL    DF SMEAR SCANNED      RBC COMMENTS ANISOCYTOSIS  1+        RBC COMMENTS MICROCYTOSIS  1+        RBC COMMENTS HYPOCHROMIA  1+        RBC COMMENTS SCHISTOCYTES  1+       NT-PRO BNP    Collection Time: 07/21/17  5:09 PM   Result Value Ref Range    NT pro-BNP 4074 (H) 0 - 125 PG/ML   TROPONIN I    Collection Time: 07/21/17  5:09 PM   Result Value Ref Range    Troponin-I, Qt. 0.10 (H) <0.05 ng/mL

## 2017-07-22 NOTE — PROGRESS NOTES
5:30 PM  Wound care completed on pt's L foot. Pt tolerated well. 6:50 PM  Unable to get a PTT lab on pt. Paged Dr. Laron Lara to get an order for an art stick to obtain lab.

## 2017-07-23 PROBLEM — E87.6 HYPOKALEMIA: Status: ACTIVE | Noted: 2017-07-23

## 2017-07-23 LAB
ALBUMIN SERPL BCP-MCNC: 1.5 G/DL (ref 3.5–5)
ALBUMIN/GLOB SERPL: 0.3 {RATIO} (ref 1.1–2.2)
ALP SERPL-CCNC: 114 U/L (ref 45–117)
ALT SERPL-CCNC: 18 U/L (ref 12–78)
ANION GAP BLD CALC-SCNC: 9 MMOL/L (ref 5–15)
AST SERPL W P-5'-P-CCNC: 18 U/L (ref 15–37)
BILIRUB SERPL-MCNC: 0.4 MG/DL (ref 0.2–1)
BUN SERPL-MCNC: 11 MG/DL (ref 6–20)
BUN/CREAT SERPL: 9 (ref 12–20)
CALCIUM SERPL-MCNC: 7.6 MG/DL (ref 8.5–10.1)
CHLORIDE SERPL-SCNC: 104 MMOL/L (ref 97–108)
CO2 SERPL-SCNC: 23 MMOL/L (ref 21–32)
CREAT SERPL-MCNC: 1.19 MG/DL (ref 0.7–1.3)
ERYTHROCYTE [DISTWIDTH] IN BLOOD BY AUTOMATED COUNT: 16.8 % (ref 11.5–14.5)
GLOBULIN SER CALC-MCNC: 4.6 G/DL (ref 2–4)
GLUCOSE BLD STRIP.AUTO-MCNC: 217 MG/DL (ref 65–100)
GLUCOSE BLD STRIP.AUTO-MCNC: 226 MG/DL (ref 65–100)
GLUCOSE BLD STRIP.AUTO-MCNC: 294 MG/DL (ref 65–100)
GLUCOSE BLD STRIP.AUTO-MCNC: 297 MG/DL (ref 65–100)
GLUCOSE SERPL-MCNC: 258 MG/DL (ref 65–100)
HCT VFR BLD AUTO: 33.1 % (ref 36.6–50.3)
HGB BLD-MCNC: 10.3 G/DL (ref 12.1–17)
INR PPP: 1.1 (ref 0.9–1.1)
MCH RBC QN AUTO: 22.9 PG (ref 26–34)
MCHC RBC AUTO-ENTMCNC: 31.1 G/DL (ref 30–36.5)
MCV RBC AUTO: 73.7 FL (ref 80–99)
PLATELET # BLD AUTO: 328 K/UL (ref 150–400)
POTASSIUM SERPL-SCNC: 3.3 MMOL/L (ref 3.5–5.1)
PROT SERPL-MCNC: 6.1 G/DL (ref 6.4–8.2)
PROTHROMBIN TIME: 11.3 SEC (ref 9–11.1)
RBC # BLD AUTO: 4.49 M/UL (ref 4.1–5.7)
SERVICE CMNT-IMP: ABNORMAL
SODIUM SERPL-SCNC: 136 MMOL/L (ref 136–145)
WBC # BLD AUTO: 7.7 K/UL (ref 4.1–11.1)

## 2017-07-23 PROCEDURE — 82962 GLUCOSE BLOOD TEST: CPT

## 2017-07-23 PROCEDURE — 74011250636 HC RX REV CODE- 250/636: Performed by: FAMILY MEDICINE

## 2017-07-23 PROCEDURE — 74011636637 HC RX REV CODE- 636/637: Performed by: INTERNAL MEDICINE

## 2017-07-23 PROCEDURE — 85610 PROTHROMBIN TIME: CPT | Performed by: FAMILY MEDICINE

## 2017-07-23 PROCEDURE — 74011250637 HC RX REV CODE- 250/637: Performed by: FAMILY MEDICINE

## 2017-07-23 PROCEDURE — 65660000000 HC RM CCU STEPDOWN

## 2017-07-23 PROCEDURE — 36415 COLL VENOUS BLD VENIPUNCTURE: CPT | Performed by: FAMILY MEDICINE

## 2017-07-23 PROCEDURE — 74011250637 HC RX REV CODE- 250/637: Performed by: INTERNAL MEDICINE

## 2017-07-23 PROCEDURE — 80053 COMPREHEN METABOLIC PANEL: CPT | Performed by: FAMILY MEDICINE

## 2017-07-23 PROCEDURE — 85027 COMPLETE CBC AUTOMATED: CPT | Performed by: FAMILY MEDICINE

## 2017-07-23 RX ORDER — HYDRALAZINE HYDROCHLORIDE 10 MG/1
10 TABLET, FILM COATED ORAL 3 TIMES DAILY
Status: DISCONTINUED | OUTPATIENT
Start: 2017-07-23 | End: 2017-07-26

## 2017-07-23 RX ORDER — ZOLPIDEM TARTRATE 5 MG/1
10 TABLET ORAL
Status: DISCONTINUED | OUTPATIENT
Start: 2017-07-23 | End: 2017-07-23 | Stop reason: SDUPTHER

## 2017-07-23 RX ORDER — ISOSORBIDE MONONITRATE 30 MG/1
30 TABLET, EXTENDED RELEASE ORAL DAILY
Status: DISCONTINUED | OUTPATIENT
Start: 2017-07-23 | End: 2017-07-26 | Stop reason: HOSPADM

## 2017-07-23 RX ORDER — ATORVASTATIN CALCIUM 20 MG/1
20 TABLET, FILM COATED ORAL
Status: DISCONTINUED | OUTPATIENT
Start: 2017-07-23 | End: 2017-07-26 | Stop reason: HOSPADM

## 2017-07-23 RX ORDER — POTASSIUM CHLORIDE 750 MG/1
20 TABLET, FILM COATED, EXTENDED RELEASE ORAL
Status: COMPLETED | OUTPATIENT
Start: 2017-07-23 | End: 2017-07-23

## 2017-07-23 RX ADMIN — INSULIN LISPRO 5 UNITS: 100 INJECTION, SOLUTION INTRAVENOUS; SUBCUTANEOUS at 17:08

## 2017-07-23 RX ADMIN — INSULIN LISPRO 3 UNITS: 100 INJECTION, SOLUTION INTRAVENOUS; SUBCUTANEOUS at 22:25

## 2017-07-23 RX ADMIN — GABAPENTIN 300 MG: 300 CAPSULE ORAL at 22:08

## 2017-07-23 RX ADMIN — GABAPENTIN 300 MG: 300 CAPSULE ORAL at 16:15

## 2017-07-23 RX ADMIN — INSULIN LISPRO 3 UNITS: 100 INJECTION, SOLUTION INTRAVENOUS; SUBCUTANEOUS at 13:04

## 2017-07-23 RX ADMIN — METOPROLOL TARTRATE 12.5 MG: 25 TABLET ORAL at 09:19

## 2017-07-23 RX ADMIN — ENOXAPARIN SODIUM 140 MG: 100 INJECTION SUBCUTANEOUS at 01:19

## 2017-07-23 RX ADMIN — APIXABAN 10 MG: 5 TABLET, FILM COATED ORAL at 13:03

## 2017-07-23 RX ADMIN — INSULIN GLARGINE 35 UNITS: 100 INJECTION, SOLUTION SUBCUTANEOUS at 22:26

## 2017-07-23 RX ADMIN — ZOLPIDEM TARTRATE 10 MG: 5 TABLET ORAL at 22:26

## 2017-07-23 RX ADMIN — ISOSORBIDE MONONITRATE 30 MG: 30 TABLET, EXTENDED RELEASE ORAL at 13:03

## 2017-07-23 RX ADMIN — POTASSIUM CHLORIDE 20 MEQ: 750 TABLET, FILM COATED, EXTENDED RELEASE ORAL at 09:19

## 2017-07-23 RX ADMIN — HYDRALAZINE HYDROCHLORIDE 10 MG: 10 TABLET, FILM COATED ORAL at 13:03

## 2017-07-23 RX ADMIN — GABAPENTIN 300 MG: 300 CAPSULE ORAL at 09:19

## 2017-07-23 RX ADMIN — OXYCODONE HYDROCHLORIDE AND ACETAMINOPHEN 1 TABLET: 10; 325 TABLET ORAL at 16:16

## 2017-07-23 RX ADMIN — INSULIN LISPRO 3 UNITS: 100 INJECTION, SOLUTION INTRAVENOUS; SUBCUTANEOUS at 09:17

## 2017-07-23 RX ADMIN — OXYCODONE HYDROCHLORIDE AND ACETAMINOPHEN 1 TABLET: 10; 325 TABLET ORAL at 22:08

## 2017-07-23 RX ADMIN — HYDRALAZINE HYDROCHLORIDE 10 MG: 10 TABLET, FILM COATED ORAL at 22:08

## 2017-07-23 RX ADMIN — Medication 10 ML: at 22:10

## 2017-07-23 RX ADMIN — APIXABAN 10 MG: 5 TABLET, FILM COATED ORAL at 22:26

## 2017-07-23 RX ADMIN — ATORVASTATIN CALCIUM 20 MG: 20 TABLET, FILM COATED ORAL at 22:08

## 2017-07-23 RX ADMIN — METOPROLOL TARTRATE 12.5 MG: 25 TABLET ORAL at 17:10

## 2017-07-23 NOTE — PROGRESS NOTES
Cardiology Progress Note  7/23/2017     Admit Date: 7/21/2017  Admit Diagnosis: Bilateral pulmonary embolism (HCC)  CC: none currently   Cardiac Assessment/Plan:   Dyspnea, lightheaded ness; indeterminate troponin: All c/w extensive PE;     however, echo 7/22: Ef 25-30% with borderline LVE, global HK; Mod LVH; RVE/decreased RV Fxn. Mild-mod TR. Findings c/w hypertensive CM: Med Rx for now; will need future eval for ischemia (after PE treated). Adding hydralazine/imdur (with acei angioedema, would avoid ARBs); metoprolol to XL soon. Need to check ferritin/TSH/HIV.    HTN: see plan above     Rhythm: sinus    Lipids: with DM, should be on a statin: starting lipitor 20; check lipids.     Volume status: looks euvolemic other than LE edema. Will need to watch Cr. Low albumin (1.7) certainly could be playing a role in LE edema.      ID (osteo/C diff), DM2 (insulin for years), CKD, Dispo: per primary team.      He's being Rx for recent osteo: was on IV ABx, off now; Had C diff starting a few weeks ago. He had a post-op PE in 2014 after transmet resection. ____________________________________________________________________     The patient reports no chest pain/pressure; He reports compliance with meds.     No PND, orthopnea, palpitations, pre-syncope, syncope. No current complaints.     He notes intermittent LE edema for the last few months.     He developed new exertional GARCIA for the last 2 days (again no CP).    ECG: sinus; LVH TWI right-precordial leads. Tele: sinus  CXR: no CHF/PNA. Notable labs: Trop 0.1; Cr 1.45; WBC 13; Hg 9.9; k 3.2. HgA1c 11! CTA: bilateral extensive PE.  LE U/S: right pop DVT.     Notable prior cardiac history:  None; PE in 2014.     [facility] (Arlene Rizvi, [place]); Family from [place]. For other plans, see orders.   Hospital problem list   Active Hospital Problems    Diagnosis Date Noted    Hypokalemia 07/23/2017    History of Clostridium difficile infection 2017    Non-compliance with treatment 2017    Bilateral pulmonary embolism ([facility] 2017    IDDM (insulin dependent diabetes mellitus) ([facility] 10/06/2016    HTN (hypertension)         Subjective: Doris Olson reports   Chest pain X none  consistent with:  Non-cardiac CP         Atypical CP     None now  On going  Anginal CP     Dyspnea  none  at rest  with exertion        x improved  unchanged  worse              PND X none  overnight       Orthopnea X none  improved  unchanged  worse   Presyncope X none  improved  unchanged  worse     Ambulated in hallway without symptoms   Yes   Ambulated in room without symptoms  Yes   Objective:    Physical Exam:  Overall VSSAF;    Visit Vitals    BP (!) 157/106 (BP 1 Location: Right arm, BP Patient Position: At rest)    Pulse 82    Temp 98.4 °F (36.9 °C)    Resp 18    Ht 6' 3\" (1.905 m)    Wt 130.3 kg (287 lb 4.2 oz)    SpO2 96%    BMI 35.9 kg/m2     Temp (24hrs), Av.3 °F (36.8 °C), Min:97.5 °F (36.4 °C), Max:98.9 °F (37.2 °C)    Patient Vitals for the past 8 hrs:   Pulse   17 0735 82   17 0432 91    Patient Vitals for the past 8 hrs:   Resp   17 0735 18   17 0432 20    Patient Vitals for the past 8 hrs:   BP   17 0735 (!) 157/106   17 0432 (!) 165/105          Intake/Output Summary (Last 24 hours) at 17 1053  Last data filed at 17 0432   Gross per 24 hour   Intake              240 ml   Output              500 ml   Net             -260 ml     General Appearance: Well developed, well nourished, no acute distress. Ears/Nose/Mouth/Throat:   Normal MM; anicteric. JVP: WNL   Resp:   clear to auscultation bilaterally. Nl resp effort. Cardiovascular:  RRR, S1, S2 normal, no new murmur. No gallop or rub. Abdomen:   Soft, non-tender, bowel sounds are present. Extremities: 1-2+ edema bilaterally. Skin:  Neuro: Warm and dry.   A/O x3, grossly nonfocal   cath site intact w/o hematoma or new bruit; distal pulse unchanged  Yes   Data Review:     Telemetry independently reviewed x sinus  chronic afib  parox afib  NSVT     ECG independently reviewed  NSR  afib  no significant changes  NSST-Tw chgs   x no new ECG provided for review   Lab results reviewed as noted below. Current medications reviewed as noted below. No results for input(s): PH, PCO2, PO2 in the last 72 hours.   Recent Labs      07/22/17   0423  07/21/17   1709   CPK  97  147   CKMB  <1.0  1.7   CKNDX  Cannot be calulated  1.2   TROIQ  0.09*  0.10*     Recent Labs      07/23/17   0412  07/22/17   0423  07/21/17   1709   NA  136  136  137  137   K  3.3*  3.1*  3.1*  3.2*   CL  104  103  104  101   CO2  23  25  26  25   BUN  11  13  13  13   CREA  1.19  1.24  1.20  1.45*   GLU  258*  210*  205*  163*   CA  7.6*  7.4*  7.5*  7.7*   ALB  1.5*   --   1.7*   WBC  7.7  9.7  13.0*   HGB  10.3*  9.2*  9.9*   HCT  33.1*  29.8*  32.4*   PLT  328  266  292     Lab Results   Component Value Date/Time    Cholesterol, total 150 07/22/2017 04:23 AM    HDL Cholesterol 45 07/22/2017 04:23 AM    LDL, calculated 78 07/22/2017 04:23 AM    Triglyceride 135 07/22/2017 04:23 AM    CHOL/HDL Ratio 3.3 07/22/2017 04:23 AM     Recent Labs      07/23/17   0412  07/21/17   1709   SGOT  18  15   AP  114  127*   TP  6.1*  6.6   ALB  1.5*  1.7*   GLOB  4.6*  4.9*     Recent Labs      07/23/17   0430  07/22/17 2009   INR  1.1  1.1   PTP  11.3*  11.4*      No components found for: GLPOC    Current Facility-Administered Medications   Medication Dose Route Frequency    apixaban (ELIQUIS) tablet 5 mg  5 mg Oral Q12H    hydrALAZINE (APRESOLINE) tablet 10 mg  10 mg Oral TID    isosorbide mononitrate ER (IMDUR) tablet 30 mg  30 mg Oral DAILY    dextrose 10% infusion 125-250 mL  125-250 mL IntraVENous PRN    zolpidem (AMBIEN) tablet 10 mg  10 mg Oral QHS PRN    gabapentin (NEURONTIN) capsule 300 mg  300 mg Oral TID    metoprolol tartrate (LOPRESSOR) tablet 12.5 mg  12.5 mg Oral BID    oxyCODONE-acetaminophen (PERCOCET 10)  mg per tablet 1 Tab  1 Tab Oral Q6H PRN    sodium chloride (NS) flush 5-10 mL  5-10 mL IntraVENous Q8H    sodium chloride (NS) flush 5-10 mL  5-10 mL IntraVENous PRN    acetaminophen (TYLENOL) tablet 650 mg  650 mg Oral Q4H PRN    naloxone (NARCAN) injection 0.4 mg  0.4 mg IntraVENous PRN    ondansetron (ZOFRAN) injection 4 mg  4 mg IntraVENous Q4H PRN    insulin lispro (HUMALOG) injection   SubCUTAneous AC&HS    glucose chewable tablet 16 g  4 Tab Oral PRN    glucagon (GLUCAGEN) injection 1 mg  1 mg IntraMUSCular PRN    insulin glargine (LANTUS) injection 35 Units  35 Units SubCUTAneous QHS        Warden Sara MD

## 2017-07-23 NOTE — PROGRESS NOTES
General Daily Progress Note    Admit Date: 7/21/2017  Hospital day  3    Subjective:     Patient has no complaint of sob or cp or diarrhea  Pt states he has had a bm but flushed it did not alert nursing staff as directed yesterday   Medication side effects: none     Current Facility-Administered Medications   Medication Dose Route Frequency    dextrose 10% infusion 125-250 mL  125-250 mL IntraVENous PRN    warfarin (COUMADIN) tablet 5 mg  5 mg Oral QPM    enoxaparin (LOVENOX) 140 mg  140 mg SubCUTAneous Q12H    zolpidem (AMBIEN) tablet 10 mg  10 mg Oral QHS PRN    gabapentin (NEURONTIN) capsule 300 mg  300 mg Oral TID    metoprolol tartrate (LOPRESSOR) tablet 12.5 mg  12.5 mg Oral BID    oxyCODONE-acetaminophen (PERCOCET 10)  mg per tablet 1 Tab  1 Tab Oral Q6H PRN    sodium chloride (NS) flush 5-10 mL  5-10 mL IntraVENous Q8H    sodium chloride (NS) flush 5-10 mL  5-10 mL IntraVENous PRN    acetaminophen (TYLENOL) tablet 650 mg  650 mg Oral Q4H PRN    naloxone (NARCAN) injection 0.4 mg  0.4 mg IntraVENous PRN    ondansetron (ZOFRAN) injection 4 mg  4 mg IntraVENous Q4H PRN    insulin lispro (HUMALOG) injection   SubCUTAneous AC&HS    glucose chewable tablet 16 g  4 Tab Oral PRN    glucagon (GLUCAGEN) injection 1 mg  1 mg IntraMUSCular PRN    insulin glargine (LANTUS) injection 35 Units  35 Units SubCUTAneous QHS        Review of Systems  A comprehensive review of systems was negative except for that written in the HPI. Objective:     Patient Vitals for the past 8 hrs:   BP Temp Pulse Resp SpO2   07/23/17 0735 (!) 157/106 98.4 °F (36.9 °C) 82 18 96 %   07/23/17 0432 (!) 165/105 97.9 °F (36.6 °C) 91 20 95 %        07/21 1901 - 07/23 0700  In: 240 [P.O.:240]  Out: 500 [Urine:500]  Physical Exam: General appearance: alert, fatigued, cooperative, no distress, appears stated age  Lungs: clear to auscultation bilaterally  Heart: regular rate and rhythm  Abdomen: soft, non-tender.  Bowel sounds normal. No masses,  no organomegaly  Extremities: extremities normal, atraumatic, no cyanosis or edema did not removed bandages   Neurologic: Grossly normal          Data Review   Recent Results (from the past 24 hour(s))   GLUCOSE, POC    Collection Time: 07/22/17 11:53 AM   Result Value Ref Range    Glucose (POC) 299 (H) 65 - 100 mg/dL    Performed by [address] [place] [address], POC    Collection Time: 07/22/17  4:29 PM   Result Value Ref Range    Glucose (POC) 283 (H) 65 - 100 mg/dL    Performed by Ally Pyle (PCT)    PROTHROMBIN TIME + INR    Collection Time: 07/22/17  8:09 PM   Result Value Ref Range    INR 1.1 0.9 - 1.1      Prothrombin time 11.4 (H) 9.0 - 11.1 sec   GLUCOSE, POC    Collection Time: 07/22/17  9:00 PM   Result Value Ref Range    Glucose (POC) 275 (H) 65 - 100 mg/dL    Performed by Joslyn Diaz (PCT)    METABOLIC PANEL, COMPREHENSIVE    Collection Time: 07/23/17  4:12 AM   Result Value Ref Range    Sodium 136 136 - 145 mmol/L    Potassium 3.3 (L) 3.5 - 5.1 mmol/L    Chloride 104 97 - 108 mmol/L    CO2 23 21 - 32 mmol/L    Anion gap 9 5 - 15 mmol/L    Glucose 258 (H) 65 - 100 mg/dL    BUN 11 6 - 20 MG/DL    Creatinine 1.19 0.70 - 1.30 MG/DL    BUN/Creatinine ratio 9 (L) 12 - 20      GFR est AA >60 >60 ml/min/1.73m2    GFR est non-AA >60 >60 ml/min/1.73m2    Calcium 7.6 (L) 8.5 - 10.1 MG/DL    Bilirubin, total 0.4 0.2 - 1.0 MG/DL    ALT (SGPT) 18 12 - 78 U/L    AST (SGOT) 18 15 - 37 U/L    Alk. phosphatase 114 45 - 117 U/L    Protein, total 6.1 (L) 6.4 - 8.2 g/dL    Albumin 1.5 (L) 3.5 - 5.0 g/dL    Globulin 4.6 (H) 2.0 - 4.0 g/dL    A-G Ratio 0.3 (L) 1.1 - 2.2     CBC W/O DIFF    Collection Time: 07/23/17  4:12 AM   Result Value Ref Range    WBC 7.7 4.1 - 11.1 K/uL    RBC 4.49 4. 10 - 5.70 M/uL    HGB 10.3 (L) 12.1 - 17.0 g/dL    HCT 33.1 (L) 36.6 - 50.3 %    MCV 73.7 (L) 80.0 - 99.0 FL    MCH 22.9 (L) 26.0 - 34.0 PG    MCHC 31.1 30.0 - 36.5 g/dL    RDW 16.8 (H) 11.5 - 14.5 %    PLATELET 327 304 - 400 K/uL   PROTHROMBIN TIME + INR    Collection Time: 07/23/17  4:30 AM   Result Value Ref Range    INR 1.1 0.9 - 1.1      Prothrombin time 11.3 (H) 9.0 - 11.1 sec           Assessment:     Active Problems:    HTN (hypertension) ()      IDDM (insulin dependent diabetes mellitus) ([facility] (10/6/2016)      Bilateral pulmonary embolism ([facility] (7/21/2017)      History of Clostridium difficile infection (7/22/2017)      Non-compliance with treatment (7/22/2017)        Plan:       continue ISS and diabetic diet  noncompliant pt makes it quite difficult to care for pt   Contact isolation recent dx of c diff   Hypokalemia will supplement and check MG   Will ask case managment to see if pt can obtain Lovenox and eliquis  at home -however pt declining lovenox bridge will start eliquis and d/c tomorrow if covered by insurance

## 2017-07-23 NOTE — PROGRESS NOTES
Pharmacy Monitoring for Apixaban    Pharmacy review for this 51 y.o. male ordered Apixaban for acute PE. Major Interacting Medications: s/p warfarin and enoxaparin (last dose 7/23 01:19)  Platelet Inhibitors: None at this time    Recent Labs      07/23/17   0430  07/23/17   0412  07/22/17 2009 07/22/17 0423 07/21/17   1709   INR  1.1   --   1.1   --    --    HGB   --   10.3*   --   9.2*  9.9*   PLT   --   328   --   266  292   ALB   --   1.5*   --    --   1.7*   SGOT   --   18   --    --   15   TBILI   --   0.4   --    --   0.2   CREA   --   1.19   --   1.24  1.20  1.45*   BUN   --   11   --   13  13  13       Child Jett Score, if applicable (Avoid if level C): n/a      Impression/Plan: Current dose = apixaban 5mg po BID. Discussed with Dr. Alexa Zuleta and will increase to apixaban 10 mg PO BID x 7 days then apixaban 5 mg po bid.         Thanks    My-Fareed Moran, PHARMD

## 2017-07-23 NOTE — PROGRESS NOTES
[address] SHIFT NURSING NOTE    Bedside and Verbal shift change report given to [id number] ROSE MARIECaryn RUFINOCaryn Gao [facility] (oncoming nurse) by Aj Limon (offgoing nurse). Report included the following information SBAR. SHIFT SUMMARY:         Admission Date 7/21/2017   Admission Diagnosis Bilateral pulmonary embolism ([facility] [address]   Consults IP CONSULT TO CARDIOLOGY  IP CONSULT TO HOSPITALIST        Consults   [] PT   [] OT   [] Speech   [] Palliative      [] Hospice    [] Case Management   [] None   Cardiac Monitoring   [x] Yes   [] No     Antibiotics   [] Yes   [] No   GI Prophylaxis  (Ex: Protonix, Pepcid, etc,.)   [] Yes   [] No          DVT Prophylaxis   SCDs:             Jeff stockings:         [x] Medication (Ex: Lovenox, Eliquis, Brilinta, Coumadin,  Heparin, etc..)   [] Contraindicated   [] No VTE needed       Urinary Catheter             LDAs               Peripheral IV 07/21/17 Right Antecubital (Active)   Site Assessment Clean, dry, & intact 7/22/2017 11:07 PM   Phlebitis Assessment 0 7/22/2017 11:07 PM   Infiltration Assessment 0 7/22/2017 11:07 PM   Dressing Status Clean, dry, & intact 7/22/2017 11:07 PM   Dressing Type Transparent;Tape 7/22/2017 11:07 PM   Hub Color/Line Status Pink;Capped 7/22/2017 11:07 PM                      I/Os No intake or output data in the 24 hours ending 07/22/17 2310      Activity Level Activity Level: Up with Assistance     Activity Assistance: Partial (one person)   Diet Active Orders   Diet    DIET DIABETIC CONSISTENT CARB Regular      Purposeful Rounding every 1-2 hour?    [x] Yes    Lee Score  Total Score: 1   Bed Alarm (If score 3 or >)   [] Yes    [] Refused (See signed refusal form in chart)   Noé Score  Noé Score: 21       Noé Score (if score 14 or less)   [] PMT consult   [] Nutrition consult   [x] Wound Care consult      []  Specialty bed         Influenza Vaccine Received Flu Vaccine for Current Season (usually Sept-March): Not Flu Season               Needs prior to discharge:   Home O2 required:    [] Yes   [x] No     If yes, how much O2 required?     Other:    Last Bowel Movement Date: 07/21/17   Readmission Risk Assessment Tool Score Medium Risk            15       Total Score        3 Relationship with PCP    2 Patient Living Status    9 More than 1 Admission in calendar year    1 Charlson Comorbidity Score        Criteria that do not apply:    Patient Length of Stay > 5    Patient Insurance is Medicare, Medicaid or Self Pay       Expected Length of Stay - - -   Actual Length of Stay 1

## 2017-07-23 NOTE — PROGRESS NOTES
[facility]. 0900 K 3.3 with am lab; Dr Stefanie Hoover will replete lytes. 80 Dr Brissa Camejo in to see pt; questions were answered. 1300 Hydralazine/Imdur given to pt. Medication information sheet provided. Benefit of therapy explained. 1700 Report given to Michael Sue RN.

## 2017-07-23 NOTE — PROGRESS NOTES
Bedside and Verbal shift change report given to José Buckley (oncoming nurse) by Maynor Mike (offgoing nurse). Report included the following information SBAR, Kardex, MAR and Recent Results.

## 2017-07-24 ENCOUNTER — TELEPHONE (OUTPATIENT)
Dept: INTERNAL MEDICINE CLINIC | Age: 47
End: 2017-07-24

## 2017-07-24 LAB
ALBUMIN SERPL BCP-MCNC: 1.7 G/DL (ref 3.5–5)
ALBUMIN/GLOB SERPL: 0.4 {RATIO} (ref 1.1–2.2)
ALP SERPL-CCNC: 114 U/L (ref 45–117)
ALT SERPL-CCNC: 26 U/L (ref 12–78)
ANION GAP BLD CALC-SCNC: 6 MMOL/L (ref 5–15)
ANION GAP BLD CALC-SCNC: 7 MMOL/L (ref 5–15)
AST SERPL W P-5'-P-CCNC: 25 U/L (ref 15–37)
BASOPHILS # BLD AUTO: 0.1 K/UL (ref 0–0.1)
BASOPHILS # BLD AUTO: 0.1 K/UL (ref 0–0.1)
BASOPHILS # BLD: 1 % (ref 0–1)
BASOPHILS # BLD: 1 % (ref 0–1)
BILIRUB SERPL-MCNC: 0.2 MG/DL (ref 0.2–1)
BUN SERPL-MCNC: 10 MG/DL (ref 6–20)
BUN SERPL-MCNC: 9 MG/DL (ref 6–20)
BUN/CREAT SERPL: 8 (ref 12–20)
BUN/CREAT SERPL: 9 (ref 12–20)
CALCIUM SERPL-MCNC: 7.7 MG/DL (ref 8.5–10.1)
CALCIUM SERPL-MCNC: 7.8 MG/DL (ref 8.5–10.1)
CHLORIDE SERPL-SCNC: 102 MMOL/L (ref 97–108)
CHLORIDE SERPL-SCNC: 104 MMOL/L (ref 97–108)
CHOLEST SERPL-MCNC: 161 MG/DL
CO2 SERPL-SCNC: 27 MMOL/L (ref 21–32)
CO2 SERPL-SCNC: 29 MMOL/L (ref 21–32)
CREAT SERPL-MCNC: 1.09 MG/DL (ref 0.7–1.3)
CREAT SERPL-MCNC: 1.18 MG/DL (ref 0.7–1.3)
DIFFERENTIAL METHOD BLD: ABNORMAL
EOSINOPHIL # BLD: 0.2 K/UL (ref 0–0.4)
EOSINOPHIL # BLD: 0.4 K/UL (ref 0–0.4)
EOSINOPHIL NFR BLD: 3 % (ref 0–7)
EOSINOPHIL NFR BLD: 5 % (ref 0–7)
ERYTHROCYTE [DISTWIDTH] IN BLOOD BY AUTOMATED COUNT: 16.8 % (ref 11.5–14.5)
ERYTHROCYTE [DISTWIDTH] IN BLOOD BY AUTOMATED COUNT: 16.9 % (ref 11.5–14.5)
FERRITIN SERPL-MCNC: 115 NG/ML (ref 26–388)
GLOBULIN SER CALC-MCNC: 4.7 G/DL (ref 2–4)
GLUCOSE BLD STRIP.AUTO-MCNC: 150 MG/DL (ref 65–100)
GLUCOSE BLD STRIP.AUTO-MCNC: 179 MG/DL (ref 65–100)
GLUCOSE BLD STRIP.AUTO-MCNC: 188 MG/DL (ref 65–100)
GLUCOSE BLD STRIP.AUTO-MCNC: 217 MG/DL (ref 65–100)
GLUCOSE SERPL-MCNC: 160 MG/DL (ref 65–100)
GLUCOSE SERPL-MCNC: 189 MG/DL (ref 65–100)
HCT VFR BLD AUTO: 32.6 % (ref 36.6–50.3)
HCT VFR BLD AUTO: 33 % (ref 36.6–50.3)
HDLC SERPL-MCNC: 46 MG/DL
HDLC SERPL: 3.5 {RATIO} (ref 0–5)
HGB BLD-MCNC: 10.4 G/DL (ref 12.1–17)
HGB BLD-MCNC: 9.6 G/DL (ref 12.1–17)
LDLC SERPL CALC-MCNC: 98.2 MG/DL (ref 0–100)
LIPID PROFILE,FLP: NORMAL
LYMPHOCYTES # BLD AUTO: 27 % (ref 12–49)
LYMPHOCYTES # BLD AUTO: 36 % (ref 12–49)
LYMPHOCYTES # BLD: 1.9 K/UL (ref 0.8–3.5)
LYMPHOCYTES # BLD: 2.7 K/UL (ref 0.8–3.5)
MAGNESIUM SERPL-MCNC: 1.8 MG/DL (ref 1.6–2.4)
MCH RBC QN AUTO: 22 PG (ref 26–34)
MCH RBC QN AUTO: 23.5 PG (ref 26–34)
MCHC RBC AUTO-ENTMCNC: 29.4 G/DL (ref 30–36.5)
MCHC RBC AUTO-ENTMCNC: 31.5 G/DL (ref 30–36.5)
MCV RBC AUTO: 74.5 FL (ref 80–99)
MCV RBC AUTO: 74.8 FL (ref 80–99)
MONOCYTES # BLD: 0.3 K/UL (ref 0–1)
MONOCYTES # BLD: 0.4 K/UL (ref 0–1)
MONOCYTES NFR BLD AUTO: 4 % (ref 5–13)
MONOCYTES NFR BLD AUTO: 6 % (ref 5–13)
NEUTS SEG # BLD: 3.9 K/UL (ref 1.8–8)
NEUTS SEG # BLD: 4.3 K/UL (ref 1.8–8)
NEUTS SEG NFR BLD AUTO: 54 % (ref 32–75)
NEUTS SEG NFR BLD AUTO: 63 % (ref 32–75)
NRBC # BLD: 0 K/UL (ref 0–0.01)
NRBC # BLD: 0 K/UL (ref 0–0.01)
NRBC BLD-RTO: 0 PER 100 WBC
NRBC BLD-RTO: 0 PER 100 WBC
PLATELET # BLD AUTO: 381 K/UL (ref 150–400)
PLATELET # BLD AUTO: 402 K/UL (ref 150–400)
POTASSIUM SERPL-SCNC: 3.4 MMOL/L (ref 3.5–5.1)
POTASSIUM SERPL-SCNC: 3.5 MMOL/L (ref 3.5–5.1)
PROT SERPL-MCNC: 6.4 G/DL (ref 6.4–8.2)
RBC # BLD AUTO: 4.36 M/UL (ref 4.1–5.7)
RBC # BLD AUTO: 4.43 M/UL (ref 4.1–5.7)
RBC MORPH BLD: ABNORMAL
SERVICE CMNT-IMP: ABNORMAL
SODIUM SERPL-SCNC: 137 MMOL/L (ref 136–145)
SODIUM SERPL-SCNC: 138 MMOL/L (ref 136–145)
TRIGL SERPL-MCNC: 84 MG/DL (ref ?–150)
TSH SERPL DL<=0.05 MIU/L-ACNC: 3.75 UIU/ML (ref 0.36–3.74)
VLDLC SERPL CALC-MCNC: 16.8 MG/DL
WBC # BLD AUTO: 7 K/UL (ref 4.1–11.1)
WBC # BLD AUTO: 7.4 K/UL (ref 4.1–11.1)

## 2017-07-24 PROCEDURE — 77030018846 HC SOL IRR STRL H20 ICUM -A

## 2017-07-24 PROCEDURE — 74011250637 HC RX REV CODE- 250/637: Performed by: INTERNAL MEDICINE

## 2017-07-24 PROCEDURE — 80053 COMPREHEN METABOLIC PANEL: CPT | Performed by: FAMILY MEDICINE

## 2017-07-24 PROCEDURE — 77030019607 HC DSG BURN S&N -A

## 2017-07-24 PROCEDURE — 86780 TREPONEMA PALLIDUM: CPT | Performed by: FAMILY MEDICINE

## 2017-07-24 PROCEDURE — 74011250637 HC RX REV CODE- 250/637: Performed by: FAMILY MEDICINE

## 2017-07-24 PROCEDURE — 77030011256 HC DRSG MEPILEX <16IN NO BORD MOLN -A

## 2017-07-24 PROCEDURE — 82962 GLUCOSE BLOOD TEST: CPT

## 2017-07-24 PROCEDURE — 83735 ASSAY OF MAGNESIUM: CPT | Performed by: FAMILY MEDICINE

## 2017-07-24 PROCEDURE — 80061 LIPID PANEL: CPT | Performed by: INTERNAL MEDICINE

## 2017-07-24 PROCEDURE — 87389 HIV-1 AG W/HIV-1&-2 AB AG IA: CPT | Performed by: FAMILY MEDICINE

## 2017-07-24 PROCEDURE — 77030011255 HC DSG AQUACEL AG BMS -A

## 2017-07-24 PROCEDURE — 77030018836 HC SOL IRR NACL ICUM -A

## 2017-07-24 PROCEDURE — 74011636637 HC RX REV CODE- 636/637: Performed by: INTERNAL MEDICINE

## 2017-07-24 PROCEDURE — 80048 BASIC METABOLIC PNL TOTAL CA: CPT | Performed by: FAMILY MEDICINE

## 2017-07-24 PROCEDURE — 84443 ASSAY THYROID STIM HORMONE: CPT | Performed by: FAMILY MEDICINE

## 2017-07-24 PROCEDURE — 82728 ASSAY OF FERRITIN: CPT | Performed by: INTERNAL MEDICINE

## 2017-07-24 PROCEDURE — 85025 COMPLETE CBC W/AUTO DIFF WBC: CPT | Performed by: FAMILY MEDICINE

## 2017-07-24 PROCEDURE — 36415 COLL VENOUS BLD VENIPUNCTURE: CPT | Performed by: INTERNAL MEDICINE

## 2017-07-24 PROCEDURE — 65660000000 HC RM CCU STEPDOWN

## 2017-07-24 RX ORDER — HYDRALAZINE HYDROCHLORIDE 10 MG/1
10 TABLET, FILM COATED ORAL ONCE
Status: COMPLETED | OUTPATIENT
Start: 2017-07-24 | End: 2017-07-24

## 2017-07-24 RX ORDER — METOPROLOL TARTRATE 50 MG/1
50 TABLET ORAL 2 TIMES DAILY
Status: DISCONTINUED | OUTPATIENT
Start: 2017-07-24 | End: 2017-07-24

## 2017-07-24 RX ORDER — SPIRONOLACTONE 25 MG/1
25 TABLET ORAL DAILY
Status: DISCONTINUED | OUTPATIENT
Start: 2017-07-24 | End: 2017-07-26 | Stop reason: HOSPADM

## 2017-07-24 RX ORDER — METOPROLOL TARTRATE 25 MG/1
37.5 TABLET, FILM COATED ORAL ONCE
Status: COMPLETED | OUTPATIENT
Start: 2017-07-24 | End: 2017-07-24

## 2017-07-24 RX ORDER — CARVEDILOL 3.12 MG/1
3.12 TABLET ORAL 2 TIMES DAILY WITH MEALS
Status: DISCONTINUED | OUTPATIENT
Start: 2017-07-24 | End: 2017-07-25

## 2017-07-24 RX ORDER — ALPRAZOLAM 0.5 MG/1
0.5 TABLET ORAL
Status: DISCONTINUED | OUTPATIENT
Start: 2017-07-24 | End: 2017-07-26 | Stop reason: HOSPADM

## 2017-07-24 RX ADMIN — OXYCODONE HYDROCHLORIDE AND ACETAMINOPHEN 1 TABLET: 10; 325 TABLET ORAL at 22:12

## 2017-07-24 RX ADMIN — INSULIN LISPRO 2 UNITS: 100 INJECTION, SOLUTION INTRAVENOUS; SUBCUTANEOUS at 17:10

## 2017-07-24 RX ADMIN — ATORVASTATIN CALCIUM 20 MG: 20 TABLET, FILM COATED ORAL at 22:02

## 2017-07-24 RX ADMIN — APIXABAN 10 MG: 5 TABLET, FILM COATED ORAL at 22:02

## 2017-07-24 RX ADMIN — HYDRALAZINE HYDROCHLORIDE 10 MG: 10 TABLET, FILM COATED ORAL at 22:03

## 2017-07-24 RX ADMIN — Medication 10 ML: at 22:03

## 2017-07-24 RX ADMIN — APIXABAN 10 MG: 5 TABLET, FILM COATED ORAL at 11:02

## 2017-07-24 RX ADMIN — GABAPENTIN 300 MG: 300 CAPSULE ORAL at 16:14

## 2017-07-24 RX ADMIN — SPIRONOLACTONE 25 MG: 25 TABLET, FILM COATED ORAL at 11:03

## 2017-07-24 RX ADMIN — HYDRALAZINE HYDROCHLORIDE 10 MG: 10 TABLET, FILM COATED ORAL at 05:54

## 2017-07-24 RX ADMIN — METOPROLOL TARTRATE 12.5 MG: 25 TABLET ORAL at 05:54

## 2017-07-24 RX ADMIN — INSULIN LISPRO 2 UNITS: 100 INJECTION, SOLUTION INTRAVENOUS; SUBCUTANEOUS at 08:14

## 2017-07-24 RX ADMIN — OXYCODONE HYDROCHLORIDE AND ACETAMINOPHEN 1 TABLET: 10; 325 TABLET ORAL at 16:11

## 2017-07-24 RX ADMIN — HYDRALAZINE HYDROCHLORIDE 10 MG: 10 TABLET, FILM COATED ORAL at 11:05

## 2017-07-24 RX ADMIN — INSULIN LISPRO 3 UNITS: 100 INJECTION, SOLUTION INTRAVENOUS; SUBCUTANEOUS at 12:51

## 2017-07-24 RX ADMIN — METOPROLOL TARTRATE 37.5 MG: 25 TABLET ORAL at 08:21

## 2017-07-24 RX ADMIN — ZOLPIDEM TARTRATE 10 MG: 5 TABLET ORAL at 22:02

## 2017-07-24 RX ADMIN — OXYCODONE HYDROCHLORIDE AND ACETAMINOPHEN 1 TABLET: 10; 325 TABLET ORAL at 08:32

## 2017-07-24 RX ADMIN — INSULIN GLARGINE 35 UNITS: 100 INJECTION, SOLUTION SUBCUTANEOUS at 22:03

## 2017-07-24 RX ADMIN — Medication 10 ML: at 04:59

## 2017-07-24 RX ADMIN — Medication 10 ML: at 12:52

## 2017-07-24 RX ADMIN — ACETAMINOPHEN 650 MG: 325 TABLET, FILM COATED ORAL at 19:29

## 2017-07-24 RX ADMIN — HYDRALAZINE HYDROCHLORIDE 10 MG: 10 TABLET, FILM COATED ORAL at 16:15

## 2017-07-24 RX ADMIN — GABAPENTIN 300 MG: 300 CAPSULE ORAL at 22:02

## 2017-07-24 RX ADMIN — GABAPENTIN 300 MG: 300 CAPSULE ORAL at 08:18

## 2017-07-24 RX ADMIN — CARVEDILOL 3.12 MG: 3.12 TABLET, FILM COATED ORAL at 16:14

## 2017-07-24 RX ADMIN — ISOSORBIDE MONONITRATE 30 MG: 30 TABLET, EXTENDED RELEASE ORAL at 08:19

## 2017-07-24 NOTE — PROGRESS NOTES
Bedside shift change report given to Ariel Easley RN (oncoming nurse) by Olga Zapata RN (offgoing nurse). Report included the following information SBAR, Kardex, Intake/Output, MAR, Recent Results, Med Rec Status and Cardiac Rhythm NSR.

## 2017-07-24 NOTE — WOUND CARE
Wound Care Consult: Chart reviewed and patient assessed for his left foot wound that was present on admission. Patient is alert and oriented x 3 and very talkative. When approached about his diabetes and his A1C he becomes quite defensive and says that he is making \"good choices\" in his meals but has problems with amounts and he is drinking more water. Overall, non-compliant with diet and fluids. He has quit exercising. He was admitted for PE and shortness of breath. Assessment of the wound: pink none granulating wound bed. Wound bed is slimy and pink. The edges are calloused and these are debrided only by Dr. Janna Cha on a regular basis. WOUND POA CONDITIONS    Wound Foot Left (Active)   DRESSING STATUS Removed 7/24/2017  3:03 PM   DRESSING TYPE Gauze;Gauze wrap (padmaja) 7/24/2017  3:03 PM   Incision site well approximated? No 7/23/2017  4:16 PM   Non-Pressure Injury Full thickness (subcut/muscle) 7/24/2017  3:03 PM   Wound Length (cm) 5.5 cm 7/24/2017  3:03 PM   Wound Width (cm) 5 cm 7/24/2017  3:03 PM   Wound Depth (cm) 0.6 7/24/2017  3:03 PM   Wound Surface area (cm^3) 16.5 cm^2 7/24/2017  3:03 PM   Condition of [place] 7/24/2017  3:03 PM   Condition of Edges Calloused; Rolled/curled 7/24/2017  3:03 PM   Epithelialization (%) 0 7/24/2017  3:03 PM   Tissue Type Pink 7/24/2017  3:03 PM   Tissue Type Percent Pink 100 7/24/2017  3:03 PM   Drainage Amount  Small  7/24/2017  3:03 PM   Drainage Color Serosanguinous 7/24/2017  3:03 PM   Wound Odor None 7/24/2017  3:03 PM   Periwound Skin Condition Calloused; Intact 7/24/2017  3:03 PM   Cleansing and Cleansing Agents  Normal saline 7/24/2017  3:03 PM   Dressing Type Applied Silver products; Foam 7/24/2017  3:03 PM   Procedure Tolerated Well 7/24/2017  3:03 PM       Recommendation for dressing while in the hospital: Silver dressings (Aquacel Ag) with moist wound healing. Orders written for the same. Heel off-loading is very important to prevent pressure ulcers. Discussed the wound condition with the patient and told him that his glucose control directly effects the wound healing. Encouraged much less carbohydrates and more protein to assist with Granulation tissue formation. Patient will continue his wound care at home with Medihoney and Meli as intended at home.    Anil Ray RN, BSN, [employer]

## 2017-07-24 NOTE — PROGRESS NOTES
Cardiology Progress Note  7/24/2017     Admit Date: 7/21/2017  Admit Diagnosis: Bilateral pulmonary embolism (HCC)  CC: none currently   Cardiac Assessment/Plan:   Dyspnea, lightheaded ness; indeterminate troponin: All c/w extensive PE;     however, echo 7/22: Ef 25-30% with borderline LVE, global HK; Mod LVH; RVE/decreased RV Fxn. Mild-mod TR. Findings c/w hypertensive CM: Med Rx for now; will need future eval for ischemia (after PE treated). Adding hydralazine/imdur (with acei angioedema, would avoid ARBs); metoprolol to XL soon. Nl ferritin/TSH; HIV pending. HTN: Still too high: changed metoprolol to coreg; increase hydralazine if needed; cont imdur. Added low dose aldactone.     Rhythm: sinus    Lipids: with DM, should be on a statin: started lipitor 20; check lipids.     Volume status: looks euvolemic other than LE edema. Will need to watch Cr. Low albumin (1.7) certainly could be playing a role in LE edema.      ID (osteo/C diff), DM2 (insulin for years), CKD, Dispo: per primary team.      He's being Rx for recent osteo: was on IV ABx, off now; Had C diff starting a few weeks ago. He had a post-op PE in 2014 after transmet resection. ____________________________________________________________________     The patient reports no chest pain/pressure; He reports compliance with meds.     No PND, orthopnea, palpitations, pre-syncope, syncope. No current complaints.     He notes intermittent LE edema for the last few months.     He developed new exertional GARCIA for the last 2 days (again no CP).    ECG: sinus; LVH TWI right-precordial leads. Tele: sinus  CXR: no CHF/PNA. Notable labs: Trop 0.1; Cr 1.45; WBC 13; Hg 9.9; k 3.2. HgA1c 11! CTA: bilateral extensive PE.  LE U/S: right pop DVT.     Notable prior cardiac history:  None; PE in 2014.     He is from [facility] ([place]); Family from [place]. For other plans, see orders.   Hospital problem list Active Hospital Problems    Diagnosis Date Noted    Hypokalemia 2017    History of Clostridium difficile infection 2017    Non-compliance with treatment 2017    Bilateral pulmonary embolism ([facility] [address] 2017    IDDM (insulin dependent diabetes mellitus) ([facility] 10/06/2016    HTN (hypertension)         Subjective: Yomaira Slaughter reports   Chest pain X none  consistent with:  Non-cardiac CP         Atypical CP     None now  On going  Anginal CP     Dyspnea  none  at rest  with exertion        x improved  unchanged  worse              PND X none  overnight       Orthopnea X none  improved  unchanged  worse   Presyncope X none  improved  unchanged  worse     Ambulated in hallway without symptoms   Yes   Ambulated in room without symptoms  Yes   Objective:    Physical Exam:  Overall VSSAF;    Visit Vitals    /90 (BP 1 Location: Right arm, BP Patient Position: At rest)    Pulse 80    Temp 98 °F (36.7 °C)    Resp 18    Ht 6' 3\" (1.905 m)    Wt 131 kg (288 lb 14.4 oz)    SpO2 94%    BMI 36.11 kg/m2     Temp (24hrs), Av.1 °F (36.7 °C), Min:96.1 °F (35.6 °C), Max:99.6 °F (37.6 °C)    Patient Vitals for the past 8 hrs:   Pulse   17 0818 80   17 0720 78   17 0441 82    Patient Vitals for the past 8 hrs:   Resp   17 0720 18   17 0441 18    Patient Vitals for the past 8 hrs:   BP   17 0818 150/90   17 0720 (!) 181/111   17 0441 (!) 163/102          Intake/Output Summary (Last 24 hours) at 17 0940  Last data filed at 17 044   Gross per 24 hour   Intake              820 ml   Output              475 ml   Net              345 ml     General Appearance: Well developed, well nourished, no acute distress. Ears/Nose/Mouth/Throat:   Normal MM; anicteric. JVP: WNL   Resp:   clear to auscultation bilaterally. Nl resp effort. Cardiovascular:  RRR, S1, S2 normal, no new murmur. No gallop or rub.    Abdomen:   Soft, non-tender, bowel sounds are present. Extremities: 1-2+ edema bilaterally. Skin:  Neuro: Warm and dry. A/O x3, grossly nonfocal   cath site intact w/o hematoma or new bruit; distal pulse unchanged  Yes   Data Review:     Telemetry independently reviewed x sinus  chronic afib  parox afib  NSVT     ECG independently reviewed  NSR  afib  no significant changes  NSST-Tw chgs   x no new ECG provided for review   Lab results reviewed as noted below. Current medications reviewed as noted below. No results for input(s): PH, PCO2, PO2 in the last 72 hours.   Recent Labs      07/22/17 0423 07/21/17   1709   CPK  97  147   CKMB  <1.0  1.7   CKNDX  Cannot be calulated  1.2   TROIQ  0.09*  0.10*     Recent Labs      07/24/17   0454  07/23/17 0412 07/22/17 0423 07/21/17   1709   NA  138  136  136  137  137   K  3.4*  3.3*  3.1*  3.1*  3.2*   CL  102  104  103  104  101   CO2  29  23  25  26  25   BUN  10  11  13  13  13   CREA  1.09  1.19  1.24  1.20  1.45*   GLU  160*  258*  210*  205*  163*   CA  7.7*  7.6*  7.4*  7.5*  7.7*   ALB   --   1.5*   --   1.7*   WBC  7.4  7.7  9.7  13.0*   HGB  9.6*  10.3*  9.2*  9.9*   HCT  32.6*  33.1*  29.8*  32.4*   PLT  381  328  266  292     Lab Results   Component Value Date/Time    Cholesterol, total 161 07/24/2017 04:54 AM    HDL Cholesterol 46 07/24/2017 04:54 AM    LDL, calculated 98.2 07/24/2017 04:54 AM    Triglyceride 84 07/24/2017 04:54 AM    CHOL/HDL Ratio 3.5 07/24/2017 04:54 AM     Recent Labs      07/23/17 0412 07/21/17   1709   SGOT  18  15   AP  114  127*   TP  6.1*  6.6   ALB  1.5*  1.7*   GLOB  4.6*  4.9*     Recent Labs      07/23/17 0430 07/22/17 2009   INR  1.1  1.1   PTP  11.3*  11.4*      No components found for: GLPOC    Current Facility-Administered Medications   Medication Dose Route Frequency    metoprolol tartrate (LOPRESSOR) tablet 50 mg  50 mg Oral BID    hydrALAZINE (APRESOLINE) tablet 10 mg  10 mg Oral TID    isosorbide mononitrate ER (IMDUR) tablet 30 mg  30 mg Oral DAILY    atorvastatin (LIPITOR) tablet 20 mg  20 mg Oral QHS    apixaban (ELIQUIS) tablet 10 mg  10 mg Oral Q12H    dextrose 10% infusion 125-250 mL  125-250 mL IntraVENous PRN    zolpidem (AMBIEN) tablet 10 mg  10 mg Oral QHS PRN    gabapentin (NEURONTIN) capsule 300 mg  300 mg Oral TID    oxyCODONE-acetaminophen (PERCOCET 10)  mg per tablet 1 Tab  1 Tab Oral Q6H PRN    sodium chloride (NS) flush 5-10 mL  5-10 mL IntraVENous Q8H    sodium chloride (NS) flush 5-10 mL  5-10 mL IntraVENous PRN    acetaminophen (TYLENOL) tablet 650 mg  650 mg Oral Q4H PRN    naloxone (NARCAN) injection 0.4 mg  0.4 mg IntraVENous PRN    ondansetron (ZOFRAN) injection 4 mg  4 mg IntraVENous Q4H PRN    insulin lispro (HUMALOG) injection   SubCUTAneous AC&HS    glucose chewable tablet 16 g  4 Tab Oral PRN    glucagon (GLUCAGEN) injection 1 mg  1 mg IntraMUSCular PRN    insulin glargine (LANTUS) injection 35 Units  35 Units SubCUTAneous QHS        Angelia Roche MD

## 2017-07-24 NOTE — PROGRESS NOTES
5:15 AM Notified Dr. Stefanie Hoover of patient's /102 this AM. Orders received for one time dose 10 mg PO hydralazine and to give scheduled 12.5 mg PO metoprolol early. See MAR. Will follow up and continue to monitor closely.

## 2017-07-24 NOTE — PROGRESS NOTES
[facility] SHIFT NURSING NOTE    Bedside and Verbal shift change report received from [facility], RN (offgoing nurse). Report included the following information SBAR, Kardex, Intake/Output, MAR, Recent Results and Cardiac Rhythm NSR.    SHIFT SUMMARY:   1500: Patient resting quietly in bed with no complaint voiced. Admission Date 7/21/2017   Admission Diagnosis Bilateral pulmonary embolism ([address]   Consults IP CONSULT TO CARDIOLOGY  IP CONSULT TO HOSPITALIST        Consults   [] PT   [] OT   [] Speech   [] Palliative      [] Hospice    [] Case Management   [x] None   Cardiac Monitoring   [x] Yes   [] No     Antibiotics   [] Yes   [x] No   GI Prophylaxis  (Ex: Protonix, Pepcid, etc,.)   [] Yes   [x] No          DVT Prophylaxis   SCDs:             Jeff stockings:         [x] Medication (Ex: Lovenox, Eliquis, Brilinta, Coumadin,  Heparin, etc..)   [] Contraindicated   [] No VTE needed       Urinary Catheter             LDAs               Peripheral IV 07/24/17 Right Hand (Active)   Site Assessment Clean, dry, & intact 7/24/2017  3:00 PM   Phlebitis Assessment 0 7/24/2017  3:00 PM   Infiltration Assessment 0 7/24/2017  3:00 PM   Dressing Status Clean, dry, & intact 7/24/2017  3:00 PM   Dressing Type Transparent 7/24/2017  3:00 PM   Hub Color/Line Status Blue;Capped;Flushed;Patent 7/24/2017  3:00 PM                      I/Os   Intake/Output Summary (Last 24 hours) at 07/24/17 1848  Last data filed at 07/24/17 0441   Gross per 24 hour   Intake              220 ml   Output                0 ml   Net              220 ml         Activity Level Activity Level: Up ad mona     Activity Assistance: No assistance needed   Diet Active Orders   Diet    DIET DIABETIC CONSISTENT CARB Regular      Purposeful Rounding every 1-2 hour?    [x] Yes    Lee Score  Total Score: 1   Bed Alarm (If score 3 or >)   [] Yes    [] Refused (See signed refusal form in chart)   Noé Score  Noé Score: 23       Noé Score (if score 14 or less)   [] PMT consult   [] Nutrition consult   [x] Wound Care consult      []  Specialty bed         Influenza Vaccine Received Flu Vaccine for Current Season (usually Sept-March): Not Flu Season               Needs prior to discharge:   Home O2 required:    [] Yes   [x] No     If yes, how much O2 required?     Other:    Last Bowel Movement Date: 07/24/17   Readmission Risk Assessment Tool Score Medium Risk            15       Total Score        3 Relationship with PCP    2 Patient Living Status    9 More than 1 Admission in calendar year    1 Charlson Comorbidity Score        Criteria that do not apply:    Patient Length of Stay > 5    Patient Insurance is Medicare, Medicaid or Self Pay       Expected Length of Stay 3d 2h   Actual Length of Stay 3

## 2017-07-24 NOTE — PROGRESS NOTES
I spoke with Dr. Evelyn Ritchie via phone and she confirmed pt will not be d/c home on Lovenox; she will complete Rx for Eliquis.     Ramon Dixon, [address] [place] [address] [phone]

## 2017-07-24 NOTE — PROGRESS NOTES
Consult received to verify Lovenox coverage. Chart review and spoke with nursing who stated pt is no longer receiving Lovenox and is now on Eliquis 5 mg twice a day. I phoned Dr. Adam Olguin and left a message requesting a return call to clarify the d/c medication. I met with pt who stated he has met his out of pocket deductible and his medications are being covered at 100%. He gets his medications filled at [facility] and [address]. I phoned and spoke with Margarita Fenton in the pharmacy who run the Rx Eliquis 5 mg twice a day for 30 day and it was covered at 100 %. Margarita Fenton stated they do have it in stock. Pt informed. Pt stated that he lives in a multilevel home with his spouse and daughter. He reports being independent with his ADLs and IADLs. He has a RW. He is active with his PCP. He drives. CM will follow for d/c needs. Care Management Interventions  PCP Verified by CM:  Yes  Transition of Care Consult (CM Consult): Discharge Planning  Discharge Durable Medical Equipment: No  Physical Therapy Consult: No  Occupational Therapy Consult: No  Speech Therapy Consult: No  Current Support Network: Own Home, Lives with Spouse  Confirm Follow Up Transport: Self  Plan discussed with Pt/Family/Caregiver: Yes  Discharge Location  Discharge Placement: [facility] [address]

## 2017-07-24 NOTE — PROGRESS NOTES
General Daily Progress Note    Admit Date: 7/21/2017  Hospital day  several    Subjective:     Patient has no complaint of sob or cp or diarrhea and per nursing had a formed stool yesterday s/w nursing pt's bps remain elevated    Medication side effects: none     Current Facility-Administered Medications   Medication Dose Route Frequency    metoprolol tartrate (LOPRESSOR) tablet 50 mg  50 mg Oral BID    hydrALAZINE (APRESOLINE) tablet 10 mg  10 mg Oral TID    isosorbide mononitrate ER (IMDUR) tablet 30 mg  30 mg Oral DAILY    atorvastatin (LIPITOR) tablet 20 mg  20 mg Oral QHS    apixaban (ELIQUIS) tablet 10 mg  10 mg Oral Q12H    dextrose 10% infusion 125-250 mL  125-250 mL IntraVENous PRN    zolpidem (AMBIEN) tablet 10 mg  10 mg Oral QHS PRN    gabapentin (NEURONTIN) capsule 300 mg  300 mg Oral TID    oxyCODONE-acetaminophen (PERCOCET 10)  mg per tablet 1 Tab  1 Tab Oral Q6H PRN    sodium chloride (NS) flush 5-10 mL  5-10 mL IntraVENous Q8H    sodium chloride (NS) flush 5-10 mL  5-10 mL IntraVENous PRN    acetaminophen (TYLENOL) tablet 650 mg  650 mg Oral Q4H PRN    naloxone (NARCAN) injection 0.4 mg  0.4 mg IntraVENous PRN    ondansetron (ZOFRAN) injection 4 mg  4 mg IntraVENous Q4H PRN    insulin lispro (HUMALOG) injection   SubCUTAneous AC&HS    glucose chewable tablet 16 g  4 Tab Oral PRN    glucagon (GLUCAGEN) injection 1 mg  1 mg IntraMUSCular PRN    insulin glargine (LANTUS) injection 35 Units  35 Units SubCUTAneous QHS        Review of Systems  A comprehensive review of systems was negative except for that written in the HPI.     Objective:     Patient Vitals for the past 8 hrs:   BP Temp Pulse Resp SpO2 Weight   07/24/17 0720 (!) 181/111 98 °F (36.7 °C) 78 18 94 % -   07/24/17 0441 (!) 163/102 97.9 °F (36.6 °C) 82 18 99 % 288 lb 14.4 oz (131 kg)        07/22 1901 - 07/24 0700  In: 1660 [P.O.:1660]  Out: 4147 [ZZENF:[id number]]  Physical Exam: General appearance: alert, fatigued, cooperative, no distress, appears stated age  Lungs: clear to auscultation bilaterally  Heart: regular rate and rhythm  Abdomen: soft, non-tender.  Bowel sounds normal. No masses,  no organomegaly  Extremities: extremities normal, atraumatic, no cyanosis or edema did not removed bandages   Neurologic: Grossly normal          Data Review   Recent Results (from the past 24 hour(s))   GLUCOSE, POC    Collection Time: 07/23/17  7:56 AM   Result Value Ref Range    Glucose (POC) 217 (H) 65 - 100 mg/dL    Performed by [address] [place] [address], POC    Collection Time: 07/23/17 11:32 AM   Result Value Ref Range    Glucose (POC) 226 (H) 65 - 100 mg/dL    Performed by [address] [place] [address], POC    Collection Time: 07/23/17  5:02 PM   Result Value Ref Range    Glucose (POC) 297 (H) 65 - 100 mg/dL    Performed by Jihan Jalloh    GLUCOSE, POC    Collection Time: 07/23/17 10:12 PM   Result Value Ref Range    Glucose (POC) 294 (H) 65 - 100 mg/dL    Performed by Daniel Mckeon    MAGNESIUM    Collection Time: 07/24/17  4:54 AM   Result Value Ref Range    Magnesium 1.8 1.6 - 2.4 mg/dL   METABOLIC PANEL, BASIC    Collection Time: 07/24/17  4:54 AM   Result Value Ref Range    Sodium 138 136 - 145 mmol/L    Potassium 3.4 (L) 3.5 - 5.1 mmol/L    Chloride 102 97 - 108 mmol/L    CO2 29 21 - 32 mmol/L    Anion gap 7 5 - 15 mmol/L    Glucose 160 (H) 65 - 100 mg/dL    BUN 10 6 - 20 MG/DL    Creatinine 1.09 0.70 - 1.30 MG/DL    BUN/Creatinine ratio 9 (L) 12 - 20      GFR est AA >60 >60 ml/min/1.73m2    GFR est non-AA >60 >60 ml/min/1.73m2    Calcium 7.7 (L) 8.5 - 10.1 MG/DL   CBC WITH AUTOMATED DIFF    Collection Time: 07/24/17  4:54 AM   Result Value Ref Range    WBC 7.4 4.1 - 11.1 K/uL    RBC 4.36 4.10 - 5.70 M/uL    HGB 9.6 (L) 12.1 - 17.0 g/dL    HCT 32.6 (L) 36.6 - 50.3 %    MCV 74.8 (L) 80.0 - 99.0 FL    MCH 22.0 (L) 26.0 - 34.0 PG    MCHC 29.4 (L) 30.0 - 36.5 g/dL    RDW 16.9 (H) 11.5 - 14.5 %    PLATELET 407 330 - 156 K/uL    NEUTROPHILS 54 32 - 75 %    LYMPHOCYTES 36 12 - 49 %    MONOCYTES 4 (L) 5 - 13 %    EOSINOPHILS 5 0 - 7 %    BASOPHILS 1 0 - 1 %    ABS. NEUTROPHILS 3.9 1.8 - 8.0 K/UL    ABS. LYMPHOCYTES 2.7 0.8 - 3.5 K/UL    ABS. MONOCYTES 0.3 0.0 - 1.0 K/UL    ABS. EOSINOPHILS 0.4 0.0 - 0.4 K/UL    ABS.  BASOPHILS 0.1 0.0 - 0.1 K/UL    RBC COMMENTS ANISOCYTOSIS  1+        RBC COMMENTS MICROCYTOSIS  1+        RBC COMMENTS HYPOCHROMIA  2+        DF SMEAR SCANNED     TSH 3RD GENERATION    Collection Time: 07/24/17  4:54 AM   Result Value Ref Range    TSH 3.75 (H) 0.36 - 3.74 uIU/mL   NUCLEATED RBC    Collection Time: 07/24/17  4:54 AM   Result Value Ref Range    NRBC 0.0 0  WBC    ABSOLUTE NRBC 0.00 0.00 - 0.01 K/uL   GLUCOSE, POC    Collection Time: 07/24/17  7:23 AM   Result Value Ref Range    Glucose (POC) 150 (H) 65 - 100 mg/dL    Performed by eJssi Hewitt            Assessment:     Active Problems:    HTN (hypertension) ()      IDDM (insulin dependent diabetes mellitus) ([facility] (10/6/2016)      Bilateral pulmonary embolism ([facility] (7/21/2017)      History of Clostridium difficile infection (7/22/2017)      Non-compliance with treatment (7/22/2017)      Hypokalemia (7/23/2017)        Plan:       continue ISS and diabetic diet  noncompliant pt makes it quite difficult to care for pt   Contact isolation recent dx of c diff   Hypokalemia supplementing improving with  normal MG   htn ess benign bp's remain elevated adjusted lopressor pt to receive 75 mg this am will likely need to change to 100 mg BID low na diet encouraged   Aware of labs ordered from cardio will add on vrdl  Will monitor

## 2017-07-24 NOTE — CARDIO/PULMONARY
Cardiopulmonary Rehab Nursing Entry:    Pt visited for cardiac teaching per request of Dr. Rowena Carlton secondary to hypertensive CM. EF 25-30%, pt non-smoker. Role of the Cardiac Rehab Nurse provided. Pt provided with low-salt diet and home heart healthy habit written materials. Verbal reinforcement of foods to avoid: processed, pre-packaged, canned items. Restriction of 1500mg of sodium per day with instruction of how to read nutritional labels. Advised pt to comply with prescriptions, to develop habit of weighing himself daily and assessing for any weight gain. Reviewed the parameters to contact MD, 3lbs/day or 5/lbs/week. Encouraged pt to begin home exercise program.    Pt without verbalization of questions or concerns at this time. Encouraged to read written materials.

## 2017-07-24 NOTE — PROGRESS NOTES
General Daily Progress Note    Admit Date: 7/21/2017  Hospital day  3    Subjective:     Patient has no complaint of sob or cp or diarrhea  Pt states he has had a bm but flushed it did not alert nursing staff as directed yesterday pt ate cheese pop corn salted almonds and there is [place] food in the room but he states he did not \"eat the chinese food\"  Medication side effects: none     Current Facility-Administered Medications   Medication Dose Route Frequency    [START ON 7/30/2017] apixaban (ELIQUIS) tablet 5 mg  5 mg Oral Q12H    hydrALAZINE (APRESOLINE) tablet 10 mg  10 mg Oral TID    isosorbide mononitrate ER (IMDUR) tablet 30 mg  30 mg Oral DAILY    atorvastatin (LIPITOR) tablet 20 mg  20 mg Oral QHS    apixaban (ELIQUIS) tablet 10 mg  10 mg Oral Q12H    dextrose 10% infusion 125-250 mL  125-250 mL IntraVENous PRN    zolpidem (AMBIEN) tablet 10 mg  10 mg Oral QHS PRN    gabapentin (NEURONTIN) capsule 300 mg  300 mg Oral TID    metoprolol tartrate (LOPRESSOR) tablet 12.5 mg  12.5 mg Oral BID    oxyCODONE-acetaminophen (PERCOCET 10)  mg per tablet 1 Tab  1 Tab Oral Q6H PRN    sodium chloride (NS) flush 5-10 mL  5-10 mL IntraVENous Q8H    sodium chloride (NS) flush 5-10 mL  5-10 mL IntraVENous PRN    acetaminophen (TYLENOL) tablet 650 mg  650 mg Oral Q4H PRN    naloxone (NARCAN) injection 0.4 mg  0.4 mg IntraVENous PRN    ondansetron (ZOFRAN) injection 4 mg  4 mg IntraVENous Q4H PRN    insulin lispro (HUMALOG) injection   SubCUTAneous AC&HS    glucose chewable tablet 16 g  4 Tab Oral PRN    glucagon (GLUCAGEN) injection 1 mg  1 mg IntraMUSCular PRN    insulin glargine (LANTUS) injection 35 Units  35 Units SubCUTAneous QHS        Review of Systems  A comprehensive review of systems was negative except for that written in the HPI.     Objective:     Patient Vitals for the past 8 hrs:   BP Temp Pulse Resp SpO2 Weight   07/24/17 0720 (!) 181/111 98 °F (36.7 °C) 78 18 94 % -   07/24/17 0441 (!) 163/102 97.9 °F (36.6 °C) 82 18 99 % 288 lb 14.4 oz (131 kg)        07/22 1901 - 07/24 0700  In: 1660 [P.O.:1660]  Out: 0280 [Urine:1375]  Physical Exam: General appearance: alert, fatigued, cooperative, no distress, appears stated age  Lungs: clear to auscultation bilaterally  Heart: regular rate and rhythm  Abdomen: soft, non-tender.  Bowel sounds normal. No masses,  no organomegaly  Extremities: extremities normal, atraumatic, no cyanosis or edema did not removed bandages   Neurologic: Grossly normal          Data Review   Recent Results (from the past 24 hour(s))   GLUCOSE, POC    Collection Time: 07/23/17  7:56 AM   Result Value Ref Range    Glucose (POC) 217 (H) 65 - 100 mg/dL    Performed by [address] [place] [address], POC    Collection Time: 07/23/17 11:32 AM   Result Value Ref Range    Glucose (POC) 226 (H) 65 - 100 mg/dL    Performed by [address] [place] [address], POC    Collection Time: 07/23/17  5:02 PM   Result Value Ref Range    Glucose (POC) 297 (H) 65 - 100 mg/dL    Performed by Sherly Ruiz    GLUCOSE, POC    Collection Time: 07/23/17 10:12 PM   Result Value Ref Range    Glucose (POC) 294 (H) 65 - 100 mg/dL    Performed by Jason Medel    MAGNESIUM    Collection Time: 07/24/17  4:54 AM   Result Value Ref Range    Magnesium 1.8 1.6 - 2.4 mg/dL   METABOLIC PANEL, BASIC    Collection Time: 07/24/17  4:54 AM   Result Value Ref Range    Sodium 138 136 - 145 mmol/L    Potassium 3.4 (L) 3.5 - 5.1 mmol/L    Chloride 102 97 - 108 mmol/L    CO2 29 21 - 32 mmol/L    Anion gap 7 5 - 15 mmol/L    Glucose 160 (H) 65 - 100 mg/dL    BUN 10 6 - 20 MG/DL    Creatinine 1.09 0.70 - 1.30 MG/DL    BUN/Creatinine ratio 9 (L) 12 - 20      GFR est AA >60 >60 ml/min/1.73m2    GFR est non-AA >60 >60 ml/min/1.73m2    Calcium 7.7 (L) 8.5 - 10.1 MG/DL   CBC WITH AUTOMATED DIFF    Collection Time: 07/24/17  4:54 AM   Result Value Ref Range    WBC 7.4 4.1 - 11.1 K/uL    RBC 4.36 4.10 - 5.70 M/uL    HGB 9.6 (L) 12.1 - 17.0 g/dL    HCT 32.6 (L) 36.6 - 50.3 %    MCV 74.8 (L) 80.0 - 99.0 FL    MCH 22.0 (L) 26.0 - 34.0 PG    MCHC 29.4 (L) 30.0 - 36.5 g/dL    RDW 16.9 (H) 11.5 - 14.5 %    PLATELET 424 179 - 017 K/uL    NEUTROPHILS 54 32 - 75 %    LYMPHOCYTES 36 12 - 49 %    MONOCYTES 4 (L) 5 - 13 %    EOSINOPHILS 5 0 - 7 %    BASOPHILS 1 0 - 1 %    ABS. NEUTROPHILS 3.9 1.8 - 8.0 K/UL    ABS. LYMPHOCYTES 2.7 0.8 - 3.5 K/UL    ABS. MONOCYTES 0.3 0.0 - 1.0 K/UL    ABS. EOSINOPHILS 0.4 0.0 - 0.4 K/UL    ABS.  BASOPHILS 0.1 0.0 - 0.1 K/UL    RBC COMMENTS ANISOCYTOSIS  1+        RBC COMMENTS MICROCYTOSIS  1+        RBC COMMENTS HYPOCHROMIA  2+        DF SMEAR SCANNED     TSH 3RD GENERATION    Collection Time: 07/24/17  4:54 AM   Result Value Ref Range    TSH 3.75 (H) 0.36 - 3.74 uIU/mL   NUCLEATED RBC    Collection Time: 07/24/17  4:54 AM   Result Value Ref Range    NRBC 0.0 0  WBC    ABSOLUTE NRBC 0.00 0.00 - 0.01 K/uL           Assessment:     Active Problems:    HTN (hypertension) ()      IDDM (insulin dependent diabetes mellitus) ([facility] (10/6/2016)      Bilateral pulmonary embolism ([facility] (7/21/2017)      History of Clostridium difficile infection (7/22/2017)      Non-compliance with treatment (7/22/2017)      Hypokalemia (7/23/2017)        Plan:       continue ISS and diabetic diet  noncompliant pt makes it quite difficult to care for pt   Contact isolation recent dx of c diff   Hypokalemia will supplemented and check MG   htn uncontrolled due to dietary non compliance d/w pt need to eat a low na diet and cheese popcorn salted almonds and chinese food are NOT low in na  D/w pt need for hiv will check vrdl as well   dispo hopefully tomorrow but would not be surprised if pt left ama

## 2017-07-24 NOTE — PROGRESS NOTES
Bedside shift change report given to Jamari Edouard RN (oncoming nurse) by Kimberly Arana RN (offgoing nurse). Report included the following information SBAR, Kardex, Intake/Output, MAR, Recent Results, Med Rec Status and Cardiac Rhythm NSR.

## 2017-07-25 LAB
ANION GAP BLD CALC-SCNC: 5 MMOL/L (ref 5–15)
BUN SERPL-MCNC: 13 MG/DL (ref 6–20)
BUN/CREAT SERPL: 12 (ref 12–20)
CALCIUM SERPL-MCNC: 7.9 MG/DL (ref 8.5–10.1)
CHLORIDE SERPL-SCNC: 105 MMOL/L (ref 97–108)
CO2 SERPL-SCNC: 29 MMOL/L (ref 21–32)
CREAT SERPL-MCNC: 1.1 MG/DL (ref 0.7–1.3)
GLUCOSE BLD STRIP.AUTO-MCNC: 149 MG/DL (ref 65–100)
GLUCOSE BLD STRIP.AUTO-MCNC: 152 MG/DL (ref 65–100)
GLUCOSE BLD STRIP.AUTO-MCNC: 229 MG/DL (ref 65–100)
GLUCOSE BLD STRIP.AUTO-MCNC: 275 MG/DL (ref 65–100)
GLUCOSE SERPL-MCNC: 163 MG/DL (ref 65–100)
POTASSIUM SERPL-SCNC: 3.9 MMOL/L (ref 3.5–5.1)
SERVICE CMNT-IMP: ABNORMAL
SODIUM SERPL-SCNC: 139 MMOL/L (ref 136–145)
T PALLIDUM AB SER QL IA: NEGATIVE

## 2017-07-25 PROCEDURE — 77030011256 HC DRSG MEPILEX <16IN NO BORD MOLN -A

## 2017-07-25 PROCEDURE — 36415 COLL VENOUS BLD VENIPUNCTURE: CPT | Performed by: INTERNAL MEDICINE

## 2017-07-25 PROCEDURE — 74011250637 HC RX REV CODE- 250/637: Performed by: INTERNAL MEDICINE

## 2017-07-25 PROCEDURE — 80048 BASIC METABOLIC PNL TOTAL CA: CPT | Performed by: INTERNAL MEDICINE

## 2017-07-25 PROCEDURE — 82962 GLUCOSE BLOOD TEST: CPT

## 2017-07-25 PROCEDURE — 74011636637 HC RX REV CODE- 636/637: Performed by: INTERNAL MEDICINE

## 2017-07-25 PROCEDURE — 77030011255 HC DSG AQUACEL AG BMS -A

## 2017-07-25 PROCEDURE — 74011250637 HC RX REV CODE- 250/637: Performed by: FAMILY MEDICINE

## 2017-07-25 PROCEDURE — 74011250636 HC RX REV CODE- 250/636: Performed by: INTERNAL MEDICINE

## 2017-07-25 PROCEDURE — 65660000000 HC RM CCU STEPDOWN

## 2017-07-25 RX ORDER — CARVEDILOL 3.12 MG/1
3.12 TABLET ORAL
Status: COMPLETED | OUTPATIENT
Start: 2017-07-25 | End: 2017-07-25

## 2017-07-25 RX ORDER — CARVEDILOL 6.25 MG/1
6.25 TABLET ORAL ONCE
Status: COMPLETED | OUTPATIENT
Start: 2017-07-25 | End: 2017-07-25

## 2017-07-25 RX ORDER — CARVEDILOL 6.25 MG/1
6.25 TABLET ORAL 2 TIMES DAILY WITH MEALS
Status: DISCONTINUED | OUTPATIENT
Start: 2017-07-25 | End: 2017-07-26 | Stop reason: HOSPADM

## 2017-07-25 RX ORDER — HYDRALAZINE HYDROCHLORIDE 20 MG/ML
10 INJECTION INTRAMUSCULAR; INTRAVENOUS
Status: DISCONTINUED | OUTPATIENT
Start: 2017-07-25 | End: 2017-07-26 | Stop reason: HOSPADM

## 2017-07-25 RX ADMIN — Medication 10 ML: at 12:06

## 2017-07-25 RX ADMIN — CARVEDILOL 3.12 MG: 3.12 TABLET, FILM COATED ORAL at 08:42

## 2017-07-25 RX ADMIN — SPIRONOLACTONE 25 MG: 25 TABLET, FILM COATED ORAL at 08:42

## 2017-07-25 RX ADMIN — HYDRALAZINE HYDROCHLORIDE 10 MG: 10 TABLET, FILM COATED ORAL at 21:22

## 2017-07-25 RX ADMIN — Medication 10 ML: at 05:23

## 2017-07-25 RX ADMIN — CARVEDILOL 6.25 MG: 6.25 TABLET, FILM COATED ORAL at 21:22

## 2017-07-25 RX ADMIN — HYDRALAZINE HYDROCHLORIDE 10 MG: 10 TABLET, FILM COATED ORAL at 08:41

## 2017-07-25 RX ADMIN — OXYCODONE HYDROCHLORIDE AND ACETAMINOPHEN 1 TABLET: 10; 325 TABLET ORAL at 18:29

## 2017-07-25 RX ADMIN — GABAPENTIN 300 MG: 300 CAPSULE ORAL at 08:41

## 2017-07-25 RX ADMIN — INSULIN LISPRO 3 UNITS: 100 INJECTION, SOLUTION INTRAVENOUS; SUBCUTANEOUS at 21:22

## 2017-07-25 RX ADMIN — GABAPENTIN 300 MG: 300 CAPSULE ORAL at 21:22

## 2017-07-25 RX ADMIN — HYDRALAZINE HYDROCHLORIDE 10 MG: 20 INJECTION INTRAMUSCULAR; INTRAVENOUS at 22:57

## 2017-07-25 RX ADMIN — INSULIN GLARGINE 35 UNITS: 100 INJECTION, SOLUTION SUBCUTANEOUS at 21:22

## 2017-07-25 RX ADMIN — Medication 10 ML: at 21:24

## 2017-07-25 RX ADMIN — GABAPENTIN 300 MG: 300 CAPSULE ORAL at 18:15

## 2017-07-25 RX ADMIN — CARVEDILOL 3.12 MG: 3.12 TABLET, FILM COATED ORAL at 12:05

## 2017-07-25 RX ADMIN — APIXABAN 10 MG: 5 TABLET, FILM COATED ORAL at 22:24

## 2017-07-25 RX ADMIN — ATORVASTATIN CALCIUM 20 MG: 20 TABLET, FILM COATED ORAL at 21:22

## 2017-07-25 RX ADMIN — INSULIN LISPRO 2 UNITS: 100 INJECTION, SOLUTION INTRAVENOUS; SUBCUTANEOUS at 12:06

## 2017-07-25 RX ADMIN — APIXABAN 10 MG: 5 TABLET, FILM COATED ORAL at 12:04

## 2017-07-25 RX ADMIN — INSULIN LISPRO 3 UNITS: 100 INJECTION, SOLUTION INTRAVENOUS; SUBCUTANEOUS at 18:14

## 2017-07-25 RX ADMIN — INSULIN LISPRO 2 UNITS: 100 INJECTION, SOLUTION INTRAVENOUS; SUBCUTANEOUS at 09:03

## 2017-07-25 RX ADMIN — HYDRALAZINE HYDROCHLORIDE 10 MG: 10 TABLET, FILM COATED ORAL at 18:15

## 2017-07-25 RX ADMIN — ISOSORBIDE MONONITRATE 30 MG: 30 TABLET, EXTENDED RELEASE ORAL at 08:41

## 2017-07-25 NOTE — PROGRESS NOTES
General Daily Progress Note    Admit Date: 7/21/2017  Hospital day  several    Subjective:     Patient has no complaint of sob or cp or diarrhea  Still not compliant with diet s/w nursing   Medication side effects: none     Current Facility-Administered Medications   Medication Dose Route Frequency    carvedilol (COREG) tablet 3.125 mg  3.125 mg Oral BID WITH MEALS    spironolactone (ALDACTONE) tablet 25 mg  25 mg Oral DAILY    ALPRAZolam (XANAX) tablet 0.5 mg  0.5 mg Oral Q12H PRN    hydrALAZINE (APRESOLINE) tablet 10 mg  10 mg Oral TID    isosorbide mononitrate ER (IMDUR) tablet 30 mg  30 mg Oral DAILY    atorvastatin (LIPITOR) tablet 20 mg  20 mg Oral QHS    apixaban (ELIQUIS) tablet 10 mg  10 mg Oral Q12H    dextrose 10% infusion 125-250 mL  125-250 mL IntraVENous PRN    zolpidem (AMBIEN) tablet 10 mg  10 mg Oral QHS PRN    gabapentin (NEURONTIN) capsule 300 mg  300 mg Oral TID    oxyCODONE-acetaminophen (PERCOCET 10)  mg per tablet 1 Tab  1 Tab Oral Q6H PRN    sodium chloride (NS) flush 5-10 mL  5-10 mL IntraVENous Q8H    sodium chloride (NS) flush 5-10 mL  5-10 mL IntraVENous PRN    acetaminophen (TYLENOL) tablet 650 mg  650 mg Oral Q4H PRN    naloxone (NARCAN) injection 0.4 mg  0.4 mg IntraVENous PRN    ondansetron (ZOFRAN) injection 4 mg  4 mg IntraVENous Q4H PRN    insulin lispro (HUMALOG) injection   SubCUTAneous AC&HS    glucose chewable tablet 16 g  4 Tab Oral PRN    glucagon (GLUCAGEN) injection 1 mg  1 mg IntraMUSCular PRN    insulin glargine (LANTUS) injection 35 Units  35 Units SubCUTAneous QHS        Review of Systems  A comprehensive review of systems was negative except for that written in the HPI.     Objective:     Patient Vitals for the past 8 hrs:   BP Temp Pulse Resp SpO2   07/25/17 0332 (!) 143/101 98.2 °F (36.8 °C) 76 16 100 %        07/23 1901 - 07/25 0700  In: 320 [P.O.:320]  Out: -   Physical Exam: General appearance: alert, fatigued, cooperative, no distress, appears stated age  Lungs: clear to auscultation bilaterally  Heart: regular rate and rhythm  Abdomen: soft, non-tender. Bowel sounds normal. No masses,  no organomegaly  Extremities: extremities normal, atraumatic, no cyanosis or edema did not removed bandages   Neurologic: Grossly normal          Data Review   Recent Results (from the past 24 hour(s))   GLUCOSE, POC    Collection Time: 07/24/17  7:23 AM   Result Value Ref Range    Glucose (POC) 150 (H) 65 - 100 mg/dL    Performed by Ventura De Luna    CBC WITH AUTOMATED DIFF    Collection Time: 07/24/17 10:56 AM   Result Value Ref Range    WBC 7.0 4.1 - 11.1 K/uL    RBC 4.43 4.10 - 5.70 M/uL    HGB 10.4 (L) 12.1 - 17.0 g/dL    HCT 33.0 (L) 36.6 - 50.3 %    MCV 74.5 (L) 80.0 - 99.0 FL    MCH 23.5 (L) 26.0 - 34.0 PG    MCHC 31.5 30.0 - 36.5 g/dL    RDW 16.8 (H) 11.5 - 14.5 %    PLATELET 315 (H) 219 - 400 K/uL    NEUTROPHILS 63 32 - 75 %    LYMPHOCYTES 27 12 - 49 %    MONOCYTES 6 5 - 13 %    EOSINOPHILS 3 0 - 7 %    BASOPHILS 1 0 - 1 %    ABS. NEUTROPHILS 4.3 1.8 - 8.0 K/UL    ABS. LYMPHOCYTES 1.9 0.8 - 3.5 K/UL    ABS. MONOCYTES 0.4 0.0 - 1.0 K/UL    ABS. EOSINOPHILS 0.2 0.0 - 0.4 K/UL    ABS. BASOPHILS 0.1 0.0 - 0.1 K/UL   METABOLIC PANEL, COMPREHENSIVE    Collection Time: 07/24/17 10:56 AM   Result Value Ref Range    Sodium 137 136 - 145 mmol/L    Potassium 3.5 3.5 - 5.1 mmol/L    Chloride 104 97 - 108 mmol/L    CO2 27 21 - 32 mmol/L    Anion gap 6 5 - 15 mmol/L    Glucose 189 (H) 65 - 100 mg/dL    BUN 9 6 - 20 MG/DL    Creatinine 1.18 0.70 - 1.30 MG/DL    BUN/Creatinine ratio 8 (L) 12 - 20      GFR est AA >60 >60 ml/min/1.73m2    GFR est non-AA >60 >60 ml/min/1.73m2    Calcium 7.8 (L) 8.5 - 10.1 MG/DL    Bilirubin, total 0.2 0.2 - 1.0 MG/DL    ALT (SGPT) 26 12 - 78 U/L    AST (SGOT) 25 15 - 37 U/L    Alk.  phosphatase 114 45 - 117 U/L    Protein, total 6.4 6.4 - 8.2 g/dL    Albumin 1.7 (L) 3.5 - 5.0 g/dL    Globulin 4.7 (H) 2.0 - 4.0 g/dL    A-G Ratio 0.4 (L) 1.1 - 2. 2     NUCLEATED RBC    Collection Time: 07/24/17 10:56 AM   Result Value Ref Range    NRBC 0.0 0  WBC    ABSOLUTE NRBC 0.00 0.00 - 0.01 K/uL   GLUCOSE, POC    Collection Time: 07/24/17 11:26 AM   Result Value Ref Range    Glucose (POC) 217 (H) 65 - 100 mg/dL    Performed by Santana Fong    GLUCOSE, POC    Collection Time: 07/24/17  4:21 PM   Result Value Ref Range    Glucose (POC) 179 (H) 65 - 100 mg/dL    Performed by uSe Samuel    GLUCOSE, POC    Collection Time: 07/24/17  8:40 PM   Result Value Ref Range    Glucose (POC) 188 (H) 65 - 100 mg/dL    Performed by [address] [place] [address], [facility]    Collection Time: 07/25/17  3:49 AM   Result Value Ref Range    Sodium 139 136 - 145 mmol/L    Potassium 3.9 3.5 - 5.1 mmol/L    Chloride 105 97 - 108 mmol/L    CO2 29 21 - 32 mmol/L    Anion gap 5 5 - 15 mmol/L    Glucose 163 (H) 65 - 100 mg/dL    BUN 13 6 - 20 MG/DL    Creatinine 1.10 0.70 - 1.30 MG/DL    BUN/Creatinine ratio 12 12 - 20      GFR est AA >60 >60 ml/min/1.73m2    GFR est non-AA >60 >60 ml/min/1.73m2    Calcium 7.9 (L) 8.5 - 10.1 MG/DL           Assessment:     Active Problems:    HTN (hypertension) ()      IDDM (insulin dependent diabetes mellitus) ([facility] (10/6/2016)      Bilateral pulmonary embolism (HCC) (7/21/2017)      History of Clostridium difficile infection (7/22/2017)      Non-compliance with treatment (7/22/2017)      Hypokalemia (7/23/2017)        Plan:     He remains quite anxious to go however  Bps remain quite elevated pt is not compliant with diet s/w nursing still eating salted almonds in room  bp meds adjusted yesterday via cardiology dispo once bp's < 180/90 in the am phill  Hypokalemia resolved

## 2017-07-25 NOTE — TELEPHONE ENCOUNTER
I called and left a msg on Ingrid's vm stating that it's ok for her to check coverage for med Eliquis. A callback number was left if [place] have any further questions or concerns.

## 2017-07-25 NOTE — PROGRESS NOTES
Cardiology Progress Note  7/25/2017     Admit Date: 7/21/2017  Admit Diagnosis: Bilateral pulmonary embolism (HCC)  CC: none currently   Cardiac Assessment/Plan:   Dyspnea, lightheaded ness; indeterminate troponin: All c/w extensive PE;     however, echo 7/22: Ef 25-30% with borderline LVE, global HK; Mod LVH; RVE/decreased RV Fxn. Mild-mod TR. Findings c/w hypertensive CM: Med Rx for now; will need future eval for ischemia (after PE treated). Adding hydralazine/imdur (with acei angioedema, would avoid ARBs). Nl ferritin/TSH; HIV pending. HTN: Still too high but better overall: changed metoprolol to coreg (now 6.25 bid); increase hydralazine if needed (currently 10 tid, may need 25 tid); cont imdur. Added low dose aldactone (25 qam).     Rhythm: sinus    Lipids: with DM, should be on a statin: started lipitor 20; check lipids.     Volume status: looks euvolemic other than LE edema. Will need to watch Cr. Low albumin (1.7) certainly could be playing a role in LE edema.      ID (osteo/C diff), DM2 (insulin for years), CKD, Dispo: per primary team.    F/U with me in 6-8 weeks.      He's being Rx for recent osteo: was on IV ABx, off now; Had C diff starting a few weeks ago. He had a post-op PE in 2014 after transmet resection. ____________________________________________________________________     The patient reports no chest pain/pressure; He reports compliance with meds.     No PND, orthopnea, palpitations, pre-syncope, syncope. No current complaints.     He notes intermittent LE edema for the last few months.     He developed new exertional GARCIA for the last 2 days (again no CP).    ECG: sinus; LVH TWI right-precordial leads. Tele: sinus  CXR: no CHF/PNA. Notable labs: Trop 0.1; Cr 1.45; WBC 13; Hg 9.9; k 3.2. HgA1c 11!     CTA: bilateral extensive PE.  LE U/S: right pop DVT.     Notable prior cardiac history:  None; PE in 2014.     [facility] Family from [place]. For other plans, see orders. Hospital problem list   Active Hospital Problems    Diagnosis Date Noted    Hypokalemia 2017    History of Clostridium difficile infection 2017    Non-compliance with treatment 2017    Bilateral pulmonary embolism [facility] 2017    IDDM (insulin dependent diabetes mellitus) ([facility] 10/06/2016    HTN (hypertension)         Subjective: Denise Payment reports   Chest pain X none  consistent with:  Non-cardiac CP         Atypical CP     None now  On going  Anginal CP     Dyspnea  none  at rest  with exertion        x improved  unchanged  worse              PND X none  overnight       Orthopnea X none  improved  unchanged  worse   Presyncope X none  improved  unchanged  worse     Ambulated in hallway without symptoms   Yes   Ambulated in room without symptoms  Yes   Objective:    Physical Exam:  Overall VSSAF;    Visit Vitals    BP (!) 167/108 (BP 1 Location: Right arm)    Pulse 74    Temp 98.3 °F (36.8 °C)    Resp 16    Ht 6' 3\" (1.905 m)    Wt 131 kg (288 lb 14.4 oz)    SpO2 100%    BMI 36.11 kg/m2     Temp (24hrs), Av.1 °F (36.7 °C), Min:96.7 °F (35.9 °C), Max:98.8 °F (37.1 °C)    Patient Vitals for the past 8 hrs:   Pulse   17 0839 74   17 0815 78   17 0332 76    Patient Vitals for the past 8 hrs:   Resp   17 0815 16   17 0332 16    Patient Vitals for the past 8 hrs:   BP   17 0839 (!) 167/108   17 0815 (!) 180/122   17 0332 (!) 143/101          Intake/Output Summary (Last 24 hours) at 17 0941  Last data filed at 17 0002   Gross per 24 hour   Intake              100 ml   Output                0 ml   Net              100 ml     General Appearance: Well developed, well nourished, no acute distress. Ears/Nose/Mouth/Throat:   Normal MM; anicteric. JVP: WNL   Resp:   clear to auscultation bilaterally. Nl resp effort.    Cardiovascular:  RRR, S1, S2 normal, no new murmur. No gallop or rub. Abdomen:   Soft, non-tender, bowel sounds are present. Extremities: 1-2+ edema bilaterally. Skin:  Neuro: Warm and dry. A/O x3, grossly nonfocal   cath site intact w/o hematoma or new bruit; distal pulse unchanged  Yes   Data Review:     Telemetry independently reviewed x sinus  chronic afib  parox afib  NSVT     ECG independently reviewed  NSR  afib  no significant changes  NSST-Tw chgs   x no new ECG provided for review   Lab results reviewed as noted below. Current medications reviewed as noted below. No results for input(s): PH, PCO2, PO2 in the last 72 hours. No results for input(s): CPK, CKMB, CKNDX, TROIQ in the last 72 hours.   Recent Labs      07/25/17   0349  07/24/17   1056  07/24/17   0454  07/23/17   0412   NA  139  137  138  136   K  3.9  3.5  3.4*  3.3*   CL  105  104  102  104   CO2  29  27  29  23   BUN  13  9  10  11   CREA  1.10  1.18  1.09  1.19   GLU  163*  189*  160*  258*   CA  7.9*  7.8*  7.7*  7.6*   ALB   --   1.7*   --   1.5*   WBC   --   7.0  7.4  7.7   HGB   --   10.4*  9.6*  10.3*   HCT   --   33.0*  32.6*  33.1*   PLT   --   402*  381  328     Lab Results   Component Value Date/Time    Cholesterol, total 161 07/24/2017 04:54 AM    HDL Cholesterol 46 07/24/2017 04:54 AM    LDL, calculated 98.2 07/24/2017 04:54 AM    Triglyceride 84 07/24/2017 04:54 AM    CHOL/HDL Ratio 3.5 07/24/2017 04:54 AM     Recent Labs      07/24/17   1056  07/23/17   0412   SGOT  25  18   AP  114  114   TP  6.4  6.1*   ALB  1.7*  1.5*   GLOB  4.7*  4.6*     Recent Labs      07/23/17   0430  07/22/17 2009   INR  1.1  1.1   PTP  11.3*  11.4*      No components found for: GLPOC    Current Facility-Administered Medications   Medication Dose Route Frequency    carvedilol (COREG) tablet 6.25 mg  6.25 mg Oral BID WITH MEALS    carvedilol (COREG) tablet 3.125 mg  3.125 mg Oral NOW    hydrALAZINE (APRESOLINE) 20 mg/mL injection 10 mg  10 mg IntraVENous Q4H PRN    spironolactone (ALDACTONE) tablet 25 mg  25 mg Oral DAILY    ALPRAZolam (XANAX) tablet 0.5 mg  0.5 mg Oral Q12H PRN    hydrALAZINE (APRESOLINE) tablet 10 mg  10 mg Oral TID    isosorbide mononitrate ER (IMDUR) tablet 30 mg  30 mg Oral DAILY    atorvastatin (LIPITOR) tablet 20 mg  20 mg Oral QHS    apixaban (ELIQUIS) tablet 10 mg  10 mg Oral Q12H    dextrose 10% infusion 125-250 mL  125-250 mL IntraVENous PRN    zolpidem (AMBIEN) tablet 10 mg  10 mg Oral QHS PRN    gabapentin (NEURONTIN) capsule 300 mg  300 mg Oral TID    oxyCODONE-acetaminophen (PERCOCET 10)  mg per tablet 1 Tab  1 Tab Oral Q6H PRN    sodium chloride (NS) flush 5-10 mL  5-10 mL IntraVENous Q8H    sodium chloride (NS) flush 5-10 mL  5-10 mL IntraVENous PRN    acetaminophen (TYLENOL) tablet 650 mg  650 mg Oral Q4H PRN    naloxone (NARCAN) injection 0.4 mg  0.4 mg IntraVENous PRN    ondansetron (ZOFRAN) injection 4 mg  4 mg IntraVENous Q4H PRN    insulin lispro (HUMALOG) injection   SubCUTAneous AC&HS    glucose chewable tablet 16 g  4 Tab Oral PRN    glucagon (GLUCAGEN) injection 1 mg  1 mg IntraMUSCular PRN    insulin glargine (LANTUS) injection 35 Units  35 Units SubCUTAneous QHS        Rubi Garcia MD

## 2017-07-25 NOTE — PROGRESS NOTES
Bedside shift change report given to Imani Del Valle RN (oncoming nurse) by Rosalba Garner RN (offgoing nurse). Report included the following information SBAR, Kardex, Intake/Output, MAR, Recent Results, Med Rec Status and Cardiac Rhythm NSR.

## 2017-07-26 VITALS
DIASTOLIC BLOOD PRESSURE: 87 MMHG | HEART RATE: 80 BPM | SYSTOLIC BLOOD PRESSURE: 154 MMHG | OXYGEN SATURATION: 99 % | HEIGHT: 75 IN | WEIGHT: 288.9 LBS | TEMPERATURE: 98.4 F | RESPIRATION RATE: 18 BRPM | BODY MASS INDEX: 35.92 KG/M2

## 2017-07-26 LAB
ANION GAP BLD CALC-SCNC: 4 MMOL/L (ref 5–15)
BUN SERPL-MCNC: 17 MG/DL (ref 6–20)
BUN/CREAT SERPL: 14 (ref 12–20)
CALCIUM SERPL-MCNC: 7.9 MG/DL (ref 8.5–10.1)
CHLORIDE SERPL-SCNC: 103 MMOL/L (ref 97–108)
CO2 SERPL-SCNC: 30 MMOL/L (ref 21–32)
CREAT SERPL-MCNC: 1.18 MG/DL (ref 0.7–1.3)
GLUCOSE BLD STRIP.AUTO-MCNC: 164 MG/DL (ref 65–100)
GLUCOSE BLD STRIP.AUTO-MCNC: 167 MG/DL (ref 65–100)
GLUCOSE SERPL-MCNC: 221 MG/DL (ref 65–100)
HIV 1+2 AB+HIV1 P24 AG SERPL QL IA: NONREACTIVE
HIV12 RESULT COMMENT, HHIVC: NORMAL
POTASSIUM SERPL-SCNC: 4.1 MMOL/L (ref 3.5–5.1)
SERVICE CMNT-IMP: ABNORMAL
SERVICE CMNT-IMP: ABNORMAL
SODIUM SERPL-SCNC: 137 MMOL/L (ref 136–145)

## 2017-07-26 PROCEDURE — 74011250637 HC RX REV CODE- 250/637: Performed by: INTERNAL MEDICINE

## 2017-07-26 PROCEDURE — 36415 COLL VENOUS BLD VENIPUNCTURE: CPT | Performed by: INTERNAL MEDICINE

## 2017-07-26 PROCEDURE — 74011636637 HC RX REV CODE- 636/637: Performed by: INTERNAL MEDICINE

## 2017-07-26 PROCEDURE — 80048 BASIC METABOLIC PNL TOTAL CA: CPT | Performed by: INTERNAL MEDICINE

## 2017-07-26 PROCEDURE — 74011250637 HC RX REV CODE- 250/637: Performed by: FAMILY MEDICINE

## 2017-07-26 PROCEDURE — 82962 GLUCOSE BLOOD TEST: CPT

## 2017-07-26 RX ORDER — ISOSORBIDE MONONITRATE 30 MG/1
30 TABLET, EXTENDED RELEASE ORAL DAILY
Qty: 30 TAB | Refills: 0 | Status: SHIPPED | OUTPATIENT
Start: 2017-07-26 | End: 2017-09-02

## 2017-07-26 RX ORDER — HYDRALAZINE HYDROCHLORIDE 25 MG/1
25 TABLET, FILM COATED ORAL 3 TIMES DAILY
Status: DISCONTINUED | OUTPATIENT
Start: 2017-07-26 | End: 2017-07-26 | Stop reason: HOSPADM

## 2017-07-26 RX ORDER — CARVEDILOL 6.25 MG/1
6.25 TABLET ORAL 2 TIMES DAILY WITH MEALS
Qty: 60 TAB | Refills: 1 | Status: SHIPPED | OUTPATIENT
Start: 2017-07-26 | End: 2017-09-09 | Stop reason: SDUPTHER

## 2017-07-26 RX ORDER — SPIRONOLACTONE 25 MG/1
25 TABLET ORAL DAILY
Qty: 30 TAB | Refills: 0 | Status: SHIPPED | OUTPATIENT
Start: 2017-07-26 | End: 2017-08-21 | Stop reason: SDUPTHER

## 2017-07-26 RX ORDER — HYDRALAZINE HYDROCHLORIDE 25 MG/1
25 TABLET, FILM COATED ORAL 3 TIMES DAILY
Qty: 90 TAB | Refills: 0 | Status: SHIPPED | OUTPATIENT
Start: 2017-07-26 | End: 2017-08-21 | Stop reason: SDUPTHER

## 2017-07-26 RX ADMIN — INSULIN LISPRO 2 UNITS: 100 INJECTION, SOLUTION INTRAVENOUS; SUBCUTANEOUS at 09:02

## 2017-07-26 RX ADMIN — CARVEDILOL 6.25 MG: 6.25 TABLET, FILM COATED ORAL at 09:01

## 2017-07-26 RX ADMIN — ISOSORBIDE MONONITRATE 30 MG: 30 TABLET, EXTENDED RELEASE ORAL at 09:01

## 2017-07-26 RX ADMIN — APIXABAN 10 MG: 5 TABLET, FILM COATED ORAL at 12:28

## 2017-07-26 RX ADMIN — HYDRALAZINE HYDROCHLORIDE 25 MG: 25 TABLET, FILM COATED ORAL at 09:01

## 2017-07-26 RX ADMIN — Medication 10 ML: at 06:00

## 2017-07-26 RX ADMIN — SPIRONOLACTONE 25 MG: 25 TABLET, FILM COATED ORAL at 09:01

## 2017-07-26 RX ADMIN — GABAPENTIN 300 MG: 300 CAPSULE ORAL at 09:01

## 2017-07-26 RX ADMIN — INSULIN LISPRO 2 UNITS: 100 INJECTION, SOLUTION INTRAVENOUS; SUBCUTANEOUS at 12:28

## 2017-07-26 RX ADMIN — OXYCODONE HYDROCHLORIDE AND ACETAMINOPHEN 1 TABLET: 10; 325 TABLET ORAL at 00:20

## 2017-07-26 NOTE — PROGRESS NOTES
Cardiology Progress Note  7/26/2017     Admit Date: 7/21/2017  Admit Diagnosis: Bilateral pulmonary embolism (HCC)  CC: none currently   Cardiac Assessment/Plan:   Dyspnea, lightheaded ness; indeterminate troponin: All c/w extensive PE;     however, echo 7/22: Ef 25-30% with borderline LVE, global HK; Mod LVH; RVE/decreased RV Fxn. Mild-mod TR. Findings c/w hypertensive CM: Med Rx for now; will need future eval for ischemia (after PE treated). Added hydralazine (now 25 tid) and imdur (30); With acei angioedema, avoiding ARBs. Nl ferritin/TSH; HIV pending. HTN: Still too high but better overall: changed metoprolol to coreg (now 6.25 bid); increase hydralazine if needed (currently 10 tid, may need 25 tid); cont imdur. Added low dose aldactone (25 qam).     Rhythm: sinus    Lipids: with DM, should be on a statin: started lipitor 20; check lipids.     Volume status: looks euvolemic other than LE edema. Will need to watch Cr. Low albumin (1.7) certainly could be playing a role in LE edema.      ID (osteo/C diff), DM2 (insulin for years), CKD, Dispo: per primary team.  F/u with me 6-8 weeks after d/c.      He's being Rx for recent osteo: was on IV ABx, off now; Had C diff starting a few weeks ago. He had a post-op PE in 2014 after transmet resection. ____________________________________________________________________     The patient reports no chest pain/pressure; He reports compliance with meds.     No PND, orthopnea, palpitations, pre-syncope, syncope. No current complaints.     He notes intermittent LE edema for the last few months.     He developed new exertional GARCIA for the last 2 days (again no CP).    ECG: sinus; LVH TWI right-precordial leads. Tele: sinus  CXR: no CHF/PNA. Notable labs: Trop 0.1; Cr 1.45; WBC 13; Hg 9.9; k 3.2. HgA1c 11!     CTA: bilateral extensive PE.  LE U/S: right pop DVT.     Notable prior cardiac history:  None; PE in 2014.     [facility] Family from [place]. For other plans, see orders. Hospital problem list   Active Hospital Problems    Diagnosis Date Noted    Hypokalemia 2017    History of Clostridium difficile infection 2017    Non-compliance with treatment 2017    Bilateral pulmonary embolism ([facility] 2017    IDDM (insulin dependent diabetes mellitus) ([facility] 10/06/2016    HTN (hypertension)         Subjective: Joleen Martinez reports   Chest pain X none  consistent with:  Non-cardiac CP         Atypical CP     None now  On going  Anginal CP     Dyspnea  none  at rest  with exertion        x improved  unchanged  worse              PND X none  overnight       Orthopnea X none  improved  unchanged  worse   Presyncope X none  improved  unchanged  worse     Ambulated in hallway without symptoms   Yes   Ambulated in room without symptoms  Yes   Objective:    Physical Exam:  Overall VSSAF;    Visit Vitals    BP (!) 170/95 (BP 1 Location: Right arm, BP Patient Position: At rest)    Pulse 72    Temp 98 °F (36.7 °C)    Resp 18    Ht 6' 3\" (1.905 m)    Wt 131 kg (288 lb 14.4 oz)    SpO2 92%    BMI 36.11 kg/m2     Temp (24hrs), Av °F (36.7 °C), Min:97.5 °F (36.4 °C), Max:98.6 °F (37 °C)    Patient Vitals for the past 8 hrs:   Pulse   17 0726 72   17 0504 82    Patient Vitals for the past 8 hrs:   Resp   17 0726 18   17 0504 18    Patient Vitals for the past 8 hrs:   BP   17 0726 (!) 170/95   17 0504 114/79          Intake/Output Summary (Last 24 hours) at 17 0836  Last data filed at 17 0356   Gross per 24 hour   Intake              500 ml   Output                0 ml   Net              500 ml     General Appearance: Well developed, well nourished, no acute distress. Ears/Nose/Mouth/Throat:   Normal MM; anicteric. JVP: WNL   Resp:   clear to auscultation bilaterally. Nl resp effort. Cardiovascular:  RRR, S1, S2 normal, no new murmur. No gallop or rub. Abdomen:   Soft, non-tender, bowel sounds are present. Extremities: 1-2+ edema bilaterally. Skin:  Neuro: Warm and dry. A/O x3, grossly nonfocal   cath site intact w/o hematoma or new bruit; distal pulse unchanged  Yes   Data Review:     Telemetry independently reviewed x sinus  chronic afib  parox afib  NSVT     ECG independently reviewed  NSR  afib  no significant changes  NSST-Tw chgs   x no new ECG provided for review   Lab results reviewed as noted below. Current medications reviewed as noted below. No results for input(s): PH, PCO2, PO2 in the last 72 hours. No results for input(s): CPK, CKMB, CKNDX, TROIQ in the last 72 hours. Recent Labs      07/26/17   0020  07/25/17   0349  07/24/17   1056  07/24/17   0454   NA  137  139  137  138   K  4.1  3.9  3.5  3.4*   CL  103  105  104  102   CO2  30  29  27  29   BUN  17  13  9  10   CREA  1.18  1.10  1.18  1.09   GLU  221*  163*  189*  160*   CA  7.9*  7.9*  7.8*  7.7*   ALB   --    --   1.7*   --    WBC   --    --   7.0  7.4   HGB   --    --   10.4*  9.6*   HCT   --    --   33.0*  32.6*   PLT   --    --   402*  381     Lab Results   Component Value Date/Time    Cholesterol, total 161 07/24/2017 04:54 AM    HDL Cholesterol 46 07/24/2017 04:54 AM    LDL, calculated 98.2 07/24/2017 04:54 AM    Triglyceride 84 07/24/2017 04:54 AM    CHOL/HDL Ratio 3.5 07/24/2017 04:54 AM     Recent Labs      07/24/17   1056   SGOT  25   AP  114   TP  6.4   ALB  1.7*   GLOB  4.7*     No results for input(s): INR, PTP, APTT in the last 72 hours.     No lab exists for component: INREXT, INREXT   No components found for: [place]    Current Facility-Administered Medications   Medication Dose Route Frequency    hydrALAZINE (APRESOLINE) tablet 25 mg  25 mg Oral TID    carvedilol (COREG) tablet 6.25 mg  6.25 mg Oral BID WITH MEALS    hydrALAZINE (APRESOLINE) 20 mg/mL injection 10 mg  10 mg IntraVENous Q4H PRN    spironolactone (ALDACTONE) tablet 25 mg  25 mg Oral DAILY    ALPRAZolam (XANAX) tablet 0.5 mg  0.5 mg Oral Q12H PRN    isosorbide mononitrate ER (IMDUR) tablet 30 mg  30 mg Oral DAILY    atorvastatin (LIPITOR) tablet 20 mg  20 mg Oral QHS    apixaban (ELIQUIS) tablet 10 mg  10 mg Oral Q12H    dextrose 10% infusion 125-250 mL  125-250 mL IntraVENous PRN    zolpidem (AMBIEN) tablet 10 mg  10 mg Oral QHS PRN    gabapentin (NEURONTIN) capsule 300 mg  300 mg Oral TID    oxyCODONE-acetaminophen (PERCOCET 10)  mg per tablet 1 Tab  1 Tab Oral Q6H PRN    sodium chloride (NS) flush 5-10 mL  5-10 mL IntraVENous Q8H    sodium chloride (NS) flush 5-10 mL  5-10 mL IntraVENous PRN    acetaminophen (TYLENOL) tablet 650 mg  650 mg Oral Q4H PRN    naloxone (NARCAN) injection 0.4 mg  0.4 mg IntraVENous PRN    ondansetron (ZOFRAN) injection 4 mg  4 mg IntraVENous Q4H PRN    insulin lispro (HUMALOG) injection   SubCUTAneous AC&HS    glucose chewable tablet 16 g  4 Tab Oral PRN    glucagon (GLUCAGEN) injection 1 mg  1 mg IntraMUSCular PRN    insulin glargine (LANTUS) injection 35 Units  35 Units SubCUTAneous QHS        Mayank Baires MD

## 2017-07-26 NOTE — DISCHARGE SUMMARY
Physician Discharge Summary       Patient: Heladio Hdez MRN: [id number]  SSN: [id number]    YOB: 1970  Age: 51 y.o. Sex: male    PCP: Eric Oropeza MD    Admit date: 7/21/2017  Admitting Provider: Mirza Romo MD    Discharge date: 7/26/2017  Discharging Provider: Eric Oropeza MD    * Admission Diagnoses: Bilateral pulmonary embolism [facility])    * Discharge Diagnoses:    Hospital Problems as of 7/26/2017  Date Reviewed: 7/22/2017          Codes Class Noted - Resolved POA    Hypokalemia ICD-10-CM: E87.6  ICD-9-CM: 276.8  7/23/2017 - Present Unknown        History of Clostridium difficile infection ICD-10-CM: Z86.19  ICD-9-CM: V12.09  7/22/2017 - Present Unknown        Non-compliance with treatment ICD-10-CM: Z91.19  ICD-9-CM: V15.81  7/22/2017 - Present Unknown        Bilateral pulmonary embolism ([facility] ICD-10-CM: I26.99  ICD-9-CM: 415.19  7/21/2017 - Present Unknown        IDDM (insulin dependent diabetes mellitus) ([facility] ICD-10-CM: E11.9, Z79.4  ICD-9-CM: 250.00, V58.67  10/6/2016 - Present Yes        HTN (hypertension) ICD-10-CM: I10  ICD-9-CM: 401.9  Unknown - Present Yes              * Hospital Course:  Admitted for B PE treated for uncontrolled IDDM as well pt was not compliant with diet and remained difficult to control bs during visit- see rounding progressnotes   Stable at time of d/c   * Procedures:   * No surgery found *      Consults: Cardiology    Significant Diagnostic Studies: labs: HIV pending     Discharge Exam:  Visit Vitals    /87 (BP 1 Location: Left arm, BP Patient Position: Sitting)    Pulse 80    Temp 98.4 °F (36.9 °C)    Resp 18    Ht 6' 3\" (1.905 m)    Wt 288 lb 14.4 oz (131 kg)    SpO2 99%    BMI 36.11 kg/m2     General appearance: alert, cooperative, no distress, appears stated age  Lungs: clear to auscultation bilaterally  Heart: regular rate and rhythm  Abdomen: soft, non-tender.  Bowel sounds normal. No masses,  no organomegaly  Neurologic: Grossly normal  Extremities did not remove bandages management per wound care team   * Discharge Condition: improved and fair  * Disposition: Home    Discharge Medications:  Current Discharge Medication List      START taking these medications    Details   apixaban (ELIQUIS) 5 mg tablet Take 2 Tabs by mouth every twelve (12) hours. Qty: 60 Tab, Refills: 1      carvedilol (COREG) 6.25 mg tablet Take 1 Tab by mouth two (2) times daily (with meals). Qty: 60 Tab, Refills: 1      hydrALAZINE (APRESOLINE) 25 mg tablet Take 1 Tab by mouth three (3) times daily. Qty: 90 Tab, Refills: 0      isosorbide mononitrate ER (IMDUR) 30 mg tablet Take 1 Tab by mouth daily. Qty: 30 Tab, Refills: 0      spironolactone (ALDACTONE) 25 mg tablet Take 1 Tab by mouth daily. Qty: 30 Tab, Refills: 0         CONTINUE these medications which have NOT CHANGED    Details   gabapentin (NEURONTIN) 300 mg capsule Take 1 Cap by mouth three (3) times daily. Qty: 90 Cap, Refills: 0      metroNIDAZOLE (FLAGYL) 500 mg tablet Take 1 Tab by mouth three (3) times daily. Qty: 30 Tab, Refills: 0      ALPRAZolam (XANAX) 0.5 mg tablet Take  by mouth. diphenhydrAMINE (ALLERGY RELIEF,DIPHENHYDRAMIN,) 25 mg capsule Take 25 mg by mouth nightly as needed. insulin detemir (LEVEMIR FLEXTOUCH) 100 unit/mL (3 mL) inpn 50 Units by SubCUTAneous route nightly. 60 units QHS  Qty: 20 Pen, Refills: 11      L. acidoph & paracasei- S therm- Bifido (EMBER-Q/RISAQUAD) 8 billion cell cap cap Take 1 Cap by mouth daily. To prevent diarrhea  Qty: 21 Cap, Refills: 0      metFORMIN (GLUCOPHAGE) 500 mg tablet TAKE 1 TABLET BY MOUTH TWICE A DAY WITH MEALS AND INCREASE AS DIRECTED  Qty: 75 Tab, Refills: 0    Associated Diagnoses: IDDM (insulin dependent diabetes mellitus) (HCC)      oxyCODONE-acetaminophen (PERCOCET 10)  mg per tablet Take 1 Tab by mouth every six (6) hours as needed. Max Daily Amount: 4 Tabs.   Qty: 14 Tab, Refills: 0 cholecalciferol (VITAMIN D3) 1,000 unit cap Take 2,000 Units by mouth daily. insulin lispro (HUMALOG) 100 unit/mL kwikpen 12 Units by SubCUTAneous route Before breakfast, lunch, and dinner. DX: E11.65  Qty: 1 Package, Refills: 11      esomeprazole (NEXIUM) 40 mg capsule Take 1 Cap by mouth daily. Qty: 90 Cap, Refills: 3      sodium chloride (SALINE NASAL) 0.65 % nasal spray 1 [place] by Both Nostrils route as needed for Congestion. Qty: 15 mL, Refills: 0      atorvastatin (LIPITOR) 40 mg tablet Take 1 Tab by mouth daily. Qty: 90 Tab, Refills: 3         STOP taking these medications       cefTARoline 600 mg, ADDaptor 1 Each IVPB Comments:   Reason for Stopping:         sodium chloride (NORMAL SALINE FLUSH) 0.9 % Comments:   Reason for Stopping:         HEPARIN SOD,PORCINE/0.9 % NACL (HEPARIN FLUSH IV) Comments:   Reason for Stopping:         metoprolol tartrate (LOPRESSOR) 25 mg tablet Comments:   Reason for Stopping:         lisinopril (PRINIVIL, ZESTRIL) 40 mg tablet Comments:   Reason for Stopping:               * Follow-up Care/Patient Instructions:   Activity: Activity as tolerated  Diet: Resume previous diet  Wound Care: As directed    Follow-up Information     Follow up With Details Comments Rafi [id number] [address]  [phone]            Signed:  [address] [place], MD  7/26/2017  2:04 PM

## 2017-07-26 NOTE — DISCHARGE INSTRUCTIONS
PATIENT DISCHARGE INSTRUCTIONS      PATIENT DISCHARGE INSTRUCTIONS    Jen Das / [id number] : 1970    Admitted 2017 Discharged: 2017       · It is important that you take the medication exactly as they are prescribed. · Keep your medication in the bottles provided by the pharmacist and keep a list of the medication names, dosages, and times to be taken in your wallet. · Do not take other medications without consulting your doctor.      What to do at Home    Recommended Diet: Cardiac Diet, Diabetic Diet and Low fat, Low cholesterol    Recommended Activity: Activity as tolerated    If you experience any concerns contact your doctor         Signed By: Mary Kay Mcguire MD     2017

## 2017-07-26 NOTE — PROGRESS NOTES
Nutrition Services      Nutrition Screen:  Wt Readings from Last 10 Encounters:   07/24/17 131 kg (288 lb 14.4 oz)   07/18/17 127 kg (280 lb)   07/17/17 129.6 kg (285 lb 11.2 oz)   07/13/17 118.8 kg (262 lb)   07/12/17 122.7 kg (270 lb 6.4 oz)   07/03/17 123.7 kg (272 lb 12.8 oz)   06/14/17 135.4 kg (298 lb 9.6 oz)   06/04/17 131.5 kg (290 lb)   06/01/17 138.6 kg (305 lb 8 oz)   04/17/17 128.5 kg (283 lb 3.2 oz)     Body mass index is 36.11 kg/(m^2). Supplements:                        _____ ordered ______  declined. __ __  Pt is nutritionally stable at this time, will rescreen in 7 days. _x __    Pt is at nutritional risk and will be rescreened in 3-6 days. __ __  Pt is at moderate or high nutritional risk, will refer to RD for assessment.        Eda Garcia  Dietetic Technician, Registered

## 2017-07-26 NOTE — PROGRESS NOTES
Pt seen am bp better at 5 am awaiting am bp after meds and if stable will discharge home   ramírez discharged  Pt on his home dose of lipitor of 40mg instead of 20 mg from cardiologist again encouraged compliance

## 2017-07-31 ENCOUNTER — OFFICE VISIT (OUTPATIENT)
Dept: INTERNAL MEDICINE CLINIC | Age: 47
End: 2017-07-31

## 2017-07-31 VITALS
DIASTOLIC BLOOD PRESSURE: 78 MMHG | RESPIRATION RATE: 15 BRPM | TEMPERATURE: 98.6 F | SYSTOLIC BLOOD PRESSURE: 137 MMHG | WEIGHT: 285.2 LBS | HEIGHT: 75 IN | OXYGEN SATURATION: 99 % | BODY MASS INDEX: 35.46 KG/M2 | HEART RATE: 78 BPM

## 2017-07-31 DIAGNOSIS — E78.00 HYPERCHOLESTEREMIA: ICD-10-CM

## 2017-07-31 DIAGNOSIS — R94.30 CARDIAC LV EJECTION FRACTION 30-35%: ICD-10-CM

## 2017-07-31 DIAGNOSIS — I26.99 BILATERAL PULMONARY EMBOLISM (HCC): ICD-10-CM

## 2017-07-31 DIAGNOSIS — Z09 HOSPITAL DISCHARGE FOLLOW-UP: Primary | ICD-10-CM

## 2017-07-31 DIAGNOSIS — A04.72 C. DIFFICILE DIARRHEA: ICD-10-CM

## 2017-07-31 DIAGNOSIS — I50.23 SYSTOLIC CHF, ACUTE ON CHRONIC (HCC): ICD-10-CM

## 2017-07-31 DIAGNOSIS — R05.8 DRY COUGH: ICD-10-CM

## 2017-07-31 LAB — GLUCOSE POC: 91 MG/DL

## 2017-07-31 RX ORDER — METRONIDAZOLE 500 MG/1
500 TABLET ORAL 3 TIMES DAILY
Qty: 15 TAB | Refills: 0 | Status: SHIPPED | OUTPATIENT
Start: 2017-07-31 | End: 2017-08-05

## 2017-07-31 RX ORDER — ATORVASTATIN CALCIUM 40 MG/1
40 TABLET, FILM COATED ORAL DAILY
Qty: 90 TAB | Refills: 3 | Status: SHIPPED | OUTPATIENT
Start: 2017-07-31 | End: 2018-09-12 | Stop reason: SDUPTHER

## 2017-07-31 NOTE — PROGRESS NOTES
Chief Complaint   Patient presents with   [facility] Follow Up    Medication Evaluation           Subjective:   Jessa Valentin 51 y.o.  male with a  past medical history reviewed see below. comes in today for f/up of hospital follow up. He was admitted for a b pulmon emboli. He was told to not to finish the flagyl and notes his diarrhea returned this weekend and so he restarted it and the diarrhea has improved he has 4 pills left. He has started a probiotic as well. He was evaluated by cardiology and has an appt set up. He is using all bp meds as directed. He states he is not taking his lipitor denies sop no cp has a light cough and is better than when he was admitted     ROS: otherwise feeling generally well. All other systems reviewed and are negative      Current Outpatient Prescriptions   Medication Sig Dispense Refill    atorvastatin (LIPITOR) 40 mg tablet Take 1 Tab by mouth daily. 90 Tab 3    metroNIDAZOLE (FLAGYL) 500 mg tablet Take 1 Tab by mouth three (3) times daily for 5 days. 15 Tab 0    apixaban (ELIQUIS) 5 mg tablet Take 2 Tabs by mouth every twelve (12) hours. 60 Tab 1    carvedilol (COREG) 6.25 mg tablet Take 1 Tab by mouth two (2) times daily (with meals). 60 Tab 1    hydrALAZINE (APRESOLINE) 25 mg tablet Take 1 Tab by mouth three (3) times daily. 90 Tab 0    isosorbide mononitrate ER (IMDUR) 30 mg tablet Take 1 Tab by mouth daily. 30 Tab 0    spironolactone (ALDACTONE) 25 mg tablet Take 1 Tab by mouth daily. 30 Tab 0    gabapentin (NEURONTIN) 300 mg capsule Take 1 Cap by mouth three (3) times daily. 90 Cap 0    diphenhydrAMINE (ALLERGY RELIEF,DIPHENHYDRAMIN,) 25 mg capsule Take 25 mg by mouth nightly as needed.  insulin detemir (LEVEMIR FLEXTOUCH) 100 unit/mL (3 mL) inpn 50 Units by SubCUTAneous route nightly. 60 units QHS 20 Pen 11    L. acidoph & paracasei- S therm- Bifido (EMBER-Q/RISAQUAD) 8 billion cell cap cap Take 1 Cap by mouth daily.  To prevent diarrhea 21 Cap 0    metFORMIN (GLUCOPHAGE) 500 mg tablet TAKE 1 TABLET BY MOUTH TWICE A DAY WITH MEALS AND INCREASE AS DIRECTED 75 Tab 0    oxyCODONE-acetaminophen (PERCOCET 10)  mg per tablet Take 1 Tab by mouth every six (6) hours as needed. Max Daily Amount: 4 Tabs. (Patient taking differently: Take 1 Tab by mouth every six (6) hours as needed for Pain.) 14 Tab 0    insulin lispro (HUMALOG) 100 unit/mL kwikpen 12 Units by SubCUTAneous route Before breakfast, lunch, and dinner. DX: E11.65 1 Package 11    esomeprazole (NEXIUM) 40 mg capsule Take 1 Cap by mouth daily. 90 Cap 3    sodium chloride (SALINE NASAL) 0.65 % nasal spray 1 [place] by Both Nostrils route as needed for Congestion. (Patient taking differently: 1 Spray by Both Nostrils route as needed for Congestion (dry sinus symptoms, especially during winter time. ).) 15 mL 0    ALPRAZolam (XANAX) 0.5 mg tablet Take  by mouth.  cholecalciferol (VITAMIN D3) 1,000 unit cap Take 2,000 Units by mouth daily.        Allergies   Allergen Reactions    Ace Inhibitors Swelling     Facial swelling cough     Morphine Angioedema     Past Medical History:   Diagnosis Date    Diabetes ([facility]     since age 23   24 [facility] DM (diabetes mellitus) ([facility]     HTN (hypertension)     Hypercholesteremia     Hyperlipidemia      Past Surgical History:   Procedure Laterality Date    HX AMPUTATION      toes- left foot    HX BUNIONECTOMY      HX ORTHOPAEDIC      boutinerre deformity    HX ORTHOPAEDIC      fascia removed left foot    HX OTHER SURGICAL  03/2017    Skin graft Surgery    HX TONSILLECTOMY      HX WISDOM TEETH EXTRACTION       Family History   Problem Relation Age of Onset    Hypertension Mother     High Cholesterol Mother      Social History   Substance Use Topics    Smoking status: Former Smoker     Types: Cigars     Quit date: 4/22/2011    Smokeless tobacco: Never Used    Alcohol use 0.0 oz/week     1 Glasses of wine, 0 Standard drinks or equivalent per week      Comment: stop drinking 5 months ago          Objective:     Visit Vitals    /78 (BP 1 Location: Left arm, BP Patient Position: Sitting)    Pulse 78    Temp 98.6 °F (37 °C) (Oral)    Resp 15    Ht 6' 3\" (1.905 m)    Wt 285 lb 3.2 oz (129.4 kg)    SpO2 99%    BMI 35.65 kg/m2     Gen: NAD, pleasant  HEENT: normal appearing head, nares patent, PERRLA, EOMI, oropharynx no erythema, no cervical lymphadenopathy neck supple   Cardio: RRR nl S1S2 no murmur  Lungs CTAB no wheeze no rales no rhonchi dry nonproductive cough noted  ABD Soft non tender non distended no hepatosplenomegaly no guarding no rebounding positive bowel sounds in all 4 quadrants  Extremities: full ROM X 4 no clubbing no cyanosis  Neuro: no gross focal deficits noted, alert and orientated X 3  Psych.: well groomed stable mood disorder. Assessment/Plan:   Diagnoses and all orders for this visit:    1. Hospital discharge follow-up    2. Bilateral pulmonary embolism (HCC)  -     CBC WITH AUTOMATED DIFF    3. IDDM (insulin dependent diabetes mellitus) (HCC)  -     AMB POC GLUCOSE BLOOD, BY GLUCOSE MONITORING DEVICE    4. Systolic CHF, acute on chronic (HCC)    5. Cardiac LV ejection fraction 30-35%    6. Hypercholesteremia    7. Dry cough  -     CBC WITH AUTOMATED DIFF  -     BNP    8. C. difficile diarrhea    Other orders  -     atorvastatin (LIPITOR) 40 mg tablet; Take 1 Tab by mouth daily. -     metroNIDAZOLE (FLAGYL) 500 mg tablet; Take 1 Tab by mouth three (3) times daily for 5 days. Follow-up Disposition:  Return in about 1 week (around 8/7/2017) for recheck c diff diarrhea review labs 15 min . Yosvany Camacho As his diarrhea has returned we will extend his Flagyl at an additional 5 days clinically he is improving  avs printed and given to the pt.   He is set up his stress test with Dr Cici Alegria and will need cardiac rehab. advised to clear any exercise with his podiatrist and his cardiologist.  Patient has a jug/gallon of [address] zero advise patient to use this in moderation as diet drinks can still increase his blood sugars he is advised to monitor his blood sugars and bring in readings for his next appointment the patient voiced understanding of the above. Medication side effects were reviewed with the patient. Call with any concerns.

## 2017-07-31 NOTE — MR AVS SNAPSHOT
Visit Information Date & Time Provider Department Dept. Phone Encounter #  
 7/31/2017  9:15 AM Teresa Romero, [address] [facility] 30 [facility] [id number] Follow-up Instructions Return in about 1 week (around 8/7/2017) for recheck c diff diarrhea review labs 15 min . Upcoming Health Maintenance Date Due MICROALBUMIN Q1 8/24/1979 Pneumococcal 19-64 Highest Risk (1 of 3 - PCV13) 8/24/1988 FOOT EXAM Q1 11/11/2015 EYE EXAM RETINAL OR DILATED Q1 1/14/2016 INFLUENZA AGE 9 TO ADULT 8/1/2017 HEMOGLOBIN A1C Q6M 1/12/2018 LIPID PANEL Q1 7/24/2018 DTaP/Tdap/Td series (2 - Td) 11/18/2021 Allergies as of 7/31/2017  Review Complete On: 7/31/2017 By: Teresa Romero MD  
  
 Severity Noted Reaction Type Reactions Ace Inhibitors High 06/01/2017    Swelling Facial swelling cough Morphine High 10/20/2014   Systemic Angioedema Current Immunizations  Reviewed on 11/18/2016 Name Date Influenza Vaccine 11/1/2013 Influenza Vaccine Intradermal PF 10/28/2016 Tdap 11/18/2011 Not reviewed this visit You Were Diagnosed With   
  
 Codes Comments Hospital discharge follow-up    -  Primary ICD-10-CM: [address] ICD-9-CM: V67.59 Bilateral pulmonary embolism (HCC)     ICD-10-CM: I26.99 
ICD-9-CM: 415.19 IDDM (insulin dependent diabetes mellitus) ([facility].)     ICD-10-CM: E11.9, Z79.4 ICD-9-CM: 250.00, V58.67 Systolic CHF, acute on chronic (HCC)     ICD-10-CM: Y76.69 ICD-9-CM: 428.23, 428.0 Cardiac LV ejection fraction 30-35%     ICD-10-CM: R94.30 ICD-9-CM: 925. 9 Hypercholesteremia     ICD-10-CM: E78.00 ICD-9-CM: 272.0 Dry cough     ICD-10-CM: R05 ICD-9-CM: 786.2 C. difficile diarrhea     ICD-10-CM: A04.7 ICD-9-CM: 008.45 Vitals  BP Pulse Temp Resp Height(growth percentile) Weight(growth percentile)  
 137/78 (BP 1 Location: Left arm, BP Patient Position: Sitting) 78 98.6 °F (37 °C) (Oral) 15 6' 3\" (1.905 m) 285 lb 3.2 oz (129.4 kg) SpO2 BMI Smoking Status 99% 35.65 kg/m2 Former Smoker Vitals History BMI and BSA Data Body Mass Index Body Surface Area  
 35.65 kg/m 2 2.62 m 2 Preferred Pharmacy Pharmacy Name Phone [facility]/PHARMACY #[id number][facility] [place] - [address] [phone] Your Updated Medication List  
  
   
This list is accurate as of: 7/31/17 10:05 AM.  Always use your most recent med list.  
  
  
  
  
 ALLERGY RELIEF(DIPHENHYDRAMIN) 25 mg capsule Generic drug:  diphenhydrAMINE Take 25 mg by mouth nightly as needed. apixaban 5 mg tablet Commonly known as:  [place] Royals Take 2 Tabs by mouth every twelve (12) hours. atorvastatin 40 mg tablet Commonly known as:  LIPITOR Take 1 Tab by mouth daily. carvedilol 6.25 mg tablet Commonly known as:  [place] John Take 1 Tab by mouth two (2) times daily (with meals). esomeprazole 40 mg capsule Commonly known as:  NexIUM Take 1 Cap by mouth daily. gabapentin 300 mg capsule Commonly known as:  NEURONTIN Take 1 Cap by mouth three (3) times daily. hydrALAZINE 25 mg tablet Commonly known as:  APRESOLINE Take 1 Tab by mouth three (3) times daily. insulin detemir 100 unit/mL (3 mL) Inpn Commonly known as:  LEVEMIR FLEXTOUCH  
50 Units by SubCUTAneous route nightly. 60 units QHS  
  
 insulin lispro 100 unit/mL kwikpen Commonly known as:  HUMALOG  
12 Units by SubCUTAneous route Before breakfast, lunch, and dinner. DX: E11.65  
  
 isosorbide mononitrate ER 30 mg tablet Commonly known as:  IMDUR Take 1 Tab by mouth daily. L. acidoph & paracasei- S therm- Bifido 8 billion cell Cap cap Commonly known as:  EMBER-Q/RISAQUAD Take 1 Cap by mouth daily. To prevent diarrhea  
  
 metFORMIN 500 mg tablet Commonly known as:  GLUCOPHAGE  
 TAKE 1 TABLET BY MOUTH TWICE A DAY WITH MEALS AND INCREASE AS DIRECTED  
  
 metroNIDAZOLE 500 mg tablet Commonly known as:  FLAGYL Take 1 Tab by mouth three (3) times daily for 5 days. oxyCODONE-acetaminophen  mg per tablet Commonly known as:  PERCOCET 10 Take 1 Tab by mouth every six (6) hours as needed. Max Daily Amount: 4 Tabs.  
  
 sodium chloride 0.65 % nasal spray Commonly known as:  SALINE NASAL  
1 [place] by Both Nostrils route as needed for Congestion. spironolactone 25 mg tablet Commonly known as:  ALDACTONE Take 1 Tab by mouth daily. VITAMIN D3 1,000 unit Cap Generic drug:  cholecalciferol Take 2,000 Units by mouth daily. XANAX 0.5 mg tablet Generic drug:  ALPRAZolam  
Take  by mouth. Prescriptions Sent to Pharmacy Refills  
 atorvastatin (LIPITOR) 40 mg tablet 3 Sig: Take 1 Tab by mouth daily. Class: Normal  
 Pharmacy: [facility]/pharmacy [address] [place] [address] [address] Ph #: [phone] Route: Oral  
 metroNIDAZOLE (FLAGYL) 500 mg tablet 0 Sig: Take 1 Tab by mouth three (3) times daily for 5 days. Class: Normal  
 Pharmacy: [facility]/pharmacy [address] [place] [address] [address] Ph #: [phone] Route: Oral  
  
We Performed the Following AMB POC GLUCOSE BLOOD, BY GLUCOSE MONITORING DEVICE [37707 CPT(R)] BNP [id number] CPT(R)] CBC WITH AUTOMATED DIFF [24297 CPT(R)] Follow-up Instructions Return in about 1 week (around 8/7/2017) for recheck c diff diarrhea review labs 15 min . Patient Instructions Bring in all medications to the next appt. Introducing [facility] & HEALTH SERVICES! Dear Dearl Iman: Thank you for requesting a [employer] account. Our records indicate that you already have an active [employer] account. You can access your account anytime at [email or web address] Did you know that you can access your hospital and ER discharge instructions at any time in iSpecimen? You can also review all of your test results from your hospital stay or ER visit. Additional Information If you have questions, please visit the Frequently Asked Questions section of the iSpecimen website at [email or web address] Remember, iSpecimen is NOT to be used for urgent needs. For medical emergencies, dial 911. Now available from your iPhone and Android! Please provide this summary of care documentation to your next provider. Your primary care clinician is listed as Camila Litten. If you have any questions after today's visit, please call [phone].

## 2017-07-31 NOTE — PROGRESS NOTES
Chief Complaint   Patient presents with   [facility] Follow Up     1. Have you been to the ER, urgent care clinic since your last visit? Hospitalized since your last visit? Yes When: 7/21/17    2. Have you seen or consulted any other health care providers outside of the [address] [place] [address] since your last visit? Include any pap smears or colon screening.  No

## 2017-08-01 LAB
BASOPHILS # BLD AUTO: 0 X10E3/UL (ref 0–0.2)
BASOPHILS NFR BLD AUTO: 1 %
BNP SERPL-MCNC: 94.2 PG/ML (ref 0–100)
EOSINOPHIL # BLD AUTO: 0.3 X10E3/UL (ref 0–0.4)
EOSINOPHIL NFR BLD AUTO: 4 %
ERYTHROCYTE [DISTWIDTH] IN BLOOD BY AUTOMATED COUNT: 17.4 % (ref 12.3–15.4)
HCT VFR BLD AUTO: 34.9 % (ref 37.5–51)
HGB BLD-MCNC: 10.6 G/DL (ref 12.6–17.7)
IMM GRANULOCYTES # BLD: 0 X10E3/UL (ref 0–0.1)
IMM GRANULOCYTES NFR BLD: 0 %
LYMPHOCYTES # BLD AUTO: 1.6 X10E3/UL (ref 0.7–3.1)
LYMPHOCYTES NFR BLD AUTO: 19 %
MCH RBC QN AUTO: 22.6 PG (ref 26.6–33)
MCHC RBC AUTO-ENTMCNC: 30.4 G/DL (ref 31.5–35.7)
MCV RBC AUTO: 74 FL (ref 79–97)
MONOCYTES # BLD AUTO: 0.5 X10E3/UL (ref 0.1–0.9)
MONOCYTES NFR BLD AUTO: 6 %
NEUTROPHILS # BLD AUTO: 5.7 X10E3/UL (ref 1.4–7)
NEUTROPHILS NFR BLD AUTO: 70 %
PLATELET # BLD AUTO: 506 X10E3/UL (ref 150–379)
RBC # BLD AUTO: 4.69 X10E6/UL (ref 4.14–5.8)
WBC # BLD AUTO: 8.1 X10E3/UL (ref 3.4–10.8)

## 2017-08-01 NOTE — PROGRESS NOTES
Progress Note    Subjective:   Denise Mahoney is a 51 y.o.  male for follow up of Left   Surgical ulcer to the left TMA stump distal stump medially, and lateral midfoot To Bone  Ulcer has been present for/since 6 weeks since surgery on the left foot for ulcer improvement. Doing well. No concerns. Wound care going well. Offloading wound: yes  Appetite: fair  Wound associated pain: mild  Diabetic: yes  Type 2   Insulin Dependent  Smoker: no    ROS: no N/V, no T/chills; no local rash  There have been no changes in patient's medical history in the interim. Objective:   T: 97.6 P: 77  RR: 19  BP: 155/89   General: well developed, well nourished, pleasant , NAD. Hygiene good  Psych: cooperative. Pleasant. No anxiety or depression. Normal mood and affect. Neuro: alert and oriented to person/place/situation. Otherwise nonfocal.  HEENT: Normocephalic, atraumatic. EOMI. Conjunctiva clear. No scleral icterus. Neck: Normal range of motion. No masses. Chest: Good chest expansion bilat  Abdomen: Soft, nontender, nondistended, normoactive bowel sounds  Lower extremities: color normal; temperature normal. Hair growth is not present. Calves are supple, nontender, approximately equally sized in comparison. Capillary refill <3 sec  Focused Lower Extremity Exam:  Vascular exam:  Bilateral lower extremity: Non Pitting   edema, foot ,   DP pulse :1+  PT pulse: +1  Nails dystrophic       Ulcer Description:   Etiology:Surgical  Location: left plantar distal foot  Measurement:1.62 x2.9 x 0.2cm  Ulcer bed: Granular/Healthy    Periwound: Normal  Exudate: Serous      Ulcer Description:   Etiology:Surgical  Location: left lateral midfoot  Measurement: 4.2 x 3 x 0.4 cm  Ulcer bed: Granular/Healthy  Visible Tendon  Visible Bone     Periwound: Normal  Exudate: Serous     Assessment/Plan   51 y.o. male with Surgical left foot TMA stump ulcer x2.     Dressing: Meli applied to the wounds  Frequency: three times a week, with application of Wound vac    Excisional debridement of the left foot ulcers x2 with a # 7mm curette skin to include muscle    Continue wound vac, will plan for OR debridement and grafix application  Rx for percocet 5/325  Will plan for possible OR debridement in the next week and application of amniotic tissue graft. Patient understood and agrees with plan. Questions answered. Weekly visits and serial debridements also discussed. Follow up with me in 1 week.

## 2017-08-07 ENCOUNTER — TELEPHONE (OUTPATIENT)
Dept: INTERNAL MEDICINE CLINIC | Age: 47
End: 2017-08-07

## 2017-08-07 NOTE — TELEPHONE ENCOUNTER
Pt states he has been waiting on lab results to know if he has to take an antibiotic or not. He states he waited all last week as he had labs done the Friday prior to last week. Please call pt as soon as you can today.

## 2017-08-08 ENCOUNTER — OFFICE VISIT (OUTPATIENT)
Dept: INTERNAL MEDICINE CLINIC | Age: 47
End: 2017-08-08

## 2017-08-08 VITALS
DIASTOLIC BLOOD PRESSURE: 80 MMHG | SYSTOLIC BLOOD PRESSURE: 131 MMHG | WEIGHT: 278.4 LBS | BODY MASS INDEX: 34.62 KG/M2 | OXYGEN SATURATION: 99 % | HEART RATE: 92 BPM | RESPIRATION RATE: 16 BRPM | TEMPERATURE: 98.4 F | HEIGHT: 75 IN

## 2017-08-08 DIAGNOSIS — I26.99 BILATERAL PULMONARY EMBOLISM (HCC): Primary | ICD-10-CM

## 2017-08-08 DIAGNOSIS — Z71.2 ENCOUNTER TO DISCUSS TEST RESULTS: ICD-10-CM

## 2017-08-08 DIAGNOSIS — Z91.199 NONCOMPLIANCE WITH DIABETES TREATMENT: ICD-10-CM

## 2017-08-08 LAB — GLUCOSE POC: 251 MG/DL

## 2017-08-08 RX ORDER — METFORMIN HYDROCHLORIDE 500 MG/1
TABLET ORAL
Qty: 75 TAB | Refills: 3 | Status: SHIPPED | OUTPATIENT
Start: 2017-08-08 | End: 2018-01-23 | Stop reason: DRUGHIGH

## 2017-08-08 RX ORDER — INSULIN LISPRO 100 [IU]/ML
12 INJECTION, SOLUTION INTRAVENOUS; SUBCUTANEOUS
Qty: 1 PACKAGE | Refills: 11 | Status: SHIPPED | OUTPATIENT
Start: 2017-08-08 | End: 2018-06-27 | Stop reason: SDUPTHER

## 2017-08-08 NOTE — MR AVS SNAPSHOT
Visit Information Date & Time Provider Department Dept. Phone Encounter #  
 8/8/2017 10:45  [facility] [place], [address] [address] [phone] [id number] Follow-up Instructions Return in about 3 weeks (around 8/29/2017) for recheck diabeties bring in your log 15 min . Your Appointments 8/8/2017 10:45 AM  
Any with Erasmo Anguiano MD  
[address] [facility] ([facility]) Appt Note: Return in about 1 week (around 8/7/2017) for recheck c diff diarrhea review labs 15 min . [address] [place] [id number]  
[phone]  
  
   
 [address] [place] [id number] Upcoming Health Maintenance Date Due MICROALBUMIN Q1 8/24/1979 Pneumococcal 19-64 Highest Risk (1 of 3 - PCV13) 8/24/1988 FOOT EXAM Q1 11/11/2015 EYE EXAM RETINAL OR DILATED Q1 1/14/2016 INFLUENZA AGE 9 TO ADULT 8/1/2017 HEMOGLOBIN A1C Q6M 1/12/2018 LIPID PANEL Q1 7/24/2018 DTaP/Tdap/Td series (2 - Td) 11/18/2021 Allergies as of 8/8/2017  Review Complete On: 8/8/2017 By: Lauro Harris Severity Noted Reaction Type Reactions Ace Inhibitors High 06/01/2017    Swelling Facial swelling cough Morphine High 10/20/2014   Systemic Angioedema Current Immunizations  Reviewed on 11/18/2016 Name Date Influenza Vaccine 11/1/2013 Influenza Vaccine Intradermal PF 10/28/2016 Tdap 11/18/2011 Not reviewed this visit You Were Diagnosed With   
  
 Codes Comments Bilateral pulmonary embolism (HCC)    -  Primary ICD-10-CM: I26.99 
ICD-9-CM: 415.19 IDDM (insulin dependent diabetes mellitus) ([facility]     ICD-10-CM: E11.9, Z79.4 ICD-9-CM: 250.00, V58.67 Encounter to discuss test results     ICD-10-CM: Z71.89 ICD-9-CM: V65.49 Vitals BP Pulse Temp Resp Height(growth percentile) Weight(growth percentile) 131/80 (BP 1 Location: Left arm, BP Patient Position: Sitting) 92 98.4 °F (36.9 °C) (Oral) 16 6' 3\" (1.905 m) 278 lb 6.4 oz (126.3 kg) SpO2 BMI Smoking Status 99% 34.8 kg/m2 Former Smoker Vitals History BMI and BSA Data Body Mass Index Body Surface Area 34.8 kg/m 2 2.59 m 2 Preferred Pharmacy Pharmacy Name Phone [facility]/PHARMACY #[id number] [address] [phone] Your Updated Medication List  
  
   
This list is accurate as of: 8/8/17 10:27 AM.  Always use your most recent med list.  
  
  
  
  
 ALLERGY RELIEF(DIPHENHYDRAMIN) 25 mg capsule Generic drug:  diphenhydrAMINE Take 25 mg by mouth nightly as needed. apixaban 5 mg tablet Commonly known as:  [place] Take 2 Tabs by mouth every twelve (12) hours. atorvastatin 40 mg tablet Commonly known as:  LIPITOR Take 1 Tab by mouth daily. carvedilol 6.25 mg tablet Commonly known as:  Jerl Specter Take 1 Tab by mouth two (2) times daily (with meals). esomeprazole 40 mg capsule Commonly known as:  NexIUM Take 1 Cap by mouth daily. gabapentin 300 mg capsule Commonly known as:  NEURONTIN Take 1 Cap by mouth three (3) times daily. hydrALAZINE 25 mg tablet Commonly known as:  APRESOLINE Take 1 Tab by mouth three (3) times daily. insulin detemir 100 unit/mL (3 mL) Inpn Commonly known as:  LEVEMIR FLEXTOUCH  
50 Units by SubCUTAneous route nightly. 60 units QHS  
  
 insulin lispro 100 unit/mL kwikpen Commonly known as:  HUMALOG  
12 Units by SubCUTAneous route Before breakfast, lunch, and dinner. DX: E11.65  
  
 isosorbide mononitrate ER 30 mg tablet Commonly known as:  IMDUR Take 1 Tab by mouth daily. L. acidoph & paracasei- S therm- Bifido 8 billion cell Cap cap Commonly known as:  EMBER-Q/RISAQUAD Take 1 Cap by mouth daily. To prevent diarrhea  
  
 metFORMIN 500 mg tablet Commonly known as:  GLUCOPHAGE  
TAKE 1 TABLET BY MOUTH TWICE A DAY WITH MEALS AND INCREASE AS DIRECTED  
  
 oxyCODONE-acetaminophen  mg per tablet Commonly known as:  PERCOCET 10 Take 1 Tab by mouth every six (6) hours as needed. Max Daily Amount: 4 Tabs.  
  
 sodium chloride 0.65 % nasal spray Commonly known as:  SALINE NASAL  
1 [place] by Both Nostrils route as needed for Congestion. spironolactone 25 mg tablet Commonly known as:  ALDACTONE Take 1 Tab by mouth daily. VITAMIN D3 1,000 unit Cap Generic drug:  cholecalciferol Take 2,000 Units by mouth daily. XANAX 0.5 mg tablet Generic drug:  ALPRAZolam  
Take  by mouth. Prescriptions Sent to Pharmacy Refills  
 metFORMIN (GLUCOPHAGE) 500 mg tablet 3 Sig: TAKE 1 TABLET BY MOUTH TWICE A DAY WITH MEALS AND INCREASE AS DIRECTED Class: Normal  
 Pharmacy: [facility]/pharmacy [address] [place] [address] [address] Ph #: [phone]  
 insulin lispro (HUMALOG) 100 unit/mL kwikpen 11 Si Units by SubCUTAneous route Before breakfast, lunch, and dinner. DX: E11.65 Class: Normal  
 Pharmacy: [facility]/pharmacy [facility] [address] Ph #: [phone] Route: SubCUTAneous  
 apixaban (ELIQUIS) 5 mg tablet 1 Sig: Take 2 Tabs by mouth every twelve (12) hours. Class: Normal  
 Pharmacy: [facility]/pharmacy [address] [place] [address] [address] Ph #: [phone] Route: Oral  
  
We Performed the Following AMB POC GLUCOSE BLOOD, BY GLUCOSE MONITORING DEVICE [05484 CPT(R)] Follow-up Instructions Return in about 3 weeks (around 2017) for recheck diabeties bring in your log 15 min . Introducing [facility] & HEALTH SERVICES! Dear Faisal Medina: Thank you for requesting a [employer] account. Our records indicate that you already have an active [employer] account.   You can access your account anytime at [email or web address] Did you know that you can access your hospital and ER discharge instructions at any time in [employer]? You can also review all of your test results from your hospital stay or ER visit. Additional Information If you have questions, please visit the Frequently Asked Questions section of the [employer] website at [email or web address] Remember, [employer] is NOT to be used for urgent needs. For medical emergencies, dial 911. Now available from your iPhone and Android! Please provide this summary of care documentation to your next provider. Your primary care clinician is listed as Shilpa Reddy. If you have any questions after today's visit, please call [phone].

## 2017-08-08 NOTE — PROGRESS NOTES
Chief Complaint   Patient presents with    Results    Medication Evaluation           Subjective:   Sofi Mcdonald 49 y.o.  male with a  past medical history reviewed see below. Here for lab review He notes his bs has been up and down   He has not been taking his humalog as he is out  He was not given all the eliquis and is taking it BID as directed   He is not  sure why he was not given the correct amt.   diarrhea has resolved and here for review of labs - he has been walking again. He will be returned to work sept 26th ROS: otherwise feeling generally well.  All other systems reviewed and are negative      Facility-Administered Medications Ordered in Other Visits   Medication Dose Route Frequency Provider Last Rate Last Dose    aluminum-magnesium hydroxide (MAALOX) oral suspension 15-30 mL  15-30 mL Oral Q2H PRN Coty Lunsford MD   30 mL at 08/30/17 0816    insulin glargine (LANTUS) injection 18 Units  18 Units SubCUTAneous DAILY Erasmo Betancourt MD   18 Units at 08/30/17 0833    fentaNYL (DURAGESIC) 75 mcg/hr patch 1 Patch  1 Patch TransDERmal Q72H [place], NP   1 Patch at 08/30/17 1606    heparin 25,000 units in D5W 250 ml infusion  13-36 Units/kg/hr IntraVENous TITRATE Pili Smart MD 20.5 mL/hr at 08/30/17 1516 18.1 Units/kg/hr at 08/30/17 1516    heparin (porcine) injection 4,700 Units  40 Units/kg IntraVENous PRN Pili Smart MD   4,700 Units at 08/30/17 1515    heparin (porcine) injection 9,450 Units  80 Units/kg SubCUTAneous PRN Pili Smart MD        HYDROmorphone (PF) 15 mg/30 ml (DILAUDID) PCA   IntraVENous CONTINUOUS Pili Smart MD        metroNIDAZOLE (FLAGYL) IVPB premix 500 mg  500 mg IntraVENous Q8H Erasmo Betancourt  mL/hr at 08/30/17 1301 500 mg at 08/30/17 1301    ALPRAZolam (XANAX) tablet 0.25 mg  0.25 mg Oral TID PRN Erasmo Betancourt MD   0.25 mg at 08/26/17 2128    insulin lispro (HUMALOG) injection SubCUTAneous [facility] [place], MD   8 Units at 08/30/17 1652    sodium chloride (NS) flush 10-30 mL  10-30 mL InterCATHeter PRN Erasmo Amaya MD        sodium chloride (NS) flush 10 mL  10 mL InterCATHeter Q24H Erasmo Amaya MD   10 mL at 08/30/17 1303    sodium chloride (NS) flush 10 mL  10 mL InterCATHeter  [facility] [place], MD   10 mL at 08/27/17 2057    sodium chloride (NS) flush 10-40 mL  10-40 mL InterCATHeter En Barnes MD   10 mL at 08/30/17 1302    sodium chloride (NS) flush 20 mL  20 mL InterCATHeter  [facility] [place], MD        heparin (porcine) pf 300 Units  300 Units InterCATHeter  [facility] [place], MD        cefepime (MAXIPIME) 2 g in 0.9% sodium chloride (MBP/ADV) 100 mL  2 g IntraVENous En Barnes  mL/hr at 08/30/17 1605 2 g at 08/30/17 1605    atorvastatin (LIPITOR) tablet 40 mg  40 mg Oral DAILY Franc Bashir MD   40 mg at 08/30/17 0817    carvedilol (COREG) tablet 6.25 mg  6.25 mg Oral BID WITH MEALS Oscar Campo MD   6.25 mg at 08/30/17 1652    pantoprazole (PROTONIX) tablet 40 mg  40 mg Oral DAILY Franc Bashir MD   40 mg at 08/30/17 0817    gabapentin (NEURONTIN) capsule 300 mg  300 mg Oral TID Franc Bashir MD   300 mg at 08/30/17 1605    glucose chewable tablet 16 g  4 Tab Oral PRN Franc Bashir MD        dextrose (D50W) injection syrg 12.5-25 g  12.5-25 g IntraVENous PRN Franc Bashir MD        glucagon (GLUCAGEN) injection 1 mg  1 mg IntraMUSCular PRN Franc Bashir MD        nitroglycerin (NITROBID) 2 % ointment 1 Inch  1 Inch Topical Q6H PRN Oscar Campo MD        sodium chloride (NS) flush 5-10 mL  5-10 mL IntraVENous Q8H Oscar Campo MD   10 mL at 08/30/17 1303    sodium chloride (NS) flush 5-10 mL  5-10 mL IntraVENous PRN Franc Bashir MD        acetaminophen (TYLENOL) tablet 650 mg  650 mg Oral Q6H PRN Oscar Campo MD        ondansetron (ZOFRAN) injection 4 mg  4 mg IntraVENous Q4H PRN Deirdre Rinaldi MD   4 mg at 08/30/17 0123    oxyCODONE-acetaminophen (PERCOCET 10)  mg per tablet 1 Tab  1 Tab Oral Q6H PRN Amadeo Aaron MD   1 Tab at 08/30/17 3179    HYDROmorphone (PF) (DILAUDID) injection 0.5 mg  0.5 mg IntraVENous Q3H PRN Amadeo Aaron MD   0.5 mg at 08/28/17 1137    vancomycin (VANCOCIN) 1750 mg in  ml infusion  1,750 mg IntraVENous Q18H Maik Milan  mL/hr at 08/30/17 0816 1,750 mg at 08/30/17 5263     Allergies   Allergen Reactions    Ace Inhibitors Swelling     Facial swelling cough     Morphine Angioedema     Past Medical History:   Diagnosis Date    Diabetes ([address]     since age 23    HTN (hypertension)     Hypercholesteremia     Hyperlipidemia      Past Surgical History:   Procedure Laterality Date    HX AMPUTATION      toes- left foot    HX BUNIONECTOMY      HX ORTHOPAEDIC      boutinerre deformity    HX ORTHOPAEDIC      fascia removed left foot    HX OTHER SURGICAL  03/2017    Skin graft Surgery    HX TONSILLECTOMY      HX WISDOM TEETH EXTRACTION       Family History   Problem Relation Age of Onset    Hypertension Mother     High Cholesterol Mother      Social History   Substance Use Topics    Smoking status: Former Smoker     Types: Cigars     Quit date: 4/22/2011    Smokeless tobacco: Never Used    Alcohol use 0.0 oz/week     1 Glasses of wine, 0 Standard drinks or equivalent per week      Comment: stop drinking 5 months ago          Objective:     Visit Vitals    /80 (BP 1 Location: Left arm, BP Patient Position: Sitting)    Pulse 92    Temp 98.4 °F (36.9 °C) (Oral)    Resp 16    Ht 6' 3\" (1.905 m)    Wt 278 lb 6.4 oz (126.3 kg)    SpO2 99%    BMI 34.8 kg/m2     Gen: NAD, pleasant  HEENT: normal appearing head, nares patent, PERRLA, EOMI, oropharynx no erythema, no cervical lymphadenopathy neck supple   Cardio: RRR nl S1S2 no murmur  Lungs CTAB no wheeze no rales no rhonchi  ABD Soft non tender non distended + bowel sounds  Extremities: full ROM X 4 no clubbing no cyanosis  Neuro: no gross focal deficits noted, alert and orientated X 3  Psych.: well groomed no outward signs of depression. Assessment/Plan:   Diagnoses and all orders for this visit:    1. Bilateral pulmonary embolism ([address]    2. Noncompliance with diabetes treatment    3. IDDM (insulin dependent diabetes mellitus) (HCC)  -     metFORMIN (GLUCOPHAGE) 500 mg tablet; TAKE 1 TABLET BY MOUTH TWICE A DAY WITH MEALS AND INCREASE AS DIRECTED  -     AMB POC GLUCOSE BLOOD, BY GLUCOSE MONITORING DEVICE    4. Encounter to discuss test results    Other orders  -     insulin lispro (HUMALOG) 100 unit/mL kwikpen; 12 Units by SubCUTAneous route Before breakfast, lunch, and dinner. DX: E11.65  -     apixaban (ELIQUIS) 5 mg tablet; Take 2 Tabs by mouth every twelve (12) hours. Follow-up Disposition:  Return in about 3 weeks (around 8/29/2017) for recheck diabeties bring in your log 15 min . Teresa Guerin avs printed and given to the pt. Emanuel Pyle all the flagyl, f/up with cardiology as directed pt needs to call and set up his appt. The patient voiced understanding of the above. Medication side effects were reviewed with the patient. Call with any concerns.

## 2017-08-08 NOTE — PROGRESS NOTES
Chief Complaint   Patient presents with    Results    Medication Evaluation       1. Have you been to the ER, urgent care clinic since your last visit? Hospitalized since your last visit? No    2. Have you seen or consulted any other health care providers outside of the [address] [place] [address] since your last visit? Include any pap smears or colon screening.  No

## 2017-08-08 NOTE — TELEPHONE ENCOUNTER
Spoke with patient after 2 patient identifiers being note and advised per Dr. Tanisha Marques that his labs looked fine, and that he could stop taking the ABT. Patient expressed understanding and has no further questions at this time.

## 2017-08-17 ENCOUNTER — TELEPHONE (OUTPATIENT)
Dept: INTERNAL MEDICINE CLINIC | Age: 47
End: 2017-08-17

## 2017-08-17 NOTE — TELEPHONE ENCOUNTER
Call from pt who is demanding that we resolve issue for him as it is \"3:30 on Friday\" and he needs his medication. I explained that I did not have information from insurance to complete prior authorization. Advised pt that today was Thursday and that he could call pharmacy/insurance co to see what is needed to resolve issue. Once he advised me what is needed or form to use, that I would complete prior authorization. Pt stated he would do so.

## 2017-08-17 NOTE — TELEPHONE ENCOUNTER
Pt called back to provide a # the pharmacy gave him. # [place] [phone] for insurance co.    Stated that I would do all I could to resolve issues but if he had any symtoms to immediately go to ER for assistance.

## 2017-08-17 NOTE — TELEPHONE ENCOUNTER
Information faxed to pharmacy regarding date  medication Eliquis was E prescribed for  PtCaryn Thurman

## 2017-08-17 NOTE — TELEPHONE ENCOUNTER
Pharmacy states dosage is too high and they have no paperwork from insurance co to request prior authorization. Pharmacist states when information for Rx is run, \"DUR\" is answer that is returned.

## 2017-08-18 ENCOUNTER — TELEPHONE (OUTPATIENT)
Dept: INTERNAL MEDICINE CLINIC | Age: 47
End: 2017-08-18

## 2017-08-18 NOTE — TELEPHONE ENCOUNTER
Pt requesting call back from Jesus Manuel regarding prior authorization on his Blood Thinner medication.  He has been out of his blood thinner for 2 days and can't go another day without it.  Best contact number [phone].

## 2017-08-24 ENCOUNTER — APPOINTMENT (OUTPATIENT)
Dept: GENERAL RADIOLOGY | Age: 47
DRG: 854 | End: 2017-08-24
Attending: EMERGENCY MEDICINE
Payer: COMMERCIAL

## 2017-08-24 ENCOUNTER — HOSPITAL ENCOUNTER (INPATIENT)
Age: 47
LOS: 9 days | Discharge: NURSING, REHAB OR LONG-TERM CARE | DRG: 854 | End: 2017-09-02
Attending: EMERGENCY MEDICINE | Admitting: INTERNAL MEDICINE
Payer: COMMERCIAL

## 2017-08-24 DIAGNOSIS — A41.9 SEPSIS, DUE TO UNSPECIFIED ORGANISM: Primary | ICD-10-CM

## 2017-08-24 DIAGNOSIS — I26.99 BILATERAL PULMONARY EMBOLISM (HCC): ICD-10-CM

## 2017-08-24 DIAGNOSIS — N17.9 AKI (ACUTE KIDNEY INJURY) (HCC): ICD-10-CM

## 2017-08-24 DIAGNOSIS — L08.9 LEFT FOOT INFECTION: ICD-10-CM

## 2017-08-24 DIAGNOSIS — F41.8 ANXIETY ABOUT HEALTH: ICD-10-CM

## 2017-08-24 PROBLEM — K21.9 GERD (GASTROESOPHAGEAL REFLUX DISEASE): Status: ACTIVE | Noted: 2017-08-24

## 2017-08-24 PROBLEM — E87.1 HYPONATREMIA: Status: ACTIVE | Noted: 2017-08-24

## 2017-08-24 PROBLEM — I50.22 CHRONIC SYSTOLIC HEART FAILURE (HCC): Status: ACTIVE | Noted: 2017-07-31

## 2017-08-24 LAB
ALBUMIN SERPL-MCNC: 1.6 G/DL (ref 3.5–5)
ALBUMIN/GLOB SERPL: 0.2 {RATIO} (ref 1.1–2.2)
ALP SERPL-CCNC: 115 U/L (ref 45–117)
ALT SERPL-CCNC: 56 U/L (ref 12–78)
ANION GAP SERPL CALC-SCNC: 8 MMOL/L (ref 5–15)
AST SERPL-CCNC: 24 U/L (ref 15–37)
BASOPHILS # BLD: 0 K/UL (ref 0–0.1)
BASOPHILS NFR BLD: 0 % (ref 0–1)
BILIRUB SERPL-MCNC: 0.3 MG/DL (ref 0.2–1)
BUN SERPL-MCNC: 17 MG/DL (ref 6–20)
BUN/CREAT SERPL: 9 (ref 12–20)
CALCIUM SERPL-MCNC: 8.9 MG/DL (ref 8.5–10.1)
CHLORIDE SERPL-SCNC: 98 MMOL/L (ref 97–108)
CO2 SERPL-SCNC: 24 MMOL/L (ref 21–32)
CREAT SERPL-MCNC: 2 MG/DL (ref 0.7–1.3)
DIFFERENTIAL METHOD BLD: ABNORMAL
EOSINOPHIL # BLD: 0.2 K/UL (ref 0–0.4)
EOSINOPHIL NFR BLD: 1 % (ref 0–7)
ERYTHROCYTE [DISTWIDTH] IN BLOOD BY AUTOMATED COUNT: 16.3 % (ref 11.5–14.5)
GLOBULIN SER CALC-MCNC: 7.9 G/DL (ref 2–4)
GLUCOSE SERPL-MCNC: 81 MG/DL (ref 65–100)
HCT VFR BLD AUTO: 31.7 % (ref 36.6–50.3)
HGB BLD-MCNC: 9.8 G/DL (ref 12.1–17)
LACTATE SERPL-SCNC: 1.3 MMOL/L (ref 0.4–2)
LYMPHOCYTES # BLD: 3.7 K/UL (ref 0.8–3.5)
LYMPHOCYTES NFR BLD: 19 % (ref 12–49)
MCH RBC QN AUTO: 22.4 PG (ref 26–34)
MCHC RBC AUTO-ENTMCNC: 30.9 G/DL (ref 30–36.5)
MCV RBC AUTO: 72.4 FL (ref 80–99)
MONOCYTES # BLD: 1 K/UL (ref 0–1)
MONOCYTES NFR BLD: 5 % (ref 5–13)
NEUTS SEG # BLD: 14.6 K/UL (ref 1.8–8)
NEUTS SEG NFR BLD: 75 % (ref 32–75)
PLATELET # BLD AUTO: 539 K/UL (ref 150–400)
POTASSIUM SERPL-SCNC: 4 MMOL/L (ref 3.5–5.1)
PROT SERPL-MCNC: 9.5 G/DL (ref 6.4–8.2)
RBC # BLD AUTO: 4.38 M/UL (ref 4.1–5.7)
RBC MORPH BLD: ABNORMAL
SODIUM SERPL-SCNC: 130 MMOL/L (ref 136–145)
WBC # BLD AUTO: 19.5 K/UL (ref 4.1–11.1)

## 2017-08-24 PROCEDURE — 80053 COMPREHEN METABOLIC PANEL: CPT | Performed by: EMERGENCY MEDICINE

## 2017-08-24 PROCEDURE — 71010 XR CHEST PORT: CPT

## 2017-08-24 PROCEDURE — 96365 THER/PROPH/DIAG IV INF INIT: CPT

## 2017-08-24 PROCEDURE — 85025 COMPLETE CBC W/AUTO DIFF WBC: CPT | Performed by: EMERGENCY MEDICINE

## 2017-08-24 PROCEDURE — 96361 HYDRATE IV INFUSION ADD-ON: CPT

## 2017-08-24 PROCEDURE — 65660000000 HC RM CCU STEPDOWN

## 2017-08-24 PROCEDURE — 36415 COLL VENOUS BLD VENIPUNCTURE: CPT | Performed by: EMERGENCY MEDICINE

## 2017-08-24 PROCEDURE — 87040 BLOOD CULTURE FOR BACTERIA: CPT | Performed by: EMERGENCY MEDICINE

## 2017-08-24 PROCEDURE — 83605 ASSAY OF LACTIC ACID: CPT | Performed by: EMERGENCY MEDICINE

## 2017-08-24 PROCEDURE — 73630 X-RAY EXAM OF FOOT: CPT

## 2017-08-24 PROCEDURE — 96375 TX/PRO/DX INJ NEW DRUG ADDON: CPT

## 2017-08-24 PROCEDURE — 74011000258 HC RX REV CODE- 258: Performed by: EMERGENCY MEDICINE

## 2017-08-24 PROCEDURE — 74011250636 HC RX REV CODE- 250/636: Performed by: EMERGENCY MEDICINE

## 2017-08-24 PROCEDURE — 99285 EMERGENCY DEPT VISIT HI MDM: CPT

## 2017-08-24 PROCEDURE — 93005 ELECTROCARDIOGRAM TRACING: CPT

## 2017-08-24 PROCEDURE — 74011250637 HC RX REV CODE- 250/637: Performed by: EMERGENCY MEDICINE

## 2017-08-24 RX ORDER — ACETAMINOPHEN 500 MG
1000 TABLET ORAL ONCE
Status: COMPLETED | OUTPATIENT
Start: 2017-08-24 | End: 2017-08-24

## 2017-08-24 RX ORDER — HYDROMORPHONE HYDROCHLORIDE 1 MG/ML
1 INJECTION, SOLUTION INTRAMUSCULAR; INTRAVENOUS; SUBCUTANEOUS
Status: COMPLETED | OUTPATIENT
Start: 2017-08-24 | End: 2017-08-24

## 2017-08-24 RX ORDER — METRONIDAZOLE 500 MG/100ML
500 INJECTION, SOLUTION INTRAVENOUS EVERY 8 HOURS
Status: DISCONTINUED | OUTPATIENT
Start: 2017-08-24 | End: 2017-08-27

## 2017-08-24 RX ORDER — SODIUM CHLORIDE 0.9 % (FLUSH) 0.9 %
5-10 SYRINGE (ML) INJECTION AS NEEDED
Status: DISCONTINUED | OUTPATIENT
Start: 2017-08-24 | End: 2017-08-27 | Stop reason: SDUPTHER

## 2017-08-24 RX ORDER — VANCOMYCIN 1.75 GRAM/500 ML IN 0.9 % SODIUM CHLORIDE INTRAVENOUS
1750
Status: DISCONTINUED | OUTPATIENT
Start: 2017-08-25 | End: 2017-09-02 | Stop reason: HOSPADM

## 2017-08-24 RX ORDER — ONDANSETRON 2 MG/ML
4 INJECTION INTRAMUSCULAR; INTRAVENOUS
Status: COMPLETED | OUTPATIENT
Start: 2017-08-24 | End: 2017-08-24

## 2017-08-24 RX ORDER — HEPARIN SODIUM 10000 [USP'U]/100ML
12-36 INJECTION, SOLUTION INTRAVENOUS
Status: DISCONTINUED | OUTPATIENT
Start: 2017-08-24 | End: 2017-08-26

## 2017-08-24 RX ORDER — SODIUM CHLORIDE 9 MG/ML
75 INJECTION, SOLUTION INTRAVENOUS CONTINUOUS
Status: DISCONTINUED | OUTPATIENT
Start: 2017-08-24 | End: 2017-08-30

## 2017-08-24 RX ADMIN — ACETAMINOPHEN 1000 MG: 500 TABLET, FILM COATED ORAL at 22:38

## 2017-08-24 RX ADMIN — SODIUM CHLORIDE 3543 ML: 900 INJECTION, SOLUTION INTRAVENOUS at 21:12

## 2017-08-24 RX ADMIN — SODIUM CHLORIDE 3 G: 900 INJECTION, SOLUTION INTRAVENOUS at 21:29

## 2017-08-24 RX ADMIN — HYDROMORPHONE HYDROCHLORIDE 1 MG: 1 INJECTION, SOLUTION INTRAMUSCULAR; INTRAVENOUS; SUBCUTANEOUS at 21:14

## 2017-08-24 RX ADMIN — ONDANSETRON 4 MG: 2 INJECTION INTRAMUSCULAR; INTRAVENOUS at 21:13

## 2017-08-24 NOTE — IP AVS SNAPSHOT
[address] [facility] 
[phone] Patient: Anita Henriquez MRN: [id number] :1970 You are allergic to the following Allergen Reactions Ace Inhibitors Swelling Facial swelling cough Morphine Angioedema Recent Documentation Height Weight BMI Smoking Status 1.905 m 113.5 kg 31.28 kg/m2 Former Smoker Emergency Contacts Name Discharge Info Relation Home Work Mobile [facility] DISCHARGE CAREGIVER [3] Spouse [3] [phone] About your hospitalization You were admitted on:  2017 You last received care in the:  [facility] 2 PROGRESSIVE CARE You were discharged on:  2017 Unit phone number:  [phone] Why you were hospitalized Your primary diagnosis was:  Not on File Your diagnoses also included:  Sepsis (Hcc), Htn (Hypertension), Bilateral Pulmonary Embolism (Hcc), Chronic Systolic Heart Failure (Hcc), Diabetes (Hcc), Left Foot Infection, Hypercholesteremia, Hyponatremia, Ned (Acute Kidney Injury) (Hcc), Gerd (Gastroesophageal Reflux Disease) Providers Seen During Your Hospitalizations Provider Role Specialty Primary office phone Yaya Hdez MD Attending Provider Emergency Medicine [phone] Stacia Aguirre MD Attending Provider [facility] [phone] Your Primary Care Physician (PCP) Primary Care Physician Office Phone Office Fax Farnazrandy Karri Ayala [phone] Follow-up Information Follow up With Details Comments Contact Info Markie Balderramaid [address] [place] [address] 
[phone] Janell Hurtado MD  as planned [address] [id number] [facility] 
[phone] Sade Freed MD In 1 month  [address]34 [facility] 
[phone] Current Discharge Medication List  
  
START taking these medications Dose & Instructions Dispensing Information Comments Morning Noon Evening Bedtime  
 cefepime 2 gram 2 g, ADDaptor 1 Device IVPB Your last dose was: Your next dose is:    
   
   
 Dose:  2 g  
2 g by IntraVENous route every eight (8) hours for 7 days. Quantity:  1 Dose Refills:  0  
     
   
   
   
  
 fentaNYL 75 mcg/hr Commonly known as:  José Wilson Your last dose was: Your next dose is:    
   
   
 Dose:  1 Patch 1 Patch by TransDERmal route every seventy-two (72) hours. Max Daily Amount: 1 Patch. Quantity:  2 Patch Refills:  0  
     
   
   
   
  
 metroNIDAZOLE Commonly known as:  FLAGYL Your last dose was: Your next dose is:    
   
   
 Dose:  500 mg  
100 mL by IntraVENous route every eight (8) hours for 7 days. Quantity:  2100 mL Refills:  0  
     
   
   
   
  
 ondansetron 4 mg/2 mL Soln Commonly known as:  Gilbert Simmonds Your last dose was: Your next dose is:    
   
   
 Dose:  4 mg  
2 mL by IntraVENous route every four (4) hours as needed. Quantity:  1 mL Refills:  0  
     
   
   
   
  
 vancomycin in 0.9% Sodium Cl 1.75 gram/500 mL Soln Commonly known as:  Codility Your last dose was: Your next dose is:    
   
   
 Dose:  1750 mg  
500 mL by IntraVENous route every eighteen (18) hours for 7 days. Quantity:  4000 mL Refills:  0 CONTINUE these medications which have CHANGED Dose & Instructions Dispensing Information Comments Morning Noon Evening Bedtime  
 insulin detemir 100 unit/mL (3 mL) Inpn Commonly known as:  Cherry Melendrez What changed:  additional instructions Your last dose was: Your next dose is:    
   
   
 Dose:  50 Units 50 Units by SubCUTAneous route nightly. 60 units QHS Quantity:  20 Pen Refills:  11  
     
   
   
   
  
 oxyCODONE-acetaminophen  mg per tablet Commonly known as:  PERCOCET 10 What changed:  reasons to take this Your last dose was: Your next dose is:    
   
   
 Dose:  1 Tab Take 1 Tab by mouth every six (6) hours as needed. Max Daily Amount: 4 Tabs. Quantity:  12 Tab Refills:  0  
     
   
   
   
  
 sodium chloride 0.65 % nasal spray Commonly known as:  SALINE NASAL What changed:  reasons to take this Your last dose was: Your next dose is:    
   
   
 Dose:  1 Spray 1 [place] by Both Nostrils route as needed for Congestion. Quantity:  15 mL Refills:  0 CONTINUE these medications which have NOT CHANGED Dose & Instructions Dispensing Information Comments Morning Noon Evening Bedtime ALLERGY RELIEF(DIPHENHYDRAMIN) 25 mg capsule Generic drug:  diphenhydrAMINE Your last dose was: Your next dose is:    
   
   
 Dose:  25 mg Take 25 mg by mouth nightly as needed. Refills:  0  
     
   
   
   
  
 apixaban 5 mg tablet Commonly known as:  Albino Beards Your last dose was: Your next dose is:    
   
   
 Dose:  10 mg Take 2 Tabs by mouth every twelve (12) hours. Quantity:  120 Tab Refills:  1  
     
   
   
   
  
 atorvastatin 40 mg tablet Commonly known as:  LIPITOR Your last dose was: Your next dose is:    
   
   
 Dose:  40 mg Take 1 Tab by mouth daily. Quantity:  90 Tab Refills:  3  
     
   
   
   
  
 carvedilol 6.25 mg tablet Commonly known as:  Harleen Locus Your last dose was: Your next dose is:    
   
   
 Dose:  6.25 mg Take 1 Tab by mouth two (2) times daily (with meals). Quantity:  60 Tab Refills:  1  
     
   
   
   
  
 esomeprazole 40 mg capsule Commonly known as:  NexIUM Your last dose was: Your next dose is:    
   
   
 Dose:  40 mg Take 1 Cap by mouth daily. Quantity:  90 Cap Refills:  3 gabapentin 300 mg capsule Commonly known as:  NEURONTIN Your last dose was: Your next dose is:    
   
   
 Dose:  300 mg Take 1 Cap by mouth three (3) times daily. Quantity:  90 Cap Refills:  0  
     
   
   
   
  
 hydrALAZINE 25 mg tablet Commonly known as:  APRESOLINE Your last dose was: Your next dose is:    
   
   
 Dose:  25 mg Take 1 Tab by mouth three (3) times daily. Quantity:  90 Tab Refills:  0  
     
   
   
   
  
 insulin lispro 100 unit/mL kwikpen Commonly known as:  HUMALOG Your last dose was: Your next dose is:    
   
   
 Dose:  12 Units 12 Units by SubCUTAneous route Before breakfast, lunch, and dinner. DX: E11.65 Quantity:  1 Package Refills:  11  
     
   
   
   
  
 metFORMIN 500 mg tablet Commonly known as:  GLUCOPHAGE Your last dose was: Your next dose is: TAKE 1 TABLET BY MOUTH TWICE A DAY WITH MEALS AND INCREASE AS DIRECTED Quantity:  75 Tab Refills:  3  
     
   
   
   
  
 spironolactone 25 mg tablet Commonly known as:  ALDACTONE Your last dose was: Your next dose is:    
   
   
 Dose:  25 mg Take 1 Tab by mouth daily. Quantity:  30 Tab Refills:  0 STOP taking these medications   
 isosorbide mononitrate ER 30 mg tablet Commonly known as:  IMDUR Where to Get Your Medications Information on where to get these meds will be given to you by the nurse or doctor. ! Ask your nurse or doctor about these medications  
  cefepime 2 gram 2 g, ADDaptor 1 Device IVPB  
 fentaNYL 75 mcg/hr  
 metroNIDAZOLE  
 ondansetron 4 mg/2 mL Soln  
 oxyCODONE-acetaminophen  mg per tablet  
 vancomycin in 0.9% Sodium Cl 1.75 gram/500 mL Soln Discharge Instructions PATIENT DISCHARGE INSTRUCTIONS PATIENT DISCHARGE INSTRUCTIONS Av [facility] / [id number] : 1970 Admitted 8/24/2017 Discharged: 9/2/2017 · It is important that you take the medication exactly as they are prescribed. · Keep your medication in the bottles provided by the pharmacist and keep a list of the medication names, dosages, and times to be taken in your wallet. · Do not take other medications without consulting your doctor. What to do at [facility] Recommended Diet: Diabetic Diet Recommended Activity: PT/OT Eval and Treat Signed By: Antelmo Wyatt MD   
 September 2, 2017 Discharge Instructions Attachments/References HEART FAILURE: AVOIDING TRIGGERS (ENGLISH) Discharge Orders None [employer] Announcement We are excited to announce that we are making your provider's discharge notes available to you in [employer]. You will see these notes when they are completed and signed by the physician that discharged you from your recent hospital stay. If you have any questions or concerns about any information you see in [employer], please call the Health Information Department where you were seen or reach out to your Primary Care Provider for more information about your plan of care. Introducing [facility]! Dear Petra Grewal: Thank you for requesting a [employer] account. Our records indicate that you already have an active [employer] account. You can access your account anytime at [email or web address]. Assignment Editor/[employer] Did you know that you can access your hospital and ER discharge instructions at any time in [employer]? You can also review all of your test results from your hospital stay or ER visit. Additional Information If you have questions, please visit the Frequently Asked Questions section of the [employer] website at [email or web address] Remember, [employer] is NOT to be used for urgent needs. For medical emergencies, dial 911. Now available from your iPhone and Android! General Information Please provide this summary of care documentation to your next provider. Patient Signature:  ____________________________________________________________ Date:  ____________________________________________________________  
  
[facility] Provider Signature:  ____________________________________________________________ Date:  ____________________________________________________________ More Information Avoiding Triggers With Heart Failure: Care Instructions Your Care Instructions Triggers are anything that make your heart failure flare up. A flare-up is also called \"sudden heart failure\" or \"acute heart failure. \" When you have a flare-up, fluid builds up in your lungs, and you have problems breathing. You might need to go to the hospital. By watching for changes in your condition and avoiding triggers, you can prevent heart failure flare-ups. Follow-up care is a key part of your treatment and safety. Be sure to make and go to all appointments, and call your doctor if you are having problems. It's also a good idea to know your test results and keep a list of the medicines you take. How can you care for yourself at home? Watch for changes in your weight and condition · Weigh yourself without clothing at the same time each day. Record your weight. Call your doctor if you have sudden weight gain, such as more than 2 to 3 pounds in a day or 5 pounds in a week. (Your doctor may suggest a different range of weight gain.) A sudden weight gain may mean that your heart failure is getting worse. · Keep a daily record of your symptoms. Write down any changes in how you feel, such as new shortness of breath, cough, or problems eating. Also record if your ankles are more swollen than usual and if you feel more tired than usual. Note anything that you ate or did that could have triggered these changes. Limit sodium Sodium causes your body to hold on to extra water. This may cause your heart failure symptoms to get worse. People get most of their sodium from processed foods. Fast food and restaurant meals also tend to be very high in sodium. · Your doctor may suggest that you limit sodium to 2,000 milligrams (mg) a day or less. That is less than 1 teaspoon of salt a day, including all the salt you eat in cooking or in packaged foods. · Read food labels on cans and food packages. They tell you how much sodium you get in one serving. Check the serving size. If you eat more than one serving, you are getting more sodium. · Be aware that sodium can come in forms other than salt, including monosodium glutamate (MSG), sodium citrate, and sodium bicarbonate (baking soda). MSG is often added to Asian food. You can sometimes ask for food without MSG or salt. · Slowly reducing salt will help you adjust to the taste. Take the salt shaker off the table. · Flavor your food with garlic, lemon juice, onion, vinegar, herbs, and spices instead of salt. Do not use soy sauce, steak sauce, onion salt, garlic salt, mustard, or ketchup on your food, unless it is labeled \"low-sodium\" or \"low-salt. \" 
· Make your own salad dressings, sauces, and ketchup without adding salt. · Use fresh or frozen ingredients, instead of canned ones, whenever you can. Choose low-sodium canned goods. · Eat less processed food and food from restaurants, including fast food. Exercise as directed Moderate, regular exercise is very good for your heart. It improves your blood flow and helps control your weight. But too much exercise can stress your heart and cause a heart failure flare-up. · Check with your doctor before you start an exercise program. 
· Walking is an easy way to get exercise. Start out slowly. Gradually increase the length and pace of your walk. Swimming, riding a bike, and using a treadmill are also good forms of exercise. · When you exercise, watch for signs that your heart is working too hard. You are pushing yourself too hard if you cannot talk while you are exercising. If you become short of breath or dizzy or have chest pain, stop, sit down, and rest. 
· Do not exercise when you do not feel well. Take medicines correctly · Take your medicines exactly as prescribed. Call your doctor if you think you are having a problem with your medicine. · Make a list of all the medicines you take. Include those prescribed to you by other doctors and any over-the-counter medicines, vitamins, or supplements you take. Take this list with you when you go to any doctor. · Take your medicines at the same time every day. It may help you to post a list of all the medicines you take every day and what time of day you take them. · Make taking your medicine as simple as you can. Plan times to take your medicines when you are doing other things, such as eating a meal or getting ready for bed. This will make it easier to remember to take your medicines. · Get organized. Use helpful tools, such as daily or weekly pill containers. When should you call for help? Call 911 if you have symptoms of sudden heart failure such as: 
· You have severe trouble breathing. · You cough up pink, foamy mucus. · You have a new irregular or rapid heartbeat. Call your doctor now or seek immediate medical care if: 
· You have new or increased shortness of breath. · You are dizzy or lightheaded, or you feel like you may faint. · You have sudden weight gain, such as more than 2 to 3 pounds in a day or 5 pounds in a week. (Your doctor may suggest a different range of weight gain.) · You have increased swelling in your legs, ankles, or feet. · You are suddenly so tired or weak that you cannot do your usual activities. Watch closely for changes in your health, and be sure to contact your doctor if you develop new symptoms. Where can you learn more? Go to http://lori-bella.info/. Enter I824 in the search box to learn more about \"Avoiding Triggers With Heart Failure: Care Instructions. \" Current as of: February 23, 2017 Content Version: 11.3 © 2013-7175 [employer]. Care instructions adapted under license by [employer] (which disclaims liability or warranty for this information). If you have questions about a medical condition or this instruction, always ask your healthcare professional. [place] [address] any warranty or liability for your use of this information.

## 2017-08-24 NOTE — IP AVS SNAPSHOT
[address] [facility] [address]. 
[phone] Patient: William Hooper MRN: [id number] :1970 Current Discharge Medication List  
  
START taking these medications Dose & Instructions Dispensing Information Comments Morning Noon Evening Bedtime  
 cefepime 2 gram 2 g, ADDaptor 1 Device IVPB Your last dose was: Your next dose is:    
   
   
 Dose:  2 g  
2 g by IntraVENous route every eight (8) hours for 7 days. Quantity:  1 Dose Refills:  0  
     
   
   
   
  
 fentaNYL 75 mcg/hr Commonly known as:  Arminda Laws Your last dose was: Your next dose is:    
   
   
 Dose:  1 Patch 1 Patch by TransDERmal route every seventy-two (72) hours. Max Daily Amount: 1 Patch. Quantity:  2 Patch Refills:  0  
     
   
   
   
  
 metroNIDAZOLE Commonly known as:  FLAGYL Your last dose was: Your next dose is:    
   
   
 Dose:  500 mg  
100 mL by IntraVENous route every eight (8) hours for 7 days. Quantity:  2100 mL Refills:  0  
     
   
   
   
  
 ondansetron 4 mg/2 mL Soln Commonly known as:  Inga Zamora Your last dose was: Your next dose is:    
   
   
 Dose:  4 mg  
2 mL by IntraVENous route every four (4) hours as needed. Quantity:  1 mL Refills:  0  
     
   
   
   
  
 vancomycin in 0.9% Sodium Cl 1.75 gram/500 mL Soln Commonly known as:  Best Before Media Your last dose was: Your next dose is:    
   
   
 Dose:  1750 mg  
500 mL by IntraVENous route every eighteen (18) hours for 7 days. Quantity:  4000 mL Refills:  0 CONTINUE these medications which have CHANGED Dose & Instructions Dispensing Information Comments Morning Noon Evening Bedtime  
 insulin detemir 100 unit/mL (3 mL) Inpn Commonly known as:  Jarrett Cui What changed:  additional instructions Your last dose was: Your next dose is:    
   
   
 Dose:  50 Units 50 Units by SubCUTAneous route nightly. 60 units QHS Quantity:  20 Pen Refills:  11  
     
   
   
   
  
 oxyCODONE-acetaminophen  mg per tablet Commonly known as:  PERCOCET 10 What changed:  reasons to take this Your last dose was: Your next dose is:    
   
   
 Dose:  1 Tab Take 1 Tab by mouth every six (6) hours as needed. Max Daily Amount: 4 Tabs. Quantity:  12 Tab Refills:  0  
     
   
   
   
  
 sodium chloride 0.65 % nasal spray Commonly known as:  SALINE NASAL What changed:  reasons to take this Your last dose was: Your next dose is:    
   
   
 Dose:  1 Spray 1 [place] by Both Nostrils route as needed for Congestion. Quantity:  15 mL Refills:  0 CONTINUE these medications which have NOT CHANGED Dose & Instructions Dispensing Information Comments Morning Noon Evening Bedtime ALLERGY RELIEF(DIPHENHYDRAMIN) 25 mg capsule Generic drug:  diphenhydrAMINE Your last dose was: Your next dose is:    
   
   
 Dose:  25 mg Take 25 mg by mouth nightly as needed. Refills:  0  
     
   
   
   
  
 apixaban 5 mg tablet Commonly known as:  Kit Po Your last dose was: Your next dose is:    
   
   
 Dose:  10 mg Take 2 Tabs by mouth every twelve (12) hours. Quantity:  120 Tab Refills:  1  
     
   
   
   
  
 atorvastatin 40 mg tablet Commonly known as:  LIPITOR Your last dose was: Your next dose is:    
   
   
 Dose:  40 mg Take 1 Tab by mouth daily. Quantity:  90 Tab Refills:  3  
     
   
   
   
  
 carvedilol 6.25 mg tablet Commonly known as:  Bev Blakely Your last dose was: Your next dose is:    
   
   
 Dose:  6.25 mg Take 1 Tab by mouth two (2) times daily (with meals). Quantity:  60 Tab Refills:  1  
     
   
   
   
  
 esomeprazole 40 mg capsule Commonly known as:  NexIUM Your last dose was: Your next dose is:    
   
   
 Dose:  40 mg Take 1 Cap by mouth daily. Quantity:  90 Cap Refills:  3  
     
   
   
   
  
 gabapentin 300 mg capsule Commonly known as:  NEURONTIN Your last dose was: Your next dose is:    
   
   
 Dose:  300 mg Take 1 Cap by mouth three (3) times daily. Quantity:  90 Cap Refills:  0  
     
   
   
   
  
 hydrALAZINE 25 mg tablet Commonly known as:  APRESOLINE Your last dose was: Your next dose is:    
   
   
 Dose:  25 mg Take 1 Tab by mouth three (3) times daily. Quantity:  90 Tab Refills:  0  
     
   
   
   
  
 insulin lispro 100 unit/mL kwikpen Commonly known as:  HUMALOG Your last dose was: Your next dose is:    
   
   
 Dose:  12 Units 12 Units by SubCUTAneous route Before breakfast, lunch, and dinner. DX: E11.65 Quantity:  1 Package Refills:  11  
     
   
   
   
  
 metFORMIN 500 mg tablet Commonly known as:  GLUCOPHAGE Your last dose was: Your next dose is: TAKE 1 TABLET BY MOUTH TWICE A DAY WITH MEALS AND INCREASE AS DIRECTED Quantity:  75 Tab Refills:  3  
     
   
   
   
  
 spironolactone 25 mg tablet Commonly known as:  ALDACTONE Your last dose was: Your next dose is:    
   
   
 Dose:  25 mg Take 1 Tab by mouth daily. Quantity:  30 Tab Refills:  0 STOP taking these medications   
 isosorbide mononitrate ER 30 mg tablet Commonly known as:  IMDUR Where to Get Your Medications Information on where to get these meds will be given to you by the nurse or doctor. ! Ask your nurse or doctor about these medications  
  cefepime 2 gram 2 g, ADDaptor 1 Device IVPB  
 fentaNYL 75 mcg/hr  
 metroNIDAZOLE  
 ondansetron 4 mg/2 mL Soln  
 oxyCODONE-acetaminophen  mg per tablet vancomycin in 0.9% Sodium Cl 1.75 gram/500 mL Soln

## 2017-08-25 ENCOUNTER — APPOINTMENT (OUTPATIENT)
Dept: MRI IMAGING | Age: 47
DRG: 854 | End: 2017-08-25
Attending: PODIATRIST
Payer: COMMERCIAL

## 2017-08-25 LAB
ALBUMIN SERPL-MCNC: 1.2 G/DL (ref 3.5–5)
ALBUMIN/GLOB SERPL: 0.2 {RATIO} (ref 1.1–2.2)
ALP SERPL-CCNC: 100 U/L (ref 45–117)
ALT SERPL-CCNC: 39 U/L (ref 12–78)
ANION GAP SERPL CALC-SCNC: 10 MMOL/L (ref 5–15)
APPEARANCE UR: ABNORMAL
APTT PPP: 36.1 SEC (ref 22.1–32.5)
APTT PPP: 36.1 SEC (ref 22.1–32.5)
APTT PPP: 36.7 SEC (ref 22.1–32.5)
AST SERPL-CCNC: 20 U/L (ref 15–37)
ATRIAL RATE: 87 BPM
BACTERIA URNS QL MICRO: ABNORMAL /HPF
BASOPHILS # BLD: 0 K/UL (ref 0–0.1)
BASOPHILS NFR BLD: 0 % (ref 0–1)
BILIRUB SERPL-MCNC: 0.3 MG/DL (ref 0.2–1)
BILIRUB UR QL: NEGATIVE
BUN SERPL-MCNC: 18 MG/DL (ref 6–20)
BUN/CREAT SERPL: 10 (ref 12–20)
CALCIUM SERPL-MCNC: 8.3 MG/DL (ref 8.5–10.1)
CALCULATED P AXIS, ECG09: 35 DEGREES
CALCULATED R AXIS, ECG10: 44 DEGREES
CALCULATED T AXIS, ECG11: 62 DEGREES
CHLORIDE SERPL-SCNC: 103 MMOL/L (ref 97–108)
CO2 SERPL-SCNC: 22 MMOL/L (ref 21–32)
COLOR UR: ABNORMAL
CREAT SERPL-MCNC: 1.89 MG/DL (ref 0.7–1.3)
DIAGNOSIS, 93000: NORMAL
DIFFERENTIAL METHOD BLD: ABNORMAL
EOSINOPHIL # BLD: 0.5 K/UL (ref 0–0.4)
EOSINOPHIL NFR BLD: 3 % (ref 0–7)
EPITH CASTS URNS QL MICRO: ABNORMAL /LPF
ERYTHROCYTE [DISTWIDTH] IN BLOOD BY AUTOMATED COUNT: 16.3 % (ref 11.5–14.5)
GLOBULIN SER CALC-MCNC: 6.5 G/DL (ref 2–4)
GLUCOSE BLD STRIP.AUTO-MCNC: 129 MG/DL (ref 65–100)
GLUCOSE BLD STRIP.AUTO-MCNC: 141 MG/DL (ref 65–100)
GLUCOSE BLD STRIP.AUTO-MCNC: 146 MG/DL (ref 65–100)
GLUCOSE BLD STRIP.AUTO-MCNC: 233 MG/DL (ref 65–100)
GLUCOSE SERPL-MCNC: 126 MG/DL (ref 65–100)
GLUCOSE UR STRIP.AUTO-MCNC: 100 MG/DL
HCT VFR BLD AUTO: 28.4 % (ref 36.6–50.3)
HGB BLD-MCNC: 8.5 G/DL (ref 12.1–17)
HGB UR QL STRIP: ABNORMAL
HYALINE CASTS URNS QL MICRO: ABNORMAL /LPF (ref 0–5)
KETONES UR QL STRIP.AUTO: NEGATIVE MG/DL
LEUKOCYTE ESTERASE UR QL STRIP.AUTO: NEGATIVE
LYMPHOCYTES # BLD: 2.5 K/UL (ref 0.8–3.5)
LYMPHOCYTES NFR BLD: 15 % (ref 12–49)
MCH RBC QN AUTO: 21.5 PG (ref 26–34)
MCHC RBC AUTO-ENTMCNC: 29.9 G/DL (ref 30–36.5)
MCV RBC AUTO: 71.9 FL (ref 80–99)
MONOCYTES # BLD: 1.8 K/UL (ref 0–1)
MONOCYTES NFR BLD: 11 % (ref 5–13)
MUCOUS THREADS URNS QL MICRO: ABNORMAL /LPF
NEUTS SEG # BLD: 12 K/UL (ref 1.8–8)
NEUTS SEG NFR BLD: 71 % (ref 32–75)
NITRITE UR QL STRIP.AUTO: NEGATIVE
P-R INTERVAL, ECG05: 120 MS
PH UR STRIP: 5.5 [PH] (ref 5–8)
PLATELET # BLD AUTO: 498 K/UL (ref 150–400)
POTASSIUM SERPL-SCNC: 3.7 MMOL/L (ref 3.5–5.1)
PROT SERPL-MCNC: 7.7 G/DL (ref 6.4–8.2)
PROT UR STRIP-MCNC: 300 MG/DL
Q-T INTERVAL, ECG07: 372 MS
QRS DURATION, ECG06: 92 MS
QTC CALCULATION (BEZET), ECG08: 447 MS
RBC # BLD AUTO: 3.95 M/UL (ref 4.1–5.7)
RBC #/AREA URNS HPF: ABNORMAL /HPF (ref 0–5)
RBC MORPH BLD: ABNORMAL
SERVICE CMNT-IMP: ABNORMAL
SODIUM SERPL-SCNC: 135 MMOL/L (ref 136–145)
SP GR UR REFRACTOMETRY: 1.02 (ref 1–1.03)
THERAPEUTIC RANGE,PTTT: ABNORMAL SECS (ref 58–77)
UROBILINOGEN UR QL STRIP.AUTO: 0.2 EU/DL (ref 0.2–1)
VENTRICULAR RATE, ECG03: 87 BPM
WBC # BLD AUTO: 16.8 K/UL (ref 4.1–11.1)
WBC URNS QL MICRO: ABNORMAL /HPF (ref 0–4)

## 2017-08-25 PROCEDURE — 82962 GLUCOSE BLOOD TEST: CPT

## 2017-08-25 PROCEDURE — 74011250636 HC RX REV CODE- 250/636: Performed by: EMERGENCY MEDICINE

## 2017-08-25 PROCEDURE — 80053 COMPREHEN METABOLIC PANEL: CPT | Performed by: INTERNAL MEDICINE

## 2017-08-25 PROCEDURE — 85730 THROMBOPLASTIN TIME PARTIAL: CPT | Performed by: INTERNAL MEDICINE

## 2017-08-25 PROCEDURE — 73718 MRI LOWER EXTREMITY W/O DYE: CPT

## 2017-08-25 PROCEDURE — 74011000250 HC RX REV CODE- 250: Performed by: INTERNAL MEDICINE

## 2017-08-25 PROCEDURE — 74011250636 HC RX REV CODE- 250/636: Performed by: FAMILY MEDICINE

## 2017-08-25 PROCEDURE — 74011000258 HC RX REV CODE- 258: Performed by: INTERNAL MEDICINE

## 2017-08-25 PROCEDURE — 81001 URINALYSIS AUTO W/SCOPE: CPT | Performed by: EMERGENCY MEDICINE

## 2017-08-25 PROCEDURE — 65660000000 HC RM CCU STEPDOWN

## 2017-08-25 PROCEDURE — 87186 SC STD MICRODIL/AGAR DIL: CPT | Performed by: INTERNAL MEDICINE

## 2017-08-25 PROCEDURE — 74011250636 HC RX REV CODE- 250/636: Performed by: INTERNAL MEDICINE

## 2017-08-25 PROCEDURE — 85025 COMPLETE CBC W/AUTO DIFF WBC: CPT | Performed by: INTERNAL MEDICINE

## 2017-08-25 PROCEDURE — 87077 CULTURE AEROBIC IDENTIFY: CPT | Performed by: INTERNAL MEDICINE

## 2017-08-25 PROCEDURE — 87205 SMEAR GRAM STAIN: CPT | Performed by: INTERNAL MEDICINE

## 2017-08-25 PROCEDURE — 36415 COLL VENOUS BLD VENIPUNCTURE: CPT | Performed by: INTERNAL MEDICINE

## 2017-08-25 PROCEDURE — 74011250637 HC RX REV CODE- 250/637: Performed by: INTERNAL MEDICINE

## 2017-08-25 PROCEDURE — 74011636637 HC RX REV CODE- 636/637: Performed by: INTERNAL MEDICINE

## 2017-08-25 PROCEDURE — 74011250636 HC RX REV CODE- 250/636: Performed by: HOSPITALIST

## 2017-08-25 RX ORDER — CARVEDILOL 6.25 MG/1
6.25 TABLET ORAL 2 TIMES DAILY WITH MEALS
Status: DISCONTINUED | OUTPATIENT
Start: 2017-08-25 | End: 2017-09-02 | Stop reason: HOSPADM

## 2017-08-25 RX ORDER — ATORVASTATIN CALCIUM 40 MG/1
40 TABLET, FILM COATED ORAL DAILY
Status: DISCONTINUED | OUTPATIENT
Start: 2017-08-25 | End: 2017-09-02 | Stop reason: HOSPADM

## 2017-08-25 RX ORDER — INSULIN LISPRO 100 [IU]/ML
INJECTION, SOLUTION INTRAVENOUS; SUBCUTANEOUS EVERY 6 HOURS
Status: DISCONTINUED | OUTPATIENT
Start: 2017-08-25 | End: 2017-08-26

## 2017-08-25 RX ORDER — SODIUM CHLORIDE 0.9 % (FLUSH) 0.9 %
5-10 SYRINGE (ML) INJECTION AS NEEDED
Status: DISCONTINUED | OUTPATIENT
Start: 2017-08-25 | End: 2017-09-01

## 2017-08-25 RX ORDER — GABAPENTIN 300 MG/1
300 CAPSULE ORAL 3 TIMES DAILY
Status: DISCONTINUED | OUTPATIENT
Start: 2017-08-25 | End: 2017-09-02 | Stop reason: HOSPADM

## 2017-08-25 RX ORDER — ACETAMINOPHEN 325 MG/1
650 TABLET ORAL
Status: DISCONTINUED | OUTPATIENT
Start: 2017-08-25 | End: 2017-09-02 | Stop reason: HOSPADM

## 2017-08-25 RX ORDER — HEPARIN SODIUM 5000 [USP'U]/ML
80 INJECTION, SOLUTION INTRAVENOUS; SUBCUTANEOUS AS NEEDED
Status: DISCONTINUED | OUTPATIENT
Start: 2017-08-25 | End: 2017-08-27

## 2017-08-25 RX ORDER — PANTOPRAZOLE SODIUM 40 MG/1
40 TABLET, DELAYED RELEASE ORAL DAILY
Status: DISCONTINUED | OUTPATIENT
Start: 2017-08-25 | End: 2017-09-02 | Stop reason: HOSPADM

## 2017-08-25 RX ORDER — HYDROMORPHONE HYDROCHLORIDE 1 MG/ML
0.5 INJECTION, SOLUTION INTRAMUSCULAR; INTRAVENOUS; SUBCUTANEOUS
Status: DISCONTINUED | OUTPATIENT
Start: 2017-08-25 | End: 2017-09-01

## 2017-08-25 RX ORDER — MAGNESIUM SULFATE 100 %
4 CRYSTALS MISCELLANEOUS AS NEEDED
Status: DISCONTINUED | OUTPATIENT
Start: 2017-08-25 | End: 2017-09-02 | Stop reason: HOSPADM

## 2017-08-25 RX ORDER — DEXTROSE 50 % IN WATER (D50W) INTRAVENOUS SYRINGE
12.5-25 AS NEEDED
Status: DISCONTINUED | OUTPATIENT
Start: 2017-08-25 | End: 2017-09-02 | Stop reason: HOSPADM

## 2017-08-25 RX ORDER — ONDANSETRON 2 MG/ML
4 INJECTION INTRAMUSCULAR; INTRAVENOUS
Status: DISCONTINUED | OUTPATIENT
Start: 2017-08-25 | End: 2017-09-02 | Stop reason: HOSPADM

## 2017-08-25 RX ORDER — HEPARIN SODIUM 5000 [USP'U]/ML
40 INJECTION, SOLUTION INTRAVENOUS; SUBCUTANEOUS AS NEEDED
Status: DISCONTINUED | OUTPATIENT
Start: 2017-08-25 | End: 2017-08-27

## 2017-08-25 RX ORDER — OXYCODONE AND ACETAMINOPHEN 10; 325 MG/1; MG/1
1 TABLET ORAL
Status: DISCONTINUED | OUTPATIENT
Start: 2017-08-25 | End: 2017-09-02 | Stop reason: HOSPADM

## 2017-08-25 RX ORDER — SODIUM CHLORIDE 0.9 % (FLUSH) 0.9 %
5-10 SYRINGE (ML) INJECTION EVERY 8 HOURS
Status: DISCONTINUED | OUTPATIENT
Start: 2017-08-25 | End: 2017-09-02 | Stop reason: HOSPADM

## 2017-08-25 RX ADMIN — HEPARIN SODIUM 16 UNITS/KG/HR: 10000 INJECTION, SOLUTION INTRAVENOUS at 22:06

## 2017-08-25 RX ADMIN — INSULIN LISPRO 3 UNITS: 100 INJECTION, SOLUTION INTRAVENOUS; SUBCUTANEOUS at 11:57

## 2017-08-25 RX ADMIN — GABAPENTIN 300 MG: 300 CAPSULE ORAL at 09:14

## 2017-08-25 RX ADMIN — INSULIN LISPRO 4 UNITS: 100 INJECTION, SOLUTION INTRAVENOUS; SUBCUTANEOUS at 18:24

## 2017-08-25 RX ADMIN — HEPARIN SODIUM 12 UNITS/KG/HR: 10000 INJECTION, SOLUTION INTRAVENOUS at 03:11

## 2017-08-25 RX ADMIN — METRONIDAZOLE 500 MG: 500 INJECTION, SOLUTION INTRAVENOUS at 02:04

## 2017-08-25 RX ADMIN — ATORVASTATIN CALCIUM 40 MG: 40 TABLET, FILM COATED ORAL at 09:14

## 2017-08-25 RX ADMIN — SODIUM CHLORIDE 75 ML/HR: 900 INJECTION, SOLUTION INTRAVENOUS at 04:19

## 2017-08-25 RX ADMIN — HYDROMORPHONE HYDROCHLORIDE 0.5 MG: 1 INJECTION, SOLUTION INTRAMUSCULAR; INTRAVENOUS; SUBCUTANEOUS at 11:54

## 2017-08-25 RX ADMIN — Medication 10 ML: at 02:11

## 2017-08-25 RX ADMIN — Medication 10 ML: at 05:36

## 2017-08-25 RX ADMIN — Medication 10 ML: at 21:40

## 2017-08-25 RX ADMIN — CEFEPIME HYDROCHLORIDE 2 G: 2 INJECTION, POWDER, FOR SOLUTION INTRAVENOUS at 21:40

## 2017-08-25 RX ADMIN — CEFEPIME HYDROCHLORIDE 2 G: 2 INJECTION, POWDER, FOR SOLUTION INTRAVENOUS at 09:14

## 2017-08-25 RX ADMIN — VANCOMYCIN HYDROCHLORIDE 1750 MG: 10 INJECTION, POWDER, LYOPHILIZED, FOR SOLUTION INTRAVENOUS at 21:41

## 2017-08-25 RX ADMIN — METRONIDAZOLE 500 MG: 500 INJECTION, SOLUTION INTRAVENOUS at 11:37

## 2017-08-25 RX ADMIN — PANTOPRAZOLE SODIUM 40 MG: 40 TABLET, DELAYED RELEASE ORAL at 09:13

## 2017-08-25 RX ADMIN — CARVEDILOL 6.25 MG: 6.25 TABLET, FILM COATED ORAL at 09:14

## 2017-08-25 RX ADMIN — HEPARIN SODIUM 9450 UNITS: 5000 INJECTION, SOLUTION INTRAVENOUS; SUBCUTANEOUS at 11:36

## 2017-08-25 RX ADMIN — CARVEDILOL 6.25 MG: 6.25 TABLET, FILM COATED ORAL at 17:22

## 2017-08-25 RX ADMIN — HEPARIN SODIUM 9450 UNITS: 5000 INJECTION, SOLUTION INTRAVENOUS; SUBCUTANEOUS at 22:51

## 2017-08-25 RX ADMIN — GABAPENTIN 300 MG: 300 CAPSULE ORAL at 21:40

## 2017-08-25 RX ADMIN — VANCOMYCIN HYDROCHLORIDE 3000 MG: 10 INJECTION, POWDER, LYOPHILIZED, FOR SOLUTION INTRAVENOUS at 00:41

## 2017-08-25 RX ADMIN — GABAPENTIN 300 MG: 300 CAPSULE ORAL at 17:22

## 2017-08-25 RX ADMIN — HYDROMORPHONE HYDROCHLORIDE 0.5 MG: 1 INJECTION, SOLUTION INTRAMUSCULAR; INTRAVENOUS; SUBCUTANEOUS at 21:52

## 2017-08-25 RX ADMIN — HYDROMORPHONE HYDROCHLORIDE 0.5 MG: 1 INJECTION, SOLUTION INTRAMUSCULAR; INTRAVENOUS; SUBCUTANEOUS at 03:25

## 2017-08-25 RX ADMIN — INSULIN LISPRO 3 UNITS: 100 INJECTION, SOLUTION INTRAVENOUS; SUBCUTANEOUS at 05:35

## 2017-08-25 RX ADMIN — SODIUM CHLORIDE 75 ML/HR: 900 INJECTION, SOLUTION INTRAVENOUS at 22:06

## 2017-08-25 RX ADMIN — METRONIDAZOLE 500 MG: 500 INJECTION, SOLUTION INTRAVENOUS at 18:24

## 2017-08-25 NOTE — H&P
Hospitalist Admission Note    NAME: Norman Abernathy   :  1970   MRN:  [id number]     Date/Time:  2017 11:17 PM    Patient PCP: Martha Burkitt, MD  ________________________________________________________________________    My assessment of this patient's clinical condition and my plan of care is as follows. Note: Pt admitted under Dr Xiomara ePña service, oncall physician Dr Kerri Arnold notified by me over the phone    Assessment / Plan:  Sepsis with fever, leukocytosis and tachycardia POA  Due to Left foot infection at amputation site  Admit to PCU  IV vancomycin, cefepime, Flagyl  Check wound cultures  F/u blood cultures  Podiatry consulted by ED, will keep NPO for midnight for likely plan for debridement tomorrow  Ordered multiple IVF boluses in ED per sepsis protocol, considering LA ok, Normal BP and h/o CHF, will limit bolus to 1L and place on NaCl 75ml/hr and hold aldactone    Recent PE (1 month ago)  Will hold eliquis for surgery and place on heparin ggt    ALEX  Hyponatremia  Seems related to dehydration and sepsis  IVF and above  Holding diuretics    HTN (hypertension)   Continue Coreg while holding hydralzine and imdur for now due to sepsis  PRN nitropaste    Diabetes type 2  BS only 81 and he will be NPO  Will hold Long acting and place on high resistance SSI    Hypercholesteremia   On statins    GERD (gastroesophageal reflux disease)   On PPIs    Code Status: full     DVT Prophylaxis: on heparin ggt as above        Subjective:   CHIEF COMPLAINT: Sent by Podiatry for evaluation    HISTORY OF PRESENT ILLNESS:     Imelda Solomon is a 49 y.o.  male who is sent by his podiatry to ED for evaluation.  As per patient, pt had surgery of his left foot few months ago and later he developed the wound on left side of his amputation site and it's in process of constant care under podiatry but when he woke up today, he noted new wound on the right side of left foot. He was seen at podiatry office who referred him to ED for admission. Pt denies any fever, chills, nausea, vomiting, diarrhea, chest pain, cough, shortness of breath. In ED pt noted to have leukocytosis, tachycardia, and fever along with renal insufficiency and hyponatremia. We were asked to admit for work up and evaluation of the above problems. Past Medical History:   Diagnosis Date    Diabetes ([facility]     since age 23    [facility]     HTN (hypertension)     Hypercholesteremia     Hyperlipidemia         Past Surgical History:   Procedure Laterality Date    HX AMPUTATION      toes- left foot    HX BUNIONECTOMY      HX ORTHOPAEDIC      boutinerre deformity    HX ORTHOPAEDIC      fascia removed left foot    HX OTHER SURGICAL  03/2017    Skin graft Surgery    HX TONSILLECTOMY      HX WISDOM TEETH EXTRACTION         Social History   Substance Use Topics    Smoking status: Former Smoker     Types: Cigars     Quit date: 4/22/2011    Smokeless tobacco: Never Used    Alcohol use 0.0 oz/week     1 Glasses of wine, 0 Standard drinks or equivalent per week      Comment: stop drinking 5 months ago        Family History   Problem Relation Age of Onset    Hypertension Mother     High Cholesterol Mother      Allergies   Allergen Reactions    Ace Inhibitors Swelling     Facial swelling cough     Morphine Angioedema        Prior to Admission medications    Medication Sig Start Date End Date Taking? Authorizing Provider   metFORMIN (GLUCOPHAGE) 500 mg tablet TAKE 1 TABLET BY MOUTH TWICE A DAY WITH MEALS AND INCREASE AS DIRECTED 8/8/17  Yes Haseeb Roque MD   insulin lispro (HUMALOG) 100 unit/mL kwikpen 12 Units by SubCUTAneous route Before breakfast, lunch, and dinner. DX: E11.65 8/8/17  Yes Haseeb Roque MD   apixaban (ELIQUIS) 5 mg tablet Take 2 Tabs by mouth every twelve (12) hours.  8/8/17  Yes Haseeb Roque MD   atorvastatin (LIPITOR) 40 mg tablet Take 1 Tab by mouth daily. 7/31/17  Yes Ruthie Molina MD   carvedilol (COREG) 6.25 mg tablet Take 1 Tab by mouth two (2) times daily (with meals). 7/26/17  Yes Ruthie Molina MD   hydrALAZINE (APRESOLINE) 25 mg tablet Take 1 Tab by mouth three (3) times daily. 7/26/17  Yes Ruthie Molina MD   isosorbide mononitrate ER (IMDUR) 30 mg tablet Take 1 Tab by mouth daily. 7/26/17  Yes Ruthie Molina MD   spironolactone (ALDACTONE) 25 mg tablet Take 1 Tab by mouth daily. 7/26/17  Yes Ruthie Molina MD   gabapentin (NEURONTIN) 300 mg capsule Take 1 Cap by mouth three (3) times daily. 7/12/17  Yes Ruthie Molina MD   diphenhydrAMINE (ALLERGY RELIEF,DIPHENHYDRAMIN,) 25 mg capsule Take 25 mg by mouth nightly as needed. Yes Historical Provider   insulin detemir (LEVEMIR FLEXTOUCH) 100 unit/mL (3 mL) inpn 50 Units by SubCUTAneous route nightly. 60 units QHS  Patient taking differently: 50 Units by SubCUTAneous route nightly. 6/3/17  Yes Vaughn Back MD   oxyCODONE-acetaminophen (PERCOCET 10)  mg per tablet Take 1 Tab by mouth every six (6) hours as needed. Max Daily Amount: 4 Tabs. Patient taking differently: Take 1 Tab by mouth every six (6) hours as needed for Pain. 3/2/17  Yes Ruthie Molina MD   esomeprazole (NEXIUM) 40 mg capsule Take 1 Cap by mouth daily. 10/6/16  Yes Ruthie Molina MD   sodium chloride (SALINE NASAL) 0.65 % nasal spray 1 [place] by Both Nostrils route as needed for Congestion. Patient taking differently: 1 Spray by Both Nostrils route as needed for Congestion (dry sinus symptoms, especially during winter time. ). 10/6/16  Yes Ruthie Molina MD       REVIEW OF SYSTEMS:     I am not able to complete the review of systems because:    The patient is intubated and sedated    The patient has altered mental status due to his acute medical problems    The patient has baseline aphasia from prior stroke(s)    The patient has baseline dementia and is not reliable historian    The patient is in acute medical distress and unable to provide information           Total of 12 systems reviewed as follows:       POSITIVE= underlined text  Negative = text not underlined  General:  fever, chills, sweats, generalized weakness, weight loss/gain,      loss of appetite   Eyes:    blurred vision, eye pain, loss of vision, double vision  ENT:    rhinorrhea, pharyngitis   Respiratory:   cough, sputum production, SOB, GARCIA, wheezing, pleuritic pain   Cardiology:   chest pain, palpitations, orthopnea, PND, edema, syncope   Gastrointestinal:  abdominal pain , N/V, diarrhea, dysphagia, constipation, bleeding   Genitourinary:  frequency, urgency, dysuria, hematuria, incontinence   Muskuloskeletal :  arthralgia, myalgia, back pain  Hematology:  easy bruising, nose or gum bleeding, lymphadenopathy   Dermatological: Left foot ulceration / wound at amputation site  Endocrine:   hot flashes or polydipsia   Neurological:  headache, dizziness, confusion, focal weakness, paresthesia,     Speech difficulties, memory loss, gait difficulty  Psychological: Feelings of anxiety, depression, agitation    Objective:   VITALS:    Visit Vitals    /69 (BP 1 Location: Right arm, BP Patient Position: At rest)    Pulse 97    Temp (!) 100.7 °F (38.2 °C)    Resp 18    Ht 6' 3\" (1.905 m)    Wt 118.1 kg (260 lb 5.8 oz)    SpO2 95%    BMI 32.54 kg/m2       PHYSICAL EXAM:    General:    Alert, cooperative, no distress, appears stated age. HEENT: Atraumatic, anicteric sclerae, pink conjunctivae     No oral ulcers, mucosa dry, throat clear, dentition fair  Neck:  Supple, symmetrical,  thyroid: non tender  Lungs:   Clear to auscultation bilaterally. No Wheezing or Rhonchi. No rales. Chest wall:  No tenderness  No Accessory muscle use. Heart:   Regular  rhythm,  No  murmur   No edema  Abdomen:   Soft, non-tender. Not distended. Bowel sounds normal  Extremities: No cyanosis.   No clubbing,      Skin turgor normal, Capillary refill normal, Radial dial pulse 2+  Skin:     Left foot ulceration / wound at amputation site   Psych:  Good insight. Not depressed. Not anxious or agitated. Neurologic: EOMs intact. No facial asymmetry. No aphasia or slurred speech. Symmetrical strength, Sensation grossly intact. Alert and oriented X 4.     _______________________________________________________________________  Care Plan discussed with:    Comments   Patient y    Family      RN y    Care Manager                    Consultant:      _______________________________________________________________________  Expected  Disposition:   Home with Family    HH/PT/OT/RN y   SNF/LTC    CHARLIE y   ________________________________________________________________________  TOTAL TIME: 72 Minutes    Critical Care Provided     Minutes non procedure based      Comments    y Reviewed previous records   >50% of visit spent in counseling and coordination of care y Discussion with patient and questions answered       ________________________________________________________________________  Signed: Brain Lobo MD    Procedures: see electronic medical records for all procedures/Xrays and details which were not copied into this note but were reviewed prior to creation of Plan. LAB DATA REVIEWED:    Recent Results (from the past 24 hour(s))   CBC WITH AUTOMATED DIFF    Collection Time: 08/24/17  7:02 PM   Result Value Ref Range    WBC 19.5 (H) 4.1 - 11.1 K/uL    RBC 4.38 4.10 - 5.70 M/uL    HGB 9.8 (L) 12.1 - 17.0 g/dL    HCT 31.7 (L) 36.6 - 50.3 %    MCV 72.4 (L) 80.0 - 99.0 FL    MCH 22.4 (L) 26.0 - 34.0 PG    MCHC 30.9 30.0 - 36.5 g/dL    RDW 16.3 (H) 11.5 - 14.5 %    PLATELET 071 (H) 080 - 400 K/uL    NEUTROPHILS 75 32 - 75 %    LYMPHOCYTES 19 12 - 49 %    MONOCYTES 5 5 - 13 %    EOSINOPHILS 1 0 - 7 %    BASOPHILS 0 0 - 1 %    ABS. NEUTROPHILS 14.6 (H) 1.8 - 8.0 K/UL    ABS. LYMPHOCYTES 3.7 (H) 0.8 - 3.5 K/UL    ABS.  MONOCYTES 1.0 0.0 - 1.0 K/UL    ABS. EOSINOPHILS 0.2 0.0 - 0.4 K/UL    ABS. BASOPHILS 0.0 0.0 - 0.1 K/UL    DF SMEAR SCANNED      RBC COMMENTS ANISOCYTOSIS  1+        RBC COMMENTS MICROCYTOSIS  2+        RBC COMMENTS HYPOCHROMIA  2+       METABOLIC PANEL, COMPREHENSIVE    Collection Time: 08/24/17  7:02 PM   Result Value Ref Range    Sodium 130 (L) 136 - 145 mmol/L    Potassium 4.0 3.5 - 5.1 mmol/L    Chloride 98 97 - 108 mmol/L    CO2 24 21 - 32 mmol/L    Anion gap 8 5 - 15 mmol/L    Glucose 81 65 - 100 mg/dL    BUN 17 6 - 20 MG/DL    Creatinine 2.00 (H) 0.70 - 1.30 MG/DL    BUN/Creatinine ratio 9 (L) 12 - 20      GFR est AA 43 (L) >60 ml/min/1.73m2    GFR est non-AA 36 (L) >60 ml/min/1.73m2    Calcium 8.9 8.5 - 10.1 MG/DL    Bilirubin, total 0.3 0.2 - 1.0 MG/DL    ALT (SGPT) 56 12 - 78 U/L    AST (SGOT) 24 15 - 37 U/L    Alk.  phosphatase 115 45 - 117 U/L    Protein, total 9.5 (H) 6.4 - 8.2 g/dL    Albumin 1.6 (L) 3.5 - 5.0 g/dL    Globulin 7.9 (H) 2.0 - 4.0 g/dL    A-G Ratio 0.2 (L) 1.1 - 2.2     LACTIC ACID    Collection Time: 08/24/17  7:02 PM   Result Value Ref Range    Lactic acid 1.3 0.4 - 2.0 MMOL/L   EKG, 12 LEAD, INITIAL    Collection Time: 08/24/17  8:52 PM   Result Value Ref Range    Ventricular Rate 87 BPM    Atrial Rate 87 BPM    P-R Interval 120 ms    QRS Duration 92 ms    Q-T Interval 372 ms    QTC Calculation (Bezet) 447 ms    Calculated P Axis 35 degrees    Calculated R Axis 44 degrees    Calculated T Axis 62 degrees    Diagnosis       Normal sinus rhythm with sinus arrhythmia  Voltage criteria for left ventricular hypertrophy  Nonspecific ST and T wave abnormality  Abnormal ECG  When compared with ECG of 21-JUL-2017 16:52,  QT has shortened

## 2017-08-25 NOTE — PROGRESS NOTES
General Daily Progress Note    Admit Date: 8/24/2017  Hospital day 2    Subjective:     Patient has no complaint of sob pt states he is going to have his toe amputated .    Medication side effects: none    Current Facility-Administered Medications   Medication Dose Route Frequency    atorvastatin (LIPITOR) tablet 40 mg  40 mg Oral DAILY    carvedilol (COREG) tablet 6.25 mg  6.25 mg Oral BID WITH MEALS    pantoprazole (PROTONIX) tablet 40 mg  40 mg Oral DAILY    gabapentin (NEURONTIN) capsule 300 mg  300 mg Oral TID    insulin lispro (HUMALOG) injection   SubCUTAneous Q6H    glucose chewable tablet 16 g  4 Tab Oral PRN    dextrose (D50W) injection syrg 12.5-25 g  12.5-25 g IntraVENous PRN    glucagon (GLUCAGEN) injection 1 mg  1 mg IntraMUSCular PRN    nitroglycerin (NITROBID) 2 % ointment 1 Inch  1 Inch Topical Q6H PRN    sodium chloride (NS) flush 5-10 mL  5-10 mL IntraVENous Q8H    sodium chloride (NS) flush 5-10 mL  5-10 mL IntraVENous PRN    acetaminophen (TYLENOL) tablet 650 mg  650 mg Oral Q6H PRN    ondansetron (ZOFRAN) injection 4 mg  4 mg IntraVENous Q4H PRN    heparin (porcine) injection 9,450 Units  80 Units/kg SubCUTAneous PRN    heparin (porcine) injection 4,700 Units  40 Units/kg IntraVENous PRN    oxyCODONE-acetaminophen (PERCOCET 10)  mg per tablet 1 Tab  1 Tab Oral Q6H PRN    HYDROmorphone (PF) (DILAUDID) injection 0.5 mg  0.5 mg IntraVENous Q3H PRN    sodium chloride (NS) flush 5-10 mL  5-10 mL IntraVENous PRN    sodium chloride 0.9 % bolus infusion 3,543 mL  30 mL/kg IntraVENous ONCE    vancomycin (VANCOCIN) 1750 mg in  ml infusion  1,750 mg IntraVENous Q18H    cefepime (MAXIPIME) 2 g in 0.9% sodium chloride (MBP/ADV) 100 mL  2 g IntraVENous Q12H    metroNIDAZOLE (FLAGYL) IVPB premix 500 mg  500 mg IntraVENous Q8H    heparin 25,000 units in D5W 250 ml infusion  12-36 Units/kg/hr IntraVENous TITRATE    0.9% sodium chloride infusion  75 mL/hr IntraVENous CONTINUOUS Review of Systems  A comprehensive review of systems was negative except for that written in the HPI. Objective:     Patient Vitals for the past 8 hrs:   BP Temp Pulse Resp SpO2 Height Weight   08/25/17 0421 118/84 98.9 °F (37.2 °C) 71 16 100 % - -   08/25/17 0141 - - - - - 6' 3\" (1.905 m) 250 lb 3.2 oz (113.5 kg)   08/25/17 0134 131/83 98.6 °F (37 °C) 77 18 99 % - -   08/25/17 0030 119/77 98.7 °F (37.1 °C) - - 99 % - -   08/25/17 0015 - - - - 100 % - -   08/25/17 0010 118/70 - - - 97 % - -   08/25/17 0001 118/70 - - - 99 % - -   08/25/17 0000 - - - - 100 % - -   08/24/17 2345 - - - - 99 % - -        08/23 1901 - 08/25 0700  In: 1217.4 [I.V.:1217.4]  Out: 700 [Urine:700]    Physical Exam: General appearance: alert, cooperative, no distress, appears stated age, anxious  Lungs: clear to auscultation bilaterally  Heart: regular rate and rhythm  Abdomen: soft, non-tender. Bowel sounds normal. No masses,  no organomegaly  Extremities: no edema, redness or tenderness in the calves or thighs  Foot bandaged did not remove           Data Review   Recent Results (from the past 24 hour(s))   CBC WITH AUTOMATED DIFF    Collection Time: 08/24/17  7:02 PM   Result Value Ref Range    WBC 19.5 (H) 4.1 - 11.1 K/uL    RBC 4.38 4.10 - 5.70 M/uL    HGB 9.8 (L) 12.1 - 17.0 g/dL    HCT 31.7 (L) 36.6 - 50.3 %    MCV 72.4 (L) 80.0 - 99.0 FL    MCH 22.4 (L) 26.0 - 34.0 PG    MCHC 30.9 30.0 - 36.5 g/dL    RDW 16.3 (H) 11.5 - 14.5 %    PLATELET 447 (H) 904 - 400 K/uL    NEUTROPHILS 75 32 - 75 %    LYMPHOCYTES 19 12 - 49 %    MONOCYTES 5 5 - 13 %    EOSINOPHILS 1 0 - 7 %    BASOPHILS 0 0 - 1 %    ABS. NEUTROPHILS 14.6 (H) 1.8 - 8.0 K/UL    ABS. LYMPHOCYTES 3.7 (H) 0.8 - 3.5 K/UL    ABS. MONOCYTES 1.0 0.0 - 1.0 K/UL    ABS. EOSINOPHILS 0.2 0.0 - 0.4 K/UL    ABS.  BASOPHILS 0.0 0.0 - 0.1 K/UL    DF SMEAR SCANNED      RBC COMMENTS ANISOCYTOSIS  1+        RBC COMMENTS MICROCYTOSIS  2+        RBC COMMENTS HYPOCHROMIA  2+       METABOLIC PANEL, COMPREHENSIVE    Collection Time: 08/24/17  7:02 PM   Result Value Ref Range    Sodium 130 (L) 136 - 145 mmol/L    Potassium 4.0 3.5 - 5.1 mmol/L    Chloride 98 97 - 108 mmol/L    CO2 24 21 - 32 mmol/L    Anion gap 8 5 - 15 mmol/L    Glucose 81 65 - 100 mg/dL    BUN 17 6 - 20 MG/DL    Creatinine 2.00 (H) 0.70 - 1.30 MG/DL    BUN/Creatinine ratio 9 (L) 12 - 20      GFR est AA 43 (L) >60 ml/min/1.73m2    GFR est non-AA 36 (L) >60 ml/min/1.73m2    Calcium 8.9 8.5 - 10.1 MG/DL    Bilirubin, total 0.3 0.2 - 1.0 MG/DL    ALT (SGPT) 56 12 - 78 U/L    AST (SGOT) 24 15 - 37 U/L    Alk.  phosphatase 115 45 - 117 U/L    Protein, total 9.5 (H) 6.4 - 8.2 g/dL    Albumin 1.6 (L) 3.5 - 5.0 g/dL    Globulin 7.9 (H) 2.0 - 4.0 g/dL    A-G Ratio 0.2 (L) 1.1 - 2.2     CULTURE, BLOOD, PAIRED    Collection Time: 08/24/17  7:02 PM   Result Value Ref Range    Special Requests: NO SPECIAL REQUESTS      Culture result: NO GROWTH AFTER 12 HOURS     LACTIC ACID    Collection Time: 08/24/17  7:02 PM   Result Value Ref Range    Lactic acid 1.3 0.4 - 2.0 MMOL/L   EKG, 12 LEAD, INITIAL    Collection Time: 08/24/17  8:52 PM   Result Value Ref Range    Ventricular Rate 87 BPM    Atrial Rate 87 BPM    P-R Interval 120 ms    QRS Duration 92 ms    Q-T Interval 372 ms    QTC Calculation (Bezet) 447 ms    Calculated P Axis 35 degrees    Calculated R Axis 44 degrees    Calculated T Axis 62 degrees    Diagnosis       Normal sinus rhythm with sinus arrhythmia  Voltage criteria for left ventricular hypertrophy  Nonspecific ST and T wave abnormality  Abnormal ECG  When compared with ECG of 21-JUL-2017 16:52,  QT has shortened     PTT    Collection Time: 08/25/17 12:13 AM   Result Value Ref Range    aPTT 36.1 (H) 22.1 - 32.5 sec    aPTT, therapeutic range     58.0 - 77.0 SECS   CULTURE, WOUND W GRAM STAIN    Collection Time: 08/25/17 12:48 AM   Result Value Ref Range    Special Requests: NO SPECIAL REQUESTS      GRAM STAIN PENDING     Culture result: PENDING    CULTURE, WOUND [place] [employer] STAIN    Collection Time: 08/25/17 12:48 AM   Result Value Ref Range    Special Requests: NO SPECIAL REQUESTS      GRAM STAIN PENDING     Culture result: PENDING    GLUCOSE, POC    Collection Time: 08/25/17  2:01 AM   Result Value Ref Range    Glucose (POC) 129 (H) 65 - 100 mg/dL    Performed by Maura Marcus    CBC WITH AUTOMATED DIFF    Collection Time: 08/25/17  3:34 AM   Result Value Ref Range    WBC 16.8 (H) 4.1 - 11.1 K/uL    RBC 3.95 (L) 4.10 - 5.70 M/uL    HGB 8.5 (L) 12.1 - 17.0 g/dL    HCT 28.4 (L) 36.6 - 50.3 %    MCV 71.9 (L) 80.0 - 99.0 FL    MCH 21.5 (L) 26.0 - 34.0 PG    MCHC 29.9 (L) 30.0 - 36.5 g/dL    RDW 16.3 (H) 11.5 - 14.5 %    PLATELET 908 (H) 378 - 400 K/uL    NEUTROPHILS 71 32 - 75 %    LYMPHOCYTES 15 12 - 49 %    MONOCYTES 11 5 - 13 %    EOSINOPHILS 3 0 - 7 %    BASOPHILS 0 0 - 1 %    ABS. NEUTROPHILS 12.0 (H) 1.8 - 8.0 K/UL    ABS. LYMPHOCYTES 2.5 0.8 - 3.5 K/UL    ABS. MONOCYTES 1.8 (H) 0.0 - 1.0 K/UL    ABS. EOSINOPHILS 0.5 (H) 0.0 - 0.4 K/UL    ABS. BASOPHILS 0.0 0.0 - 0.1 K/UL    DF MANUAL      RBC COMMENTS ANISOCYTOSIS  1+       METABOLIC PANEL, COMPREHENSIVE    Collection Time: 08/25/17  3:34 AM   Result Value Ref Range    Sodium 135 (L) 136 - 145 mmol/L    Potassium 3.7 3.5 - 5.1 mmol/L    Chloride 103 97 - 108 mmol/L    CO2 22 21 - 32 mmol/L    Anion gap 10 5 - 15 mmol/L    Glucose 126 (H) 65 - 100 mg/dL    BUN 18 6 - 20 MG/DL    Creatinine 1.89 (H) 0.70 - 1.30 MG/DL    BUN/Creatinine ratio 10 (L) 12 - 20      GFR est AA 46 (L) >60 ml/min/1.73m2    GFR est non-AA 38 (L) >60 ml/min/1.73m2    Calcium 8.3 (L) 8.5 - 10.1 MG/DL    Bilirubin, total 0.3 0.2 - 1.0 MG/DL    ALT (SGPT) 39 12 - 78 U/L    AST (SGOT) 20 15 - 37 U/L    Alk.  phosphatase 100 45 - 117 U/L    Protein, total 7.7 6.4 - 8.2 g/dL    Albumin 1.2 (L) 3.5 - 5.0 g/dL    Globulin 6.5 (H) 2.0 - 4.0 g/dL    A-G Ratio 0.2 (L) 1.1 - 2.2     GLUCOSE, POC    Collection Time: 08/25/17  5:13 AM   Result Value Ref Range    Glucose (POC) 146 (H) 65 - 100 mg/dL    Performed by Henry Russell (PCT)    URINALYSIS W/ RFLX MICROSCOPIC    Collection Time: 08/25/17  5:40 AM   Result Value Ref Range    Color YELLOW/STRAW      Appearance CLOUDY (A) CLEAR      Specific gravity 1.017 1.003 - 1.030      pH (UA) 5.5 5.0 - 8.0      Protein 300 (A) NEG mg/dL    Glucose 100 (A) NEG mg/dL    Ketone NEGATIVE  NEG mg/dL    Bilirubin NEGATIVE  NEG      Blood TRACE (A) NEG      Urobilinogen 0.2 0.2 - 1.0 EU/dL    Nitrites NEGATIVE  NEG      Leukocyte Esterase NEGATIVE  NEG      WBC 0-4 0 - 4 /hpf    RBC 5-10 0 - 5 /hpf    Epithelial cells MODERATE (A) FEW /lpf    Bacteria 1+ (A) NEG /hpf    Mucus TRACE (A) NEG /lpf    Hyaline cast 0-2 0 - 5 /lpf           Assessment:     Active Problems:    HTN (hypertension) (7/23/2013)      Diabetes (HCC) ()      Overview: since age 23      Hypercholesteremia ()      Sepsis ([facility] (5/27/2017)      Bilateral pulmonary embolism (HCC) (7/21/2017)      Chronic systolic heart failure ([facility] (7/31/2017)      Left foot infection (8/24/2017)      Hyponatremia (8/24/2017)      ALEX (acute kidney injury) ([facility] (8/24/2017)      GERD (gastroesophageal reflux disease) (8/24/2017)        Plan:     Continue home bp meds  Continue abx  Will need a picc placed d/w nursing   Continue heprin   Gentle ivh and monitor renal fxn  Pt very anxious to go home xanax prn pt has a history of leaving [place] and is not very compliant with diet -during last hospitalization eating chinese food and chips etc

## 2017-08-25 NOTE — ED NOTES
Per Dr. Lam Samples, ampicillin to finish infusing, then vanc to be hung, the cefepime and flagyl per verbal orders from Dr. Lam Samples.

## 2017-08-25 NOTE — PROGRESS NOTES
PCU SHIFT NURSING NOTE      Bedside shift change report given to Jeremi Puente (oncoming nurse) by Frances Saunders (offgoing nurse). Report included the following information SBAR, Kardex, Intake/Output, MAR and Recent Results. Shift Summary: 7115  Pt in bed resting  No complaints of pain  NSR on monitor  VSS  Continue to monitor    1000  Labs sent   [address] re:  POC    1130  Verified heparin gtt rate change with Sara Barboza in pharmacy      Admission Date 8/24/2017   Admission Diagnosis Sepsis ([address]   Consults IP CONSULT TO PODIATRY  IP CONSULT TO HOSPITALIST        Consults   []PT   []OT   []Speech   []Case Management      [] Palliative      Cardiac Monitoring Order   []Yes   []No     IV drips   []Yes    Drip:                            Dose:  Drip:                            Dose:  Drip:                            Dose:   []No     GI Prophylaxis   []Yes   []No         DVT Prophylaxis   SCDs:             Jeff stockings:         [] Medication   []Contraindicated   []None      Activity Level Activity Level: Up with Assistance     Activity Assistance: Partial (one person)   Purposeful Rounding every 1-2 hour? []Yes   Mccormick Score  Total Score: 2   Bed Alarm (If score 3 or >)   []Yes   [] Refused (See signed refusal form in chart)   Noé Score  Noé Score: 20   Noé Score (if score 14 or less)   []PMT consult   []Wound Care consult      []Specialty bed   [] Nutrition consult          Needs prior to discharge:   Home O2 required:    []Yes   []No    If yes, how much O2 required?     Other:    Last Bowel Movement:        Influenza Vaccine          Pneumonia Vaccine           Diet Active Orders   Diet    DIET NPO      LDAs               Peripheral IV 08/24/17 Right Hand (Active)   Site Assessment Clean, dry, & intact 8/25/2017  7:18 AM   Phlebitis Assessment 0 8/25/2017  7:18 AM   Infiltration Assessment 0 8/25/2017  7:18 AM   Dressing Status Clean, dry, & intact 8/25/2017  7:18 AM   Dressing Type Transparent 8/25/2017 7:18 AM   Hub Color/Line Status Blue; Infusing 8/25/2017  7:18 AM       Peripheral IV 08/25/17 Left Hand (Active)   Site Assessment Clean, dry, & intact 8/25/2017  7:18 AM   Phlebitis Assessment 0 8/25/2017  7:18 AM   Infiltration Assessment 0 8/25/2017  7:18 AM   Dressing Status Clean, dry, & intact 8/25/2017  7:18 AM   Dressing Type Transparent 8/25/2017  7:18 AM   Hub Color/Line Status Blue; Infusing 8/25/2017  7:18 AM   Action Taken Blood drawn 8/25/2017 12:21 AM                      Urinary Catheter      Intake & Output   Date 08/24/17 0700 - 08/25/17 0659 08/25/17 0700 - 08/26/17 0659   Shift [id number] 1900-0659 24 Hour Total 8342-8937 3062-0104 24 Hour Total   I  N  T  A  K  E   I.V.  (mL/kg/hr)  1217.4  (0.9) 1217.4  (0.4)         Heparin Volume  26 26         Volume (0.9% sodium chloride infusion)  52.5 52.5         Volume (sodium chloride 0.9 % bolus infusion 3,543 mL)  1038.8 1038.8         Volume (metroNIDAZOLE (FLAGYL) IVPB premix 500 mg)  100 100       Shift Total  (mL/kg)  1217.4  (10.7) 1217.4  (10.7)      O  U  T  P  U  T   Urine  (mL/kg/hr)  700  (0.5) 700  (0.3)         Urine Voided  700 700       Shift Total  (mL/kg)  700  (6.2) 700  (6.2)      NET  517.4 517.4      Weight (kg) 118.1 113.5 113.5 113.5 113.5 113.5         Readmission Risk Assessment Tool Score Medium Risk            15       Total Score        3 Has Seen PCP in Last 6 Months (Yes=3, No=0)    2 . Living with Significant Other. Assisted Living. LTAC. SNF. or   Rehab    9 IP Visits Last 12 Months (1-3=4, 4=9, >4=11)    1 Charlson Comorbidity Score (Age + Comorbid Conditions)        Criteria that do not apply:    Patient Length of Stay (>5 days = 3)    Pt.  Coverage (Medicare=5 , Medicaid, or Self-Pay=4)       Expected Length of Stay - - -   Actual Length of Stay 1

## 2017-08-25 NOTE — PROGRESS NOTES

## 2017-08-25 NOTE — PROGRESS NOTES
TRANSFER - IN REPORT:    Verbal report received from [facility]) on Viviana Pena  being received from ED(unit) for routine progression of care      Report consisted of patients Situation, Background, Assessment and   Recommendations(SBAR). Information from the following report(s) SBAR, Kardex, Intake/Output, MAR, Recent Results and Cardiac Rhythm NSR was reviewed with the receiving nurse. Opportunity for questions and clarification was provided. Assessment completed upon patients arrival to unit and care assumed.      Primary Nurse Shaheed Rosario RN and Mac Hernandez RN performed a dual skin assessment on this patient Impairment noted- see wound doc flow sheet  Noé score is 20

## 2017-08-25 NOTE — CONSULTS
Podiatry History and Physical    Subjective:         Date of Consultation:  August 25, 2017    Referring Physician: Dr. Estefany Posadas MD    Patient is a 49 y.o.  male who is being seen for a left diabetic foot infection. Pt was admitted to [facility] for the same reason. Pt has been dealing with a chronic left diabetic foot ulcer for > 6 months. He has been seeing my partner, Dr. Fady Moran. Pt was seen by a different podiatrist yesterday for a 2nd opinion and was advised to come to the ER due to the appearance of the foot. Pt states that he was developing fevers earlier this week. Presently, Pt is laying in bed in no apparent distress. Denies any n/v/f/ns/c.      Patient Active Problem List    Diagnosis Date Noted    Left foot infection 08/24/2017    Hyponatremia 08/24/2017    ALEX (acute kidney injury) ([address] 08/24/2017    GERD (gastroesophageal reflux disease) 08/24/2017    Chronic systolic heart failure ([address] 07/31/2017    Hypokalemia 07/23/2017    History of Clostridium difficile infection 07/22/2017    Non-compliance with treatment 07/22/2017    Bilateral pulmonary embolism ([address] 07/21/2017    Sepsis ([address] 05/27/2017    ARF (acute renal failure) ([address] 05/27/2017    Acute osteomyelitis of metatarsal bone of left foot ([address] 03/21/2017    Osteomyelitis ([address] 02/24/2017    Edema of left lower extremity 02/17/2017    Diabetic infection of right foot ([address] 02/16/2017    IDDM (insulin dependent diabetes mellitus) ([address] 10/06/2016    Diabetes ([address]     HTN (hypertension)     Hypercholesteremia     HTN (hypertension) 07/23/2013     Past Medical History:   Diagnosis Date    Diabetes ([address]     since age 23   [de-identified] DM (diabetes mellitus) ([address]     HTN (hypertension)     Hypercholesteremia     Hyperlipidemia       Family History   Problem Relation Age of Onset    Hypertension Mother     High Cholesterol Mother       Social History   Substance Use Topics    Smoking status: Former Smoker Types: Cigars     Quit date: 4/22/2011    Smokeless tobacco: Never Used    Alcohol use 0.0 oz/week     1 Glasses of wine, 0 Standard drinks or equivalent per week      Comment: stop drinking 5 months ago     Past Surgical History:   Procedure Laterality Date    HX AMPUTATION      toes- left foot    HX BUNIONECTOMY      HX ORTHOPAEDIC      boutinerre deformity    HX ORTHOPAEDIC      fascia removed left foot    HX OTHER SURGICAL  03/2017    Skin graft Surgery    HX TONSILLECTOMY      HX WISDOM TEETH EXTRACTION        Prior to Admission medications    Medication Sig Start Date End Date Taking? Authorizing Provider   metFORMIN (GLUCOPHAGE) 500 mg tablet TAKE 1 TABLET BY MOUTH TWICE A DAY WITH MEALS AND INCREASE AS DIRECTED 8/8/17  Yes Martha Burkitt, MD   insulin lispro (HUMALOG) 100 unit/mL kwikpen 12 Units by SubCUTAneous route Before breakfast, lunch, and dinner. DX: E11.65 8/8/17  Yes Martha Burkitt, MD   apixaban (ELIQUIS) 5 mg tablet Take 2 Tabs by mouth every twelve (12) hours. 8/8/17  Yes Martha Burkitt, MD   atorvastatin (LIPITOR) 40 mg tablet Take 1 Tab by mouth daily. 7/31/17  Yes Martha Burkitt, MD   carvedilol (COREG) 6.25 mg tablet Take 1 Tab by mouth two (2) times daily (with meals). 7/26/17  Yes Martha Burkitt, MD   hydrALAZINE (APRESOLINE) 25 mg tablet Take 1 Tab by mouth three (3) times daily. 7/26/17  Yes Martha Burkitt, MD   isosorbide mononitrate ER (IMDUR) 30 mg tablet Take 1 Tab by mouth daily. 7/26/17  Yes Martha Burkitt, MD   spironolactone (ALDACTONE) 25 mg tablet Take 1 Tab by mouth daily. 7/26/17  Yes Martha Burkitt, MD   gabapentin (NEURONTIN) 300 mg capsule Take 1 Cap by mouth three (3) times daily. 7/12/17  Yes Martha Burkitt, MD   diphenhydrAMINE (ALLERGY RELIEF,DIPHENHYDRAMIN,) 25 mg capsule Take 25 mg by mouth nightly as needed.    Yes Historical Provider   insulin detemir (LEVEMIR FLEXTOUCH) 100 unit/mL (3 mL) inpn 50 Units by SubCUTAneous route nightly. 60 units QHS  Patient taking differently: 50 Units by SubCUTAneous route nightly. 6/3/17  Yes Marion Reyes MD   oxyCODONE-acetaminophen (PERCOCET 10)  mg per tablet Take 1 Tab by mouth every six (6) hours as needed. Max Daily Amount: 4 Tabs. Patient taking differently: Take 1 Tab by mouth every six (6) hours as needed for Pain. 3/2/17  Yes Merle Avalos MD   esomeprazole (NEXIUM) 40 mg capsule Take 1 Cap by mouth daily. 10/6/16  Yes Merle Avalos MD   sodium chloride (SALINE NASAL) 0.65 % nasal spray 1 [place] by Both Nostrils route as needed for Congestion. Patient taking differently: 1 Spray by Both Nostrils route as needed for Congestion (dry sinus symptoms, especially during winter time. ). 10/6/16  Yes Merle Avalos MD     Allergies   Allergen Reactions    Ace Inhibitors Swelling     Facial swelling cough     Morphine Angioedema        Review of Systems:  A comprehensive review of systems was negative except for that written in the HPI. Objective:     Patient Vitals for the past 8 hrs:   BP Temp Pulse Resp SpO2   17 1202 141/82 98.3 °F (36.8 °C) 74 16 99 %   17 0718 (!) 122/95 97.5 °F (36.4 °C) 71 18 100 %     Temp (24hrs), Av °F (37.2 °C), Min:97.5 °F (36.4 °C), Max:100.7 °F (38.2 °C)    Left Foot Exam: +large plantar central full thickness wound with granular periphery but central fibrotic area that probes deep.  +large medial heel fibrogranular heel ulcer that appears to communicate with plantar central wound. Serosanguinous drainage expressed from foot. Protective sensation absent. Foot warm to touch.      Data Review:   Recent Results (from the past 24 hour(s))   CBC WITH AUTOMATED DIFF    Collection Time: 17  7:02 PM   Result Value Ref Range    WBC 19.5 (H) 4.1 - 11.1 K/uL    RBC 4.38 4.10 - 5.70 M/uL    HGB 9.8 (L) 12.1 - 17.0 g/dL    HCT 31.7 (L) 36.6 - 50.3 %    MCV 72.4 (L) 80.0 - 99.0 FL    MCH 22.4 (L) 26.0 - 34.0 PG    MCHC 30.9 30.0 - 36.5 g/dL    RDW 16.3 (H) 11.5 - 14.5 %    PLATELET 295 (H) 727 - 400 K/uL    NEUTROPHILS 75 32 - 75 %    LYMPHOCYTES 19 12 - 49 %    MONOCYTES 5 5 - 13 %    EOSINOPHILS 1 0 - 7 %    BASOPHILS 0 0 - 1 %    ABS. NEUTROPHILS 14.6 (H) 1.8 - 8.0 K/UL    ABS. LYMPHOCYTES 3.7 (H) 0.8 - 3.5 K/UL    ABS. MONOCYTES 1.0 0.0 - 1.0 K/UL    ABS. EOSINOPHILS 0.2 0.0 - 0.4 K/UL    ABS. BASOPHILS 0.0 0.0 - 0.1 K/UL    DF SMEAR SCANNED      RBC COMMENTS ANISOCYTOSIS  1+        RBC COMMENTS MICROCYTOSIS  2+        RBC COMMENTS HYPOCHROMIA  2+       METABOLIC PANEL, COMPREHENSIVE    Collection Time: 08/24/17  7:02 PM   Result Value Ref Range    Sodium 130 (L) 136 - 145 mmol/L    Potassium 4.0 3.5 - 5.1 mmol/L    Chloride 98 97 - 108 mmol/L    CO2 24 21 - 32 mmol/L    Anion gap 8 5 - 15 mmol/L    Glucose 81 65 - 100 mg/dL    BUN 17 6 - 20 MG/DL    Creatinine 2.00 (H) 0.70 - 1.30 MG/DL    BUN/Creatinine ratio 9 (L) 12 - 20      GFR est AA 43 (L) >60 ml/min/1.73m2    GFR est non-AA 36 (L) >60 ml/min/1.73m2    Calcium 8.9 8.5 - 10.1 MG/DL    Bilirubin, total 0.3 0.2 - 1.0 MG/DL    ALT (SGPT) 56 12 - 78 U/L    AST (SGOT) 24 15 - 37 U/L    Alk.  phosphatase 115 45 - 117 U/L    Protein, total 9.5 (H) 6.4 - 8.2 g/dL    Albumin 1.6 (L) 3.5 - 5.0 g/dL    Globulin 7.9 (H) 2.0 - 4.0 g/dL    A-G Ratio 0.2 (L) 1.1 - 2.2     CULTURE, BLOOD, PAIRED    Collection Time: 08/24/17  7:02 PM   Result Value Ref Range    Special Requests: NO SPECIAL REQUESTS      Culture result: NO GROWTH AFTER 12 HOURS     LACTIC ACID    Collection Time: 08/24/17  7:02 PM   Result Value Ref Range    Lactic acid 1.3 0.4 - 2.0 MMOL/L   EKG, 12 LEAD, INITIAL    Collection Time: 08/24/17  8:52 PM   Result Value Ref Range    Ventricular Rate 87 BPM    Atrial Rate 87 BPM    P-R Interval 120 ms    QRS Duration 92 ms    Q-T Interval 372 ms    QTC Calculation (Bezet) 447 ms    Calculated P Axis 35 degrees    Calculated R Axis 44 degrees    Calculated T Axis 62 degrees    Diagnosis       Normal sinus rhythm with sinus arrhythmia  Voltage criteria for left ventricular hypertrophy  Nonspecific ST and T wave abnormality  Abnormal ECG  When compared with ECG of 21-JUL-2017 16:52,  QT has shortened  Confirmed by Jen Seaman ([id number]) on 8/25/2017 7:41:46 AM     PTT    Collection Time: 08/25/17 12:13 AM   Result Value Ref Range    aPTT 36.1 (H) 22.1 - 32.5 sec    aPTT, therapeutic range     58.0 - 77.0 SECS   CULTURE, WOUND W GRAM STAIN    Collection Time: 08/25/17 12:48 AM   Result Value Ref Range    Special Requests: RT SIDE     GRAM STAIN OCCASIONAL WBCS SEEN      GRAM STAIN NO ORGANISMS SEEN      Culture result: PENDING    CULTURE, WOUND W GRAM STAIN    Collection Time: 08/25/17 12:48 AM   Result Value Ref Range    Special Requests: LEFT SIDE     GRAM STAIN 1+ WBCS SEEN      GRAM STAIN OCCASIONAL GRAM POSITIVE RODS (CORYNEFORM)      GRAM STAIN OCCASIONAL GRAM POSITIVE COCCI      GRAM STAIN OCCASIONAL APPARENT GRAM NEGATIVE RODS      Culture result: PENDING    GLUCOSE, POC    Collection Time: 08/25/17  2:01 AM   Result Value Ref Range    Glucose (POC) 129 (H) 65 - 100 mg/dL    Performed by Adilia Grace    CBC WITH AUTOMATED DIFF    Collection Time: 08/25/17  3:34 AM   Result Value Ref Range    WBC 16.8 (H) 4.1 - 11.1 K/uL    RBC 3.95 (L) 4.10 - 5.70 M/uL    HGB 8.5 (L) 12.1 - 17.0 g/dL    HCT 28.4 (L) 36.6 - 50.3 %    MCV 71.9 (L) 80.0 - 99.0 FL    MCH 21.5 (L) 26.0 - 34.0 PG    MCHC 29.9 (L) 30.0 - 36.5 g/dL    RDW 16.3 (H) 11.5 - 14.5 %    PLATELET 317 (H) 258 - 400 K/uL    NEUTROPHILS 71 32 - 75 %    LYMPHOCYTES 15 12 - 49 %    MONOCYTES 11 5 - 13 %    EOSINOPHILS 3 0 - 7 %    BASOPHILS 0 0 - 1 %    ABS. NEUTROPHILS 12.0 (H) 1.8 - 8.0 K/UL    ABS. LYMPHOCYTES 2.5 0.8 - 3.5 K/UL    ABS. MONOCYTES 1.8 (H) 0.0 - 1.0 K/UL    ABS. EOSINOPHILS 0.5 (H) 0.0 - 0.4 K/UL    ABS.  BASOPHILS 0.0 0.0 - 0.1 K/UL    DF MANUAL      RBC COMMENTS ANISOCYTOSIS  1+       METABOLIC PANEL, COMPREHENSIVE    Collection Time: 08/25/17  3:34 AM   Result Value Ref Range    Sodium 135 (L) 136 - 145 mmol/L    Potassium 3.7 3.5 - 5.1 mmol/L    Chloride 103 97 - 108 mmol/L    CO2 22 21 - 32 mmol/L    Anion gap 10 5 - 15 mmol/L    Glucose 126 (H) 65 - 100 mg/dL    BUN 18 6 - 20 MG/DL    Creatinine 1.89 (H) 0.70 - 1.30 MG/DL    BUN/Creatinine ratio 10 (L) 12 - 20      GFR est AA 46 (L) >60 ml/min/1.73m2    GFR est non-AA 38 (L) >60 ml/min/1.73m2    Calcium 8.3 (L) 8.5 - 10.1 MG/DL    Bilirubin, total 0.3 0.2 - 1.0 MG/DL    ALT (SGPT) 39 12 - 78 U/L    AST (SGOT) 20 15 - 37 U/L    Alk.  phosphatase 100 45 - 117 U/L    Protein, total 7.7 6.4 - 8.2 g/dL    Albumin 1.2 (L) 3.5 - 5.0 g/dL    Globulin 6.5 (H) 2.0 - 4.0 g/dL    A-G Ratio 0.2 (L) 1.1 - 2.2     GLUCOSE, POC    Collection Time: 08/25/17  5:13 AM   Result Value Ref Range    Glucose (POC) 146 (H) 65 - 100 mg/dL    Performed by Toma Meth (PCT)    URINALYSIS W/ RFLX MICROSCOPIC    Collection Time: 08/25/17  5:40 AM   Result Value Ref Range    Color YELLOW/STRAW      Appearance CLOUDY (A) CLEAR      Specific gravity 1.017 1.003 - 1.030      pH (UA) 5.5 5.0 - 8.0      Protein 300 (A) NEG mg/dL    Glucose 100 (A) NEG mg/dL    Ketone NEGATIVE  NEG mg/dL    Bilirubin NEGATIVE  NEG      Blood TRACE (A) NEG      Urobilinogen 0.2 0.2 - 1.0 EU/dL    Nitrites NEGATIVE  NEG      Leukocyte Esterase NEGATIVE  NEG      WBC 0-4 0 - 4 /hpf    RBC 5-10 0 - 5 /hpf    Epithelial cells MODERATE (A) FEW /lpf    Bacteria 1+ (A) NEG /hpf    Mucus TRACE (A) NEG /lpf    Hyaline cast 0-2 0 - 5 /lpf   PTT    Collection Time: 08/25/17  9:20 AM   Result Value Ref Range    aPTT 36.1 (H) 22.1 - 32.5 sec    aPTT, therapeutic range     58.0 - 77.0 SECS   GLUCOSE, POC    Collection Time: 08/25/17 11:44 AM   Result Value Ref Range    Glucose (POC) 141 (H) 65 - 100 mg/dL    Performed by Brannon Trinidad          Impression:     1.) Left Diabetic Foot Ulcer with possible underlying Abscess/Osteomyelitis  2.) Diabetes with Neuropathy      Recommendation:   1.) Had long discussion with Pt. Reviewed the severity of his current infection with him. Based on clinical exam, it appears that both of his left plantar wounds communicate with each other. My concern is that he has an underlying deep seated infection with potential osteomyelitis. 2.) XR negative for gas. Will order STAT MRI. Explained to Pt that if MRI shows osteo, he will more than likely need a BKA. Pt understands. 3.) Continue antibxs (cefepime/vanco/flagyl). WBC 19.5 --> 16.8. Pt to be strict nwb to left foot for now.   4.) Since temp normalized and since WBC trending down, no need for emergent surgical intervention. Will d/c NPO and start diabetic diet.    5.) Will follow

## 2017-08-25 NOTE — PROGRESS NOTES
PCU SHIFT NURSING NOTE      Bedside shift change report given to Kian Price RN (oncoming nurse) by Vanesa Fay RN (offgoing nurse). Report included the following information SBAR, Kardex, Intake/Output, MAR, Recent Results and Cardiac Rhythm NSR. Shift Summary:     0130 -- During admission assessment, pt doesn't make eye contact and is withdrawn. Encouraged verbalization of feelings. Pt states that his grandmother had DM and kept having amputations until she eventually , and he further states \"I know that's what's going to happen to me. I'm going to die\" Encouraged positive thinking and provided reassuring information, such as benefits of good BG control and that he is stable clinically at this time and is being monitored carefully. Explained plan of care. Pt verbalized feeling somewhat better but c/o pain 8/10 in L foot. Paged hospitalist.     0300 -- Pt has not voided since arriving on unit despite two attempts to use urinal. Bladder scanner shows 660mL. Educated pt on urinary retention and need to page physician for order to straight cath. Pt adamantly refusing cath. Reiterated importance of emptying urine from bladder. Pt continues to refuse. Will encourage pt to try and void. 0500 -- Pt voided 700mL hazy yellow urine. Labs collected and sent. Pt asking for a period of uninterrupted sleep at this time. Admission Date 2017   Admission Diagnosis Sepsis ([facility]   Consults IP CONSULT TO PODIATRY  IP CONSULT TO HOSPITALIST        Consults   []PT   []OT   []Speech   []Case Management      [] Palliative      Cardiac Monitoring Order   [x]Yes   []No     IV drips   [x]Yes    Drip:    Heparin            Dose:  Drip:                            Dose:  Drip:                            Dose:   []No     GI Prophylaxis   [x]Yes   []No         DVT Prophylaxis   SCDs:             Jeff stockings:         [x] Medication   []Contraindicated   []None      Activity Level Activity Level:  Up with Assistance     Activity Assistance: Partial (one person)   Purposeful Rounding every 1-2 hour? [x]Yes   Mccormick Score  Total Score: 2   Bed Alarm (If score 3 or >)   []Yes   [] Refused (See signed refusal form in chart)   Noé Score  Noé Score: 20   Noé Score (if score 14 or less)   []PMT consult   []Wound Care consult      []Specialty bed   [] Nutrition consult          Needs prior to discharge:   Home O2 required:    []Yes   [x]No    If yes, how much O2 required? Other:    Last Bowel Movement:        Influenza Vaccine          Pneumonia Vaccine           Diet Active Orders   Diet    DIET NPO      LDAs               Peripheral IV 08/24/17 Right Hand (Active)   Site Assessment Clean, dry, & intact 8/24/2017  9:12 PM   Phlebitis Assessment 0 8/24/2017  9:12 PM   Infiltration Assessment 0 8/24/2017  9:12 PM   Dressing Status Clean, dry, & intact 8/24/2017  9:12 PM   Dressing Type Tape 8/24/2017  9:12 PM       Peripheral IV 08/25/17 Left Hand (Active)   Site Assessment Clean, dry, & intact 8/25/2017 12:21 AM   Phlebitis Assessment 0 8/25/2017 12:21 AM   Infiltration Assessment 0 8/25/2017 12:21 AM   Dressing Status Clean, dry, & intact 8/25/2017 12:21 AM   Dressing Type Tape;Transparent 8/25/2017 12:21 AM   Hub Color/Line Status Blue;Flushed;Patent 8/25/2017 12:21 AM   Action Taken Blood drawn 8/25/2017 12:21 AM                      Urinary Catheter      Intake & Output        Readmission Risk Assessment Tool Score Medium Risk            15       Total Score        3 Has Seen PCP in Last 6 Months (Yes=3, No=0)    2 . Living with Significant Other. Assisted Living. LTAC. SNF. or   Rehab    9 IP Visits Last 12 Months (1-3=4, 4=9, >4=11)    1 Charlson Comorbidity Score (Age + Comorbid Conditions)        Criteria that do not apply:    Patient Length of Stay (>5 days = 3)    Pt.  Coverage (Medicare=5 , Medicaid, or Self-Pay=4)       Expected Length of Stay - - -   Actual Length of Stay 1

## 2017-08-25 NOTE — ED NOTES
Heparin drip has not arrived from pharmacy. Spoke with Dr. Katey Jones, and pt may travel to floor prior to initiation of heparin drip. Per Dr. Katey Jones, STAT CBC is to be drawn in the morning, not now.

## 2017-08-25 NOTE — ED PROVIDER NOTES
HPI Comments: Ferdinand Chaidez, 49 y.o. Male with PMHx of DM, hyperlipidemia, HTN, and hypercholesterolemia presents ambulatory to the ED for evaluation of left foot wound and possible admission after evaluation at a podiatrist's office earlier today. PT has had a diabetic foot treatment that has been treated since February and is followed by Dr. Linnette Robles. He was recently admitted for DVT in LLE and has had LLE swelling since. He saw Dr. Linda Rivera ([phone]) earlier today who debrided the wound and removed a large chunk of skin from the left heel. Dr. Paula Salas thought the wound was worrisome and sent the pt to the ED for treatment. Mother notes that he developed a new sore on the left foot today and that it was not there yesterday. Pt's mother changes the bandaging daily. Pt is taking insulin, metformin and Eliquis daily. He reports morphine and lisinopril allergies. He denies any fevers, chills, nausea, vomiting, CP or SOB. PCP: Ivan Valero MD    Social history significant for: - Tobacco, - EtOH, - Illicit Drug Use    There are no other complaints, changes, or physical findings at this time. Written by NIKIA Castellon, as dictated by Tee Hernández MD.    The history is provided by the patient and a parent. No  was used.         Past Medical History:   Diagnosis Date    Diabetes ([facility]     since age 23    DM (diabetes mellitus) ([facility]     HTN (hypertension)     Hypercholesteremia     Hyperlipidemia        Past Surgical History:   Procedure Laterality Date    HX AMPUTATION      toes- left foot    HX BUNIONECTOMY      HX ORTHOPAEDIC      boutinerre deformity    HX ORTHOPAEDIC      fascia removed left foot    HX OTHER SURGICAL  03/2017    Skin graft Surgery    HX TONSILLECTOMY      HX WISDOM TEETH EXTRACTION           Family History:   Problem Relation Age of Onset    Hypertension Mother     High Cholesterol Mother        Social History     Social History  Marital status:      Spouse name: N/A    Number of children: N/A    Years of education: N/A     Occupational History    Not on file. Social History Main Topics    Smoking status: Former Smoker     Types: Cigars     Quit date: 4/22/2011    Smokeless tobacco: Never Used    Alcohol use 0.0 oz/week     1 Glasses of wine, 0 Standard drinks or equivalent per week      Comment: stop drinking 5 months ago    Drug use: No    Sexual activity: Yes     Partners: Female     Birth control/ protection: None     Other Topics Concern    Not on file     Social History Narrative    ** Merged History Encounter **              ALLERGIES: Ace inhibitors and Morphine    Review of Systems   Constitutional: Negative for activity change, chills and fever. HENT: Negative for congestion and sore throat. Eyes: Negative for pain and redness. Respiratory: Negative for cough, chest tightness and shortness of breath. Cardiovascular: Positive for leg swelling (LLE). Negative for chest pain and palpitations. Gastrointestinal: Negative for abdominal pain, diarrhea, nausea and vomiting. Genitourinary: Negative for dysuria, frequency and urgency. Musculoskeletal: Positive for myalgias (L foot). Negative for back pain and neck pain. Skin: Positive for wound (L foot). Negative for rash. Neurological: Negative for syncope, light-headedness and headaches. Psychiatric/Behavioral: Negative for confusion. All other systems reviewed and are negative. Patient Vitals for the past 12 hrs:   Temp Pulse Resp BP SpO2   08/24/17 1959 (!) 100.7 °F (38.2 °C) - - - -   08/24/17 1903 - 97 - 128/69 95 %   08/24/17 1818 100.2 °F (37.9 °C) 97 18 122/77 98 %         Physical Exam   Constitutional: He is oriented to person, place, and time. He appears well-developed and well-nourished. No distress. HENT:   Head: Normocephalic. Nose: Nose normal.   Mouth/Throat: Oropharynx is clear and moist. No oropharyngeal exudate. Eyes: Conjunctivae are normal. Pupils are equal, round, and reactive to light. No scleral icterus. Neck: Normal range of motion. Neck supple. No JVD present. No tracheal deviation present. No thyromegaly present. Cardiovascular: Normal rate, regular rhythm and intact distal pulses. Exam reveals no gallop and no friction rub. No murmur heard. Pulmonary/Chest: Effort normal and breath sounds normal. No stridor. No respiratory distress. He has no wheezes. He has no rales. Abdominal: Soft. Bowel sounds are normal. He exhibits no distension. There is no tenderness. There is no rebound and no guarding. Musculoskeletal: Normal range of motion. He exhibits no edema. Lymphadenopathy:     He has no cervical adenopathy. Neurological: He is alert and oriented to person, place, and time. No cranial nerve deficit. He exhibits normal muscle tone. Coordination normal.   Skin: Skin is warm. Rash noted. He is not diaphoretic. There is erythema. Left heel with large chronic dm ulcer with mixed areas of granulation tissue; scant purulent drainage; one tunneling area in center of heel area; ulcer extends to mid foot over the plantar surface;    Psychiatric: He has a normal mood and affect. His behavior is normal.   Nursing note and vitals reviewed.        MDM  Number of Diagnoses or Management Options  ALEX (acute kidney injury) ([facility]):   Sepsis, due to unspecified organism [facility]):   Diagnosis management comments: DDx: diabetic foot ulcer, sepsis, gangrene        Amount and/or Complexity of Data Reviewed  Clinical lab tests: ordered and reviewed  Tests in the radiology section of CPT®: ordered and reviewed  Tests in the medicine section of CPT®: ordered and reviewed  Review and summarize past medical records: yes  Discuss the patient with other providers: yes (Podiatry, hospitalist )  Independent visualization of images, tracings, or specimens: yes    Risk of Complications, Morbidity, and/or Mortality  General comments: Will admit for sepsis/infected dm heel ulcer; febrile to 100.8;vss;  normal lactate; wbc of 19; consulted podiatry; admit to hospitalist; Flaquito Melendez MD      Critical Care  Total time providing critical care: 30-74 minutes    Patient Progress  Patient progress: stable    ED Course       Procedures    CRITICAL CARE NOTE :  8:45 PM    IMPENDING DETERIORATION -Cardiovascular and Metabolic    ASSOCIATED RISK FACTORS - Hypotension, Shock, Metabolic changes and Vascular Compromise    MANAGEMENT- Bedside Assessment and Supervision of Care    INTERPRETATION -  Xrays, ECG and Blood Pressure    INTERVENTIONS - hemodynamic mngmt and Metobolic interventions    CASE REVIEW - Hospitalist and Medical Sub-Specialist    TREATMENT RESPONSE -Improved    PERFORMED BY - Self        NOTES   :      I have spent 50 minutes of critical care time involved in lab review, consultations with specialist, family decision- making, bedside attention and documentation. During this entire length of time I was immediately available to the patient . Critical Care: The reason for providing this level of medical care for this critically ill patient was due to a critical illness that impaired one or more vital organ systems, such that there was a high probability of imminent or life threatening deterioration in the patient's condition. This care involved high complexity decision making to assess, manipulate, and support vital system functions, to treat this degree of vital organ system failure, and to prevent further life threatening deterioration of the patients condition. Flaquito Melendez MD      CONSULT NOTE:   9:36 PM  Flaquito Melendez MD spoke with Dr. Jasson Villanueva,   Specialty: Hospitalist  Discussed pt's hx, disposition, and available diagnostic and imaging results. Reviewed care plans. Consultant will evaluate pt for admission.   Written by NIKIA Luna, as dictated by Flaquito Melendez MD.      CONSULT NOTE:   10:12 PM  Flaquito Melendez MD spoke with Dr. Lyndsey Villareal,   Specialty: podiatry  Discussed pt's hx, disposition, and available diagnostic and imaging results. Reviewed care plans. Consultant agrees with plans as outlined. Dr. Lyndsey Villareal agrees with plan for NPO after midnight and will inform Dr. Laura Baird of pts admission for further plan. Written by Sudarshan Gomez ED Scribe, as dictated by Jm Parisi MD.      LABORATORY TESTS:  Recent Results (from the past 12 hour(s))   CBC WITH AUTOMATED DIFF    Collection Time: 08/24/17  7:02 PM   Result Value Ref Range    WBC 19.5 (H) 4.1 - 11.1 K/uL    RBC 4.38 4.10 - 5.70 M/uL    HGB 9.8 (L) 12.1 - 17.0 g/dL    HCT 31.7 (L) 36.6 - 50.3 %    MCV 72.4 (L) 80.0 - 99.0 FL    MCH 22.4 (L) 26.0 - 34.0 PG    MCHC 30.9 30.0 - 36.5 g/dL    RDW 16.3 (H) 11.5 - 14.5 %    PLATELET 490 (H) 856 - 400 K/uL    NEUTROPHILS 75 32 - 75 %    LYMPHOCYTES 19 12 - 49 %    MONOCYTES 5 5 - 13 %    EOSINOPHILS 1 0 - 7 %    BASOPHILS 0 0 - 1 %    ABS. NEUTROPHILS 14.6 (H) 1.8 - 8.0 K/UL    ABS. LYMPHOCYTES 3.7 (H) 0.8 - 3.5 K/UL    ABS. MONOCYTES 1.0 0.0 - 1.0 K/UL    ABS. EOSINOPHILS 0.2 0.0 - 0.4 K/UL    ABS. BASOPHILS 0.0 0.0 - 0.1 K/UL    DF SMEAR SCANNED      RBC COMMENTS ANISOCYTOSIS  1+        RBC COMMENTS MICROCYTOSIS  2+        RBC COMMENTS HYPOCHROMIA  2+       METABOLIC PANEL, COMPREHENSIVE    Collection Time: 08/24/17  7:02 PM   Result Value Ref Range    Sodium 130 (L) 136 - 145 mmol/L    Potassium 4.0 3.5 - 5.1 mmol/L    Chloride 98 97 - 108 mmol/L    CO2 24 21 - 32 mmol/L    Anion gap 8 5 - 15 mmol/L    Glucose 81 65 - 100 mg/dL    BUN 17 6 - 20 MG/DL    Creatinine 2.00 (H) 0.70 - 1.30 MG/DL    BUN/Creatinine ratio 9 (L) 12 - 20      GFR est AA 43 (L) >60 ml/min/1.73m2    GFR est non-AA 36 (L) >60 ml/min/1.73m2    Calcium 8.9 8.5 - 10.1 MG/DL    Bilirubin, total 0.3 0.2 - 1.0 MG/DL    ALT (SGPT) 56 12 - 78 U/L    AST (SGOT) 24 15 - 37 U/L    Alk.  phosphatase 115 45 - 117 U/L    Protein, total 9.5 (H) 6.4 - 8.2 g/dL Albumin 1.6 (L) 3.5 - 5.0 g/dL    Globulin 7.9 (H) 2.0 - 4.0 g/dL    A-G Ratio 0.2 (L) 1.1 - 2.2     LACTIC ACID    Collection Time: 08/24/17  7:02 PM   Result Value Ref Range    Lactic acid 1.3 0.4 - 2.0 MMOL/L   EKG, 12 LEAD, INITIAL    Collection Time: 08/24/17  8:52 PM   Result Value Ref Range    Ventricular Rate 87 BPM    Atrial Rate 87 BPM    P-R Interval 120 ms    QRS Duration 92 ms    Q-T Interval 372 ms    QTC Calculation (Bezet) 447 ms    Calculated P Axis 35 degrees    Calculated R Axis 44 degrees    Calculated T Axis 62 degrees    Diagnosis       Normal sinus rhythm with sinus arrhythmia  Voltage criteria for left ventricular hypertrophy  Nonspecific ST and T wave abnormality  Abnormal ECG  When compared with ECG of 21-JUL-2017 16:52,  QT has shortened         IMAGING RESULTS:  XR FOOT LT MIN 3 V   Final Result   IMPRESSION:  Post surgical changes are noted. No acute abnormality is   identified. XR CHEST PORT   Final Result   INDICATION: Sepsis     COMPARISON: July 21, 2017     FINDINGS: AP portable imaging of the chest performed at 9:14 PM demonstrates a  stable cardiomediastinal silhouette. The lungs are clear bilaterally. No  significant osseous abnormalities are seen.     IMPRESSION  IMPRESSION: No evidence of acute cardiopulmonary process. MEDICATIONS GIVEN:  Medications   sodium chloride (NS) flush 5-10 mL (not administered)   ampicillin-sulbactam (UNASYN) 3 g in 0.9% sodium chloride (MBP/ADV) 100 mL (3 g IntraVENous New Bag 8/24/17 2129)   vancomycin (VANCOCIN) 3,000 mg in 0.9% sodium chloride 500 mL IVPB (not administered)   sodium chloride 0.9 % bolus infusion 3,543 mL (3,543 mL IntraVENous New Bag 8/24/17 2112)   vancomycin (VANCOCIN) 1750 mg in  ml infusion (not administered)   ondansetron (ZOFRAN) injection 4 mg (4 mg IntraVENous Given 8/24/17 2113)   HYDROmorphone (PF) (DILAUDID) injection 1 mg (1 mg IntraVENous Given 8/24/17 2114)       IMPRESSION:  1.  Sepsis, due to unspecified organism ([facility] [address]    2. ALEX [facility]) [facility]        PLAN:  1. Admit     Admit Note:  10:14 PM  Pt is being admitted by Dr. Gilda Vazquez. The results of their tests and reason(s) for their admission have been discussed with pt and/or available family. They convey agreement and understanding for the need to be admitted and for admission diagnosis. This note is prepared by Sudarshan Gomez, acting as a Scribe for Jm Parisi MD.    Jm Parisi MD: The scribe's documentation has been prepared under my direction and personally reviewed by me in its entirety. I confirm that the notes above accurately reflects all work, treatment, procedures, and medical decision making performed by me.

## 2017-08-25 NOTE — CARDIO/PULMONARY
C/P Rehab Note:    Chart Reviewed. Admitting diagnosis of Sepsis. History significant for:  -PE        -Systolic HF  -HTN  -DM  -Hyperlipidemia  -Amputation of L toes  Pt is a former smoker. Podiatry Consult for possible debridement. Echo from 7/17 indicated LV Ef 25-30%. Pt last received teaching on 7/17. Met with pt who was lying in bed. Reviewed role of C/P Rehab Nurse. Advised him that I am aware he was not admitted for heart muscle issues but we provide teaching every hospital admission. He said,\" you would be glad to know I have been walking three times a week and eating a heart healthy diet and here I am back again. \"  Provided emotional support. He replied, \" I know you are here to help but just not a good time. \"  Explained that I completely understand. Will continue to follow.

## 2017-08-25 NOTE — CDMP QUERY
Dr. Karuna Mcgregor :    The [address] [place] [address] has issued a statement indicating that, \"Individuals who are overweight, obese, or morbidly obese are at an increased risk for certain medical conditions when compared to persons of normal weight. Therefore, these conditions are always clinically significant and reportable when documented by the provider. \"    Review of the documentation for this patient demonstrates the clinical indicators of a BMI of 31.27, height of 6'3\" and a weight of 250 lbs. Please clarify if the patient has a diagnosis associated with those findings:    =>Obesity  (BMI: 30-39.9)  =>Morbid Obesity (BMI: >40.0)  =>Overweight  (BMI: 25-29.9)  =>Other weight status (specify status)  =>Unable to determine    Thank You,   [email or web address]  [phone]

## 2017-08-25 NOTE — ED NOTES
TRANSFER - OUT REPORT:    Verbal report given to Rogelio Lowry RN on Delilah Barnes  being transferred to PCU for routine progression of care       Report consisted of patients Situation, Background, Assessment and   Recommendations(SBAR). Information from the following report(s) SBAR, Kardex, ED Summary, [facility] and Recent Results was reviewed with the receiving nurse. Lines:   Peripheral IV 07/24/17 Right Hand (Active)       Peripheral IV 08/24/17 Right Hand (Active)   Site Assessment Clean, dry, & intact 8/24/2017  9:12 PM   Phlebitis Assessment 0 8/24/2017  9:12 PM   Infiltration Assessment 0 8/24/2017  9:12 PM   Dressing Status Clean, dry, & intact 8/24/2017  9:12 PM   Dressing Type Tape 8/24/2017  9:12 PM        Opportunity for questions and clarification was provided.       Patient transported with:   Monitor  Registered Nurse

## 2017-08-26 LAB
ANION GAP SERPL CALC-SCNC: 8 MMOL/L (ref 5–15)
APTT PPP: 118.8 SEC (ref 22.1–32.5)
APTT PPP: 45.5 SEC (ref 22.1–32.5)
APTT PPP: 64.4 SEC (ref 22.1–32.5)
BASOPHILS # BLD: 0 K/UL (ref 0–0.1)
BASOPHILS NFR BLD: 0 % (ref 0–1)
BUN SERPL-MCNC: 15 MG/DL (ref 6–20)
BUN/CREAT SERPL: 10 (ref 12–20)
CALCIUM SERPL-MCNC: 8.2 MG/DL (ref 8.5–10.1)
CHLORIDE SERPL-SCNC: 105 MMOL/L (ref 97–108)
CO2 SERPL-SCNC: 22 MMOL/L (ref 21–32)
CREAT SERPL-MCNC: 1.51 MG/DL (ref 0.7–1.3)
DIFFERENTIAL METHOD BLD: ABNORMAL
EOSINOPHIL # BLD: 0.2 K/UL (ref 0–0.4)
EOSINOPHIL NFR BLD: 2 % (ref 0–7)
ERYTHROCYTE [DISTWIDTH] IN BLOOD BY AUTOMATED COUNT: 16.5 % (ref 11.5–14.5)
GLUCOSE BLD STRIP.AUTO-MCNC: 287 MG/DL (ref 65–100)
GLUCOSE BLD STRIP.AUTO-MCNC: 297 MG/DL (ref 65–100)
GLUCOSE BLD STRIP.AUTO-MCNC: 331 MG/DL (ref 65–100)
GLUCOSE BLD STRIP.AUTO-MCNC: 360 MG/DL (ref 65–100)
GLUCOSE BLD STRIP.AUTO-MCNC: 381 MG/DL (ref 65–100)
GLUCOSE SERPL-MCNC: 333 MG/DL (ref 65–100)
HCT VFR BLD AUTO: 28 % (ref 36.6–50.3)
HGB BLD-MCNC: 8.5 G/DL (ref 12.1–17)
LYMPHOCYTES # BLD: 2 K/UL (ref 0.8–3.5)
LYMPHOCYTES NFR BLD: 20 % (ref 12–49)
MCH RBC QN AUTO: 21.9 PG (ref 26–34)
MCHC RBC AUTO-ENTMCNC: 30.4 G/DL (ref 30–36.5)
MCV RBC AUTO: 72 FL (ref 80–99)
MONOCYTES # BLD: 0.8 K/UL (ref 0–1)
MONOCYTES NFR BLD: 8 % (ref 5–13)
NEUTS SEG # BLD: 7.1 K/UL (ref 1.8–8)
NEUTS SEG NFR BLD: 70 % (ref 32–75)
PLATELET # BLD AUTO: 450 K/UL (ref 150–400)
POTASSIUM SERPL-SCNC: 4.2 MMOL/L (ref 3.5–5.1)
RBC # BLD AUTO: 3.89 M/UL (ref 4.1–5.7)
RBC MORPH BLD: ABNORMAL
SERVICE CMNT-IMP: ABNORMAL
SODIUM SERPL-SCNC: 135 MMOL/L (ref 136–145)
THERAPEUTIC RANGE,PTTT: ABNORMAL SECS (ref 58–77)
WBC # BLD AUTO: 10.1 K/UL (ref 4.1–11.1)

## 2017-08-26 PROCEDURE — 74011636637 HC RX REV CODE- 636/637: Performed by: INTERNAL MEDICINE

## 2017-08-26 PROCEDURE — 82962 GLUCOSE BLOOD TEST: CPT

## 2017-08-26 PROCEDURE — 85730 THROMBOPLASTIN TIME PARTIAL: CPT | Performed by: INTERNAL MEDICINE

## 2017-08-26 PROCEDURE — 85025 COMPLETE CBC W/AUTO DIFF WBC: CPT | Performed by: EMERGENCY MEDICINE

## 2017-08-26 PROCEDURE — 36569 INSJ PICC 5 YR+ W/O IMAGING: CPT | Performed by: FAMILY MEDICINE

## 2017-08-26 PROCEDURE — 65660000000 HC RM CCU STEPDOWN

## 2017-08-26 PROCEDURE — 74011250637 HC RX REV CODE- 250/637: Performed by: INTERNAL MEDICINE

## 2017-08-26 PROCEDURE — 74011000250 HC RX REV CODE- 250: Performed by: INTERNAL MEDICINE

## 2017-08-26 PROCEDURE — 74011250636 HC RX REV CODE- 250/636: Performed by: INTERNAL MEDICINE

## 2017-08-26 PROCEDURE — 77030018719 HC DRSG PTCH ANTIMIC J&J -A

## 2017-08-26 PROCEDURE — 74011250636 HC RX REV CODE- 250/636: Performed by: HOSPITALIST

## 2017-08-26 PROCEDURE — 74011250637 HC RX REV CODE- 250/637: Performed by: HOSPITALIST

## 2017-08-26 PROCEDURE — 74011250637 HC RX REV CODE- 250/637: Performed by: FAMILY MEDICINE

## 2017-08-26 PROCEDURE — 76937 US GUIDE VASCULAR ACCESS: CPT

## 2017-08-26 PROCEDURE — C1751 CATH, INF, PER/CENT/MIDLINE: HCPCS

## 2017-08-26 PROCEDURE — 74011000258 HC RX REV CODE- 258: Performed by: FAMILY MEDICINE

## 2017-08-26 PROCEDURE — 80048 BASIC METABOLIC PNL TOTAL CA: CPT | Performed by: EMERGENCY MEDICINE

## 2017-08-26 PROCEDURE — 74011250636 HC RX REV CODE- 250/636: Performed by: EMERGENCY MEDICINE

## 2017-08-26 PROCEDURE — 36415 COLL VENOUS BLD VENIPUNCTURE: CPT | Performed by: INTERNAL MEDICINE

## 2017-08-26 PROCEDURE — 77030018786 HC NDL GD F/USND BARD -B

## 2017-08-26 PROCEDURE — 74011000258 HC RX REV CODE- 258: Performed by: INTERNAL MEDICINE

## 2017-08-26 PROCEDURE — 74011636637 HC RX REV CODE- 636/637: Performed by: FAMILY MEDICINE

## 2017-08-26 PROCEDURE — 74011250636 HC RX REV CODE- 250/636: Performed by: FAMILY MEDICINE

## 2017-08-26 RX ORDER — SODIUM CHLORIDE 0.9 % (FLUSH) 0.9 %
10-40 SYRINGE (ML) INJECTION EVERY 8 HOURS
Status: DISCONTINUED | OUTPATIENT
Start: 2017-08-26 | End: 2017-09-02 | Stop reason: HOSPADM

## 2017-08-26 RX ORDER — SODIUM CHLORIDE 0.9 % (FLUSH) 0.9 %
10 SYRINGE (ML) INJECTION AS NEEDED
Status: DISCONTINUED | OUTPATIENT
Start: 2017-08-26 | End: 2017-09-02 | Stop reason: HOSPADM

## 2017-08-26 RX ORDER — ALPRAZOLAM 0.25 MG/1
0.25 TABLET ORAL
Status: DISCONTINUED | OUTPATIENT
Start: 2017-08-26 | End: 2017-09-02 | Stop reason: HOSPADM

## 2017-08-26 RX ORDER — SODIUM CHLORIDE 0.9 % (FLUSH) 0.9 %
10-30 SYRINGE (ML) INJECTION AS NEEDED
Status: DISCONTINUED | OUTPATIENT
Start: 2017-08-26 | End: 2017-09-02 | Stop reason: HOSPADM

## 2017-08-26 RX ORDER — SODIUM CHLORIDE 0.9 % (FLUSH) 0.9 %
20 SYRINGE (ML) INJECTION AS NEEDED
Status: DISCONTINUED | OUTPATIENT
Start: 2017-08-26 | End: 2017-09-02 | Stop reason: HOSPADM

## 2017-08-26 RX ORDER — CLONIDINE HYDROCHLORIDE 0.1 MG/1
0.1 TABLET ORAL
Status: DISCONTINUED | OUTPATIENT
Start: 2017-08-26 | End: 2017-08-30

## 2017-08-26 RX ORDER — HEPARIN 100 UNIT/ML
300 SYRINGE INTRAVENOUS AS NEEDED
Status: DISCONTINUED | OUTPATIENT
Start: 2017-08-26 | End: 2017-09-01

## 2017-08-26 RX ORDER — SODIUM CHLORIDE 0.9 % (FLUSH) 0.9 %
10 SYRINGE (ML) INJECTION EVERY 24 HOURS
Status: DISCONTINUED | OUTPATIENT
Start: 2017-08-26 | End: 2017-09-02 | Stop reason: HOSPADM

## 2017-08-26 RX ORDER — INSULIN LISPRO 100 [IU]/ML
INJECTION, SOLUTION INTRAVENOUS; SUBCUTANEOUS
Status: DISCONTINUED | OUTPATIENT
Start: 2017-08-26 | End: 2017-09-02 | Stop reason: HOSPADM

## 2017-08-26 RX ADMIN — HEPARIN SODIUM 20 UNITS/KG/HR: 10000 INJECTION, SOLUTION INTRAVENOUS at 10:07

## 2017-08-26 RX ADMIN — INSULIN LISPRO 5 UNITS: 100 INJECTION, SOLUTION INTRAVENOUS; SUBCUTANEOUS at 01:05

## 2017-08-26 RX ADMIN — INSULIN LISPRO 5 UNITS: 100 INJECTION, SOLUTION INTRAVENOUS; SUBCUTANEOUS at 05:08

## 2017-08-26 RX ADMIN — METRONIDAZOLE 500 MG: 500 INJECTION, SOLUTION INTRAVENOUS at 02:50

## 2017-08-26 RX ADMIN — ALPRAZOLAM 0.25 MG: 0.25 TABLET ORAL at 21:28

## 2017-08-26 RX ADMIN — HYDROMORPHONE HYDROCHLORIDE 0.5 MG: 1 INJECTION, SOLUTION INTRAMUSCULAR; INTRAVENOUS; SUBCUTANEOUS at 12:15

## 2017-08-26 RX ADMIN — CARVEDILOL 6.25 MG: 6.25 TABLET, FILM COATED ORAL at 17:14

## 2017-08-26 RX ADMIN — Medication 10 ML: at 21:30

## 2017-08-26 RX ADMIN — GABAPENTIN 300 MG: 300 CAPSULE ORAL at 15:29

## 2017-08-26 RX ADMIN — GABAPENTIN 300 MG: 300 CAPSULE ORAL at 08:38

## 2017-08-26 RX ADMIN — CEFEPIME HYDROCHLORIDE 2 G: 2 INJECTION, POWDER, FOR SOLUTION INTRAVENOUS at 17:13

## 2017-08-26 RX ADMIN — CARVEDILOL 6.25 MG: 6.25 TABLET, FILM COATED ORAL at 08:39

## 2017-08-26 RX ADMIN — HYDROMORPHONE HYDROCHLORIDE 0.5 MG: 1 INJECTION, SOLUTION INTRAMUSCULAR; INTRAVENOUS; SUBCUTANEOUS at 15:29

## 2017-08-26 RX ADMIN — METRONIDAZOLE 500 MG: 500 INJECTION, SOLUTION INTRAVENOUS at 18:52

## 2017-08-26 RX ADMIN — INSULIN LISPRO 5 UNITS: 100 INJECTION, SOLUTION INTRAVENOUS; SUBCUTANEOUS at 22:16

## 2017-08-26 RX ADMIN — VANCOMYCIN HYDROCHLORIDE 1750 MG: 10 INJECTION, POWDER, LYOPHILIZED, FOR SOLUTION INTRAVENOUS at 13:00

## 2017-08-26 RX ADMIN — ATORVASTATIN CALCIUM 40 MG: 40 TABLET, FILM COATED ORAL at 08:38

## 2017-08-26 RX ADMIN — PANTOPRAZOLE SODIUM 40 MG: 40 TABLET, DELAYED RELEASE ORAL at 08:38

## 2017-08-26 RX ADMIN — METRONIDAZOLE 500 MG: 500 INJECTION, SOLUTION INTRAVENOUS at 11:54

## 2017-08-26 RX ADMIN — INSULIN LISPRO 8 UNITS: 100 INJECTION, SOLUTION INTRAVENOUS; SUBCUTANEOUS at 11:54

## 2017-08-26 RX ADMIN — OXYCODONE HYDROCHLORIDE AND ACETAMINOPHEN 1 TABLET: 10; 325 TABLET ORAL at 13:28

## 2017-08-26 RX ADMIN — GABAPENTIN 300 MG: 300 CAPSULE ORAL at 21:28

## 2017-08-26 RX ADMIN — CEFEPIME HYDROCHLORIDE 2 G: 2 INJECTION, POWDER, FOR SOLUTION INTRAVENOUS at 08:38

## 2017-08-26 RX ADMIN — HEPARIN SODIUM 4700 UNITS: 5000 INJECTION, SOLUTION INTRAVENOUS; SUBCUTANEOUS at 14:15

## 2017-08-26 RX ADMIN — HYDROMORPHONE HYDROCHLORIDE 0.5 MG: 1 INJECTION, SOLUTION INTRAMUSCULAR; INTRAVENOUS; SUBCUTANEOUS at 08:54

## 2017-08-26 RX ADMIN — Medication 10 ML: at 05:08

## 2017-08-26 RX ADMIN — Medication 10 ML: at 21:28

## 2017-08-26 RX ADMIN — INSULIN LISPRO 8 UNITS: 100 INJECTION, SOLUTION INTRAVENOUS; SUBCUTANEOUS at 17:14

## 2017-08-26 RX ADMIN — OXYCODONE HYDROCHLORIDE AND ACETAMINOPHEN 1 TABLET: 10; 325 TABLET ORAL at 21:28

## 2017-08-26 RX ADMIN — HYDROMORPHONE HYDROCHLORIDE 0.5 MG: 1 INJECTION, SOLUTION INTRAMUSCULAR; INTRAVENOUS; SUBCUTANEOUS at 22:27

## 2017-08-26 RX ADMIN — SODIUM CHLORIDE 75 ML/HR: 900 INJECTION, SOLUTION INTRAVENOUS at 11:55

## 2017-08-26 RX ADMIN — ALPRAZOLAM 0.25 MG: 0.25 TABLET ORAL at 11:53

## 2017-08-26 RX ADMIN — HYDROMORPHONE HYDROCHLORIDE 0.5 MG: 1 INJECTION, SOLUTION INTRAMUSCULAR; INTRAVENOUS; SUBCUTANEOUS at 18:26

## 2017-08-26 NOTE — CONSULTS
[address]   [address] [facility]       Name:  Jennifer Galarza   MR#:  [id number]   :  1970   Account #:  [de-identified]    Date of Consultation:  2017   Date of Adm:  2017       REASON FOR CONSULTATION: Diabetic foot infection, left foot. HISTORY: The patient is a 71-year-old diabetic male with a chronic   plantar diabetic foot wound that has been present for over a year. He   has been seen and followed by Dr. Demetri Dash. He has been also   managed at the [facility] and has received   hyperbaric therapy. He has had a number of debridements in   the operating room. He had a remote transmetatarsal amputation of   the left foot, which has been well-healed and not an ongoing issue. He   was hospitalized yesterday with a worsening appearance of his wound   along with fever and leukocytosis. He has been started on antibiotics   and wound care. He was seen by Podiatry, specifically Dr. Truong Barber, who based on the appearance of his foot and his   previous history and MRI scan that his foot was not salvageable and a   below-knee amputation should be considered. We are asked to see   him in regard to below-knee amputation. He is anticoagulated because   of a recent pulmonary embolus. PAST MEDICAL HISTORY: Diabetes, hypertension. ADMISSION MEDICATIONS    1. Metformin. 2. Insulin. 3. Eliquis. 4. Lipitor. 5. Coreg. 6. Hydralazine. 7. Isosorbide. 8. Spironolactone. 9. Neurontin. 10. Nexium. ALLERGIES   1. ACE INHIBITORS. 2. MORPHINE. SOCIAL HISTORY: The patient works as an . He is    and his wife is here with him at the hospital.    FAMILY HISTORY: Unremarkable. REVIEW OF SYSTEMS   He has had no recent cardiac, respiratory, GI, , neurologic,   hematologic or psychiatric complaints.     PHYSICAL EXAMINATION   GENERAL: He is a middle-aged male in no distress. He is awake, alert   and responsive. LUNGS: Clear. HEART: Regular rate and rhythm. ABDOMEN: Soft and nontender. EXTREMITIES: He has a large area of superficial skin loss involving   the plantar midfoot extending over the heel. There is an area   approximately 2 cm in diameter in the mid plantar foot where the   wound extends deeper into the subcutaneous layer. The subcutaneous   tissue was necrotic at this point. This likely communicates into an area   of deeper infection. NEUROLOGIC: Grossly normal with intact motor and sensory function. IMPRESSION: The patient has a worsening left foot wound, likely was   a deep space infection extending from the mid plantar foot to the heel. Based on his long history of the wound that has been managed   aggressively, but still with worsening of the wound, it seems   appropriate to proceed with below-the-knee amputation as the   likelihood of salvaging his foot is exceedingly low at this point. This   was discussed with him and his wife. They will consider this option. There is no urgency to intervention as he is clinically stable. We will   follow him with you. Wound care will be continued as well as   antibiotics.         MD Rosa Maria Looney / Olivia Hernandez   D:  08/26/2017   09:25   T:  08/26/2017   10:13   Job #:  [id number]

## 2017-08-26 NOTE — PROGRESS NOTES
Spiritual Care Assessment/Progress Notes    Celsa Caro [id number]  [id number]    1970  49 y.o.  male    Patient Telephone Number: [phone] (home)   Latter-day Affiliation: Mandaen   Language: English   Extended Emergency Contact Information  Primary Emergency Contact: Luis Alberto Lawson Phone: [phone]  Mobile Phone: [phone]  Relation: Spouse   Patient Active Problem List    Diagnosis Date Noted    Left foot infection 08/24/2017    Hyponatremia 08/24/2017    ALEX (acute kidney injury) ([address] 08/24/2017    GERD (gastroesophageal reflux disease) 08/24/2017    Chronic systolic heart failure ([address] 07/31/2017    Hypokalemia 07/23/2017    History of Clostridium difficile infection 07/22/2017    Non-compliance with treatment 07/22/2017    Bilateral pulmonary embolism ([address] 07/21/2017    Sepsis ([address] 05/27/2017    ARF (acute renal failure) ([address] 05/27/2017    Acute osteomyelitis of metatarsal bone of left foot ([address] 03/21/2017    Osteomyelitis ([address] 02/24/2017    Edema of left lower extremity 02/17/2017    Diabetic infection of right foot ([address] 02/16/2017    IDDM (insulin dependent diabetes mellitus) ([address] 10/06/2016    Diabetes ([address]     HTN (hypertension)     Hypercholesteremia     HTN (hypertension) 07/23/2013        Date: 8/26/2017       Level of Latter-day/Spiritual Activity:  [x]         Involved in carmelita tradition/spiritual practice    []         Not involved in carmelita tradition/spiritual practice  [x]         Spiritually oriented    []         Claims no spiritual orientation    []         seeking spiritual identity  []         Feels alienated from Restorationism practice/tradition  []         Feels angry about Restorationism practice/tradition  [x]         Spirituality/Restorationism tradition is a resource for coping at this time.   []         Not able to assess due to medical condition    Services Provided Today:  [x]         crisis intervention    [] reading Scriptures  [x]         spiritual assessment    [x]         prayer  []         empathic listening/emotional support  []         rites and rituals (cite in comments)  []         life review     []         Roman Catholic support  []         theological development    []         advocacy  []         ethical dialog     []         blessing  []         bereavement support    [x]         support to family  []         anticipatory grief support   []         help with AMD  []         spiritual guidance    []         meditation      Spiritual Care Needs  [x]         Emotional Support  [x]         Spiritual/Scientology Care  [x]         Loss/Adjustment  []         Advocacy/Referral                /Ethics  []         No needs expressed at               this time  []         Other: (note in               comments)  [address]  []         Follow up visits with               pt/family  []         Provide materials  []         Schedule sacraments  []         Contact Community               Clergy  [x]         Follow up as needed  []         Other: (note in               comments)     Comments:  Initial visit on PCU at request of physician. Patient's wife was present. Patient's eyes were tearful as he shared he and his wife have been talking about how life will change. He seems to have fears and uncertainty about the possible further loss of lower extremity. He expressed concern about being a burden to his wife. They have been  23 years and have a 15year old daughter. Patient shared [place] and does see many  veterans who seem to have adjusted to change in mobility which seems to inspire some hope for him. He and his family are active in Orthodoxy. His aunt is a . Offered assurance of prayer and advised of  availability.   Mian Evans, MPS, [address] [facility], [address] [place] [address] [address]     Paging Service  [phone]

## 2017-08-26 NOTE — PROGRESS NOTES
Pharmacy Automatic Renal Dosing Protocol - Antimicrobials    Indication for Antimicrobials: SSTI, wound infection    Current Regimen of Each Antimicrobial (Start Day & Day of Therapy):  Vancomycin 1750mg IV q18h (start  Day 3)  Cefepime 2g IV q12h (start  Day 2)  Metronidazole 500mg IV q8h (start  Day 3)    Previous Abx:  Unasyn 3gm IV q6h - started -     Goal Vancomycin Level (if needed): 15-20  Level(s) Ordered (if needed):  prior to 0700 dose  Measured / Extrapolated Vancomycin Levels (if drawn): pending    Significant Cultures:    Blood: NG x 2 days pending   Wound Left foot (right side): few probable staph aureus, light mixed skin karan pending   Wound left foot (left side): few probable staph aureus, scant GNR, mod mixed skin karan pending     MRI left foot: Preliminary report: Soft tissues are diffusely edematous consistent with cellulitis. Abnormal signal within the  cuboid bone and residual fourth metatarsal consistent with osteomyelitis. No  focal fluid collection visualized. Final report to follow. CAPD, Hemodialysis or Renal Replacement Therapy: none   Paralysis, amputations, malnutrition: none  Recent Labs      17   0503  17   0334  17   1902   CREA  1.51*  1.89*  2.00*   BUN  15  18  17   WBC  10.1  16.8*  19.5*     Temp (24hrs), Av.4 °F (36.9 °C), Min:98.1 °F (36.7 °C), Max:98.8 °F (37.1 °C)    Creatinine Clearance (Creatinine Clearance (ml/min)): ~71     Impression/Plan:   -Cont vanc as ordered - ordered vanc trough for tomorrow AM  - Adjust Cefepime to 2g IV q8h for improved renal fxn.   -Metronidazole requires no renal adjustment. Pharmacy will follow daily and adjust medications as appropriate for renal function and/or serum levels.     Thank you,  Lily White, ZAK     Renal Dosing Tables on [employer]

## 2017-08-26 NOTE — PROGRESS NOTES
Podiatry  -Pt seen & examined at bedside. Denies any n/v/f/ns/c.  -MRI shows extensive soft tissue cellulitis throughout plantar sole of foot as well as osteo in 4th metatarsal and cuboid. Based on these findings and the amount of infected soft tissue & bone, Pt needs a BKA. Explained this to the Pt. He is upset but understands. Pt to talk with his wife. -Will consult vasc sx.  -Continue antibxs. WBC 16.8 --> 10.

## 2017-08-26 NOTE — PROGRESS NOTES
PCU SHIFT NURSING NOTE      {[facility] BEDSIDE_VERBAL_RECORDED_WRITTEN:[id number]::Bedside shift change report given to Jesenia(oncoming nurse) by Perry Montague (offgoing nurse). Report included the following information SBAR, Kardex, Med Rec Status and Cardiac Rhythm NSR. Shift Summary: Patient returned from MRI. Alert & oriented x4. Requested pain medication for 8/10 to left foot. Heparin dose increased per protocol, checked by 2 RN'S. Will continue to monitor. 0000: FSBS 381. Notified Dr. Mary Key x2 per answerring service for insulin orders, no sliding scale pass 349. no return call. Administered SS Insulin for highest dose for 349 5units. Will continue to monitor. 0600: PTT 64.4 no rate change per protocol. Admission Date 8/24/2017   Admission Diagnosis Sepsis ([address]   Consults IP CONSULT TO PODIATRY  IP CONSULT TO HOSPITALIST        Consults   []PT   []OT   []Speech   []Case Management      [] Palliative      Cardiac Monitoring Order   []Yes   []No     IV drips   []Yes    Drip:                            Dose:  Drip:                            Dose:  Drip:                            Dose:   []No     GI Prophylaxis   []Yes   []No         DVT Prophylaxis   SCDs:             Jeff stockings:         [] Medication   []Contraindicated   []None      Activity Level Activity Level: Up with Assistance     Activity Assistance: Partial (one person)   Purposeful Rounding every 1-2 hour? []Yes   Mccormick Score  Total Score: 2   Bed Alarm (If score 3 or >)   []Yes   [] Refused (See signed refusal form in chart)   Noé Score  Noé Score: 20   Noé Score (if score 14 or less)   []PMT consult   []Wound Care consult      []Specialty bed   [] Nutrition consult          Needs prior to discharge:   Home O2 required:    []Yes   []No    If yes, how much O2 required?     Other:    Last Bowel Movement: Last Bowel Movement Date: 08/25/17      Influenza Vaccine Received Flu Vaccine for Current Season (usually Sept-March): Not Flu Season Pneumonia Vaccine           Diet Active Orders   Diet    DIET DIABETIC CONSISTENT CARB Regular      LDAs               Peripheral IV 08/24/17 Right Hand (Active)   Site Assessment Clean, dry, & intact 8/25/2017  5:22 PM   Phlebitis Assessment 0 8/25/2017  5:22 PM   Infiltration Assessment 0 8/25/2017  5:22 PM   Dressing Status Clean, dry, & intact 8/25/2017  5:22 PM   Dressing Type Transparent 8/25/2017  5:22 PM   Hub Color/Line Status Blue; Infusing 8/25/2017  5:22 PM       Peripheral IV 08/25/17 Left Hand (Active)   Site Assessment Clean, dry, & intact 8/25/2017  5:22 PM   Phlebitis Assessment 0 8/25/2017  5:22 PM   Infiltration Assessment 0 8/25/2017  5:22 PM   Dressing Status Clean, dry, & intact 8/25/2017  5:22 PM   Dressing Type Transparent 8/25/2017  5:22 PM   Hub Color/Line Status Blue; Infusing 8/25/2017  5:22 PM   Action Taken Blood drawn 8/25/2017 12:21 AM                      Urinary Catheter      Intake & Output   Date 08/24/17 1900 - 08/25/17 0659 08/25/17 0700 - 08/26/17 0659   Shift 6954-5374 24 Hour Total 5019-2628 4400-0587 24 Hour Total   I  N  T  A  K  E   I.V.  (mL/kg/hr) 1217.4 1217.4         Heparin Volume 26 26         Volume (0.9% sodium chloride infusion) 52.5 52.5         Volume (sodium chloride 0.9 % bolus infusion 3,543 mL) 1038.8 1038.8         Volume (metroNIDAZOLE (FLAGYL) IVPB premix 500 mg) 100 100       Shift Total  (mL/kg) 1217.4  (10.7) 1217.4  (10.7)      O  U  T  P  U  T   Urine  (mL/kg/hr) 700 700 650  (0.5)  650      Urine Voided 700 700 650  650    Shift Total  (mL/kg) 700  (6.2) 700  (6.2) 650  (5.7)  650  (5.7)   .4 517.4 -650  -650   Weight (kg) 113.5 113.5 113.5 113.5 113.5         Readmission Risk Assessment Tool Score Medium Risk            13       Total Score        3 Has Seen PCP in Last 6 Months (Yes=3, No=0)    9 IP Visits Last 12 Months (1-3=4, 4=9, >4=11)    1 Charlson Comorbidity Score (Age + Comorbid Conditions)        Criteria that do not apply: . Living with Significant Other. Assisted Living. LTAC. SNF. or   Rehab    Patient Length of Stay (>5 days = 3)    Pt.  Coverage (Medicare=5 , Medicaid, or Self-Pay=4)       Expected Length of Stay 3d 19h   Actual Length of Stay 1

## 2017-08-26 NOTE — CONSULTS
Brief Vascular Surgery  Consult Note    Impression: Left diabetic foot infection involving plantar foot with necrotic area in center that likely represents a deep space infection. Agree that he will be best treated with BKA. Continue wound care and antibiotics for now. Discussed BKA with patient and his wife. Would potentially do procedure early next week pending clinical course.         Full note dictated    Wayne Jenkins MD

## 2017-08-26 NOTE — PROGRESS NOTES
PICC (Peripherally Inserted Central Catheter) line insertion  procedure note :     Procedure explained to patient along with risks and benefits and patient agreed to proceed. Informed consent obtained from Patient. Patient teaching completed. Timeout completed. Pre-procedure assessment done. Maximum sterile barrier precautions observed throughout procedure. Lidocaine 1%  3.0    ml sq given prior to cannulation. Cannulated Brachial  vein using ultrasound guidance and modified seldinger technique. Inserted 5  [place] double  lumen PICC to Right arm using [employer] Tip Location System and  [address]. Pt has  sinus   rhythm. PICC tip location was confirmed by 3 CG tip positioning system, indicating tall P wave and no negative deflection before P wave which would indicate that the PICC tip is properly placed in the distal SVC or at the [place]. PICC tip location was  confirmed by 2 PICC nurses and 3CG printout placed on patient's chart. Blood return verified and flushed with 20 ml normal saline in each port. Sterile dressing applied with biopatch, statLock and occlusive dressing as per protocol. Curos caps applied to each port. Patient tolerated procedure well with minimal blood loss ( less than 5 ml.)   Patient education material provided. PICC procedure performed by  : Hima Duffy RN. PICC nurse  Assisted by :   Kalpesh Hollis RN  PICC nurse  Reason for access :Poor vascular access  Complications related to insertion  : none  X-Ray : not applicable  Notified primary nurse Arleth Hair RN  that  PICC line can be used.    Total Trimmed Length :  43 cm   External Length : 0  cm   PICC line site arm circumference:  38   cm   PICC catheter occupies  7 % of vein  Type of PICC: Bard Solo Power PICC   Ref # :    [id number]     Lot # :  [id number]  Expiration Date : 10/31/2018    Hima Duffy, [address], PICC Nurse, Vascular Access Team.

## 2017-08-26 NOTE — PROGRESS NOTES
PCU SHIFT NURSING NOTE      Bedside shift change report given to Solomon Stone (oncoming nurse) by Emilee Moreno (offgoing nurse). Report included the following information SBAR, Kardex, Intake/Output, MAR and Recent Results. Shift Summary: 0730  Paged on call MD  Pt needs PICC and BS/insulin orders adjusted  Dr Aaliyah Mas on call     0012  Called vascular consult  Dr Barbara Javier on call   0820  Pt just rec'd news of need for L BKA - wife at bedside   Pt very emotional - asked that I come back later for Yogesh Staff  Dr Barbara Javier at bedside  Paged Dr Whitt Borne office again - she will adjust insulin, ok for PICC line  - order placed and spoke with [facility] of picc team    [phone]  PICC placed and ready for use    1200  Labs sent   1300  Encouraged NWB on L foot as instructed by MD  Pt states \"it dont matter what I do, they are gonna take it anyway\"   1330  Wound care done    1410  Verified heparin rate change with Asher Shaffer in pharmacy        Admission Date 8/24/2017   Admission Diagnosis Sepsis ([address]   Consults IP CONSULT TO PODIATRY  IP CONSULT TO HOSPITALIST  IP CONSULT TO VASCULAR SURGERY        Consults   []PT   []OT   []Speech   []Case Management      [] Palliative      Cardiac Monitoring Order   []Yes   []No     IV drips   []Yes    Drip:                            Dose:  Drip:                            Dose:  Drip:                            Dose:   []No     GI Prophylaxis   []Yes   []No         DVT Prophylaxis   SCDs:             Jeff stockings:         [] Medication   []Contraindicated   []None      Activity Level Activity Level: Up with Assistance     Activity Assistance: Partial (one person)   Purposeful Rounding every 1-2 hour?    []Yes   Mccormick Score  Total Score: 2   Bed Alarm (If score 3 or >)   []Yes   [] Refused (See signed refusal form in chart)   Noé Score  Noé Score: 20   Noé Score (if score 14 or less)   []PMT consult   []Wound Care consult      []Specialty bed   [] Nutrition consult          Needs prior to discharge:   Home O2 required:    []Yes   []No    If yes, how much O2 required? Other:    Last Bowel Movement: Last Bowel Movement Date: 08/25/17      Influenza Vaccine Received Flu Vaccine for Current Season (usually Sept-March): Not Flu Season        Pneumonia Vaccine           Diet Active Orders   Diet    DIET DIABETIC CONSISTENT CARB Regular      LDAs               Peripheral IV 08/24/17 Right Hand (Active)   Site Assessment Clean, dry, & intact 8/26/2017  4:42 AM   Phlebitis Assessment 0 8/26/2017  4:42 AM   Infiltration Assessment 0 8/26/2017  4:42 AM   Dressing Status Clean, dry, & intact 8/26/2017  4:42 AM   Dressing Type Transparent 8/26/2017  4:42 AM   Hub Color/Line Status Blue; Infusing 8/25/2017  5:22 PM       Peripheral IV 08/25/17 Left Hand (Active)   Site Assessment Clean, dry, & intact 8/26/2017  4:42 AM   Phlebitis Assessment 0 8/26/2017  4:42 AM   Infiltration Assessment 0 8/26/2017  4:42 AM   Dressing Status Clean, dry, & intact 8/26/2017  4:42 AM   Dressing Type Transparent 8/26/2017  4:42 AM   Hub Color/Line Status Blue; Infusing 8/25/2017  5:22 PM   Action Taken Blood drawn 8/25/2017 12:21 AM                      Urinary Catheter      Intake & Output   Date 08/25/17 0700 - 08/26/17 0659 08/26/17 0700 - 08/27/17 0659   Shift 9967-7471 6647-6232 24 Hour Total 3872-8793 0091-4790 24 Hour Total   I  N  T  A  K  E   I.V.  (mL/kg/hr)  5389.5  (4) 5389.5  (2)         Heparin Volume  444.7 444.7         Volume (0.9% sodium chloride infusion)  1776.3 1776.3         Volume (sodium chloride 0.9 % bolus infusion 3,543 mL)  2168.5 2168.5         Volume (vancomycin (VANCOCIN) 1750 mg in  ml infusion)  500 500         Volume (cefepime (MAXIPIME) 2 g in 0.9% sodium chloride (MBP/ADV) 100 mL)  200 200         Volume (metroNIDAZOLE (FLAGYL) IVPB premix 500 mg)  300 300       Shift Total  (mL/kg)  5389.5  (47.5) 5389.5  (47.5)      O  U  T  P  U  T   Urine  (mL/kg/hr) 650  (0.5) 1500  (1.1) 2150  (0.8) Urine Voided 650 1500 2150       Shift Total  (mL/kg) 650  (5.7) 1500  (13.2) 2150  (18.9)      NET -650 3889.5 3239.5      Weight (kg) 113.5 113.5 113.5 113.5 113.5 113.5         Readmission Risk Assessment Tool Score Medium Risk            13       Total Score        3 Has Seen PCP in Last 6 Months (Yes=3, No=0)    9 IP Visits Last 12 Months (1-3=4, 4=9, >4=11)    1 Charlson Comorbidity Score (Age + Comorbid Conditions)        Criteria that do not apply:    . Living with Significant Other. Assisted Living. LTAC. SNF. or   Rehab    Patient Length of Stay (>5 days = 3)    Pt.  Coverage (Medicare=5 , Medicaid, or Self-Pay=4)       Expected Length of Stay 3d 19h   Actual Length of Stay 2

## 2017-08-27 LAB
ANION GAP SERPL CALC-SCNC: 6 MMOL/L (ref 5–15)
APTT PPP: 25.7 SEC (ref 22.1–32.5)
BACTERIA SPEC CULT: ABNORMAL
BASOPHILS # BLD: 0 K/UL (ref 0–0.1)
BASOPHILS NFR BLD: 0 % (ref 0–1)
BUN SERPL-MCNC: 10 MG/DL (ref 6–20)
BUN/CREAT SERPL: 8 (ref 12–20)
CALCIUM SERPL-MCNC: 8.1 MG/DL (ref 8.5–10.1)
CHLORIDE SERPL-SCNC: 106 MMOL/L (ref 97–108)
CO2 SERPL-SCNC: 24 MMOL/L (ref 21–32)
CREAT SERPL-MCNC: 1.33 MG/DL (ref 0.7–1.3)
DATE LAST DOSE: ABNORMAL
DIFFERENTIAL METHOD BLD: ABNORMAL
EOSINOPHIL # BLD: 0.2 K/UL (ref 0–0.4)
EOSINOPHIL NFR BLD: 2 % (ref 0–7)
ERYTHROCYTE [DISTWIDTH] IN BLOOD BY AUTOMATED COUNT: 16.5 % (ref 11.5–14.5)
GLUCOSE BLD STRIP.AUTO-MCNC: 236 MG/DL (ref 65–100)
GLUCOSE BLD STRIP.AUTO-MCNC: 257 MG/DL (ref 65–100)
GLUCOSE BLD STRIP.AUTO-MCNC: 269 MG/DL (ref 65–100)
GLUCOSE BLD STRIP.AUTO-MCNC: 320 MG/DL (ref 65–100)
GLUCOSE SERPL-MCNC: 278 MG/DL (ref 65–100)
GRAM STN SPEC: ABNORMAL
GRAM STN SPEC: ABNORMAL
HCT VFR BLD AUTO: 27.8 % (ref 36.6–50.3)
HGB BLD-MCNC: 8.6 G/DL (ref 12.1–17)
LYMPHOCYTES # BLD: 2 K/UL (ref 0.8–3.5)
LYMPHOCYTES NFR BLD: 18 % (ref 12–49)
MCH RBC QN AUTO: 22.5 PG (ref 26–34)
MCHC RBC AUTO-ENTMCNC: 30.9 G/DL (ref 30–36.5)
MCV RBC AUTO: 72.6 FL (ref 80–99)
MONOCYTES # BLD: 0.7 K/UL (ref 0–1)
MONOCYTES NFR BLD: 6 % (ref 5–13)
NEUTS SEG # BLD: 8.2 K/UL (ref 1.8–8)
NEUTS SEG NFR BLD: 74 % (ref 32–75)
PLATELET # BLD AUTO: 417 K/UL (ref 150–400)
POTASSIUM SERPL-SCNC: 4.3 MMOL/L (ref 3.5–5.1)
RBC # BLD AUTO: 3.83 M/UL (ref 4.1–5.7)
RBC MORPH BLD: ABNORMAL
REPORTED DOSE,DOSE: ABNORMAL UNITS
REPORTED DOSE/TIME,TMG: ABNORMAL
SERVICE CMNT-IMP: ABNORMAL
SODIUM SERPL-SCNC: 136 MMOL/L (ref 136–145)
THERAPEUTIC RANGE,PTTT: NORMAL SECS (ref 58–77)
VANCOMYCIN TROUGH SERPL-MCNC: 15.7 UG/ML (ref 5–10)
WBC # BLD AUTO: 11.1 K/UL (ref 4.1–11.1)

## 2017-08-27 PROCEDURE — 74011250636 HC RX REV CODE- 250/636: Performed by: INTERNAL MEDICINE

## 2017-08-27 PROCEDURE — 74011250637 HC RX REV CODE- 250/637: Performed by: INTERNAL MEDICINE

## 2017-08-27 PROCEDURE — 77030018836 HC SOL IRR NACL ICUM -A

## 2017-08-27 PROCEDURE — 85025 COMPLETE CBC W/AUTO DIFF WBC: CPT | Performed by: EMERGENCY MEDICINE

## 2017-08-27 PROCEDURE — 80202 ASSAY OF VANCOMYCIN: CPT | Performed by: FAMILY MEDICINE

## 2017-08-27 PROCEDURE — 65660000000 HC RM CCU STEPDOWN

## 2017-08-27 PROCEDURE — 82962 GLUCOSE BLOOD TEST: CPT

## 2017-08-27 PROCEDURE — 74011250636 HC RX REV CODE- 250/636: Performed by: HOSPITALIST

## 2017-08-27 PROCEDURE — 80048 BASIC METABOLIC PNL TOTAL CA: CPT | Performed by: EMERGENCY MEDICINE

## 2017-08-27 PROCEDURE — 74011636637 HC RX REV CODE- 636/637: Performed by: FAMILY MEDICINE

## 2017-08-27 PROCEDURE — 74011250637 HC RX REV CODE- 250/637: Performed by: FAMILY MEDICINE

## 2017-08-27 PROCEDURE — 74011250636 HC RX REV CODE- 250/636: Performed by: EMERGENCY MEDICINE

## 2017-08-27 PROCEDURE — 74011000250 HC RX REV CODE- 250: Performed by: FAMILY MEDICINE

## 2017-08-27 PROCEDURE — 36415 COLL VENOUS BLD VENIPUNCTURE: CPT | Performed by: EMERGENCY MEDICINE

## 2017-08-27 PROCEDURE — 74011000250 HC RX REV CODE- 250: Performed by: INTERNAL MEDICINE

## 2017-08-27 PROCEDURE — 74011250636 HC RX REV CODE- 250/636: Performed by: FAMILY MEDICINE

## 2017-08-27 PROCEDURE — 85730 THROMBOPLASTIN TIME PARTIAL: CPT | Performed by: FAMILY MEDICINE

## 2017-08-27 PROCEDURE — 74011000258 HC RX REV CODE- 258: Performed by: FAMILY MEDICINE

## 2017-08-27 RX ORDER — METRONIDAZOLE 500 MG/100ML
500 INJECTION, SOLUTION INTRAVENOUS EVERY 8 HOURS
Status: DISCONTINUED | OUTPATIENT
Start: 2017-08-27 | End: 2017-09-02 | Stop reason: HOSPADM

## 2017-08-27 RX ORDER — INSULIN GLARGINE 100 [IU]/ML
10 INJECTION, SOLUTION SUBCUTANEOUS DAILY
Status: DISCONTINUED | OUTPATIENT
Start: 2017-08-27 | End: 2017-08-28

## 2017-08-27 RX ADMIN — PANTOPRAZOLE SODIUM 40 MG: 40 TABLET, DELAYED RELEASE ORAL at 09:21

## 2017-08-27 RX ADMIN — VANCOMYCIN HYDROCHLORIDE 1750 MG: 10 INJECTION, POWDER, LYOPHILIZED, FOR SOLUTION INTRAVENOUS at 07:55

## 2017-08-27 RX ADMIN — Medication 10 ML: at 13:32

## 2017-08-27 RX ADMIN — CEFEPIME HYDROCHLORIDE 2 G: 2 INJECTION, POWDER, FOR SOLUTION INTRAVENOUS at 17:13

## 2017-08-27 RX ADMIN — Medication 20 ML: at 17:12

## 2017-08-27 RX ADMIN — HYDROMORPHONE HYDROCHLORIDE 0.5 MG: 1 INJECTION, SOLUTION INTRAMUSCULAR; INTRAVENOUS; SUBCUTANEOUS at 09:45

## 2017-08-27 RX ADMIN — INSULIN GLARGINE 10 UNITS: 100 INJECTION, SOLUTION SUBCUTANEOUS at 09:24

## 2017-08-27 RX ADMIN — CEFEPIME HYDROCHLORIDE 2 G: 2 INJECTION, POWDER, FOR SOLUTION INTRAVENOUS at 09:19

## 2017-08-27 RX ADMIN — GABAPENTIN 300 MG: 300 CAPSULE ORAL at 20:57

## 2017-08-27 RX ADMIN — GABAPENTIN 300 MG: 300 CAPSULE ORAL at 09:21

## 2017-08-27 RX ADMIN — HYDROMORPHONE HYDROCHLORIDE 0.5 MG: 1 INJECTION, SOLUTION INTRAMUSCULAR; INTRAVENOUS; SUBCUTANEOUS at 21:00

## 2017-08-27 RX ADMIN — HYDROMORPHONE HYDROCHLORIDE 0.5 MG: 1 INJECTION, SOLUTION INTRAMUSCULAR; INTRAVENOUS; SUBCUTANEOUS at 13:28

## 2017-08-27 RX ADMIN — GABAPENTIN 300 MG: 300 CAPSULE ORAL at 17:12

## 2017-08-27 RX ADMIN — CARVEDILOL 6.25 MG: 6.25 TABLET, FILM COATED ORAL at 17:12

## 2017-08-27 RX ADMIN — Medication 10 ML: at 06:03

## 2017-08-27 RX ADMIN — SODIUM CHLORIDE 75 ML/HR: 900 INJECTION, SOLUTION INTRAVENOUS at 06:37

## 2017-08-27 RX ADMIN — INSULIN LISPRO 5 UNITS: 100 INJECTION, SOLUTION INTRAVENOUS; SUBCUTANEOUS at 21:31

## 2017-08-27 RX ADMIN — HYDROMORPHONE HYDROCHLORIDE 0.5 MG: 1 INJECTION, SOLUTION INTRAMUSCULAR; INTRAVENOUS; SUBCUTANEOUS at 23:56

## 2017-08-27 RX ADMIN — ATORVASTATIN CALCIUM 40 MG: 40 TABLET, FILM COATED ORAL at 09:21

## 2017-08-27 RX ADMIN — INSULIN LISPRO 6 UNITS: 100 INJECTION, SOLUTION INTRAVENOUS; SUBCUTANEOUS at 13:27

## 2017-08-27 RX ADMIN — Medication 10 ML: at 06:02

## 2017-08-27 RX ADMIN — METRONIDAZOLE 500 MG: 500 INJECTION, SOLUTION INTRAVENOUS at 20:56

## 2017-08-27 RX ADMIN — CEFEPIME HYDROCHLORIDE 2 G: 2 INJECTION, POWDER, FOR SOLUTION INTRAVENOUS at 00:28

## 2017-08-27 RX ADMIN — CARVEDILOL 6.25 MG: 6.25 TABLET, FILM COATED ORAL at 09:21

## 2017-08-27 RX ADMIN — Medication 10 ML: at 20:57

## 2017-08-27 RX ADMIN — HYDROMORPHONE HYDROCHLORIDE 0.5 MG: 1 INJECTION, SOLUTION INTRAMUSCULAR; INTRAVENOUS; SUBCUTANEOUS at 17:11

## 2017-08-27 RX ADMIN — METRONIDAZOLE 500 MG: 500 INJECTION, SOLUTION INTRAVENOUS at 13:31

## 2017-08-27 RX ADMIN — CEFEPIME HYDROCHLORIDE 2 G: 2 INJECTION, POWDER, FOR SOLUTION INTRAVENOUS at 23:56

## 2017-08-27 RX ADMIN — CLONIDINE HYDROCHLORIDE 0.1 MG: 0.1 TABLET ORAL at 21:23

## 2017-08-27 RX ADMIN — INSULIN LISPRO 8 UNITS: 100 INJECTION, SOLUTION INTRAVENOUS; SUBCUTANEOUS at 09:23

## 2017-08-27 RX ADMIN — METRONIDAZOLE 500 MG: 500 INJECTION, SOLUTION INTRAVENOUS at 02:55

## 2017-08-27 RX ADMIN — INSULIN LISPRO 8 UNITS: 100 INJECTION, SOLUTION INTRAVENOUS; SUBCUTANEOUS at 17:11

## 2017-08-27 NOTE — PROGRESS NOTES
Pharmacy Automatic Renal Dosing Protocol - Antimicrobials    Indication for Antimicrobials: SSTI, wound infection    Current Regimen of Each Antimicrobial (Start Day & Day of Therapy):  Vancomycin 1750mg IV q18h (start  Day 4)  Cefepime 2g IV q8h (start  Day 4)  Metronidazole 500mg IV q8h (start  Day 4)    Previous Abx:  Unasyn 3gm IV q6h - started -     Goal Vancomycin Level (if needed): 15-20  Level(s) Ordered (if needed):   Measured / Extrapolated Vancomycin Levels (if drawn):      @ 0618: 15.7 / 15.1    Significant Cultures:    Blood: NG x 3 days (pending)   Wound Left foot (right side): MRSA sens vanc MICHELLE 1 (FINAL)   Wound left foot (left side): light MRSA, scant GNR, mod mixed skin karan (pending)     MRI left foot: Preliminary report: Soft tissues are diffusely edematous consistent with cellulitis. Abnormal signal within the  cuboid bone and residual fourth metatarsal consistent with osteomyelitis. No  focal fluid collection visualized. Final report to follow. CAPD, Hemodialysis or Renal Replacement Therapy: none   Paralysis, amputations, malnutrition: none  Recent Labs      17   0528  17   0503  17   0334   CREA  1.33*  1.51*  1.89*   BUN  10  15  18   WBC  11.1  10.1  16.8*     Temp (24hrs), Av.4 °F (36.9 °C), Min:98.1 °F (36.7 °C), Max:99.1 °F (37.3 °C)    Creatinine Clearance (Creatinine Clearance (ml/min)): ~81     Impression/Plan:   - Vancomycin Trough is therapeutic. Cont current vanc dose. - Cefepime dosed appropriately for renal fxn  -Metronidazole requires no renal adjustment. - Cont to follow final cx's and sens. Pharmacy will follow daily and adjust medications as appropriate for renal function and/or serum levels.     Thank you,  Jessica White, ZAK     Renal Dosing Tables on Pharmweb

## 2017-08-27 NOTE — PROGRESS NOTES
General Daily Progress Note    Admit Date: 8/24/2017  Hospital day several    Subjective:     Pt denies any cp sob still abit anxious about his progress s/w nursing   Medication side effects: none    Current Facility-Administered Medications   Medication Dose Route Frequency    ALPRAZolam (XANAX) tablet 0.25 mg  0.25 mg Oral TID PRN    cloNIDine HCl (CATAPRES) tablet 0.1 mg  0.1 mg Oral Q6H PRN    insulin lispro (HUMALOG) injection   SubCUTAneous AC&HS    sodium chloride (NS) flush 10-30 mL  10-30 mL InterCATHeter PRN    sodium chloride (NS) flush 10 mL  10 mL InterCATHeter Q24H    sodium chloride (NS) flush 10 mL  10 mL InterCATHeter PRN    sodium chloride (NS) flush 10-40 mL  10-40 mL InterCATHeter Q8H    sodium chloride (NS) flush 20 mL  20 mL InterCATHeter PRN    heparin (porcine) pf 300 Units  300 Units InterCATHeter PRN    cefepime (MAXIPIME) 2 g in 0.9% sodium chloride (MBP/ADV) 100 mL  2 g IntraVENous Q8H    Vancomycin Trough Reminder prior to 0700 dose 8/27  1 Each Other ONCE    atorvastatin (LIPITOR) tablet 40 mg  40 mg Oral DAILY    carvedilol (COREG) tablet 6.25 mg  6.25 mg Oral BID WITH MEALS    pantoprazole (PROTONIX) tablet 40 mg  40 mg Oral DAILY    gabapentin (NEURONTIN) capsule 300 mg  300 mg Oral TID    glucose chewable tablet 16 g  4 Tab Oral PRN    dextrose (D50W) injection syrg 12.5-25 g  12.5-25 g IntraVENous PRN    glucagon (GLUCAGEN) injection 1 mg  1 mg IntraMUSCular PRN    nitroglycerin (NITROBID) 2 % ointment 1 Inch  1 Inch Topical Q6H PRN    sodium chloride (NS) flush 5-10 mL  5-10 mL IntraVENous Q8H    sodium chloride (NS) flush 5-10 mL  5-10 mL IntraVENous PRN    acetaminophen (TYLENOL) tablet 650 mg  650 mg Oral Q6H PRN    ondansetron (ZOFRAN) injection 4 mg  4 mg IntraVENous Q4H PRN    heparin (porcine) injection 9,450 Units  80 Units/kg SubCUTAneous PRN    heparin (porcine) injection 4,700 Units  40 Units/kg IntraVENous PRN    oxyCODONE-acetaminophen (PERCOCET 10)  mg per tablet 1 Tab  1 Tab Oral Q6H PRN    HYDROmorphone (PF) (DILAUDID) injection 0.5 mg  0.5 mg IntraVENous Q3H PRN    sodium chloride (NS) flush 5-10 mL  5-10 mL IntraVENous PRN    vancomycin (VANCOCIN) 1750 mg in  ml infusion  1,750 mg IntraVENous Q18H    metroNIDAZOLE (FLAGYL) IVPB premix 500 mg  500 mg IntraVENous Q8H    0.9% sodium chloride infusion  75 mL/hr IntraVENous CONTINUOUS        Review of Systems  A comprehensive review of systems was negative except for that written in the HPI. Objective:     Patient Vitals for the past 8 hrs:   BP Temp Pulse Resp SpO2   08/27/17 0758 (!) 131/92 98.3 °F (36.8 °C) 70 18 100 %   08/27/17 0256 153/83 98.1 °F (36.7 °C) 69 20 100 %   08/27/17 0035 (!) 169/92 98.5 °F (36.9 °C) 71 20 100 %   08/27/17 0020 (!) 157/100 - 72 - -        08/25 1901 - 08/27 0700  In: 7550.4 [I.V.:7550.4]  Out: 3350 [Urine:3350]    Physical Exam: General appearance: alert, cooperative, no distress, appears stated age, anxious  Lungs: clear to auscultation bilaterally  Heart: regular rate and rhythm  Abdomen: soft, non-tender.  Bowel sounds normal. No masses,  no organomegaly  Extremities: no edema, redness or tenderness in the calves or thighs  Foot bandaged did not remove           Data Review   Recent Results (from the past 24 hour(s))   GLUCOSE, POC    Collection Time: 08/26/17 11:25 AM   Result Value Ref Range    Glucose (POC) 287 (H) 65 - 100 mg/dL    Performed by FRANK CAMPBELL (PCT) CON    PTT    Collection Time: 08/26/17 12:03 PM   Result Value Ref Range    aPTT 45.5 (H) 22.1 - 32.5 sec    aPTT, therapeutic range     58.0 - 77.0 SECS   GLUCOSE, POC    Collection Time: 08/26/17  4:09 PM   Result Value Ref Range    Glucose (POC) 297 (H) 65 - 100 mg/dL    Performed by FRANK CAMPBELL (PCT) CON    PTT    Collection Time: 08/26/17  7:51 PM   Result Value Ref Range    aPTT 118.8 (HH) 22.1 - 32.5 sec    aPTT, therapeutic range     58.0 - 77.0 SECS   GLUCOSE, POC    Collection Time: 08/26/17 10:10 PM   Result Value Ref Range    Glucose (POC) 331 (H) 65 - 100 mg/dL    Performed by Radha Matthews, BASIC    Collection Time: 08/27/17  5:28 AM   Result Value Ref Range    Sodium 136 136 - 145 mmol/L    Potassium 4.3 3.5 - 5.1 mmol/L    Chloride 106 97 - 108 mmol/L    CO2 24 21 - 32 mmol/L    Anion gap 6 5 - 15 mmol/L    Glucose 278 (H) 65 - 100 mg/dL    BUN 10 6 - 20 MG/DL    Creatinine 1.33 (H) 0.70 - 1.30 MG/DL    BUN/Creatinine ratio 8 (L) 12 - 20      GFR est AA >60 >60 ml/min/1.73m2    GFR est non-AA 57 (L) >60 ml/min/1.73m2    Calcium 8.1 (L) 8.5 - 10.1 MG/DL   CBC WITH AUTOMATED DIFF    Collection Time: 08/27/17  5:28 AM   Result Value Ref Range    WBC 11.1 4.1 - 11.1 K/uL    RBC 3.83 (L) 4.10 - 5.70 M/uL    HGB 8.6 (L) 12.1 - 17.0 g/dL    HCT 27.8 (L) 36.6 - 50.3 %    MCV 72.6 (L) 80.0 - 99.0 FL    MCH 22.5 (L) 26.0 - 34.0 PG    MCHC 30.9 30.0 - 36.5 g/dL    RDW 16.5 (H) 11.5 - 14.5 %    PLATELET 423 (H) 199 - 400 K/uL    NEUTROPHILS 74 32 - 75 %    LYMPHOCYTES 18 12 - 49 %    MONOCYTES 6 5 - 13 %    EOSINOPHILS 2 0 - 7 %    BASOPHILS 0 0 - 1 %    ABS. NEUTROPHILS 8.2 (H) 1.8 - 8.0 K/UL    ABS. LYMPHOCYTES 2.0 0.8 - 3.5 K/UL    ABS. MONOCYTES 0.7 0.0 - 1.0 K/UL    ABS. EOSINOPHILS 0.2 0.0 - 0.4 K/UL    ABS.  BASOPHILS 0.0 0.0 - 0.1 K/UL    DF SMEAR SCANNED      RBC COMMENTS ANISOCYTOSIS  1+       PTT    Collection Time: 08/27/17  5:28 AM   Result Value Ref Range    aPTT 25.7 22.1 - 32.5 sec    aPTT, therapeutic range     58.0 - 77.0 SECS   GLUCOSE, POC    Collection Time: 08/27/17  7:44 AM   Result Value Ref Range    Glucose (POC) 269 (H) 65 - 100 mg/dL    Performed by Grady Boeck            Assessment:     Active Problems:    HTN (hypertension) (7/23/2013)      Diabetes ([facility] ()      Overview: since age 23      Hypercholesteremia ()      Sepsis ([facility] [address] (5/27/2017)      Bilateral pulmonary embolism ([facility] [address] (7/21/2017)      Chronic systolic heart failure ([facility] (7/31/2017)      Left foot infection (8/24/2017)      Hyponatremia (8/24/2017)      ALEX (acute kidney injury) ([facility] (8/24/2017)      GERD (gastroesophageal reflux disease) (8/24/2017)        Plan:   Discontinued heparin sat drip due to elevated PT/inr recheck in am  May have procedure Monday will hold for now   Continue home bp meds  adjsuted ISS   Continue abx  Gentle ivh and monitor renal fxn  Pt very anxious to go home xanax prn pt has a history of leaving AMA and is not very compliant with diet -during last hospitalization eating chinese food and chips etc

## 2017-08-27 NOTE — PROGRESS NOTES
General Daily Progress Note    Admit Date: 8/24/2017  Hospital day 3    Subjective:     Patient has no complaint of sob pt states he is going to have a bka and is quite upset crying with wife in room bs elevated s/w nursing   Medication side effects: none    Current Facility-Administered Medications   Medication Dose Route Frequency    ALPRAZolam (XANAX) tablet 0.25 mg  0.25 mg Oral TID PRN    cloNIDine HCl (CATAPRES) tablet 0.1 mg  0.1 mg Oral Q6H PRN    insulin lispro (HUMALOG) injection   SubCUTAneous AC&HS    sodium chloride (NS) flush 10-30 mL  10-30 mL InterCATHeter PRN    sodium chloride (NS) flush 10 mL  10 mL InterCATHeter Q24H    sodium chloride (NS) flush 10 mL  10 mL InterCATHeter PRN    sodium chloride (NS) flush 10-40 mL  10-40 mL InterCATHeter Q8H    sodium chloride (NS) flush 20 mL  20 mL InterCATHeter PRN    heparin (porcine) pf 300 Units  300 Units InterCATHeter PRN    cefepime (MAXIPIME) 2 g in 0.9% sodium chloride (MBP/ADV) 100 mL  2 g IntraVENous Q8H    Vancomycin Trough Reminder prior to 0700 dose 8/27  1 Each Other ONCE    atorvastatin (LIPITOR) tablet 40 mg  40 mg Oral DAILY    carvedilol (COREG) tablet 6.25 mg  6.25 mg Oral BID WITH MEALS    pantoprazole (PROTONIX) tablet 40 mg  40 mg Oral DAILY    gabapentin (NEURONTIN) capsule 300 mg  300 mg Oral TID    glucose chewable tablet 16 g  4 Tab Oral PRN    dextrose (D50W) injection syrg 12.5-25 g  12.5-25 g IntraVENous PRN    glucagon (GLUCAGEN) injection 1 mg  1 mg IntraMUSCular PRN    nitroglycerin (NITROBID) 2 % ointment 1 Inch  1 Inch Topical Q6H PRN    sodium chloride (NS) flush 5-10 mL  5-10 mL IntraVENous Q8H    sodium chloride (NS) flush 5-10 mL  5-10 mL IntraVENous PRN    acetaminophen (TYLENOL) tablet 650 mg  650 mg Oral Q6H PRN    ondansetron (ZOFRAN) injection 4 mg  4 mg IntraVENous Q4H PRN    heparin (porcine) injection 9,450 Units  80 Units/kg SubCUTAneous PRN    heparin (porcine) injection 4,700 Units  40 Units/kg IntraVENous PRN    oxyCODONE-acetaminophen (PERCOCET 10)  mg per tablet 1 Tab  1 Tab Oral Q6H PRN    HYDROmorphone (PF) (DILAUDID) injection 0.5 mg  0.5 mg IntraVENous Q3H PRN    sodium chloride (NS) flush 5-10 mL  5-10 mL IntraVENous PRN    vancomycin (VANCOCIN) 1750 mg in  ml infusion  1,750 mg IntraVENous Q18H    metroNIDAZOLE (FLAGYL) IVPB premix 500 mg  500 mg IntraVENous Q8H    0.9% sodium chloride infusion  75 mL/hr IntraVENous CONTINUOUS        Review of Systems  A comprehensive review of systems was negative except for that written in the HPI. Objective:     Patient Vitals for the past 8 hrs:   BP Temp Pulse Resp SpO2   08/27/17 0758 (!) 131/92 98.3 °F (36.8 °C) 70 18 100 %   08/27/17 0256 153/83 98.1 °F (36.7 °C) 69 20 100 %   08/27/17 0035 (!) 169/92 98.5 °F (36.9 °C) 71 20 100 %   08/27/17 0020 (!) 157/100 - 72 - -        08/25 1901 - 08/27 0700  In: 7550.4 [I.V.:7550.4]  Out: 3350 [Urine:3350]    Physical Exam: General appearance: alert, cooperative, no distress, appears stated age, anxious  Lungs: clear to auscultation bilaterally  Heart: regular rate and rhythm  Abdomen: soft, non-tender.  Bowel sounds normal. No masses,  no organomegaly  Extremities: no edema, redness or tenderness in the calves or thighs  Foot bandaged did not remove           Data Review   Recent Results (from the past 24 hour(s))   GLUCOSE, POC    Collection Time: 08/26/17 11:25 AM   Result Value Ref Range    Glucose (POC) 287 (H) 65 - 100 mg/dL    Performed by FRANK CAMPBELL (PCT) CON    PTT    Collection Time: 08/26/17 12:03 PM   Result Value Ref Range    aPTT 45.5 (H) 22.1 - 32.5 sec    aPTT, therapeutic range     58.0 - 77.0 SECS   GLUCOSE, POC    Collection Time: 08/26/17  4:09 PM   Result Value Ref Range    Glucose (POC) 297 (H) 65 - 100 mg/dL    Performed by FRANK CAMPBELL (PCT) CON    PTT    Collection Time: 08/26/17  7:51 PM   Result Value Ref Range    aPTT 118.8 (HH) 22.1 - 32.5 sec aPTT, therapeutic range     58.0 - 77.0 SECS   GLUCOSE, POC    Collection Time: 08/26/17 10:10 PM   Result Value Ref Range    Glucose (POC) 331 (H) 65 - 100 mg/dL    Performed by Radha Matthews, BASIC    Collection Time: 08/27/17  5:28 AM   Result Value Ref Range    Sodium 136 136 - 145 mmol/L    Potassium 4.3 3.5 - 5.1 mmol/L    Chloride 106 97 - 108 mmol/L    CO2 24 21 - 32 mmol/L    Anion gap 6 5 - 15 mmol/L    Glucose 278 (H) 65 - 100 mg/dL    BUN 10 6 - 20 MG/DL    Creatinine 1.33 (H) 0.70 - 1.30 MG/DL    BUN/Creatinine ratio 8 (L) 12 - 20      GFR est AA >60 >60 ml/min/1.73m2    GFR est non-AA 57 (L) >60 ml/min/1.73m2    Calcium 8.1 (L) 8.5 - 10.1 MG/DL   CBC WITH AUTOMATED DIFF    Collection Time: 08/27/17  5:28 AM   Result Value Ref Range    WBC 11.1 4.1 - 11.1 K/uL    RBC 3.83 (L) 4.10 - 5.70 M/uL    HGB 8.6 (L) 12.1 - 17.0 g/dL    HCT 27.8 (L) 36.6 - 50.3 %    MCV 72.6 (L) 80.0 - 99.0 FL    MCH 22.5 (L) 26.0 - 34.0 PG    MCHC 30.9 30.0 - 36.5 g/dL    RDW 16.5 (H) 11.5 - 14.5 %    PLATELET 868 (H) 808 - 400 K/uL    NEUTROPHILS 74 32 - 75 %    LYMPHOCYTES 18 12 - 49 %    MONOCYTES 6 5 - 13 %    EOSINOPHILS 2 0 - 7 %    BASOPHILS 0 0 - 1 %    ABS. NEUTROPHILS 8.2 (H) 1.8 - 8.0 K/UL    ABS. LYMPHOCYTES 2.0 0.8 - 3.5 K/UL    ABS. MONOCYTES 0.7 0.0 - 1.0 K/UL    ABS. EOSINOPHILS 0.2 0.0 - 0.4 K/UL    ABS.  BASOPHILS 0.0 0.0 - 0.1 K/UL    DF SMEAR SCANNED      RBC COMMENTS ANISOCYTOSIS  1+       PTT    Collection Time: 08/27/17  5:28 AM   Result Value Ref Range    aPTT 25.7 22.1 - 32.5 sec    aPTT, therapeutic range     58.0 - 77.0 SECS   GLUCOSE, POC    Collection Time: 08/27/17  7:44 AM   Result Value Ref Range    Glucose (POC) 269 (H) 65 - 100 mg/dL    Performed by Sal Agee            Assessment:     Active Problems:    HTN (hypertension) (7/23/2013)      Diabetes ([facility] ()      Overview: since age 23      Hypercholesteremia ()      Sepsis ([facility] (5/27/2017)      Bilateral pulmonary embolism ([facility] (7/21/2017)      Chronic systolic heart failure ([facility] (7/31/2017)      Left foot infection (8/24/2017)      Hyponatremia (8/24/2017)      ALEX (acute kidney injury) ([facility] (8/24/2017)      GERD (gastroesophageal reflux disease) (8/24/2017)        Plan:     Continue home bp meds  adjsuted ISS   Continue abx  Will need a picc placed d/w nursing   Continue heprin for now will need to stop tomorrow before procedure  Gentle ivh and monitor renal fxn  Pt very anxious to go home xanax prn pt has a history of leaving AMA and is not very compliant with diet -during last hospitalization eating chinese food and chips etc    Addendum  Discontinued heparin drip due to PT and will see pt in am

## 2017-08-27 NOTE — PROGRESS NOTES
9AM  Pt stated Dr Shu Lott has been by to round and states she will be placing him on \"another type of anticoagulation\" (Heparin was d/c last night after elevated PTT result)    5:20 PM  Paged MD to inquire about anticoagulation    6:13 PM  Per MD, hold on heparin -- keep NPO p MN for possible surgery 8/28/17

## 2017-08-27 NOTE — PROGRESS NOTES
PCU SHIFT NURSING NOTE      {[facility] BEDSIDE_VERBAL_RECORDED_WRITTEN:25078:Bedside shift change report given Jerel (oncoming nurse) Silas(offgoing nurse). Report included the following information SBAR, Kardex, Med Rec Status and Cardiac Rhythm SA. Shift Summary: . Notified Dr. Shelton Hernandez awaiting return call. Heparin on hold per protocol. 2100: Notified Dr. Shelton Hernandez return call, Orders to stop heparin & draw PTT in am. Do not restart Heparin drip. Notified pharmacy. 0600: dressing to left foot changed per wound care orders. Wound has slough, wet to dry gauze & wrapped in kerlix. Patient tolerated dressing change. Admission Date 8/24/2017   Admission Diagnosis Sepsis ([facility]   Consults IP CONSULT TO PODIATRY  IP CONSULT TO HOSPITALIST  IP CONSULT TO VASCULAR SURGERY        Consults   []PT   []OT   []Speech   []Case Management      [] Palliative      Cardiac Monitoring Order   []Yes   []No     IV drips   []Yes    Drip:                            Dose:  Drip:                            Dose:  Drip:                            Dose:   []No     GI Prophylaxis   []Yes   []No         DVT Prophylaxis   SCDs:             Jeff stockings:         [] Medication   []Contraindicated   []None      Activity Level Activity Level: Up with Assistance     Activity Assistance: Partial (one person)   Purposeful Rounding every 1-2 hour? []Yes   Mccormick Score  Total Score: 2   Bed Alarm (If score 3 or >)   []Yes   [] Refused (See signed refusal form in chart)   Noé Score  Noé Score: 20   Noé Score (if score 14 or less)   []PMT consult   []Wound Care consult      []Specialty bed   [] Nutrition consult          Needs prior to discharge:   Home O2 required:    []Yes   []No    If yes, how much O2 required?     Other:    Last Bowel Movement: Last Bowel Movement Date: 08/26/17      Influenza Vaccine Received Flu Vaccine for Current Season (usually Sept-March): Not Flu Season        Pneumonia Vaccine           Diet Active Orders   Diet    DIET DIABETIC CONSISTENT CARB Regular      LDAs         PICC Double Lumen 08/26/17 Right;Brachial (Active)   Central Line Being Utilized Yes 8/26/2017  5:14 PM   Criteria for Appropriate Use Limited/no vessel suitable for conventional peripheral access 8/26/2017  5:14 PM   Site Assessment Clean, dry, & intact 8/26/2017  5:14 PM   Phlebitis Assessment 0 8/26/2017  5:14 PM   Infiltration Assessment 0 8/26/2017  5:14 PM   Arm Circumference (cm) 38 cm 8/26/2017 11:25 AM   Date of Last Dressing Change 08/26/17 8/26/2017 11:25 AM   Dressing Status Clean, dry, & intact 8/26/2017  5:14 PM   Action Taken Other (comment) 8/26/2017 11:25 AM   External Catheter Length (cm) 0 centimeters 8/26/2017 11:25 AM   Dressing Type Transparent 8/26/2017  5:14 PM   Hub Color/Line Status Purple;Capped;Flushed;Patent 8/26/2017 11:25 AM   Positive Blood Return (Site #1) Yes 8/26/2017  5:14 PM   Hub Color/Line Status Red;Capped;Flushed;Patent 8/26/2017 11:25 AM   Positive Blood Return (Site #2) Yes 8/26/2017  5:14 PM   Alcohol Cap Used Yes 8/26/2017 11:25 AM          Peripheral IV 08/25/17 Left Hand (Active)   Site Assessment Clean, dry, & intact 8/26/2017  5:14 PM   Phlebitis Assessment 0 8/26/2017  5:14 PM   Infiltration Assessment 0 8/26/2017  5:14 PM   Dressing Status Clean, dry, & intact 8/26/2017  5:14 PM   Dressing Type Transparent 8/26/2017  5:14 PM   Hub Color/Line Status Blue; Infusing 8/26/2017  5:14 PM   Action Taken Blood drawn 8/25/2017 12:21 AM                      Urinary Catheter      Intake & Output   Date 08/25/17 1900 - 08/26/17 0659 08/26/17 0700 - 08/27/17 0659   Shift 7373-9592 24 Hour Total 6012-8623 8393-4863 24 Hour Total   I  N  T  A  K  E   I.V.  (mL/kg/hr) 5389.5 5389.5         Heparin Volume 444.7 444.7         Volume (0.9% sodium chloride infusion) 1776.3 1776.3         Volume (sodium chloride 0.9 % bolus infusion 3,543 mL) 2168.5 2168.5         Volume (vancomycin (VANCOCIN) 1750 mg in  ml infusion) 500 500         Volume (cefepime (MAXIPIME) 2 g in 0.9% sodium chloride (MBP/ADV) 100 mL) 200 200         Volume (metroNIDAZOLE (FLAGYL) IVPB premix 500 mg) 300 300       Shift Total  (mL/kg) 5389.5  (47.5) 5389.5  (47.5)      O  U  T  P  U  T   Urine  (mL/kg/hr) 1500 2150         Urine Voided 1500 2150       Shift Total  (mL/kg) 1500  (13.2) 2150  (18.9)      NET 3889.5 3239.5      Weight (kg) 113.5 113.5 113.5 113.5 113.5         Readmission Risk Assessment Tool Score Medium Risk            13       Total Score        3 Has Seen PCP in Last 6 Months (Yes=3, No=0)    9 IP Visits Last 12 Months (1-3=4, 4=9, >4=11)    1 Charlson Comorbidity Score (Age + Comorbid Conditions)        Criteria that do not apply:    . Living with Significant Other. Assisted Living. LTAC. SNF. or   Rehab    Patient Length of Stay (>5 days = 3)    Pt.  Coverage (Medicare=5 , Medicaid, or Self-Pay=4)       Expected Length of Stay 3d 19h   Actual Length of Stay 2

## 2017-08-28 ENCOUNTER — ANESTHESIA EVENT (OUTPATIENT)
Dept: SURGERY | Age: 47
DRG: 854 | End: 2017-08-28
Payer: COMMERCIAL

## 2017-08-28 ENCOUNTER — ANESTHESIA (OUTPATIENT)
Dept: SURGERY | Age: 47
DRG: 854 | End: 2017-08-28
Payer: COMMERCIAL

## 2017-08-28 LAB
ANION GAP SERPL CALC-SCNC: 6 MMOL/L (ref 5–15)
BACTERIA SPEC CULT: ABNORMAL
BASOPHILS # BLD: 0 K/UL (ref 0–0.1)
BASOPHILS NFR BLD: 0 % (ref 0–1)
BUN SERPL-MCNC: 11 MG/DL (ref 6–20)
BUN/CREAT SERPL: 9 (ref 12–20)
CALCIUM SERPL-MCNC: 7.9 MG/DL (ref 8.5–10.1)
CHLORIDE SERPL-SCNC: 107 MMOL/L (ref 97–108)
CO2 SERPL-SCNC: 24 MMOL/L (ref 21–32)
CREAT SERPL-MCNC: 1.28 MG/DL (ref 0.7–1.3)
DIFFERENTIAL METHOD BLD: ABNORMAL
EOSINOPHIL # BLD: 0.4 K/UL (ref 0–0.4)
EOSINOPHIL NFR BLD: 3 % (ref 0–7)
ERYTHROCYTE [DISTWIDTH] IN BLOOD BY AUTOMATED COUNT: 16.3 % (ref 11.5–14.5)
GLUCOSE BLD STRIP.AUTO-MCNC: 131 MG/DL (ref 65–100)
GLUCOSE BLD STRIP.AUTO-MCNC: 176 MG/DL (ref 65–100)
GLUCOSE BLD STRIP.AUTO-MCNC: 182 MG/DL (ref 65–100)
GLUCOSE BLD STRIP.AUTO-MCNC: 209 MG/DL (ref 65–100)
GLUCOSE BLD STRIP.AUTO-MCNC: 221 MG/DL (ref 65–100)
GLUCOSE BLD STRIP.AUTO-MCNC: 251 MG/DL (ref 65–100)
GLUCOSE SERPL-MCNC: 286 MG/DL (ref 65–100)
GRAM STN SPEC: ABNORMAL
HCT VFR BLD AUTO: 26.5 % (ref 36.6–50.3)
HGB BLD-MCNC: 8.1 G/DL (ref 12.1–17)
LYMPHOCYTES # BLD: 2.1 K/UL (ref 0.8–3.5)
LYMPHOCYTES NFR BLD: 17 % (ref 12–49)
MCH RBC QN AUTO: 22 PG (ref 26–34)
MCHC RBC AUTO-ENTMCNC: 30.6 G/DL (ref 30–36.5)
MCV RBC AUTO: 72 FL (ref 80–99)
MONOCYTES # BLD: 0.7 K/UL (ref 0–1)
MONOCYTES NFR BLD: 6 % (ref 5–13)
NEUTS SEG # BLD: 8.9 K/UL (ref 1.8–8)
NEUTS SEG NFR BLD: 74 % (ref 32–75)
PLATELET # BLD AUTO: 411 K/UL (ref 150–400)
POTASSIUM SERPL-SCNC: 3.9 MMOL/L (ref 3.5–5.1)
RBC # BLD AUTO: 3.68 M/UL (ref 4.1–5.7)
RBC MORPH BLD: ABNORMAL
RBC MORPH BLD: ABNORMAL
SERVICE CMNT-IMP: ABNORMAL
SODIUM SERPL-SCNC: 137 MMOL/L (ref 136–145)
WBC # BLD AUTO: 12.1 K/UL (ref 4.1–11.1)

## 2017-08-28 PROCEDURE — 80048 BASIC METABOLIC PNL TOTAL CA: CPT | Performed by: EMERGENCY MEDICINE

## 2017-08-28 PROCEDURE — L1830 KO IMMOB CANVAS LONG PRE OTS: HCPCS | Performed by: SURGERY

## 2017-08-28 PROCEDURE — 77030011640 HC PAD GRND REM COVD -A: Performed by: SURGERY

## 2017-08-28 PROCEDURE — 74011250636 HC RX REV CODE- 250/636: Performed by: ANESTHESIOLOGY

## 2017-08-28 PROCEDURE — 74011250636 HC RX REV CODE- 250/636: Performed by: SURGERY

## 2017-08-28 PROCEDURE — 36415 COLL VENOUS BLD VENIPUNCTURE: CPT | Performed by: EMERGENCY MEDICINE

## 2017-08-28 PROCEDURE — 74011000250 HC RX REV CODE- 250: Performed by: FAMILY MEDICINE

## 2017-08-28 PROCEDURE — 74011250636 HC RX REV CODE- 250/636: Performed by: EMERGENCY MEDICINE

## 2017-08-28 PROCEDURE — 77030008463 HC STPLR SKN PROX J&J -B: Performed by: SURGERY

## 2017-08-28 PROCEDURE — 74011250636 HC RX REV CODE- 250/636: Performed by: HOSPITALIST

## 2017-08-28 PROCEDURE — 85025 COMPLETE CBC W/AUTO DIFF WBC: CPT | Performed by: EMERGENCY MEDICINE

## 2017-08-28 PROCEDURE — 74011250637 HC RX REV CODE- 250/637: Performed by: FAMILY MEDICINE

## 2017-08-28 PROCEDURE — 74011000258 HC RX REV CODE- 258: Performed by: FAMILY MEDICINE

## 2017-08-28 PROCEDURE — 76210000016 HC OR PH I REC 1 TO 1.5 HR: Performed by: SURGERY

## 2017-08-28 PROCEDURE — 74011250636 HC RX REV CODE- 250/636

## 2017-08-28 PROCEDURE — 77030002996 HC SUT SLK J&J -A: Performed by: SURGERY

## 2017-08-28 PROCEDURE — 82962 GLUCOSE BLOOD TEST: CPT

## 2017-08-28 PROCEDURE — 0Y6J0Z1 DETACHMENT AT LEFT LOWER LEG, HIGH, OPEN APPROACH: ICD-10-PCS | Performed by: SURGERY

## 2017-08-28 PROCEDURE — 77030008684 HC TU ET CUF COVD -B: Performed by: NURSE ANESTHETIST, CERTIFIED REGISTERED

## 2017-08-28 PROCEDURE — 76060000034 HC ANESTHESIA 1.5 TO 2 HR: Performed by: SURGERY

## 2017-08-28 PROCEDURE — 77030031139 HC SUT VCRL2 J&J -A: Performed by: SURGERY

## 2017-08-28 PROCEDURE — 74011250636 HC RX REV CODE- 250/636: Performed by: FAMILY MEDICINE

## 2017-08-28 PROCEDURE — 74011636637 HC RX REV CODE- 636/637: Performed by: FAMILY MEDICINE

## 2017-08-28 PROCEDURE — 77030013079 HC BLNKT BAIR HGGR 3M -A: Performed by: NURSE ANESTHETIST, CERTIFIED REGISTERED

## 2017-08-28 PROCEDURE — 74011250636 HC RX REV CODE- 250/636: Performed by: INTERNAL MEDICINE

## 2017-08-28 PROCEDURE — 76010000153 HC OR TIME 1.5 TO 2 HR: Performed by: SURGERY

## 2017-08-28 PROCEDURE — 88307 TISSUE EXAM BY PATHOLOGIST: CPT | Performed by: SURGERY

## 2017-08-28 PROCEDURE — 77030019908 HC STETH ESOPH SIMS -A: Performed by: NURSE ANESTHETIST, CERTIFIED REGISTERED

## 2017-08-28 PROCEDURE — 77030026438 HC STYL ET INTUB CARD -A: Performed by: NURSE ANESTHETIST, CERTIFIED REGISTERED

## 2017-08-28 PROCEDURE — 74011000250 HC RX REV CODE- 250

## 2017-08-28 PROCEDURE — 74011250637 HC RX REV CODE- 250/637: Performed by: HOSPITALIST

## 2017-08-28 PROCEDURE — 88311 DECALCIFY TISSUE: CPT | Performed by: SURGERY

## 2017-08-28 PROCEDURE — 74011000250 HC RX REV CODE- 250: Performed by: SURGERY

## 2017-08-28 PROCEDURE — 65660000000 HC RM CCU STEPDOWN

## 2017-08-28 PROCEDURE — 74011000272 HC RX REV CODE- 272: Performed by: SURGERY

## 2017-08-28 PROCEDURE — 74011250637 HC RX REV CODE- 250/637: Performed by: INTERNAL MEDICINE

## 2017-08-28 RX ORDER — FENTANYL CITRATE 50 UG/ML
25 INJECTION, SOLUTION INTRAMUSCULAR; INTRAVENOUS
Status: DISCONTINUED | OUTPATIENT
Start: 2017-08-28 | End: 2017-08-28 | Stop reason: HOSPADM

## 2017-08-28 RX ORDER — HYDROMORPHONE HCL IN 0.9% NACL 15 MG/30ML
PATIENT CONTROLLED ANALGESIA VIAL INTRAVENOUS
Status: COMPLETED
Start: 2017-08-28 | End: 2017-08-28

## 2017-08-28 RX ORDER — PROPOFOL 10 MG/ML
INJECTION, EMULSION INTRAVENOUS AS NEEDED
Status: DISCONTINUED | OUTPATIENT
Start: 2017-08-28 | End: 2017-08-28 | Stop reason: HOSPADM

## 2017-08-28 RX ORDER — SUCCINYLCHOLINE CHLORIDE 20 MG/ML
INJECTION INTRAMUSCULAR; INTRAVENOUS AS NEEDED
Status: DISCONTINUED | OUTPATIENT
Start: 2017-08-28 | End: 2017-08-28 | Stop reason: HOSPADM

## 2017-08-28 RX ORDER — FENTANYL CITRATE 50 UG/ML
INJECTION, SOLUTION INTRAMUSCULAR; INTRAVENOUS
Status: COMPLETED
Start: 2017-08-28 | End: 2017-08-28

## 2017-08-28 RX ORDER — PHENYLEPHRINE HCL IN 0.9% NACL 0.4MG/10ML
SYRINGE (ML) INTRAVENOUS AS NEEDED
Status: DISCONTINUED | OUTPATIENT
Start: 2017-08-28 | End: 2017-08-28 | Stop reason: HOSPADM

## 2017-08-28 RX ORDER — INSULIN GLARGINE 100 [IU]/ML
14 INJECTION, SOLUTION SUBCUTANEOUS DAILY
Status: DISCONTINUED | OUTPATIENT
Start: 2017-08-28 | End: 2017-08-30

## 2017-08-28 RX ORDER — HYDROMORPHONE HYDROCHLORIDE 1 MG/ML
2 INJECTION, SOLUTION INTRAMUSCULAR; INTRAVENOUS; SUBCUTANEOUS ONCE
Status: COMPLETED | OUTPATIENT
Start: 2017-08-28 | End: 2017-08-28

## 2017-08-28 RX ORDER — LIDOCAINE HYDROCHLORIDE 10 MG/ML
0.1 INJECTION, SOLUTION EPIDURAL; INFILTRATION; INTRACAUDAL; PERINEURAL AS NEEDED
Status: DISCONTINUED | OUTPATIENT
Start: 2017-08-28 | End: 2017-08-28 | Stop reason: HOSPADM

## 2017-08-28 RX ORDER — SODIUM CHLORIDE, SODIUM LACTATE, POTASSIUM CHLORIDE, CALCIUM CHLORIDE 600; 310; 30; 20 MG/100ML; MG/100ML; MG/100ML; MG/100ML
25 INJECTION, SOLUTION INTRAVENOUS CONTINUOUS
Status: DISCONTINUED | OUTPATIENT
Start: 2017-08-28 | End: 2017-08-28 | Stop reason: HOSPADM

## 2017-08-28 RX ORDER — HYDROMORPHONE HCL IN 0.9% NACL 15 MG/30ML
PATIENT CONTROLLED ANALGESIA VIAL INTRAVENOUS CONTINUOUS
Status: DISCONTINUED | OUTPATIENT
Start: 2017-08-28 | End: 2017-08-31

## 2017-08-28 RX ORDER — SODIUM CHLORIDE 0.9 % (FLUSH) 0.9 %
5-10 SYRINGE (ML) INJECTION AS NEEDED
Status: DISCONTINUED | OUTPATIENT
Start: 2017-08-28 | End: 2017-08-28 | Stop reason: HOSPADM

## 2017-08-28 RX ORDER — MIDAZOLAM HYDROCHLORIDE 1 MG/ML
INJECTION, SOLUTION INTRAMUSCULAR; INTRAVENOUS AS NEEDED
Status: DISCONTINUED | OUTPATIENT
Start: 2017-08-28 | End: 2017-08-28 | Stop reason: HOSPADM

## 2017-08-28 RX ORDER — HYDROMORPHONE HYDROCHLORIDE 1 MG/ML
0.2 INJECTION, SOLUTION INTRAMUSCULAR; INTRAVENOUS; SUBCUTANEOUS
Status: DISCONTINUED | OUTPATIENT
Start: 2017-08-28 | End: 2017-08-28 | Stop reason: HOSPADM

## 2017-08-28 RX ORDER — FENTANYL CITRATE 50 UG/ML
INJECTION, SOLUTION INTRAMUSCULAR; INTRAVENOUS AS NEEDED
Status: DISCONTINUED | OUTPATIENT
Start: 2017-08-28 | End: 2017-08-28 | Stop reason: HOSPADM

## 2017-08-28 RX ORDER — LIDOCAINE HYDROCHLORIDE 20 MG/ML
INJECTION, SOLUTION EPIDURAL; INFILTRATION; INTRACAUDAL; PERINEURAL AS NEEDED
Status: DISCONTINUED | OUTPATIENT
Start: 2017-08-28 | End: 2017-08-28 | Stop reason: HOSPADM

## 2017-08-28 RX ORDER — ONDANSETRON 2 MG/ML
INJECTION INTRAMUSCULAR; INTRAVENOUS AS NEEDED
Status: DISCONTINUED | OUTPATIENT
Start: 2017-08-28 | End: 2017-08-28 | Stop reason: HOSPADM

## 2017-08-28 RX ORDER — SODIUM CHLORIDE 0.9 % (FLUSH) 0.9 %
5-10 SYRINGE (ML) INJECTION EVERY 8 HOURS
Status: DISCONTINUED | OUTPATIENT
Start: 2017-08-28 | End: 2017-08-28 | Stop reason: HOSPADM

## 2017-08-28 RX ORDER — DIPHENHYDRAMINE HYDROCHLORIDE 50 MG/ML
12.5 INJECTION, SOLUTION INTRAMUSCULAR; INTRAVENOUS AS NEEDED
Status: DISCONTINUED | OUTPATIENT
Start: 2017-08-28 | End: 2017-08-28 | Stop reason: HOSPADM

## 2017-08-28 RX ADMIN — Medication 10 ML: at 22:36

## 2017-08-28 RX ADMIN — CLONIDINE HYDROCHLORIDE 0.1 MG: 0.1 TABLET ORAL at 16:58

## 2017-08-28 RX ADMIN — SODIUM CHLORIDE, POTASSIUM CHLORIDE, SODIUM LACTATE AND CALCIUM CHLORIDE: 600; 310; 30; 20 INJECTION, SOLUTION INTRAVENOUS at 13:02

## 2017-08-28 RX ADMIN — Medication 80 MCG: at 14:06

## 2017-08-28 RX ADMIN — FENTANYL CITRATE 25 MCG: 50 INJECTION, SOLUTION INTRAMUSCULAR; INTRAVENOUS at 15:56

## 2017-08-28 RX ADMIN — INSULIN LISPRO 4 UNITS: 100 INJECTION, SOLUTION INTRAVENOUS; SUBCUTANEOUS at 17:47

## 2017-08-28 RX ADMIN — SUCCINYLCHOLINE CHLORIDE 120 MG: 20 INJECTION INTRAMUSCULAR; INTRAVENOUS at 13:12

## 2017-08-28 RX ADMIN — FENTANYL CITRATE 25 MCG: 50 INJECTION, SOLUTION INTRAMUSCULAR; INTRAVENOUS at 15:50

## 2017-08-28 RX ADMIN — INSULIN LISPRO 6 UNITS: 100 INJECTION, SOLUTION INTRAVENOUS; SUBCUTANEOUS at 11:37

## 2017-08-28 RX ADMIN — FENTANYL CITRATE 100 MCG: 50 INJECTION, SOLUTION INTRAMUSCULAR; INTRAVENOUS at 13:06

## 2017-08-28 RX ADMIN — HYDROMORPHONE HYDROCHLORIDE 0.5 MG: 1 INJECTION, SOLUTION INTRAMUSCULAR; INTRAVENOUS; SUBCUTANEOUS at 11:37

## 2017-08-28 RX ADMIN — Medication 80 MCG: at 13:49

## 2017-08-28 RX ADMIN — Medication 80 MCG: at 13:27

## 2017-08-28 RX ADMIN — FENTANYL CITRATE 25 MCG: 50 INJECTION, SOLUTION INTRAMUSCULAR; INTRAVENOUS at 15:39

## 2017-08-28 RX ADMIN — CEFEPIME HYDROCHLORIDE 2 G: 2 INJECTION, POWDER, FOR SOLUTION INTRAVENOUS at 16:58

## 2017-08-28 RX ADMIN — METRONIDAZOLE 500 MG: 500 INJECTION, SOLUTION INTRAVENOUS at 20:21

## 2017-08-28 RX ADMIN — Medication 80 MCG: at 14:00

## 2017-08-28 RX ADMIN — GABAPENTIN 300 MG: 300 CAPSULE ORAL at 16:58

## 2017-08-28 RX ADMIN — SODIUM CHLORIDE 75 ML/HR: 900 INJECTION, SOLUTION INTRAVENOUS at 15:15

## 2017-08-28 RX ADMIN — OXYCODONE HYDROCHLORIDE AND ACETAMINOPHEN 1 TABLET: 10; 325 TABLET ORAL at 17:46

## 2017-08-28 RX ADMIN — Medication 80 MCG: at 14:08

## 2017-08-28 RX ADMIN — Medication: at 15:15

## 2017-08-28 RX ADMIN — LIDOCAINE HYDROCHLORIDE 60 MG: 20 INJECTION, SOLUTION EPIDURAL; INFILTRATION; INTRACAUDAL; PERINEURAL at 13:12

## 2017-08-28 RX ADMIN — METRONIDAZOLE 500 MG: 500 INJECTION, SOLUTION INTRAVENOUS at 04:21

## 2017-08-28 RX ADMIN — MIDAZOLAM HYDROCHLORIDE 2 MG: 1 INJECTION, SOLUTION INTRAMUSCULAR; INTRAVENOUS at 13:06

## 2017-08-28 RX ADMIN — Medication 80 MCG: at 14:18

## 2017-08-28 RX ADMIN — CARVEDILOL 6.25 MG: 6.25 TABLET, FILM COATED ORAL at 08:42

## 2017-08-28 RX ADMIN — PANTOPRAZOLE SODIUM 40 MG: 40 TABLET, DELAYED RELEASE ORAL at 08:42

## 2017-08-28 RX ADMIN — Medication 80 MCG: at 13:43

## 2017-08-28 RX ADMIN — CEFEPIME HYDROCHLORIDE 2 G: 2 INJECTION, POWDER, FOR SOLUTION INTRAVENOUS at 08:39

## 2017-08-28 RX ADMIN — Medication 80 MCG: at 14:04

## 2017-08-28 RX ADMIN — FENTANYL CITRATE 100 MCG: 50 INJECTION, SOLUTION INTRAMUSCULAR; INTRAVENOUS at 14:48

## 2017-08-28 RX ADMIN — INSULIN LISPRO 8 UNITS: 100 INJECTION, SOLUTION INTRAVENOUS; SUBCUTANEOUS at 08:42

## 2017-08-28 RX ADMIN — FENTANYL CITRATE 50 MCG: 50 INJECTION, SOLUTION INTRAMUSCULAR; INTRAVENOUS at 13:56

## 2017-08-28 RX ADMIN — GABAPENTIN 300 MG: 300 CAPSULE ORAL at 08:42

## 2017-08-28 RX ADMIN — FENTANYL CITRATE 25 MCG: 50 INJECTION, SOLUTION INTRAMUSCULAR; INTRAVENOUS at 16:20

## 2017-08-28 RX ADMIN — CARVEDILOL 6.25 MG: 6.25 TABLET, FILM COATED ORAL at 16:58

## 2017-08-28 RX ADMIN — HYDROMORPHONE HYDROCHLORIDE 2 MG: 1 INJECTION, SOLUTION INTRAMUSCULAR; INTRAVENOUS; SUBCUTANEOUS at 20:21

## 2017-08-28 RX ADMIN — HYDROMORPHONE HYDROCHLORIDE 0.5 MG: 1 INJECTION, SOLUTION INTRAMUSCULAR; INTRAVENOUS; SUBCUTANEOUS at 04:21

## 2017-08-28 RX ADMIN — INSULIN GLARGINE 6 UNITS: 100 INJECTION, SOLUTION SUBCUTANEOUS at 08:41

## 2017-08-28 RX ADMIN — Medication 80 MCG: at 13:56

## 2017-08-28 RX ADMIN — HYDROMORPHONE HYDROCHLORIDE 0.5 MG: 1 INJECTION, SOLUTION INTRAMUSCULAR; INTRAVENOUS; SUBCUTANEOUS at 08:36

## 2017-08-28 RX ADMIN — Medication 10 ML: at 04:21

## 2017-08-28 RX ADMIN — GABAPENTIN 300 MG: 300 CAPSULE ORAL at 22:33

## 2017-08-28 RX ADMIN — Medication 80 MCG: at 13:46

## 2017-08-28 RX ADMIN — ATORVASTATIN CALCIUM 40 MG: 40 TABLET, FILM COATED ORAL at 08:42

## 2017-08-28 RX ADMIN — PROPOFOL 120 MG: 10 INJECTION, EMULSION INTRAVENOUS at 13:12

## 2017-08-28 RX ADMIN — INSULIN LISPRO 2 UNITS: 100 INJECTION, SOLUTION INTRAVENOUS; SUBCUTANEOUS at 22:34

## 2017-08-28 RX ADMIN — Medication 80 MCG: at 14:15

## 2017-08-28 RX ADMIN — VANCOMYCIN HYDROCHLORIDE 1750 MG: 10 INJECTION, POWDER, LYOPHILIZED, FOR SOLUTION INTRAVENOUS at 00:43

## 2017-08-28 RX ADMIN — VANCOMYCIN HYDROCHLORIDE 1750 MG: 10 INJECTION, POWDER, LYOPHILIZED, FOR SOLUTION INTRAVENOUS at 20:21

## 2017-08-28 RX ADMIN — METRONIDAZOLE 500 MG: 500 INJECTION, SOLUTION INTRAVENOUS at 13:15

## 2017-08-28 RX ADMIN — Medication 10 ML: at 22:38

## 2017-08-28 RX ADMIN — FENTANYL CITRATE 50 MCG: 50 INJECTION, SOLUTION INTRAMUSCULAR; INTRAVENOUS at 14:00

## 2017-08-28 RX ADMIN — Medication 80 MCG: at 14:10

## 2017-08-28 RX ADMIN — ONDANSETRON 4 MG: 2 INJECTION INTRAMUSCULAR; INTRAVENOUS at 13:50

## 2017-08-28 NOTE — PERIOP NOTES
TRANSFER - OUT REPORT:    Verbal report given to Solomon Stone RN(name) on Amanda Jimenes  being transferred to [facility](unit) for routine progression of care       Report consisted of patients Situation, Background, Assessment and   Recommendations(SBAR). Information from the following report(s) SBAR, OR Summary, Procedure Summary, Intake/Output and MAR was reviewed with the receiving nurse. Opportunity for questions and clarification was provided.       Patient transported with:   Monitor  O2 @ 1 liters  Registered Nurse  [facility]

## 2017-08-28 NOTE — ROUTINE PROCESS
1433: Patient: Gustavo Angelo MRN: [id number]  SSN: [id number]   YOB: 1970  Age: 49 y.o. Sex: male     Patient is status post Procedure(s):  LEFT BELOW KNEE AMPUTATION . Surgeon(s) and Role:     * Brody Kaur MD - Primary    Local/Dose/Irrigation:            PICC Double Lumen 08/26/17 Right;Brachial (Active)   Central Line Being Utilized Yes 8/28/2017 12:00 PM   Criteria for Appropriate Use Limited/no vessel suitable for conventional peripheral access 8/28/2017 12:00 PM   Site Assessment Clean, dry, & intact 8/28/2017 12:00 PM   Phlebitis Assessment 0 8/28/2017 12:00 PM   Infiltration Assessment 0 8/28/2017 12:00 PM   Arm Circumference (cm) 38 cm 8/26/2017 11:25 AM   Date of Last Dressing Change 08/26/17 8/28/2017 12:00 PM   Dressing Status Clean, dry, & intact 8/28/2017 12:00 PM   Action Taken Open ports on tubing capped 8/27/2017 11:30 PM   External Catheter Length (cm) 0 centimeters 8/27/2017 11:30 PM   Dressing Type Disk with Chlorhexadine gluconate (CHG); Transparent 8/28/2017 12:00 PM   Hub Color/Line Status Blue;Capped 8/28/2017 12:00 PM   Positive Blood Return (Site #1) Yes 8/28/2017  8:05 AM   Hub Color/Line Status Red; Infusing;Flushed;Patent 8/28/2017 12:00 PM   Positive Blood Return (Site #2) Yes 8/28/2017 12:00 PM   Alcohol Cap Used Yes 8/28/2017 12:00 PM          Peripheral IV 08/25/17 Left Hand (Active)   Site Assessment Clean, dry, & intact 8/28/2017 12:00 PM   Phlebitis Assessment 0 8/28/2017 12:00 PM   Infiltration Assessment 0 8/28/2017 12:00 PM   Dressing Status Clean, dry, & intact 8/28/2017 12:00 PM   Dressing Type Tape;Transparent 8/28/2017 12:00 PM   Hub Color/Line Status Blue;Patent 8/28/2017 12:00 PM   Action Taken Blood drawn 8/25/2017 12:21 AM            Airway - Endotracheal Tube 08/28/17 Oral (Active)   Line Rajesh Lips 8/28/2017 12:00 AM                   Dressing/Packing:  Wound Foot Lower;Distal-DRESSING TYPE: 4 x 4;Gauze wrap (padmaja) (08/27/17 1001)  Wound Leg Left-DRESSING TYPE: 4 x 4;Xeroform; Other (Comment) ([address] [place] [address], PRO NETTING) (08/28/17 2912)  Splint/Cast:  ]    Other:

## 2017-08-28 NOTE — PERIOP NOTES
TRANSFER - IN REPORT:    Verbal report received from [facility] on [place] People  being received from [phone] for ordered procedure      Report consisted of patients Situation, Background, Assessment and   Recommendations(SBAR). Information from the following report(s) SBAR, Kardex, Intake/Output, MAR and Recent Results was reviewed with the receiving nurse. Opportunity for questions and clarification was provided. Assessment completed upon patients arrival to unit and care assumed.

## 2017-08-28 NOTE — OP NOTES
[address]   OP NOTE       Name:  Ramírez Ortiz   MR#:  [id number]   :  1970   Account #:  [de-identified]    Surgery Date:  2017   Date of Adm:  2017       PREOPERATIVE DIAGNOSIS: Osteomyelitis of the left foot. POSTOPERATIVE DIAGNOSIS: Osteomyelitis of the left foot. PROCEDURES PERFORMED: Left below-knee amputation. SURGEON: Trace Mahmood MD    ANESTHESIA: General.    ESTIMATED BLOOD LOSS: 150 mL. SPECIMENS REMOVED: None. DRAINS: None. COMPLICATIONS: None. INDICATIONS: The patient is a 48-year-old diabetic with a nonhealing   foot wound and osteomyelitis. The foot is now nonsalvageable, despite   maximum medical management and periodic surgical debridements. The patient presents for below-knee amputation of the left leg. DESCRIPTION OF PROCEDURE: After informed consent was   obtained, the patient was maintained on his current broad-spectrum   intravenous antibiotics. He was taken to the operating room, and after   induction of adequate general anesthesia, the left leg was prepped and   draped as a sterile field. The leg was exsanguinated with an Esmarch   dressing, and elevated, and a pneumatic tourniquet was inflated on the   thigh to 250 mmHg pressure. A standard posterior flap below-knee   amputation incision was made, and dissection was carried sharply   down to the fascia. Muscle was divided with electrocautery. The   anterior tibial artery and veins were exposed and ligated between 0 silk   ties. The periosteum of the tibia and fibula were exposed and scored   with electrocautery. The periosteum was elevated proximally. The   fibula and tibia were transected with a Gigli saw. The anterior edge of   the tibia was beveled. The posterior flap was fashioned with an   amputation knife.  The remaining vessels and tibial nerve were then   ligated with 0 silk ties and 2-0 silk suture ligatures. The short   saphenous vein was ligated with a 2-0 silk tie. The tourniquet was   deflated. Points of bleeding throughout the muscle were controlled with   figure-of-eight 2-0 silk sutures and electrocautery. After hemostasis   was achieved, the anterior edge of the tibia was rounded off with a   bone rasp, the stump was irrigated copiously with antibiotic irrigation,   and was hemostatic. The fascia of the posterior flap was approximated   to the anterior fascia with interrupted 0 Vicryl sutures. Subcutaneous   tissue was closed with interrupted 2-0 and 3-0 Vicryl sutures, and the   skin was closed with staples. A nonadherent dressing was applied over   the staple line. A bulky dry dressing was applied to the lower leg, and a   knee immobilizer was applied to prevent a flexion contracture. The   patient was extubated and transferred to the PACU in stable condition. All counts were correct.         Olegario Romo (Cher Alfaro) Karel Leventhal, MD       / [facility]   D:  08/28/2017   14:56   T:  08/28/2017   15:13   Job #:  [id number]

## 2017-08-28 NOTE — ANESTHESIA POSTPROCEDURE EVALUATION
Post-Anesthesia Evaluation and Assessment    Patient: Pito Cameron MRN: [id number]  SSN: [id number]    YOB: 1970  Age: 49 y.o. Sex: male       Cardiovascular Function/Vital Signs  Visit Vitals    /90 (BP 1 Location: Left arm, BP Patient Position: At rest)    Pulse 75    Temp 37.2 °C (98.9 °F)    Resp 16    Ht 6' 3\" (1.905 m)    Wt 113.5 kg (250 lb 3.2 oz)    SpO2 97%    BMI 31.27 kg/m2       Patient is status post general anesthesia for Procedure(s):  LEFT BELOW KNEE AMPUTATION . Nausea/Vomiting: None    Postoperative hydration reviewed and adequate. Pain:  Pain Scale 1: FLACC (08/28/17 1600)  Pain Intensity 1: 0 (08/28/17 1600)   Managed    Neurological Status:   Neuro (WDL): Exceptions to WDL (08/28/17 1452)  Neuro  Neurologic State: Drowsy;Sleeping (08/28/17 1452)  LUE Motor Response: Purposeful (08/28/17 1456)  LLE Motor Response: Purposeful (08/28/17 1456)  RUE Motor Response: Purposeful (08/28/17 1456)  RLE Motor Response: Purposeful (08/28/17 1456)   At baseline    Mental Status and Level of Consciousness: Arousable    Pulmonary Status:   O2 Device: Nasal cannula (08/28/17 1600)   Adequate oxygenation and airway patent    Complications related to anesthesia: None    Post-anesthesia assessment completed.  No concerns    Signed By: Lauren Yap MD     August 28, 2017

## 2017-08-28 NOTE — PROGRESS NOTES
General Daily Progress Note    Admit Date: 8/24/2017  Hospital day several    Subjective:     Pt denies any cp sob still abit anxious about his progress s/w nursing   Medication side effects: none    Current Facility-Administered Medications   Medication Dose Route Frequency    insulin glargine (LANTUS) injection 10 Units  10 Units SubCUTAneous DAILY    metroNIDAZOLE (FLAGYL) IVPB premix 500 mg  500 mg IntraVENous Q8H    ALPRAZolam (XANAX) tablet 0.25 mg  0.25 mg Oral TID PRN    cloNIDine HCl (CATAPRES) tablet 0.1 mg  0.1 mg Oral Q6H PRN    insulin lispro (HUMALOG) injection   SubCUTAneous AC&HS    sodium chloride (NS) flush 10-30 mL  10-30 mL InterCATHeter PRN    sodium chloride (NS) flush 10 mL  10 mL InterCATHeter Q24H    sodium chloride (NS) flush 10 mL  10 mL InterCATHeter PRN    sodium chloride (NS) flush 10-40 mL  10-40 mL InterCATHeter Q8H    sodium chloride (NS) flush 20 mL  20 mL InterCATHeter PRN    heparin (porcine) pf 300 Units  300 Units InterCATHeter PRN    cefepime (MAXIPIME) 2 g in 0.9% sodium chloride (MBP/ADV) 100 mL  2 g IntraVENous Q8H    atorvastatin (LIPITOR) tablet 40 mg  40 mg Oral DAILY    carvedilol (COREG) tablet 6.25 mg  6.25 mg Oral BID WITH MEALS    pantoprazole (PROTONIX) tablet 40 mg  40 mg Oral DAILY    gabapentin (NEURONTIN) capsule 300 mg  300 mg Oral TID    glucose chewable tablet 16 g  4 Tab Oral PRN    dextrose (D50W) injection syrg 12.5-25 g  12.5-25 g IntraVENous PRN    glucagon (GLUCAGEN) injection 1 mg  1 mg IntraMUSCular PRN    nitroglycerin (NITROBID) 2 % ointment 1 Inch  1 Inch Topical Q6H PRN    sodium chloride (NS) flush 5-10 mL  5-10 mL IntraVENous Q8H    sodium chloride (NS) flush 5-10 mL  5-10 mL IntraVENous PRN    acetaminophen (TYLENOL) tablet 650 mg  650 mg Oral Q6H PRN    ondansetron (ZOFRAN) injection 4 mg  4 mg IntraVENous Q4H PRN    oxyCODONE-acetaminophen (PERCOCET 10)  mg per tablet 1 Tab  1 Tab Oral Q6H PRN    HYDROmorphone (PF) (DILAUDID) injection 0.5 mg  0.5 mg IntraVENous Q3H PRN    vancomycin (VANCOCIN) 1750 mg in  ml infusion  1,750 mg IntraVENous Q18H    0.9% sodium chloride infusion  75 mL/hr IntraVENous CONTINUOUS        Review of Systems  A comprehensive review of systems was negative except for that written in the HPI. Objective:     Patient Vitals for the past 8 hrs:   BP Pulse   08/28/17 0427 (!) 159/108 71        08/26 1901 - 08/28 0700  In: 6519.7 [P.O.:840; I.V.:5679.7]  Out: 4550 [Urine:4550]    Physical Exam: General appearance: alert, cooperative, no distress, appears stated age, anxious  Lungs: clear to auscultation bilaterally  Heart: regular rate and rhythm  Abdomen: soft, non-tender.  Bowel sounds normal. No masses,  no organomegaly  Extremities: no edema, redness or tenderness in the calves or thighs  Foot bandaged did not remove           Data Review   Recent Results (from the past 24 hour(s))   GLUCOSE, POC    Collection Time: 08/27/17  7:44 AM   Result Value Ref Range    Glucose (POC) 269 (H) 65 - 100 mg/dL    Performed by Sal Agee    GLUCOSE, POC    Collection Time: 08/27/17 11:10 AM   Result Value Ref Range    Glucose (POC) 236 (H) 65 - 100 mg/dL    Performed by Viridiana Spann, POC    Collection Time: 08/27/17  4:33 PM   Result Value Ref Range    Glucose (POC) 257 (H) 65 - 100 mg/dL    Performed by Sal Agee    GLUCOSE, POC    Collection Time: 08/27/17  9:22 PM   Result Value Ref Range    Glucose (POC) 320 (H) 65 - 100 mg/dL    Performed by [facility]     METABOLIC PANEL, BASIC    Collection Time: 08/28/17  3:12 AM   Result Value Ref Range    Sodium 137 136 - 145 mmol/L    Potassium 3.9 3.5 - 5.1 mmol/L    Chloride 107 97 - 108 mmol/L    CO2 24 21 - 32 mmol/L    Anion gap 6 5 - 15 mmol/L    Glucose 286 (H) 65 - 100 mg/dL    BUN 11 6 - 20 MG/DL    Creatinine 1.28 0.70 - 1.30 MG/DL    BUN/Creatinine ratio 9 (L) 12 - 20      GFR est AA >60 >60 ml/min/1.73m2    GFR est non-AA 60 (L) >60 ml/min/1.73m2    Calcium 7.9 (L) 8.5 - 10.1 MG/DL   CBC WITH AUTOMATED DIFF    Collection Time: 08/28/17  3:12 AM   Result Value Ref Range    WBC 12.1 (H) 4.1 - 11.1 K/uL    RBC 3.68 (L) 4.10 - 5.70 M/uL    HGB 8.1 (L) 12.1 - 17.0 g/dL    HCT 26.5 (L) 36.6 - 50.3 %    MCV 72.0 (L) 80.0 - 99.0 FL    MCH 22.0 (L) 26.0 - 34.0 PG    MCHC 30.6 30.0 - 36.5 g/dL    RDW 16.3 (H) 11.5 - 14.5 %    PLATELET 780 (H) 033 - 400 K/uL    NEUTROPHILS 74 32 - 75 %    LYMPHOCYTES 17 12 - 49 %    MONOCYTES 6 5 - 13 %    EOSINOPHILS 3 0 - 7 %    BASOPHILS 0 0 - 1 %    ABS. NEUTROPHILS 8.9 (H) 1.8 - 8.0 K/UL    ABS. LYMPHOCYTES 2.1 0.8 - 3.5 K/UL    ABS. MONOCYTES 0.7 0.0 - 1.0 K/UL    ABS. EOSINOPHILS 0.4 0.0 - 0.4 K/UL    ABS.  BASOPHILS 0.0 0.0 - 0.1 K/UL    RBC COMMENTS MICROCYTOSIS  1+        RBC COMMENTS HYPOCHROMIA  1+        DF SMEAR SCANNED             Assessment:     Active Problems:    HTN (hypertension) (7/23/2013)      Diabetes ([facility] [address] ()      Overview: since age 23      Hypercholesteremia ()      Sepsis ([address] (5/27/2017)      Bilateral pulmonary embolism ([address] (7/21/2017)      Chronic systolic heart failure ([address] (7/31/2017)      Left foot infection (8/24/2017)      Hyponatremia (8/24/2017)      ALEX (acute kidney injury) ([address] (8/24/2017)      GERD (gastroesophageal reflux disease) (8/24/2017)        Plan:   Discontinued heparin sat drip due to elevated PT/inr recheck in am  May have procedure today he is currenlty npo cut lantus in 1/2 will hold for now   Continue home bp meds  adjsuted ISS adjusted lantus   Continue abx  Gentle ivh and monitor renal fxn  Pt very anxious to go home xanax prn pt has a history of leaving AMA and is not very compliant with diet -chips noted in room

## 2017-08-28 NOTE — DIABETES MGMT
DTC Progress Note    Recommendations/ Comments: If appropriate, please consider increasing Lantus dose to 40 units daily (this is 80% of pt home dose), noted pt only given 6 units of Lantus this morning due to NPO status for possible surgery. BG remains >200mg/dL, please consider giving pt now dose of Lantus 34 units - for total of 40 units given today, to aid in glucose control. Also once diet is resumed please consider resuming prandial insulin, Humalog 9 units ac tid. Chart reviewed on [address] for hyperglycemia. Patient is a 49 y.o. male with known history of Type 2 Diabetes on Metformin 500mg bid, Humalog 12 units ac tid, and Levemir 50 units nightly at home. A1c:   Lab Results   Component Value Date/Time    Hemoglobin A1c 11.1 07/12/2017 12:00 PM    Hemoglobin A1c 12.5 05/28/2017 03:18 AM       Recent Glucose Results: Lab Results   Component Value Date/Time     (H) 08/28/2017 03:12 AM    GLUCPOC 251 (H) 08/28/2017 07:32 AM    GLUCPOC 320 (H) 08/27/2017 09:22 PM    GLUCPOC 257 (H) 08/27/2017 04:33 PM        Lab Results   Component Value Date/Time    Creatinine 1.28 08/28/2017 03:12 AM     Estimated Creatinine Clearance: 95.9 mL/min (based on Cr of 1.28). Active Orders   Diet    DIET NPO        PO intake: Patient Vitals for the past 72 hrs:   % Diet Eaten   08/27/17 1714 100 %   08/27/17 1331 90 %   08/27/17 0945 100 %       Current hospital DM medication:   -Lantus 14 units daily   -Humalog insulin resistant correction    Will continue to follow as needed.     Thank you    Kristyn Espinoza, [facility]  Office: [phone]

## 2017-08-28 NOTE — CARDIO/PULMONARY
C/P Rehab Note:     Chart Reviewed. Admitting diagnosis of Sepsis.      History significant for:  -PE        -Systolic HF  -HTN  -DM  -Hyperlipidemia  -Amputation of L toes  Pt is a former smoker.           Echo from 7/22/2017 25-30%    Teaching by Cardiac Rehab last done 7/21/2017. Met with pt lying in bed on phone- put phone aside for me to introduce myself and stated he would be going for surgery at 1:30 pm today. Reminded him we would be in to see him post op and that he would be weighed,watched for swelling hands feet,legs,abd and have lungs listened to as well. Pt thanked me and went back to phone conversation regarding surgery. Will follow post op for additional CHF teaching.

## 2017-08-28 NOTE — ANESTHESIA PREPROCEDURE EVALUATION
Anesthetic History   No history of anesthetic complications            Review of Systems / Medical History  Patient summary reviewed, nursing notes reviewed and pertinent labs reviewed    Pulmonary  Within defined limits                 Neuro/Psych   Within defined limits           Cardiovascular    Hypertension              Exercise tolerance: >4 METS  Comments: EF25-30%   GI/Hepatic/Renal     GERD    Renal disease: ARF       Endo/Other    Diabetes: using insulin    Obesity and anemia     Other Findings   Comments: Osteomyelitis   Sepsis          Physical Exam    Airway  Mallampati: I  TM Distance: 4 - 6 cm  Neck ROM: normal range of motion   Mouth opening: Normal     Cardiovascular  Regular rate and rhythm,  S1 and S2 normal,  no murmur, click, rub, or gallop             Dental    Dentition: Upper partial plate and Lower partial plate     Pulmonary  Breath sounds clear to auscultation               Abdominal  GI exam deferred       Other Findings            Anesthetic Plan    ASA: 3  Anesthesia type: general    Monitoring Plan: BIS      Induction: Intravenous  Anesthetic plan and risks discussed with: Patient

## 2017-08-28 NOTE — PROGRESS NOTES
Bedside shift change report given to Ariel Underwood (oncoming nurse) by Dileep Telles RN (offgoing nurse). Report included the following information SBAR, Kardex, Intake/Output and Recent Results.

## 2017-08-28 NOTE — BRIEF OP NOTE
BRIEF OPERATIVE NOTE    Date of Procedure: 8/28/2017   Preoperative Diagnosis: PVD  Postoperative Diagnosis: PVD    Procedure(s):  LEFT BELOW KNEE AMPUTATION   Surgeon(s) and Role:     * Gianna Bateman MD - Primary         Assistant Staff:       Surgical Staff:  Circ-1: Homa Figueredo RN  Scrub Tech-1: Canary Bees Deal  Surg Asst-1: Jo Monson  Surg Asst-2: Ellen Muta  Event Time In   Incision Start 1329   Incision Close 21      Anesthesia: General   Estimated Blood Loss: 150 cc  Specimens:   ID Type Source Tests Collected by Time Destination   1 : LEFT LOWER LEG Fresh Leg, Left  Gianna Bateman MD 8/28/2017 1345 Pathology      Findings: BKA   Complications: None  Implants: * No implants in log *

## 2017-08-28 NOTE — PROGRESS NOTES
PCU SHIFT NURSING NOTE      Bedside shift change report given to Massimo Haq (oncoming nurse) by Estella Asher (offgoing nurse). Report included the following information SBAR, Kardex, Intake/Output, MAR and Recent Results. Shift Summary: 0800  Pt in bed, resting  C/o 8/10 pain in L foot  VSS   NPO for possible surgery  Spoke with Dr Otoniel Vences re: lantus administration  Will administer 6 units  NSR on monitor    1015  Surgery today around 1330 per Dr Eagle Reveal  1115  Report given to Nataly Ferrell in preop    1150  Pt signed consents   HCG bath done   1215  Pt transported to preop   1620  Report rec'd from PACU, Eula    1640  Pt arrived back on unit  Pt now on PCA   On 1L O2, SPO2 100%  O2 discontinued  Pt hypertensive  PRN clonidine administered   Continue to monitor    1745  Paged MD  Pt remains hypertensive  Also need to inquire about anticoagulation   1830  2nd attempt to reach MD   1850  3rd attempt to reach MD     Admission Date 8/24/2017   Admission Diagnosis Sepsis ([facility] [address]  PVD   Consults IP CONSULT TO PODIATRY  IP CONSULT TO VASCULAR SURGERY        Consults   []PT   []OT   []Speech   []Case Management      [] Palliative      Cardiac Monitoring Order   []Yes   []No     IV drips   []Yes    Drip:                            Dose:  Drip:                            Dose:  Drip:                            Dose:   []No     GI Prophylaxis   []Yes   []No         DVT Prophylaxis   SCDs:             Jeff stockings:         [] Medication   []Contraindicated   []None      Activity Level Activity Level: Up with Assistance     Activity Assistance: Partial (one person)   Purposeful Rounding every 1-2 hour?    []Yes   Mccormick Score  Total Score: 2   Bed Alarm (If score 3 or >)   []Yes   [] Refused (See signed refusal form in chart)   Noé Score  Noé Score: 19   Noé Score (if score 14 or less)   []PMT consult   []Wound Care consult      []Specialty bed   [] Nutrition consult          Needs prior to discharge:   Home O2 required:    []Yes []No    If yes, how much O2 required? Other:    Last Bowel Movement: Last Bowel Movement Date: 08/26/17      Influenza Vaccine Received Flu Vaccine for Current Season (usually Sept-March): Not Flu Season        Pneumonia Vaccine           Diet Active Orders   Diet    DIET NPO      LDAs         PICC Double Lumen 08/26/17 Right;Brachial (Active)   Central Line Being Utilized Yes 8/27/2017 11:30 PM   Criteria for Appropriate Use Limited/no vessel suitable for conventional peripheral access 8/27/2017 11:30 PM   Site Assessment Clean, dry, & intact 8/27/2017 11:30 PM   Phlebitis Assessment 0 8/27/2017 11:30 PM   Infiltration Assessment 0 8/27/2017 11:30 PM   Arm Circumference (cm) 38 cm 8/26/2017 11:25 AM   Date of Last Dressing Change 08/26/17 8/27/2017 11:30 PM   Dressing Status Clean, dry, & intact 8/27/2017 11:30 PM   Action Taken Open ports on tubing capped 8/27/2017 11:30 PM   External Catheter Length (cm) 0 centimeters 8/27/2017 11:30 PM   Dressing Type Disk with Chlorhexadine gluconate (CHG); Transparent 8/27/2017 11:30 PM   Hub Color/Line Status Blue; Infusing 8/27/2017 11:30 PM   Positive Blood Return (Site #1) Yes 8/27/2017 11:30 PM   Hub Color/Line Status Red;Flushed 8/27/2017 11:30 PM   Positive Blood Return (Site #2) Yes 8/27/2017 11:30 PM   Alcohol Cap Used Yes 8/27/2017 11:30 PM          Peripheral IV 08/25/17 Left Hand (Active)   Site Assessment Clean, dry, & intact 8/27/2017 11:30 PM   Phlebitis Assessment 0 8/27/2017 11:30 PM   Infiltration Assessment 0 8/27/2017 11:30 PM   Dressing Status Clean, dry, & intact 8/27/2017 11:30 PM   Dressing Type Tape;Transparent 8/27/2017 11:30 PM   Hub Color/Line Status Blue;Flushed 8/27/2017 11:30 PM   Action Taken Blood drawn 8/25/2017 12:21 AM                      Urinary Catheter      Intake & Output   Date 08/27/17 0700 - 08/28/17 0659 08/28/17 0700 - 08/29/17 0659   Shift 4065-4424 7486-3342 24 Hour Total 0700-1859 1900-0659 24 Hour Total   TIBURCIO LABOY  E P.O. 840  840         P. O. 840  840       I.V.  (mL/kg/hr)  3518.8  (2.6) 3518.8  (1.3)         Volume (0.9% sodium chloride infusion)  1718.8 1718.8         Volume (cefepime (MAXIPIME) 2 g in 0.9% sodium chloride (MBP/ADV) 100 mL)  200 200         Volume (metroNIDAZOLE (FLAGYL) IVPB premix 500 mg)  100 100         Volume (vancomycin (VANCOCIN) 1750 mg in  ml infusion)  1500 1500       Shift Total  (mL/kg) 840  (7.4) 3518.8  (31) 4358.8  (38.4)      O  U  T  P  U  T   Urine  (mL/kg/hr) 1100  (0.8) 1600  (1.2) 2700  (1)         Urine Voided 1100 1600 2700       Shift Total  (mL/kg) 1100  (9.7) 1600  (14.1) 2700  (23.8)      NET -260 1918.8 1658.8      Weight (kg) 113.5 113.5 113.5 113.5 113.5 113.5         Readmission Risk Assessment Tool Score Medium Risk            13       Total Score        3 Has Seen PCP in Last 6 Months (Yes=3, No=0)    9 IP Visits Last 12 Months (1-3=4, 4=9, >4=11)    1 Charlson Comorbidity Score (Age + Comorbid Conditions)        Criteria that do not apply:    . Living with Significant Other. Assisted Living. LTAC. SNF. or   Rehab    Patient Length of Stay (>5 days = 3)    Pt.  Coverage (Medicare=5 , Medicaid, or Self-Pay=4)       Expected Length of Stay 3d 19h   Actual Length of Stay 4

## 2017-08-29 ENCOUNTER — APPOINTMENT (OUTPATIENT)
Dept: NUCLEAR MEDICINE | Age: 47
DRG: 854 | End: 2017-08-29
Attending: FAMILY MEDICINE
Payer: COMMERCIAL

## 2017-08-29 LAB
ANION GAP SERPL CALC-SCNC: 8 MMOL/L (ref 5–15)
APTT PPP: 35 SEC (ref 22.1–32.5)
APTT PPP: 46 SEC (ref 22.1–32.5)
BACTERIA SPEC CULT: NORMAL
BASOPHILS # BLD: 0 K/UL (ref 0–0.1)
BASOPHILS # BLD: 0 K/UL (ref 0–0.1)
BASOPHILS NFR BLD: 0 % (ref 0–1)
BASOPHILS NFR BLD: 0 % (ref 0–1)
BUN SERPL-MCNC: 10 MG/DL (ref 6–20)
BUN/CREAT SERPL: 8 (ref 12–20)
CALCIUM SERPL-MCNC: 7.8 MG/DL (ref 8.5–10.1)
CHLORIDE SERPL-SCNC: 104 MMOL/L (ref 97–108)
CO2 SERPL-SCNC: 23 MMOL/L (ref 21–32)
CREAT SERPL-MCNC: 1.33 MG/DL (ref 0.7–1.3)
DIFFERENTIAL METHOD BLD: ABNORMAL
DIFFERENTIAL METHOD BLD: ABNORMAL
EOSINOPHIL # BLD: 0 K/UL (ref 0–0.4)
EOSINOPHIL # BLD: 0.1 K/UL (ref 0–0.4)
EOSINOPHIL NFR BLD: 0 % (ref 0–7)
EOSINOPHIL NFR BLD: 1 % (ref 0–7)
ERYTHROCYTE [DISTWIDTH] IN BLOOD BY AUTOMATED COUNT: 16.6 % (ref 11.5–14.5)
ERYTHROCYTE [DISTWIDTH] IN BLOOD BY AUTOMATED COUNT: 16.6 % (ref 11.5–14.5)
GLUCOSE BLD STRIP.AUTO-MCNC: 148 MG/DL (ref 65–100)
GLUCOSE BLD STRIP.AUTO-MCNC: 171 MG/DL (ref 65–100)
GLUCOSE BLD STRIP.AUTO-MCNC: 211 MG/DL (ref 65–100)
GLUCOSE BLD STRIP.AUTO-MCNC: 224 MG/DL (ref 65–100)
GLUCOSE SERPL-MCNC: 235 MG/DL (ref 65–100)
HCT VFR BLD AUTO: 27.1 % (ref 36.6–50.3)
HCT VFR BLD AUTO: 30.3 % (ref 36.6–50.3)
HGB BLD-MCNC: 8.3 G/DL (ref 12.1–17)
HGB BLD-MCNC: 9.2 G/DL (ref 12.1–17)
LYMPHOCYTES # BLD: 1.7 K/UL (ref 0.8–3.5)
LYMPHOCYTES # BLD: 1.9 K/UL (ref 0.8–3.5)
LYMPHOCYTES NFR BLD: 10 % (ref 12–49)
LYMPHOCYTES NFR BLD: 12 % (ref 12–49)
MCH RBC QN AUTO: 21.9 PG (ref 26–34)
MCH RBC QN AUTO: 22.1 PG (ref 26–34)
MCHC RBC AUTO-ENTMCNC: 30.4 G/DL (ref 30–36.5)
MCHC RBC AUTO-ENTMCNC: 30.6 G/DL (ref 30–36.5)
MCV RBC AUTO: 72 FL (ref 80–99)
MCV RBC AUTO: 72.3 FL (ref 80–99)
MONOCYTES # BLD: 0.8 K/UL (ref 0–1)
MONOCYTES # BLD: 1.1 K/UL (ref 0–1)
MONOCYTES NFR BLD: 6 % (ref 5–13)
MONOCYTES NFR BLD: 6 % (ref 5–13)
NEUTS SEG # BLD: 11.3 K/UL (ref 1.8–8)
NEUTS SEG # BLD: 15.5 K/UL (ref 1.8–8)
NEUTS SEG NFR BLD: 81 % (ref 32–75)
NEUTS SEG NFR BLD: 84 % (ref 32–75)
PLATELET # BLD AUTO: 378 K/UL (ref 150–400)
PLATELET # BLD AUTO: 382 K/UL (ref 150–400)
POTASSIUM SERPL-SCNC: 3.8 MMOL/L (ref 3.5–5.1)
RBC # BLD AUTO: 3.75 M/UL (ref 4.1–5.7)
RBC # BLD AUTO: 4.21 M/UL (ref 4.1–5.7)
RBC MORPH BLD: ABNORMAL
SERVICE CMNT-IMP: ABNORMAL
SERVICE CMNT-IMP: NORMAL
SODIUM SERPL-SCNC: 135 MMOL/L (ref 136–145)
THERAPEUTIC RANGE,PTTT: ABNORMAL SECS (ref 58–77)
THERAPEUTIC RANGE,PTTT: ABNORMAL SECS (ref 58–77)
WBC # BLD AUTO: 13.9 K/UL (ref 4.1–11.1)
WBC # BLD AUTO: 18.5 K/UL (ref 4.1–11.1)

## 2017-08-29 PROCEDURE — 74011250636 HC RX REV CODE- 250/636: Performed by: EMERGENCY MEDICINE

## 2017-08-29 PROCEDURE — 74011250636 HC RX REV CODE- 250/636: Performed by: SURGERY

## 2017-08-29 PROCEDURE — 97161 PT EVAL LOW COMPLEX 20 MIN: CPT

## 2017-08-29 PROCEDURE — 78582 LUNG VENTILAT&PERFUS IMAGING: CPT

## 2017-08-29 PROCEDURE — 74011000250 HC RX REV CODE- 250: Performed by: FAMILY MEDICINE

## 2017-08-29 PROCEDURE — 36415 COLL VENOUS BLD VENIPUNCTURE: CPT | Performed by: EMERGENCY MEDICINE

## 2017-08-29 PROCEDURE — 85025 COMPLETE CBC W/AUTO DIFF WBC: CPT | Performed by: EMERGENCY MEDICINE

## 2017-08-29 PROCEDURE — 74011636637 HC RX REV CODE- 636/637: Performed by: FAMILY MEDICINE

## 2017-08-29 PROCEDURE — 74011250636 HC RX REV CODE- 250/636: Performed by: INTERNAL MEDICINE

## 2017-08-29 PROCEDURE — 74011250637 HC RX REV CODE- 250/637: Performed by: HOSPITALIST

## 2017-08-29 PROCEDURE — 82962 GLUCOSE BLOOD TEST: CPT

## 2017-08-29 PROCEDURE — 77030027138 HC INCENT SPIROMETER -A

## 2017-08-29 PROCEDURE — 85730 THROMBOPLASTIN TIME PARTIAL: CPT | Performed by: SURGERY

## 2017-08-29 PROCEDURE — 74011250636 HC RX REV CODE- 250/636: Performed by: FAMILY MEDICINE

## 2017-08-29 PROCEDURE — 97530 THERAPEUTIC ACTIVITIES: CPT

## 2017-08-29 PROCEDURE — 65660000000 HC RM CCU STEPDOWN

## 2017-08-29 PROCEDURE — 74011250637 HC RX REV CODE- 250/637: Performed by: FAMILY MEDICINE

## 2017-08-29 PROCEDURE — 74011250637 HC RX REV CODE- 250/637: Performed by: INTERNAL MEDICINE

## 2017-08-29 PROCEDURE — 74011000258 HC RX REV CODE- 258: Performed by: FAMILY MEDICINE

## 2017-08-29 PROCEDURE — 80048 BASIC METABOLIC PNL TOTAL CA: CPT | Performed by: EMERGENCY MEDICINE

## 2017-08-29 RX ORDER — HEPARIN SODIUM 5000 [USP'U]/ML
80 INJECTION, SOLUTION INTRAVENOUS; SUBCUTANEOUS AS NEEDED
Status: DISCONTINUED | OUTPATIENT
Start: 2017-08-29 | End: 2017-09-01

## 2017-08-29 RX ORDER — HEPARIN SODIUM 5000 [USP'U]/ML
40 INJECTION, SOLUTION INTRAVENOUS; SUBCUTANEOUS AS NEEDED
Status: DISCONTINUED | OUTPATIENT
Start: 2017-08-29 | End: 2017-09-01

## 2017-08-29 RX ORDER — CLONIDINE HYDROCHLORIDE 0.1 MG/1
0.1 TABLET ORAL
Status: COMPLETED | OUTPATIENT
Start: 2017-08-29 | End: 2017-08-29

## 2017-08-29 RX ORDER — HEPARIN SODIUM 10000 [USP'U]/100ML
13-36 INJECTION, SOLUTION INTRAVENOUS
Status: DISCONTINUED | OUTPATIENT
Start: 2017-08-29 | End: 2017-09-01

## 2017-08-29 RX ADMIN — Medication 10 ML: at 05:16

## 2017-08-29 RX ADMIN — CEFEPIME HYDROCHLORIDE 2 G: 2 INJECTION, POWDER, FOR SOLUTION INTRAVENOUS at 16:42

## 2017-08-29 RX ADMIN — GABAPENTIN 300 MG: 300 CAPSULE ORAL at 16:41

## 2017-08-29 RX ADMIN — CEFEPIME HYDROCHLORIDE 2 G: 2 INJECTION, POWDER, FOR SOLUTION INTRAVENOUS at 00:05

## 2017-08-29 RX ADMIN — CEFEPIME HYDROCHLORIDE 2 G: 2 INJECTION, POWDER, FOR SOLUTION INTRAVENOUS at 23:31

## 2017-08-29 RX ADMIN — INSULIN LISPRO 6 UNITS: 100 INJECTION, SOLUTION INTRAVENOUS; SUBCUTANEOUS at 08:53

## 2017-08-29 RX ADMIN — HEPARIN SODIUM 13.04 UNITS/KG/HR: 10000 INJECTION, SOLUTION INTRAVENOUS at 16:34

## 2017-08-29 RX ADMIN — GABAPENTIN 300 MG: 300 CAPSULE ORAL at 08:52

## 2017-08-29 RX ADMIN — Medication 10 ML: at 21:45

## 2017-08-29 RX ADMIN — Medication: at 09:09

## 2017-08-29 RX ADMIN — METRONIDAZOLE 500 MG: 500 INJECTION, SOLUTION INTRAVENOUS at 12:27

## 2017-08-29 RX ADMIN — OXYCODONE HYDROCHLORIDE AND ACETAMINOPHEN 1 TABLET: 10; 325 TABLET ORAL at 16:56

## 2017-08-29 RX ADMIN — ATORVASTATIN CALCIUM 40 MG: 40 TABLET, FILM COATED ORAL at 08:51

## 2017-08-29 RX ADMIN — OXYCODONE HYDROCHLORIDE AND ACETAMINOPHEN 1 TABLET: 10; 325 TABLET ORAL at 10:22

## 2017-08-29 RX ADMIN — HEPARIN SODIUM 4700 UNITS: 5000 INJECTION, SOLUTION INTRAVENOUS; SUBCUTANEOUS at 23:31

## 2017-08-29 RX ADMIN — METRONIDAZOLE 500 MG: 500 INJECTION, SOLUTION INTRAVENOUS at 05:21

## 2017-08-29 RX ADMIN — METRONIDAZOLE 500 MG: 500 INJECTION, SOLUTION INTRAVENOUS at 21:42

## 2017-08-29 RX ADMIN — GABAPENTIN 300 MG: 300 CAPSULE ORAL at 21:42

## 2017-08-29 RX ADMIN — CARVEDILOL 6.25 MG: 6.25 TABLET, FILM COATED ORAL at 16:40

## 2017-08-29 RX ADMIN — SODIUM CHLORIDE 75 ML/HR: 900 INJECTION, SOLUTION INTRAVENOUS at 19:15

## 2017-08-29 RX ADMIN — CARVEDILOL 6.25 MG: 6.25 TABLET, FILM COATED ORAL at 08:52

## 2017-08-29 RX ADMIN — CLONIDINE HYDROCHLORIDE 0.1 MG: 0.1 TABLET ORAL at 12:27

## 2017-08-29 RX ADMIN — INSULIN GLARGINE 14 UNITS: 100 INJECTION, SOLUTION SUBCUTANEOUS at 08:52

## 2017-08-29 RX ADMIN — Medication 10 ML: at 05:15

## 2017-08-29 RX ADMIN — SODIUM CHLORIDE 75 ML/HR: 900 INJECTION, SOLUTION INTRAVENOUS at 05:14

## 2017-08-29 RX ADMIN — VANCOMYCIN HYDROCHLORIDE 1750 MG: 10 INJECTION, POWDER, LYOPHILIZED, FOR SOLUTION INTRAVENOUS at 14:12

## 2017-08-29 RX ADMIN — PANTOPRAZOLE SODIUM 40 MG: 40 TABLET, DELAYED RELEASE ORAL at 08:51

## 2017-08-29 RX ADMIN — INSULIN LISPRO 6 UNITS: 100 INJECTION, SOLUTION INTRAVENOUS; SUBCUTANEOUS at 12:26

## 2017-08-29 RX ADMIN — CLONIDINE HYDROCHLORIDE 0.1 MG: 0.1 TABLET ORAL at 08:51

## 2017-08-29 RX ADMIN — CEFEPIME HYDROCHLORIDE 2 G: 2 INJECTION, POWDER, FOR SOLUTION INTRAVENOUS at 08:52

## 2017-08-29 RX ADMIN — INSULIN LISPRO 4 UNITS: 100 INJECTION, SOLUTION INTRAVENOUS; SUBCUTANEOUS at 18:10

## 2017-08-29 NOTE — PROGRESS NOTES
Problem: Mobility Impaired (Adult and Pediatric)  Goal: *Acute Goals and Plan of Care (Insert Text)  Physical Therapy Goals  Initiated 8/29/2017  1. Patient will move from supine to sit and sit to supine , scoot up and down and roll side to side in bed with independence within 7 day(s). 2. Patient will transfer from bed to chair and chair to bed with modified independence using the least restrictive device within 7 day(s). 3. Patient will perform sit to stand with modified independence within 7 day(s). 4. Patient will ambulate with modified independence for 150 feet with the least restrictive device within 7 day(s). PHYSICAL THERAPY EVALUATION  Patient: Pilo Donohue (3 y.o. male)  Date: 8/29/2017  Primary Diagnosis: Sepsis ([address]  PVD  Procedure(s) (LRB):  LEFT BELOW KNEE AMPUTATION  (Left) 1 Day Post-Op   Precautions:  Fall      ASSESSMENT :  Based on the objective data described below, the patient presents s/p L BKA with expected post operative functional deficits in addition to questionable episode of hypotension/seizure-like activity that limits prior independent mobility. Patient received in supine, agreeable, with expected pain and use of PCA to address x1 during session. He mobilized well, with CGA-min A throughout transfers, gait with use of RW and hop-step gait pattern. Patient followed instruction well on safety, use of AD, step technique and voiced desire to receive prosthesis for eventual ambulation. However, after sitting to chair immediately after gait, patient appeared to have seizure-like activity with eyes rolling back and brief unresponsiveness for approximately 5 seconds with author alerting RN and rehab tech present. Patient then immediately alert, not post-ictal, and aware of what had occurred. VS obtained immediately and stable with patient reporting brief dizziness that corresponded with episode.  RN notified of all to address with MD, with author notifying OT prior to their own evaluation. Once stabilized, expect patient to make great progress ahead with IP rehabilitation recommended at [place]. Patient educated on this and appears agreeable. Patient will benefit from skilled intervention to address the above impairments. Patients rehabilitation potential is considered to be Good  Factors which may influence rehabilitation potential include:   [ ]         None noted  [ ]         Mental ability/status  [ ]         Medical condition  [ ]         Home/family situation and support systems  [ ]         Safety awareness  [X]         Pain tolerance/management  [ ]         Other:        PLAN :  Recommendations and Planned Interventions:  [X]           Bed Mobility Training             [ ]    Neuromuscular Re-Education  [X]           Transfer Training                   [ ]    Orthotic/Prosthetic Training  [X]           Gait Training                         [ ]    Modalities  [X]           Therapeutic Exercises           [ ]    Edema Management/Control  [X]           Therapeutic Activities            [X]    Patient and Family Training/Education  [ ]           Other (comment):     Frequency/Duration: Patient will be followed by physical therapy  5 times a week to address goals. Discharge Recommendations: Inpatient Rehab  Further Equipment Recommendations for Discharge: defer to rehab       SUBJECTIVE:   Patient stated I want to walk again.       OBJECTIVE DATA SUMMARY:   HISTORY:    Past Medical History:   Diagnosis Date    Diabetes ([facility] [address]       since age 24    HTN (hypertension)      Hypercholesteremia      Hyperlipidemia       Past Surgical History:   Procedure Laterality Date    HX AMPUTATION         toes- left foot    HX BUNIONECTOMY        HX ORTHOPAEDIC         boutinerre deformity    HX ORTHOPAEDIC         fascia removed left foot    HX OTHER SURGICAL   03/2017     Skin graft Surgery    HX TONSILLECTOMY        HX WISDOM TEETH EXTRACTION         Prior Level of Function/Home Situation: Independent, using no AD, no falls in the past year, driving. Personal factors and/or comorbidities impacting plan of care:      Home Situation  Home Environment: Private residence  # Steps to Enter: 5  Rails to Enter: Yes  Hand Rails : Right  Wheelchair Ramp: No  One/Two Story Residence: Two story  # of Interior Steps: 20  Interior Rails: Right  Lift Chair Available: No  Living Alone: No  Support Systems: Family member(s), Friends \ neighbors  Patient Expects to be Discharged to[de-identified] Private residence  Current DME Used/Available at Home: Walker, rolling     EXAMINATION/PRESENTATION/DECISION MAKING:   Critical Behavior:  Neurologic State: Alert  Orientation Level: Oriented X4  Cognition: Appropriate decision making, Appropriate safety awareness     Hearing: Auditory  Auditory Impairment: None  Range Of Motion:  AROM: Within functional limits                       Strength:    Strength: Within functional limits                    Tone & Sensation:   Tone: Normal              Sensation: Intact               Coordination:  Coordination: Within functional limits     Functional Mobility:  Bed Mobility:  Rolling: Contact guard assistance  Supine to Sit: Contact guard assistance  Sit to Supine: Contact guard assistance  Scooting: Contact guard assistance  Transfers:  Sit to Stand: Minimum assistance  Stand to Sit: Minimum assistance                       Balance:   Sitting: Intact; Without support  Standing: Impaired; With support (RW)  Standing - Static: Good;Fair;Constant support  Standing - Dynamic : Fair  Ambulation/Gait Training:  Distance (ft): 35 Feet (ft)  Assistive Device: Walker, rolling;Gait belt  Ambulation - Level of Assistance: Contact guard assistance;Minimal assistance (min A balance checks x2)     Gait Description (WDL): Exceptions to WDL  Gait Abnormalities: Other (hop-step gait )              Speed/Dolly: Slow  Step Length: Right shortened                             Chair follow throughout Functional Measure:  Barthel Index:      Bathin  Bladder: 10  Bowels: 10  Groomin  Dressin  Feeding: 10  Mobility: 0  Stairs: 0  Toilet Use: 5  Transfer (Bed to Chair and Back): 10  Total: 55         Barthel and G-code impairment scale:  Percentage of impairment CH  0% CI  1-19% CJ  20-39% CK  40-59% CL  60-79% CM  80-99% CN  100%   Barthel Score 0-100 100 99-80 79-60 59-40 20-39 1-19    0   Barthel Score 0-20 20 17-19 13-16 9-12 5-8 1-4 0      The Barthel ADL Index: Guidelines  1. The index should be used as a record of what a patient does, not as a record of what a patient could do. 2. The main aim is to establish degree of independence from any help, physical or verbal, however minor and for whatever reason. 3. The need for supervision renders the patient not independent. 4. A patient's performance should be established using the best available evidence. Asking the patient, friends/relatives and nurses are the usual sources, but direct observation and common sense are also important. However direct testing is not needed. 5. Usually the patient's performance over the preceding 24-48 hours is important, but occasionally longer periods will be relevant. 6. Middle categories imply that the patient supplies over 50 per cent of the effort. 7. Use of aids to be independent is allowed. Anny Tilley., Barthel, DCarynW. (3912). Functional evaluation: the Barthel Index. [address] [facility] (14)2. NINA Su, Ankit Olguin., Jovannytalib Etienne., [place], [address] [place] [address] (). Measuring the change indisability after inpatient rehabilitation; comparison of the responsiveness of the Barthel Index and Functional Taliaferro Measure. Journal of Neurology, Neurosurgery, and Psychiatry, 66(4), 713-049. Nelson Andrew, [place].HERBERT, AWILDA Baum, & Missael Bishop MCarynA. (2004.) Assessment of post-stroke quality of life in cost-effectiveness studies: The usefulness of the Barthel Index and the EuroQoL-5D.  Quality of Life Research, 13, 251-74            G codes: In compliance with CMSs Claims Based Outcome Reporting, the following G-code set was chosen for this patient based on their primary functional limitation being treated: The outcome measure chosen to determine the severity of the functional limitation was the Barthel with a score of 55/100 which was correlated with the impairment scale. · Mobility - Walking and Moving Around:               - CURRENT STATUS:    CK - 40%-59% impaired, limited or restricted               - GOAL STATUS:           CI - 1%-19% impaired, limited or restricted               - D/C STATUS:                       ---------------To be determined---------------         Pain:  Pain Scale 1: Numeric (0 - 10)  Pain Intensity 1: 7  Pain Location 1: Leg  Pain Orientation 1: Left  Pain Description 1: Aching; Throbbing  Pain Intervention(s) 1: Encouraged PCA  Activity Tolerance:   BP, HR labile, alongside episode of ? Alertness      Please refer to the flowsheet for vital signs taken during this treatment. After treatment:   [ ]         Patient left in no apparent distress sitting up in chair  [X]         Patient left in no apparent distress in bed  [X]         Call bell left within reach  [X]         Nursing notified  [ ]         Caregiver present  [ ]         Bed alarm activated      COMMUNICATION/EDUCATION:   The patients plan of care was discussed with: Occupational Therapist, Registered Nurse and . [X]         Fall prevention education was provided and the patient/caregiver indicated understanding. [X]         Patient/family have participated as able in goal setting and plan of care. [X]         Patient/family agree to work toward stated goals and plan of care. [ ]         Patient understands intent and goals of therapy, but is neutral about his/her participation. [ ]         Patient is unable to participate in goal setting and plan of care.      Thank you for this referral.  Jeane Weller, PT, DPT    Time Calculation: 31 mins

## 2017-08-29 NOTE — PROGRESS NOTES
Attempted to see patient for OT evaluation, but he politely declined participation 2/2 fatigue after working with PT earlier this morning. Will follow back as able, later today or tomorrow for evaluation. He will likely need inpatient rehab and expressed being motivated to regain his functional independence.

## 2017-08-29 NOTE — PROGRESS NOTES
CM Initial Assessment        PMHX--  Significant for dm, htn, hyperlipedemia and hypercholesterolemia. Current admission assessment-- Patient came into ed for evaluation of left foot wound. Patient has had diabetic foot treatment that has been treated since Feb and followed by Dr Fausto Hernandez. He had left below knee amputation done on 8/28/17 by Dr Stephan Flor. Patient lives in three [place] home with his wife and family. He has five steps to get into the home. He states there are 20 steps to get to his bedroom. He states he was independent in adl's prior to coming to the hospital and he was also doing light yard work. He has a walker at home. He states he has had wound care with home health in the past however he did not remember the name of the agency. He has never used rehab. Spoke with patient about rehab after discharge and he is interested in this. Physical therapy is in the room to work with patient at this time. Care Management Interventions  PCP Verified by CM:  Yes  Discharge Durable Medical Equipment:  (Patient has glucometer and walker at home. )  Physical Therapy Consult: Yes  Occupational Therapy Consult: Yes  Current Support Network: Lives with Spouse  Confirm Follow Up Transport: Family  Plan discussed with Pt/Family/Caregiver: Yes  Discharge Location  Discharge Placement: Home                       Lucie EDUARDOBSNCRM EXT [id number]

## 2017-08-29 NOTE — PROGRESS NOTES
PCU SHIFT NURSING NOTE      Bedside shift change report given to Consuelo Torres (oncoming nurse) by Zelda/Neelima (offgoing nurse). Report included the following information SBAR, Kardex, Intake/Output, MAR and Recent Results. Shift Summary: 0745  Pt in bed resting  States pain 6/10 and goal is 5/10  Temp 99.7  Hypertensive  Verified PCA dose (unable to document at this time due to wifi down)  Dressing on LLE CDI  Continue to monitor  Educated/encouraged use of incentive spirometer  1125  Pt working with PT, started to convulse, tachy in 140s, eyes rolled back in head   Pt assisted back to bed and Dr [address] contacted  Pt states he started to feel a jerking sensation in his L leg, and felt dizzy  Remembers entire event   Awaiting recommendations    1300  Neuro evaluation normal  Pt left unit for VQ scan   1400  Pt returned to unit   1615  Pt remains tachy in the 120s-130s  Spoke with Dr [address]  Will give pm dose of carvedilol and notify her if rate remains above 110    1625  Pt c/o feeling SOB  SPO2 98-99%  2LO2 NC initiated for pt comfort    1630  APTT sent to lab  Heparin gtt initiated  Verified rate with Gib in pharmacy      Admission Date 8/24/2017   Admission Diagnosis Sepsis ([facility] [address]  PVD   Consults IP CONSULT TO PODIATRY  IP CONSULT TO Kari   []PT   []OT   []Speech   []Case Management      [] Palliative      Cardiac Monitoring Order   []Yes   []No     IV drips   []Yes    Drip:                            Dose:  Drip:                            Dose:  Drip:                            Dose:   []No     GI Prophylaxis   []Yes   []No         DVT Prophylaxis   SCDs:             Jeff stockings:         [] Medication   []Contraindicated   []None      Activity Level Activity Level: Up with Assistance     Activity Assistance: Partial (one person)   Purposeful Rounding every 1-2 hour?    []Yes   Mccormick Score  Total Score: 2   Bed Alarm (If score 3 or >)   []Yes   [] Refused (See signed refusal form in chart)   Noé Score  Noé Score: 18   Noé Score (if score 14 or less)   []PMT consult   []Wound Care consult      []Specialty bed   [] Nutrition consult          Needs prior to discharge:   Home O2 required:    []Yes   []No    If yes, how much O2 required? Other:    Last Bowel Movement: Last Bowel Movement Date: 08/27/17      Influenza Vaccine Received Flu Vaccine for Current Season (usually Sept-March): Not Flu Season        Pneumonia Vaccine           Diet Active Orders   Diet    DIET DIABETIC CONSISTENT CARB Regular      LDAs         PICC Double Lumen 08/26/17 Right;Brachial (Active)   Central Line Being Utilized Yes 8/29/2017  3:08 AM   Criteria for Appropriate Use Limited/no vessel suitable for conventional peripheral access 8/29/2017  3:08 AM   Site Assessment Clean, dry, & intact 8/29/2017  3:08 AM   Phlebitis Assessment 0 8/29/2017  3:08 AM   Infiltration Assessment 0 8/29/2017  3:08 AM   Arm Circumference (cm) 38 cm 8/29/2017  3:08 AM   Date of Last Dressing Change 08/26/17 8/29/2017  3:08 AM   Dressing Status Clean, dry, & intact 8/29/2017  3:08 AM   Action Taken Other (comment) 8/28/2017  6:29 PM   External Catheter Length (cm) 0 centimeters 8/27/2017 11:30 PM   Dressing Type Disk with Chlorhexadine gluconate (CHG) 8/29/2017  3:08 AM   Hub Color/Line Status Purple; Infusing 8/29/2017  3:08 AM   Positive Blood Return (Site #1) Yes 8/29/2017  3:08 AM   Hub Color/Line Status Red; Infusing 8/29/2017  3:08 AM   Positive Blood Return (Site #2) Yes 8/29/2017  3:08 AM   Alcohol Cap Used Yes 8/29/2017  3:08 AM          Peripheral IV 08/25/17 Left Hand (Active)   Site Assessment Clean, dry, & intact 8/29/2017  3:08 AM   Phlebitis Assessment 0 8/29/2017  3:08 AM   Infiltration Assessment 0 8/29/2017  3:08 AM   Dressing Status Clean, dry, & intact 8/29/2017  3:08 AM   Dressing Type Tape;Transparent 8/29/2017  3:08 AM   Hub Color/Line Status Blue;Capped 8/29/2017  3:08 AM   Action Taken Blood drawn 8/25/2017 12:21 AM   Alcohol Cap Used Yes 8/29/2017  3:08 AM                      Urinary Catheter      Intake & Output   Date 08/28/17 0700 - 08/29/17 0659 08/29/17 0700 - 08/30/17 0659   Shift 0700-1859 1900-0659 24 Hour Total 6187-6766 0876-2028 24 Hour Total   I  N  T  A  K  E   I.V.  (mL/kg/hr) 1873.8  (1.4) 2372.5  (1.7) 4246.3  (1.6)         Volume (0.9% sodium chloride infusion) 1423.8 1572.5 2996. 3         Volume (lactated Ringers infusion) 150  150         Volume (cefepime (MAXIPIME) 2 g in 0.9% sodium chloride (MBP/ADV) 100 mL) 200 100 300         Volume (metroNIDAZOLE (FLAGYL) IVPB premix 500 mg) 100 200 300         Volume (vancomycin (VANCOCIN) 1750 mg in  ml infusion)  500 500       Shift Total  (mL/kg) 1873.8  (16.5) 2372.5  (20.9) 4246.3  (37.4)      O  U  T  P  U  T   Urine  (mL/kg/hr)  1125  (0.8) 1125  (0.4)         Urine Voided  1125 1125       Shift Total  (mL/kg)  1125  (9.9) 1125  (9.9)      NET 1873.8 1247.5 3121.3      Weight (kg) 113.5 113.5 113.5 113.5 113.5 113.5         Readmission Risk Assessment Tool Score Medium Risk            13       Total Score        3 Has Seen PCP in Last 6 Months (Yes=3, No=0)    9 IP Visits Last 12 Months (1-3=4, 4=9, >4=11)    1 Charlson Comorbidity Score (Age + Comorbid Conditions)        Criteria that do not apply:    . Living with Significant Other. Assisted Living. LTAC. SNF. or   Rehab    Patient Length of Stay (>5 days = 3)    Pt.  Coverage (Medicare=5 , Medicaid, or Self-Pay=4)       Expected Length of Stay 3d 19h   Actual Length of Stay 5

## 2017-08-29 NOTE — PROGRESS NOTES
PCU SHIFT NURSING NOTE      Bedside and Verbal shift change report given to [place], RN (oncoming nurse) by Prakash Wan RN (offgoing nurse). Report included the following information SBAR, Kardex, Intake/Output, MAR, Recent Results, Med Rec Status, Cardiac Rhythm ST with PVCs and Alarm Parameters . Shift Summary:   8701 Bedside report received, Patient c/o left leg pain 7/10, patient encourged to use PCA, repositioned. Patient had no further complaints. 2235 PTT drawn at this time and sent to lab.    [address] [place] [address] [facility] to verify Heparin Drip settings. 2335 4,700unit bolus given per PRN order for PTT of 46. Rate of Heparin drip changed from 13.1 units/kg/hr to 15.1 units/kg/hr. Dual sign off by Quincy Ceballos RN. Patient made aware of rate change and bolus orders. 0120 Patient had vomiting episode. PRN Zofran given, will continue to assess. No other vomiting episodes noted. 9344 Patient c/o heartburn. 0530 MD paged    6306 Dr Mars Shukla called nurse. Verbal read back order given for Maalox 15-30ml PO q 2 hours PRN for heartburn/indigestion. 0535 Patient given 30ml PRN dose, will continue to assess. Admission Date 8/24/2017   Admission Diagnosis Sepsis ([address]  PVD   Consults IP CONSULT TO PODIATRY  IP CONSULT TO VASCULAR SURGERY        Consults   []PT   []OT   []Speech   []Case Management      [] Palliative      Cardiac Monitoring Order   []Yes   []No     IV drips   []Yes    Drip:                            Dose:  Drip:                            Dose:  Drip:                            Dose:   []No     GI Prophylaxis   []Yes   []No         DVT Prophylaxis   SCDs:             Jeff stockings:         [] Medication   []Contraindicated   []None      Activity Level Activity Level: Up with Assistance     Activity Assistance: Partial (one person)   Purposeful Rounding every 1-2 hour?    []Yes   Mccormick Score  Total Score: 2   Bed Alarm (If score 3 or >)   []Yes   [] Refused (See signed refusal form in chart)   Noé Score  Noé Score: 20   Noé Score (if score 14 or less)   []PMT consult   []Wound Care consult      []Specialty bed   [] Nutrition consult          Needs prior to discharge:   Home O2 required:    []Yes   []No    If yes, how much O2 required? Other:    Last Bowel Movement: Last Bowel Movement Date: 08/27/17      Influenza Vaccine Received Flu Vaccine for Current Season (usually Sept-March): Not Flu Season        Pneumonia Vaccine           Diet Active Orders   Diet    DIET DIABETIC CONSISTENT CARB Regular      LDAs         PICC Double Lumen 08/26/17 Right;Brachial (Active)   Central Line Being Utilized Yes 8/29/2017  4:23 PM   Criteria for Appropriate Use Limited/no vessel suitable for conventional peripheral access 8/29/2017  4:23 PM   Site Assessment Clean, dry, & intact 8/29/2017  4:23 PM   Phlebitis Assessment 0 8/29/2017  4:23 PM   Infiltration Assessment 0 8/29/2017  4:23 PM   Arm Circumference (cm) 38 cm 8/29/2017  3:08 AM   Date of Last Dressing Change 08/26/17 8/29/2017  4:23 PM   Dressing Status Clean, dry, & intact 8/29/2017  4:23 PM   Action Taken Other (comment) 8/28/2017  6:29 PM   External Catheter Length (cm) 0 centimeters 8/27/2017 11:30 PM   Dressing Type Transparent 8/29/2017  4:23 PM   Hub Color/Line Status Purple; Infusing 8/29/2017  3:08 AM   Positive Blood Return (Site #1) Yes 8/29/2017  4:23 PM   Hub Color/Line Status Red; Infusing 8/29/2017  3:08 AM   Positive Blood Return (Site #2) Yes 8/29/2017  4:23 PM   Alcohol Cap Used Yes 8/29/2017  3:08 AM          Peripheral IV 08/25/17 Left Hand (Active)   Site Assessment Clean, dry, & intact 8/29/2017  4:23 PM   Phlebitis Assessment 0 8/29/2017  4:23 PM   Infiltration Assessment 0 8/29/2017  4:23 PM   Dressing Status Clean, dry, & intact 8/29/2017  4:23 PM   Dressing Type Transparent 8/29/2017  4:23 PM   Hub Color/Line Status Blue;Capped 8/29/2017  3:08 AM   Action Taken Benzoin 8/29/2017  4:23 PM   Alcohol Cap Used Yes 8/29/2017  3:08 AM                      Urinary Catheter      Intake & Output   Date 08/28/17 1900 - 08/29/17 0659 08/29/17 0700 - 08/30/17 0659   Shift 0016-1247 24 Hour Total 2300-6939 4586-4682 24 Hour Total   I  N  T  A  K  E   I.V.  (mL/kg/hr) 2372.5 4246. 3         Volume (0.9% sodium chloride infusion) 1572.5 2996. 3         Volume (lactated Ringers infusion)  150         Volume (cefepime (MAXIPIME) 2 g in 0.9% sodium chloride (MBP/ADV) 100 mL) 100 300         Volume (metroNIDAZOLE (FLAGYL) IVPB premix 500 mg) 200 300         Volume (vancomycin (VANCOCIN) 1750 mg in  ml infusion) 500 500       Shift Total  (mL/kg) 2372.5  (20.9) 4246.3  (37.4)      O  U  T  P  U  T   Urine  (mL/kg/hr) 1125 1125 1725  (1.3)  1725      Urine Voided 1125 1125 1725  1725    Shift Total  (mL/kg) 1125  (9.9) 1125  (9.9) 1725  (15.2)  1725  (15.2)   NET 1247.5 3121.3 -1725  -1725   Weight (kg) 113.5 113.5 113.5 113.5 113.5         Readmission Risk Assessment Tool Score Medium Risk            13       Total Score        3 Has Seen PCP in Last 6 Months (Yes=3, No=0)    9 IP Visits Last 12 Months (1-3=4, 4=9, >4=11)    1 Charlson Comorbidity Score (Age + Comorbid Conditions)        Criteria that do not apply:    . Living with Significant Other. Assisted Living. LTAC. SNF. or   Rehab    Patient Length of Stay (>5 days = 3)    Pt.  Coverage (Medicare=5 , Medicaid, or Self-Pay=4)       Expected Length of Stay 3d 19h   Actual Length of Stay 5

## 2017-08-29 NOTE — PROGRESS NOTES
General Daily Progress Note    Admit Date: 8/24/2017  Hospital day several    Subjective:     Pt denies any cp sob still abit anxious about his progress s/w nursing on pca pump pain control so so   Medication side effects: none    Current Facility-Administered Medications   Medication Dose Route Frequency    insulin glargine (LANTUS) injection 14 Units  14 Units SubCUTAneous DAILY    HYDROmorphone (PF) 15 mg/30 ml (DILAUDID) PCA   IntraVENous CONTINUOUS    metroNIDAZOLE (FLAGYL) IVPB premix 500 mg  500 mg IntraVENous Q8H    ALPRAZolam (XANAX) tablet 0.25 mg  0.25 mg Oral TID PRN    cloNIDine HCl (CATAPRES) tablet 0.1 mg  0.1 mg Oral Q6H PRN    insulin lispro (HUMALOG) injection   SubCUTAneous AC&HS    sodium chloride (NS) flush 10-30 mL  10-30 mL InterCATHeter PRN    sodium chloride (NS) flush 10 mL  10 mL InterCATHeter Q24H    sodium chloride (NS) flush 10 mL  10 mL InterCATHeter PRN    sodium chloride (NS) flush 10-40 mL  10-40 mL InterCATHeter Q8H    sodium chloride (NS) flush 20 mL  20 mL InterCATHeter PRN    heparin (porcine) pf 300 Units  300 Units InterCATHeter PRN    cefepime (MAXIPIME) 2 g in 0.9% sodium chloride (MBP/ADV) 100 mL  2 g IntraVENous Q8H    atorvastatin (LIPITOR) tablet 40 mg  40 mg Oral DAILY    carvedilol (COREG) tablet 6.25 mg  6.25 mg Oral BID WITH MEALS    pantoprazole (PROTONIX) tablet 40 mg  40 mg Oral DAILY    gabapentin (NEURONTIN) capsule 300 mg  300 mg Oral TID    glucose chewable tablet 16 g  4 Tab Oral PRN    dextrose (D50W) injection syrg 12.5-25 g  12.5-25 g IntraVENous PRN    glucagon (GLUCAGEN) injection 1 mg  1 mg IntraMUSCular PRN    nitroglycerin (NITROBID) 2 % ointment 1 Inch  1 Inch Topical Q6H PRN    sodium chloride (NS) flush 5-10 mL  5-10 mL IntraVENous Q8H    sodium chloride (NS) flush 5-10 mL  5-10 mL IntraVENous PRN    acetaminophen (TYLENOL) tablet 650 mg  650 mg Oral Q6H PRN    ondansetron (ZOFRAN) injection 4 mg  4 mg IntraVENous Q4H PRN  oxyCODONE-acetaminophen (PERCOCET 10)  mg per tablet 1 Tab  1 Tab Oral Q6H PRN    HYDROmorphone (PF) (DILAUDID) injection 0.5 mg  0.5 mg IntraVENous Q3H PRN    vancomycin (VANCOCIN) 1750 mg in  ml infusion  1,750 mg IntraVENous Q18H    0.9% sodium chloride infusion  75 mL/hr IntraVENous CONTINUOUS        Review of Systems  A comprehensive review of systems was negative except for that written in the HPI. Objective:     Patient Vitals for the past 8 hrs:   BP Temp Pulse Resp SpO2   08/29/17 0725 (!) 172/92 99.7 °F (37.6 °C) 93 18 100 %   08/29/17 0308 (!) 167/105 99.1 °F (37.3 °C) 86 18 100 %     08/29 0701 - 08/29 1900  In: -   Out: 525 [Urine:525]  08/27 1901 - 08/29 0700  In: 8465 [I.V.:8465]  Out: 7327 [Urine:2725]    Physical Exam: General appearance: alert, cooperative, no distress, appears stated age, anxious  Lungs: clear to auscultation bilaterally  Heart: regular rate and rhythm  Abdomen: soft, non-tender.  Bowel sounds normal. No masses,  no organomegaly  Extremities: no edema, redness or tenderness in the calves or thighs  Foot bandaged did not remove           Data Review   Recent Results (from the past 24 hour(s))   GLUCOSE, POC    Collection Time: 08/28/17 11:17 AM   Result Value Ref Range    Glucose (POC) 209 (H) 65 - 100 mg/dL    Performed by Singh Romero)    GLUCOSE, POC    Collection Time: 08/28/17 12:22 PM   Result Value Ref Range    Glucose (POC) 182 (H) 65 - 100 mg/dL    Performed by Josselyn Leo, POC    Collection Time: 08/28/17  2:56 PM   Result Value Ref Range    Glucose (POC) 131 (H) 65 - 100 mg/dL    Performed by Ayleen Vargas*    GLUCOSE, POC    Collection Time: 08/28/17  5:36 PM   Result Value Ref Range    Glucose (POC) 176 (H) 65 - 100 mg/dL    Performed by [address], POC    Collection Time: 08/28/17  9:53 PM   Result Value Ref Range    Glucose (POC) 221 (H) 65 - 100 mg/dL    Performed by [address], BASIC Collection Time: 08/29/17  5:24 AM   Result Value Ref Range    Sodium 135 (L) 136 - 145 mmol/L    Potassium 3.8 3.5 - 5.1 mmol/L    Chloride 104 97 - 108 mmol/L    CO2 23 21 - 32 mmol/L    Anion gap 8 5 - 15 mmol/L    Glucose 235 (H) 65 - 100 mg/dL    BUN 10 6 - 20 MG/DL    Creatinine 1.33 (H) 0.70 - 1.30 MG/DL    BUN/Creatinine ratio 8 (L) 12 - 20      GFR est AA >60 >60 ml/min/1.73m2    GFR est non-AA 57 (L) >60 ml/min/1.73m2    Calcium 7.8 (L) 8.5 - 10.1 MG/DL   CBC WITH AUTOMATED DIFF    Collection Time: 08/29/17  5:24 AM   Result Value Ref Range    WBC 13.9 (H) 4.1 - 11.1 K/uL    RBC 4.21 4.10 - 5.70 M/uL    HGB 9.2 (L) 12.1 - 17.0 g/dL    HCT 30.3 (L) 36.6 - 50.3 %    MCV 72.0 (L) 80.0 - 99.0 FL    MCH 21.9 (L) 26.0 - 34.0 PG    MCHC 30.4 30.0 - 36.5 g/dL    RDW 16.6 (H) 11.5 - 14.5 %    PLATELET 543 730 - 627 K/uL    NEUTROPHILS 81 (H) 32 - 75 %    LYMPHOCYTES 12 12 - 49 %    MONOCYTES 6 5 - 13 %    EOSINOPHILS 1 0 - 7 %    BASOPHILS 0 0 - 1 %    ABS. NEUTROPHILS 11.3 (H) 1.8 - 8.0 K/UL    ABS. LYMPHOCYTES 1.7 0.8 - 3.5 K/UL    ABS. MONOCYTES 0.8 0.0 - 1.0 K/UL    ABS. EOSINOPHILS 0.1 0.0 - 0.4 K/UL    ABS.  BASOPHILS 0.0 0.0 - 0.1 K/UL    RBC COMMENTS MICROCYTOSIS  1+        RBC COMMENTS HYPOCHROMIA  2+        DF SMEAR SCANNED             Assessment:     Active Problems:    HTN (hypertension) (7/23/2013)      Diabetes ([facility] ()      Overview: since age 23      Hypercholesteremia ()      Sepsis ([facility] (5/27/2017)      Bilateral pulmonary embolism ([facility] (7/21/2017)      Chronic systolic heart failure ([facility] (7/31/2017)      Left foot infection (8/24/2017)      Hyponatremia (8/24/2017)      ALEX (acute kidney injury) ([facility] (8/24/2017)      GERD (gastroesophageal reflux disease) (8/24/2017)        Plan:    on heparin change to po eliquis per vascular team  Continue home bp meds adjusted slightly yesterdayadjsuted ISS adjusted lantus   Continue abx  Gentle ivh and monitor renal fxn  Pt  is not very compliant with diet -chips noted in room

## 2017-08-29 NOTE — PROGRESS NOTES
Vascular     Pain controlled w/ PCA  Looks good  Will start heparin drip  If no bleeding on heparin can switch to Eliquis before D/C

## 2017-08-29 NOTE — PROGRESS NOTES
Nutrition Services      Nutrition Screen:  Wt Readings from Last 10 Encounters:   08/25/17 113.5 kg (250 lb 3.2 oz)   08/25/17 113.4 kg (250 lb)   08/08/17 126.3 kg (278 lb 6.4 oz)   07/31/17 129.4 kg (285 lb 3.2 oz)   07/24/17 131 kg (288 lb 14.4 oz)   07/18/17 127 kg (280 lb)   07/17/17 129.6 kg (285 lb 11.2 oz)   07/13/17 118.8 kg (262 lb)   07/12/17 122.7 kg (270 lb 6.4 oz)   07/03/17 123.7 kg (272 lb 12.8 oz)     Body mass index is 31.27 kg/(m^2). Pt stated that he knows his diet & has been given info in the past, but may benefit from an outside support system for  Encouragement & compliance. Pt stated he may contact some groups. Supplements: Glucerna- Raynaldo Khalif                        __x___ ordered ______  declined. __ __  Pt is nutritionally stable at this time, will rescreen in 7 days. __ __    Pt is at nutritional risk and will be rescreened in  days. __x __  Pt is at moderate or high nutritional risk, will refer to RD for assessment.        Carmelina Parada  Dietetic Technician, Registered

## 2017-08-29 NOTE — ADT AUTH CERT NOTES
Knee: Amputation Above or Below Knee - Care Day 5 (8/28/2017) by Liliana Galvan RN        Review Status Review Entered       Completed 8/29/2017       Details              Care Day: 5 Care Date: 8/28/2017 Level of Care: Step Down       Guideline Day 1        Clinical Status       (X) * Clinical Indications met [B]       8/29/2017 9:52 AM EDT by Aliya Clemens         Osteomyelitis of the left foot                     Activity       ( ) Bed rest       8/29/2017 9:52 AM EDT by Aliya Clemens         OOB WITH ASSISTANCE                     Routes       (X) IV fluids, medications       8/29/2017 9:52 AM EDT by Aliya Clemens         IV FLUID 75ML/HR COTNINUOUS, INJ DILAUDID IV Q3HRS AS NEEDED X3              (X) Diet as tolerated postoperatively       8/29/2017 9:52 AM EDT by Aliya Clemens         DIABETIC DIET                     Medications       (X) IV antibiotics       8/29/2017 9:52 AM EDT by Aliya Clemens         INJ  CEFEPIME IV Q8HRS, INJ FLAGYL IV Q8HRS, INJ VANCOMYCIN IV Q18HRS              (X) Possible PCA       8/29/2017 9:52 AM EDT by Aliya Clemens         DIALUDID PCA                     8/29/2017 9:52 AM EDT by Aliya Clemens       Subject: Additional Clinical Information             Surgery Date: 08/28/2017   Date of Adm:   08/24/2017           PREOPERATIVE DIAGNOSIS: Osteomyelitis of the left foot.      POSTOPERATIVE DIAGNOSIS: Osteomyelitis of the left foot.      PROCEDURES PERFORMED: Left below-knee amputation.      SURGEON: Unique Zaragoza MD      ANESTHESIA: General.      ESTIMATED BLOOD LOSS: 150 mL.      SPECIMENS REMOVED: None.      DRAINS: None.      COMPLICATIONS: None.      INDICATIONS: The patient is a 77-year-old diabetic with a nonhealing   foot wound and osteomyelitis. The foot is now nonsalvageable, despite   maximum medical management and periodic surgical debridements.    The patient presents for below-knee amputation of the left leg.              Visit Vitals          â¢      BP      171/90 (BP 1 Location: Left arm, BP Patient Position: At rest)          â¢      Pulse      75          â¢      Temp      37.2  °C (98.9  °F)          â¢      Resp      16          â¢      Ht      6' 3\" (1.905 m)          â¢      Wt      113.5 kg (250 lb 3.2 oz)          â¢      SpO2      97% 1 LPM VIA NC          â¢      BMI      31.27 kg/m2                                             * Milestone                  Knee: Amputation Above or Below Knee - Clinical Indications for Procedure by Soniya Valentin RN        Review Status Review Entered       Completed 8/29/2017       Details              Clinical Indications for Procedure       Most Recent : Mary Arias Most Recent Date: 8/29/2017 9:53 AM EDT       (X) Procedure is indicated for 1 or more of the following  (1) (2):          (X) Severe infection, gangrene, ischemia, osteomyelitis, or other lower extremity disease and           ALL of the following  (3) (4) (5):             (X) Nonoperative therapy has been tried and failed or is not appropriate.             8/29/2017 9:47 AM EDT by Mary Arias               Osteomyelitis of the left foot.                    (X) Alternative operative approach cannot be used because of 1 or more of the following :                (X) Debridement has failed or is not appropriate.                8/29/2017 9:47 AM EDT by Mary Arias                  maximum medical management and periodic surgical debridements.                                                     Sepsis and Other Febrile Illness, without Focal Infection - Care Day 4 (8/27/2017) by Soniya Valentin RN        Review Status Review Entered       Completed 8/29/2017       Details              Care Day: 4 Care Date: 8/27/2017 Level of Care: Step Down       Guideline Day 3        Clinical Status       (X) * Mental status normal or at baseline              Activity       (X) Activity as tolerated       8/29/2017 9:46 AM EDT by Rollie Gaucher         OOB WITH ASSISTANCE                     Routes       ( ) * Oral hydration       8/29/2017 9:46 AM EDT by Rollie Gaucher         DIABETIC DIET, IV FLUIDS 75ML/HR CONTINUOUS              (X) Diet as tolerated       8/29/2017 9:46 AM EDT by Rollie Gaucher         DIABETIC DIET              (X) Parenteral or oral medication       8/29/2017 9:46 AM EDT by Rollie Gaucher         inj flagyl iv q8hrs, INJ VANCOMCYIN IV Q18 HRS, INJ DILAUDID IV Q3HRS AS NEEDED X5, INJ CEFEPIME IV Q8HRS                     Medications       (X) Antibiotics       8/29/2017 9:46 AM EDT by Rollie Gaucher         INJ MAXIPIME IV Q8HRS, INJ DILAUDID IV Q3HRS AS NEEDED X5, INJ FLAGYL IV Q8HRS.  INJ VANCOMYCIN IV Q18 HRS                     8/29/2017 9:53 AM EDT by Rollie Gaucher       Subject: Additional Clinical Information       VS: T 98.5, P 71, R 20, /92, SPO2 100% RA              8/29/2017 9:46 AM EDT by Rollie Gaucher       Subject: Additional Clinical Information       MEDICAL PROGRESS NOTE:  Assessment:      Active Problems:     HTN (hypertension) (7/23/2013)         Diabetes ([facility] ()         Overview: since age 23  Jim November     Hypercholesteremia ()         Sepsis ([facility] (5/27/2017)         Bilateral pulmonary embolism ([facility] (7/21/2017)         Chronic systolic heart failure ([facility] (7/31/2017)         Left foot infection (8/24/2017)         Hyponatremia (8/24/2017)         ALEX (acute kidney injury) ([facility] (8/24/2017)         GERD (gastroesophageal reflux disease) (8/24/2017)                    Plan:          Discontinued heparin sat drip due to elevated PT/inr recheck in am   May have procedure Monday will hold for now   Continue home bp meds  adjsuted ISS   Continue abx  Gentle ivh and monitor renal fxn  Pt very anxious to go home xanax prn pt has a history of leaving AMA and is not very compliant with diet -during last hospitalization eating chinese food and chips etc

## 2017-08-30 LAB
ANION GAP SERPL CALC-SCNC: 8 MMOL/L (ref 5–15)
APTT PPP: 48.8 SEC (ref 22.1–32.5)
APTT PPP: 54 SEC (ref 22.1–32.5)
APTT PPP: 54.1 SEC (ref 22.1–32.5)
BASOPHILS # BLD: 0 K/UL (ref 0–0.1)
BASOPHILS NFR BLD: 0 % (ref 0–1)
BUN SERPL-MCNC: 18 MG/DL (ref 6–20)
BUN/CREAT SERPL: 11 (ref 12–20)
CALCIUM SERPL-MCNC: 7.6 MG/DL (ref 8.5–10.1)
CHLORIDE SERPL-SCNC: 102 MMOL/L (ref 97–108)
CO2 SERPL-SCNC: 23 MMOL/L (ref 21–32)
CREAT SERPL-MCNC: 1.63 MG/DL (ref 0.7–1.3)
DIFFERENTIAL METHOD BLD: ABNORMAL
EOSINOPHIL # BLD: 0.2 K/UL (ref 0–0.4)
EOSINOPHIL NFR BLD: 1 % (ref 0–7)
ERYTHROCYTE [DISTWIDTH] IN BLOOD BY AUTOMATED COUNT: 16.9 % (ref 11.5–14.5)
GLUCOSE BLD STRIP.AUTO-MCNC: 220 MG/DL (ref 65–100)
GLUCOSE BLD STRIP.AUTO-MCNC: 226 MG/DL (ref 65–100)
GLUCOSE BLD STRIP.AUTO-MCNC: 244 MG/DL (ref 65–100)
GLUCOSE BLD STRIP.AUTO-MCNC: 286 MG/DL (ref 65–100)
GLUCOSE SERPL-MCNC: 204 MG/DL (ref 65–100)
HCT VFR BLD AUTO: 24.9 % (ref 36.6–50.3)
HGB BLD-MCNC: 7.6 G/DL (ref 12.1–17)
LYMPHOCYTES # BLD: 2.3 K/UL (ref 0.8–3.5)
LYMPHOCYTES NFR BLD: 14 % (ref 12–49)
MCH RBC QN AUTO: 21.7 PG (ref 26–34)
MCHC RBC AUTO-ENTMCNC: 30.5 G/DL (ref 30–36.5)
MCV RBC AUTO: 71.1 FL (ref 80–99)
MONOCYTES # BLD: 1 K/UL (ref 0–1)
MONOCYTES NFR BLD: 6 % (ref 5–13)
NEUTS SEG # BLD: 13 K/UL (ref 1.8–8)
NEUTS SEG NFR BLD: 79 % (ref 32–75)
PLATELET # BLD AUTO: 405 K/UL (ref 150–400)
POTASSIUM SERPL-SCNC: 4 MMOL/L (ref 3.5–5.1)
RBC # BLD AUTO: 3.5 M/UL (ref 4.1–5.7)
RBC MORPH BLD: ABNORMAL
SERVICE CMNT-IMP: ABNORMAL
SODIUM SERPL-SCNC: 133 MMOL/L (ref 136–145)
THERAPEUTIC RANGE,PTTT: ABNORMAL SECS (ref 58–77)
WBC # BLD AUTO: 16.5 K/UL (ref 4.1–11.1)

## 2017-08-30 PROCEDURE — 97530 THERAPEUTIC ACTIVITIES: CPT | Performed by: OCCUPATIONAL THERAPIST

## 2017-08-30 PROCEDURE — 74011250637 HC RX REV CODE- 250/637: Performed by: FAMILY MEDICINE

## 2017-08-30 PROCEDURE — 74011636637 HC RX REV CODE- 636/637: Performed by: FAMILY MEDICINE

## 2017-08-30 PROCEDURE — 74011250636 HC RX REV CODE- 250/636: Performed by: EMERGENCY MEDICINE

## 2017-08-30 PROCEDURE — 74011250636 HC RX REV CODE- 250/636: Performed by: FAMILY MEDICINE

## 2017-08-30 PROCEDURE — 74011250637 HC RX REV CODE- 250/637: Performed by: HOSPITALIST

## 2017-08-30 PROCEDURE — 85730 THROMBOPLASTIN TIME PARTIAL: CPT | Performed by: SURGERY

## 2017-08-30 PROCEDURE — 74011250637 HC RX REV CODE- 250/637: Performed by: INTERNAL MEDICINE

## 2017-08-30 PROCEDURE — 85025 COMPLETE CBC W/AUTO DIFF WBC: CPT | Performed by: EMERGENCY MEDICINE

## 2017-08-30 PROCEDURE — 74011250637 HC RX REV CODE- 250/637: Performed by: NURSE PRACTITIONER

## 2017-08-30 PROCEDURE — 80048 BASIC METABOLIC PNL TOTAL CA: CPT | Performed by: EMERGENCY MEDICINE

## 2017-08-30 PROCEDURE — 97165 OT EVAL LOW COMPLEX 30 MIN: CPT | Performed by: OCCUPATIONAL THERAPIST

## 2017-08-30 PROCEDURE — 74011000250 HC RX REV CODE- 250: Performed by: FAMILY MEDICINE

## 2017-08-30 PROCEDURE — 77010033678 HC OXYGEN DAILY

## 2017-08-30 PROCEDURE — 74011250636 HC RX REV CODE- 250/636: Performed by: INTERNAL MEDICINE

## 2017-08-30 PROCEDURE — 74011000258 HC RX REV CODE- 258: Performed by: FAMILY MEDICINE

## 2017-08-30 PROCEDURE — 74011250636 HC RX REV CODE- 250/636: Performed by: SURGERY

## 2017-08-30 PROCEDURE — 82962 GLUCOSE BLOOD TEST: CPT

## 2017-08-30 PROCEDURE — 36415 COLL VENOUS BLD VENIPUNCTURE: CPT | Performed by: EMERGENCY MEDICINE

## 2017-08-30 PROCEDURE — 65660000000 HC RM CCU STEPDOWN

## 2017-08-30 RX ORDER — CLONIDINE 0.1 MG/24H
1 PATCH, EXTENDED RELEASE TRANSDERMAL
Status: DISCONTINUED | OUTPATIENT
Start: 2017-08-30 | End: 2017-08-30

## 2017-08-30 RX ORDER — INSULIN GLARGINE 100 [IU]/ML
18 INJECTION, SOLUTION SUBCUTANEOUS DAILY
Status: DISCONTINUED | OUTPATIENT
Start: 2017-08-30 | End: 2017-08-31

## 2017-08-30 RX ORDER — FENTANYL 75 UG/H
1 PATCH TRANSDERMAL
Status: DISCONTINUED | OUTPATIENT
Start: 2017-08-30 | End: 2017-09-02 | Stop reason: HOSPADM

## 2017-08-30 RX ADMIN — METRONIDAZOLE 500 MG: 500 INJECTION, SOLUTION INTRAVENOUS at 21:26

## 2017-08-30 RX ADMIN — PANTOPRAZOLE SODIUM 40 MG: 40 TABLET, DELAYED RELEASE ORAL at 08:17

## 2017-08-30 RX ADMIN — GABAPENTIN 300 MG: 300 CAPSULE ORAL at 21:24

## 2017-08-30 RX ADMIN — OXYCODONE HYDROCHLORIDE AND ACETAMINOPHEN 1 TABLET: 10; 325 TABLET ORAL at 08:33

## 2017-08-30 RX ADMIN — INSULIN LISPRO 2 UNITS: 100 INJECTION, SOLUTION INTRAVENOUS; SUBCUTANEOUS at 21:24

## 2017-08-30 RX ADMIN — CEFEPIME HYDROCHLORIDE 2 G: 2 INJECTION, POWDER, FOR SOLUTION INTRAVENOUS at 23:57

## 2017-08-30 RX ADMIN — HEPARIN SODIUM 4700 UNITS: 5000 INJECTION, SOLUTION INTRAVENOUS; SUBCUTANEOUS at 15:15

## 2017-08-30 RX ADMIN — Medication 10 ML: at 05:49

## 2017-08-30 RX ADMIN — CEFEPIME HYDROCHLORIDE 2 G: 2 INJECTION, POWDER, FOR SOLUTION INTRAVENOUS at 08:17

## 2017-08-30 RX ADMIN — INSULIN LISPRO 6 UNITS: 100 INJECTION, SOLUTION INTRAVENOUS; SUBCUTANEOUS at 08:34

## 2017-08-30 RX ADMIN — ATORVASTATIN CALCIUM 40 MG: 40 TABLET, FILM COATED ORAL at 08:17

## 2017-08-30 RX ADMIN — VANCOMYCIN HYDROCHLORIDE 1750 MG: 10 INJECTION, POWDER, LYOPHILIZED, FOR SOLUTION INTRAVENOUS at 08:16

## 2017-08-30 RX ADMIN — ALUMINUM HYDROXIDE AND MAGNESIUM HYDROXIDE 30 ML: 200; 200 SUSPENSION ORAL at 18:37

## 2017-08-30 RX ADMIN — HEPARIN SODIUM 16.1 UNITS/KG/HR: 10000 INJECTION, SOLUTION INTRAVENOUS at 06:50

## 2017-08-30 RX ADMIN — METRONIDAZOLE 500 MG: 500 INJECTION, SOLUTION INTRAVENOUS at 13:01

## 2017-08-30 RX ADMIN — Medication 10 ML: at 13:03

## 2017-08-30 RX ADMIN — INSULIN LISPRO 8 UNITS: 100 INJECTION, SOLUTION INTRAVENOUS; SUBCUTANEOUS at 16:52

## 2017-08-30 RX ADMIN — ONDANSETRON 4 MG: 2 INJECTION INTRAMUSCULAR; INTRAVENOUS at 01:23

## 2017-08-30 RX ADMIN — METRONIDAZOLE 500 MG: 500 INJECTION, SOLUTION INTRAVENOUS at 05:51

## 2017-08-30 RX ADMIN — CEFEPIME HYDROCHLORIDE 2 G: 2 INJECTION, POWDER, FOR SOLUTION INTRAVENOUS at 16:05

## 2017-08-30 RX ADMIN — CARVEDILOL 6.25 MG: 6.25 TABLET, FILM COATED ORAL at 08:17

## 2017-08-30 RX ADMIN — HEPARIN SODIUM 18.1 UNITS/KG/HR: 10000 INJECTION, SOLUTION INTRAVENOUS at 15:16

## 2017-08-30 RX ADMIN — Medication 10 ML: at 21:27

## 2017-08-30 RX ADMIN — GABAPENTIN 300 MG: 300 CAPSULE ORAL at 16:05

## 2017-08-30 RX ADMIN — ALUMINUM HYDROXIDE AND MAGNESIUM HYDROXIDE 30 ML: 200; 200 SUSPENSION ORAL at 08:16

## 2017-08-30 RX ADMIN — ALUMINUM HYDROXIDE AND MAGNESIUM HYDROXIDE 30 ML: 200; 200 SUSPENSION ORAL at 21:25

## 2017-08-30 RX ADMIN — INSULIN LISPRO 6 UNITS: 100 INJECTION, SOLUTION INTRAVENOUS; SUBCUTANEOUS at 13:02

## 2017-08-30 RX ADMIN — GABAPENTIN 300 MG: 300 CAPSULE ORAL at 08:17

## 2017-08-30 RX ADMIN — ALUMINUM HYDROXIDE AND MAGNESIUM HYDROXIDE 30 ML: 200; 200 SUSPENSION ORAL at 05:46

## 2017-08-30 RX ADMIN — INSULIN GLARGINE 18 UNITS: 100 INJECTION, SOLUTION SUBCUTANEOUS at 08:33

## 2017-08-30 RX ADMIN — Medication 10 ML: at 13:02

## 2017-08-30 RX ADMIN — ONDANSETRON 4 MG: 2 INJECTION INTRAMUSCULAR; INTRAVENOUS at 18:37

## 2017-08-30 RX ADMIN — CARVEDILOL 6.25 MG: 6.25 TABLET, FILM COATED ORAL at 16:52

## 2017-08-30 RX ADMIN — Medication: at 07:45

## 2017-08-30 NOTE — PROGRESS NOTES
Problem: Falls - Risk of  Goal: *Absence of Falls  Document Lee Fall Risk and appropriate interventions in the flowsheet.    Outcome: Progressing Towards Goal  Fall Risk Interventions:  Mobility Interventions: Communicate number of staff needed for ambulation/transfer           Medication Interventions: Assess postural VS orthostatic hypotension, Bed/chair exit alarm, Teach patient to arise slowly                       Problem: Patient Education: Go to Patient Education Activity  Goal: Patient/Family Education  Outcome: Progressing Towards Goal  PTS SISTER AT BEDSIDE IN AM FOR EXTENSIVE EDUCATION ON PT CONDITION    Problem: Diabetic Ulcer-Treatment  Goal: *Improvement of existing diabetic ulcer  Outcome: Progressing Towards Goal  L[facility] BKA SURGICAL SITE    Problem: Discharge Planning  Goal: *Discharge to safe environment  Outcome: Progressing Towards Goal  EDUCATION OF DISCHARGE PLANNING

## 2017-08-30 NOTE — PROGRESS NOTES
[address] [facility] NOTIFIED OF PT HYPOTENSIVE CONDITION DURING OT THERAPY. MD VERBALIZED CARDIOLOGY CONSULT TO BE PLACED. DR Willard Caputo MD TO MD PHONE CALL.  DR Osborne Friend VERBALIZED UNDERSTANDING AND CARDIOLOGY PHONE # [phone] GIVEN

## 2017-08-30 NOTE — CONSULTS
Cardiology Consult Note    CC: PVD/sepsis    Orestes Stevenson MD  Reason for consult:  HTN/hypotension  Requesting MD:  Dr. Ephriam Koyanagi. Admit Date: 8/24/2017   Today's Date: 8/30/2017   Cardiologist:  Dr Radha Maxwell Assessment/Plan:   CM: likely HTN-related (Nl ferritin/TSH; HIV negative). Echo 7/22/17: Ef 25-30% with borderline LVE, global HK; Mod LVH; RVE/decreased RV Fxn. Mild-mod TR.     Findings c/w hypertensive CM: Med Rx for now; will need future eval for ischemia (after PE treated). With acei angioedema, avoiding ARBs as well. No further aldactone with recurrent ALEX. Rec: cont coreg; add back hydralazine/imdur as BP tolerates; avoid further clonidine if able. D/C IVF. Will start lasix when appropriate: watch Cr/vol status. HTN: High POA then low (likely orthostatic/post-op/neuropathy/narcotics etc): better overall now: Watch orthostatics; adjust meds as above.      Rhythm: sinus     Lipids: with DM, should be on a statin: previously on lipitor 20.        ID (previous osteo/C diff), DM2 (insulin for years), CKD, pain control, anticoagulation, Dispo: per primary team.    Already has F/u plan with me next month.      At admit 7/2016 for extensive PEs/HTN, found to have CM (prob HTN but ischemic not excluded). Cardiac meds on d/c: coreg 6.25 bid; hydralazine 25 TID; imdur 30 qd; aldactone 25 every day (Cr had normalized). With acei angioedema, avoiding ARBs  Also on eliquis for bilat PEs. 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 HTN earlier in admit: clonidine PO then patch started; More recently with hypotension (when OOB) with orthostatic Sx. Mild GARCIA, worse since admit; The patient reports no chest pain/pressure; He reports compliance with meds.      No PND, orthopnea, palpitations, pre-syncope, syncope.  No current complaints.       He is from [place]; Family from [place]. _____________________________  Hospital Problem List:  Active Problems:    HTN (hypertension) (7/23/2013)      Diabetes ([facility] ()      Overview: since age 23      Hypercholesteremia ()      Sepsis ([facility] [address] (5/27/2017)      Bilateral pulmonary embolism ([facility] (7/21/2017)      Chronic systolic heart failure ([facility] (7/31/2017)      Left foot infection (8/24/2017)      Hyponatremia (8/24/2017)      ALEX (acute kidney injury) ([facility] [address] (8/24/2017)      GERD (gastroesophageal reflux disease) (8/24/2017)       ____________________________________________________________________  Yahir Reyes is a 49 y.o. male presents with Sepsis ([facility]  PVD. As noted in H&P: \"50 y.o.  male who is sent by his podiatry to ED for evaluation. As per patient, pt had surgery of his left foot few months ago and later he developed the wound on left side of his amputation site and it's in process of constant care under podiatry but when he woke up today, he noted new wound on the right side of left foot. He was seen at podiatry office who referred him to ED for admission. Pt denies any fever, chills, nausea, vomiting, diarrhea, chest pain, cough, shortness of breath.  In ED pt noted to have leukocytosis, tachycardia, and fever along with renal insufficiency and hyponatremia\"    _____________________________________________________________________  Patient Active Problem List    Diagnosis Date Noted    Left foot infection 08/24/2017    Hyponatremia 08/24/2017    ALEX (acute kidney injury) ([facility] 08/24/2017    GERD (gastroesophageal reflux disease) 08/24/2017    Chronic systolic heart failure ([address] 07/31/2017    Hypokalemia 07/23/2017    History of Clostridium difficile infection 07/22/2017    Non-compliance with treatment 07/22/2017    Bilateral [facility] embolism ([facility] 07/21/2017    Sepsis ([facility] 05/27/2017    ARF (acute renal failure) ([facility] 05/27/2017    Acute osteomyelitis of metatarsal bone of left foot ([facility] 03/21/2017    Osteomyelitis ([facility] 02/24/2017    Edema of left lower extremity 02/17/2017    Diabetic infection of right foot ([facility] 02/16/2017    IDDM (insulin dependent diabetes mellitus) ([facility] 10/06/2016    Diabetes ([facility]     HTN (hypertension)     Hypercholesteremia     HTN (hypertension) 07/23/2013        Past Medical History:   Diagnosis Date    Diabetes ([facility]     since age 23    HTN (hypertension)     Hypercholesteremia     Hyperlipidemia       Past Surgical History:   Procedure Laterality Date    HX AMPUTATION      toes- left foot    HX BUNIONECTOMY      HX ORTHOPAEDIC      boutinerre deformity    HX ORTHOPAEDIC      fascia removed left foot    HX OTHER SURGICAL  03/2017    Skin graft Surgery    HX TONSILLECTOMY      HX WISDOM TEETH EXTRACTION       Allergies   Allergen Reactions    Ace Inhibitors Swelling     Facial swelling cough     Morphine Angioedema      Family History   Problem Relation Age of Onset    Hypertension Mother     High Cholesterol Mother       Social History     Social History    Marital status:      Spouse name: N/A    Number of children: N/A    Years of education: N/A     Occupational History    Not on file.      Social History Main Topics    Smoking status: Former Smoker     Types: Cigars     Quit date: 4/22/2011    Smokeless tobacco: Never Used    Alcohol use 0.0 oz/week     1 Glasses of wine, 0 Standard drinks or equivalent per week      Comment: stop drinking 5 months ago    Drug use: No    Sexual activity: Yes     Partners: Female     Birth control/ protection: None     Other Topics Concern    Not on file     Social History Narrative    ** Merged History Encounter **          Current Facility-Administered Medications   Medication Dose Route Frequency    aluminum-magnesium hydroxide (MAALOX) oral suspension 15-30 mL  15-30 mL Oral Q2H PRN    insulin glargine (LANTUS) injection 18 Units  18 Units SubCUTAneous DAILY    fentaNYL (DURAGESIC) 75 mcg/hr patch 1 Patch  1 Patch TransDERmal Q72H    heparin 25,000 units in D5W 250 ml infusion  13-36 Units/kg/hr IntraVENous TITRATE    heparin (porcine) injection 4,700 Units  40 Units/kg IntraVENous PRN    heparin (porcine) injection 9,450 Units  80 Units/kg SubCUTAneous PRN    HYDROmorphone (PF) 15 mg/30 ml (DILAUDID) PCA   IntraVENous CONTINUOUS    metroNIDAZOLE (FLAGYL) IVPB premix 500 mg  500 mg IntraVENous Q8H    ALPRAZolam (XANAX) tablet 0.25 mg  0.25 mg Oral TID PRN    insulin lispro (HUMALOG) injection   SubCUTAneous AC&HS    sodium chloride (NS) flush 10-30 mL  10-30 mL InterCATHeter PRN    sodium chloride (NS) flush 10 mL  10 mL InterCATHeter Q24H    sodium chloride (NS) flush 10 mL  10 mL InterCATHeter PRN    sodium chloride (NS) flush 10-40 mL  10-40 mL InterCATHeter Q8H    sodium chloride (NS) flush 20 mL  20 mL InterCATHeter PRN    heparin (porcine) pf 300 Units  300 Units InterCATHeter PRN    cefepime (MAXIPIME) 2 g in 0.9% sodium chloride (MBP/ADV) 100 mL  2 g IntraVENous Q8H    atorvastatin (LIPITOR) tablet 40 mg  40 mg Oral DAILY    carvedilol (COREG) tablet 6.25 mg  6.25 mg Oral BID WITH MEALS    pantoprazole (PROTONIX) tablet 40 mg  40 mg Oral DAILY    gabapentin (NEURONTIN) capsule 300 mg  300 mg Oral TID    glucose chewable tablet 16 g  4 Tab Oral PRN    dextrose (D50W) injection syrg 12.5-25 g  12.5-25 g IntraVENous PRN    glucagon (GLUCAGEN) injection 1 mg  1 mg IntraMUSCular PRN    nitroglycerin (NITROBID) 2 % ointment 1 Inch  1 Inch Topical Q6H PRN    sodium chloride (NS) flush 5-10 mL  5-10 mL IntraVENous Q8H    sodium chloride (NS) flush 5-10 mL  5-10 mL IntraVENous PRN    acetaminophen (TYLENOL) tablet 650 mg  650 mg Oral Q6H PRN    ondansetron (ZOFRAN) injection 4 mg  4 mg IntraVENous Q4H PRN    oxyCODONE-acetaminophen (PERCOCET 10)  mg per tablet 1 Tab  1 Tab Oral Q6H PRN    HYDROmorphone (PF) (DILAUDID) injection 0.5 mg  0.5 mg IntraVENous Q3H PRN    vancomycin (VANCOCIN) 1750 mg in  ml infusion  1,750 mg IntraVENous Q18H        Prior to Admission Medications:  Prior to Admission medications    Medication Sig Start Date End Date Taking? Authorizing Provider   metFORMIN (GLUCOPHAGE) 500 mg tablet TAKE 1 TABLET BY MOUTH TWICE A DAY WITH MEALS AND INCREASE AS DIRECTED 8/8/17  Yes Audi Shoemaker MD   insulin lispro (HUMALOG) 100 unit/mL kwikpen 12 Units by SubCUTAneous route Before breakfast, lunch, and dinner. DX: E11.65 8/8/17  Yes Audi Shoemaker MD   apixaban (ELIQUIS) 5 mg tablet Take 2 Tabs by mouth every twelve (12) hours. 8/8/17  Yes Audi Shoemaker MD   atorvastatin (LIPITOR) 40 mg tablet Take 1 Tab by mouth daily. 7/31/17  Yes Audi Shoemaker MD   carvedilol (COREG) 6.25 mg tablet Take 1 Tab by mouth two (2) times daily (with meals). 7/26/17  Yes Audi Shoemaker MD   hydrALAZINE (APRESOLINE) 25 mg tablet Take 1 Tab by mouth three (3) times daily. 7/26/17  Yes Audi Shoemaker MD   isosorbide mononitrate ER (IMDUR) 30 mg tablet Take 1 Tab by mouth daily. 7/26/17  Yes Audi Shoemaker MD   spironolactone (ALDACTONE) 25 mg tablet Take 1 Tab by mouth daily. 7/26/17  Yes Audi Shoemaker MD   gabapentin (NEURONTIN) 300 mg capsule Take 1 Cap by mouth three (3) times daily. 7/12/17  Yes Audi Shoemaker MD   diphenhydrAMINE (ALLERGY RELIEF,DIPHENHYDRAMIN,) 25 mg capsule Take 25 mg by mouth nightly as needed. Yes Historical Provider   insulin detemir (LEVEMIR FLEXTOUCH) 100 unit/mL (3 mL) inpn 50 Units by SubCUTAneous route nightly. 60 units QHS  Patient taking differently: 50 Units by SubCUTAneous route nightly. 6/3/17  Yes Renee Pham MD   oxyCODONE-acetaminophen (PERCOCET 10)  mg per tablet Take 1 Tab by mouth every six (6) hours as needed. Max Daily Amount: 4 Tabs. Patient taking differently: Take 1 Tab by mouth every six (6) hours as needed for Pain.  3/2/17  Yes Audi Shoemaker MD esomeprazole (NEXIUM) 40 mg capsule Take 1 Cap by mouth daily. 10/6/16  Yes Carmencita Rojas MD   sodium chloride (SALINE NASAL) 0.65 % nasal spray 1 [place] by Both Nostrils route as needed for Congestion. Patient taking differently: 1 Spray by Both Nostrils route as needed for Congestion (dry sinus symptoms, especially during winter time. ).  10/6/16  Yes Carmencita Rojas MD        Review of Symptoms: As noted in H&P:  \"Total of 12 systems reviewed as follows:                                                                      POSITIVE= underlined text  Negative = text not underlined  General:                                         fever, chills, sweats, generalized weakness, weight loss/gain,                                                                     loss of appetite   Eyes:                                                         blurred vision, eye pain, loss of vision, double vision  ENT:                                                          rhinorrhea, pharyngitis   Respiratory:                                   cough, sputum production, SOB, GARCIA, wheezing, pleuritic pain   Cardiology:                                    chest pain, palpitations, orthopnea, PND, edema, syncope   Gastrointestinal:                 abdominal pain , N/V, diarrhea, dysphagia, constipation, bleeding   Genitourinary:                     frequency, urgency, dysuria, hematuria, incontinence   Muskuloskeletal :                arthralgia, myalgia, back pain  Hematology:                                  easy bruising, nose or gum bleeding, lymphadenopathy   Dermatological:                   Left foot ulceration / wound at amputation site  Endocrine:                                     hot flashes or polydipsia   Neurological:                       headache, dizziness, confusion, focal weakness, paresthesia,                                                                    Speech difficulties, memory loss, gait difficulty  Psychological:                    Feelings of anxiety, depression, agitation\". 24 hr VS reviewed, overall VSSAF  Temp (24hrs), Av.3 °F (36.8 °C), Min:97.8 °F (36.6 °C), Max:98.8 °F (37.1 °C)    Patient Vitals for the past 8 hrs:   Pulse   17 1731 90   17 1700 92   17 1652 94   17 1630 90   17 1600 91   17 1530 96   17 1500 95   17 1430 94   17 1400 97   17 1330 (!) 112   17 1300 98   17 1201 (!) 105   17 1130 (!) 104   17 1126 (!) 103   17 1118 99   17 1114 99   17 1111 98   17 1106 (!) 102   17 1104 (!) 105   17 1101 (!) 108   17 1100 (!) 110   17 1058 (!) 109   17 1043 (!) 114   17 1040 (!) 122   17 1035 (!) 114    Patient Vitals for the past 8 hrs:   Resp   17 1630 18    Patient Vitals for the past 8 hrs:   BP   17 1731 138/77   17 1700 112/77   17 1630 114/79   17 1600 104/88   17 1530 97/72   17 1500 106/82   17 1430 99/77   17 1400 112/70   17 1330 (!) 116/103   17 1300 119/76   17 1201 (!) 111/94   17 1130 99/85   17 1126 105/82   17 1123 114/81   17 1118 105/71   17 1114 103/65   17 1111 92/57   17 1106 102/65   17 1104 106/63   17 1101 93/63   17 1100 91/71   17 1058 (!) 89/56   17 1043 112/70   17 1040 134/90   17 1035 (!) 120/103   17 1022 134/90          Intake/Output Summary (Last 24 hours) at 17 1801  Last data filed at 17 1516   Gross per 24 hour   Intake           2484.5 ml   Output             1200 ml   Net           1284.5 ml         Physical Exam (complete single organ system exam)    Cons: The patient is no distress. Appears stated age. HEENT: Normal conjunctivae and palate. No xanthelasma. Neck: Flat JVP without appreciable HJR.   Resp: Normal respiratory effort with clear lungs bilaterally. CV: Regular rate and rhythm. PMI not palpated. Normal S1,S2  No gallop or rubs appreciated. Holosystolic murmur appreciated. Intact carotid upstroke bilaterally without appreciated bruits. Abdominal aorta not palpated; no abdominal bruit noted. Decreased pedal pulses; L BKA. No peripheral edema. GI: No abd mass noted, soft; no organomegaly noted. Bowel sounds present. Muscular:  No significant kyphosis. Strength WNL for age. Ext: No cyanosis, clubbing, or stigmata of peripheral embolization. Derm: No ulcers or stasis dermatitis of lower extremities. Neuro: Alert and oriented x 3;  Grossly non-focal. Normal mood and affect.        Labs:   Recent Results (from the past 24 hour(s))   GLUCOSE, POC    Collection Time: 08/29/17  9:42 PM   Result Value Ref Range    Glucose (POC) 171 (H) 65 - 100 mg/dL    Performed by Panfilo Irwin    PTT    Collection Time: 08/29/17 10:35 PM   Result Value Ref Range    aPTT 46.0 (H) 22.1 - 32.5 sec    aPTT, therapeutic range     58.0 - 25.3 SECS   METABOLIC PANEL, BASIC    Collection Time: 08/30/17  5:44 AM   Result Value Ref Range    Sodium 133 (L) 136 - 145 mmol/L    Potassium 4.0 3.5 - 5.1 mmol/L    Chloride 102 97 - 108 mmol/L    CO2 23 21 - 32 mmol/L    Anion gap 8 5 - 15 mmol/L    Glucose 204 (H) 65 - 100 mg/dL    BUN 18 6 - 20 MG/DL    Creatinine 1.63 (H) 0.70 - 1.30 MG/DL    BUN/Creatinine ratio 11 (L) 12 - 20      GFR est AA 55 (L) >60 ml/min/1.73m2    GFR est non-AA 45 (L) >60 ml/min/1.73m2    Calcium 7.6 (L) 8.5 - 10.1 MG/DL   CBC WITH AUTOMATED DIFF    Collection Time: 08/30/17  5:44 AM   Result Value Ref Range    WBC 16.5 (H) 4.1 - 11.1 K/uL    RBC 3.50 (L) 4.10 - 5.70 M/uL    HGB 7.6 (L) 12.1 - 17.0 g/dL    HCT 24.9 (L) 36.6 - 50.3 %    MCV 71.1 (L) 80.0 - 99.0 FL    MCH 21.7 (L) 26.0 - 34.0 PG    MCHC 30.5 30.0 - 36.5 g/dL    RDW 16.9 (H) 11.5 - 14.5 %    PLATELET 008 (H) 990 - 400 K/uL    NEUTROPHILS 79 (H) 32 - 75 % LYMPHOCYTES 14 12 - 49 %    MONOCYTES 6 5 - 13 %    EOSINOPHILS 1 0 - 7 %    BASOPHILS 0 0 - 1 %    ABS. NEUTROPHILS 13.0 (H) 1.8 - 8.0 K/UL    ABS. LYMPHOCYTES 2.3 0.8 - 3.5 K/UL    ABS. MONOCYTES 1.0 0.0 - 1.0 K/UL    ABS. EOSINOPHILS 0.2 0.0 - 0.4 K/UL    ABS. BASOPHILS 0.0 0.0 - 0.1 K/UL    RBC COMMENTS MICROCYTOSIS  PRESENT        RBC COMMENTS OVALOCYTES  PRESENT        RBC COMMENTS ROULEAUX  PRESENT        RBC COMMENTS HYPOCHROMIA  1+        RBC COMMENTS RBC FRAGMENTS      DF SMEAR SCANNED     PTT    Collection Time: 08/30/17  5:44 AM   Result Value Ref Range    aPTT 54.1 (H) 22.1 - 32.5 sec    aPTT, therapeutic range     58.0 - 77.0 SECS   GLUCOSE, POC    Collection Time: 08/30/17  7:11 AM   Result Value Ref Range    Glucose (POC) 226 (H) 65 - 100 mg/dL    Performed by More Childs    GLUCOSE, POC    Collection Time: 08/30/17 10:58 AM   Result Value Ref Range    Glucose (POC) 220 (H) 65 - 100 mg/dL    Performed by More Childs    PTT    Collection Time: 08/30/17  1:13 PM   Result Value Ref Range    aPTT 48.8 (H) 22.1 - 32.5 sec    aPTT, therapeutic range     58.0 - 77.0 SECS   GLUCOSE, POC    Collection Time: 08/30/17  4:18 PM   Result Value Ref Range    Glucose (POC) 286 (H) 65 - 100 mg/dL    Performed by Moredanitza Childs      No results for input(s): CPK, CKMB, CKNDX, TROIQ in the last 72 hours.     Justin Tolliver MD

## 2017-08-30 NOTE — PROGRESS NOTES
Surgical Progress Note - Grayson Sandhoff ACNP-BC   2017 8:49 AM       Problem List:     Active Problems:    HTN (hypertension) (2013)      Diabetes ([facility] ()      Overview: since age 23      Hypercholesteremia ()      Sepsis ([address] (2017)      Bilateral pulmonary embolism ([address] (2017)      Chronic systolic heart failure ([address] (2017)      Left foot infection (2017)      Hyponatremia (2017)      ALEX (acute kidney injury) ([address] (2017)      GERD (gastroesophageal reflux disease) (2017)           Subjective:     Patient is POD2 from a left BKA. History of bilateral PE. Patient continues to complain of pain. Nursing Data:     Visit Vitals    /90    Pulse (!) 101    Temp 98.8 °F (37.1 °C)    Resp 16    Ht 6' 3\" (1.905 m)    Wt 113.5 kg (250 lb 3.2 oz)    SpO2 100%    BMI 31.27 kg/m2      ---------------------------------------------------------------------------------------------------------  Temp (24hrs), Av.8 °F (37.1 °C), Min:98.3 °F (36.8 °C), Max:99.8 °F (37.7 °C)    ---------------------------------------------------------------------------------------------------------    Intake/Output Summary (Last 24 hours) at 17 0849  Last data filed at 17 0659   Gross per 24 hour   Intake           3284.5 ml   Output             1800 ml   Net           1484.5 ml       Exam:   General: NAD  Cardio/Pulm: Regular and even. Abd: Soft NT,ND. Extremities:  Dressing to left stump w small amount of drainage on the lateral aspect. Neuro: A&O x 3.       Lab Review:     Recent Labs      17   0544  17   1649   WBC  16.5*  18.5*   HGB  7.6*  8.3*   HCT  24.9*  27.1*   PLT  405*  378     Recent Labs      17   0544  17   0524   NA  133*  135*   K  4.0  3.8   CL  102  104   CO2  23  23   BUN  18  10   CREA  1.63*  1.33*   GLU  204*  235*   CA  7.6*  7.8*     No results for input(s): SGOT, GPT, AP, TBIL, TP, ALB, GLOB, GGT, AML, LPSE in the last 72 hours. No lab exists for component: AMYP, HLPSE         Assessment:        PVD w left BKA secondary to osteomylitis from a DM foot ulcer. DM   Bilateral PE. Acute Blood Loss Anemia. Hyponatremia. A/CKD   Sepsis resolved     Plan:     Patient initiated on a heparin drip for bilateral PE. Slight drop in H & H overnight. Dressing to stump w minimal amount of drainage. Continue PT/OT. Defer blood glucose management to primary team.  Slight increase in creatinine overnight. Anemia may be a contributing factor. Not at levels to transfuse. Not safe to switch to Eliquis yet. Continue heparin. Prosthetist to fit patient for limb guard today or tomorrow. Modify pain management w Fentanyl patch since pain control will be a limiting factor for discharge. Will attempt to wean PCA in the am.  Dispo: [facility] consult today.

## 2017-08-30 NOTE — PROGRESS NOTES
RN continues to request deferral of PT after following up x2 given orthostatic hypotension and medical lability seen during OT evaluation. Will f/u tomorrow. Caryl Nyhan PT, DPT.

## 2017-08-30 NOTE — PROGRESS NOTES
Occupational Therapy Goals  Initiated 8/30/2017  1. Patient will perform grooming standing at sink with minimal assistance/contact guard assist within 7 day(s). 2.  Patient will perform sponge bathing with minimal assistance/contact guard assist within 7 day(s). 3.  Patient will perform lower body dressing with minimal assistance/contact guard assist within 7 day(s). 4.  Patient will perform toilet/BSC transfers with supervision/set-up within 7 day(s). 5.  Patient will perform all aspects of toileting with minimal assistance/contact guard assist within 7 day(s). Occupational Therapy EVALUATION  Patient: William Hooper (25 y.o. male)  Date: 8/30/2017  Primary Diagnosis: Sepsis ([facility] [address]  PVD  Procedure(s) (LRB):  LEFT BELOW KNEE AMPUTATION  (Left) 2 Days Post-Op   Precautions:  Contact, Fall (Orthostatic)    ASSESSMENT : Cleared by nursing to see patient for OT evaluation. Based on the objective data described below, the patient presents with new L BKA, orthostatic hypotension, GW, decreased activity tolerance and decreased balance which is impairing his functional independence. Patient initially tolerating session well, but BP became increasingly hypotensive after seated up in chair, requiring patient to be assisted back to bed. Patient's BP also slow to recover once supine, with bed in Trendelenburg. Nurse present once patient back supine in bed and assisting to monitor VSS with this OT. Patient is well below his independent functional baseline, now supervision to max A for ADLs and is supervision to min A for functional mobility. Patient will benefit from skilled intervention to address the above impairments.   Patients rehabilitation potential is considered to be Good  Factors which may influence rehabilitation potential include:   []             None noted  []             Mental ability/status  [x]             Medical condition  []             Home/family situation and support systems  [] Safety awareness  []             Pain tolerance/management  []             Other:      PLAN :  Recommendations and Planned Interventions:  [x]               Self Care Training                  []        Therapeutic Activities  [x]               Functional Mobility Training    []        Cognitive Retraining  [x]               Therapeutic Exercises           [x]        Endurance Activities  [x]               Balance Training                   []        Neuromuscular Re-Education  []               Visual/Perceptual Training     [x]   Home Safety Training  [x]               Patient Education                 [x]        Family Training/Education  []               Other (comment):    Frequency/Duration: Patient will be followed by occupational therapy 5 times per week to address goals.   Discharge Recommendations: Inpatient Rehab  Further Equipment Recommendations for Discharge: TBD       OBJECTIVE DATA SUMMARY:     Past Medical History:   Diagnosis Date    Diabetes ([facility] [address]     since age 23    HTN (hypertension)     Hypercholesteremia     Hyperlipidemia      Past Surgical History:   Procedure Laterality Date    HX AMPUTATION      toes- left foot    HX BUNIONECTOMY      HX ORTHOPAEDIC      boutinerre deformity    HX ORTHOPAEDIC      fascia removed left foot    HX OTHER SURGICAL  03/2017    Skin graft Surgery    HX TONSILLECTOMY      HX WISDOM TEETH EXTRACTION       Prior Level of Function/Home Situation: Independent and working as an    Expanded or extensive additional review of patient history:     Home Situation  Home Environment: Private residence  # Steps to Enter: 5  Rails to Enter: Yes  [employer] : Right  Wheelchair Ramp: No  One/Two Story Residence: Two story  # of Interior Steps: 21  Interior Rails: Right  Lift Chair Available: No  Living Alone: No  Support Systems: Family member(s), Friends \ neighbors  Patient Expects to be Discharged to[de-identified] Private residence  Current DME Used/Available at Home: Walker, rolling  [x]  Right hand dominant   []  Left hand dominant  Cognitive/Behavioral Status:  Neurologic State: Alert  Orientation Level: Oriented X4  Cognition: Appropriate decision making; Appropriate for age attention/concentration; Appropriate safety awareness; Follows commands        Safety/Judgement: Insight into deficits    Vision/Perceptual:    Acuity: Within Defined Limits       Range of Motion:  AROM: Within functional limits     Strength:  Strength: Within functional limits  Coordination:  Coordination: Within functional limits          Tone & Sensation:  Tone: Normal  Balance:  Sitting: Intact  Standing: Impaired (with support of RW)  Standing - Static: Good  Standing - Dynamic : Fair  Functional Mobility and Transfers for ADLs:  Bed Mobility:  Rolling: Supervision  Supine to Sit: Supervision  Sit to Supine: Contact guard assistance  Scooting: Supervision  Transfers:  Sit to Stand: Minimum assistance     Bed to Chair: Contact guard assistance (pivoting on RLE with support of RW to,CGA squat pivot to bed)                         ADL Assessment:  Feeding: Independent    Oral Facial Hygiene/Grooming: Contact guard assistance (seated)    Bathing: Moderate assistance    Upper Body Dressing: Supervision;Setup (if seated)    Lower Body Dressing: Maximum assistance    Toileting: Moderate assistance                Functional Measure:  Barthel Index:    Bathin  Bladder: 10  Bowels: 10  Groomin  Dressin  Feeding: 10  Mobility: 0  Stairs: 0  Toilet Use: 0  Transfer (Bed to Chair and Back): 10  Total: 45       Barthel and G-code impairment scale:  Percentage of impairment CH  0% CI  1-19% CJ  20-39% CK  40-59% CL  60-79% CM  80-99% CN  100%   Barthel Score 0-100 100 99-80 79-60 59-40 20-39 1-19   0   Barthel Score 0-20 20 17-19 13-16 9-12 5-8 1-4 0      The Barthel ADL Index: Guidelines  1. The index should be used as a record of what a patient does, not as a record of what a patient could do.   2. The main aim is to establish degree of independence from any help, physical or verbal, however minor and for whatever reason. 3. The need for supervision renders the patient not independent. 4. A patient's performance should be established using the best available evidence. Asking the patient, friends/relatives and nurses are the usual sources, but direct observation and common sense are also important. However direct testing is not needed. 5. Usually the patient's performance over the preceding 24-48 hours is important, but occasionally longer periods will be relevant. 6. Middle categories imply that the patient supplies over 50 per cent of the effort. 7. Use of aids to be independent is allowed. John Hicks., Barthel, D.W. (4666). Functional evaluation: the Barthel Index. [address] [facility] (14)2. Ronak Bitter justin [place], J.J.M.F, Yin Freed., Ramos Novoa., [facility], [address] [place] [address] (1999). Measuring the change indisability after inpatient rehabilitation; comparison of the responsiveness of the Barthel Index and Functional [place] Measure. Journal of Neurology, Neurosurgery, and Psychiatry, 66(4), 504-090. Pio Evans, N.J.A, AWILDA Baum, & Shaheen Oliveira, M.A. (2004.) Assessment of post-stroke quality of life in cost-effectiveness studies: The usefulness of the Barthel Index and the EuroQoL-5D. Quality of Life Research, 13, 427-43       In compliance with CMSs Claims Based Outcome Reporting, the following G-code set was chosen for this patient based on their primary functional limitation being treated: The outcome measure chosen to determine the severity of the functional limitation was the Barthel Index with a score of 45/100 which was correlated with the impairment scale. ?  Self Care:     - CURRENT STATUS: CK - 40%-59% impaired, limited or restricted    - GOAL STATUS:  CJ - 20%-39% impaired, limited or restricted    - D/C STATUS:  ---------------To be determined--------------- Intervention and task modifications:  Initiated transfer training. Pain:  Pain Scale 1: Numeric (0 - 10)  Pain Intensity 1: 3  Pain Location 1: Leg  Pain Orientation 1: Left  Pain Description 1: Aching; Throbbing  Pain Intervention(s) 1: Encouraged PCA;Repositioned  Activity Tolerance:  See VS taken between 10:22 and 11:14  Please refer to the flowsheet for vital signs taken during this treatment. After treatment:   [] Patient left in no apparent distress sitting up in chair  [x] Patient left in no apparent distress in bed  [x] Call bell left within reach  [x] Nursing notified  [] Caregiver present  [] Bed alarm activated    COMMUNICATION/EDUCATION:   The patients plan of care was discussed with: Physical Therapist and Registered Nurse. [x] Home safety education was provided and the patient/caregiver indicated understanding. [x] Patient/family have participated as able in goal setting and plan of care. [x] Patient/family agree to work toward stated goals and plan of care. [] Patient understands intent and goals of therapy, but is neutral about his/her participation. [] Patient is unable to participate in goal setting and plan of care. This patients plan of care is appropriate for delegation to [facility].     Thank you for this referral.  Jake Guerra, OTR/L  Time Calculation: 55 mins

## 2017-08-30 NOTE — DIABETES MGMT
DTC Progress Note    Recommendations/ Comments: Noted Lantus dose increased to 18 units today, please continue to titrate dose until fasting BG <200mg/dL. Also please consider resuming prandial insulin, Humalog 9 units ac tid. Chart reviewed on Oris Cassandra for hyperglycemia. Patient is a 49 y.o. male with known history of Type 2 Diabetes on Metformin 500mg bid, Humalog 12 units ac tid, and Levemir 50 units nightly at home. A1c:   Lab Results   Component Value Date/Time    Hemoglobin A1c 11.1 07/12/2017 12:00 PM    Hemoglobin A1c 12.5 05/28/2017 03:18 AM       Recent Glucose Results:   Lab Results   Component Value Date/Time     (H) 08/30/2017 05:44 AM    GLUCPOC 226 (H) 08/30/2017 07:11 AM    GLUCPOC 171 (H) 08/29/2017 09:42 PM    GLUCPOC 148 (H) 08/29/2017 04:59 PM        Lab Results   Component Value Date/Time    Creatinine 1.63 08/30/2017 05:44 AM     Estimated Creatinine Clearance: 75.3 mL/min (based on Cr of 1.63). Active Orders   Diet    DIET DIABETIC CONSISTENT CARB Regular        PO intake:   Patient Vitals for the past 72 hrs:   % Diet Eaten   08/29/17 1915 50 %   08/27/17 1714 100 %   08/27/17 1331 90 %   08/27/17 0945 100 %       Current hospital DM medication:   -Lantus 18 units daily   -Humalog insulin resistant correction    Will continue to follow as needed.     Thank you    Fletcher Guthrie, [address]  Office: [phone]

## 2017-08-30 NOTE — PROGRESS NOTES
ot 120/103 and then patch was placed and pt became hypotensive before the patch-will have cardio elvaluate  Pt has been hypertensive and hypotensive after OT

## 2017-08-30 NOTE — PROGRESS NOTES
General Daily Progress Note    Admit Date: 8/24/2017  Hospital day several    Subjective:     Pt denies any cp sob still abit anxious about his progress s/w nursing on pca pump pain control so so   Medication side effects: none    Current Facility-Administered Medications   Medication Dose Route Frequency    aluminum-magnesium hydroxide (MAALOX) oral suspension 15-30 mL  15-30 mL Oral Q2H PRN    cloNIDine (CATAPRES) 0.1 mg/24 hr patch 1 Patch  1 Patch TransDERmal Q7D    heparin 25,000 units in D5W 250 ml infusion  13-36 Units/kg/hr IntraVENous TITRATE    heparin (porcine) injection 4,700 Units  40 Units/kg IntraVENous PRN    heparin (porcine) injection 9,450 Units  80 Units/kg SubCUTAneous PRN    insulin glargine (LANTUS) injection 14 Units  14 Units SubCUTAneous DAILY    HYDROmorphone (PF) 15 mg/30 ml (DILAUDID) PCA   IntraVENous CONTINUOUS    metroNIDAZOLE (FLAGYL) IVPB premix 500 mg  500 mg IntraVENous Q8H    ALPRAZolam (XANAX) tablet 0.25 mg  0.25 mg Oral TID PRN    insulin lispro (HUMALOG) injection   SubCUTAneous AC&HS    sodium chloride (NS) flush 10-30 mL  10-30 mL InterCATHeter PRN    sodium chloride (NS) flush 10 mL  10 mL InterCATHeter Q24H    sodium chloride (NS) flush 10 mL  10 mL InterCATHeter PRN    sodium chloride (NS) flush 10-40 mL  10-40 mL InterCATHeter Q8H    sodium chloride (NS) flush 20 mL  20 mL InterCATHeter PRN    heparin (porcine) pf 300 Units  300 Units InterCATHeter PRN    cefepime (MAXIPIME) 2 g in 0.9% sodium chloride (MBP/ADV) 100 mL  2 g IntraVENous Q8H    atorvastatin (LIPITOR) tablet 40 mg  40 mg Oral DAILY    carvedilol (COREG) tablet 6.25 mg  6.25 mg Oral BID WITH MEALS    pantoprazole (PROTONIX) tablet 40 mg  40 mg Oral DAILY    gabapentin (NEURONTIN) capsule 300 mg  300 mg Oral TID    glucose chewable tablet 16 g  4 Tab Oral PRN    dextrose (D50W) injection syrg 12.5-25 g  12.5-25 g IntraVENous PRN    glucagon (GLUCAGEN) injection 1 mg  1 mg IntraMUSCular PRN    nitroglycerin (NITROBID) 2 % ointment 1 Inch  1 Inch Topical Q6H PRN    sodium chloride (NS) flush 5-10 mL  5-10 mL IntraVENous Q8H    sodium chloride (NS) flush 5-10 mL  5-10 mL IntraVENous PRN    acetaminophen (TYLENOL) tablet 650 mg  650 mg Oral Q6H PRN    ondansetron (ZOFRAN) injection 4 mg  4 mg IntraVENous Q4H PRN    oxyCODONE-acetaminophen (PERCOCET 10)  mg per tablet 1 Tab  1 Tab Oral Q6H PRN    HYDROmorphone (PF) (DILAUDID) injection 0.5 mg  0.5 mg IntraVENous Q3H PRN    vancomycin (VANCOCIN) 1750 mg in  ml infusion  1,750 mg IntraVENous Q18H    0.9% sodium chloride infusion  75 mL/hr IntraVENous CONTINUOUS        Review of Systems  A comprehensive review of systems was negative except for that written in the HPI. Objective:     Patient Vitals for the past 8 hrs:   BP Temp Pulse Resp SpO2   08/30/17 0340 115/85 98.8 °F (37.1 °C) 95 16 100 %        08/28 1901 - 08/30 0700  In: 5351.2 [P.O.:240; I.V.:5111.2]  Out: 3450 [Urine:2850]    Physical Exam: General appearance: alert, cooperative, no distress, appears stated age, anxious  Lungs: clear to auscultation bilaterally  Heart: regular rate and rhythm  Abdomen: soft, non-tender.  Bowel sounds normal. No masses,  no organomegaly  Extremities: no edema, redness or tenderness in the calves or thighs  Foot bandaged did not remove           Data Review   Recent Results (from the past 24 hour(s))   GLUCOSE, POC    Collection Time: 08/29/17  8:26 AM   Result Value Ref Range    Glucose (POC) 224 (H) 65 - 100 mg/dL    Performed by Alexys León    GLUCOSE, POC    Collection Time: 08/29/17 11:10 AM   Result Value Ref Range    Glucose (POC) 211 (H) 65 - 100 mg/dL    Performed by Alexys León    CBC WITH AUTOMATED DIFF    Collection Time: 08/29/17  4:49 PM   Result Value Ref Range    WBC 18.5 (H) 4.1 - 11.1 K/uL    RBC 3.75 (L) 4.10 - 5.70 M/uL    HGB 8.3 (L) 12.1 - 17.0 g/dL    HCT 27.1 (L) 36.6 - 50.3 %    MCV 72.3 (L) 80.0 - 99.0 FL    MCH 22.1 (L) 26.0 - 34.0 PG    MCHC 30.6 30.0 - 36.5 g/dL    RDW 16.6 (H) 11.5 - 14.5 %    PLATELET 539 042 - 329 K/uL    NEUTROPHILS 84 (H) 32 - 75 %    LYMPHOCYTES 10 (L) 12 - 49 %    MONOCYTES 6 5 - 13 %    EOSINOPHILS 0 0 - 7 %    BASOPHILS 0 0 - 1 %    ABS. NEUTROPHILS 15.5 (H) 1.8 - 8.0 K/UL    ABS. LYMPHOCYTES 1.9 0.8 - 3.5 K/UL    ABS. MONOCYTES 1.1 (H) 0.0 - 1.0 K/UL    ABS. EOSINOPHILS 0.0 0.0 - 0.4 K/UL    ABS.  BASOPHILS 0.0 0.0 - 0.1 K/UL    DF SMEAR SCANNED      RBC COMMENTS MICROCYTOSIS  2+        RBC COMMENTS HYPOCHROMIA  2+        RBC COMMENTS ANISOCYTOSIS  1+       PTT    Collection Time: 08/29/17  4:49 PM   Result Value Ref Range    aPTT 35.0 (H) 22.1 - 32.5 sec    aPTT, therapeutic range     58.0 - 77.0 SECS   GLUCOSE, POC    Collection Time: 08/29/17  4:59 PM   Result Value Ref Range    Glucose (POC) 148 (H) 65 - 100 mg/dL    Performed by Angelita Sue    GLUCOSE, POC    Collection Time: 08/29/17  9:42 PM   Result Value Ref Range    Glucose (POC) 171 (H) 65 - 100 mg/dL    Performed by Josselyn Sharp    PTT    Collection Time: 08/29/17 10:35 PM   Result Value Ref Range    aPTT 46.0 (H) 22.1 - 32.5 sec    aPTT, therapeutic range     58.0 - 97.5 SECS   METABOLIC PANEL, BASIC    Collection Time: 08/30/17  5:44 AM   Result Value Ref Range    Sodium 133 (L) 136 - 145 mmol/L    Potassium 4.0 3.5 - 5.1 mmol/L    Chloride 102 97 - 108 mmol/L    CO2 23 21 - 32 mmol/L    Anion gap 8 5 - 15 mmol/L    Glucose 204 (H) 65 - 100 mg/dL    BUN 18 6 - 20 MG/DL    Creatinine 1.63 (H) 0.70 - 1.30 MG/DL    BUN/Creatinine ratio 11 (L) 12 - 20      GFR est AA 55 (L) >60 ml/min/1.73m2    GFR est non-AA 45 (L) >60 ml/min/1.73m2    Calcium 7.6 (L) 8.5 - 10.1 MG/DL   CBC WITH AUTOMATED DIFF    Collection Time: 08/30/17  5:44 AM   Result Value Ref Range    WBC 16.5 (H) 4.1 - 11.1 K/uL    RBC 3.50 (L) 4.10 - 5.70 M/uL    HGB 7.6 (L) 12.1 - 17.0 g/dL    HCT 24.9 (L) 36.6 - 50.3 %    MCV 71.1 (L) 80.0 - 99.0 FL MCH 21.7 (L) 26.0 - 34.0 PG    MCHC 30.5 30.0 - 36.5 g/dL    RDW 16.9 (H) 11.5 - 14.5 %    PLATELET 811 (H) 003 - 400 K/uL    NEUTROPHILS 79 (H) 32 - 75 %    LYMPHOCYTES 14 12 - 49 %    MONOCYTES 6 5 - 13 %    EOSINOPHILS 1 0 - 7 %    BASOPHILS 0 0 - 1 %    ABS. NEUTROPHILS 13.0 (H) 1.8 - 8.0 K/UL    ABS. LYMPHOCYTES 2.3 0.8 - 3.5 K/UL    ABS. MONOCYTES 1.0 0.0 - 1.0 K/UL    ABS. EOSINOPHILS 0.2 0.0 - 0.4 K/UL    ABS.  BASOPHILS 0.0 0.0 - 0.1 K/UL    RBC COMMENTS MICROCYTOSIS  PRESENT        RBC COMMENTS OVALOCYTES  PRESENT        RBC COMMENTS ROULEAUX  PRESENT        RBC COMMENTS HYPOCHROMIA  1+        RBC COMMENTS RBC FRAGMENTS      DF SMEAR SCANNED     PTT    Collection Time: 08/30/17  5:44 AM   Result Value Ref Range    aPTT 54.1 (H) 22.1 - 32.5 sec    aPTT, therapeutic range     58.0 - 77.0 SECS   GLUCOSE, POC    Collection Time: 08/30/17  7:11 AM   Result Value Ref Range    Glucose (POC) 226 (H) 65 - 100 mg/dL    Performed by Kali Lucas            Assessment:     Active Problems:    HTN (hypertension) (7/23/2013)      Diabetes ([address] ()      Overview: since age 23      Hypercholesteremia ()      Sepsis ([address] (5/27/2017)      Bilateral pulmonary embolism ([address] (7/21/2017)      Chronic systolic heart failure ([address] (7/31/2017)      Left foot infection (8/24/2017)      Hyponatremia (8/24/2017)      ALEX (acute kidney injury) ([address] (8/24/2017)      GERD (gastroesophageal reflux disease) (8/24/2017)        Plan:    on heparin change to po eliquis per vascular team  Continue home bp meds adjusted slightly yesterday will add on clondine patch  Continue ISS adjusted lantus again   Continue abx  Gentle ivh and monitor renal fxn  Pt  is not very compliant with diet -chips noted in room

## 2017-08-30 NOTE — PROGRESS NOTES
Bedside and Verbal shift change report given to Marybeth Falcon RN (oncoming nurse) by Brittney Shaw / Priya Cole RN (offgoing nurse). Report included the following information SBAR, Kardex, ED Summary, OR Summary, Procedure Summary, Intake/Output, MAR, Recent Results and Cardiac Rhythm NSR/ST.

## 2017-08-30 NOTE — PROGRESS NOTES
Initial Nutrition Assessment:    INTERVENTIONS/RECOMMENDATIONS:   · Meals/Snacks: General/healthful diet: Continue CCD for blood glucose management. · Supplements: Commercial supplement: Continue Glucerna BID. ASSESSMENT:   Chart reviewed, medically noted for sepsis and L BKA (8/28). Significant PMH includes HTN, DM, CHF, ALEX, GERD. Visited pt at bedside. Nurse reports that pt is eating well, noted untouched breakfast tray at bedside. Reminded pt to monitor CHO intake and to be cautious of eating outside foods while in hospital. Discussed importance of blood glucose control and protein intake for wound healing. Will monitor PO intake and follow-up on Glucerna order. Diet Order: Consistent carb  % Eaten:  Patient Vitals for the past 72 hrs:   % Diet Eaten   08/29/17 1915 50 %   08/27/17 1714 100 %   08/27/17 1331 90 %     Pertinent Medications: [x]Reviewed []Other: IVF, Maalox, Lipitor, Insulin  Pertinent Labs: [x]Reviewed []Other: -171-148  Food Allergies: [x]None []Other   Last BM: 8/27   [x]Active     []Hyperactive  []Hypoactive       [] Absent BS  Skin:    [] Intact   [x] Incision  [] Breakdown  [] Other:    Anthropometrics:   Height: 6' 3\" (190.5 cm) Weight: 113.5 kg (250 lb 3.6 oz)   IBW (%IBW):   ( ) UBW (%UBW):   (  %)   Last Weight Metrics:  Weight Loss Metrics 8/30/2017 8/8/2017 7/31/2017 7/24/2017 7/18/2017 7/17/2017 7/13/2017   Today's Wt 250 lb 3.6 oz 278 lb 6.4 oz 285 lb 3.2 oz 288 lb 14.4 oz 280 lb 285 lb 11.2 oz 262 lb   BMI 31.28 kg/m2 34.8 kg/m2 35.65 kg/m2 36.11 kg/m2 35 kg/m2 35.71 kg/m2 32.75 kg/m2       BMI: Body mass index is 31.28 kg/(m^2). This BMI is indicative of:   []Underweight    []Normal    []Overweight    [x] Obesity   [] Extreme Obesity (BMI>40)     Estimated Nutrition Needs (Based on):   2508 Kcals/day ([id number]. 2(AF)) , 113.5 g (1 g/kg bw) Protein  Carbohydrate:  At Least 130 g/day  Fluids: 2500 mL/day (1ml/kcal)    Pt expected to meet estimated nutrient needs: [x]Yes []No    NUTRITION DIAGNOSES:   Problem:  Altered nutrition-related lab values (blood glucose)      Etiology: related to uncontrolled diabetes     Signs/Symptoms: as evidenced by blood glucose levels of [phone]      Problem: Increased protein needs  Etiology: related to wound healing  Signs/Symptoms: as evidenced by left below the knee amputation (8/28)  NUTRITION INTERVENTIONS:  Meals/Snacks: General/healthful diet   Supplements: Commercial supplement              GOAL:   Consume >50% of meals over next 3-5 days. LEARNING NEEDS (Diet, Food/Nutrient-Drug Interaction):    [] None Identified   [x] Identified and Education Provided/Documented: Provided brief education on effects of poor blood glucose control. [] Identified and Pt declined/was not appropriate     Cultureal, Taoist, OR Ethnic Dietary Needs:    [x] None Identified   [] Identified and Addressed     [x] Interdisciplinary Care Plan Reviewed/Documented    [x] Discharge Planning:   Continue CCD for blood glucose management. MONITORING /EVALUATION:      Food/Nutrient Intake Outcomes:  Total energy intake, Carbohydrate intake  Physical Signs/Symptoms Outcomes: Weight/weight change, Glucose profile    NUTRITION RISK:    [] High              [x] Moderate           []  Low  []  Minimal/Uncompromised    PT SEEN FOR:    []  MD Consult: []Calorie Count      []Diabetic Diet Education        []Diet Education     []Electrolyte Management     []General Nutrition Management and Supplements     []Management of Tube Feeding     []TPN Recommendations    []  RN Referral:  []MST score >=2     []Enteral/Parenteral Nutrition PTA     []Pregnant: Gestational DM or Multigestation     []Pressure Ulcer/Wound Care needs        []  Low BMI  [x]  DTR Referral       Zulma Steward V  Dietetic Intern

## 2017-08-31 LAB
ANION GAP SERPL CALC-SCNC: 7 MMOL/L (ref 5–15)
APTT PPP: 55 SEC (ref 22.1–32.5)
APTT PPP: 61.8 SEC (ref 22.1–32.5)
APTT PPP: 69.7 SEC (ref 22.1–32.5)
APTT PPP: 70.9 SEC (ref 22.1–32.5)
BASOPHILS # BLD: 0 K/UL (ref 0–0.1)
BASOPHILS NFR BLD: 0 % (ref 0–1)
BUN SERPL-MCNC: 22 MG/DL (ref 6–20)
BUN/CREAT SERPL: 13 (ref 12–20)
CALCIUM SERPL-MCNC: 7.5 MG/DL (ref 8.5–10.1)
CHLORIDE SERPL-SCNC: 103 MMOL/L (ref 97–108)
CO2 SERPL-SCNC: 24 MMOL/L (ref 21–32)
CREAT SERPL-MCNC: 1.76 MG/DL (ref 0.7–1.3)
DIFFERENTIAL METHOD BLD: ABNORMAL
EOSINOPHIL # BLD: 0.1 K/UL (ref 0–0.4)
EOSINOPHIL NFR BLD: 1 % (ref 0–7)
ERYTHROCYTE [DISTWIDTH] IN BLOOD BY AUTOMATED COUNT: 16.7 % (ref 11.5–14.5)
GLUCOSE BLD STRIP.AUTO-MCNC: 174 MG/DL (ref 65–100)
GLUCOSE BLD STRIP.AUTO-MCNC: 231 MG/DL (ref 65–100)
GLUCOSE BLD STRIP.AUTO-MCNC: 263 MG/DL (ref 65–100)
GLUCOSE BLD STRIP.AUTO-MCNC: 281 MG/DL (ref 65–100)
GLUCOSE SERPL-MCNC: 242 MG/DL (ref 65–100)
HCT VFR BLD AUTO: 25.2 % (ref 36.6–50.3)
HGB BLD-MCNC: 7.9 G/DL (ref 12.1–17)
LYMPHOCYTES # BLD: 1.9 K/UL (ref 0.8–3.5)
LYMPHOCYTES NFR BLD: 13 % (ref 12–49)
MCH RBC QN AUTO: 21.8 PG (ref 26–34)
MCHC RBC AUTO-ENTMCNC: 31.3 G/DL (ref 30–36.5)
MCV RBC AUTO: 69.6 FL (ref 80–99)
MONOCYTES # BLD: 1 K/UL (ref 0–1)
MONOCYTES NFR BLD: 7 % (ref 5–13)
NEUTS SEG # BLD: 11.7 K/UL (ref 1.8–8)
NEUTS SEG NFR BLD: 79 % (ref 32–75)
PLATELET # BLD AUTO: 388 K/UL (ref 150–400)
POTASSIUM SERPL-SCNC: 4 MMOL/L (ref 3.5–5.1)
RBC # BLD AUTO: 3.62 M/UL (ref 4.1–5.7)
RBC MORPH BLD: ABNORMAL
RBC MORPH BLD: ABNORMAL
SERVICE CMNT-IMP: ABNORMAL
SODIUM SERPL-SCNC: 134 MMOL/L (ref 136–145)
THERAPEUTIC RANGE,PTTT: ABNORMAL SECS (ref 58–77)
VANCOMYCIN SERPL-MCNC: 22.5 UG/ML
WBC # BLD AUTO: 14.7 K/UL (ref 4.1–11.1)

## 2017-08-31 PROCEDURE — 74011250637 HC RX REV CODE- 250/637: Performed by: FAMILY MEDICINE

## 2017-08-31 PROCEDURE — 36415 COLL VENOUS BLD VENIPUNCTURE: CPT | Performed by: EMERGENCY MEDICINE

## 2017-08-31 PROCEDURE — 74011250636 HC RX REV CODE- 250/636: Performed by: FAMILY MEDICINE

## 2017-08-31 PROCEDURE — 74011250636 HC RX REV CODE- 250/636: Performed by: SURGERY

## 2017-08-31 PROCEDURE — 85730 THROMBOPLASTIN TIME PARTIAL: CPT | Performed by: FAMILY MEDICINE

## 2017-08-31 PROCEDURE — 74011250637 HC RX REV CODE- 250/637: Performed by: INTERNAL MEDICINE

## 2017-08-31 PROCEDURE — 74011250636 HC RX REV CODE- 250/636: Performed by: HOSPITALIST

## 2017-08-31 PROCEDURE — 74011000258 HC RX REV CODE- 258: Performed by: FAMILY MEDICINE

## 2017-08-31 PROCEDURE — 85025 COMPLETE CBC W/AUTO DIFF WBC: CPT | Performed by: EMERGENCY MEDICINE

## 2017-08-31 PROCEDURE — 74011636637 HC RX REV CODE- 636/637: Performed by: FAMILY MEDICINE

## 2017-08-31 PROCEDURE — 74011250636 HC RX REV CODE- 250/636: Performed by: EMERGENCY MEDICINE

## 2017-08-31 PROCEDURE — 65660000000 HC RM CCU STEPDOWN

## 2017-08-31 PROCEDURE — 97535 SELF CARE MNGMENT TRAINING: CPT

## 2017-08-31 PROCEDURE — 85730 THROMBOPLASTIN TIME PARTIAL: CPT | Performed by: SURGERY

## 2017-08-31 PROCEDURE — 97530 THERAPEUTIC ACTIVITIES: CPT

## 2017-08-31 PROCEDURE — 80048 BASIC METABOLIC PNL TOTAL CA: CPT | Performed by: EMERGENCY MEDICINE

## 2017-08-31 PROCEDURE — 82962 GLUCOSE BLOOD TEST: CPT

## 2017-08-31 PROCEDURE — 80202 ASSAY OF VANCOMYCIN: CPT | Performed by: INTERNAL MEDICINE

## 2017-08-31 PROCEDURE — 74011000250 HC RX REV CODE- 250: Performed by: FAMILY MEDICINE

## 2017-08-31 RX ORDER — HYDROMORPHONE HYDROCHLORIDE 1 MG/ML
2 INJECTION, SOLUTION INTRAMUSCULAR; INTRAVENOUS; SUBCUTANEOUS
Status: DISCONTINUED | OUTPATIENT
Start: 2017-08-31 | End: 2017-09-01

## 2017-08-31 RX ORDER — DOCUSATE SODIUM 100 MG/1
100 CAPSULE, LIQUID FILLED ORAL 2 TIMES DAILY
Status: DISCONTINUED | OUTPATIENT
Start: 2017-08-31 | End: 2017-09-02 | Stop reason: HOSPADM

## 2017-08-31 RX ORDER — INSULIN GLARGINE 100 [IU]/ML
24 INJECTION, SOLUTION SUBCUTANEOUS DAILY
Status: DISCONTINUED | OUTPATIENT
Start: 2017-08-31 | End: 2017-09-02 | Stop reason: HOSPADM

## 2017-08-31 RX ADMIN — HEPARIN SODIUM 20.1 UNITS/KG/HR: 10000 INJECTION, SOLUTION INTRAVENOUS at 21:04

## 2017-08-31 RX ADMIN — GABAPENTIN 300 MG: 300 CAPSULE ORAL at 21:00

## 2017-08-31 RX ADMIN — HYDROMORPHONE HYDROCHLORIDE 0.5 MG: 1 INJECTION, SOLUTION INTRAMUSCULAR; INTRAVENOUS; SUBCUTANEOUS at 23:07

## 2017-08-31 RX ADMIN — HEPARIN SODIUM 19.1 UNITS/KG/HR: 10000 INJECTION, SOLUTION INTRAVENOUS at 09:15

## 2017-08-31 RX ADMIN — Medication 10 ML: at 14:37

## 2017-08-31 RX ADMIN — NITROGLYCERIN 1 INCH: 20 OINTMENT TOPICAL at 03:14

## 2017-08-31 RX ADMIN — CARVEDILOL 6.25 MG: 6.25 TABLET, FILM COATED ORAL at 16:48

## 2017-08-31 RX ADMIN — GABAPENTIN 300 MG: 300 CAPSULE ORAL at 08:18

## 2017-08-31 RX ADMIN — INSULIN LISPRO 8 UNITS: 100 INJECTION, SOLUTION INTRAVENOUS; SUBCUTANEOUS at 12:18

## 2017-08-31 RX ADMIN — METRONIDAZOLE 500 MG: 500 INJECTION, SOLUTION INTRAVENOUS at 12:18

## 2017-08-31 RX ADMIN — VANCOMYCIN HYDROCHLORIDE 1750 MG: 10 INJECTION, POWDER, LYOPHILIZED, FOR SOLUTION INTRAVENOUS at 21:00

## 2017-08-31 RX ADMIN — VANCOMYCIN HYDROCHLORIDE 1750 MG: 10 INJECTION, POWDER, LYOPHILIZED, FOR SOLUTION INTRAVENOUS at 01:01

## 2017-08-31 RX ADMIN — INSULIN GLARGINE 24 UNITS: 100 INJECTION, SOLUTION SUBCUTANEOUS at 08:20

## 2017-08-31 RX ADMIN — Medication 10 ML: at 12:19

## 2017-08-31 RX ADMIN — Medication 10 ML: at 05:40

## 2017-08-31 RX ADMIN — PANTOPRAZOLE SODIUM 40 MG: 40 TABLET, DELAYED RELEASE ORAL at 08:18

## 2017-08-31 RX ADMIN — METRONIDAZOLE 500 MG: 500 INJECTION, SOLUTION INTRAVENOUS at 23:29

## 2017-08-31 RX ADMIN — GABAPENTIN 300 MG: 300 CAPSULE ORAL at 16:47

## 2017-08-31 RX ADMIN — HEPARIN SODIUM 20.1 UNITS/KG/HR: 10000 INJECTION, SOLUTION INTRAVENOUS at 11:11

## 2017-08-31 RX ADMIN — Medication 10 ML: at 21:03

## 2017-08-31 RX ADMIN — DOCUSATE SODIUM 100 MG: 100 CAPSULE, LIQUID FILLED ORAL at 17:44

## 2017-08-31 RX ADMIN — INSULIN LISPRO 6 UNITS: 100 INJECTION, SOLUTION INTRAVENOUS; SUBCUTANEOUS at 17:43

## 2017-08-31 RX ADMIN — LIDOCAINE HYDROCHLORIDE 40 ML: 20 SOLUTION ORAL; TOPICAL at 08:18

## 2017-08-31 RX ADMIN — CEFEPIME HYDROCHLORIDE 2 G: 2 INJECTION, POWDER, FOR SOLUTION INTRAVENOUS at 08:18

## 2017-08-31 RX ADMIN — DOCUSATE SODIUM 100 MG: 100 CAPSULE, LIQUID FILLED ORAL at 12:18

## 2017-08-31 RX ADMIN — CEFEPIME HYDROCHLORIDE 2 G: 2 INJECTION, POWDER, FOR SOLUTION INTRAVENOUS at 16:47

## 2017-08-31 RX ADMIN — CARVEDILOL 6.25 MG: 6.25 TABLET, FILM COATED ORAL at 08:18

## 2017-08-31 RX ADMIN — ATORVASTATIN CALCIUM 40 MG: 40 TABLET, FILM COATED ORAL at 08:18

## 2017-08-31 RX ADMIN — METRONIDAZOLE 500 MG: 500 INJECTION, SOLUTION INTRAVENOUS at 05:39

## 2017-08-31 RX ADMIN — INSULIN LISPRO 8 UNITS: 100 INJECTION, SOLUTION INTRAVENOUS; SUBCUTANEOUS at 08:19

## 2017-08-31 RX ADMIN — HYDROMORPHONE HYDROCHLORIDE 0.5 MG: 1 INJECTION, SOLUTION INTRAMUSCULAR; INTRAVENOUS; SUBCUTANEOUS at 17:44

## 2017-08-31 NOTE — PROGRESS NOTES
PCU SHIFT NURSING NOTE      1915: Bedside and Verbal shift change report given to George Jimenez RN (oncoming nurse) by Nakul Wilson RN (offgoing nurse). Report included the following information SBAR, Kardex, Intake/Output, MAR, Accordion, Recent Results and Cardiac Rhythm NSR/ST. Heparin gtt rate verified. Shift Summary:  2340: Pharmacy called. Verified ptt of 70.9 with Catie Clap from pharmacy. No changes to gtt. 0710: Bedside and Verbal shift change report given to EMILIA/ Tess 29 (oncoming nurse) by George Jimenez RN (offgoing nurse). Report included the following information SBAR, Kardex, Intake/Output, MAR, Accordion, Recent Results and Cardiac Rhythm NSR/ST. Admission Date 8/24/2017   Admission Diagnosis Sepsis ([facility] [address]  PVD   Consults IP CONSULT TO PODIATRY  IP CONSULT TO VASCULAR SURGERY  IP CONSULT TO CARDIOLOGY        Consults   [x]PT   [x]OT   []Speech   [x]Case Management      [] Palliative      Cardiac Monitoring Order   [x]Yes   []No     IV drips   [x]Yes    Drip: heparin               Dose: 20.1 units/kg/hr  Drip:                            Dose:  Drip:                            Dose:   []No     GI Prophylaxis   []Yes   []No         DVT Prophylaxis   SCDs:             Jeff stockings:         [x] Medication   []Contraindicated   []None      Activity Level Activity Level: Up with Assistance     Activity Assistance: Partial (two people)   Purposeful Rounding every 1-2 hour? [x]Yes   Mccormick Score  Total Score: 2   Bed Alarm (If score 3 or >)   []Yes   [] Refused (See signed refusal form in chart)   Noé Score  Noé Score: 17   Noé Score (if score 14 or less)   []PMT consult   [x]Wound Care consult      []Specialty bed   [] Nutrition consult          Needs prior to discharge:   Home O2 required:    []Yes   [x]No    If yes, how much O2 required?     Other:    Last Bowel Movement: Last Bowel Movement Date: 08/27/17      Influenza Vaccine Received Flu Vaccine for Current Season (usually Sept-March): Not Flu Season        Pneumonia Vaccine           Diet Active Orders   Diet    DIET DIABETIC CONSISTENT CARB Regular      LDAs         PICC Double Lumen 08/26/17 Right;Brachial (Active)   Central Line Being Utilized Yes 8/31/2017  7:56 AM   Criteria for Appropriate Use Limited/no vessel suitable for conventional peripheral access 8/31/2017  3:14 AM   Site Assessment Clean, dry, & intact 8/31/2017  7:56 AM   Phlebitis Assessment 0 8/31/2017  7:56 AM   Infiltration Assessment 0 8/31/2017  7:56 AM   Arm Circumference (cm) 38 cm 8/30/2017  3:40 AM   Date of Last Dressing Change 08/26/17 8/31/2017  3:14 AM   Dressing Status Clean, dry, & intact 8/31/2017  7:56 AM   Action Taken Blood drawn 8/31/2017  3:14 AM   External Catheter Length (cm) 0 centimeters 8/27/2017 11:30 PM   Dressing Type Disk with Chlorhexadine gluconate (CHG); Transparent 8/31/2017  7:56 AM   Hub Color/Line Status Purple; Infusing 8/31/2017  7:56 AM   Positive Blood Return (Site #1) Yes 8/31/2017  7:56 AM   Hub Color/Line Status Red; Infusing 8/31/2017  7:56 AM   Positive Blood Return (Site #2) Yes 8/31/2017  7:56 AM   Alcohol Cap Used Yes 8/31/2017  7:56 AM          Peripheral IV 08/25/17 Left Hand (Active)   Site Assessment Clean, dry, & intact 8/31/2017  7:56 AM   Phlebitis Assessment 0 8/31/2017  7:56 AM   Infiltration Assessment 0 8/31/2017  7:56 AM   Dressing Status Clean, dry, & intact 8/31/2017  7:56 AM   Dressing Type Tape;Transparent 8/31/2017  7:56 AM   Hub Color/Line Status Blue; Infusing 8/31/2017  7:56 AM   Action Taken Open ports on tubing capped 8/31/2017  7:56 AM   Alcohol Cap Used Yes 8/31/2017  7:56 AM                      Urinary Catheter      Intake & Output   Date 08/30/17 1900 - 08/31/17 0659 08/31/17 0700 - 09/01/17 0659   Shift 1708-3146 24 Hour Total 1901-3596 4937-9353 24 Hour Total   I  N  T  A  K  E   P.O.  450 275  275      P. O.  450 275  275    I.V.  (mL/kg/hr)  1866 561.8  (0.4)  561.8      Heparin Volume  214.8 261.8  261.8 Volume (0.9% sodium chloride infusion)  851.3         Volume (cefepime (MAXIPIME) 2 g in 0.9% sodium chloride (MBP/ADV) 100 mL)  200 200  200      Volume (metroNIDAZOLE (FLAGYL) IVPB premix 500 mg)  100 100  100      Volume (vancomycin (VANCOCIN) 1750 mg in  ml infusion)  500       Shift Total  (mL/kg)  2316  (20.4) 836.8  (7.4)  836.8  (7.4)   O  U  T  P  U  T   Urine  (mL/kg/hr) 600 1200 1225  (0.9)  1225      Urine Voided 600 1200 1225  1225      Urine Occurrence(s) 1 x 1 x       Emesis/NG output  700         Emesis  700       Shift Total  (mL/kg) 600  (5.3) 1900  (16.7) 1225  (10.8)  1225  (10.8)   NET -600 416 -388.2  -388. 2   Weight (kg) 113.5 113.5 113.5 113.5 113.5         Readmission Risk Assessment Tool Score Medium Risk            16       Total Score        3 Has Seen PCP in Last 6 Months (Yes=3, No=0)    3 Patient Length of Stay (>5 days = 3)    9 IP Visits Last 12 Months (1-3=4, 4=9, >4=11)    1 Charlson Comorbidity Score (Age + Comorbid Conditions)        Criteria that do not apply:    . Living with Significant Other. Assisted Living. LTAC. SNF. or   Rehab    Pt.  Coverage (Medicare=5 , Medicaid, or Self-Pay=4)       Expected Length of Stay 6d 9h   Actual Length of Stay 7

## 2017-08-31 NOTE — PROGRESS NOTES
Cardiology Progress Note  8/31/2017     Admit Date: 8/24/2017  Admit Diagnosis: Sepsis ([address]  PVD  CC: none currently  Cardiologist:  Dr Jordyn Heard Assessment/Plan:   CM: likely HTN-related (Nl ferritin/TSH; HIV negative).    Echo 7/22/17: Ef 25-30% with borderline LVE, global HK; Mod LVH; RVE/decreased RV Fxn. Mild-mod TR.      Findings c/w hypertensive CM: Med Rx for now; will need future eval for ischemia (after PE treated). With acei angioedema, avoiding ARBs as well. No further aldactone with recurrent ALEX.     Rec: cont coreg; add back hydralazine/imdur as BP tolerates; avoid further clonidine if able. D/Cd IVF. Will start lasix when appropriate: watch Cr/vol status.     HTN: High POA then low (likely orthostatic/post-op/neuropathy/narcotics etc): better overall now: Watch orthostatics; adjust meds as above.       Rhythm: sinus      Lipids: with DM, should be on a statin: previously on lipitor 20.      ID (previous osteo/C diff), DM2 (insulin for years), CKD, pain control, anticoagulation, Dispo: per primary team.     Already has F/u plan with me next month.      8/31: BPs improved (Not OOB yet today); Cont coreg 6.25 bid for now (may need less). Add back hydralazine/imdur as able. No further aldactone with ALEX/CKD. Hold diuretics for now with increased Cr (1.76). ___________________________________________________________________________________  At admit 7/2016 for extensive PEs/HTN, found to have CM (prob HTN but ischemic not excluded). Cardiac meds on d/c: coreg 6.25 bid; hydralazine 25 TID; imdur 30 qd; aldactone 25 every day (Cr had normalized). With acei angioedema, avoiding ARBs  Also on eliquis for bilat PEs.    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     HTN earlier in admit: clonidine PO then patch started; More recently with hypotension (when OOB) with orthostatic Sx. Mild GARCIA, worse since admit;  The patient reports no chest pain/pressure; He reports compliance with meds.      No PND, orthopnea, palpitations, pre-syncope, syncope.  No current complaints.      He is from [place]; Family from [place]. For other plans, see orders.   Hospital problem list   Active Hospital Problems    Diagnosis Date Noted    Left foot infection 2017    Hyponatremia 2017    ALEX (acute kidney injury) ([facility] [address] 2017    GERD (gastroesophageal reflux disease) 2017    Chronic systolic heart failure (HCC) 2017    Bilateral pulmonary embolism ([facility] [address] 2017    Sepsis ([facility] [address] 2017    Diabetes ([facility] [address]      since age 23      Hypercholesteremia     HTN (hypertension) 2013        Subjective: Felipe Lilly reports   Chest pain X none  consistent with:  Non-cardiac CP         Atypical CP     None now  On going  Anginal CP     Dyspnea X none  at rest  with exertion         improved  unchanged  worse              PND X none  overnight       Orthopnea X none  improved  unchanged  worse   Presyncope X none  improved  unchanged  worse     Ambulated in hallway without symptoms   Yes   Ambulated in room without symptoms  Yes   Objective:    Physical Exam:  Overall VSSAF;    Visit Vitals    /90    Pulse 95    Temp 98.7 °F (37.1 °C)    Resp 17    Ht 6' 3\" (1.905 m)    Wt 113.5 kg (250 lb 3.6 oz)    SpO2 98%    BMI 31.28 kg/m2     Temp (24hrs), Av.4 °F (36.9 °C), Min:97.8 °F (36.6 °C), Max:98.7 °F (37.1 °C)    Patient Vitals for the past 8 hrs:   Pulse   17 0818 95   17 0756 96   17 0314 98    Patient Vitals for the past 8 hrs:   Resp   17 0756 17   17 0314 18    Patient Vitals for the past 8 hrs:   BP   17 0818 126/90   17 0756 126/90   17 0314 98/70        Intake/Output Summary (Last 24 hours) at 17 0914  Last data filed at 17 0810   Gross per 24 hour   Intake          1466.04 ml   Output 2375 ml   Net          -908.96 ml     General Appearance: Well developed, well nourished, no acute distress. Ears/Nose/Mouth/Throat:   Normal MM; anicteric. JVP: WNL   Resp:   clear to auscultation bilaterally. Nl resp effort. Cardiovascular:  RRR, S1, S2 normal, no new murmur. No gallop or rub. Abdomen:   Soft, non-tender, bowel sounds are present. Extremities: Trivial RLE edema; LLE surgically absent. Skin:  Neuro: Warm and dry. A/O x3, grossly nonfocal   cath site intact w/o hematoma or new bruit; distal pulse unchanged  Yes   Data Review:     Telemetry independently reviewed x sinus  chronic afib  parox afib  NSVT     ECG independently reviewed  NSR  afib  no significant changes  NSST-Tw chgs   x no new ECG provided for review   Lab results reviewed as noted below. Current medications reviewed as noted below. No results for input(s): PH, PCO2, PO2 in the last 72 hours. No results for input(s): CPK, CKMB, CKNDX, TROIQ in the last 72 hours. Recent Labs      08/31/17   0321  08/30/17   0544  08/29/17   1649  08/29/17   0524   NA  134*  133*   --   135*   K  4.0  4.0   --   3.8   CL  103  102   --   104   CO2  24  23   --   23   BUN  22*  18   --   10   CREA  1.76*  1.63*   --   1.33*   GLU  242*  204*   --   235*   CA  7.5*  7.6*   --   7.8*   WBC  14.7*  16.5*  18.5*  13.9*   HGB  7.9*  7.6*  8.3*  9.2*   HCT  25.2*  24.9*  27.1*  30.3*   PLT  388  405*  378  382     Lab Results   Component Value Date/Time    Cholesterol, total 161 07/24/2017 04:54 AM    HDL Cholesterol 46 07/24/2017 04:54 AM    LDL, calculated 98.2 07/24/2017 04:54 AM    Triglyceride 84 07/24/2017 04:54 AM    CHOL/HDL Ratio 3.5 07/24/2017 04:54 AM     No results for input(s): SGOT, GPT, AP, TBIL, TP, ALB, GLOB, GGT, AML, LPSE in the last 72 hours.     No lab exists for component: AMYP, HLPSE  Recent Labs      08/31/17   0321  08/30/17   2132  08/30/17   1313   APTT  61.8*  54.0*  48.8*      No components found for: [place]    Current Facility-Administered Medications   Medication Dose Route Frequency    insulin glargine (LANTUS) injection 24 Units  24 Units SubCUTAneous DAILY    aluminum-magnesium hydroxide (MAALOX) oral suspension 15-30 mL  15-30 mL Oral Q2H PRN    fentaNYL (DURAGESIC) 75 mcg/hr patch 1 Patch  1 Patch TransDERmal Q72H    heparin 25,000 units in D5W 250 ml infusion  13-36 Units/kg/hr IntraVENous TITRATE    heparin (porcine) injection 4,700 Units  40 Units/kg IntraVENous PRN    heparin (porcine) injection 9,450 Units  80 Units/kg SubCUTAneous PRN    HYDROmorphone (PF) 15 mg/30 ml (DILAUDID) PCA   IntraVENous CONTINUOUS    metroNIDAZOLE (FLAGYL) IVPB premix 500 mg  500 mg IntraVENous Q8H    ALPRAZolam (XANAX) tablet 0.25 mg  0.25 mg Oral TID PRN    insulin lispro (HUMALOG) injection   SubCUTAneous AC&HS    sodium chloride (NS) flush 10-30 mL  10-30 mL InterCATHeter PRN    sodium chloride (NS) flush 10 mL  10 mL InterCATHeter Q24H    sodium chloride (NS) flush 10 mL  10 mL InterCATHeter PRN    sodium chloride (NS) flush 10-40 mL  10-40 mL InterCATHeter Q8H    sodium chloride (NS) flush 20 mL  20 mL InterCATHeter PRN    heparin (porcine) pf 300 Units  300 Units InterCATHeter PRN    cefepime (MAXIPIME) 2 g in 0.9% sodium chloride (MBP/ADV) 100 mL  2 g IntraVENous Q8H    atorvastatin (LIPITOR) tablet 40 mg  40 mg Oral DAILY    carvedilol (COREG) tablet 6.25 mg  6.25 mg Oral BID WITH MEALS    pantoprazole (PROTONIX) tablet 40 mg  40 mg Oral DAILY    gabapentin (NEURONTIN) capsule 300 mg  300 mg Oral TID    glucose chewable tablet 16 g  4 Tab Oral PRN    dextrose (D50W) injection syrg 12.5-25 g  12.5-25 g IntraVENous PRN    glucagon (GLUCAGEN) injection 1 mg  1 mg IntraMUSCular PRN    nitroglycerin (NITROBID) 2 % ointment 1 Inch  1 Inch Topical Q6H PRN    sodium chloride (NS) flush 5-10 mL  5-10 mL IntraVENous Q8H    sodium chloride (NS) flush 5-10 mL  5-10 mL IntraVENous PRN    acetaminophen (TYLENOL) tablet 650 mg  650 mg Oral Q6H PRN    ondansetron (ZOFRAN) injection 4 mg  4 mg IntraVENous Q4H PRN    oxyCODONE-acetaminophen (PERCOCET 10)  mg per tablet 1 Tab  1 Tab Oral Q6H PRN    HYDROmorphone (PF) (DILAUDID) injection 0.5 mg  0.5 mg IntraVENous Q3H PRN    vancomycin (VANCOCIN) 1750 mg in  ml infusion  1,750 mg IntraVENous Q18H        Valeria Turner MD

## 2017-08-31 NOTE — PROGRESS NOTES
BEDSIDE UPDATE GIVEN TO DR Jimbo Lake - CURRENT PT CONDITION, INCLUDING PT C/O HEARTBURN, N/V, X3 DAYS WITHOUT A BOWEL MOVEMENT. DR Jimbo Lake VERBALIZED UNDERSTANDING AND ORDERS PLACED.

## 2017-08-31 NOTE — PROGRESS NOTES
Surgical Progress Note - Nory Flower ACNP-BC  2017 1:09 PM       Problem List:     Active Problems:    HTN (hypertension) (2013)      Diabetes ([facility] [address] ()      Overview: since age 23      Hypercholesteremia ()      Sepsis ([address] (2017)      Bilateral pulmonary embolism ([address] (2017)      Chronic systolic heart failure ([address] (2017)      Left foot infection (2017)      Hyponatremia (2017)      ALEX (acute kidney injury) ([address] (2017)      GERD (gastroesophageal reflux disease) (2017)           Subjective:     Patient is POD 3 from a left BKA. Pain is improved. Somewhat anxious about transitioning to rehab. Nursing Data:     Visit Vitals    /89 (BP 1 Location: Left arm, BP Patient Position: At rest)    Pulse 99    Temp 98.5 °F (36.9 °C)    Resp 18    Ht 6' 3\" (1.905 m)    Wt 113.5 kg (250 lb 3.6 oz)    SpO2 98%    BMI 31.28 kg/m2      ---------------------------------------------------------------------------------------------------------  Temp (24hrs), Av.4 °F (36.9 °C), Min:97.8 °F (36.6 °C), Max:98.7 °F (37.1 °C)    ---------------------------------------------------------------------------------------------------------    Intake/Output Summary (Last 24 hours) at 17 1309  Last data filed at 17 0810   Gross per 24 hour   Intake          1316.04 ml   Output             2375 ml   Net         -1058.96 ml       Exam:     General: 46yo AAM in NAD  Cardiopulmonary: Regular and even. Abd: ND, NT  Extremities: Limb guard to LLE. Neuro: A&O x 3.       Lab Review:     Recent Labs      17   0544   WBC  14.7*  16.5*   HGB  7.9*  7.6*   HCT  25.2*  24.9*   PLT  388  405*     Recent Labs      17   0544   NA  134*  133*   K  4.0  4.0   CL  103  102   CO2  24  23   BUN  22*  18   CREA  1.76*  1.63*   GLU  242*  204*   CA  7.5*  7.6*     No results for input(s): SGOT, GPT, AP, TBIL, TP, ALB, GLOB, GGT, AML, LPSE in the last 72 hours. No lab exists for component: AMYP, HLPSE         Assessment:        PVD w left BKA secondary to osteomylitis from a DM foot ulcer. DM   Bilateral PE. Acute Blood Loss Anemia. Hyponatremia. A/CKD stage III  Sepsis resolved     Plan:     Patient initiated on a heparin drip for bilateral PE will discuss conversion to oral Eliquis w Dr. Stephan Flor. Continue heparin for now. H & H improved overnight. Not at levels to transfuse. Limb guard to LLE. Continue PT/OT. Defer blood glucose management to primary team.  Creatinine stable. Hyponatremia stable. Likely pseudo secondary to hyperglycemia. Modify pain management. Continue Fentanyl patch at current dose. Discontinue PCA. Continue oral Percocet PRN and IV dilaudid bolus PRN.   Dispo: [facility] in the am.

## 2017-08-31 NOTE — PROGRESS NOTES
[facility] has approved patient for admission on their end but now has to obtain insurance authorization through [facility]. This will most likely take till tomorrow.      Ex [id number]

## 2017-08-31 NOTE — PROGRESS NOTES
PCU SHIFT NURSING NOTE      1930: Bedside and Verbal shift change report given to Tan Sanches RN (oncoming nurse) by Ella Billy RN (offgoing nurse). Report included the following information SBAR, Kardex, ED Summary, Intake/Output, MAR, Accordion, Recent Results and Cardiac Rhythm NSR/ST. Heparin and PCA verified. PCA cleared. Shift Summary:   2000: Pt resting quietly in bed. VSS. Call light within reach. Will continue to monitor. 2225: Called and verified ptt and heparin rate changes with Kiah Wetzel in pharmacy. Rate changed to 19.1 units/kg/hr. 0310: Pt c/o chest pain and indigestion. Pt refuses maalox. Prn nitro patch given. Will recheck pain. 0350: Pt states chest pain is much better. However, still having hiccups. 0710: Bedside and Verbal shift change report given to EMILIA/ Tess 29 (oncoming nurse) by Tan Sanches RN (offgoing nurse). Report included the following information SBAR, Kardex, Intake/Output, MAR, Accordion, Recent Results and Cardiac Rhythm NSR/ST. Admission Date 8/24/2017   Admission Diagnosis Sepsis ([facility] [address]  PVD   Consults IP CONSULT TO PODIATRY  IP CONSULT TO VASCULAR SURGERY  IP CONSULT TO CARDIOLOGY        Consults   [x]PT   [x]OT   []Speech   []Case Management      [] Palliative      Cardiac Monitoring Order   [x]Yes   []No     IV drips   [x]Yes    Drip: heparin               Dose:  Drip:                            Dose:  Drip:                            Dose:   []No     GI Prophylaxis   []Yes   []No         DVT Prophylaxis   SCDs:             Jeff stockings:         [x] Medication   []Contraindicated   []None      Activity Level Activity Level: Up with Assistance     Activity Assistance: Partial (one person)   Purposeful Rounding every 1-2 hour?    [x]Yes   Mccormick Score  Total Score: 2   Bed Alarm (If score 3 or >)   [x]Yes   [] Refused (See signed refusal form in chart)   Noé Score  Noé Score: 18   Noé Score (if score 14 or less)   []PMT consult   [x]Wound Care consult []Specialty bed   [] Nutrition consult          Needs prior to discharge:   Home O2 required:    []Yes   [x]No    If yes, how much O2 required? Other:    Last Bowel Movement: Last Bowel Movement Date: 08/27/17      Influenza Vaccine Received Flu Vaccine for Current Season (usually Sept-March): Not Flu Season        Pneumonia Vaccine           Diet Active Orders   Diet    DIET DIABETIC CONSISTENT CARB Regular      LDAs         PICC Double Lumen 08/26/17 Right;Brachial (Active)   Central Line Being Utilized Yes 8/30/2017  8:00 PM   Criteria for Appropriate Use Limited/no vessel suitable for conventional peripheral access 8/30/2017  8:00 PM   Site Assessment Clean, dry, & intact 8/30/2017  8:00 PM   Phlebitis Assessment 0 8/30/2017  8:00 PM   Infiltration Assessment 0 8/30/2017  8:00 PM   Arm Circumference (cm) 38 cm 8/30/2017  3:40 AM   Date of Last Dressing Change 08/26/17 8/30/2017  8:00 PM   Dressing Status Clean, dry, & intact 8/30/2017  8:00 PM   Action Taken Open ports on tubing capped 8/30/2017  8:00 PM   External Catheter Length (cm) 0 centimeters 8/27/2017 11:30 PM   Dressing Type Transparent;Disk with Chlorhexadine gluconate (CHG) 8/30/2017  8:00 PM   Hub Color/Line Status Purple; Infusing 8/30/2017  8:00 PM   Positive Blood Return (Site #1) Yes 8/30/2017  8:00 PM   Hub Color/Line Status Red; Infusing 8/30/2017  8:00 PM   Positive Blood Return (Site #2) Yes 8/30/2017  8:00 PM   Alcohol Cap Used Yes 8/30/2017  8:00 PM          Peripheral IV 08/25/17 Left Hand (Active)   Site Assessment Clean, dry, & intact 8/30/2017  8:00 PM   Phlebitis Assessment 0 8/30/2017  8:00 PM   Infiltration Assessment 0 8/30/2017  8:00 PM   Dressing Status Clean, dry, & intact 8/30/2017  8:00 PM   Dressing Type Tape;Transparent 8/30/2017  8:00 PM   Hub Color/Line Status Blue; Infusing 8/30/2017  8:00 PM   Action Taken Open ports on tubing capped 8/30/2017  8:00 PM   Alcohol Cap Used Yes 8/30/2017  8:00 PM Urinary Catheter      Intake & Output   Date 08/29/17 1900 - 08/30/17 0659 08/30/17 0700 - 08/31/17 0659   Shift 9999-0240 24 Hour Total 2439-7402 2458-8955 24 Hour Total   I  N  T  A  K  E   P.O. 240 240 450  450      P. O. 240 240 450  450    I.V.  (mL/kg/hr) 2244.5 3044. 5 1866  (1.4)  1866      Heparin Volume 230.8 230.8 214.8  214.8      Volume (0.9% sodium chloride infusion) 1813.8 1813.8 851.3  851.3      Volume (cefepime (MAXIPIME) 2 g in 0.9% sodium chloride (MBP/ADV) 100 mL) 100 300 200  200      Volume (metroNIDAZOLE (FLAGYL) IVPB premix 500 mg) 100 200 100  100      Volume (vancomycin (VANCOCIN) 1750 mg in  ml infusion)  500 500  500    Shift Total  (mL/kg) 2484.5  (21.9) 3284.5  (28.9) 2316  (20.4)  2316  (20.4)   O  U  T  P  U  T   Urine  (mL/kg/hr)  1725 600  (0.4)  600      Urine Voided  1725 600  600    Emesis/NG output 600 600 700  700      Emesis 600 600 700  700    Shift Total  (mL/kg) 600  (5.3) 2325  (20.5) 1300  (11.5)  1300  (11.5)   NET 1884.5 959.5 1016  1016   Weight (kg) 113.5 113.5 113.5 113.5 113.5         Readmission Risk Assessment Tool Score Medium Risk            16       Total Score        3 Has Seen PCP in Last 6 Months (Yes=3, No=0)    3 Patient Length of Stay (>5 days = 3)    9 IP Visits Last 12 Months (1-3=4, 4=9, >4=11)    1 Charlson Comorbidity Score (Age + Comorbid Conditions)        Criteria that do not apply:    . Living with Significant Other. Assisted Living. LTAC. SNF. or   Rehab    Pt.  Coverage (Medicare=5 , Medicaid, or Self-Pay=4)       Expected Length of Stay 3d 19h   Actual Length of Stay 6

## 2017-08-31 NOTE — PROGRESS NOTES
Problem: Self Care Deficits Care Plan (Adult)  Goal: *Acute Goals and Plan of Care (Insert Text)  Occupational Therapy Goals  Initiated 8/30/2017  1. Patient will perform grooming standing at sink with minimal assistance/contact guard assist within 7 day(s). 2. Patient will perform sponge bathing with minimal assistance/contact guard assist within 7 day(s). 3. Patient will perform lower body dressing with minimal assistance/contact guard assist within 7 day(s). 4. Patient will perform toilet/BSC transfers with supervision/set-up within 7 day(s). 5. Patient will perform all aspects of toileting with minimal assistance/contact guard assist within 7 day(s). OCCUPATIONAL THERAPY TREATMENT  Patient: Delilah Barnes (2 y.o. male)  Date: 8/31/2017  Diagnosis: Sepsis ([facility] [address]  PVD <principal problem not specified>  Procedure(s) (LRB):  LEFT BELOW KNEE AMPUTATION  (Left) 3 Days Post-Op  Precautions: Contact, Fall (Orthostatic)      ASSESSMENT:  Pt was supine in bed and cleared to be seen by nursing. All VSS and he was supervision for bed mobility. Pt was able to come to stand with CGA to min assist of 2 with use of RW.  BP was stable in standing, with a small drop. Pt was min assist of 2 for transfer to the recliner and once he was in recliner, BP taken and was 132/91 and after approx 5 minutes pt started to feel groggy and started sweating. BP was taken and had dropped to 459 systolic. Continued to take BP every few minutes and BP continued to slowly drop and pt still felt groggy. Pt was able to sit up in recliner for approx 15 minutes during session. Notified nursing and decided to put pt back to bed when his BP systolic was 604. Pt was min assist of 2 to stand from the recliner and CGA for transfer to bed. He was supervision with extra time for sit to supine.   Pt left in bed and BP stable and nursing notified, pts family was present during tx session.  (all BP in doc flow)  Recommend that pt have further therapy at discharge at in pt rehab. Progression toward goals:  [X]       Improving appropriately and progressing toward goals  [ ]       Improving slowly and progressing toward goals  [ ]       Not making progress toward goals and plan of care will be adjusted       PLAN:  Patient continues to benefit from skilled intervention to address the above impairments. Continue treatment per established plan of care. Discharge Recommendations:  Inpatient Rehab  Further Equipment Recommendations for Discharge:  tbd       SUBJECTIVE:   Patient stated I'm sweating . I feel like I did yesterday, groggy.       OBJECTIVE DATA SUMMARY:   Cognitive/Behavioral Status:  Neurologic State: Alert  Orientation Level: Appropriate for age;Oriented X4  Cognition: Appropriate decision making; Follows commands  Perception: Appears intact  Perseveration: No perseveration noted  Safety/Judgement: Awareness of environment; Fall prevention     Functional Mobility and Transfers for ADLs:  Bed Mobility:  Rolling: Supervision  Supine to Sit: Supervision  Sit to Supine: Supervision  Scooting: Supervision     Transfers:  Sit to Stand: Minimum assistance;Contact guard assistance        Balance:  Sitting: Intact  Standing: Impaired; Without support  Standing - Static: Good;Constant support  Standing - Dynamic : Fair     ADL Intervention:  Feeding  Feeding Assistance: Independent        Cognitive Retraining  Safety/Judgement: Awareness of environment; Fall prevention        Pain:  Pain Scale 1: Numeric (0 - 10)  Pain Intensity 1: 6  Pain Location 1: Leg  Pain Orientation 1: Left  Pain Description 1: Aching; Intermittent; Throbbing  Pain Intervention(s) 1: Medication (see MAR)  Activity Tolerance:   vss  Please refer to the flowsheet for vital signs taken during this treatment.   After treatment:   [ ] Patient left in no apparent distress sitting up in chair  [X] Patient left in no apparent distress in bed  [X] Call bell left within reach  [X] Nursing notified  [X] Caregiver present  [ ] Bed alarm activated      COMMUNICATION/COLLABORATION:   The patients plan of care was discussed with: Physical Therapist and Registered Nurse     Lenny Jimenez OT  Time Calculation: 55 mins

## 2017-08-31 NOTE — PROGRESS NOTES
Problem: Mobility Impaired (Adult and Pediatric)  Goal: *Acute Goals and Plan of Care (Insert Text)  Physical Therapy Goals  Initiated 8/29/2017  1. Patient will move from supine to sit and sit to supine , scoot up and down and roll side to side in bed with independence within 7 day(s). 2. Patient will transfer from bed to chair and chair to bed with modified independence using the least restrictive device within 7 day(s). 3. Patient will perform sit to stand with modified independence within 7 day(s). 4. Patient will ambulate with modified independence for 150 feet with the least restrictive device within 7 day(s). PHYSICAL THERAPY TREATMENT  Patient: Levi Hartmann (88 y.o. male)  Date: 8/31/2017  Diagnosis: Sepsis ([address]  PVD <principal problem not specified>  Procedure(s) (LRB):  LEFT BELOW KNEE AMPUTATION  (Left) 3 Days Post-Op  Precautions: Contact, Fall (Orthostatic)      ASSESSMENT:  Pt received in be supine. Pt performed supine<>sit at supervision. Pt with tendency to hold breath due to pain. Educated on not holding breath with activity to help maintain BP. Pt performed sit to stand from bed at [facility]. With min A to stabilize while standing. Pt performed sit to stand from chair at [employer] [facility] A x2. Pt performed bed <> chair transfer at [facility] x2 with min A for walker management. Pt reported increased pain with limb down. Pt with hypotensive issues seen in vitals flow chart resulting in getting back in bed at end of session. Pt will benefit from in patient rehab to help improve mobility tolerance and safety. Progression toward goals:  [ ]    Improving appropriately and progressing toward goals  [X]    Improving slowly and progressing toward goals  [ ]    Not making progress toward goals and plan of care will be adjusted       PLAN:  Patient continues to benefit from skilled intervention to address the above impairments. Continue treatment per established plan of care.   Discharge Recommendations:  Inpatient Rehab  Further Equipment Recommendations for Discharge:  none       SUBJECTIVE:   Patient stated [facility] I feel better than yesterday.       OBJECTIVE DATA SUMMARY:   Critical Behavior:  Neurologic State: Alert  Orientation Level: Appropriate for age, Oriented X4  Cognition: Appropriate decision making, Follows commands  Safety/Judgement: Awareness of environment, Fall prevention  Functional Mobility Training:  Bed Mobility:  Rolling: Supervision  Supine to Sit: Supervision  Sit to Supine: Supervision  Scooting: Supervision        Transfers:  Sit to Stand: Minimum assistance;Contact guard assistance           Bed to Chair: Contact guard assistance; Adaptive equipment (requring assistance [facility])                    Balance:  Sitting: Intact  Standing: Impaired  Standing - Static: Good;Constant support  Standing - Dynamic : Fair  Ambulation/Gait Training:                                                                                   Pain:  Pain Scale 1: Numeric (0 - 10)  Pain Intensity 1: 6  Pain Location 1: Leg  Pain Orientation 1: Left  Pain Description 1: Aching; Intermittent; Throbbing  Pain Intervention(s) 1: Medication (see MAR)  Activity Tolerance:   Pt with increase activity tolerance and ability to sit up in chair for a longer period of time before feeling symptoms. Please refer to the flowsheet for vital signs taken during this treatment.   After treatment:   [ ]    Patient left in no apparent distress sitting up in chair  [X]    Patient left in no apparent distress in bed  [X]    Call bell left within reach  [X]    Nursing notified  [ ]    Caregiver present  [X]    Bed alarm activated      COMMUNICATION/COLLABORATION:   The patients plan of care was discussed with: Occupational Therapist and Registered Nurse     Marisa Putnam   Time Calculation: 55 mins

## 2017-08-31 NOTE — PROGRESS NOTES
General Daily Progress Note    Admit Date: 8/24/2017  Hospital day several    Subjective:     Pt denies any cp sob still abit anxious  s/w nursing on pca pump being weened  Medication side effects: none    Current Facility-Administered Medications   Medication Dose Route Frequency    aluminum-magnesium hydroxide (MAALOX) oral suspension 15-30 mL  15-30 mL Oral Q2H PRN    insulin glargine (LANTUS) injection 18 Units  18 Units SubCUTAneous DAILY    fentaNYL (DURAGESIC) 75 mcg/hr patch 1 Patch  1 Patch TransDERmal Q72H    heparin 25,000 units in D5W 250 ml infusion  13-36 Units/kg/hr IntraVENous TITRATE    heparin (porcine) injection 4,700 Units  40 Units/kg IntraVENous PRN    heparin (porcine) injection 9,450 Units  80 Units/kg SubCUTAneous PRN    HYDROmorphone (PF) 15 mg/30 ml (DILAUDID) PCA   IntraVENous CONTINUOUS    metroNIDAZOLE (FLAGYL) IVPB premix 500 mg  500 mg IntraVENous Q8H    ALPRAZolam (XANAX) tablet 0.25 mg  0.25 mg Oral TID PRN    insulin lispro (HUMALOG) injection   SubCUTAneous AC&HS    sodium chloride (NS) flush 10-30 mL  10-30 mL InterCATHeter PRN    sodium chloride (NS) flush 10 mL  10 mL InterCATHeter Q24H    sodium chloride (NS) flush 10 mL  10 mL InterCATHeter PRN    sodium chloride (NS) flush 10-40 mL  10-40 mL InterCATHeter Q8H    sodium chloride (NS) flush 20 mL  20 mL InterCATHeter PRN    heparin (porcine) pf 300 Units  300 Units InterCATHeter PRN    cefepime (MAXIPIME) 2 g in 0.9% sodium chloride (MBP/ADV) 100 mL  2 g IntraVENous Q8H    atorvastatin (LIPITOR) tablet 40 mg  40 mg Oral DAILY    carvedilol (COREG) tablet 6.25 mg  6.25 mg Oral BID WITH MEALS    pantoprazole (PROTONIX) tablet 40 mg  40 mg Oral DAILY    gabapentin (NEURONTIN) capsule 300 mg  300 mg Oral TID    glucose chewable tablet 16 g  4 Tab Oral PRN    dextrose (D50W) injection syrg 12.5-25 g  12.5-25 g IntraVENous PRN    glucagon (GLUCAGEN) injection 1 mg  1 mg IntraMUSCular PRN    nitroglycerin (NITROBID) 2 % ointment 1 Inch  1 Inch Topical Q6H PRN    sodium chloride (NS) flush 5-10 mL  5-10 mL IntraVENous Q8H    sodium chloride (NS) flush 5-10 mL  5-10 mL IntraVENous PRN    acetaminophen (TYLENOL) tablet 650 mg  650 mg Oral Q6H PRN    ondansetron (ZOFRAN) injection 4 mg  4 mg IntraVENous Q4H PRN    oxyCODONE-acetaminophen (PERCOCET 10)  mg per tablet 1 Tab  1 Tab Oral Q6H PRN    HYDROmorphone (PF) (DILAUDID) injection 0.5 mg  0.5 mg IntraVENous Q3H PRN    vancomycin (VANCOCIN) 1750 mg in  ml infusion  1,750 mg IntraVENous Q18H        Review of Systems  A comprehensive review of systems was negative except for that written in the HPI. Objective:     Patient Vitals for the past 8 hrs:   BP Temp Pulse Resp SpO2   08/31/17 0314 98/70 98.7 °F (37.1 °C) 98 18 97 %        08/29 1901 - 08/31 0700  In: 4800.5 [P.O.:690; I.V.:4110.5]  Out: 2500 [Urine:1200]    Physical Exam: General appearance: alert, cooperative, no distress, appears stated age, anxious  Lungs: clear to auscultation bilaterally  Heart: regular rate and rhythm  Abdomen: soft, non-tender.  Bowel sounds normal. No masses,  no organomegaly  Extremities: no edema, redness or tenderness in the calves or thighs  Foot bandaged did not remove           Data Review   Recent Results (from the past 24 hour(s))   GLUCOSE, POC    Collection Time: 08/30/17 10:58 AM   Result Value Ref Range    Glucose (POC) 220 (H) 65 - 100 mg/dL    Performed by Natalia Lyons    PTT    Collection Time: 08/30/17  1:13 PM   Result Value Ref Range    aPTT 48.8 (H) 22.1 - 32.5 sec    aPTT, therapeutic range     58.0 - 77.0 SECS   GLUCOSE, POC    Collection Time: 08/30/17  4:18 PM   Result Value Ref Range    Glucose (POC) 286 (H) 65 - 100 mg/dL    Performed by Natalia Lyons    GLUCOSE, POC    Collection Time: 08/30/17  9:12 PM   Result Value Ref Range    Glucose (POC) 244 (H) 65 - 100 mg/dL    Performed by Megan Larsen    PTT    Collection Time: 08/30/17  9:32 PM   Result Value Ref Range    aPTT 54.0 (H) 22.1 - 32.5 sec    aPTT, therapeutic range     58.0 - 77.5 SECS   METABOLIC PANEL, BASIC    Collection Time: 08/31/17  3:21 AM   Result Value Ref Range    Sodium 134 (L) 136 - 145 mmol/L    Potassium 4.0 3.5 - 5.1 mmol/L    Chloride 103 97 - 108 mmol/L    CO2 24 21 - 32 mmol/L    Anion gap 7 5 - 15 mmol/L    Glucose 242 (H) 65 - 100 mg/dL    BUN 22 (H) 6 - 20 MG/DL    Creatinine 1.76 (H) 0.70 - 1.30 MG/DL    BUN/Creatinine ratio 13 12 - 20      GFR est AA 50 (L) >60 ml/min/1.73m2    GFR est non-AA 42 (L) >60 ml/min/1.73m2    Calcium 7.5 (L) 8.5 - 10.1 MG/DL   CBC WITH AUTOMATED DIFF    Collection Time: 08/31/17  3:21 AM   Result Value Ref Range    WBC 14.7 (H) 4.1 - 11.1 K/uL    RBC 3.62 (L) 4.10 - 5.70 M/uL    HGB 7.9 (L) 12.1 - 17.0 g/dL    HCT 25.2 (L) 36.6 - 50.3 %    MCV 69.6 (L) 80.0 - 99.0 FL    MCH 21.8 (L) 26.0 - 34.0 PG    MCHC 31.3 30.0 - 36.5 g/dL    RDW 16.7 (H) 11.5 - 14.5 %    PLATELET 112 888 - 887 K/uL    NEUTROPHILS 79 (H) 32 - 75 %    LYMPHOCYTES 13 12 - 49 %    MONOCYTES 7 5 - 13 %    EOSINOPHILS 1 0 - 7 %    BASOPHILS 0 0 - 1 %    ABS. NEUTROPHILS 11.7 (H) 1.8 - 8.0 K/UL    ABS. LYMPHOCYTES 1.9 0.8 - 3.5 K/UL    ABS. MONOCYTES 1.0 0.0 - 1.0 K/UL    ABS. EOSINOPHILS 0.1 0.0 - 0.4 K/UL    ABS.  BASOPHILS 0.0 0.0 - 0.1 K/UL    RBC COMMENTS MICROCYTOSIS  2+        RBC COMMENTS HYPOCHROMIA  2+        DF SMEAR SCANNED     PTT    Collection Time: 08/31/17  3:21 AM   Result Value Ref Range    aPTT 61.8 (H) 22.1 - 32.5 sec    aPTT, therapeutic range     58.0 - 77.0 SECS           Assessment:     Active Problems:    HTN (hypertension) (7/23/2013)      Diabetes ([facility] [address] ()      Overview: since age 23      Hypercholesteremia ()      Sepsis ([facility] [address] (5/27/2017)      Bilateral pulmonary embolism ([address] (7/21/2017)      Chronic systolic heart failure ([facility] [address] (7/31/2017)      Left foot infection (8/24/2017)      Hyponatremia (8/24/2017)      ALEX (acute kidney injury) ([address] (8/24/2017)      GERD (gastroesophageal reflux disease) (8/24/2017)        Plan:    on heparin change to po eliquis per vascular team  Continue home bp meds adjusted slightly yesterdayd/w pt bp's labile due to amputation appreciate cardiology evaluation   Continue ISS adjusted lantus again   Continue abx  Gentle ivh and monitor renal fxn  Pt  is not very compliant with diet -chips noted in room

## 2017-09-01 LAB
ANION GAP SERPL CALC-SCNC: 6 MMOL/L (ref 5–15)
BASOPHILS # BLD: 0 K/UL (ref 0–0.1)
BASOPHILS NFR BLD: 0 % (ref 0–1)
BUN SERPL-MCNC: 23 MG/DL (ref 6–20)
BUN/CREAT SERPL: 12 (ref 12–20)
CALCIUM SERPL-MCNC: 7.6 MG/DL (ref 8.5–10.1)
CHLORIDE SERPL-SCNC: 104 MMOL/L (ref 97–108)
CO2 SERPL-SCNC: 23 MMOL/L (ref 21–32)
CREAT SERPL-MCNC: 1.92 MG/DL (ref 0.7–1.3)
EOSINOPHIL # BLD: 0.2 K/UL (ref 0–0.4)
EOSINOPHIL NFR BLD: 1 % (ref 0–7)
ERYTHROCYTE [DISTWIDTH] IN BLOOD BY AUTOMATED COUNT: 16.9 % (ref 11.5–14.5)
GLUCOSE BLD STRIP.AUTO-MCNC: 192 MG/DL (ref 65–100)
GLUCOSE BLD STRIP.AUTO-MCNC: 214 MG/DL (ref 65–100)
GLUCOSE BLD STRIP.AUTO-MCNC: 225 MG/DL (ref 65–100)
GLUCOSE BLD STRIP.AUTO-MCNC: 232 MG/DL (ref 65–100)
GLUCOSE SERPL-MCNC: 210 MG/DL (ref 65–100)
HCT VFR BLD AUTO: 23.5 % (ref 36.6–50.3)
HGB BLD-MCNC: 7.4 G/DL (ref 12.1–17)
LYMPHOCYTES # BLD: 2.5 K/UL (ref 0.8–3.5)
LYMPHOCYTES NFR BLD: 20 % (ref 12–49)
MCH RBC QN AUTO: 22.1 PG (ref 26–34)
MCHC RBC AUTO-ENTMCNC: 31.5 G/DL (ref 30–36.5)
MCV RBC AUTO: 70.1 FL (ref 80–99)
MONOCYTES # BLD: 1 K/UL (ref 0–1)
MONOCYTES NFR BLD: 8 % (ref 5–13)
NEUTS SEG # BLD: 8.9 K/UL (ref 1.8–8)
NEUTS SEG NFR BLD: 71 % (ref 32–75)
PLATELET # BLD AUTO: 363 K/UL (ref 150–400)
POTASSIUM SERPL-SCNC: 3.9 MMOL/L (ref 3.5–5.1)
RBC # BLD AUTO: 3.35 M/UL (ref 4.1–5.7)
SERVICE CMNT-IMP: ABNORMAL
SODIUM SERPL-SCNC: 133 MMOL/L (ref 136–145)
WBC # BLD AUTO: 12.6 K/UL (ref 4.1–11.1)

## 2017-09-01 PROCEDURE — 74011250636 HC RX REV CODE- 250/636: Performed by: FAMILY MEDICINE

## 2017-09-01 PROCEDURE — 74011250637 HC RX REV CODE- 250/637: Performed by: INTERNAL MEDICINE

## 2017-09-01 PROCEDURE — 36415 COLL VENOUS BLD VENIPUNCTURE: CPT | Performed by: EMERGENCY MEDICINE

## 2017-09-01 PROCEDURE — 74011000250 HC RX REV CODE- 250: Performed by: FAMILY MEDICINE

## 2017-09-01 PROCEDURE — 85025 COMPLETE CBC W/AUTO DIFF WBC: CPT | Performed by: EMERGENCY MEDICINE

## 2017-09-01 PROCEDURE — 74011000258 HC RX REV CODE- 258: Performed by: FAMILY MEDICINE

## 2017-09-01 PROCEDURE — 65660000000 HC RM CCU STEPDOWN

## 2017-09-01 PROCEDURE — 97110 THERAPEUTIC EXERCISES: CPT

## 2017-09-01 PROCEDURE — 74011250636 HC RX REV CODE- 250/636: Performed by: HOSPITALIST

## 2017-09-01 PROCEDURE — 74011250637 HC RX REV CODE- 250/637: Performed by: FAMILY MEDICINE

## 2017-09-01 PROCEDURE — 74011250636 HC RX REV CODE- 250/636: Performed by: EMERGENCY MEDICINE

## 2017-09-01 PROCEDURE — 74011250637 HC RX REV CODE- 250/637: Performed by: HOSPITALIST

## 2017-09-01 PROCEDURE — 74011636637 HC RX REV CODE- 636/637: Performed by: FAMILY MEDICINE

## 2017-09-01 PROCEDURE — 97116 GAIT TRAINING THERAPY: CPT

## 2017-09-01 PROCEDURE — 82962 GLUCOSE BLOOD TEST: CPT

## 2017-09-01 PROCEDURE — 80048 BASIC METABOLIC PNL TOTAL CA: CPT | Performed by: EMERGENCY MEDICINE

## 2017-09-01 PROCEDURE — 74011250636 HC RX REV CODE- 250/636: Performed by: NURSE PRACTITIONER

## 2017-09-01 PROCEDURE — 74011250637 HC RX REV CODE- 250/637: Performed by: NURSE PRACTITIONER

## 2017-09-01 RX ORDER — HYDRALAZINE HYDROCHLORIDE 10 MG/1
10 TABLET, FILM COATED ORAL
Status: DISCONTINUED | OUTPATIENT
Start: 2017-09-01 | End: 2017-09-02 | Stop reason: HOSPADM

## 2017-09-01 RX ORDER — HYDROMORPHONE HYDROCHLORIDE 1 MG/ML
0.5 INJECTION, SOLUTION INTRAMUSCULAR; INTRAVENOUS; SUBCUTANEOUS
Status: DISCONTINUED | OUTPATIENT
Start: 2017-09-01 | End: 2017-09-02 | Stop reason: HOSPADM

## 2017-09-01 RX ADMIN — GABAPENTIN 300 MG: 300 CAPSULE ORAL at 16:51

## 2017-09-01 RX ADMIN — Medication 10 ML: at 18:56

## 2017-09-01 RX ADMIN — OXYCODONE HYDROCHLORIDE AND ACETAMINOPHEN 1 TABLET: 10; 325 TABLET ORAL at 08:46

## 2017-09-01 RX ADMIN — Medication 10 ML: at 12:23

## 2017-09-01 RX ADMIN — METRONIDAZOLE 500 MG: 500 INJECTION, SOLUTION INTRAVENOUS at 04:34

## 2017-09-01 RX ADMIN — INSULIN LISPRO 4 UNITS: 100 INJECTION, SOLUTION INTRAVENOUS; SUBCUTANEOUS at 17:09

## 2017-09-01 RX ADMIN — CEFEPIME HYDROCHLORIDE 2 G: 2 INJECTION, POWDER, FOR SOLUTION INTRAVENOUS at 08:49

## 2017-09-01 RX ADMIN — CARVEDILOL 6.25 MG: 6.25 TABLET, FILM COATED ORAL at 08:46

## 2017-09-01 RX ADMIN — CEFEPIME HYDROCHLORIDE 2 G: 2 INJECTION, POWDER, FOR SOLUTION INTRAVENOUS at 16:50

## 2017-09-01 RX ADMIN — Medication 20 ML: at 21:51

## 2017-09-01 RX ADMIN — CEFEPIME HYDROCHLORIDE 2 G: 2 INJECTION, POWDER, FOR SOLUTION INTRAVENOUS at 00:34

## 2017-09-01 RX ADMIN — HYDRALAZINE HYDROCHLORIDE 10 MG: 10 TABLET, FILM COATED ORAL at 21:50

## 2017-09-01 RX ADMIN — HYDROMORPHONE HYDROCHLORIDE 0.5 MG: 1 INJECTION, SOLUTION INTRAMUSCULAR; INTRAVENOUS; SUBCUTANEOUS at 13:09

## 2017-09-01 RX ADMIN — INSULIN LISPRO 6 UNITS: 100 INJECTION, SOLUTION INTRAVENOUS; SUBCUTANEOUS at 12:23

## 2017-09-01 RX ADMIN — ATORVASTATIN CALCIUM 40 MG: 40 TABLET, FILM COATED ORAL at 08:47

## 2017-09-01 RX ADMIN — Medication 10 ML: at 21:51

## 2017-09-01 RX ADMIN — GABAPENTIN 300 MG: 300 CAPSULE ORAL at 21:49

## 2017-09-01 RX ADMIN — ALPRAZOLAM 0.25 MG: 0.25 TABLET ORAL at 23:13

## 2017-09-01 RX ADMIN — Medication 20 ML: at 04:38

## 2017-09-01 RX ADMIN — HYDRALAZINE HYDROCHLORIDE 10 MG: 10 TABLET, FILM COATED ORAL at 09:49

## 2017-09-01 RX ADMIN — METRONIDAZOLE 500 MG: 500 INJECTION, SOLUTION INTRAVENOUS at 21:50

## 2017-09-01 RX ADMIN — INSULIN GLARGINE 24 UNITS: 100 INJECTION, SOLUTION SUBCUTANEOUS at 08:47

## 2017-09-01 RX ADMIN — DOCUSATE SODIUM 100 MG: 100 CAPSULE, LIQUID FILLED ORAL at 08:47

## 2017-09-01 RX ADMIN — HYDROMORPHONE HYDROCHLORIDE 0.5 MG: 1 INJECTION, SOLUTION INTRAMUSCULAR; INTRAVENOUS; SUBCUTANEOUS at 03:14

## 2017-09-01 RX ADMIN — CARVEDILOL 6.25 MG: 6.25 TABLET, FILM COATED ORAL at 16:51

## 2017-09-01 RX ADMIN — Medication 10 ML: at 13:10

## 2017-09-01 RX ADMIN — APIXABAN 10 MG: 5 TABLET, FILM COATED ORAL at 09:49

## 2017-09-01 RX ADMIN — OXYCODONE HYDROCHLORIDE AND ACETAMINOPHEN 1 TABLET: 10; 325 TABLET ORAL at 16:51

## 2017-09-01 RX ADMIN — APIXABAN 10 MG: 5 TABLET, FILM COATED ORAL at 21:48

## 2017-09-01 RX ADMIN — GABAPENTIN 300 MG: 300 CAPSULE ORAL at 08:46

## 2017-09-01 RX ADMIN — Medication 10 ML: at 13:09

## 2017-09-01 RX ADMIN — INSULIN LISPRO 2 UNITS: 100 INJECTION, SOLUTION INTRAVENOUS; SUBCUTANEOUS at 21:49

## 2017-09-01 RX ADMIN — OXYCODONE HYDROCHLORIDE AND ACETAMINOPHEN 1 TABLET: 10; 325 TABLET ORAL at 23:01

## 2017-09-01 RX ADMIN — CEFEPIME HYDROCHLORIDE 2 G: 2 INJECTION, POWDER, FOR SOLUTION INTRAVENOUS at 23:01

## 2017-09-01 RX ADMIN — PANTOPRAZOLE SODIUM 40 MG: 40 TABLET, DELAYED RELEASE ORAL at 08:45

## 2017-09-01 RX ADMIN — HYDROMORPHONE HYDROCHLORIDE 0.5 MG: 1 INJECTION, SOLUTION INTRAMUSCULAR; INTRAVENOUS; SUBCUTANEOUS at 18:56

## 2017-09-01 RX ADMIN — VANCOMYCIN HYDROCHLORIDE 1750 MG: 10 INJECTION, POWDER, LYOPHILIZED, FOR SOLUTION INTRAVENOUS at 13:09

## 2017-09-01 RX ADMIN — HYDROMORPHONE HYDROCHLORIDE 0.5 MG: 1 INJECTION, SOLUTION INTRAMUSCULAR; INTRAVENOUS; SUBCUTANEOUS at 21:48

## 2017-09-01 RX ADMIN — METRONIDAZOLE 500 MG: 500 INJECTION, SOLUTION INTRAVENOUS at 12:23

## 2017-09-01 RX ADMIN — DOCUSATE SODIUM 100 MG: 100 CAPSULE, LIQUID FILLED ORAL at 17:08

## 2017-09-01 RX ADMIN — HYDROMORPHONE HYDROCHLORIDE 0.5 MG: 1 INJECTION, SOLUTION INTRAMUSCULAR; INTRAVENOUS; SUBCUTANEOUS at 09:49

## 2017-09-01 RX ADMIN — Medication 10 ML: at 04:36

## 2017-09-01 RX ADMIN — INSULIN LISPRO 6 UNITS: 100 INJECTION, SOLUTION INTRAVENOUS; SUBCUTANEOUS at 08:47

## 2017-09-01 NOTE — PROGRESS NOTES
Pharmacy Automatic Renal Dosing Protocol - Antimicrobials    Indication for Antimicrobials: SSTI, wound infection    Current Regimen of Each Antimicrobial (Start Day & Day of Therapy):  Vancomycin 1750mg IV q18h (start  Day 9)  Cefepime 2g IV q8h (start  Day 9)  Metronidazole 500mg IV q8h (start  Day 9)    Previous Abx:  Unasyn 3gm IV q6h - started -     Goal Vancomycin Level (if needed): 15-20  Level(s) Ordered (if needed):   Measured / Extrapolated Vancomycin Levels (if drawn):      @ 0618: 15.7 / 15.1   @ 1800: 22.5 / 18.85--random level drawn 3 hr prior to true trough    Significant Cultures:    Blood: NG x 5 days---FINAL   Wound Left foot (right side): MRSA sens vanc MICHELLE 1 (FINAL)   Wound left foot (left side): light MRSA, klebsiella (resistant to cefazolin and int to unasyn)     MRI left foot: Preliminary report: Soft tissues are diffusely edematous consistent with cellulitis. Abnormal signal within the  cuboid bone and residual fourth metatarsal consistent with osteomyelitis. No  focal fluid collection visualized. Final report to follow. CAPD, Hemodialysis or Renal Replacement Therapy: none   Paralysis, amputations, malnutrition: none  Recent Labs      17   0035  17   0321  17   0544   CREA  1.92*  1.76*  1.63*   BUN  23*  22*  18   WBC  12.6*  14.7*  16.5*     Temp (24hrs), Av.6 °F (37 °C), Min:98.3 °F (36.8 °C), Max:98.9 °F (37.2 °C)    Creatinine Clearance (Creatinine Clearance (ml/min)): ~56    Impression/Plan:   - Vancomycin Trough is therapeutic. Cont current vanc dose. SCr up over past few days - will order another level prior to dose tonight. Last level was right in between therapeutic level and patient has confirmed MRSA, so I did not hold dosing.  - Cefepime dosed appropriately for renal function. Patient is just under cutoff for renal adjustment, but Scr/CrCl  has been fluctuating.  Will leave current regimen for now and renally adjust based on 9/2 labs. -Metronidazole requires no renal adjustment. - Per Vascular surgery \"The patient has a worsening left foot wound, likely was   a deep space infection extending from the mid plantar foot to the heel. Based on his long history of the wound that has been managed   aggressively, but still with worsening of the wound, it seems   appropriate to proceed with below-the-knee amputation as the   likelihood of salvaging his foot is exceedingly low at this point. This   was discussed with him and his wife. They will consider this option. There is no urgency to intervention as he is clinically stable. We will   follow him with you. Wound care will be continued as well as   antibiotics. \"       Pharmacy will follow daily and adjust medications as appropriate for renal function and/or serum levels.     Thank you,  PRISCILLA So     Renal Dosing Tables on [employer]

## 2017-09-01 NOTE — PROGRESS NOTES
Problem: Falls - Risk of  Goal: *Absence of Falls  Document Lee Fall Risk and appropriate interventions in the flowsheet.    Outcome: Progressing Towards Goal  Fall Risk Interventions:  Mobility Interventions: Patient to call before getting OOB           Medication Interventions: Patient to call before getting OOB, Teach patient to arise slowly

## 2017-09-01 NOTE — PROGRESS NOTES
Surgical Progress Note - Leland Doyle ACNP-BC  2017 1:09 PM       Subjective:     Patient is POD 4 from a left BKA. Pain is well controlled. Nursing Data:     Visit Vitals    BP (!) 157/108    Pulse 91    Temp 98.2 °F (36.8 °C)    Resp 17    Ht 6' 3\" (1.905 m)    Wt 113.5 kg (250 lb 3.6 oz)    SpO2 97%    BMI 31.28 kg/m2      ---------------------------------------------------------------------------------------------------------  Temp (24hrs), Av.6 °F (37 °C), Min:98.2 °F (36.8 °C), Max:98.9 °F (37.2 °C)    ---------------------------------------------------------------------------------------------------------    Intake/Output Summary (Last 24 hours) at 17 1411  Last data filed at 17 1002   Gross per 24 hour   Intake           361.78 ml   Output             2926 ml   Net         -2564.22 ml       Exam:     General: 48yo AAM in NAD  Cardiopulmonary: Regular and even. Abd: ND, NT  Extremities: Limb guard to LLE. Neuro: A&O x 3. Lab Review:     Recent Labs      175  17   0321   WBC  12.6*  14.7*   HGB  7.4*  7.9*   HCT  23.5*  25.2*   PLT  363  388     Recent Labs      175  17   0321   NA  133*  134*   K  3.9  4.0   CL  104  103   CO2  23  24   BUN  23*  22*   CREA  1.92*  1.76*   GLU  210*  242*   CA  7.6*  7.5*     No results for input(s): SGOT, GPT, AP, TBIL, TP, ALB, GLOB, GGT, AML, LPSE in the last 72 hours. 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 Leukocytosis trending down. DM mild hyperglycemia persists. Bilateral PE.   HTN  Acute Blood Loss Anemia. Hyponatremia likely pseudo secondary to hyperglycemia. A/CKD stage III w worsening creatinine for the last several days. Sepsis resolved     Plan:     Continue IV antibx until WBC w in normal limits. Consider converting to Daptomycin secondary to worsening renal function.   Will defer this decision to primary team.    Convert patient to oral Eliquis. Discontinue heparin drip. H & H low but stable. Not at levels to transfuse. Limb guard to LLE. Continue PT/OT. Defer blood glucose management to primary team.  Hyponatremia stable. Continue current pain management w Fentanyl patch, Percocet PRN, and IV dilaudid bolus PRN. BP remains uncontrolled.   Defer management to primary team.    Dispo: [facility]

## 2017-09-01 NOTE — PROGRESS NOTES
Problem: Mobility Impaired (Adult and Pediatric)  Goal: *Acute Goals and Plan of Care (Insert Text)  Physical Therapy Goals  Initiated 8/29/2017  1. Patient will move from supine to sit and sit to supine , scoot up and down and roll side to side in bed with independence within 7 day(s). 2. Patient will transfer from bed to chair and chair to bed with modified independence using the least restrictive device within 7 day(s). 3. Patient will perform sit to stand with modified independence within 7 day(s). 4. Patient will ambulate with modified independence for 150 feet with the least restrictive device within 7 day(s). PHYSICAL THERAPY TREATMENT  Patient: Iveth Dailey (1 y.o. male)  Date: 9/1/2017  Diagnosis: Sepsis ([address] PVD      Procedure(s) (LRB):  LEFT BELOW KNEE AMPUTATION  (Left) 4 Days Post-Op      Precautions: Contact, Fall (Orthostatic)  Chart, physical therapy assessment, plan of care and goals were reviewed. ASSESSMENT: pt tolerated tx well, no LOB or SOB, did well with bed mob and tranfers, does well with ther-ex, good in-pt candidate, vc's for safety and proper RW use. Progression toward goals:  [ ]      Improving appropriately and progressing toward goals  [X]      Improving slowly and progressing toward goals  [ ]      Not making progress toward goals and plan of care will be adjusted       PLAN:  Patient continues to benefit from skilled intervention to address the above impairments. Continue treatment per established plan of care.   Discharge Recommendations:  Inpatient Rehab  Further Equipment Recommendations for Discharge:  rolling walker       OBJECTIVE DATA SUMMARY:      Critical Behavior:  Neurologic State: Alert  Orientation Level: Oriented X4  Cognition: Follows commands  Safety/Judgement: Awareness of environment, Fall prevention      Functional Mobility Training:  Bed Mobility:  Rolling: Supervision  Supine to Sit: Supervision  Scooting: Supervision  Level of Assistance: Supervision              Interventions: Verbal cues      Transfers:  Sit to Stand: Stand-by asssistance  Stand to Sit: Stand-by asssistance  Bed to Chair: Contact guard assistance  Interventions: Verbal cues; Tactile cues  Level of Assistance: Contact guard assistance      Balance:  Sitting: Intact; Without support  Standing: Intact; With support  Standing - Static: Good;Constant support  Standing - Dynamic : Fair      Ambulation/Gait Training:  Distance (ft): 12 Feet (ft)  Assistive Device: Gait belt;Walker, rolling  Ambulation - Level of Assistance: Contact guard assistance  Gait Abnormalities: Decreased step clearance  Right Side Weight Bearing: Full  Left Side Weight Bearing: Non-weight bearing  Base of Support: Narrowed  Stance: Right increased  Speed/Dolly: Slow  Step Length: Right shortened     Therapeutic Exercises:   sitting  EXERCISE   Sets   Reps   Active Active Assist   Passive   Comments   Ankle pumps 1 10 [X] [ ] [ ] RLE   Heel raises 1 10 [X] [ ] [ ] \"   Toe tap 1 10 [X] [ ] [ ] \"   Knee ext 1 10 [X] [ ] [ ] \"   Hip flex 1 10 [X] [ ] [ ] bilat   Abd & Add 1 10 [X] [ ] [ ] \"      Pain:  Pain Scale 1: Numeric (0 - 10)  Pain Intensity 1: 4  Pain Location 1: Leg  Pain Orientation 1: Left  Pain Description 1: Aching; Throbbing;Constant  Pain Intervention(s) 1: Medication (see MAR)      Activity Tolerance: fair     After treatment:   [X] Patient left in no apparent distress sitting up in chair  [ ] Patient left in no apparent distress in bed  [X] Call bell left within reach  [X] Nursing notified  [ ] Caregiver present  [ ] Bed alarm activated      COMMUNICATION/COLLABORATION:   The patients plan of care was discussed with: Registered Nurse     Sammy Leonardo PTA   Time Calculation: 25 mins

## 2017-09-01 NOTE — PROGRESS NOTES
General Daily Progress Note    Admit Date: 8/24/2017  Hospital day several    Subjective:     Pt denies any cp sob still abit anxious  s/w nursing on pca pump being weened   Medication side effects: none    Current Facility-Administered Medications   Medication Dose Route Frequency    insulin glargine (LANTUS) injection 24 Units  24 Units SubCUTAneous DAILY    docusate sodium (COLACE) capsule 100 mg  100 mg Oral BID    HYDROmorphone (PF) (DILAUDID) injection 2 mg  2 mg IntraVENous Q4H PRN    aluminum-magnesium hydroxide (MAALOX) oral suspension 15-30 mL  15-30 mL Oral Q2H PRN    fentaNYL (DURAGESIC) 75 mcg/hr patch 1 Patch  1 Patch TransDERmal Q72H    heparin 25,000 units in D5W 250 ml infusion  13-36 Units/kg/hr IntraVENous TITRATE    heparin (porcine) injection 4,700 Units  40 Units/kg IntraVENous PRN    heparin (porcine) injection 9,450 Units  80 Units/kg SubCUTAneous PRN    metroNIDAZOLE (FLAGYL) IVPB premix 500 mg  500 mg IntraVENous Q8H    ALPRAZolam (XANAX) tablet 0.25 mg  0.25 mg Oral TID PRN    insulin lispro (HUMALOG) injection   SubCUTAneous AC&HS    sodium chloride (NS) flush 10-30 mL  10-30 mL InterCATHeter PRN    sodium chloride (NS) flush 10 mL  10 mL InterCATHeter Q24H    sodium chloride (NS) flush 10 mL  10 mL InterCATHeter PRN    sodium chloride (NS) flush 10-40 mL  10-40 mL InterCATHeter Q8H    sodium chloride (NS) flush 20 mL  20 mL InterCATHeter PRN    heparin (porcine) pf 300 Units  300 Units InterCATHeter PRN    cefepime (MAXIPIME) 2 g in 0.9% sodium chloride (MBP/ADV) 100 mL  2 g IntraVENous Q8H    atorvastatin (LIPITOR) tablet 40 mg  40 mg Oral DAILY    carvedilol (COREG) tablet 6.25 mg  6.25 mg Oral BID WITH MEALS    pantoprazole (PROTONIX) tablet 40 mg  40 mg Oral DAILY    gabapentin (NEURONTIN) capsule 300 mg  300 mg Oral TID    glucose chewable tablet 16 g  4 Tab Oral PRN    dextrose (D50W) injection syrg 12.5-25 g  12.5-25 g IntraVENous PRN    glucagon (GLUCAGEN) injection 1 mg  1 mg IntraMUSCular PRN    nitroglycerin (NITROBID) 2 % ointment 1 Inch  1 Inch Topical Q6H PRN    sodium chloride (NS) flush 5-10 mL  5-10 mL IntraVENous Q8H    sodium chloride (NS) flush 5-10 mL  5-10 mL IntraVENous PRN    acetaminophen (TYLENOL) tablet 650 mg  650 mg Oral Q6H PRN    ondansetron (ZOFRAN) injection 4 mg  4 mg IntraVENous Q4H PRN    oxyCODONE-acetaminophen (PERCOCET 10)  mg per tablet 1 Tab  1 Tab Oral Q6H PRN    HYDROmorphone (PF) (DILAUDID) injection 0.5 mg  0.5 mg IntraVENous Q3H PRN    vancomycin (VANCOCIN) 1750 mg in  ml infusion  1,750 mg IntraVENous Q18H        Review of Systems  A comprehensive review of systems was negative except for that written in the HPI. Objective:     Patient Vitals for the past 8 hrs:   BP Temp Pulse Resp SpO2   09/01/17 0200 116/89 98.3 °F (36.8 °C) 93 16 98 %        08/30 1901 - 09/01 0700  In: 836.8 [P.O.:275; I.V.:561.8]  Out: 3225 [Urine:3225]    Physical Exam: General appearance: alert, cooperative, no distress, appears stated age, anxious  Lungs: clear to auscultation bilaterally  Heart: regular rate and rhythm  Abdomen: soft, non-tender.  Bowel sounds normal. No masses,  no organomegaly  Extremities: + edema RLE , no redness or tenderness in the calves or thighs   l Foot bandaged did not remove           Data Review   Recent Results (from the past 24 hour(s))   PTT    Collection Time: 08/31/17  9:22 AM   Result Value Ref Range    aPTT 55.0 (H) 22.1 - 32.5 sec    aPTT, therapeutic range     58.0 - 77.0 SECS   GLUCOSE, POC    Collection Time: 08/31/17 11:09 AM   Result Value Ref Range    Glucose (POC) 263 (H) 65 - 100 mg/dL    Performed by Marilee Martinez    PTT    Collection Time: 08/31/17  4:00 PM   Result Value Ref Range    aPTT 69.7 (H) 22.1 - 32.5 sec    aPTT, therapeutic range     58.0 - 77.0 SECS   GLUCOSE, POC    Collection Time: 08/31/17  4:56 PM   Result Value Ref Range    Glucose (POC) 231 (H) 65 - 100 mg/dL    Performed by Leanne Champion, RANDOM    Collection Time: 08/31/17  6:00 PM   Result Value Ref Range    Vancomycin, random 22.5 UG/ML   GLUCOSE, POC    Collection Time: 08/31/17  9:03 PM   Result Value Ref Range    Glucose (POC) 174 (H) 65 - 100 mg/dL    Performed by Joyce Martinez    PTT    Collection Time: 08/31/17 10:54 PM   Result Value Ref Range    aPTT 70.9 (H) 22.1 - 32.5 sec    aPTT, therapeutic range     58.0 - 30.4 SECS   METABOLIC PANEL, BASIC    Collection Time: 09/01/17 12:35 AM   Result Value Ref Range    Sodium 133 (L) 136 - 145 mmol/L    Potassium 3.9 3.5 - 5.1 mmol/L    Chloride 104 97 - 108 mmol/L    CO2 23 21 - 32 mmol/L    Anion gap 6 5 - 15 mmol/L    Glucose 210 (H) 65 - 100 mg/dL    BUN 23 (H) 6 - 20 MG/DL    Creatinine 1.92 (H) 0.70 - 1.30 MG/DL    BUN/Creatinine ratio 12 12 - 20      GFR est AA 46 (L) >60 ml/min/1.73m2    GFR est non-AA 38 (L) >60 ml/min/1.73m2    Calcium 7.6 (L) 8.5 - 10.1 MG/DL   CBC WITH AUTOMATED DIFF    Collection Time: 09/01/17 12:35 AM   Result Value Ref Range    WBC 12.6 (H) 4.1 - 11.1 K/uL    RBC 3.35 (L) 4.10 - 5.70 M/uL    HGB 7.4 (L) 12.1 - 17.0 g/dL    HCT 23.5 (L) 36.6 - 50.3 %    MCV 70.1 (L) 80.0 - 99.0 FL    MCH 22.1 (L) 26.0 - 34.0 PG    MCHC 31.5 30.0 - 36.5 g/dL    RDW 16.9 (H) 11.5 - 14.5 %    PLATELET 807 977 - 265 K/uL    NEUTROPHILS 71 32 - 75 %    LYMPHOCYTES 20 12 - 49 %    MONOCYTES 8 5 - 13 %    EOSINOPHILS 1 0 - 7 %    BASOPHILS 0 0 - 1 %    ABS. NEUTROPHILS 8.9 (H) 1.8 - 8.0 K/UL    ABS. LYMPHOCYTES 2.5 0.8 - 3.5 K/UL    ABS. MONOCYTES 1.0 0.0 - 1.0 K/UL    ABS. EOSINOPHILS 0.2 0.0 - 0.4 K/UL    ABS.  BASOPHILS 0.0 0.0 - 0.1 K/UL           Assessment:     Active Problems:    HTN (hypertension) (7/23/2013)      Diabetes ([address] ()      Overview: since age 23      Hypercholesteremia ()      Sepsis ([address] (5/27/2017)      Bilateral pulmonary embolism (HCC) (7/21/2017)      Chronic systolic heart failure ([address] (7/31/2017) Left foot infection (8/24/2017)      Hyponatremia (8/24/2017)      ALEX (acute kidney injury) ([facility] [address] (8/24/2017)      GERD (gastroesophageal reflux disease) (8/24/2017)        Plan:    on heparin change to po eliquis per vascular team  Continue home bp meds adjusted slightly yesterdayd/w pt bp's labile due to amputation appreciate cardiology evaluation Pt  is not very compliant with diet -chips noted in room earlier in week   Continue ISS adjusted lantus again   Continue abx  Gentle ivh and monitor renal fxn as well as cardiac output- mild edema noted in right le increase lasix

## 2017-09-01 NOTE — DIABETES MGMT
DTC Progress Note    Recommendations/ Comments: Please continue to titrate Lantus dose until fasting BG <200mg/dL. Also please consider resuming prandial insulin, Humalog 9 units ac tid. Chart reviewed on Ivon Estrada for hyperglycemia. Patient is a 49 y.o. male with known history of Type 2 Diabetes on Metformin 500mg bid, Humalog 12 units ac tid, and Levemir 50 units nightly at home. A1c:   Lab Results   Component Value Date/Time    Hemoglobin A1c 11.1 07/12/2017 12:00 PM    Hemoglobin A1c 12.5 05/28/2017 03:18 AM       Recent Glucose Results:   Lab Results   Component Value Date/Time     (H) 09/01/2017 12:35 AM    GLUCPOC 232 (H) 09/01/2017 07:48 AM    GLUCPOC 174 (H) 08/31/2017 09:03 PM    GLUCPOC 231 (H) 08/31/2017 04:56 PM        Lab Results   Component Value Date/Time    Creatinine 1.92 09/01/2017 12:35 AM     Estimated Creatinine Clearance: 64 mL/min (based on Cr of 1.92). Active Orders   Diet    DIET DIABETIC CONSISTENT CARB Regular        PO intake:   Patient Vitals for the past 72 hrs:   % Diet Eaten   08/31/17 1802 0 %   08/31/17 1218 25 %   08/31/17 0756 0 %   08/30/17 1820 0 %   08/30/17 1300 50 %   08/30/17 0800 0 %   08/29/17 1915 50 %       Current hospital DM medication:   -Lantus 24 units daily   -Humalog insulin resistant correction    Will continue to follow as needed.     Thank you    Damion Dugan, [address]  Office: [phone]

## 2017-09-01 NOTE — PROGRESS NOTES
[facility]. Is still waiting for auth . From [facility]. They are asking if he will be ready today if they get auth. Dr. Harshil Mcmahon has not been in yet. CM following and will assist, as needed.  Mercedes Flavors IPDBY,AU,DZX--[phone]

## 2017-09-01 NOTE — PROGRESS NOTES
Cardiology Progress Note  9/1/2017     Admit Date: 8/24/2017  Admit Diagnosis: Sepsis ([address]  PVD  CC: none currently  Cardiologist:  Dr Guerrier Public Assessment/Plan:   CM: likely HTN-related (Nl ferritin/TSH; HIV negative).    Echo 7/22/17: Ef 25-30% with borderline LVE, global HK; Mod LVH; RVE/decreased RV Fxn. Mild-mod TR.      Findings c/w hypertensive CM: Med Rx for now; will need future eval for ischemia (after PE treated). With acei angioedema, avoiding ARBs as well. No further aldactone with recurrent ALEX.     Rec: cont coreg; add back hydralazine/imdur as BP tolerates; avoid further clonidine if able. D/Cd IVF. Will start lasix when appropriate: watch Cr/vol status.     HTN: High POA then low (likely orthostatic/post-op/neuropathy/narcotics etc): better overall now: Watch orthostatics; adjust meds as above.       Rhythm: sinus      Lipids: with DM, should be on a statin: previously on lipitor 20.      ID (previous osteo/C diff), DM2 (insulin for years), CKD, pain control, anticoagulation, Dispo: per primary team.     Already has F/u plan with me next month.      8/31: BPs improved (Not OOB yet today); Cont coreg 6.25 bid for now (may need less). Add back hydralazine/imdur as able. No further aldactone with ALEX/CKD. Hold diuretics for now with increased Cr (1.76).    9/1: BPs ok (BP reportedly 130 supine/110 sitting yesterday); need formal orthostatics done; Cr upto 1.9. Add prn hydralazine;   ___________________________________________________________________________________  At admit 7/2016 for extensive PEs/HTN, found to have CM (prob HTN but ischemic not excluded). Cardiac meds on d/c: coreg 6.25 bid; hydralazine 25 TID; imdur 30 qd; aldactone 25 every day (Cr had normalized). With acei angioedema, avoiding ARBs  Also on eliquis for bilat PEs.      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     HTN earlier in admit: clonidine PO then patch started; More recently with hypotension (when OOB) with orthostatic Sx. Mild GARCIA, worse since admit; The patient reports no chest pain/pressure; He reports compliance with meds.      No PND, orthopnea, palpitations, pre-syncope, syncope.  No current complaints.      He is from [place]; Family from [place]. For other plans, see orders.   Hospital problem list   Active Hospital Problems    Diagnosis Date Noted    Left foot infection 2017    Hyponatremia 2017    ALEX (acute kidney injury) ([facility] [address] 2017    GERD (gastroesophageal reflux disease) 2017    Chronic systolic heart failure ([facility] [address] 2017    Bilateral pulmonary embolism ([facility] [address] 2017    Sepsis ([facility] [address] 2017    Diabetes ([facility] [address]      since age 23      Hypercholesteremia     HTN (hypertension) 2013        Subjective: Luther Burns reports   Chest pain X none  consistent with:  Non-cardiac CP         Atypical CP     None now  On going  Anginal CP     Dyspnea X none  at rest  with exertion         improved  unchanged  worse              PND X none  overnight       Orthopnea X none  improved  unchanged  worse   Presyncope X none  improved  unchanged  worse     Ambulated in hallway without symptoms   Yes   Ambulated in room without symptoms  Yes   Objective:    Physical Exam:  Overall VSSAF;    Visit Vitals    /89 (BP 1 Location: Left arm, BP Patient Position: At rest)    Pulse 93    Temp 98.3 °F (36.8 °C)    Resp 16    Ht 6' 3\" (1.905 m)    Wt 113.5 kg (250 lb 3.6 oz)    SpO2 98%    BMI 31.28 kg/m2     Temp (24hrs), Av.6 °F (37 °C), Min:98.3 °F (36.8 °C), Max:98.9 °F (37.2 °C)    Patient Vitals for the past 8 hrs:   Pulse   17 0200 93    Patient Vitals for the past 8 hrs:   Resp   17 0200 16    Patient Vitals for the past 8 hrs:   BP   17 0200 116/89          Intake/Output Summary (Last 24 hours) at 17 0845  Last data filed at 09/01/17 0515   Gross per 24 hour   Intake           586.78 ml   Output             2150 ml   Net         -1563.22 ml     General Appearance: Well developed, well nourished, no acute distress. Ears/Nose/Mouth/Throat:   Normal MM; anicteric. JVP: WNL   Resp:   clear to auscultation bilaterally. Nl resp effort. Cardiovascular:  RRR, S1, S2 normal, no new murmur. No gallop or rub. Abdomen:   Soft, non-tender, bowel sounds are present. Extremities: Trivial RLE edema; LLE surgically absent. Skin:  Neuro: Warm and dry. A/O x3, grossly nonfocal   cath site intact w/o hematoma or new bruit; distal pulse unchanged  Yes   Data Review:     Telemetry independently reviewed x sinus  chronic afib  parox afib  NSVT     ECG independently reviewed  NSR  afib  no significant changes  NSST-Tw chgs   x no new ECG provided for review   Lab results reviewed as noted below. Current medications reviewed as noted below. No results for input(s): PH, PCO2, PO2 in the last 72 hours. No results for input(s): CPK, CKMB, CKNDX, TROIQ in the last 72 hours. Recent Labs      09/01/17   0035  08/31/17   0321  08/30/17   0544   NA  133*  134*  133*   K  3.9  4.0  4.0   CL  104  103  102   CO2  23  24  23   BUN  23*  22*  18   CREA  1.92*  1.76*  1.63*   GLU  210*  242*  204*   CA  7.6*  7.5*  7.6*   WBC  12.6*  14.7*  16.5*   HGB  7.4*  7.9*  7.6*   HCT  23.5*  25.2*  24.9*   PLT  363  388  405*     Lab Results   Component Value Date/Time    Cholesterol, total 161 07/24/2017 04:54 AM    HDL Cholesterol 46 07/24/2017 04:54 AM    LDL, calculated 98.2 07/24/2017 04:54 AM    Triglyceride 84 07/24/2017 04:54 AM    CHOL/HDL Ratio 3.5 07/24/2017 04:54 AM     No results for input(s): SGOT, GPT, AP, TBIL, TP, ALB, GLOB, GGT, AML, LPSE in the last 72 hours.     No lab exists for component: AMYP, HLPSE  Recent Labs      08/31/17   2254  08/31/17   1600  08/31/17   0922   APTT  70.9*  69.7*  55.0*      No components found for: [place]    Current Facility-Administered Medications   Medication Dose Route Frequency    apixaban (ELIQUIS) tablet 10 mg  10 mg Oral Q12H    HYDROmorphone (PF) (DILAUDID) injection 0.5 mg  0.5 mg IntraVENous Q3H PRN    insulin glargine (LANTUS) injection 24 Units  24 Units SubCUTAneous DAILY    docusate sodium (COLACE) capsule 100 mg  100 mg Oral BID    aluminum-magnesium hydroxide (MAALOX) oral suspension 15-30 mL  15-30 mL Oral Q2H PRN    fentaNYL (DURAGESIC) 75 mcg/hr patch 1 Patch  1 Patch TransDERmal Q72H    metroNIDAZOLE (FLAGYL) IVPB premix 500 mg  500 mg IntraVENous Q8H    ALPRAZolam (XANAX) tablet 0.25 mg  0.25 mg Oral TID PRN    insulin lispro (HUMALOG) injection   SubCUTAneous AC&HS    sodium chloride (NS) flush 10-30 mL  10-30 mL InterCATHeter PRN    sodium chloride (NS) flush 10 mL  10 mL InterCATHeter Q24H    sodium chloride (NS) flush 10 mL  10 mL InterCATHeter PRN    sodium chloride (NS) flush 10-40 mL  10-40 mL InterCATHeter Q8H    sodium chloride (NS) flush 20 mL  20 mL InterCATHeter PRN    cefepime (MAXIPIME) 2 g in 0.9% sodium chloride (MBP/ADV) 100 mL  2 g IntraVENous Q8H    atorvastatin (LIPITOR) tablet 40 mg  40 mg Oral DAILY    carvedilol (COREG) tablet 6.25 mg  6.25 mg Oral BID WITH MEALS    pantoprazole (PROTONIX) tablet 40 mg  40 mg Oral DAILY    gabapentin (NEURONTIN) capsule 300 mg  300 mg Oral TID    glucose chewable tablet 16 g  4 Tab Oral PRN    dextrose (D50W) injection syrg 12.5-25 g  12.5-25 g IntraVENous PRN    glucagon (GLUCAGEN) injection 1 mg  1 mg IntraMUSCular PRN    nitroglycerin (NITROBID) 2 % ointment 1 Inch  1 Inch Topical Q6H PRN    sodium chloride (NS) flush 5-10 mL  5-10 mL IntraVENous Q8H    sodium chloride (NS) flush 5-10 mL  5-10 mL IntraVENous PRN    acetaminophen (TYLENOL) tablet 650 mg  650 mg Oral Q6H PRN    ondansetron (ZOFRAN) injection 4 mg  4 mg IntraVENous Q4H PRN    oxyCODONE-acetaminophen (PERCOCET 10)  mg per tablet 1 Tab 1 Tab Oral Q6H PRN    vancomycin (VANCOCIN) 1750 mg in  ml infusion  1,750 mg IntraVENous Q18H        Xenia Dunlap MD

## 2017-09-02 ENCOUNTER — HOSPITAL ENCOUNTER (OUTPATIENT)
Age: 47
Discharge: HOME | End: 2017-09-08
Attending: PHYSICAL MEDICINE & REHABILITATION | Admitting: PHYSICAL MEDICINE & REHABILITATION

## 2017-09-02 VITALS
DIASTOLIC BLOOD PRESSURE: 103 MMHG | HEIGHT: 75 IN | TEMPERATURE: 98.7 F | BODY MASS INDEX: 31.11 KG/M2 | RESPIRATION RATE: 18 BRPM | HEART RATE: 84 BPM | WEIGHT: 250.22 LBS | OXYGEN SATURATION: 100 % | SYSTOLIC BLOOD PRESSURE: 171 MMHG

## 2017-09-02 LAB
ANION GAP SERPL CALC-SCNC: 5 MMOL/L (ref 5–15)
APPEARANCE UR: CLEAR
BACTERIA URNS QL MICRO: NEGATIVE /HPF
BASOPHILS # BLD: 0 K/UL (ref 0–0.1)
BASOPHILS NFR BLD: 0 % (ref 0–1)
BILIRUB UR QL: NEGATIVE
BUN SERPL-MCNC: 21 MG/DL (ref 6–20)
BUN/CREAT SERPL: 12 (ref 12–20)
CALCIUM SERPL-MCNC: 7.8 MG/DL (ref 8.5–10.1)
CHLORIDE SERPL-SCNC: 108 MMOL/L (ref 97–108)
CO2 SERPL-SCNC: 23 MMOL/L (ref 21–32)
COLOR UR: ABNORMAL
CREAT SERPL-MCNC: 1.8 MG/DL (ref 0.7–1.3)
EOSINOPHIL # BLD: 0.3 K/UL (ref 0–0.4)
EOSINOPHIL NFR BLD: 3 % (ref 0–7)
EPITH CASTS URNS QL MICRO: ABNORMAL /LPF
ERYTHROCYTE [DISTWIDTH] IN BLOOD BY AUTOMATED COUNT: 17 % (ref 11.5–14.5)
GLUCOSE BLD STRIP.AUTO-MCNC: 182 MG/DL (ref 65–100)
GLUCOSE BLD STRIP.AUTO-MCNC: 203 MG/DL (ref 65–100)
GLUCOSE SERPL-MCNC: 198 MG/DL (ref 65–100)
GLUCOSE UR STRIP.AUTO-MCNC: 250 MG/DL
HCT VFR BLD AUTO: 24.8 % (ref 36.6–50.3)
HGB BLD-MCNC: 7.9 G/DL (ref 12.1–17)
HGB UR QL STRIP: ABNORMAL
HYALINE CASTS URNS QL MICRO: ABNORMAL /LPF (ref 0–5)
KETONES UR QL STRIP.AUTO: NEGATIVE MG/DL
LEUKOCYTE ESTERASE UR QL STRIP.AUTO: NEGATIVE
LYMPHOCYTES # BLD: 2.3 K/UL (ref 0.8–3.5)
LYMPHOCYTES NFR BLD: 21 % (ref 12–49)
MCH RBC QN AUTO: 22.4 PG (ref 26–34)
MCHC RBC AUTO-ENTMCNC: 31.9 G/DL (ref 30–36.5)
MCV RBC AUTO: 70.5 FL (ref 80–99)
MONOCYTES # BLD: 0.9 K/UL (ref 0–1)
MONOCYTES NFR BLD: 9 % (ref 5–13)
NEUTS SEG # BLD: 7.3 K/UL (ref 1.8–8)
NEUTS SEG NFR BLD: 67 % (ref 32–75)
NITRITE UR QL STRIP.AUTO: NEGATIVE
PH UR STRIP: 5.5 [PH] (ref 5–8)
PLATELET # BLD AUTO: 321 K/UL (ref 150–400)
POTASSIUM SERPL-SCNC: 4.1 MMOL/L (ref 3.5–5.1)
PROT UR STRIP-MCNC: 100 MG/DL
RBC # BLD AUTO: 3.52 M/UL (ref 4.1–5.7)
RBC #/AREA URNS HPF: ABNORMAL /HPF (ref 0–5)
SERVICE CMNT-IMP: ABNORMAL
SERVICE CMNT-IMP: ABNORMAL
SODIUM SERPL-SCNC: 136 MMOL/L (ref 136–145)
SP GR UR REFRACTOMETRY: 1.02 (ref 1–1.03)
UROBILINOGEN UR QL STRIP.AUTO: 0.2 EU/DL (ref 0.2–1)
WBC # BLD AUTO: 10.8 K/UL (ref 4.1–11.1)
WBC URNS QL MICRO: ABNORMAL /HPF (ref 0–4)

## 2017-09-02 PROCEDURE — 74011250637 HC RX REV CODE- 250/637: Performed by: FAMILY MEDICINE

## 2017-09-02 PROCEDURE — 74011000258 HC RX REV CODE- 258: Performed by: FAMILY MEDICINE

## 2017-09-02 PROCEDURE — 74011250636 HC RX REV CODE- 250/636: Performed by: EMERGENCY MEDICINE

## 2017-09-02 PROCEDURE — 36415 COLL VENOUS BLD VENIPUNCTURE: CPT | Performed by: EMERGENCY MEDICINE

## 2017-09-02 PROCEDURE — 87086 URINE CULTURE/COLONY COUNT: CPT | Performed by: PHYSICAL MEDICINE & REHABILITATION

## 2017-09-02 PROCEDURE — 74011250636 HC RX REV CODE- 250/636: Performed by: PHYSICAL MEDICINE & REHABILITATION

## 2017-09-02 PROCEDURE — 74011636637 HC RX REV CODE- 636/637: Performed by: PHYSICAL MEDICINE & REHABILITATION

## 2017-09-02 PROCEDURE — 74011000250 HC RX REV CODE- 250: Performed by: FAMILY MEDICINE

## 2017-09-02 PROCEDURE — 74011250636 HC RX REV CODE- 250/636: Performed by: FAMILY MEDICINE

## 2017-09-02 PROCEDURE — 74011250637 HC RX REV CODE- 250/637: Performed by: NURSE PRACTITIONER

## 2017-09-02 PROCEDURE — 74011250637 HC RX REV CODE- 250/637: Performed by: INTERNAL MEDICINE

## 2017-09-02 PROCEDURE — 81001 URINALYSIS AUTO W/SCOPE: CPT | Performed by: PHYSICAL MEDICINE & REHABILITATION

## 2017-09-02 PROCEDURE — 85025 COMPLETE CBC W/AUTO DIFF WBC: CPT | Performed by: EMERGENCY MEDICINE

## 2017-09-02 PROCEDURE — 74011250637 HC RX REV CODE- 250/637: Performed by: HOSPITALIST

## 2017-09-02 PROCEDURE — 74011636637 HC RX REV CODE- 636/637: Performed by: FAMILY MEDICINE

## 2017-09-02 PROCEDURE — 82962 GLUCOSE BLOOD TEST: CPT

## 2017-09-02 PROCEDURE — 74011000250 HC RX REV CODE- 250: Performed by: PHYSICAL MEDICINE & REHABILITATION

## 2017-09-02 PROCEDURE — 74011250636 HC RX REV CODE- 250/636: Performed by: NURSE PRACTITIONER

## 2017-09-02 PROCEDURE — 74011000258 HC RX REV CODE- 258: Performed by: PHYSICAL MEDICINE & REHABILITATION

## 2017-09-02 PROCEDURE — 74011250637 HC RX REV CODE- 250/637: Performed by: PHYSICAL MEDICINE & REHABILITATION

## 2017-09-02 PROCEDURE — 80048 BASIC METABOLIC PNL TOTAL CA: CPT | Performed by: EMERGENCY MEDICINE

## 2017-09-02 RX ORDER — METRONIDAZOLE 500 MG/100ML
500 INJECTION, SOLUTION INTRAVENOUS EVERY 8 HOURS
Status: DISCONTINUED | OUTPATIENT
Start: 2017-09-02 | End: 2017-09-07

## 2017-09-02 RX ORDER — DIPHENHYDRAMINE HCL 25 MG
25 CAPSULE ORAL ONCE
Status: COMPLETED | OUTPATIENT
Start: 2017-09-02 | End: 2017-09-02

## 2017-09-02 RX ORDER — DOCUSATE SODIUM 100 MG/1
100 CAPSULE, LIQUID FILLED ORAL 2 TIMES DAILY
Status: DISCONTINUED | OUTPATIENT
Start: 2017-09-02 | End: 2017-09-08 | Stop reason: HOSPADM

## 2017-09-02 RX ORDER — NITROGLYCERIN 0.4 MG/1
0.4 TABLET SUBLINGUAL
Status: DISCONTINUED | OUTPATIENT
Start: 2017-09-02 | End: 2017-09-08 | Stop reason: HOSPADM

## 2017-09-02 RX ORDER — METRONIDAZOLE 500 MG/100ML
500 INJECTION, SOLUTION INTRAVENOUS EVERY 8 HOURS
Qty: 2100 ML | Refills: 0 | Status: SHIPPED
Start: 2017-09-02 | End: 2017-09-09

## 2017-09-02 RX ORDER — FENTANYL 75 UG/H
1 PATCH TRANSDERMAL
Qty: 2 PATCH | Refills: 0 | Status: SHIPPED | OUTPATIENT
Start: 2017-09-02 | End: 2017-11-02

## 2017-09-02 RX ORDER — OXYCODONE HYDROCHLORIDE 5 MG/1
5 TABLET ORAL
Status: DISCONTINUED | OUTPATIENT
Start: 2017-09-02 | End: 2017-09-05

## 2017-09-02 RX ORDER — INSULIN GLARGINE 100 [IU]/ML
24 INJECTION, SOLUTION SUBCUTANEOUS DAILY
Status: DISCONTINUED | OUTPATIENT
Start: 2017-09-03 | End: 2017-09-08 | Stop reason: HOSPADM

## 2017-09-02 RX ORDER — ONDANSETRON 2 MG/ML
4 INJECTION INTRAMUSCULAR; INTRAVENOUS
Qty: 1 ML | Refills: 0 | Status: SHIPPED
Start: 2017-09-02 | End: 2017-11-02

## 2017-09-02 RX ORDER — MAGNESIUM SULFATE 100 %
16 CRYSTALS MISCELLANEOUS AS NEEDED
Status: DISCONTINUED | OUTPATIENT
Start: 2017-09-02 | End: 2017-09-08 | Stop reason: HOSPADM

## 2017-09-02 RX ORDER — ZOLPIDEM TARTRATE 5 MG/1
5 TABLET ORAL
Status: DISCONTINUED | OUTPATIENT
Start: 2017-09-02 | End: 2017-09-08 | Stop reason: HOSPADM

## 2017-09-02 RX ORDER — ATORVASTATIN CALCIUM 40 MG/1
40 TABLET, FILM COATED ORAL
Status: DISCONTINUED | OUTPATIENT
Start: 2017-09-02 | End: 2017-09-08 | Stop reason: HOSPADM

## 2017-09-02 RX ORDER — ACETAMINOPHEN 325 MG/1
650 TABLET ORAL
Status: DISCONTINUED | OUTPATIENT
Start: 2017-09-02 | End: 2017-09-08 | Stop reason: HOSPADM

## 2017-09-02 RX ORDER — FENTANYL 75 UG/H
1 PATCH TRANSDERMAL
Status: DISCONTINUED | OUTPATIENT
Start: 2017-09-02 | End: 2017-09-08 | Stop reason: HOSPADM

## 2017-09-02 RX ORDER — GABAPENTIN 300 MG/1
300 CAPSULE ORAL 3 TIMES DAILY
Status: DISCONTINUED | OUTPATIENT
Start: 2017-09-02 | End: 2017-09-08 | Stop reason: HOSPADM

## 2017-09-02 RX ORDER — INSULIN LISPRO 100 [IU]/ML
INJECTION, SOLUTION INTRAVENOUS; SUBCUTANEOUS
Status: DISCONTINUED | OUTPATIENT
Start: 2017-09-02 | End: 2017-09-08 | Stop reason: HOSPADM

## 2017-09-02 RX ORDER — PANTOPRAZOLE SODIUM 40 MG/1
40 TABLET, DELAYED RELEASE ORAL
Status: DISCONTINUED | OUTPATIENT
Start: 2017-09-03 | End: 2017-09-08 | Stop reason: HOSPADM

## 2017-09-02 RX ORDER — ONDANSETRON 4 MG/1
4 TABLET, ORALLY DISINTEGRATING ORAL
Status: DISCONTINUED | OUTPATIENT
Start: 2017-09-02 | End: 2017-09-08 | Stop reason: HOSPADM

## 2017-09-02 RX ORDER — VANCOMYCIN 1.75 GRAM/500 ML IN 0.9 % SODIUM CHLORIDE INTRAVENOUS
1750
Qty: 4000 ML | Refills: 0 | Status: SHIPPED
Start: 2017-09-02 | End: 2017-09-09

## 2017-09-02 RX ORDER — OXYCODONE AND ACETAMINOPHEN 10; 325 MG/1; MG/1
1 TABLET ORAL
Qty: 12 TAB | Refills: 0 | Status: SHIPPED | OUTPATIENT
Start: 2017-09-02 | End: 2017-10-02 | Stop reason: SDUPTHER

## 2017-09-02 RX ORDER — ADHESIVE BANDAGE
30 BANDAGE TOPICAL DAILY PRN
Status: DISCONTINUED | OUTPATIENT
Start: 2017-09-02 | End: 2017-09-08 | Stop reason: HOSPADM

## 2017-09-02 RX ORDER — ALPRAZOLAM 0.25 MG/1
0.25 TABLET ORAL
Status: DISCONTINUED | OUTPATIENT
Start: 2017-09-02 | End: 2017-09-08 | Stop reason: HOSPADM

## 2017-09-02 RX ORDER — OXYCODONE HYDROCHLORIDE 5 MG/1
10 TABLET ORAL
Status: DISCONTINUED | OUTPATIENT
Start: 2017-09-02 | End: 2017-09-05

## 2017-09-02 RX ORDER — CARVEDILOL 6.25 MG/1
6.25 TABLET ORAL 2 TIMES DAILY WITH MEALS
Status: DISCONTINUED | OUTPATIENT
Start: 2017-09-02 | End: 2017-09-08 | Stop reason: HOSPADM

## 2017-09-02 RX ADMIN — HYDROMORPHONE HYDROCHLORIDE 0.5 MG: 1 INJECTION, SOLUTION INTRAMUSCULAR; INTRAVENOUS; SUBCUTANEOUS at 07:58

## 2017-09-02 RX ADMIN — Medication 10 ML: at 12:55

## 2017-09-02 RX ADMIN — ATORVASTATIN CALCIUM 40 MG: 40 TABLET, FILM COATED ORAL at 22:16

## 2017-09-02 RX ADMIN — APIXABAN 10 MG: 5 TABLET, FILM COATED ORAL at 22:13

## 2017-09-02 RX ADMIN — PANTOPRAZOLE SODIUM 40 MG: 40 TABLET, DELAYED RELEASE ORAL at 08:08

## 2017-09-02 RX ADMIN — CARVEDILOL 6.25 MG: 6.25 TABLET, FILM COATED ORAL at 07:57

## 2017-09-02 RX ADMIN — VANCOMYCIN HYDROCHLORIDE 1750 MG: 10 INJECTION, POWDER, LYOPHILIZED, FOR SOLUTION INTRAVENOUS at 07:52

## 2017-09-02 RX ADMIN — METRONIDAZOLE 500 MG: 500 INJECTION, SOLUTION INTRAVENOUS at 12:54

## 2017-09-02 RX ADMIN — DOCUSATE SODIUM 100 MG: 100 CAPSULE, LIQUID FILLED ORAL at 08:08

## 2017-09-02 RX ADMIN — INSULIN GLARGINE 24 UNITS: 100 INJECTION, SOLUTION SUBCUTANEOUS at 08:00

## 2017-09-02 RX ADMIN — INSULIN LISPRO 4 UNITS: 100 INJECTION, SOLUTION INTRAVENOUS; SUBCUTANEOUS at 07:58

## 2017-09-02 RX ADMIN — Medication 10 ML: at 05:28

## 2017-09-02 RX ADMIN — GABAPENTIN 300 MG: 300 CAPSULE ORAL at 08:08

## 2017-09-02 RX ADMIN — METRONIDAZOLE 500 MG: 500 INJECTION, SOLUTION INTRAVENOUS at 05:27

## 2017-09-02 RX ADMIN — CARVEDILOL 6.25 MG: 6.25 TABLET, FILM COATED ORAL at 22:13

## 2017-09-02 RX ADMIN — INSULIN LISPRO 6 UNITS: 100 INJECTION, SOLUTION INTRAVENOUS; SUBCUTANEOUS at 12:54

## 2017-09-02 RX ADMIN — CEFEPIME HYDROCHLORIDE 2 G: 2 INJECTION, POWDER, FOR SOLUTION INTRAVENOUS at 08:10

## 2017-09-02 RX ADMIN — HYDROMORPHONE HYDROCHLORIDE 0.5 MG: 1 INJECTION, SOLUTION INTRAMUSCULAR; INTRAVENOUS; SUBCUTANEOUS at 14:32

## 2017-09-02 RX ADMIN — METRONIDAZOLE 500 MG: 500 INJECTION, SOLUTION INTRAVENOUS at 22:16

## 2017-09-02 RX ADMIN — ALPRAZOLAM 0.25 MG: 0.25 TABLET ORAL at 22:23

## 2017-09-02 RX ADMIN — OXYCODONE HYDROCHLORIDE AND ACETAMINOPHEN 1 TABLET: 10; 325 TABLET ORAL at 14:32

## 2017-09-02 RX ADMIN — HYDRALAZINE HYDROCHLORIDE 10 MG: 10 TABLET, FILM COATED ORAL at 07:57

## 2017-09-02 RX ADMIN — HYDROMORPHONE HYDROCHLORIDE 0.5 MG: 1 INJECTION, SOLUTION INTRAMUSCULAR; INTRAVENOUS; SUBCUTANEOUS at 03:07

## 2017-09-02 RX ADMIN — APIXABAN 10 MG: 5 TABLET, FILM COATED ORAL at 08:08

## 2017-09-02 RX ADMIN — OXYCODONE HYDROCHLORIDE AND ACETAMINOPHEN 1 TABLET: 10; 325 TABLET ORAL at 07:58

## 2017-09-02 RX ADMIN — ZOLPIDEM TARTRATE 5 MG: 5 TABLET ORAL at 22:16

## 2017-09-02 RX ADMIN — DOCUSATE SODIUM 100 MG: 100 CAPSULE, LIQUID FILLED ORAL at 22:16

## 2017-09-02 RX ADMIN — OXYCODONE HYDROCHLORIDE 10 MG: 5 TABLET ORAL at 19:56

## 2017-09-02 RX ADMIN — ATORVASTATIN CALCIUM 40 MG: 40 TABLET, FILM COATED ORAL at 08:08

## 2017-09-02 RX ADMIN — CEFEPIME HYDROCHLORIDE 2 G: 2 INJECTION, POWDER, FOR SOLUTION INTRAVENOUS at 22:17

## 2017-09-02 RX ADMIN — INSULIN LISPRO 4 UNITS: 100 INJECTION, SOLUTION INTRAVENOUS; SUBCUTANEOUS at 22:31

## 2017-09-02 RX ADMIN — Medication 10 ML: at 05:27

## 2017-09-02 RX ADMIN — OXYCODONE HYDROCHLORIDE 10 MG: 5 TABLET ORAL at 23:02

## 2017-09-02 RX ADMIN — GABAPENTIN 300 MG: 300 CAPSULE ORAL at 22:13

## 2017-09-02 RX ADMIN — DIPHENHYDRAMINE HYDROCHLORIDE 25 MG: 25 CAPSULE ORAL at 03:20

## 2017-09-02 NOTE — PROGRESS NOTES
Patient is to be discharged to [facility] today. RN to call report after lunch - [phone]  Room assignment to be provided at time of report. RN informed.     Ifrah Sidhu RN CM  Ext [phone]

## 2017-09-02 NOTE — PROGRESS NOTES
Called report to Angelina Call, RN at [facility]. Report included SBAR, Kardex, and MAR. Per request of the RN[facility] ([phone]). Will medicate patient before being transferred.  Patient updated on plan

## 2017-09-02 NOTE — H&P
[address]  [address] [place] [address] Ne, 508 [facility] H&P and initial Rehab plan of care   Patient Name:      Adan Tejeda Site:  [facility]   MRN:     /   :   [id number] Admit Date 17   Attending Physician: Dr. Tsering Nascimento:   Zack 65     Referring Physician:     Pepito Choi is a 49 y.o.  male who is sent by his podiatry to ED for evaluation. As per patient, pt had surgery of his left foot few months ago and later he developed the wound on left side of his amputation site and it's in process of constant care under podiatry but when he woke up today, he noted new wound on the right side of left foot. He was seen at podiatry office who referred him to ED for admission. In ED pt noted to have leukocytosis, tachycardia, and fever along with renal insufficiency and hyponatremia. We were asked to admit for work up and evaluation of the above problems.            Past Medical History:   Diagnosis Date    Diabetes ([facility]       since age 23    DM (diabetes mellitus) ([facility]      HTN (hypertension)      Hypercholesteremia      Hyperlipidemia                 Past Surgical History:   Procedure Laterality Date    HX AMPUTATION         toes- left foot    HX BUNIONECTOMY        HX ORTHOPAEDIC         boutinerre deformity    HX ORTHOPAEDIC         fascia removed left foot    HX OTHER SURGICAL   2017     Skin graft Surgery    HX TONSILLECTOMY        HX WISDOM TEETH EXTRACTION                     Social History   Substance Use Topics    Smoking status: Former Smoker       Types: Cigars       Quit date: 2011    Smokeless tobacco: Never Used    Alcohol use 0.0 oz/week        1 Glasses of wine, 0 Standard drinks or equivalent per week          Comment: stop drinking 5 months ago         Family History   Problem Relation Age of Onset    Hypertension Mother     [employer] High Cholesterol Mother              Allergies   Allergen Reactions    Ace Inhibitors Swelling       Facial swelling cough     Morphine Angioedema                 Prior to Admission medications    Medication Sig Start Date End Date Taking? Authorizing Provider   metFORMIN (GLUCOPHAGE) 500 mg tablet TAKE 1 TABLET BY MOUTH TWICE A DAY WITH MEALS AND INCREASE AS DIRECTED 8/8/17   Yes Cristian Alexander MD   insulin lispro (HUMALOG) 100 unit/mL kwikpen 12 Units by SubCUTAneous route Before breakfast, lunch, and dinner. DX: E11.65 8/8/17   Yes Cristian Alexander MD   apixaban (ELIQUIS) 5 mg tablet Take 2 Tabs by mouth every twelve (12) hours. 8/8/17   Yes Cristian Alexander MD   atorvastatin (LIPITOR) 40 mg tablet Take 1 Tab by mouth daily. 7/31/17   Yes Cristian Alexander MD   carvedilol (COREG) 6.25 mg tablet Take 1 Tab by mouth two (2) times daily (with meals). 7/26/17   Yes Cristian Alexander MD   hydrALAZINE (APRESOLINE) 25 mg tablet Take 1 Tab by mouth three (3) times daily. 7/26/17   Yes Cristian Alexander MD   isosorbide mononitrate ER (IMDUR) 30 mg tablet Take 1 Tab by mouth daily. 7/26/17   Yes Cristian Alexander MD   spironolactone (ALDACTONE) 25 mg tablet Take 1 Tab by mouth daily. 7/26/17   Yes Cristian Alexander MD   gabapentin (NEURONTIN) 300 mg capsule Take 1 Cap by mouth three (3) times daily. 7/12/17   Yes Cristian Alexander MD   diphenhydrAMINE (ALLERGY RELIEF,DIPHENHYDRAMIN,) 25 mg capsule Take 25 mg by mouth nightly as needed. Yes Historical Provider   insulin detemir (LEVEMIR FLEXTOUCH) 100 unit/mL (3 mL) inpn 50 Units by SubCUTAneous route nightly. 60 units QHS  Patient taking differently: 50 Units by SubCUTAneous route nightly. 6/3/17   Yes Maria Eugenia Cordero MD   oxyCODONE-acetaminophen (PERCOCET 10)  mg per tablet Take 1 Tab by mouth every six (6) hours as needed. Max Daily Amount: 4 Tabs.   Patient taking differently: Take 1 Tab by mouth every six (6) hours as needed for Pain. 3/2/17   Yes Diana Byrd MD   esomeprazole (NEXIUM) 40 mg capsule Take 1 Cap by mouth daily. 10/6/16   Yes Diana Byrd MD   sodium chloride (SALINE NASAL) 0.65 % nasal spray 1 [place] by Both Nostrils route as needed for Congestion. Patient taking differently: 1 Spray by Both Nostrils route as needed for Congestion (dry sinus symptoms, especially during winter time. ).  10/6/16   Yes Elia Toribio MD      MEDICATIONS ON TRANSFER       Medication Last Action       apixaban (ELIQUIS) tablet 10 mg Given       10 mg, PO, Q12H    09/02 0808       atorvastatin (LIPITOR) tablet 40 mg Given       40 mg, PO, DAILY    09/02 0808       carvedilol (COREG) tablet 6.25 mg Given       6.25 mg, PO, BID WITH MEALS    09/02 0757       cefepime (MAXIPIME) 2 g in 0.9% sodium chloride (MBP/ADV) 100 mL New Bag       2 g, IV, Q8H    09/02 0810       docusate sodium (COLACE) capsule 100 mg Given       100 mg, PO, BID    09/02 0808       fentaNYL (DURAGESIC) 75 mcg/hr patch 1 Patch Apply Patch       1 Patch, TD, Q72H    08/30 1606       gabapentin (NEURONTIN) capsule 300 mg Given       300 mg, PO, TID    09/02 0808       insulin glargine (LANTUS) injection 24 Units Given       24 Units, SC, DAILY    09/02 0800       insulin lispro (HUMALOG) injection Given       6 Units, SC, AC&HS    09/02 1254       metroNIDAZOLE (FLAGYL) IVPB premix 500 mg New Bag       500 mg, IV, Q8H    09/02 1254       pantoprazole (PROTONIX) tablet 40 mg Given       40 mg, PO, DAILY    09/02 0808       sodium chloride (NS) flush 10 mL Given       10 mL, IK, Q24H    09/02 1255       sodium chloride (NS) flush 10-40 mL Given       10 mL, IK, Q8H    09/02 0527       sodium chloride (NS) flush 5-10 mL Given       10 mL, IV, Q8H    09/02 0528       vancomycin (VANCOCIN) 1750 mg in  ml infusion New Bag       1,750 mg, IV, Q18H                   REVIEW OF SYSTEMS       Total of 12 systems reviewed as follows: POSITIVE= underlined text  Negative = text not underlined  General:                                         fever, chills, sweats, generalized weakness, weight loss/gain,                                                                     loss of appetite   Eyes:                                                         blurred vision, eye pain, loss of vision, double vision  ENT:                                                          rhinorrhea, pharyngitis   Respiratory:                                   cough, sputum production, SOB, GARCIA, wheezing, pleuritic pain   Cardiology:                                    chest pain, palpitations, orthopnea, PND, edema, syncope   Gastrointestinal:                 abdominal pain , N/V, diarrhea, dysphagia, constipation, bleeding   Genitourinary:                     frequency, urgency, dysuria, hematuria, incontinence   Muskuloskeletal :                arthralgia, myalgia, back pain  Hematology:                                  easy bruising, nose or gum bleeding, lymphadenopathy   Dermatological:                   Left foot ulceration / wound at amputation site  Endocrine:                                     hot flashes or polydipsia   Neurological:                       headache, dizziness, confusion, focal weakness, paresthesia,                                                                    Speech difficulties, memory loss, gait difficulty  Psychological:                    Feelings of anxiety, depression, agitation               PHYSICAL EXAM:     General:                    Alert, cooperative, no distress, appears stated age.      HEENT:                     Atraumatic, anicteric sclerae, pink conjunctivae                                              No oral ulcers, mucosa dry, throat clear, dentition fair  Neck:                                   Supple, symmetrical,  thyroid: non tender  Lungs: Clear to auscultation bilaterally. No Wheezing or Rhonchi. No rales. Chest wall:                No tenderness  No Accessory muscle use. Heart:                                  Regular  rhythm,  No  murmur   No edema  Abdomen:                  Soft, non-tender. Not distended. Bowel sounds normal  Extremities:               No cyanosis. No clubbing,                                                Skin turgor normal, Capillary refill normal, Radial dial pulse 2+  Skin:                                    Left foot ulceration / wound at amputation site   Psych:                                 Good insight. Not depressed. Not anxious or agitated. Neurologic:                EOMs intact. No facial asymmetry. No aphasia or slurred speech. Symmetrical strength, Sensation grossly intact. Alert and oriented X 4.           <REASON FOR INTENSIVE INPATIENT REHABILITATION AND PRIMARY DIAGNOSIS             PVD w left BKA secondary to MRSA osteomyelitis from a DM foot ulcer     MEDICAL COMORBIDITES AND PLAN:  DM mild hyperglycemia     Bilateral PE.   HTN  Acute Blood Loss Anemia. Hyponatremia likely pseudo secondary to hyperglycemia. A/CKD stage III w worsening creatinine for the last several days. Sepsis resolved          <POST-ADMISSION PHYSICIAN EVALUATION:>Due to the rigors of the rehabilitation program and methods to monitor, prevent, and treat them are noted in the comorbidities. <COMPARISON TO PREADMISSION SCREENING:>The patient's current function and medical condition remains essentially the same. <SUMMARY STATEMENTS:>An IRF setting is deemed the most appropriate level of care for this patient based upon the needs of the above noted conditions and co-morbidities.   1. This patient requires at least 3 hours daily therapies at least 5 days per week (or a minimum of 15 hours/week) to be provided continuously, increase endurance and reinforce learning of adaptive techniques due to a combination of functional deficits and co-morbidities noted above. 2. In addition, coordinated weekly team meetings will be utilized throughout this patients rehab stay, to review progress, identify problems and determine solutions to these barriers within previously set goals. Revisions to goals and care plan will be discussed whenever necessary with physician, nursing, and therapy input. <POST-ADMISSION PHYSICAL EVALUATION:>  The existing and potential medical and rehab complications due to the patients medical co morbidities which exist for this patient due to the rigors of the intensive rehabilitation program, and the methods to monitor and prevent them are noted in the above initial plan of care. Comparison to Preadmission Screening  This patients current function and medical condition compared with the preadmission screening form: same       Summary statements: An IRF setting is deemed the most appropriate level of care for this pt based on the needs of the above noted conditions and co-morbidities. This patient requires therapies at least 3 hrs daily at least 5 days per week for a minimum of 15 hrs/week) to be provided continuously, to achieve functional goals and maintain medical stability. Coordinated weekly team meetings will be utilized throughout the patients stay, to review progress, identify barriers and revise goals as necessary. This patient is able to participate in and benefit from the above stated medical and rehabilitation plan of care.               Issa Mancia M.D. ( signature)    Date time  ___________9/9/17    1:18 PM_______________________

## 2017-09-02 NOTE — DISCHARGE SUMMARY
Physician Discharge Summary     Patient ID:  Kostas Avalos  [id number]  44 y.o.  1970    Admit date: 8/24/2017    Discharge date and time: 9/2/2017    Admission Diagnoses: Sepsis (HCC);PVD    Discharge Diagnoses:  Principal Diagnosis                                               Active Problems:    HTN (hypertension) (7/23/2013)      Diabetes ([facility] ()      Overview: since age 23      Hypercholesteremia ()      Sepsis ([facility] (5/27/2017)      Bilateral pulmonary embolism ([facility] (7/21/2017)      Chronic systolic heart failure ([facility] (7/31/2017)      Left foot infection (8/24/2017)      Hyponatremia (8/24/2017)      ALEX (acute kidney injury) ([facility] (8/24/2017)      GERD (gastroesophageal reflux disease) (8/24/2017)       Consults: Cardiology, Vascular Surgery and Podiatry/Wound care    Procedures:   Left below knee amputation 8/28/2017 by Dr. Jolly Allen. Significant Diagnostic Studies:     MRI foot 8/24: 1. Osteomyelitis is on both sides of the fourth TMT joint. 2. Nondisplaced fracture of the proximal fourth metatarsal.  3. Fifth ray complete amputation. First through fourth ray proximally  amputations. 4. New skin ulcerations and underlying cellulitis. No drainable fluid  collection. VQ scan 8/29: Multiple unmatched perfusion defects compatible with EXTENSIVE  PULMONARY EMBOLI, without appreciable change when comparing today's nuclear  imaging with previous CT imaging. Hospital Course: Mr. Kostas Avalos is a patient of Dr. Mary Wang, and presented with the problems above. See the initial H and P for full details. From Dr. Bertrand Andrade' note:    Zayda Boudreaux is a 49 y.o.  male who is sent by his podiatry to ED for evaluation. As per patient, pt had surgery of his left foot few months ago and later he developed the wound on left side of his amputation site and it's in process of constant care under podiatry but when he woke up today, he noted new wound on the right side of left foot. He was seen at podiatry office who referred him to ED for admission. Pt denies any fever, chills, nausea, vomiting, diarrhea, chest pain, cough, shortness of breath. In ED pt noted to have leukocytosis, tachycardia, and fever along with renal insufficiency and hyponatremia. By problem. .. Sepsis due to wound infection L foot:  He is s/p AKA on 8/28. Diabetes ([facility]): This is currently not well controlled. He has been on reduced dose of basal insulin in hospital; We'll increase this to his usual dose at discharge. ALEX (acute kidney injury) ([facility] (8/24/2017): This has improved. I have discussed antibiotic options with Dr. Hailee Parikh,  For now, the best course seems to be to continue the vancomycin, as his WBC has normalized, and Cr improved. If Cr bumps up, then could change to daptomycin. Bilateral pulmonary embolism ([facility] (7/21/2017): Continue anticoagulation with eliquis. HTN (hypertension) (7/23/2013): His BP has been running a bit high. We'll resume hydralazine and his home dose of spironolactone. Hypercholesteremia:  Continue atorvastatin. Chronic systolic heart failure ([facility] (7/31/2017): Compensated at this time. Hyponatremia (8/24/2017)    GERD (gastroesophageal reflux disease) (8/24/2017): Continue PPI (Nexium). Discharge Exam:  Blood pressure (!) 171/103, pulse 84, temperature 98.7 °F (37.1 °C), resp. rate 18, height 6' 3\" (1.905 m), weight 250 lb 3.6 oz (113.5 kg), SpO2 100 %. Gen: Patient is in no acute distress. Lungs: CTAB. Heart: RRR. Abd: S, NT, ND, BS present. Extremities: Warm. Disposition: [facility]    Patient Instructions:   Current Discharge Medication List      START taking these medications    Details   vancomycin in 0.9% Sodium Cl (VANCOCIN) 1.75 gram/500 mL soln 500 mL by IntraVENous route every eighteen (18) hours for 7 days.   Qty: 4000 mL, Refills: 0      ondansetron (ZOFRAN) 4 mg/2 mL soln 2 mL by IntraVENous route every four (4) hours as needed. Qty: 1 mL, Refills: 0      metroNIDAZOLE (FLAGYL) 100 mL by IntraVENous route every eight (8) hours for 7 days. Qty: 2100 mL, Refills: 0      fentaNYL (DURAGESIC) 75 mcg/hr 1 Patch by TransDERmal route every seventy-two (72) hours. Max Daily Amount: 1 Patch. Qty: 2 Patch, Refills: 0      cefepime 2 gram 2 g, ADDaptor 1 Device IVPB 2 g by IntraVENous route every eight (8) hours for 7 days. Qty: 1 Dose, Refills: 0         CONTINUE these medications which have NOT CHANGED    Details   metFORMIN (GLUCOPHAGE) 500 mg tablet TAKE 1 TABLET BY MOUTH TWICE A DAY WITH MEALS AND INCREASE AS DIRECTED  Qty: 75 Tab, Refills: 3    Associated Diagnoses: IDDM (insulin dependent diabetes mellitus) ([facility])      insulin lispro (HUMALOG) 100 unit/mL kwikpen 12 Units by SubCUTAneous route Before breakfast, lunch, and dinner. DX: E11.65  Qty: 1 Package, Refills: 11      apixaban (ELIQUIS) 5 mg tablet Take 2 Tabs by mouth every twelve (12) hours. Qty: 120 Tab, Refills: 1      atorvastatin (LIPITOR) 40 mg tablet Take 1 Tab by mouth daily. Qty: 90 Tab, Refills: 3      carvedilol (COREG) 6.25 mg tablet Take 1 Tab by mouth two (2) times daily (with meals). Qty: 60 Tab, Refills: 1      hydrALAZINE (APRESOLINE) 25 mg tablet Take 1 Tab by mouth three (3) times daily. Qty: 90 Tab, Refills: 0      spironolactone (ALDACTONE) 25 mg tablet Take 1 Tab by mouth daily. Qty: 30 Tab, Refills: 0      gabapentin (NEURONTIN) 300 mg capsule Take 1 Cap by mouth three (3) times daily. Qty: 90 Cap, Refills: 0      diphenhydrAMINE (ALLERGY RELIEF,DIPHENHYDRAMIN,) 25 mg capsule Take 25 mg by mouth nightly as needed. insulin detemir (LEVEMIR FLEXTOUCH) 100 unit/mL (3 mL) inpn 50 Units by SubCUTAneous route nightly. 60 units QHS  Qty: 20 Pen, Refills: 11      oxyCODONE-acetaminophen (PERCOCET 10)  mg per tablet Take 1 Tab by mouth every six (6) hours as needed. Max Daily Amount: 4 Tabs.   Qty: 14 Tab, Refills: 0      esomeprazole ([place]) 40 mg capsule Take 1 Cap by mouth daily. Qty: 90 Cap, Refills: 3      sodium chloride (SALINE NASAL) 0.65 % nasal spray 1 [place] by Both Nostrils route as needed for Congestion. Qty: 15 mL, Refills: 0         STOP taking these medications       isosorbide mononitrate ER (IMDUR) 30 mg tablet Comments:   Reason for Stopping:             Activity: PT/OT Eval and Treat  Diet: Diabetic Diet  Wound Care: Per vascular surgery instructions    Follow-up With  Details  Why  Contact Info   Fabiana Ramesh  [phone]     Sindy Factor    as planned  [address]    as directed  [address] [facility]  [address] [phone]         Signed:  Pardeep Marin MD  9/2/2017  12:05 PM    Note: Greater than 30 minutes were spent on activities related to this discharge.

## 2017-09-02 NOTE — DISCHARGE INSTRUCTIONS
PATIENT DISCHARGE INSTRUCTIONS      PATIENT DISCHARGE INSTRUCTIONS    Kiley Romo / [id number] : 1970    Admitted 2017 Discharged: 2017       · It is important that you take the medication exactly as they are prescribed. · Keep your medication in the bottles provided by the pharmacist and keep a list of the medication names, dosages, and times to be taken in your wallet. · Do not take other medications without consulting your doctor.      What to do at Home    Recommended Diet: Diabetic Diet    Recommended Activity: PT/OT Eval and Treat            Signed By: Dakota Xiao MD     2017

## 2017-09-03 LAB
25(OH)D3 SERPL-MCNC: <9 NG/ML (ref 30–100)
ALBUMIN SERPL-MCNC: 1.1 G/DL (ref 3.5–5)
ALBUMIN/GLOB SERPL: 0.2 {RATIO} (ref 1.1–2.2)
ALP SERPL-CCNC: 85 U/L (ref 45–117)
ALT SERPL-CCNC: 20 U/L (ref 12–78)
ANION GAP SERPL CALC-SCNC: 7 MMOL/L (ref 5–15)
AST SERPL-CCNC: 17 U/L (ref 15–37)
BILIRUB SERPL-MCNC: 0.1 MG/DL (ref 0.2–1)
BUN SERPL-MCNC: 18 MG/DL (ref 6–20)
BUN/CREAT SERPL: 11 (ref 12–20)
CALCIUM SERPL-MCNC: 7.5 MG/DL (ref 8.5–10.1)
CHLORIDE SERPL-SCNC: 110 MMOL/L (ref 97–108)
CO2 SERPL-SCNC: 22 MMOL/L (ref 21–32)
CREAT SERPL-MCNC: 1.69 MG/DL (ref 0.7–1.3)
ERYTHROCYTE [DISTWIDTH] IN BLOOD BY AUTOMATED COUNT: 17.2 % (ref 11.5–14.5)
GLOBULIN SER CALC-MCNC: 5.5 G/DL (ref 2–4)
GLUCOSE SERPL-MCNC: 209 MG/DL (ref 65–100)
HCT VFR BLD AUTO: 23.6 % (ref 36.6–50.3)
HGB BLD-MCNC: 7.5 G/DL (ref 12.1–17)
MAGNESIUM SERPL-MCNC: 1.7 MG/DL (ref 1.6–2.4)
MCH RBC QN AUTO: 22.6 PG (ref 26–34)
MCHC RBC AUTO-ENTMCNC: 31.8 G/DL (ref 30–36.5)
MCV RBC AUTO: 71.1 FL (ref 80–99)
PLATELET # BLD AUTO: 345 K/UL (ref 150–400)
POTASSIUM SERPL-SCNC: 3.8 MMOL/L (ref 3.5–5.1)
PROT SERPL-MCNC: 6.6 G/DL (ref 6.4–8.2)
RBC # BLD AUTO: 3.32 M/UL (ref 4.1–5.7)
SODIUM SERPL-SCNC: 139 MMOL/L (ref 136–145)
WBC # BLD AUTO: 11.1 K/UL (ref 4.1–11.1)

## 2017-09-03 PROCEDURE — 36415 COLL VENOUS BLD VENIPUNCTURE: CPT | Performed by: PHYSICAL MEDICINE & REHABILITATION

## 2017-09-03 PROCEDURE — 74011636637 HC RX REV CODE- 636/637: Performed by: PHYSICAL MEDICINE & REHABILITATION

## 2017-09-03 PROCEDURE — 74011000258 HC RX REV CODE- 258: Performed by: PHYSICAL MEDICINE & REHABILITATION

## 2017-09-03 PROCEDURE — 85027 COMPLETE CBC AUTOMATED: CPT | Performed by: PHYSICAL MEDICINE & REHABILITATION

## 2017-09-03 PROCEDURE — 74011250636 HC RX REV CODE- 250/636: Performed by: PHYSICAL MEDICINE & REHABILITATION

## 2017-09-03 PROCEDURE — 83735 ASSAY OF MAGNESIUM: CPT | Performed by: PHYSICAL MEDICINE & REHABILITATION

## 2017-09-03 PROCEDURE — 74011250637 HC RX REV CODE- 250/637: Performed by: PHYSICAL MEDICINE & REHABILITATION

## 2017-09-03 PROCEDURE — 80053 COMPREHEN METABOLIC PANEL: CPT | Performed by: PHYSICAL MEDICINE & REHABILITATION

## 2017-09-03 PROCEDURE — 82306 VITAMIN D 25 HYDROXY: CPT | Performed by: PHYSICAL MEDICINE & REHABILITATION

## 2017-09-03 PROCEDURE — 74011000250 HC RX REV CODE- 250: Performed by: PHYSICAL MEDICINE & REHABILITATION

## 2017-09-03 RX ORDER — DIPHENHYDRAMINE HCL 25 MG
25 CAPSULE ORAL
Status: DISCONTINUED | OUTPATIENT
Start: 2017-09-03 | End: 2017-09-08 | Stop reason: HOSPADM

## 2017-09-03 RX ORDER — METFORMIN HYDROCHLORIDE 500 MG/1
500 TABLET ORAL 2 TIMES DAILY WITH MEALS
Status: DISCONTINUED | OUTPATIENT
Start: 2017-09-03 | End: 2017-09-05

## 2017-09-03 RX ADMIN — GABAPENTIN 300 MG: 300 CAPSULE ORAL at 21:29

## 2017-09-03 RX ADMIN — CEFEPIME HYDROCHLORIDE 2 G: 2 INJECTION, POWDER, FOR SOLUTION INTRAVENOUS at 22:00

## 2017-09-03 RX ADMIN — ZOLPIDEM TARTRATE 5 MG: 5 TABLET ORAL at 21:29

## 2017-09-03 RX ADMIN — CEFEPIME HYDROCHLORIDE 2 G: 2 INJECTION, POWDER, FOR SOLUTION INTRAVENOUS at 13:51

## 2017-09-03 RX ADMIN — CEFEPIME HYDROCHLORIDE 2 G: 2 INJECTION, POWDER, FOR SOLUTION INTRAVENOUS at 06:13

## 2017-09-03 RX ADMIN — PANTOPRAZOLE SODIUM 40 MG: 40 TABLET, DELAYED RELEASE ORAL at 08:25

## 2017-09-03 RX ADMIN — METFORMIN HYDROCHLORIDE 500 MG: 500 TABLET, FILM COATED ORAL at 17:00

## 2017-09-03 RX ADMIN — CARVEDILOL 6.25 MG: 6.25 TABLET, FILM COATED ORAL at 16:59

## 2017-09-03 RX ADMIN — INSULIN LISPRO 4 UNITS: 100 INJECTION, SOLUTION INTRAVENOUS; SUBCUTANEOUS at 08:23

## 2017-09-03 RX ADMIN — OXYCODONE HYDROCHLORIDE 10 MG: 5 TABLET ORAL at 03:00

## 2017-09-03 RX ADMIN — DOCUSATE SODIUM 100 MG: 100 CAPSULE, LIQUID FILLED ORAL at 17:00

## 2017-09-03 RX ADMIN — CARVEDILOL 6.25 MG: 6.25 TABLET, FILM COATED ORAL at 08:25

## 2017-09-03 RX ADMIN — METRONIDAZOLE 500 MG: 500 INJECTION, SOLUTION INTRAVENOUS at 17:00

## 2017-09-03 RX ADMIN — OXYCODONE HYDROCHLORIDE 10 MG: 5 TABLET ORAL at 11:33

## 2017-09-03 RX ADMIN — INSULIN LISPRO 2 UNITS: 100 INJECTION, SOLUTION INTRAVENOUS; SUBCUTANEOUS at 13:51

## 2017-09-03 RX ADMIN — ATORVASTATIN CALCIUM 40 MG: 40 TABLET, FILM COATED ORAL at 21:29

## 2017-09-03 RX ADMIN — OXYCODONE HYDROCHLORIDE 10 MG: 5 TABLET ORAL at 21:28

## 2017-09-03 RX ADMIN — INSULIN GLARGINE 24 UNITS: 100 INJECTION, SOLUTION SUBCUTANEOUS at 08:24

## 2017-09-03 RX ADMIN — DOCUSATE SODIUM 100 MG: 100 CAPSULE, LIQUID FILLED ORAL at 08:25

## 2017-09-03 RX ADMIN — APIXABAN 10 MG: 5 TABLET, FILM COATED ORAL at 08:25

## 2017-09-03 RX ADMIN — GABAPENTIN 300 MG: 300 CAPSULE ORAL at 13:51

## 2017-09-03 RX ADMIN — ALPRAZOLAM 0.25 MG: 0.25 TABLET ORAL at 23:13

## 2017-09-03 RX ADMIN — GABAPENTIN 300 MG: 300 CAPSULE ORAL at 06:12

## 2017-09-03 RX ADMIN — OXYCODONE HYDROCHLORIDE 10 MG: 5 TABLET ORAL at 17:01

## 2017-09-03 RX ADMIN — METRONIDAZOLE 500 MG: 500 INJECTION, SOLUTION INTRAVENOUS at 06:14

## 2017-09-03 RX ADMIN — OXYCODONE HYDROCHLORIDE 10 MG: 5 TABLET ORAL at 06:12

## 2017-09-03 RX ADMIN — APIXABAN 10 MG: 5 TABLET, FILM COATED ORAL at 21:29

## 2017-09-04 LAB
BACTERIA SPEC CULT: NORMAL
CC UR VC: NORMAL
SERVICE CMNT-IMP: NORMAL

## 2017-09-04 PROCEDURE — 74011000258 HC RX REV CODE- 258: Performed by: PHYSICAL MEDICINE & REHABILITATION

## 2017-09-04 PROCEDURE — 74011250637 HC RX REV CODE- 250/637: Performed by: PHYSICAL MEDICINE & REHABILITATION

## 2017-09-04 PROCEDURE — 74011250636 HC RX REV CODE- 250/636: Performed by: PHYSICAL MEDICINE & REHABILITATION

## 2017-09-04 PROCEDURE — 74011000250 HC RX REV CODE- 250: Performed by: PHYSICAL MEDICINE & REHABILITATION

## 2017-09-04 PROCEDURE — 74011636637 HC RX REV CODE- 636/637: Performed by: PHYSICAL MEDICINE & REHABILITATION

## 2017-09-04 RX ORDER — SODIUM CHLORIDE 0.9 % (FLUSH) 0.9 %
20-40 SYRINGE (ML) INJECTION EVERY 8 HOURS
Status: DISCONTINUED | OUTPATIENT
Start: 2017-09-04 | End: 2017-09-08 | Stop reason: HOSPADM

## 2017-09-04 RX ORDER — SODIUM CHLORIDE 0.9 % (FLUSH) 0.9 %
20-40 SYRINGE (ML) INJECTION AS NEEDED
Status: DISCONTINUED | OUTPATIENT
Start: 2017-09-04 | End: 2017-09-08 | Stop reason: HOSPADM

## 2017-09-04 RX ADMIN — INSULIN GLARGINE 24 UNITS: 100 INJECTION, SOLUTION SUBCUTANEOUS at 08:17

## 2017-09-04 RX ADMIN — METFORMIN HYDROCHLORIDE 500 MG: 500 TABLET, FILM COATED ORAL at 08:17

## 2017-09-04 RX ADMIN — PANTOPRAZOLE SODIUM 40 MG: 40 TABLET, DELAYED RELEASE ORAL at 08:18

## 2017-09-04 RX ADMIN — GABAPENTIN 300 MG: 300 CAPSULE ORAL at 05:10

## 2017-09-04 RX ADMIN — GABAPENTIN 300 MG: 300 CAPSULE ORAL at 14:12

## 2017-09-04 RX ADMIN — Medication 10 ML: at 14:12

## 2017-09-04 RX ADMIN — ZOLPIDEM TARTRATE 5 MG: 5 TABLET ORAL at 22:25

## 2017-09-04 RX ADMIN — INSULIN LISPRO 2 UNITS: 100 INJECTION, SOLUTION INTRAVENOUS; SUBCUTANEOUS at 12:41

## 2017-09-04 RX ADMIN — OXYCODONE HYDROCHLORIDE 10 MG: 5 TABLET ORAL at 22:24

## 2017-09-04 RX ADMIN — DOCUSATE SODIUM 100 MG: 100 CAPSULE, LIQUID FILLED ORAL at 16:42

## 2017-09-04 RX ADMIN — ATORVASTATIN CALCIUM 40 MG: 40 TABLET, FILM COATED ORAL at 21:08

## 2017-09-04 RX ADMIN — CARVEDILOL 6.25 MG: 6.25 TABLET, FILM COATED ORAL at 16:42

## 2017-09-04 RX ADMIN — METRONIDAZOLE 500 MG: 500 INJECTION, SOLUTION INTRAVENOUS at 18:15

## 2017-09-04 RX ADMIN — METRONIDAZOLE 500 MG: 500 INJECTION, SOLUTION INTRAVENOUS at 01:11

## 2017-09-04 RX ADMIN — APIXABAN 10 MG: 5 TABLET, FILM COATED ORAL at 21:06

## 2017-09-04 RX ADMIN — INSULIN LISPRO 10 UNITS: 100 INJECTION, SOLUTION INTRAVENOUS; SUBCUTANEOUS at 22:31

## 2017-09-04 RX ADMIN — Medication 20 ML: at 02:00

## 2017-09-04 RX ADMIN — OXYCODONE HYDROCHLORIDE 10 MG: 5 TABLET ORAL at 00:25

## 2017-09-04 RX ADMIN — OXYCODONE HYDROCHLORIDE 10 MG: 5 TABLET ORAL at 05:10

## 2017-09-04 RX ADMIN — CEFEPIME HYDROCHLORIDE 2 G: 2 INJECTION, POWDER, FOR SOLUTION INTRAVENOUS at 22:30

## 2017-09-04 RX ADMIN — OXYCODONE HYDROCHLORIDE 10 MG: 5 TABLET ORAL at 12:41

## 2017-09-04 RX ADMIN — CEFEPIME HYDROCHLORIDE 2 G: 2 INJECTION, POWDER, FOR SOLUTION INTRAVENOUS at 14:11

## 2017-09-04 RX ADMIN — CEFEPIME HYDROCHLORIDE 2 G: 2 INJECTION, POWDER, FOR SOLUTION INTRAVENOUS at 05:11

## 2017-09-04 RX ADMIN — GABAPENTIN 300 MG: 300 CAPSULE ORAL at 21:08

## 2017-09-04 RX ADMIN — CARVEDILOL 6.25 MG: 6.25 TABLET, FILM COATED ORAL at 08:17

## 2017-09-04 RX ADMIN — DOCUSATE SODIUM 100 MG: 100 CAPSULE, LIQUID FILLED ORAL at 08:17

## 2017-09-04 RX ADMIN — APIXABAN 10 MG: 5 TABLET, FILM COATED ORAL at 08:17

## 2017-09-04 RX ADMIN — METRONIDAZOLE 500 MG: 500 INJECTION, SOLUTION INTRAVENOUS at 08:16

## 2017-09-04 RX ADMIN — OXYCODONE HYDROCHLORIDE 10 MG: 5 TABLET ORAL at 19:26

## 2017-09-04 RX ADMIN — Medication 20 ML: at 05:09

## 2017-09-04 RX ADMIN — METFORMIN HYDROCHLORIDE 500 MG: 500 TABLET, FILM COATED ORAL at 16:42

## 2017-09-05 PROCEDURE — 74011250636 HC RX REV CODE- 250/636: Performed by: PHYSICAL MEDICINE & REHABILITATION

## 2017-09-05 PROCEDURE — 74011636637 HC RX REV CODE- 636/637: Performed by: PHYSICAL MEDICINE & REHABILITATION

## 2017-09-05 PROCEDURE — 74011250637 HC RX REV CODE- 250/637: Performed by: PHYSICAL MEDICINE & REHABILITATION

## 2017-09-05 PROCEDURE — 74011000258 HC RX REV CODE- 258: Performed by: PHYSICAL MEDICINE & REHABILITATION

## 2017-09-05 PROCEDURE — 74011000250 HC RX REV CODE- 250: Performed by: PHYSICAL MEDICINE & REHABILITATION

## 2017-09-05 RX ORDER — HYDROMORPHONE HYDROCHLORIDE 2 MG/1
4 TABLET ORAL
Status: DISCONTINUED | OUTPATIENT
Start: 2017-09-05 | End: 2017-09-08 | Stop reason: HOSPADM

## 2017-09-05 RX ORDER — HYDROMORPHONE HYDROCHLORIDE 2 MG/1
2 TABLET ORAL
Status: DISCONTINUED | OUTPATIENT
Start: 2017-09-05 | End: 2017-09-08 | Stop reason: HOSPADM

## 2017-09-05 RX ORDER — METFORMIN HYDROCHLORIDE 500 MG/1
250 TABLET ORAL 2 TIMES DAILY WITH MEALS
Status: DISCONTINUED | OUTPATIENT
Start: 2017-09-05 | End: 2017-09-08 | Stop reason: HOSPADM

## 2017-09-05 RX ORDER — ERGOCALCIFEROL 1.25 MG/1
50000 CAPSULE ORAL
Status: DISCONTINUED | OUTPATIENT
Start: 2017-09-05 | End: 2017-09-08 | Stop reason: HOSPADM

## 2017-09-05 RX ADMIN — APIXABAN 10 MG: 5 TABLET, FILM COATED ORAL at 21:34

## 2017-09-05 RX ADMIN — HYDROMORPHONE HYDROCHLORIDE 4 MG: 2 TABLET ORAL at 21:35

## 2017-09-05 RX ADMIN — METRONIDAZOLE 500 MG: 500 INJECTION, SOLUTION INTRAVENOUS at 09:41

## 2017-09-05 RX ADMIN — DOCUSATE SODIUM 100 MG: 100 CAPSULE, LIQUID FILLED ORAL at 08:35

## 2017-09-05 RX ADMIN — METFORMIN HYDROCHLORIDE 250 MG: 500 TABLET ORAL at 17:57

## 2017-09-05 RX ADMIN — ERGOCALCIFEROL 50000 UNITS: 1.25 CAPSULE ORAL at 12:11

## 2017-09-05 RX ADMIN — APIXABAN 10 MG: 5 TABLET, FILM COATED ORAL at 08:35

## 2017-09-05 RX ADMIN — GABAPENTIN 300 MG: 300 CAPSULE ORAL at 21:34

## 2017-09-05 RX ADMIN — CEFEPIME HYDROCHLORIDE 2 G: 2 INJECTION, POWDER, FOR SOLUTION INTRAVENOUS at 21:37

## 2017-09-05 RX ADMIN — DOCUSATE SODIUM 100 MG: 100 CAPSULE, LIQUID FILLED ORAL at 17:57

## 2017-09-05 RX ADMIN — HYDROMORPHONE HYDROCHLORIDE 2 MG: 2 TABLET ORAL at 12:11

## 2017-09-05 RX ADMIN — Medication 20 ML: at 22:00

## 2017-09-05 RX ADMIN — GABAPENTIN 300 MG: 300 CAPSULE ORAL at 06:19

## 2017-09-05 RX ADMIN — ATORVASTATIN CALCIUM 40 MG: 40 TABLET, FILM COATED ORAL at 21:34

## 2017-09-05 RX ADMIN — Medication 40 ML: at 06:19

## 2017-09-05 RX ADMIN — GABAPENTIN 300 MG: 300 CAPSULE ORAL at 13:46

## 2017-09-05 RX ADMIN — METRONIDAZOLE 500 MG: 500 INJECTION, SOLUTION INTRAVENOUS at 01:56

## 2017-09-05 RX ADMIN — HYDROMORPHONE HYDROCHLORIDE 2 MG: 2 TABLET ORAL at 15:36

## 2017-09-05 RX ADMIN — PANTOPRAZOLE SODIUM 40 MG: 40 TABLET, DELAYED RELEASE ORAL at 08:35

## 2017-09-05 RX ADMIN — INSULIN GLARGINE 24 UNITS: 100 INJECTION, SOLUTION SUBCUTANEOUS at 08:34

## 2017-09-05 RX ADMIN — CEFEPIME HYDROCHLORIDE 2 G: 2 INJECTION, POWDER, FOR SOLUTION INTRAVENOUS at 06:20

## 2017-09-05 RX ADMIN — METRONIDAZOLE 500 MG: 500 INJECTION, SOLUTION INTRAVENOUS at 17:56

## 2017-09-05 RX ADMIN — INSULIN LISPRO 6 UNITS: 100 INJECTION, SOLUTION INTRAVENOUS; SUBCUTANEOUS at 17:55

## 2017-09-05 RX ADMIN — Medication 20 ML: at 14:00

## 2017-09-05 RX ADMIN — METFORMIN HYDROCHLORIDE 500 MG: 500 TABLET, FILM COATED ORAL at 08:35

## 2017-09-05 RX ADMIN — CEFEPIME HYDROCHLORIDE 2 G: 2 INJECTION, POWDER, FOR SOLUTION INTRAVENOUS at 13:46

## 2017-09-05 RX ADMIN — CARVEDILOL 6.25 MG: 6.25 TABLET, FILM COATED ORAL at 17:56

## 2017-09-05 RX ADMIN — CARVEDILOL 6.25 MG: 6.25 TABLET, FILM COATED ORAL at 08:35

## 2017-09-06 PROCEDURE — 74011250636 HC RX REV CODE- 250/636: Performed by: PHYSICAL MEDICINE & REHABILITATION

## 2017-09-06 PROCEDURE — 74011000258 HC RX REV CODE- 258: Performed by: PHYSICAL MEDICINE & REHABILITATION

## 2017-09-06 PROCEDURE — 74011636637 HC RX REV CODE- 636/637: Performed by: PHYSICAL MEDICINE & REHABILITATION

## 2017-09-06 PROCEDURE — 74011250637 HC RX REV CODE- 250/637: Performed by: PHYSICAL MEDICINE & REHABILITATION

## 2017-09-06 PROCEDURE — 74011000250 HC RX REV CODE- 250: Performed by: PHYSICAL MEDICINE & REHABILITATION

## 2017-09-06 RX ORDER — METOCLOPRAMIDE HYDROCHLORIDE 5 MG/ML
10 INJECTION INTRAMUSCULAR; INTRAVENOUS
Status: DISCONTINUED | OUTPATIENT
Start: 2017-09-06 | End: 2017-09-07

## 2017-09-06 RX ADMIN — METFORMIN HYDROCHLORIDE 250 MG: 500 TABLET ORAL at 17:43

## 2017-09-06 RX ADMIN — ONDANSETRON 4 MG: 4 TABLET, ORALLY DISINTEGRATING ORAL at 05:59

## 2017-09-06 RX ADMIN — CEFEPIME HYDROCHLORIDE 2 G: 2 INJECTION, POWDER, FOR SOLUTION INTRAVENOUS at 06:00

## 2017-09-06 RX ADMIN — APIXABAN 10 MG: 5 TABLET, FILM COATED ORAL at 09:05

## 2017-09-06 RX ADMIN — GABAPENTIN 300 MG: 300 CAPSULE ORAL at 21:37

## 2017-09-06 RX ADMIN — HYDROMORPHONE HYDROCHLORIDE 2 MG: 2 TABLET ORAL at 09:16

## 2017-09-06 RX ADMIN — CARVEDILOL 6.25 MG: 6.25 TABLET, FILM COATED ORAL at 17:44

## 2017-09-06 RX ADMIN — CEFEPIME HYDROCHLORIDE 2 G: 2 INJECTION, POWDER, FOR SOLUTION INTRAVENOUS at 21:29

## 2017-09-06 RX ADMIN — HYDROMORPHONE HYDROCHLORIDE 2 MG: 2 TABLET ORAL at 17:44

## 2017-09-06 RX ADMIN — METRONIDAZOLE 500 MG: 500 INJECTION, SOLUTION INTRAVENOUS at 09:04

## 2017-09-06 RX ADMIN — HYDROMORPHONE HYDROCHLORIDE 4 MG: 2 TABLET ORAL at 21:28

## 2017-09-06 RX ADMIN — HYDROMORPHONE HYDROCHLORIDE 4 MG: 2 TABLET ORAL at 05:59

## 2017-09-06 RX ADMIN — HYDROMORPHONE HYDROCHLORIDE 4 MG: 2 TABLET ORAL at 00:47

## 2017-09-06 RX ADMIN — Medication 20 ML: at 13:00

## 2017-09-06 RX ADMIN — GABAPENTIN 300 MG: 300 CAPSULE ORAL at 05:53

## 2017-09-06 RX ADMIN — GABAPENTIN 300 MG: 300 CAPSULE ORAL at 13:00

## 2017-09-06 RX ADMIN — ZOLPIDEM TARTRATE 5 MG: 5 TABLET ORAL at 00:46

## 2017-09-06 RX ADMIN — METRONIDAZOLE 500 MG: 500 INJECTION, SOLUTION INTRAVENOUS at 17:46

## 2017-09-06 RX ADMIN — INSULIN LISPRO 2 UNITS: 100 INJECTION, SOLUTION INTRAVENOUS; SUBCUTANEOUS at 12:55

## 2017-09-06 RX ADMIN — METOCLOPRAMIDE 10 MG: 5 INJECTION, SOLUTION INTRAMUSCULAR; INTRAVENOUS at 11:58

## 2017-09-06 RX ADMIN — CEFEPIME HYDROCHLORIDE 2 G: 2 INJECTION, POWDER, FOR SOLUTION INTRAVENOUS at 13:00

## 2017-09-06 RX ADMIN — Medication 20 ML: at 11:58

## 2017-09-06 RX ADMIN — APIXABAN 10 MG: 5 TABLET, FILM COATED ORAL at 21:28

## 2017-09-06 RX ADMIN — METOCLOPRAMIDE 10 MG: 5 INJECTION, SOLUTION INTRAMUSCULAR; INTRAVENOUS at 17:46

## 2017-09-06 RX ADMIN — METOCLOPRAMIDE 10 MG: 5 INJECTION, SOLUTION INTRAMUSCULAR; INTRAVENOUS at 21:37

## 2017-09-06 RX ADMIN — PANTOPRAZOLE SODIUM 40 MG: 40 TABLET, DELAYED RELEASE ORAL at 09:05

## 2017-09-06 RX ADMIN — Medication 40 ML: at 06:00

## 2017-09-06 RX ADMIN — Medication 20 ML: at 21:30

## 2017-09-06 RX ADMIN — DOCUSATE SODIUM 100 MG: 100 CAPSULE, LIQUID FILLED ORAL at 17:45

## 2017-09-06 RX ADMIN — METFORMIN HYDROCHLORIDE 250 MG: 500 TABLET ORAL at 09:05

## 2017-09-06 RX ADMIN — INSULIN GLARGINE 24 UNITS: 100 INJECTION, SOLUTION SUBCUTANEOUS at 09:05

## 2017-09-06 RX ADMIN — CARVEDILOL 6.25 MG: 6.25 TABLET, FILM COATED ORAL at 09:05

## 2017-09-06 RX ADMIN — DOCUSATE SODIUM 100 MG: 100 CAPSULE, LIQUID FILLED ORAL at 09:05

## 2017-09-06 RX ADMIN — ATORVASTATIN CALCIUM 40 MG: 40 TABLET, FILM COATED ORAL at 21:28

## 2017-09-06 RX ADMIN — METRONIDAZOLE 500 MG: 500 INJECTION, SOLUTION INTRAVENOUS at 00:46

## 2017-09-07 VITALS
SYSTOLIC BLOOD PRESSURE: 173 MMHG | HEART RATE: 71 BPM | WEIGHT: 290 LBS | HEIGHT: 75 IN | DIASTOLIC BLOOD PRESSURE: 104 MMHG | BODY MASS INDEX: 36.06 KG/M2

## 2017-09-07 PROCEDURE — 74011250636 HC RX REV CODE- 250/636: Performed by: PHYSICAL MEDICINE & REHABILITATION

## 2017-09-07 PROCEDURE — 74011000258 HC RX REV CODE- 258: Performed by: PHYSICAL MEDICINE & REHABILITATION

## 2017-09-07 PROCEDURE — 74011636637 HC RX REV CODE- 636/637: Performed by: PHYSICAL MEDICINE & REHABILITATION

## 2017-09-07 PROCEDURE — 74011250637 HC RX REV CODE- 250/637: Performed by: PHYSICAL MEDICINE & REHABILITATION

## 2017-09-07 PROCEDURE — 74011000250 HC RX REV CODE- 250: Performed by: PHYSICAL MEDICINE & REHABILITATION

## 2017-09-07 RX ORDER — METOCLOPRAMIDE 10 MG/1
10 TABLET ORAL
Status: DISCONTINUED | OUTPATIENT
Start: 2017-09-07 | End: 2017-09-08 | Stop reason: HOSPADM

## 2017-09-07 RX ORDER — HYDROMORPHONE HYDROCHLORIDE 2 MG/1
4 TABLET ORAL ONCE
Status: COMPLETED | OUTPATIENT
Start: 2017-09-07 | End: 2017-09-07

## 2017-09-07 RX ORDER — GABAPENTIN 300 MG/1
300 CAPSULE ORAL ONCE
Status: COMPLETED | OUTPATIENT
Start: 2017-09-07 | End: 2017-09-07

## 2017-09-07 RX ORDER — SULFAMETHOXAZOLE AND TRIMETHOPRIM 400; 80 MG/1; MG/1
1 TABLET ORAL EVERY 12 HOURS
Status: DISCONTINUED | OUTPATIENT
Start: 2017-09-07 | End: 2017-09-08 | Stop reason: HOSPADM

## 2017-09-07 RX ADMIN — CEFEPIME HYDROCHLORIDE 2 G: 2 INJECTION, POWDER, FOR SOLUTION INTRAVENOUS at 06:20

## 2017-09-07 RX ADMIN — PANTOPRAZOLE SODIUM 40 MG: 40 TABLET, DELAYED RELEASE ORAL at 08:05

## 2017-09-07 RX ADMIN — ZOLPIDEM TARTRATE 5 MG: 5 TABLET ORAL at 21:04

## 2017-09-07 RX ADMIN — APIXABAN 10 MG: 5 TABLET, FILM COATED ORAL at 08:03

## 2017-09-07 RX ADMIN — METOCLOPRAMIDE 10 MG: 10 TABLET ORAL at 21:01

## 2017-09-07 RX ADMIN — METOCLOPRAMIDE 10 MG: 5 INJECTION, SOLUTION INTRAMUSCULAR; INTRAVENOUS at 07:59

## 2017-09-07 RX ADMIN — APIXABAN 10 MG: 5 TABLET, FILM COATED ORAL at 21:03

## 2017-09-07 RX ADMIN — HYDROMORPHONE HYDROCHLORIDE 4 MG: 2 TABLET ORAL at 21:01

## 2017-09-07 RX ADMIN — GABAPENTIN 300 MG: 300 CAPSULE ORAL at 13:45

## 2017-09-07 RX ADMIN — GABAPENTIN 300 MG: 300 CAPSULE ORAL at 22:00

## 2017-09-07 RX ADMIN — Medication 40 ML: at 06:20

## 2017-09-07 RX ADMIN — HYDROMORPHONE HYDROCHLORIDE 4 MG: 2 TABLET ORAL at 18:08

## 2017-09-07 RX ADMIN — ZOLPIDEM TARTRATE 5 MG: 5 TABLET ORAL at 00:47

## 2017-09-07 RX ADMIN — GABAPENTIN 300 MG: 300 CAPSULE ORAL at 06:20

## 2017-09-07 RX ADMIN — METRONIDAZOLE 500 MG: 500 INJECTION, SOLUTION INTRAVENOUS at 00:46

## 2017-09-07 RX ADMIN — CARVEDILOL 6.25 MG: 6.25 TABLET, FILM COATED ORAL at 08:03

## 2017-09-07 RX ADMIN — HYDROMORPHONE HYDROCHLORIDE 4 MG: 2 TABLET ORAL at 08:34

## 2017-09-07 RX ADMIN — INSULIN GLARGINE 24 UNITS: 100 INJECTION, SOLUTION SUBCUTANEOUS at 08:04

## 2017-09-07 RX ADMIN — METFORMIN HYDROCHLORIDE 250 MG: 500 TABLET ORAL at 08:03

## 2017-09-07 RX ADMIN — GABAPENTIN 300 MG: 300 CAPSULE ORAL at 21:01

## 2017-09-07 RX ADMIN — METRONIDAZOLE 500 MG: 500 INJECTION, SOLUTION INTRAVENOUS at 11:00

## 2017-09-07 RX ADMIN — ATORVASTATIN CALCIUM 40 MG: 40 TABLET, FILM COATED ORAL at 21:01

## 2017-09-07 RX ADMIN — HYDROMORPHONE HYDROCHLORIDE 4 MG: 2 TABLET ORAL at 00:47

## 2017-09-07 RX ADMIN — HYDROMORPHONE HYDROCHLORIDE 4 MG: 2 TABLET ORAL at 21:58

## 2017-09-07 RX ADMIN — CARVEDILOL 6.25 MG: 6.25 TABLET, FILM COATED ORAL at 18:03

## 2017-09-07 RX ADMIN — SULFAMETHOXAZOLE AND TRIMETHOPRIM 1 TABLET: 400; 80 TABLET ORAL at 21:01

## 2017-09-07 RX ADMIN — METOCLOPRAMIDE 10 MG: 10 TABLET ORAL at 18:04

## 2017-09-07 RX ADMIN — METFORMIN HYDROCHLORIDE 250 MG: 500 TABLET ORAL at 18:05

## 2017-09-08 LAB
ANION GAP SERPL CALC-SCNC: 8 MMOL/L (ref 5–15)
BUN SERPL-MCNC: 16 MG/DL (ref 6–20)
BUN/CREAT SERPL: 11 (ref 12–20)
CALCIUM SERPL-MCNC: 7.7 MG/DL (ref 8.5–10.1)
CHLORIDE SERPL-SCNC: 107 MMOL/L (ref 97–108)
CO2 SERPL-SCNC: 24 MMOL/L (ref 21–32)
CREAT SERPL-MCNC: 1.46 MG/DL (ref 0.7–1.3)
ERYTHROCYTE [DISTWIDTH] IN BLOOD BY AUTOMATED COUNT: 18.4 % (ref 11.5–14.5)
GLUCOSE SERPL-MCNC: 155 MG/DL (ref 65–100)
HCT VFR BLD AUTO: 25.4 % (ref 36.6–50.3)
HGB BLD-MCNC: 7.9 G/DL (ref 12.1–17)
MAGNESIUM SERPL-MCNC: 1.6 MG/DL (ref 1.6–2.4)
MCH RBC QN AUTO: 22.1 PG (ref 26–34)
MCHC RBC AUTO-ENTMCNC: 31.1 G/DL (ref 30–36.5)
MCV RBC AUTO: 71.1 FL (ref 80–99)
PLATELET # BLD AUTO: 406 K/UL (ref 150–400)
POTASSIUM SERPL-SCNC: 3.5 MMOL/L (ref 3.5–5.1)
RBC # BLD AUTO: 3.57 M/UL (ref 4.1–5.7)
SODIUM SERPL-SCNC: 139 MMOL/L (ref 136–145)
WBC # BLD AUTO: 10.2 K/UL (ref 4.1–11.1)

## 2017-09-08 PROCEDURE — 36415 COLL VENOUS BLD VENIPUNCTURE: CPT | Performed by: PHYSICAL MEDICINE & REHABILITATION

## 2017-09-08 PROCEDURE — 74011250637 HC RX REV CODE- 250/637: Performed by: PHYSICAL MEDICINE & REHABILITATION

## 2017-09-08 PROCEDURE — 85027 COMPLETE CBC AUTOMATED: CPT | Performed by: PHYSICAL MEDICINE & REHABILITATION

## 2017-09-08 PROCEDURE — 74011636637 HC RX REV CODE- 636/637: Performed by: PHYSICAL MEDICINE & REHABILITATION

## 2017-09-08 PROCEDURE — 83735 ASSAY OF MAGNESIUM: CPT | Performed by: PHYSICAL MEDICINE & REHABILITATION

## 2017-09-08 PROCEDURE — 80048 BASIC METABOLIC PNL TOTAL CA: CPT | Performed by: PHYSICAL MEDICINE & REHABILITATION

## 2017-09-08 RX ADMIN — METOCLOPRAMIDE 10 MG: 10 TABLET ORAL at 15:57

## 2017-09-08 RX ADMIN — INSULIN GLARGINE 24 UNITS: 100 INJECTION, SOLUTION SUBCUTANEOUS at 10:20

## 2017-09-08 RX ADMIN — METFORMIN HYDROCHLORIDE 250 MG: 500 TABLET ORAL at 10:20

## 2017-09-08 RX ADMIN — APIXABAN 10 MG: 5 TABLET, FILM COATED ORAL at 10:20

## 2017-09-08 RX ADMIN — HYDROMORPHONE HYDROCHLORIDE 4 MG: 2 TABLET ORAL at 04:00

## 2017-09-08 RX ADMIN — GABAPENTIN 300 MG: 300 CAPSULE ORAL at 05:27

## 2017-09-08 RX ADMIN — CARVEDILOL 6.25 MG: 6.25 TABLET, FILM COATED ORAL at 10:20

## 2017-09-08 RX ADMIN — DOCUSATE SODIUM 100 MG: 100 CAPSULE, LIQUID FILLED ORAL at 10:20

## 2017-09-08 RX ADMIN — SULFAMETHOXAZOLE AND TRIMETHOPRIM 1 TABLET: 400; 80 TABLET ORAL at 10:20

## 2017-09-08 RX ADMIN — CARVEDILOL 6.25 MG: 6.25 TABLET, FILM COATED ORAL at 16:00

## 2017-09-08 RX ADMIN — HYDROMORPHONE HYDROCHLORIDE 2 MG: 2 TABLET ORAL at 10:20

## 2017-09-08 RX ADMIN — DOCUSATE SODIUM 100 MG: 100 CAPSULE, LIQUID FILLED ORAL at 16:00

## 2017-09-08 RX ADMIN — METFORMIN HYDROCHLORIDE 250 MG: 500 TABLET ORAL at 16:00

## 2017-09-08 RX ADMIN — PANTOPRAZOLE SODIUM 40 MG: 40 TABLET, DELAYED RELEASE ORAL at 10:20

## 2017-09-08 RX ADMIN — ERGOCALCIFEROL 50000 UNITS: 1.25 CAPSULE ORAL at 10:20

## 2017-09-08 RX ADMIN — HYDROMORPHONE HYDROCHLORIDE 4 MG: 2 TABLET ORAL at 01:00

## 2017-09-08 RX ADMIN — HYDROMORPHONE HYDROCHLORIDE 2 MG: 2 TABLET ORAL at 15:57

## 2017-09-08 RX ADMIN — METOCLOPRAMIDE 10 MG: 10 TABLET ORAL at 10:20

## 2017-09-08 RX ADMIN — GABAPENTIN 300 MG: 300 CAPSULE ORAL at 14:00

## 2017-09-11 ENCOUNTER — HOME HEALTH ADMISSION (OUTPATIENT)
Dept: HOME HEALTH SERVICES | Facility: HOME HEALTH | Age: 47
End: 2017-09-11
Payer: COMMERCIAL

## 2017-09-11 ENCOUNTER — PATIENT OUTREACH (OUTPATIENT)
Dept: INTERNAL MEDICINE CLINIC | Age: 47
End: 2017-09-11

## 2017-09-11 RX ORDER — CARVEDILOL 6.25 MG/1
TABLET ORAL
Qty: 60 TAB | Refills: 1 | Status: SHIPPED | OUTPATIENT
Start: 2017-09-11 | End: 2018-09-12 | Stop reason: SDUPTHER

## 2017-09-11 RX ORDER — HYDRALAZINE HYDROCHLORIDE 25 MG/1
TABLET, FILM COATED ORAL
Qty: 90 TAB | Refills: 0 | Status: SHIPPED | OUTPATIENT
Start: 2017-09-11 | End: 2017-10-30 | Stop reason: SDUPTHER

## 2017-09-11 RX ORDER — GABAPENTIN 300 MG/1
CAPSULE ORAL
Qty: 90 CAP | Refills: 0 | Status: SHIPPED | OUTPATIENT
Start: 2017-09-11 | End: 2017-11-03

## 2017-09-11 RX ORDER — ISOSORBIDE MONONITRATE 30 MG/1
TABLET, EXTENDED RELEASE ORAL
Qty: 30 TAB | Refills: 0 | Status: SHIPPED | OUTPATIENT
Start: 2017-09-11 | End: 2017-10-02 | Stop reason: SDUPTHER

## 2017-09-11 RX ORDER — SPIRONOLACTONE 25 MG/1
TABLET ORAL
Qty: 30 TAB | Refills: 0 | Status: SHIPPED | OUTPATIENT
Start: 2017-09-11 | End: 2017-10-02 | Stop reason: SDUPTHER

## 2017-09-11 NOTE — PROGRESS NOTES
Case management  [facility] Discharge Follow-Up      Date/Time:  2017 2:43 PM    Patient listed on discharge [facility]) report on 2017   Patient discharged from [facility] for  physical rehab s/p [facility]  RRAT score:  Not available. Medical History:     Past Medical History:   Diagnosis Date    Diabetes ([address]     since age 23    HTN (hypertension)     Hypercholesteremia     Hyperlipidemia        Nurse Navigator(NN) contacted the patient by telephone to perform post hospital discharge assessment. Verified  and address with patient as identifiers. Provided introduction to self, and explanation of the Nurses Navigator role. The patient's PCP left BS during the patient's hospitalization. The patient states that he is feeling well today. I asked how he was coping with the loss of his limb. He feels that his mood is stable at this time. He voiced understanding that he has to go through the process of coming to  with the changes in his health and his independence. Diet:   Patient reports: Regular Diet    Activity:    Patient reports: resting in bed since discharge. I encouraged the patient to transfer from the bed to a chair to promote wound healing and lung expansion. And to sit upright for at least 1 hour before getting back into the bed. He agreed to consider my suggestion. Discharge Instructions :  I instructed the patient to closely follow the discharge instructions from [address] [place] [address] and to schedule a follow up appointment with Dr. Sophia Rizzo. Patient verbalized understanding of discharge instructions and follow-up care. Red Flags:  SOB, Sensation of fever of self or stump,      PCP/Specialist follow up:   I provided the patient with URL to [facility] and guided him to \"find a doctor\" portion of the website. I recommended for him to search for providers that were near his home zip code.  We determined the the PCP offices at [facility] were the closest to his home.   Patient agreed to schedule follow up appointment with Dr. Katie Osgood and initial appointment with  cardiology and PCP. Reviewed red flags with patient, and patient verbalizes understanding. Patient given an opportunity to ask questions. No other clinical/social/functional needs noted. The patient agrees to contact Dr. Joana Ge office for questions related to their healthcare. The patient expressed thanks, offered no additional questions and ended the call. Case management plan:  The patient has [facility] commercial. [place] [facility] [place]. I shall close the current Navigator episode at this time.

## 2017-09-12 ENCOUNTER — HOME CARE VISIT (OUTPATIENT)
Dept: SCHEDULING | Facility: HOME HEALTH | Age: 47
End: 2017-09-12
Payer: COMMERCIAL

## 2017-09-12 VITALS
SYSTOLIC BLOOD PRESSURE: 160 MMHG | OXYGEN SATURATION: 98 % | DIASTOLIC BLOOD PRESSURE: 98 MMHG | HEART RATE: 66 BPM | TEMPERATURE: 97.7 F

## 2017-09-12 PROCEDURE — G0151 HHCP-SERV OF PT,EA 15 MIN: HCPCS

## 2017-09-13 ENCOUNTER — HOME CARE VISIT (OUTPATIENT)
Dept: HOME HEALTH SERVICES | Facility: HOME HEALTH | Age: 47
End: 2017-09-13
Payer: COMMERCIAL

## 2017-09-13 ENCOUNTER — HOME CARE VISIT (OUTPATIENT)
Dept: SCHEDULING | Facility: HOME HEALTH | Age: 47
End: 2017-09-13
Payer: COMMERCIAL

## 2017-09-13 PROCEDURE — G0152 HHCP-SERV OF OT,EA 15 MIN: HCPCS

## 2017-09-14 ENCOUNTER — HOME CARE VISIT (OUTPATIENT)
Dept: SCHEDULING | Facility: HOME HEALTH | Age: 47
End: 2017-09-14
Payer: COMMERCIAL

## 2017-09-14 VITALS — OXYGEN SATURATION: 99 % | HEART RATE: 84 BPM | SYSTOLIC BLOOD PRESSURE: 162 MMHG | DIASTOLIC BLOOD PRESSURE: 94 MMHG

## 2017-09-14 PROCEDURE — G0152 HHCP-SERV OF OT,EA 15 MIN: HCPCS

## 2017-09-14 PROCEDURE — G0151 HHCP-SERV OF PT,EA 15 MIN: HCPCS

## 2017-09-15 VITALS
RESPIRATION RATE: 14 BRPM | HEART RATE: 68 BPM | DIASTOLIC BLOOD PRESSURE: 96 MMHG | SYSTOLIC BLOOD PRESSURE: 168 MMHG | OXYGEN SATURATION: 97 % | TEMPERATURE: 97.9 F

## 2017-09-15 VITALS — TEMPERATURE: 97.9 F

## 2017-09-15 NOTE — DISCHARGE SUMMARY
[facility], [address] [address] SUMMARY    Patient Name:  Vandana Tobar Site: [address]   MRN: [id number] Account #:    :   1970 Admit Date:  17   Attending Physician: Dr. Tiffany Hidalgo Discharge Date:  17   Referring Physician: Héctor Domingo DIAGNOSIS/REASON FOR INTENSIVE INPATIENT REHABILITATION: >     L BKA    <CONSULTANTS: >    NONE    <PROCEDURES: >   NONE    <SECONDARY DIAGNOSES: >      Diabetes ([facility] [address]       since age 23    DM (diabetes mellitus) ([facility] [address] [facility]      HTN (hypertension)      Hypercholesteremia      Hyperlipidemia  Hyponatremia  Orthostasis  Hypovitaminosis vitamin D            <HISTORY OF PRESENT ILLNESS: >      Eugene Councilman is a 49 y.o.  male who is sent by his podiatry to ED for evaluation. As per patient, pt had surgery of his left foot few months ago and later he developed the wound on left side of his amputation site and it's in process of constant care under podiatry but when he woke up today, he noted new wound on the right side of left foot. He was seen at podiatry office who referred him to ED for admission. In ED pt noted to have leukocytosis, tachycardia, and fever along with renal insufficiency and hyponatremia. REHABILITATION HOSPITAL COURSE: >    Patient was admitted to [facility] to begin intensive inpatient rehabilitation. His was monitored closely he was maintained on Maxipime and Flagyl. Diabetes was controlled with metformin and Lantus requiring adjustment of medications. Blood pressure was uncontrolled with low blood pressures requiring adjustment of his hypertension medications. Creatinine was monitored closely glucose closely. He was noted to have a severely low vitamin D less than 9 9517 and was started on vitamin D lesion.   Patient had nausea which was felt to be due to pain medications and gastroparesis. He was started on Reglan with good results. We also changed his Roxicodone today due to nausea and vomiting control at the time of his discharge      <FUNCTIONAL STATUS UPON ADMISSION: >   Mod assist for ADLs and mobility  <FUNCTIONAL STATUS UPON DISCHARGE: >   Modified independent bed mobility, contact-guard assist for transfers, supervision for basic self-care skills. <LAB SUMMARY: >    Results for Jalil Jimenez (MRN [id number]) as of 9/15/2017 10:39   Ref.  Range 9/8/2017 05:03   WBC Latest Ref Range: 4.1 - 11.1 K/uL 10.2   RBC Latest Ref Range: 4.10 - 5.70 M/uL 3.57 (L)   HGB Latest Ref Range: 12.1 - 17.0 g/dL 7.9 (L)   HCT Latest Ref Range: 36.6 - 50.3 % 25.4 (L)   MCV Latest Ref Range: 80.0 - 99.0 FL 71.1 (L)   MCH Latest Ref Range: 26.0 - 34.0 PG 22.1 (L)   MCHC Latest Ref Range: 30.0 - 36.5 g/dL 31.1   RDW Latest Ref Range: 11.5 - 14.5 % 18.4 (H)   PLATELET Latest Ref Range: 150 - 400 K/uL 406 (H)   Sodium Latest Ref Range: 136 - 145 mmol/L 139   Potassium Latest Ref Range: 3.5 - 5.1 mmol/L 3.5   Chloride Latest Ref Range: 97 - 108 mmol/L 107   CO2 Latest Ref Range: 21 - 32 mmol/L 24   Anion gap Latest Ref Range: 5 - 15 mmol/L 8   Glucose Latest Ref Range: 65 - 100 mg/dL 155 (H)   BUN Latest Ref Range: 6 - 20 MG/DL 16   Creatinine Latest Ref Range: 0.70 - 1.30 MG/DL 1.46 (H)   BUN/Creatinine ratio Latest Ref Range: 12 - 20   11 (L)   Calcium Latest Ref Range: 8.5 - 10.1 MG/DL 7.7 (L)   Magnesium Latest Ref Range: 1.6 - 2.4 mg/dL 1.6   GFR est non-AA Latest Ref Range: >60 ml/min/1.73m2 52 (L)   GFR est AA Latest Ref Range: >60 ml/min/1.73m2 >60     <DISCHARGE DISPOSITION: >      Home with family  <DISCHARGE REHABILITATION PLAN: >       Records home health physical therapy for 2 weeks and then begin outpatient [facility]      <DISCHARGE INSTRUCTIONS: >  Diet diabetic diet  Wear LIMB guard out of bed       DISCHARGE MEDICATIONS: > Metformin 500 twice daily  Lipitor 40 daily  Eliquis 10 twice daily  Coreg 6.25 twice daily  Imdur ER  Aldactone 25 daily  Neurontin 300 3 times daily and 600 nightly  Percocet as needed #12 dispensed Nexium 40 daily  Vitamin D 50,000 units twice a week for 4 weeks and then 1 time a week  Duragesic a weaning patch 75 mcg changed weekly and then decrease to 50 mcg change every 4 days ×2 weeks then 25 mcg patch for 2 weeks #6 dispensed Levemir 24 units daily Humalog sliding scale docusate and diphenhydramine as needed  Reglan 10 mg p.o. 3 times daily and nightly      <DISCHARGE FOLLOW-UP>     [facility] DR CHUN 3 weeks  Dr. Vasile Guidry PCP 2 weeks  Dr. Roselyn Rothman 1 week     ____________  Shruthi Guido. Trevin Sarkar MD    9/15/2017  0979 am  DATE SIGNED          Greater than 30 min were spent in counseling, coordinating care, review of meds, physical examination and written note, in preparation and as part of discharge planning.

## 2017-09-18 ENCOUNTER — HOME CARE VISIT (OUTPATIENT)
Dept: HOME HEALTH SERVICES | Facility: HOME HEALTH | Age: 47
End: 2017-09-18
Payer: COMMERCIAL

## 2017-09-18 ENCOUNTER — HOME CARE VISIT (OUTPATIENT)
Dept: SCHEDULING | Facility: HOME HEALTH | Age: 47
End: 2017-09-18
Payer: COMMERCIAL

## 2017-09-18 PROCEDURE — G0152 HHCP-SERV OF OT,EA 15 MIN: HCPCS

## 2017-09-19 ENCOUNTER — HOME CARE VISIT (OUTPATIENT)
Dept: SCHEDULING | Facility: HOME HEALTH | Age: 47
End: 2017-09-19
Payer: COMMERCIAL

## 2017-09-19 ENCOUNTER — HOME CARE VISIT (OUTPATIENT)
Dept: HOME HEALTH SERVICES | Facility: HOME HEALTH | Age: 47
End: 2017-09-19
Payer: COMMERCIAL

## 2017-09-19 VITALS
DIASTOLIC BLOOD PRESSURE: 80 MMHG | SYSTOLIC BLOOD PRESSURE: 124 MMHG | OXYGEN SATURATION: 98 % | TEMPERATURE: 97.3 F | HEART RATE: 78 BPM

## 2017-09-19 VITALS — SYSTOLIC BLOOD PRESSURE: 162 MMHG | DIASTOLIC BLOOD PRESSURE: 84 MMHG | RESPIRATION RATE: 14 BRPM

## 2017-09-19 PROCEDURE — G0157 HHC PT ASSISTANT EA 15: HCPCS

## 2017-09-20 ENCOUNTER — HOME CARE VISIT (OUTPATIENT)
Dept: SCHEDULING | Facility: HOME HEALTH | Age: 47
End: 2017-09-20
Payer: COMMERCIAL

## 2017-09-20 PROCEDURE — G0152 HHCP-SERV OF OT,EA 15 MIN: HCPCS

## 2017-09-20 PROCEDURE — G0157 HHC PT ASSISTANT EA 15: HCPCS

## 2017-09-21 VITALS
DIASTOLIC BLOOD PRESSURE: 88 MMHG | TEMPERATURE: 97 F | OXYGEN SATURATION: 98 % | HEART RATE: 74 BPM | SYSTOLIC BLOOD PRESSURE: 128 MMHG | RESPIRATION RATE: 16 BRPM

## 2017-09-22 ENCOUNTER — HOME CARE VISIT (OUTPATIENT)
Dept: SCHEDULING | Facility: HOME HEALTH | Age: 47
End: 2017-09-22
Payer: COMMERCIAL

## 2017-09-22 VITALS
RESPIRATION RATE: 15 BRPM | HEART RATE: 74 BPM | OXYGEN SATURATION: 99 % | TEMPERATURE: 98.2 F | DIASTOLIC BLOOD PRESSURE: 105 MMHG | SYSTOLIC BLOOD PRESSURE: 162 MMHG

## 2017-09-22 PROCEDURE — G0157 HHC PT ASSISTANT EA 15: HCPCS

## 2017-09-25 ENCOUNTER — HOME CARE VISIT (OUTPATIENT)
Dept: SCHEDULING | Facility: HOME HEALTH | Age: 47
End: 2017-09-25
Payer: COMMERCIAL

## 2017-09-25 VITALS
HEART RATE: 94 BPM | SYSTOLIC BLOOD PRESSURE: 142 MMHG | OXYGEN SATURATION: 98 % | DIASTOLIC BLOOD PRESSURE: 90 MMHG | RESPIRATION RATE: 15 BRPM | TEMPERATURE: 97.4 F

## 2017-09-25 PROCEDURE — G0157 HHC PT ASSISTANT EA 15: HCPCS

## 2017-09-26 ENCOUNTER — HOME CARE VISIT (OUTPATIENT)
Dept: HOME HEALTH SERVICES | Facility: HOME HEALTH | Age: 47
End: 2017-09-26
Payer: COMMERCIAL

## 2017-09-27 ENCOUNTER — HOME CARE VISIT (OUTPATIENT)
Dept: SCHEDULING | Facility: HOME HEALTH | Age: 47
End: 2017-09-27
Payer: COMMERCIAL

## 2017-09-27 VITALS
HEART RATE: 73 BPM | DIASTOLIC BLOOD PRESSURE: 88 MMHG | OXYGEN SATURATION: 98 % | TEMPERATURE: 97.2 F | RESPIRATION RATE: 15 BRPM | SYSTOLIC BLOOD PRESSURE: 144 MMHG

## 2017-09-27 PROCEDURE — G0152 HHCP-SERV OF OT,EA 15 MIN: HCPCS

## 2017-09-27 PROCEDURE — G0157 HHC PT ASSISTANT EA 15: HCPCS

## 2017-09-28 ENCOUNTER — HOME CARE VISIT (OUTPATIENT)
Dept: SCHEDULING | Facility: HOME HEALTH | Age: 47
End: 2017-09-28
Payer: COMMERCIAL

## 2017-09-28 VITALS
OXYGEN SATURATION: 98 % | DIASTOLIC BLOOD PRESSURE: 88 MMHG | HEART RATE: 95 BPM | SYSTOLIC BLOOD PRESSURE: 138 MMHG | TEMPERATURE: 98.5 F

## 2017-09-28 VITALS — DIASTOLIC BLOOD PRESSURE: 88 MMHG | RESPIRATION RATE: 14 BRPM | SYSTOLIC BLOOD PRESSURE: 144 MMHG

## 2017-09-28 PROCEDURE — G0152 HHCP-SERV OF OT,EA 15 MIN: HCPCS

## 2017-09-28 PROCEDURE — G0157 HHC PT ASSISTANT EA 15: HCPCS

## 2017-09-29 VITALS — DIASTOLIC BLOOD PRESSURE: 86 MMHG | SYSTOLIC BLOOD PRESSURE: 132 MMHG

## 2017-10-02 ENCOUNTER — OFFICE VISIT (OUTPATIENT)
Dept: INTERNAL MEDICINE CLINIC | Age: 47
End: 2017-10-02

## 2017-10-02 VITALS
HEART RATE: 78 BPM | RESPIRATION RATE: 20 BRPM | HEIGHT: 75 IN | TEMPERATURE: 98.4 F | DIASTOLIC BLOOD PRESSURE: 86 MMHG | OXYGEN SATURATION: 100 % | SYSTOLIC BLOOD PRESSURE: 135 MMHG

## 2017-10-02 DIAGNOSIS — I12.9 BENIGN HYPERTENSION WITH CKD (CHRONIC KIDNEY DISEASE), STAGE II: ICD-10-CM

## 2017-10-02 DIAGNOSIS — I26.99 BILATERAL PULMONARY EMBOLISM (HCC): ICD-10-CM

## 2017-10-02 DIAGNOSIS — N18.30 STAGE 3 CHRONIC KIDNEY DISEASE (HCC): ICD-10-CM

## 2017-10-02 DIAGNOSIS — Z89.512 HX OF BKA, LEFT (HCC): Primary | ICD-10-CM

## 2017-10-02 DIAGNOSIS — N18.2 BENIGN HYPERTENSION WITH CKD (CHRONIC KIDNEY DISEASE), STAGE II: ICD-10-CM

## 2017-10-02 DIAGNOSIS — I50.22 CHRONIC SYSTOLIC HEART FAILURE (HCC): ICD-10-CM

## 2017-10-02 DIAGNOSIS — F43.22 ADJUSTMENT DISORDER WITH ANXIOUS MOOD: ICD-10-CM

## 2017-10-02 RX ORDER — ZOLPIDEM TARTRATE 5 MG/1
5 TABLET ORAL
Qty: 30 TAB | Refills: 3 | Status: SHIPPED | OUTPATIENT
Start: 2017-10-02 | End: 2017-11-02

## 2017-10-02 RX ORDER — SPIRONOLACTONE 25 MG/1
TABLET ORAL
Qty: 90 TAB | Refills: 5 | Status: ON HOLD | OUTPATIENT
Start: 2017-10-02 | End: 2018-01-06

## 2017-10-02 RX ORDER — METOCLOPRAMIDE 10 MG/1
10 TABLET ORAL 4 TIMES DAILY
Qty: 240 TAB | Refills: 5 | Status: SHIPPED | OUTPATIENT
Start: 2017-10-02 | End: 2018-09-11

## 2017-10-02 RX ORDER — DOCUSATE SODIUM 100 MG/1
100 CAPSULE, LIQUID FILLED ORAL 2 TIMES DAILY
COMMUNITY
End: 2017-11-02

## 2017-10-02 RX ORDER — ISOSORBIDE MONONITRATE 30 MG/1
TABLET, EXTENDED RELEASE ORAL
Qty: 90 TAB | Refills: 3 | Status: SHIPPED | OUTPATIENT
Start: 2017-10-02 | End: 2017-11-02 | Stop reason: DRUGHIGH

## 2017-10-02 RX ORDER — OXYCODONE AND ACETAMINOPHEN 10; 325 MG/1; MG/1
1 TABLET ORAL
Qty: 45 TAB | Refills: 0 | Status: SHIPPED | OUTPATIENT
Start: 2017-10-02 | End: 2018-09-12

## 2017-10-02 NOTE — MR AVS SNAPSHOT
Visit Information Date & Time Provider Department Dept. Phone Encounter #  
 10/2/2017  9:40 AM Neto Castillo, [address]  [id number] Follow-up Instructions Return in about 4 weeks (around 10/30/2017). Upcoming Health Maintenance Date Due MICROALBUMIN Q1 8/24/1979 Pneumococcal 19-64 Highest Risk (1 of 3 - PCV13) 8/24/1988 EYE EXAM RETINAL OR DILATED Q1 1/14/2016 HEMOGLOBIN A1C Q6M 1/12/2018 LIPID PANEL Q1 7/24/2018 FOOT EXAM Q1 8/24/2018 DTaP/Tdap/Td series (2 - Td) 11/18/2021 Allergies as of 10/2/2017  Review Complete On: 10/2/2017 By: Neto Castillo MD  
  
 Severity Noted Reaction Type Reactions Ace Inhibitors High 06/01/2017    Swelling Facial swelling cough Morphine High 10/20/2014   Systemic Angioedema Current Immunizations  Reviewed on 11/18/2016 Name Date Influenza Vaccine 11/1/2013 Influenza Vaccine Intradermal PF 10/28/2016 Tdap 11/18/2011 Not reviewed this visit You Were Diagnosed With   
  
 Codes Comments Other acute osteomyelitis of left foot ([facility] [address]    -  Primary ICD-10-CM: E54.645 ICD-9-CM: 730.07 Hx of BKA, left ([facility] [address]     ICD-10-CM: X86.714 ICD-9-CM: V49.75 Vitals BP Pulse Temp Resp Height(growth percentile) SpO2  
 135/86 (BP 1 Location: Right arm, BP Patient Position: Sitting) 78 98.4 °F (36.9 °C) (Oral) 20 6' 3\" (1.905 m) 100% Smoking Status Former Smoker Vitals History Preferred Pharmacy Pharmacy Name Phone [id number] [address] [phone] Your Updated Medication List  
  
   
This list is accurate as of: 10/2/17 11:15 AM.  Always use your most recent med list.  
  
  
  
  
 ALLERGY RELIEF(DIPHENHYDRAMIN) 25 mg capsule Generic drug:  diphenhydrAMINE Take 25 mg by mouth nightly as needed. ALPRAZolam 0.25 mg tablet Commonly known as:  Ace Sa Take 0.25 mg by mouth nightly as needed for Anxiety. apixaban 5 mg tablet Commonly known as:  Cliffton Rubinstein Take 2 Tabs by mouth every twelve (12) hours. atorvastatin 40 mg tablet Commonly known as:  LIPITOR Take 1 Tab by mouth daily. carvedilol 6.25 mg tablet Commonly known as:  COREG  
TAKE 1 TAB BY MOUTH TWO (2) TIMES DAILY (WITH MEALS). COLACE 100 mg capsule Generic drug:  docusate sodium Take 100 mg by mouth two (2) times a day. esomeprazole 40 mg capsule Commonly known as:  NexIUM Take 1 Cap by mouth daily. * fentaNYL 25 mcg/hr PATCH Commonly known as:  DURAGESIC  
1 Patch by TransDERmal route every seventy-two (72) hours. * fentaNYL 75 mcg/hr Commonly known as:  DURAGESIC  
1 Patch by TransDERmal route every seventy-two (72) hours. Max Daily Amount: 1 Patch. * gabapentin 300 mg capsule Commonly known as:  NEURONTIN Take 600 mg by mouth nightly. * gabapentin 300 mg capsule Commonly known as:  NEURONTIN  
TAKE 1 CAPSULE BY MOUTH THREE TIMES DAILY  
  
 hydrALAZINE 25 mg tablet Commonly known as:  APRESOLINE  
TAKE 1 TAB BY MOUTH THREE (3) TIMES DAILY. insulin detemir 100 unit/mL (3 mL) Inpn Commonly known as:  LEVEMIR FLEXTOUCH  
50 Units by SubCUTAneous route nightly. 60 units QHS  
  
 insulin lispro 100 unit/mL kwikpen Commonly known as:  HUMALOG  
12 Units by SubCUTAneous route Before breakfast, lunch, and dinner. DX: E11.65  
  
 isosorbide mononitrate ER 30 mg tablet Commonly known as:  IMDUR  
TAKE 1 TABLET EVERY DAY  
  
 metFORMIN 500 mg tablet Commonly known as:  GLUCOPHAGE  
TAKE 1 TABLET BY MOUTH TWICE A DAY WITH MEALS AND INCREASE AS DIRECTED  
  
 metoclopramide HCl 10 mg tablet Commonly known as:  REGLAN Take 1 Tab by mouth four (4) times daily. before meals and at bed time  
  
 ondansetron 4 mg/2 mL Soln Commonly known as:  Armond Ricks  
 2 mL by IntraVENous route every four (4) hours as needed. oxyCODONE-acetaminophen  mg per tablet Commonly known as:  PERCOCET 10 Take 1 Tab by mouth every eight (8) hours as needed. Max Daily Amount: 3 Tabs.  
  
 sodium chloride 0.65 % nasal spray Commonly known as:  SALINE NASAL  
1 [place] by Both Nostrils route as needed for Congestion. spironolactone 25 mg tablet Commonly known as:  ALDACTONE  
TAKE 1 TABLET EVERY DAY  
  
 VITAMIN D2 50,000 unit capsule Generic drug:  ergocalciferol Take 50,000 Units by mouth every Tuesday and Friday. 1.25 mg cap 2x/ wk (tues & fri) for 4 wks then 1x/ wk  
  
 zolpidem 5 mg tablet Commonly known as:  AMBIEN Take 1 Tab by mouth nightly as needed for Sleep. Max Daily Amount: 5 mg. Indications: SLEEP-ONSET INSOMNIA * Notice: This list has 4 medication(s) that are the same as other medications prescribed for you. Read the directions carefully, and ask your doctor or other care provider to review them with you. Prescriptions Printed Refills  
 zolpidem (AMBIEN) 5 mg tablet 3 Sig: Take 1 Tab by mouth nightly as needed for Sleep. Max Daily Amount: 5 mg. Indications: SLEEP-ONSET INSOMNIA Class: Print Route: Oral  
 oxyCODONE-acetaminophen (PERCOCET 10)  mg per tablet 0 Sig: Take 1 Tab by mouth every eight (8) hours as needed. Max Daily Amount: 3 Tabs. Class: Print Route: Oral  
  
Prescriptions Sent to Pharmacy Refills  
 spironolactone (ALDACTONE) 25 mg tablet 5 Sig: TAKE 1 TABLET EVERY DAY Class: Normal  
 Pharmacy: [facility]/pharmacy [address] [place] [address] [address] Ph #: [phone]  
 isosorbide mononitrate ER (IMDUR) 30 mg tablet 3 Sig: TAKE 1 TABLET EVERY DAY Class: Normal  
 Pharmacy: [facility]/pharmacy [address] [place] [address] S[address]  [address] Ph #: [phone]  
 metoclopramide HCl (REGLAN) 10 mg tablet 5  
 Sig: Take 1 Tab by mouth four (4) times daily. before meals and at bed time Class: Normal  
 Pharmacy: [facility]/pharmacy [address] [address] [facility] [address]  #: [phone] Route: Oral  
  
We Performed the Following REFERRAL TO PAIN MANAGEMENT [UDA744 Custom] Follow-up Instructions Return in about 4 weeks (around 10/30/2017). To-Do List   
 10/03/2017 To Be Determined Appointment with Fausto Chacon PT at [address] [address]  
  
 10/03/2017 11:00 AM  
  Appointment with Stella Mederos OT at [address] [place] [address]  
  
 10/04/2017 To Be Determined Appointment with Fausto Chacon PT at [address] [address]  
  
 10/05/2017 To Be Determined Appointment with Stella Mederos OT at [address] [address]  
  
 10/06/2017 To Be Determined Appointment with Fausto Chacon PT at [address] [address] Referral Information Referral ID Referred By Referred To  
  
 [id number] Praneeth Gatica Not Available Visits Status Start Date End Date 1 New Request 10/2/17 10/2/18 If your referral has a status of pending review or denied, additional information will be sent to support the outcome of this decision. Patient Instructions Below-the-Knee Leg Amputation: What to Expect at [facility] Your Recovery A below-the-knee amputation is surgery to remove your leg below the knee. Your doctor removed the leg while keeping as much healthy bone, skin, blood vessel, and nerve tissue as possible. After a below-the-knee amputation, you will probably have bandages, a rigid dressing, or a cast over the remaining part of your leg (remaining limb). The leg will be swollen for at least 4 weeks after your surgery.  If you have a rigid dressing or cast, your doctor will set up regular visits to change the dressing or cast and check the healing. If you have elastic bandages, your doctor will tell you how to change them. You may have pain in your remaining limb. You also may think you have feeling or pain where your leg was. This is called phantom pain. It is common and may come and go for a year or longer. Your doctor can give you medicine for both types of pain. You may have already started a rehabilitation program (rehab). You will continue this under the guidance of your doctor or physical therapist. Jax Lopez will need to do a lot of work to recondition your muscles and relearn activities, balance, and coordination. The rehab can last as long as a year. You may have been fitted with a temporary artificial leg while you were still in the hospital. If this is the case, your doctor will teach you how to care for it. If you are getting an artificial leg, you may need to get used to it before you return to work and your other activities. You will probably not wear it all the time, so you will need to learn how to use a wheelchair, crutches, or other device. You will have to make changes in your home. Your workplace may be able to make allowances for you. Having your leg amputated is traumatic. Learning to live with new limitations can be hard and frustrating. You may feel depressed or grieve for your previous lifestyle. It is important to understand these feelings. Talking with your family, friends, and health professionals about your frustrations is an important part of your recovery. You may also find that it helps to talk with a person who has had an amputation. Remember that even though losing a limb is difficult, it does not change who you are or prevent you from enjoying life. You will have to adapt and learn new ways to do things, but you will still be able to work and take part in sports and activities.  And you can still learn, love, play, and live life to its fullest. 
 Many organizations can help you adjust to your new life. For example, you can find information at amputee-coalition.org. This care sheet gives you a general idea about how long it will take for you to recover. But each person recovers at a different pace. Follow the steps below to get better as quickly as possible. How can you care for yourself at home? Activity · Be active. Talk to your doctor about what you can do. If you are active and use your remaining limb, it will heal faster. · You may shower when your doctor okays it. Wash the remaining limb with soap and water, and pat it dry. You may need help doing this at first. 
· You may need to adapt your car to your situation before you drive. · You will probably be able to return to work and your usual routine when your remaining limb heals. This can be as soon as 4 to 8 weeks after surgery, but it may take longer. Diet · You can eat your normal diet. If your stomach is upset, try bland, low-fat foods like plain rice, broiled chicken, toast, and yogurt. · You may notice that your bowel movements are not regular right after your surgery. This is common. Try to avoid constipation and straining with bowel movements. Take a fiber supplement every day. If you have not had a bowel movement after a couple of days, ask your doctor about taking a mild laxative. Medicines · Your doctor will tell you if and when you can restart your medicines. He or she will also give you instructions about taking any new medicines. · If you take blood thinners, such as warfarin (Coumadin), clopidogrel (Plavix), or aspirin, be sure to talk to your doctor. He or she will tell you if and when to start taking those medicines again. Make sure that you understand exactly what your doctor wants you to do. · Be safe with medicines. Take pain medicines exactly as directed. ¨ If the doctor gave you a prescription medicine for pain, take it as prescribed. ¨ If you are not taking a prescription pain medicine, ask your doctor if you can take an over-the-counter medicine. · If you think your pain medicine is making you sick to your stomach: 
¨ Take your medicine after meals (unless your doctor has told you not to). ¨ Ask your doctor for a different pain medicine. · If your doctor prescribed antibiotics, take them as directed. Do not stop taking them just because you feel better. You need to take the full course of antibiotics. Remaining limb care · You may have bandages, a rigid dressing, or a cast on your remaining limb. Your doctor will tell you how to take care of it. Depending on your dressing and the doctor's instructions: 
¨ Check your remaining limb daily for irritation, skin breaks, and redness. Tell your doctor about any problems you see. ¨ Wash your remaining limb with mild soap and warm water every night. Pat it dry. · If you have a temporary artificial leg, remove it before you go to sleep. Exercise · Rehabilitation is a series of exercises you do after your surgery. This helps you learn to use your remaining limb and artificial leg. You will work with your doctor and physical therapist to plan this exercise program. To get the best results, you need to do the exercises correctly and as often and as long as your doctor tells you. Your rehab program will give you a number of exercises to do. Always do them as your therapist tells you. Other instructions · Preventing contractures is very important. A contracture occurs when a joint becomes stuck in one position. If this happens, it may be hard or impossible to straighten your remaining limb and use an artificial leg. ¨ Make sure you put equal weight on both hips when you sit. Use firm chairs, and sit up straight. ¨ Keep your remaining limb flat with your knees straight and your legs together while you are lying on your back. ¨ Lie on your stomach as much as possible to stretch your hip joint. ¨ Do not sit for more than an hour or two. Stand, or lie on your stomach now and then. ¨ Do not put pillows under your hips or knees or between your thighs. ¨ Do not rest your remaining limb on crutch handles or a wheelchair. Follow-up care is a key part of your treatment and safety. Be sure to make and go to all appointments, and call your doctor if you are having problems. It's also a good idea to know your test results and keep a list of the medicines you take. When should you call for help? Call 911 anytime you think you may need emergency care. For example, call if: 
· You passed out (lost consciousness). · You have sudden chest pain and shortness of breath, or you cough up blood. · You have severe trouble breathing. Call your doctor now or seek immediate medical care if: 
· You have loose stitches, or your incision comes open. · You have bleeding from the incision in your remaining limb that suddenly increases or does not stop when your doctor said it should. · You have signs of infection, such as: 
¨ Increased pain, swelling, warmth, or redness. ¨ Red streaks leading from the incision. ¨ Pus draining from the incision. ¨ A fever. · You are sick to your stomach or cannot keep fluids down. · You have pain that does not get better after you take pain medicine. Watch closely for any changes in your health, and be sure to contact your doctor if: 
· You do not have a bowel movement after taking a laxative. Where can you learn more? Go to http://lori-bella.info/. Enter C169 in the search box to learn more about \"Below-the-Knee Leg Amputation: What to Expect at Home. \" Current as of: March 21, 2017 Content Version: 11.3 © 3120-7004 [employer]. Care instructions adapted under license by [employer] (which disclaims liability or warranty for this information).  If you have questions about a medical condition or this instruction, always ask your healthcare professional. [address] any warranty or liability for your use of this information. Introducing [facility]! Dear Cody Mendeziver: Thank you for requesting a [employer] account. Our records indicate that you already have an active [employer] account. You can access your account anytime at [email or web address] Did you know that you can access your hospital and ER discharge instructions at any time in [employer]? You can also review all of your test results from your hospital stay or ER visit. Additional Information If you have questions, please visit the Frequently Asked Questions section of the [employer] website at [email or web address] Remember, [employer] is NOT to be used for urgent needs. For medical emergencies, dial 911. Now available from your iPhone and Android! Please provide this summary of care documentation to your next provider. Your primary care clinician is listed as Stanislav Loyd. If you have any questions after today's visit, please call [phone].

## 2017-10-02 NOTE — PROGRESS NOTES
1. Have you been to the ER, urgent care clinic since your last visit? Hospitalized since your last visit? Yes When: 8/24/17 [facility]    9/2/17 [facility]. 2. Have you seen or consulted any other health care providers outside of the [address] [place] [address] since your last visit? Include any pap smears or colon screening.  No.

## 2017-10-02 NOTE — PROGRESS NOTES
Pam Mcdonald is a 49 y.o. male and presents with New Patient; Complete Physical; and Medication Refill  . Subjective:    First visit, transferring care, booked in a 15 minute \"follow-up\" slot. Pt was d/c'ed from hospital 9/17 s/p left BKA due to osteomyelitis left foot. Pts hospitalization was complicated by PE. Pt is on eliquis. Diabetes Mellitus Review:  He has diabetes mellitus. Diabetic ROS - medication compliance: compliant all of the time, diabetic diet compliance: pt admits to noncompliance, home glucose monitoring: is not regularly performed  Known diabetic complications: none  Cardiovascular risk factors: family history, dyslipidemia, diabetes mellitus, obesity, hypertension  Current diabetic medications include oral agents and insulin  Eye exam current (within one year): no  Weight trend: labile  Prior visit with dietician: no  Current diet: \"healthy\" diet  in general  Current exercise: walking  Current monitoring regimen: home blood tests - daily  Home blood sugar records: trend: stable  Any episodes of hypoglycemia? no  Is He on ACE inhibitor or angiotensin II receptor blocker? Yes   Last A1c>11% in July 2017      DVT-first episode 2014 s/p toe amputation MCV    S/p left BKA in July .  Pt was in rehab     Review of Systems  Constitutional: negative for fevers, chills, anorexia and weight loss  Respiratory:  negative for cough, hemoptysis, dyspnea,wheezing  CV:   negative for chest pain, palpitations, lower extremity edema  GI:   negative for nausea, vomiting, diarrhea, abdominal pain,melena  Hematologic:  negative for easy bruising and gum/nose bleeding  Musculoskel: positive for myalgias, arthralgias, muscle weakness, joint pain  Neurological:  negative for headaches, dizziness, vertigo, memory problems and gait   Behavl/Psych: positive for feelings of anxiety, depression, mood changes    Past Medical History:   Diagnosis Date    Diabetes ([address]     since age 23    HTN (hypertension)     Hypercholesteremia     Hyperlipidemia      Past Surgical History:   Procedure Laterality Date    HX AMPUTATION      toes- left foot    HX BUNIONECTOMY      HX ORTHOPAEDIC      boutinerre deformity    HX ORTHOPAEDIC      fascia removed left foot    HX OTHER SURGICAL  03/2017    Skin graft Surgery    HX TONSILLECTOMY      HX WISDOM TEETH EXTRACTION       Social History     Social History    Marital status:      Spouse name: N/A    Number of children: N/A    Years of education: N/A     Social History Main Topics    Smoking status: Former Smoker     Types: Cigars     Quit date: 4/22/2011    Smokeless tobacco: Never Used    Alcohol use 0.0 oz/week     1 Glasses of wine, 0 Standard drinks or equivalent per week      Comment: stop drinking 5 months ago    Drug use: No    Sexual activity: Yes     Partners: Female     Birth control/ protection: None     Other Topics Concern    None     Social History Narrative    ** Merged History Encounter **          Family History   Problem Relation Age of Onset    Hypertension Mother     High Cholesterol Mother      Current Outpatient Prescriptions   Medication Sig Dispense Refill    docusate sodium (COLACE) 100 mg capsule Take 100 mg by mouth two (2) times a day.  spironolactone (ALDACTONE) 25 mg tablet TAKE 1 TABLET EVERY DAY 90 Tab 5    zolpidem (AMBIEN) 5 mg tablet Take 1 Tab by mouth nightly as needed for Sleep. Max Daily Amount: 5 mg. Indications: SLEEP-ONSET INSOMNIA 30 Tab 3    oxyCODONE-acetaminophen (PERCOCET 10)  mg per tablet Take 1 Tab by mouth every eight (8) hours as needed. Max Daily Amount: 3 Tabs. 45 Tab 0    isosorbide mononitrate ER (IMDUR) 30 mg tablet TAKE 1 TABLET EVERY DAY 90 Tab 3    metoclopramide HCl (REGLAN) 10 mg tablet Take 1 Tab by mouth four (4) times daily. before meals and at bed time 240 Tab 5    ergocalciferol (VITAMIN D2) 50,000 unit capsule Take 50,000 Units by mouth every Tuesday and Friday.  1.25 mg cap 2x/ wk (tues & fri) for 4 wks then 1x/ wk      fentaNYL (DURAGESIC) 25 mcg/hr PATCH 1 Patch by TransDERmal route every seventy-two (72) hours.  gabapentin (NEURONTIN) 300 mg capsule Take 600 mg by mouth nightly.  ALPRAZolam (XANAX) 0.25 mg tablet Take 0.25 mg by mouth nightly as needed for Anxiety.  hydrALAZINE (APRESOLINE) 25 mg tablet TAKE 1 TAB BY MOUTH THREE (3) TIMES DAILY. 90 Tab 0    gabapentin (NEURONTIN) 300 mg capsule TAKE 1 CAPSULE BY MOUTH THREE TIMES DAILY 90 Cap 0    carvedilol (COREG) 6.25 mg tablet TAKE 1 TAB BY MOUTH TWO (2) TIMES DAILY (WITH MEALS). 60 Tab 1    fentaNYL (DURAGESIC) 75 mcg/hr 1 Patch by TransDERmal route every seventy-two (72) hours. Max Daily Amount: 1 Patch. 2 Patch 0    metFORMIN (GLUCOPHAGE) 500 mg tablet TAKE 1 TABLET BY MOUTH TWICE A DAY WITH MEALS AND INCREASE AS DIRECTED 75 Tab 3    insulin lispro (HUMALOG) 100 unit/mL kwikpen 12 Units by SubCUTAneous route Before breakfast, lunch, and dinner. DX: E11.65 (Patient taking differently: 8 Units by SubCUTAneous route four (4) times daily (with meals). 2-8 units ) 1 Package 11    apixaban (ELIQUIS) 5 mg tablet Take 2 Tabs by mouth every twelve (12) hours. 120 Tab 1    atorvastatin (LIPITOR) 40 mg tablet Take 1 Tab by mouth daily. 90 Tab 3    diphenhydrAMINE (ALLERGY RELIEF,DIPHENHYDRAMIN,) 25 mg capsule Take 25 mg by mouth nightly as needed.  insulin detemir (LEVEMIR FLEXTOUCH) 100 unit/mL (3 mL) inpn 50 Units by SubCUTAneous route nightly. 60 units QHS (Patient taking differently: 24 Units by SubCUTAneous route daily (with breakfast). ) 20 Pen 11    esomeprazole (NEXIUM) 40 mg capsule Take 1 Cap by mouth daily. 90 Cap 3    ondansetron (ZOFRAN) 4 mg/2 mL soln 2 mL by IntraVENous route every four (4) hours as needed. (Patient not taking: Reported on 9/13/2017) 1 mL 0    sodium chloride (SALINE NASAL) 0.65 % nasal spray 1 [place] by Both Nostrils route as needed for Congestion.  (Patient not taking: Reported on 9/13/2017) 15 mL 0     Allergies   Allergen Reactions    Ace Inhibitors Swelling     Facial swelling cough     Morphine Angioedema       Objective:  Visit Vitals    /86 (BP 1 Location: Right arm, BP Patient Position: Sitting)    Pulse 78    Temp 98.4 °F (36.9 °C) (Oral)    Resp 20    Ht 6' 3\" (1.905 m)    SpO2 100%     Physical Exam:   General appearance - sad appearing man in NAD in wheelchair w obvious left amputation. Pleasant  Mental status - alert, oriented to person, place, and time  EYE-EOMI   Chest - clear to auscultation, no wheezes, rales or rhonchi, symmetric air entry   Heart - normal rate, regular rhythm, normal S1, S2,   Abdomen - soft,   Ext-s/p left BKA w staples in stump  Skin-Warm and dry.  no hyperpigmentation, vitiligo, or suspicious lesions  Neuro -alert, oriented, normal speech, no focal findings or movement disorder noted        Results for orders placed or performed during the hospital encounter of 09/02/17   CULTURE, URINE   Result Value Ref Range    Special Requests: NO SPECIAL REQUESTS      [place] Count <1,000 CFU/ML      Culture result: NO GROWTH 1 DAY     URINALYSIS W/MICROSCOPIC   Result Value Ref Range    Color YELLOW/STRAW      Appearance CLEAR CLEAR      Specific gravity 1.016 1.003 - 1.030      pH (UA) 5.5 5.0 - 8.0      Protein 100 (A) NEG mg/dL    Glucose 250 (A) NEG mg/dL    Ketone NEGATIVE  NEG mg/dL    Bilirubin NEGATIVE  NEG      Blood MODERATE (A) NEG      Urobilinogen 0.2 0.2 - 1.0 EU/dL    Nitrites NEGATIVE  NEG      Leukocyte Esterase NEGATIVE  NEG      WBC 0-4 0 - 4 /hpf    RBC 10-20 0 - 5 /hpf    Epithelial cells FEW FEW /lpf    Bacteria NEGATIVE  NEG /hpf    Hyaline cast 2-5 0 - 5 /lpf   METABOLIC PANEL, COMPREHENSIVE   Result Value Ref Range    Sodium 139 136 - 145 mmol/L    Potassium 3.8 3.5 - 5.1 mmol/L    Chloride 110 (H) 97 - 108 mmol/L    CO2 22 21 - 32 mmol/L    Anion gap 7 5 - 15 mmol/L    Glucose 209 (H) 65 - 100 mg/dL    BUN 18 6 - 20 MG/DL Creatinine 1.69 (H) 0.70 - 1.30 MG/DL    BUN/Creatinine ratio 11 (L) 12 - 20      GFR est AA 53 (L) >60 ml/min/1.73m2    GFR est non-AA 44 (L) >60 ml/min/1.73m2    Calcium 7.5 (L) 8.5 - 10.1 MG/DL    Bilirubin, total 0.1 (L) 0.2 - 1.0 MG/DL    ALT (SGPT) 20 12 - 78 U/L    AST (SGOT) 17 15 - 37 U/L    Alk. phosphatase 85 45 - 117 U/L    Protein, total 6.6 6.4 - 8.2 g/dL    Albumin 1.1 (L) 3.5 - 5.0 g/dL    Globulin 5.5 (H) 2.0 - 4.0 g/dL    A-G Ratio 0.2 (L) 1.1 - 2.2     MAGNESIUM   Result Value Ref Range    Magnesium 1.7 1.6 - 2.4 mg/dL   CBC W/O DIFF   Result Value Ref Range    WBC 11.1 4.1 - 11.1 K/uL    RBC 3.32 (L) 4.10 - 5.70 M/uL    HGB 7.5 (L) 12.1 - 17.0 g/dL    HCT 23.6 (L) 36.6 - 50.3 %    MCV 71.1 (L) 80.0 - 99.0 FL    MCH 22.6 (L) 26.0 - 34.0 PG    MCHC 31.8 30.0 - 36.5 g/dL    RDW 17.2 (H) 11.5 - 14.5 %    PLATELET 309 018 - 227 K/uL   VITAMIN D, 25 HYDROXY   Result Value Ref Range    Vitamin D 25-Hydroxy <9.0 (L) 30 - 878 ng/mL   METABOLIC PANEL, BASIC   Result Value Ref Range    Sodium 139 136 - 145 mmol/L    Potassium 3.5 3.5 - 5.1 mmol/L    Chloride 107 97 - 108 mmol/L    CO2 24 21 - 32 mmol/L    Anion gap 8 5 - 15 mmol/L    Glucose 155 (H) 65 - 100 mg/dL    BUN 16 6 - 20 MG/DL    Creatinine 1.46 (H) 0.70 - 1.30 MG/DL    BUN/Creatinine ratio 11 (L) 12 - 20      GFR est AA >60 >60 ml/min/1.73m2    GFR est non-AA 52 (L) >60 ml/min/1.73m2    Calcium 7.7 (L) 8.5 - 10.1 MG/DL   CBC W/O DIFF   Result Value Ref Range    WBC 10.2 4.1 - 11.1 K/uL    RBC 3.57 (L) 4.10 - 5.70 M/uL    HGB 7.9 (L) 12.1 - 17.0 g/dL    HCT 25.4 (L) 36.6 - 50.3 %    MCV 71.1 (L) 80.0 - 99.0 FL    MCH 22.1 (L) 26.0 - 34.0 PG    MCHC 31.1 30.0 - 36.5 g/dL    RDW 18.4 (H) 11.5 - 14.5 %    PLATELET 096 (H) 721 - 400 K/uL   MAGNESIUM   Result Value Ref Range    Magnesium 1.6 1.6 - 2.4 mg/dL       Assessment/Plan:    ICD-10-CM ICD-9-CM    1. Hx of [facility], [facility] ([facility] Z89.512 V49.75 REFERRAL TO PAIN MANAGEMENT   2.  Bilateral pulmonary embolism ([facility] I26.99 415.19    3. IDDM (insulin dependent diabetes mellitus) ([facility]) E11.9 250.00     Z79.4 V58.67    4. Stage 3 chronic kidney disease N18.3 585.3    5. Benign hypertension with CKD (chronic kidney disease), stage II I12.9 403.10     N18.2 585.2    6. Adjustment disorder with anxious mood F43.22 309.24    7. Chronic systolic heart failure ([facility]) I50.22 428.22      Orders Placed This Encounter    REFERRAL TO PAIN MANAGEMENT     Referral Priority:   Routine     Referral Type:   Consultation     Referral Reason:   Specialty Services Required    docusate sodium (COLACE) 100 mg capsule     Sig: Take 100 mg by mouth two (2) times a day.  spironolactone (ALDACTONE) 25 mg tablet     Sig: TAKE 1 TABLET EVERY DAY     Dispense:  90 Tab     Refill:  5    zolpidem (AMBIEN) 5 mg tablet     Sig: Take 1 Tab by mouth nightly as needed for Sleep. Max Daily Amount: 5 mg. Indications: SLEEP-ONSET INSOMNIA     Dispense:  30 Tab     Refill:  3    oxyCODONE-acetaminophen (PERCOCET 10)  mg per tablet     Sig: Take 1 Tab by mouth every eight (8) hours as needed. Max Daily Amount: 3 Tabs. Dispense:  45 Tab     Refill:  0    isosorbide mononitrate ER (IMDUR) 30 mg tablet     Sig: TAKE 1 TABLET EVERY DAY     Dispense:  90 Tab     Refill:  3    metoclopramide HCl (REGLAN) 10 mg tablet     Sig: Take 1 Tab by mouth four (4) times daily. before meals and at bed time     Dispense:  240 Tab     Refill:  5     meds refilled  D/w pt importance of strict diabetic control to inhibit progression of kidney dz  F/u 1 mth  Pain med refilled  Referral for Pain Mngmnt given to pt  D/w pt and wife to use pain meds AS NEEDED and brodge w otc tylenol, NO NSAIDS given CKD  Patient Instructions        Below-the-Knee Leg Amputation: What to Expect at [address] [place] [address]  A below-the-knee amputation is surgery to remove your leg below the knee.  Your doctor removed the leg while keeping as much healthy bone, skin, blood vessel, and nerve tissue as possible. After a below-the-knee amputation, you will probably have bandages, a rigid dressing, or a cast over the remaining part of your leg (remaining limb). The leg will be swollen for at least 4 weeks after your surgery. If you have a rigid dressing or cast, your doctor will set up regular visits to change the dressing or cast and check the healing. If you have elastic bandages, your doctor will tell you how to change them. You may have pain in your remaining limb. You also may think you have feeling or pain where your leg was. This is called phantom pain. It is common and may come and go for a year or longer. Your doctor can give you medicine for both types of pain. You may have already started a rehabilitation program (rehab). You will continue this under the guidance of your doctor or physical therapist. Sb Ko will need to do a lot of work to recondition your muscles and relearn activities, balance, and coordination. The rehab can last as long as a year. You may have been fitted with a temporary artificial leg while you were still in the hospital. If this is the case, your doctor will teach you how to care for it. If you are getting an artificial leg, you may need to get used to it before you return to work and your other activities. You will probably not wear it all the time, so you will need to learn how to use a wheelchair, crutches, or other device. You will have to make changes in your home. Your workplace may be able to make allowances for you. Having your leg amputated is traumatic. Learning to live with new limitations can be hard and frustrating. You may feel depressed or grieve for your previous lifestyle. It is important to understand these feelings. Talking with your family, friends, and health professionals about your frustrations is an important part of your recovery. You may also find that it helps to talk with a person who has had an amputation.   Remember that even though losing a limb is difficult, it does not change who you are or prevent you from enjoying life. You will have to adapt and learn new ways to do things, but you will still be able to work and take part in sports and activities. And you can still learn, love, play, and live life to its fullest.  Many organizations can help you adjust to your new life. For example, you can find information at amputee-coalition.org. This care sheet gives you a general idea about how long it will take for you to recover. But each person recovers at a different pace. Follow the steps below to get better as quickly as possible. How can you care for yourself at home? Activity  · Be active. Talk to your doctor about what you can do. If you are active and use your remaining limb, it will heal faster. · You may shower when your doctor okays it. Wash the remaining limb with soap and water, and pat it dry. You may need help doing this at first.  · You may need to adapt your car to your situation before you drive. · You will probably be able to return to work and your usual routine when your remaining limb heals. This can be as soon as 4 to 8 weeks after surgery, but it may take longer. Diet  · You can eat your normal diet. If your stomach is upset, try bland, low-fat foods like plain rice, broiled chicken, toast, and yogurt. · You may notice that your bowel movements are not regular right after your surgery. This is common. Try to avoid constipation and straining with bowel movements. Take a fiber supplement every day. If you have not had a bowel movement after a couple of days, ask your doctor about taking a mild laxative. Medicines  · Your doctor will tell you if and when you can restart your medicines. He or she will also give you instructions about taking any new medicines. · If you take blood thinners, such as warfarin (Coumadin), clopidogrel (Plavix), or aspirin, be sure to talk to your doctor.  He or she will tell you if and when to start taking those medicines again. Make sure that you understand exactly what your doctor wants you to do. · Be safe with medicines. Take pain medicines exactly as directed. ¨ If the doctor gave you a prescription medicine for pain, take it as prescribed. ¨ If you are not taking a prescription pain medicine, ask your doctor if you can take an over-the-counter medicine. · If you think your pain medicine is making you sick to your stomach:  ¨ Take your medicine after meals (unless your doctor has told you not to). ¨ Ask your doctor for a different pain medicine. · If your doctor prescribed antibiotics, take them as directed. Do not stop taking them just because you feel better. You need to take the full course of antibiotics. Remaining limb care  · You may have bandages, a rigid dressing, or a cast on your remaining limb. Your doctor will tell you how to take care of it. Depending on your dressing and the doctor's instructions:  ¨ Check your remaining limb daily for irritation, skin breaks, and redness. Tell your doctor about any problems you see. ¨ Wash your remaining limb with mild soap and warm water every night. Pat it dry. · If you have a temporary artificial leg, remove it before you go to sleep. Exercise  · Rehabilitation is a series of exercises you do after your surgery. This helps you learn to use your remaining limb and artificial leg. You will work with your doctor and physical therapist to plan this exercise program. To get the best results, you need to do the exercises correctly and as often and as long as your doctor tells you. Your rehab program will give you a number of exercises to do. Always do them as your therapist tells you. Other instructions  · Preventing contractures is very important. A contracture occurs when a joint becomes stuck in one position. If this happens, it may be hard or impossible to straighten your remaining limb and use an artificial leg.   ¨ Make sure you put equal weight on both hips when you sit. Use firm chairs, and sit up straight. ¨ Keep your remaining limb flat with your knees straight and your legs together while you are lying on your back. ¨ Lie on your stomach as much as possible to stretch your hip joint. ¨ Do not sit for more than an hour or two. Stand, or lie on your stomach now and then. ¨ Do not put pillows under your hips or knees or between your thighs. ¨ Do not rest your remaining limb on crutch handles or a wheelchair. Follow-up care is a key part of your treatment and safety. Be sure to make and go to all appointments, and call your doctor if you are having problems. It's also a good idea to know your test results and keep a list of the medicines you take. When should you call for help? Call 911 anytime you think you may need emergency care. For example, call if:  · You passed out (lost consciousness). · You have sudden chest pain and shortness of breath, or you cough up blood. · You have severe trouble breathing. Call your doctor now or seek immediate medical care if:  · You have loose stitches, or your incision comes open. · You have bleeding from the incision in your remaining limb that suddenly increases or does not stop when your doctor said it should. · You have signs of infection, such as:  ¨ Increased pain, swelling, warmth, or redness. ¨ Red streaks leading from the incision. ¨ Pus draining from the incision. ¨ A fever. · You are sick to your stomach or cannot keep fluids down. · You have pain that does not get better after you take pain medicine. Watch closely for any changes in your health, and be sure to contact your doctor if:  · You do not have a bowel movement after taking a laxative. Where can you learn more? Go to http://lori-bella.info/. Enter K015 in the search box to learn more about \"Below-the-Knee Leg Amputation: What to Expect at Home. \"  Current as of: March 21, 2017  Content Version: 11.3  © 7435-5666 [employer]wise, [employer]. Care instructions adapted under license by [employer] (which disclaims liability or warranty for this information). If you have questions about a medical condition or this instruction, always ask your healthcare professional. [address] any warranty or liability for your use of this information. Follow-up Disposition:  Return in about 4 weeks (around 10/30/2017). I have reviewed with the patient details of the assessment and plan and all questions were answered. Relevent patient education was performed. The most recent lab findings were reviewed with the patient. An After Visit Summary was printed and given to the patient.       Total encounter time was 25 minutes;>50% of time was spent counseling/coordinating care regarding DM/PE/CKD/pain control/adjustment disorder

## 2017-10-03 ENCOUNTER — HOME CARE VISIT (OUTPATIENT)
Dept: SCHEDULING | Facility: HOME HEALTH | Age: 47
End: 2017-10-03
Payer: COMMERCIAL

## 2017-10-03 PROCEDURE — G0151 HHCP-SERV OF PT,EA 15 MIN: HCPCS

## 2017-10-04 VITALS
HEART RATE: 74 BPM | DIASTOLIC BLOOD PRESSURE: 100 MMHG | TEMPERATURE: 97.2 F | OXYGEN SATURATION: 99 % | SYSTOLIC BLOOD PRESSURE: 154 MMHG

## 2017-10-05 ENCOUNTER — HOME CARE VISIT (OUTPATIENT)
Dept: HOME HEALTH SERVICES | Facility: HOME HEALTH | Age: 47
End: 2017-10-05
Payer: COMMERCIAL

## 2017-10-06 ENCOUNTER — HOME CARE VISIT (OUTPATIENT)
Dept: HOME HEALTH SERVICES | Facility: HOME HEALTH | Age: 47
End: 2017-10-06
Payer: COMMERCIAL

## 2017-10-18 RX ORDER — INSULIN PUMP SYRINGE, 3 ML
EACH MISCELLANEOUS
Qty: 1 KIT | Refills: 0 | Status: SHIPPED | OUTPATIENT
Start: 2017-10-18 | End: 2017-10-18 | Stop reason: SDUPTHER

## 2017-10-18 RX ORDER — INSULIN PUMP SYRINGE, 3 ML
EACH MISCELLANEOUS
Qty: 1 KIT | Refills: 0 | Status: SHIPPED | OUTPATIENT
Start: 2017-10-18 | End: 2018-01-09

## 2017-10-18 RX ORDER — LANCETS
EACH MISCELLANEOUS
Qty: 1 EACH | Refills: 11 | Status: SHIPPED | OUTPATIENT
Start: 2017-10-18 | End: 2020-06-29 | Stop reason: SDUPTHER

## 2017-10-19 RX ORDER — ALPRAZOLAM 0.25 MG/1
0.25 TABLET ORAL
OUTPATIENT
Start: 2017-10-19

## 2017-10-31 RX ORDER — HYDRALAZINE HYDROCHLORIDE 25 MG/1
TABLET, FILM COATED ORAL
Qty: 90 TAB | Refills: 0 | Status: SHIPPED | OUTPATIENT
Start: 2017-10-31 | End: 2018-01-01 | Stop reason: SDUPTHER

## 2017-11-02 ENCOUNTER — OFFICE VISIT (OUTPATIENT)
Dept: INTERNAL MEDICINE CLINIC | Age: 47
End: 2017-11-02

## 2017-11-02 VITALS
DIASTOLIC BLOOD PRESSURE: 70 MMHG | HEART RATE: 65 BPM | RESPIRATION RATE: 18 BRPM | HEIGHT: 75 IN | OXYGEN SATURATION: 98 % | SYSTOLIC BLOOD PRESSURE: 130 MMHG | TEMPERATURE: 98 F

## 2017-11-02 DIAGNOSIS — Z79.4 TYPE 2 DIABETES MELLITUS WITH STAGE 3 CHRONIC KIDNEY DISEASE, WITH LONG-TERM CURRENT USE OF INSULIN (HCC): Primary | ICD-10-CM

## 2017-11-02 DIAGNOSIS — I26.99 BILATERAL PULMONARY EMBOLISM (HCC): ICD-10-CM

## 2017-11-02 DIAGNOSIS — E55.9 VITAMIN D DEFICIENCY: ICD-10-CM

## 2017-11-02 DIAGNOSIS — Z89.512 S/P BKA (BELOW KNEE AMPUTATION), LEFT (HCC): ICD-10-CM

## 2017-11-02 DIAGNOSIS — N18.30 TYPE 2 DIABETES MELLITUS WITH STAGE 3 CHRONIC KIDNEY DISEASE, WITH LONG-TERM CURRENT USE OF INSULIN (HCC): Primary | ICD-10-CM

## 2017-11-02 DIAGNOSIS — N18.30 STAGE 3 CHRONIC KIDNEY DISEASE (HCC): ICD-10-CM

## 2017-11-02 DIAGNOSIS — E11.22 TYPE 2 DIABETES MELLITUS WITH STAGE 3 CHRONIC KIDNEY DISEASE, WITH LONG-TERM CURRENT USE OF INSULIN (HCC): Primary | ICD-10-CM

## 2017-11-02 DIAGNOSIS — Z79.899 POLYPHARMACY: ICD-10-CM

## 2017-11-02 DIAGNOSIS — I50.22 CHRONIC SYSTOLIC HEART FAILURE (HCC): ICD-10-CM

## 2017-11-02 PROBLEM — M86.172 ACUTE OSTEOMYELITIS OF METATARSAL BONE OF LEFT FOOT (HCC): Status: RESOLVED | Noted: 2017-03-21 | Resolved: 2017-11-02

## 2017-11-02 PROBLEM — L08.9 LEFT FOOT INFECTION: Status: RESOLVED | Noted: 2017-08-24 | Resolved: 2017-11-02

## 2017-11-02 PROBLEM — N17.9 AKI (ACUTE KIDNEY INJURY) (HCC): Status: RESOLVED | Noted: 2017-08-24 | Resolved: 2017-11-02

## 2017-11-02 PROBLEM — N17.9 ARF (ACUTE RENAL FAILURE) (HCC): Status: RESOLVED | Noted: 2017-05-27 | Resolved: 2017-11-02

## 2017-11-02 PROBLEM — R60.0 EDEMA OF LEFT LOWER EXTREMITY: Status: RESOLVED | Noted: 2017-02-17 | Resolved: 2017-11-02

## 2017-11-02 PROBLEM — E11.628 DIABETIC INFECTION OF RIGHT FOOT (HCC): Status: RESOLVED | Noted: 2017-02-16 | Resolved: 2017-11-02

## 2017-11-02 PROBLEM — E87.1 HYPONATREMIA: Status: RESOLVED | Noted: 2017-08-24 | Resolved: 2017-11-02

## 2017-11-02 PROBLEM — M86.9 OSTEOMYELITIS (HCC): Status: RESOLVED | Noted: 2017-02-24 | Resolved: 2017-11-02

## 2017-11-02 PROBLEM — L08.9 DIABETIC INFECTION OF RIGHT FOOT (HCC): Status: RESOLVED | Noted: 2017-02-16 | Resolved: 2017-11-02

## 2017-11-02 LAB
GLUCOSE POC: 101 MG/DL
HBA1C MFR BLD HPLC: 9.7 %
HGB BLD-MCNC: 9.7 G/DL

## 2017-11-02 RX ORDER — ALPRAZOLAM 0.25 MG/1
.25-.5 TABLET ORAL
Qty: 60 TAB | Refills: 0 | Status: SHIPPED | OUTPATIENT
Start: 2017-11-02 | End: 2018-01-09

## 2017-11-02 NOTE — PROGRESS NOTES
1. Have you been to the ER, urgent care clinic since your last visit? Hospitalized since your last visit? No.    2. Have you seen or consulted any other health care providers outside of the [address] [place] [address] since your last visit? Include any pap smears or colon screening. No.  Blood glucose/A1C verbal order DR. Buckley/MARKELL Hickman

## 2017-11-02 NOTE — MR AVS SNAPSHOT
Visit Information Date & Time Provider Department Dept. Phone Encounter #  
 11/2/2017  9:20 AM Glenn Ybarra, [id number] [id number] Follow-up Instructions Return in about 8 weeks (around 12/28/2017). Upcoming Health Maintenance Date Due MICROALBUMIN Q1 8/24/1979 Pneumococcal 19-64 Highest Risk (1 of 3 - PCV13) 8/24/1988 EYE EXAM RETINAL OR DILATED Q1 1/14/2016 HEMOGLOBIN A1C Q6M 1/12/2018 LIPID PANEL Q1 7/24/2018 FOOT EXAM Q1 8/24/2018 DTaP/Tdap/Td series (2 - Td) 11/18/2021 Allergies as of 11/2/2017  Review Complete On: 11/2/2017 By: Alonzo Navarro LPN Severity Noted Reaction Type Reactions Ace Inhibitors High 06/01/2017    Swelling Facial swelling cough Morphine High 10/20/2014   Systemic Angioedema Current Immunizations  Reviewed on 11/18/2016 Name Date Influenza Vaccine 11/1/2013 Influenza Vaccine Intradermal PF 10/28/2016 Tdap 11/18/2011 Not reviewed this visit You Were Diagnosed With   
  
 Codes Comments Type 2 diabetes mellitus with stage 3 chronic kidney disease, with long-term current use of insulin (HCC)    -  Primary ICD-10-CM: E11.22, N18.3, Z79.4 ICD-9-CM: 250.40, 585.3, V58.67 Stage 3 chronic kidney disease     ICD-10-CM: N18.3 ICD-9-CM: 585.3 S/P BKA (below knee amputation), left ([facility]     ICD-10-CM: Y43.014 ICD-9-CM: V49.75 Hypertensive heart/renal disease with failure ([facility]     ICD-10-CM: I13.0 ICD-9-CM: 404.91, 428.9, 585.9 Chronic systolic heart failure (HCC)     ICD-10-CM: I50.22 ICD-9-CM: 428.22 Bilateral pulmonary embolism (HCC)     ICD-10-CM: I26.99 
ICD-9-CM: 415.19 Vitamin D deficiency     ICD-10-CM: E55.9 ICD-9-CM: 268.9 Vitals BP Pulse Temp Resp Height(growth percentile) SpO2  
 130/70 (BP 1 Location: Left arm, BP Patient Position: Sitting) 65 98 °F (36.7 °C) (Oral) 18 6' 3\" (1.905 m) 98% Smoking Status Former Smoker Vitals History Preferred Pharmacy Pharmacy Name Phone [id number]- [place], [place] - [address] [phone] Your Updated Medication List  
  
   
This list is accurate as of: 11/2/17 10:37 AM.  Always use your most recent med list.  
  
  
  
  
 ALPRAZolam 0.25 mg tablet Commonly known as:  Mateus January Take 1-2 Tabs by mouth nightly as needed for Anxiety. Max Daily Amount: 0.5 mg.  
  
 apixaban 5 mg tablet Commonly known as:  Eileen Costain Take 2 Tabs by mouth every twelve (12) hours. atorvastatin 40 mg tablet Commonly known as:  LIPITOR Take 1 Tab by mouth daily. Blood-Glucose Meter monitoring kit Use as directed DxE11.9  
  
 carvedilol 6.25 mg tablet Commonly known as:  COREG  
TAKE 1 TAB BY MOUTH TWO (2) TIMES DAILY (WITH MEALS). esomeprazole 40 mg capsule Commonly known as:  NexIUM Take 1 Cap by mouth daily. * gabapentin 300 mg capsule Commonly known as:  NEURONTIN Take 600 mg by mouth nightly. * gabapentin 300 mg capsule Commonly known as:  NEURONTIN  
TAKE 1 CAPSULE BY MOUTH THREE TIMES DAILY  
  
 glucose blood VI test strips strip Commonly known as:  blood glucose test  
Use BID DxE11.9  
  
 hydrALAZINE 25 mg tablet Commonly known as:  APRESOLINE  
TAKE 1 TAB BY MOUTH THREE (3) TIMES DAILY. insulin detemir 100 unit/mL (3 mL) Inpn Commonly known as:  LEVEMIR FLEXTOUCH  
50 Units by SubCUTAneous route nightly. 60 units QHS  
  
 insulin lispro 100 unit/mL kwikpen Commonly known as:  HUMALOG  
12 Units by SubCUTAneous route Before breakfast, lunch, and dinner. DX: E11.65 Lancets Misc Use as directed. Dx: E11.9  
  
 metFORMIN 500 mg tablet Commonly known as:  GLUCOPHAGE  
TAKE 1 TABLET BY MOUTH TWICE A DAY WITH MEALS AND INCREASE AS DIRECTED  
  
 metoclopramide HCl 10 mg tablet Commonly known as:  REGLAN  
 Take 1 Tab by mouth four (4) times daily. before meals and at bed time  
  
 oxyCODONE-acetaminophen  mg per tablet Commonly known as:  PERCOCET 10 Take 1 Tab by mouth every eight (8) hours as needed. Max Daily Amount: 3 Tabs.  
  
 sodium chloride 0.65 % nasal spray Commonly known as:  SALINE NASAL  
1 Sharon by Both Nostrils route as needed for Congestion. spironolactone 25 mg tablet Commonly known as:  ALDACTONE  
TAKE 1 TABLET EVERY DAY  
  
 VITAMIN D2 50,000 unit capsule Generic drug:  ergocalciferol Take 50,000 Units by mouth every Tuesday and Friday. 1.25 mg cap 2x/ wk (tues & fri) for 4 wks then 1x/ wk * Notice: This list has 2 medication(s) that are the same as other medications prescribed for you. Read the directions carefully, and ask your doctor or other care provider to review them with you. Prescriptions Printed Refills ALPRAZolam (XANAX) 0.25 mg tablet 0 Sig: Take 1-2 Tabs by mouth nightly as needed for Anxiety. Max Daily Amount: 0.5 mg.  
 Class: Print Route: Oral  
  
We Performed the Following AMB POC GLUCOSE BLOOD, BY GLUCOSE MONITORING DEVICE [32861 CPT(R)] AMB POC HEMOGLOBIN (HGB) [80645 CPT(R)] METABOLIC PANEL, COMPREHENSIVE [49300 CPT(R)] Follow-up Instructions Return in about 8 weeks (around 12/28/2017). To-Do List   
 11/09/2017 Lab:  VITAMIN D, 25 HYDROXY   
  
 12/01/2017 ECHO:  2D ECHO LIMTED ADULT W OR WO CONTR Introducing [facility]! Dear Uri Bartlett: Thank you for requesting a [email or web address] account. Our records indicate that you already have an active [email or web address] account. You can access your account anytime at [email or web address] Did you know that you can access your hospital and ER discharge instructions at any time in [email or web address]? You can also review all of your test results from your hospital stay or ER visit. Additional Information If you have questions, please visit the Frequently Asked Questions section of the [email or web address] website at [email or web address] Remember, BitPass is NOT to be used for urgent needs. For medical emergencies, dial 911. Now available from your iPhone and Android! Please provide this summary of care documentation to your next provider. Your primary care clinician is listed as Quinton Landers. If you have any questions after today's visit, please call [phone].

## 2017-11-02 NOTE — PROGRESS NOTES
Evelin Angeles is a 49 y.o. male and presents with Diabetes and Hypertension  . Subjective:  Pt comes-in for 1 mth f/u. Previous visit was first appointment, hospital d/c f/u. S/p left BKA-pt is attending PT @ [facility] w some optimism. He is being given pain meds by prosthesis provider @ [address] [place] [address]. Pt w c/o stump swelling and phantom limb pain    Type 2 DM-insulin dependent-pt does NOT check his sugars regularly, but it was 233 this AM. Pt is VERY pleased that his A1c=9.7% today as it has been >12% previously    Polypharmacy-pt would like to d/c some unnecessary meds (gabapentin, for instance)    HTN w ckd 3-wbp well controlled. Check CKD today    Chronic systolic heart failure-EF 25-30% during hospitalization. Will recheck. Bilateral PE-stable on eliquis    Vitamin d deficiency-was <9 during hospitalization.  Will recheck              Review of Systems  Constitutional: negative for fevers, chills, anorexia and weight loss  Respiratory:  negative for cough, hemoptysis, dyspnea,wheezing  CV:   negative for chest pain, palpitations, lower extremity edema  GI:   negative for nausea, vomiting, diarrhea, abdominal pain,melena  Musculoskel: positive for myalgias, arthralgias,  joint pain  Neurological:  negative for headaches, dizziness, vertigo, memory problems and gait   Behavl/Psych: positive for feelings of  depression, mood changes    Past Medical History:   Diagnosis Date    Diabetes ([address]     since age 23    HTN (hypertension)     Hypercholesteremia     Hyperlipidemia      Past Surgical History:   Procedure Laterality Date    HX AMPUTATION      toes- left foot    HX BUNIONECTOMY      HX ORTHOPAEDIC      boutinerre deformity    HX ORTHOPAEDIC      fascia removed left foot    HX OTHER SURGICAL  03/2017    Skin graft Surgery    HX TONSILLECTOMY      HX WISDOM TEETH EXTRACTION       Social History     Social History    Marital status:      Spouse name: N/A    Number of children: N/A    Years of education: N/A     Social History Main Topics    Smoking status: Former Smoker     Types: Cigars     Quit date: 4/22/2011    Smokeless tobacco: Never Used    Alcohol use 0.0 oz/week     1 Glasses of wine, 0 Standard drinks or equivalent per week      Comment: stop drinking 5 months ago    Drug use: No    Sexual activity: Yes     Partners: Female     Birth control/ protection: None     Other Topics Concern    None     Social History Narrative    ** Merged History Encounter **          Family History   Problem Relation Age of Onset    Hypertension Mother     High Cholesterol Mother      Current Outpatient Prescriptions   Medication Sig Dispense Refill    ALPRAZolam (XANAX) 0.25 mg tablet Take 1-2 Tabs by mouth nightly as needed for Anxiety. Max Daily Amount: 0.5 mg. 60 Tab 0    hydrALAZINE (APRESOLINE) 25 mg tablet TAKE 1 TAB BY MOUTH THREE (3) TIMES DAILY. 90 Tab 0    glucose blood VI test strips (BLOOD GLUCOSE TEST) strip Use BID DxE11.9 200 Strip 5    Lancets misc Use as directed. Dx: E11.9 1 Each 11    Blood-Glucose Meter monitoring kit Use as directed DxE11.9 1 Kit 0    spironolactone (ALDACTONE) 25 mg tablet TAKE 1 TABLET EVERY DAY 90 Tab 5    oxyCODONE-acetaminophen (PERCOCET 10)  mg per tablet Take 1 Tab by mouth every eight (8) hours as needed. Max Daily Amount: 3 Tabs. 45 Tab 0    metoclopramide HCl (REGLAN) 10 mg tablet Take 1 Tab by mouth four (4) times daily. before meals and at bed time 240 Tab 5    ergocalciferol (VITAMIN D2) 50,000 unit capsule Take 50,000 Units by mouth every Tuesday and Friday. 1.25 mg cap 2x/ wk (tues & fri) for 4 wks then 1x/ wk      carvedilol (COREG) 6.25 mg tablet TAKE 1 TAB BY MOUTH TWO (2) TIMES DAILY (WITH MEALS).  60 Tab 1    metFORMIN (GLUCOPHAGE) 500 mg tablet TAKE 1 TABLET BY MOUTH TWICE A DAY WITH MEALS AND INCREASE AS DIRECTED 75 Tab 3    insulin lispro (HUMALOG) 100 unit/mL kwikpen 12 Units by SubCUTAneous route Before breakfast, lunch, and dinner. DX: E11.65 (Patient taking differently: 8 Units by SubCUTAneous route four (4) times daily (with meals). 2-8 units ) 1 Package 11    apixaban (ELIQUIS) 5 mg tablet Take 2 Tabs by mouth every twelve (12) hours. 120 Tab 1    atorvastatin (LIPITOR) 40 mg tablet Take 1 Tab by mouth daily. 90 Tab 3    insulin detemir (LEVEMIR FLEXTOUCH) 100 unit/mL (3 mL) inpn 50 Units by SubCUTAneous route nightly. 60 units QHS (Patient taking differently: 24 Units by SubCUTAneous route daily (with breakfast). ) 20 Pen 11    esomeprazole (NEXIUM) 40 mg capsule Take 1 Cap by mouth daily. 90 Cap 3     Allergies   Allergen Reactions    Ace Inhibitors Swelling     Facial swelling cough     Morphine Angioedema       Objective:  Visit Vitals    /70 (BP 1 Location: Left arm, BP Patient Position: Sitting)    Pulse 65    Temp 98 °F (36.7 °C) (Oral)    Resp 18    Ht 6' 3\" (1.905 m)    SpO2 98%     Physical Exam:   General appearance - alert, obese, seems intermit agitated  Mental status - alert, oriented to person, place, and time  EYE-EOMI  Chest - clear to auscultation, no wheezes, rales or rhonchi, symmetric air entry   Heart - normal rate, regular rhythm, normal S1, S2,   Abdomen - obese  Ext-left BKA w stump wound well healed  Skin-Warm and dry.  no hyperpigmentation, vitiligo, or suspicious lesions  Neuro -alert, oriented, normal speech, no focal findings or movement disorder noted        Results for orders placed or performed in visit on 68/37/41   METABOLIC PANEL, COMPREHENSIVE   Result Value Ref Range    Glucose 77 65 - 99 mg/dL    BUN 22 6 - 24 mg/dL    Creatinine 1.54 (H) 0.76 - 1.27 mg/dL    GFR est non-AA 53 (L) >59 mL/min/1.73    GFR est AA 61 >59 mL/min/1.73    BUN/Creatinine ratio 14 9 - 20    Sodium 141 134 - 144 mmol/L    Potassium 4.2 3.5 - 5.2 mmol/L    Chloride 104 96 - 106 mmol/L    CO2 21 18 - 29 mmol/L    Calcium 8.8 8.7 - 10.2 mg/dL    Protein, total 6.4 6.0 - 8.5 g/dL Albumin 3.1 (L) 3.5 - 5.5 g/dL    GLOBULIN, TOTAL 3.3 1.5 - 4.5 g/dL    A-G Ratio 0.9 (L) 1.2 - 2.2    Bilirubin, total <0.2 0.0 - 1.2 mg/dL    Alk. phosphatase 98 39 - 117 IU/L    AST (SGOT) 16 0 - 40 IU/L    ALT (SGPT) 15 0 - 44 IU/L   CKD REPORT   Result Value Ref Range    Interpretation Note    VITAMIN D, 25 HYDROXY   Result Value Ref Range    VITAMIN D, 25-HYDROXY 15.4 (L) 30.0 - 100.0 ng/mL   AMB POC GLUCOSE BLOOD, BY GLUCOSE MONITORING DEVICE   Result Value Ref Range    Glucose  mg/dL   AMB POC HEMOGLOBIN (HGB)   Result Value Ref Range    Hemoglobin (POC) 9.7        Assessment/Plan:    ICD-10-CM ICD-9-CM    1. Type 2 diabetes mellitus with stage 3 chronic kidney disease, with long-term current use of insulin (HCC) E11.22 250.40 AMB POC GLUCOSE BLOOD, BY GLUCOSE MONITORING DEVICE    N18.3 585.3 AMB POC HEMOGLOBIN (HGB)    Z79.4 V58.67    2. Stage 3 chronic kidney disease A02.2 710.8 METABOLIC PANEL, COMPREHENSIVE   3. S/P BKA (below knee amputation), left ([facility] Z89.512 V49.75    4. Hypertensive heart/renal disease with failure (HCC) I13.0 404.91      428.9      585.9    5. Chronic systolic heart failure (HCC) I50.22 428.22 2D ECHO LIMTED ADULT W OR WO CONTR   6. Bilateral pulmonary embolism (HCC) I26.99 415.19    7.  Vitamin D deficiency E55.9 268.9 VITAMIN D, 25 HYDROXY   8. Polypharmacy Z79.899 V58.69      Orders Placed This Encounter    METABOLIC PANEL, COMPREHENSIVE    VITAMIN D, 25 HYDROXY     Standing Status:   Future     Number of Occurrences:   1     Standing Expiration Date:   11/9/2017    CKD REPORT    VITAMIN D, 25 HYDROXY    AMB POC GLUCOSE BLOOD, BY GLUCOSE MONITORING DEVICE    AMB POC HEMOGLOBIN (HGB)    2D ECHO LIMTED ADULT W OR WO CONTR     H/o EF 25% for f/u     Standing Status:   Future     Standing Expiration Date:   5/2/2018     Order Specific Question:   Reason for Exam:     Answer:   systolic chf for f/u     Order Specific Question:   Contrast Enhancement (Bubble Study, Definity, Optison) may be used if criteria listed in established evidence-based protocol has been identified. Answer: Yes    ALPRAZolam (XANAX) 0.25 mg tablet     Sig: Take 1-2 Tabs by mouth nightly as needed for Anxiety. Max Daily Amount: 0.5 mg.     Dispense:  60 Tab     Refill:  0     Will hold gabapentin and imdur given good bp control and no pain  Check echo, vit d level, met panel  F/u 2 mths  Cont other meds  Pt declines flu and pneumonia vaccine  Pt declines psychiatric referral as he is OBVIOUSLY depressed  There are no Patient Instructions on file for this visit. Follow-up Disposition:  Return in about 8 weeks (around 12/28/2017). I have reviewed with the patient details of the assessment and plan and all questions were answered. Relevent patient education was performed. The most recent lab findings were reviewed with the patient. An After Visit Summary was printed and given to the patient.       Total encounter time was 30 minutes;>50% of time was spent counseling/coordinating care regarding s/p BKA and expectations/preventive health care/chf/depression/polypharmacy

## 2017-11-03 LAB
25(OH)D3+25(OH)D2 SERPL-MCNC: 15.4 NG/ML (ref 30–100)
ALBUMIN SERPL-MCNC: 3.1 G/DL (ref 3.5–5.5)
ALBUMIN/GLOB SERPL: 0.9 {RATIO} (ref 1.2–2.2)
ALP SERPL-CCNC: 98 IU/L (ref 39–117)
ALT SERPL-CCNC: 15 IU/L (ref 0–44)
AST SERPL-CCNC: 16 IU/L (ref 0–40)
BILIRUB SERPL-MCNC: <0.2 MG/DL (ref 0–1.2)
BUN SERPL-MCNC: 22 MG/DL (ref 6–24)
BUN/CREAT SERPL: 14 (ref 9–20)
CALCIUM SERPL-MCNC: 8.8 MG/DL (ref 8.7–10.2)
CHLORIDE SERPL-SCNC: 104 MMOL/L (ref 96–106)
CO2 SERPL-SCNC: 21 MMOL/L (ref 18–29)
CREAT SERPL-MCNC: 1.54 MG/DL (ref 0.76–1.27)
GFR SERPLBLD CREATININE-BSD FMLA CKD-EPI: 53 ML/MIN/1.73
GFR SERPLBLD CREATININE-BSD FMLA CKD-EPI: 61 ML/MIN/1.73
GLOBULIN SER CALC-MCNC: 3.3 G/DL (ref 1.5–4.5)
GLUCOSE SERPL-MCNC: 77 MG/DL (ref 65–99)
INTERPRETATION: NORMAL
POTASSIUM SERPL-SCNC: 4.2 MMOL/L (ref 3.5–5.2)
PROT SERPL-MCNC: 6.4 G/DL (ref 6–8.5)
SODIUM SERPL-SCNC: 141 MMOL/L (ref 134–144)

## 2017-11-09 DIAGNOSIS — E55.9 VITAMIN D DEFICIENCY: ICD-10-CM

## 2017-11-16 ENCOUNTER — HOSPITAL ENCOUNTER (OUTPATIENT)
Dept: NON INVASIVE DIAGNOSTICS | Age: 47
Discharge: HOME | End: 2017-11-16
Payer: COMMERCIAL

## 2017-11-16 DIAGNOSIS — I50.43 ACUTE ON CHRONIC COMBINED SYSTOLIC AND DIASTOLIC ACC/AHA STAGE C CONGESTIVE HEART FAILURE (HCC): ICD-10-CM

## 2017-11-16 PROCEDURE — 93306 TTE W/DOPPLER COMPLETE: CPT

## 2017-11-20 ENCOUNTER — TELEPHONE (OUTPATIENT)
Dept: INTERNAL MEDICINE CLINIC | Age: 47
End: 2017-11-20

## 2017-11-20 DIAGNOSIS — I50.22 SYSTOLIC CHF, CHRONIC (HCC): Primary | ICD-10-CM

## 2017-11-20 NOTE — PROGRESS NOTES
Called pt to make him aware of results. Cardiology referral mailed to patient. Dr. Kelly Munoz.   Pt was supposed to f/u post d/c in August

## 2018-01-03 ENCOUNTER — HOSPITAL ENCOUNTER (OUTPATIENT)
Dept: GENERAL RADIOLOGY | Age: 48
Discharge: HOME | End: 2018-01-03
Payer: COMMERCIAL

## 2018-01-03 DIAGNOSIS — I50.42: ICD-10-CM

## 2018-01-03 DIAGNOSIS — I42.9 FAMILIAL CARDIOMYOPATHY (HCC): ICD-10-CM

## 2018-01-03 PROCEDURE — 71046 X-RAY EXAM CHEST 2 VIEWS: CPT

## 2018-01-05 ENCOUNTER — TELEPHONE (OUTPATIENT)
Dept: INTERNAL MEDICINE CLINIC | Age: 48
End: 2018-01-05

## 2018-01-05 ENCOUNTER — HOSPITAL ENCOUNTER (OUTPATIENT)
Dept: CARDIAC CATH/INVASIVE PROCEDURES | Age: 48
Setting detail: OBSERVATION
Discharge: HOME | End: 2018-01-06
Attending: INTERNAL MEDICINE | Admitting: INTERNAL MEDICINE
Payer: COMMERCIAL

## 2018-01-05 PROBLEM — I42.9 CARDIOMYOPATHY (HCC): Status: ACTIVE | Noted: 2018-01-05

## 2018-01-05 LAB
GLUCOSE BLD STRIP.AUTO-MCNC: 137 MG/DL (ref 65–100)
GLUCOSE BLD STRIP.AUTO-MCNC: 141 MG/DL (ref 65–100)
GLUCOSE BLD STRIP.AUTO-MCNC: 156 MG/DL (ref 65–100)
GLUCOSE BLD STRIP.AUTO-MCNC: 222 MG/DL (ref 65–100)
SERVICE CMNT-IMP: ABNORMAL

## 2018-01-05 PROCEDURE — 74011636320 HC RX REV CODE- 636/320

## 2018-01-05 PROCEDURE — 77030029065 HC DRSG HEMO QCLOT ZMED -B

## 2018-01-05 PROCEDURE — 74011250637 HC RX REV CODE- 250/637: Performed by: INTERNAL MEDICINE

## 2018-01-05 PROCEDURE — 99152 MOD SED SAME PHYS/QHP 5/>YRS: CPT

## 2018-01-05 PROCEDURE — 74011250636 HC RX REV CODE- 250/636: Performed by: INTERNAL MEDICINE

## 2018-01-05 PROCEDURE — 74011250636 HC RX REV CODE- 250/636

## 2018-01-05 PROCEDURE — C1894 INTRO/SHEATH, NON-LASER: HCPCS

## 2018-01-05 PROCEDURE — 77030028837 HC SYR ANGI PWR INJ COEU -A

## 2018-01-05 PROCEDURE — 82962 GLUCOSE BLOOD TEST: CPT

## 2018-01-05 PROCEDURE — 74011000250 HC RX REV CODE- 250

## 2018-01-05 PROCEDURE — C1769 GUIDE WIRE: HCPCS

## 2018-01-05 PROCEDURE — 93458 L HRT ARTERY/VENTRICLE ANGIO: CPT

## 2018-01-05 PROCEDURE — 96374 THER/PROPH/DIAG INJ IV PUSH: CPT

## 2018-01-05 PROCEDURE — 99218 HC RM OBSERVATION: CPT

## 2018-01-05 PROCEDURE — 74011636637 HC RX REV CODE- 636/637: Performed by: INTERNAL MEDICINE

## 2018-01-05 PROCEDURE — 96361 HYDRATE IV INFUSION ADD-ON: CPT

## 2018-01-05 PROCEDURE — 74011250637 HC RX REV CODE- 250/637

## 2018-01-05 RX ORDER — MIDAZOLAM HYDROCHLORIDE 1 MG/ML
.5-2 INJECTION, SOLUTION INTRAMUSCULAR; INTRAVENOUS
Status: DISCONTINUED | OUTPATIENT
Start: 2018-01-05 | End: 2018-01-05

## 2018-01-05 RX ORDER — NALOXONE HYDROCHLORIDE 0.4 MG/ML
0.4 INJECTION, SOLUTION INTRAMUSCULAR; INTRAVENOUS; SUBCUTANEOUS AS NEEDED
Status: DISCONTINUED | OUTPATIENT
Start: 2018-01-05 | End: 2018-01-06 | Stop reason: HOSPADM

## 2018-01-05 RX ORDER — FENTANYL CITRATE 50 UG/ML
INJECTION, SOLUTION INTRAMUSCULAR; INTRAVENOUS
Status: DISCONTINUED
Start: 2018-01-05 | End: 2018-01-05

## 2018-01-05 RX ORDER — NITROGLYCERIN 400 UG/1
2 SPRAY ORAL ONCE
Status: COMPLETED | OUTPATIENT
Start: 2018-01-05 | End: 2018-01-05

## 2018-01-05 RX ORDER — CARVEDILOL 3.12 MG/1
6.25 TABLET ORAL 2 TIMES DAILY WITH MEALS
Status: DISCONTINUED | OUTPATIENT
Start: 2018-01-05 | End: 2018-01-06 | Stop reason: HOSPADM

## 2018-01-05 RX ORDER — HYDRALAZINE HYDROCHLORIDE 25 MG/1
25 TABLET, FILM COATED ORAL 3 TIMES DAILY
Status: DISCONTINUED | OUTPATIENT
Start: 2018-01-05 | End: 2018-01-06 | Stop reason: HOSPADM

## 2018-01-05 RX ORDER — DIPHENHYDRAMINE HYDROCHLORIDE 50 MG/ML
25 INJECTION, SOLUTION INTRAMUSCULAR; INTRAVENOUS
Status: DISCONTINUED | OUTPATIENT
Start: 2018-01-05 | End: 2018-01-06 | Stop reason: HOSPADM

## 2018-01-05 RX ORDER — IODIXANOL 320 MG/ML
INJECTION, SOLUTION INTRAVASCULAR
Status: COMPLETED
Start: 2018-01-05 | End: 2018-01-05

## 2018-01-05 RX ORDER — LIDOCAINE HYDROCHLORIDE 10 MG/ML
INJECTION INFILTRATION; PERINEURAL
Status: COMPLETED
Start: 2018-01-05 | End: 2018-01-05

## 2018-01-05 RX ORDER — SODIUM CHLORIDE 9 MG/ML
125 INJECTION, SOLUTION INTRAVENOUS CONTINUOUS
Status: DISCONTINUED | OUTPATIENT
Start: 2018-01-05 | End: 2018-01-05

## 2018-01-05 RX ORDER — SODIUM CHLORIDE 9 MG/ML
150 INJECTION, SOLUTION INTRAVENOUS CONTINUOUS
Status: DISPENSED | OUTPATIENT
Start: 2018-01-05 | End: 2018-01-05

## 2018-01-05 RX ORDER — LORAZEPAM 2 MG/ML
1 INJECTION INTRAMUSCULAR
Status: DISCONTINUED | OUTPATIENT
Start: 2018-01-05 | End: 2018-01-06 | Stop reason: HOSPADM

## 2018-01-05 RX ORDER — HEPARIN SODIUM 200 [USP'U]/100ML
500 INJECTION, SOLUTION INTRAVENOUS ONCE
Status: COMPLETED | OUTPATIENT
Start: 2018-01-05 | End: 2018-01-05

## 2018-01-05 RX ORDER — LORAZEPAM 1 MG/1
1 TABLET ORAL
Status: COMPLETED | OUTPATIENT
Start: 2018-01-05 | End: 2018-01-05

## 2018-01-05 RX ORDER — MIDAZOLAM HYDROCHLORIDE 1 MG/ML
INJECTION, SOLUTION INTRAMUSCULAR; INTRAVENOUS
Status: COMPLETED
Start: 2018-01-05 | End: 2018-01-05

## 2018-01-05 RX ORDER — DEXTROSE 50 % IN WATER (D50W) INTRAVENOUS SYRINGE
12.5-25 AS NEEDED
Status: DISCONTINUED | OUTPATIENT
Start: 2018-01-05 | End: 2018-01-06 | Stop reason: HOSPADM

## 2018-01-05 RX ORDER — IODIXANOL 320 MG/ML
0-200 INJECTION, SOLUTION INTRAVASCULAR
Status: DISCONTINUED | OUTPATIENT
Start: 2018-01-05 | End: 2018-01-05

## 2018-01-05 RX ORDER — FENTANYL CITRATE 50 UG/ML
INJECTION, SOLUTION INTRAMUSCULAR; INTRAVENOUS
Status: COMPLETED
Start: 2018-01-05 | End: 2018-01-05

## 2018-01-05 RX ORDER — SODIUM CHLORIDE 0.9 % (FLUSH) 0.9 %
5-10 SYRINGE (ML) INJECTION AS NEEDED
Status: DISCONTINUED | OUTPATIENT
Start: 2018-01-05 | End: 2018-01-06 | Stop reason: HOSPADM

## 2018-01-05 RX ORDER — HYDRALAZINE HYDROCHLORIDE 20 MG/ML
20 INJECTION INTRAMUSCULAR; INTRAVENOUS
Status: DISCONTINUED | OUTPATIENT
Start: 2018-01-05 | End: 2018-01-06 | Stop reason: HOSPADM

## 2018-01-05 RX ORDER — LIDOCAINE HYDROCHLORIDE 10 MG/ML
1-20 INJECTION INFILTRATION; PERINEURAL
Status: DISCONTINUED | OUTPATIENT
Start: 2018-01-05 | End: 2018-01-05

## 2018-01-05 RX ORDER — INSULIN LISPRO 100 [IU]/ML
INJECTION, SOLUTION INTRAVENOUS; SUBCUTANEOUS
Status: DISCONTINUED | OUTPATIENT
Start: 2018-01-05 | End: 2018-01-06 | Stop reason: HOSPADM

## 2018-01-05 RX ORDER — ZOLPIDEM TARTRATE 5 MG/1
5 TABLET ORAL
Status: DISCONTINUED | OUTPATIENT
Start: 2018-01-05 | End: 2018-01-06 | Stop reason: HOSPADM

## 2018-01-05 RX ORDER — GUAIFENESIN 100 MG/5ML
162 LIQUID (ML) ORAL
Status: COMPLETED | OUTPATIENT
Start: 2018-01-05 | End: 2018-01-05

## 2018-01-05 RX ORDER — SODIUM CHLORIDE 9 MG/ML
75 INJECTION, SOLUTION INTRAVENOUS CONTINUOUS
Status: DISPENSED | OUTPATIENT
Start: 2018-01-05 | End: 2018-01-06

## 2018-01-05 RX ORDER — MAGNESIUM SULFATE 100 %
4 CRYSTALS MISCELLANEOUS AS NEEDED
Status: DISCONTINUED | OUTPATIENT
Start: 2018-01-05 | End: 2018-01-06 | Stop reason: HOSPADM

## 2018-01-05 RX ORDER — SODIUM CHLORIDE 0.9 % (FLUSH) 0.9 %
5-10 SYRINGE (ML) INJECTION EVERY 8 HOURS
Status: DISCONTINUED | OUTPATIENT
Start: 2018-01-05 | End: 2018-01-06 | Stop reason: HOSPADM

## 2018-01-05 RX ORDER — MIDAZOLAM HYDROCHLORIDE 1 MG/ML
INJECTION, SOLUTION INTRAMUSCULAR; INTRAVENOUS
Status: DISCONTINUED
Start: 2018-01-05 | End: 2018-01-05

## 2018-01-05 RX ORDER — GUAIFENESIN 100 MG/5ML
LIQUID (ML) ORAL
Status: COMPLETED
Start: 2018-01-05 | End: 2018-01-05

## 2018-01-05 RX ORDER — FENTANYL CITRATE 50 UG/ML
25-50 INJECTION, SOLUTION INTRAMUSCULAR; INTRAVENOUS
Status: DISCONTINUED | OUTPATIENT
Start: 2018-01-05 | End: 2018-01-05

## 2018-01-05 RX ADMIN — Medication 10 ML: at 22:08

## 2018-01-05 RX ADMIN — HYDRALAZINE HYDROCHLORIDE 20 MG: 20 INJECTION INTRAMUSCULAR; INTRAVENOUS at 23:56

## 2018-01-05 RX ADMIN — FENTANYL CITRATE 25 MCG: 50 INJECTION, SOLUTION INTRAMUSCULAR; INTRAVENOUS at 13:40

## 2018-01-05 RX ADMIN — ZOLPIDEM TARTRATE 5 MG: 5 TABLET ORAL at 22:07

## 2018-01-05 RX ADMIN — LIDOCAINE HYDROCHLORIDE 5 ML: 10 INJECTION INFILTRATION; PERINEURAL at 13:47

## 2018-01-05 RX ADMIN — NITROGLYCERIN 1 INCH: 20 OINTMENT TOPICAL at 14:14

## 2018-01-05 RX ADMIN — HYDRALAZINE HYDROCHLORIDE 25 MG: 25 TABLET, FILM COATED ORAL at 17:43

## 2018-01-05 RX ADMIN — HYDRALAZINE HYDROCHLORIDE 25 MG: 25 TABLET, FILM COATED ORAL at 22:07

## 2018-01-05 RX ADMIN — Medication 162 MG: at 13:40

## 2018-01-05 RX ADMIN — MIDAZOLAM 2 MG: 1 INJECTION INTRAMUSCULAR; INTRAVENOUS at 13:40

## 2018-01-05 RX ADMIN — LORAZEPAM 1 MG: 1 TABLET ORAL at 21:21

## 2018-01-05 RX ADMIN — SODIUM CHLORIDE 150 ML/HR: 900 INJECTION, SOLUTION INTRAVENOUS at 14:37

## 2018-01-05 RX ADMIN — Medication 1000 UNITS: at 13:33

## 2018-01-05 RX ADMIN — INSULIN LISPRO 3 UNITS: 100 INJECTION, SOLUTION INTRAVENOUS; SUBCUTANEOUS at 17:43

## 2018-01-05 RX ADMIN — FENTANYL CITRATE 50 MCG: 50 INJECTION, SOLUTION INTRAMUSCULAR; INTRAVENOUS at 13:45

## 2018-01-05 RX ADMIN — IODIXANOL 75 ML: 320 INJECTION, SOLUTION INTRAVASCULAR at 14:03

## 2018-01-05 RX ADMIN — CARVEDILOL 6.25 MG: 3.12 TABLET, FILM COATED ORAL at 17:43

## 2018-01-05 RX ADMIN — MIDAZOLAM HYDROCHLORIDE 2 MG: 1 INJECTION, SOLUTION INTRAMUSCULAR; INTRAVENOUS at 13:40

## 2018-01-05 RX ADMIN — MIDAZOLAM HYDROCHLORIDE 1 MG: 1 INJECTION, SOLUTION INTRAMUSCULAR; INTRAVENOUS at 13:49

## 2018-01-05 RX ADMIN — LIDOCAINE HYDROCHLORIDE 5 ML: 10 INJECTION, SOLUTION INFILTRATION; PERINEURAL at 13:47

## 2018-01-05 RX ADMIN — FENTANYL CITRATE 25 MCG: 50 INJECTION, SOLUTION INTRAMUSCULAR; INTRAVENOUS at 13:48

## 2018-01-05 RX ADMIN — ASPIRIN 81 MG 162 MG: 81 TABLET ORAL at 13:40

## 2018-01-05 RX ADMIN — SODIUM CHLORIDE 75 ML/HR: 900 INJECTION, SOLUTION INTRAVENOUS at 17:37

## 2018-01-05 RX ADMIN — NITROGLYCERIN 2 SPRAY: 400 SPRAY ORAL at 14:03

## 2018-01-05 RX ADMIN — SODIUM CHLORIDE 125 ML/HR: 900 INJECTION, SOLUTION INTRAVENOUS at 11:55

## 2018-01-05 RX ADMIN — MIDAZOLAM HYDROCHLORIDE 1 MG: 1 INJECTION, SOLUTION INTRAMUSCULAR; INTRAVENOUS at 13:45

## 2018-01-05 NOTE — TELEPHONE ENCOUNTER
Pt states he cancelled appt with you today because he is having a procedure done and he did reschedule it for Tuesday. He states he is having problems sleeping and wants a rx for this please.  Pt # [phone]

## 2018-01-05 NOTE — IP AVS SNAPSHOT
[address] [facility] 
[phone] Patient: Delilah Barnes MRN: [id number] :1970 A check bharathi indicates which time of day the medication should be taken. My Medications START taking these medications Instructions Each Dose to Equal  
 Morning Noon Evening Bedtime  
 isosorbide mononitrate ER 30 mg tablet Commonly known as:  IMDUR Your last dose was: Your next dose is: Take 1 Tab by mouth daily. 30 mg CHANGE how you take these medications Instructions Each Dose to Equal  
 Morning Noon Evening Bedtime  
 insulin detemir 100 unit/mL (3 mL) Inpn Commonly known as:  Tran Royal What changed:  additional instructions Your last dose was: Your next dose is:    
   
   
 50 Units by SubCUTAneous route nightly. 60 units QHS  
 50 Units  
    
   
   
   
  
 insulin lispro 100 unit/mL kwikpen Commonly known as:  HUMALOG What changed:   
- how much to take - when to take this 
- additional instructions Your last dose was: Your next dose is:    
   
   
 12 Units by SubCUTAneous route Before breakfast, lunch, and dinner. DX: E11.65  
 12 Units  
    
   
   
   
  
 spironolactone 25 mg tablet Commonly known as:  ALDACTONE What changed:   
- how much to take 
- how to take this - when to take this Your last dose was: Your next dose is: Take 0.5 Tabs by mouth daily. TAKE 1 TABLET EVERY DAY  
 12.5 mg  
    
   
   
   
  
  
CONTINUE taking these medications Instructions Each Dose to Equal  
 Morning Noon Evening Bedtime ALPRAZolam 0.25 mg tablet Commonly known as:  Judy Isaacs Your last dose was: Your next dose is: Take 1-2 Tabs by mouth nightly as needed for Anxiety. Max Daily Amount: 0.5 mg.  
 0.25-0.5 mg  
    
   
   
   
  
 apixaban 5 mg tablet Commonly known as:  Mirtha Bliss Your last dose was: Your next dose is: Take 2 Tabs by mouth every twelve (12) hours. 10 mg  
    
   
   
   
  
 atorvastatin 40 mg tablet Commonly known as:  LIPITOR Your last dose was: Your next dose is: Take 1 Tab by mouth daily. 40 mg Blood-Glucose Meter monitoring kit Your last dose was: Your next dose is:    
   
   
 Use as directed DxE11.9  
     
   
   
   
  
 carvedilol 6.25 mg tablet Commonly known as:  Lurlene Solo Your last dose was: Your next dose is: TAKE 1 TAB BY MOUTH TWO (2) TIMES DAILY (WITH MEALS). esomeprazole 40 mg capsule Commonly known as:  NexIUM Your last dose was: Your next dose is: Take 1 Cap by mouth daily. 40 mg  
    
   
   
   
  
 glucose blood VI test strips strip Commonly known as:  blood glucose test  
   
Your last dose was: Your next dose is:    
   
   
 Use BID DxE11.9  
     
   
   
   
  
 hydrALAZINE 25 mg tablet Commonly known as:  APRESOLINE Your last dose was: Your next dose is: TAKE 1 TAB BY MOUTH THREE (3) TIMES DAILY. Lancets Misc Your last dose was: Your next dose is:    
   
   
 Use as directed. Dx: E11.9  
     
   
   
   
  
 metFORMIN 500 mg tablet Commonly known as:  GLUCOPHAGE Your last dose was: Your next dose is: TAKE 1 TABLET BY MOUTH TWICE A DAY WITH MEALS AND INCREASE AS DIRECTED  
     
   
   
   
  
 metoclopramide HCl 10 mg tablet Commonly known as:  REGLAN Your last dose was: Your next dose is: Take 1 Tab by mouth four (4) times daily. before meals and at bed time 10 mg  
    
   
   
   
  
 oxyCODONE-acetaminophen  mg per tablet Commonly known as:  PERCOCET 10 Your last dose was: Your next dose is: Take 1 Tab by mouth every eight (8) hours as needed. Max Daily Amount: 3 Tabs. 1 Tab VITAMIN D2 50,000 unit capsule Generic drug:  ergocalciferol Your last dose was: Your next dose is: Take 50,000 Units by mouth every Tuesday and Friday. 1.25 mg cap 2x/ wk (tues & fri) for 4 wks then 1x/ wk  
 25091 Units Where to Get Your Medications Information on where to get these meds will be given to you by the nurse or doctor. ! Ask your nurse or doctor about these medications  
  isosorbide mononitrate ER 30 mg tablet  
 spironolactone 25 mg tablet

## 2018-01-05 NOTE — PROGRESS NOTES
1/5/2018 4:43 PM  Patient without complaints. Last VS:   Visit Vitals    BP (!) 196/94    Pulse 66    Temp 98.2 °F (36.8 °C)    Resp 12    Ht 6' 3\" (1.905 m)    Wt 127 kg (280 lb)    SpO2 100%    BMI 35 kg/m2     Cath site without hematoma, bleeding or new bruit. Distal pulses at baseline. Continue current plan of care. Results of cath discussed with patient; No available family members.

## 2018-01-05 NOTE — IP AVS SNAPSHOT
[address] [facility] 
[phone] Patient: Parminder Trejo MRN: [id number] :1970 About your hospitalization You were admitted on:  2018 You last received care in the:  [facility] 2 INTRVNTNL CARDIO You were discharged on:  2018 Why you were hospitalized Your primary diagnosis was:  Not on File Your diagnoses also included:  Cardiomyopathy (Hcc) Follow-up Information Follow up With Details Comments Contact Info Sabra López MD   [address] [address] 
[phone] Delmis Lopez MD Schedule an appointment as soon as possible for a visit in 1 month  [address] [facility] 
[phone] Your Scheduled Appointments 2018 10:20 AM EST  
ROUTINE CARE with Sabra López MD  
[address] [address]  
[phone] Discharge Orders None A check bharathi indicates which time of day the medication should be taken. My Medications START taking these medications Instructions Each Dose to Equal  
 Morning Noon Evening Bedtime  
 isosorbide mononitrate ER 30 mg tablet Commonly known as:  IMDUR Your last dose was: Your next dose is: Take 1 Tab by mouth daily. 30 mg CHANGE how you take these medications Instructions Each Dose to Equal  
 Morning Noon Evening Bedtime  
 insulin detemir 100 unit/mL (3 mL) Inpn Commonly known as:  Bryce Freddy What changed:  additional instructions Your last dose was: Your next dose is:    
   
   
 50 Units by SubCUTAneous route nightly. 60 units QHS  
 50 Units  
    
   
   
   
  
 insulin lispro 100 unit/mL kwikpen Commonly known as:  HUMALOG What changed: - how much to take - when to take this 
- additional instructions Your last dose was: Your next dose is:    
   
   
 12 Units by SubCUTAneous route Before breakfast, lunch, and dinner. DX: E11.65  
 12 Units  
    
   
   
   
  
 spironolactone 25 mg tablet Commonly known as:  ALDACTONE What changed:   
- how much to take 
- how to take this - when to take this Your last dose was: Your next dose is: Take 0.5 Tabs by mouth daily. TAKE 1 TABLET EVERY DAY  
 12.5 mg  
    
   
   
   
  
  
CONTINUE taking these medications Instructions Each Dose to Equal  
 Morning Noon Evening Bedtime ALPRAZolam 0.25 mg tablet Commonly known as:  Sari Orlando Your last dose was: Your next dose is: Take 1-2 Tabs by mouth nightly as needed for Anxiety. Max Daily Amount: 0.5 mg.  
 0.25-0.5 mg  
    
   
   
   
  
 apixaban 5 mg tablet Commonly known as:  Madhav Marcus Your last dose was: Your next dose is: Take 2 Tabs by mouth every twelve (12) hours. 10 mg  
    
   
   
   
  
 atorvastatin 40 mg tablet Commonly known as:  LIPITOR Your last dose was: Your next dose is: Take 1 Tab by mouth daily. 40 mg Blood-Glucose Meter monitoring kit Your last dose was: Your next dose is:    
   
   
 Use as directed DxE11.9  
     
   
   
   
  
 carvedilol 6.25 mg tablet Commonly known as:  Tracey Rings Your last dose was: Your next dose is: TAKE 1 TAB BY MOUTH TWO (2) TIMES DAILY (WITH MEALS). esomeprazole 40 mg capsule Commonly known as:  NexIUM Your last dose was: Your next dose is: Take 1 Cap by mouth daily. 40 mg  
    
   
   
   
  
 glucose blood VI test strips strip Commonly known as:  blood glucose test  
   
Your last dose was: Your next dose is: Use BID DxE11.9  
     
   
   
   
  
 hydrALAZINE 25 mg tablet Commonly known as:  APRESOLINE Your last dose was: Your next dose is: TAKE 1 TAB BY MOUTH THREE (3) TIMES DAILY. Lancets Misc Your last dose was: Your next dose is:    
   
   
 Use as directed. Dx: E11.9  
     
   
   
   
  
 metFORMIN 500 mg tablet Commonly known as:  GLUCOPHAGE Your last dose was: Your next dose is: TAKE 1 TABLET BY MOUTH TWICE A DAY WITH MEALS AND INCREASE AS DIRECTED  
     
   
   
   
  
 metoclopramide HCl 10 mg tablet Commonly known as:  REGLAN Your last dose was: Your next dose is: Take 1 Tab by mouth four (4) times daily. before meals and at bed time 10 mg  
    
   
   
   
  
 oxyCODONE-acetaminophen  mg per tablet Commonly known as:  PERCOCET 10 Your last dose was: Your next dose is: Take 1 Tab by mouth every eight (8) hours as needed. Max Daily Amount: 3 Tabs. 1 Tab VITAMIN D2 50,000 unit capsule Generic drug:  ergocalciferol Your last dose was: Your next dose is: Take 50,000 Units by mouth every Tuesday and Friday. 1.25 mg cap 2x/ wk (tues & fri) for 4 wks then 1x/ wk  
 [address] Units Where to Get Your Medications Information on where to get these meds will be given to you by the nurse or doctor. ! Ask your nurse or doctor about these medications  
  isosorbide mononitrate ER 30 mg tablet  
 spironolactone 25 mg tablet Discharge Instructions [address]    [phone] [address]    [email or web address] Patient Discharge Instructions Ivon Estrada / [id number] : 1970 Admitted 2018 Discharged 2018 · It is important that you take the medication exactly as they are prescribed. · Keep your medication in the bottles provided by the pharmacist and keep a list of the medication names, dosages, and times to be taken in your wallet. · Do not take other medications without consulting your doctor. BRING ALL OF YOUR MEDICINES TO YOUR OFFICE VISIT with Dr. Janet Renner. Cardiac Catheterization  Discharge Instructions ? Do not drive, operate any machinery, or sign any legal documents for 24 hours after your procedure. You must have someone to drive you home. ? You may take a shower 24 hours after your cardiac catheterization. Be sure to get the dressing wet and then remove it; gently wash the area with warm soapy water. Pat dry and leave open to air. To help prevent infections, be sure to keep the cath site clean and dry. No lotions, creams, powders, ointments, etc. in the cath site for approximately 1 week. ? Do not take a tub bath, get in a hot tub or swimming pool for approximately 5 days or until the cath site is completely healed. ? No strenuous activity or heavy lifting over 10 lbs. for 7 days. ? Drink plenty of fluids for 24-48 hours after your cath to flush the contrast dye from your kidneys. No alcoholic beverages for 24 hours. You may resume your previous diet (low fat, low cholesterol) after your cath. ? After your cath, some bruising or discomfort is common during the healing process. Tylenol, 1-2 tablets every 6 hours as needed, is recommended if you experience any discomfort. If you experience any signs or symptoms of infection such as fever, chills, or poorly healing incision, persistent tenderness or swelling in the groin, redness and/or warmth to the touch, numbness, significant tingling or pain at the groin site or affected extremity, rash, drainage from the cath site, or if the leg feels tight or swollen, call your physician right away.  
 
? If bleeding at the cath site occurs, take a clean gauze pad and apply direct pressure to the groin just above the puncture site. Call 911 immediately, and continue to apply direct pressure until an ambulance gets to your location. ? You may return to work  2  days after your cardiac cath if no groin bleeding. Heart Failure: Your Care Instructions Heart failure occurs when your heart does not pump as much blood as the body needs. Failure does not mean that the heart has stopped pumping but rather that it is not pumping as well as it should. Over time, this causes fluid buildup in your lungs and other parts of your body. Fluid buildup can cause shortness of breath, fatigue, swollen ankles, and other problems. By taking medicines regularly, reducing sodium (salt) in your diet, checking your weight every day, and making lifestyle changes, you can feel better and live longer. Follow-up care is a key part of your treatment and safety. Be sure to make and go to all appointments, and call your doctor if you are having problems. You need to keep a list of the medicines you take and the medicine dosages. How can you care for yourself at home? Diet · Limit sodium (salt) in your diet to less than 1800 mg per day. People get most of their sodium from table salt that is added to food and from processed foods. Fast food and restaurant meals also tend to be very high in sodium. Do not add salt to your food. · Limit your liquids to  1.5 quarts per day. Medicines · Take your medicines exactly as prescribed. Call your doctor if you think you are having a problem with your medicine. · Do not take any vitamins, over-the-counter medicine, or herbal products without talking to your doctor first. Adele Garcia not take ibuprofen (Advil or Motrin) and naproxen (Aleve) without talking to your doctor first. They could make your heart failure worse. · You may be taking some of the following medicine.  
¨ Beta-blockers can slow heart rate, decrease blood pressure, and improve your condition. Taking a beta-blocker may lower your chance of needing to be hospitalized again. ¨ Angiotensin-converting enzyme inhibitors (ACEIs) reduce the heart's workload, lower blood pressure, and reduce swelling. Taking a 
¨ Diuretics, also called water pills, reduce swelling. ¨ Spironolactone is a diuretic that also keeps heart failure from getting worse. ¨ Digoxin reduces symptoms for some people with heart failure. ¨ Aspirin and other blood thinners prevent blood clots, which can cause a stroke or heart attack. ¨ Potassium supplements replace this important mineral, which is sometimes lost with diuretics. You cannot take acei nor arbs due to allergic reaction; You need to take hydralazine and imdur (isosorbide mononitrate) instead of an acei/arb to help your heart. When should you call for help? Call 911 if you have symptoms of sudden heart failure such as: 
· You have severe trouble breathing. · You cough up pink, foamy mucus. · You have a new irregular or rapid heartbeat. Call your doctor now or seek immediate medical care if: 
· You have new or increased shortness of breath. · You are dizzy or lightheaded, or you feel like you may faint. · You have sudden weight gain, such as 3 pounds or more in 2 to 3 days. · You have increased swelling in your legs, ankles, or feet. · You are suddenly so tired or weak that you cannot do your usual activities. Watch closely for changes in your health, and be sure to contact your doctor if: 
· You develop new symptoms. Follow-up:  
Follow-up Information Follow up With Details Comments Contact Info Tito Monroe MD   [address] [place] [address] 
[phone] Stephane Valentin MD Schedule an appointment as soon as possible for a visit in 1 month  [address] [facility] 
[phone] Information obtained by : 
 I understand that if any problems occur once I am at home I am to contact my physician. I understand and acknowledge receipt of the instructions indicated above. R.N.'s Signature                                                                  Date/Time Patient or Representative Signature                                                          Date/Time Reza Yi MD 
   
[address]    [phone] [address]    [email or web address] Announcement We are excited to announce that we are making your provider's discharge notes available to you in [email or web address]. You will see these notes when they are completed and signed by the physician that discharged you from your recent hospital stay. If you have any questions or concerns about any information you see in [email or web address], please call the Health Information Department where you were seen or reach out to your Primary Care Provider for more information about your plan of care. Introducing [facility]! Dear Sandy Armstrong: Thank you for requesting a Tails.com account. Our records indicate that you already have an active [email or web address] account. You can access your account anytime at [email or web address] Did you know that you can access your hospital and ER discharge instructions at any time in [email or web address]? You can also review all of your test results from your hospital stay or ER visit. Additional Information If you have questions, please visit the Frequently Asked Questions section of the [email or web address] website at [email or web address] Remember, [email or web address] is NOT to be used for urgent needs.  For medical emergencies, dial 911. Now available from your iPhone and Android! Unresulted Labs-Please follow up with your PCP about these lab tests Order Current Status CULTURE, MRSA Preliminary result Providers Seen During Your Hospitalization Provider Specialty Primary office phone Denton Rascon MD Cardiology [phone] Your Primary Care Physician (PCP) Primary Care Physician Office Phone Office Fax Jerad Hartley [phone] You are allergic to the following Allergen Reactions Ace Inhibitors Swelling Facial swelling cough Morphine Angioedema Recent Documentation Height Weight BMI Smoking Status 1.905 m 127 kg 35 kg/m2 Former Smoker Emergency Contacts Name Discharge Info Relation Home Work [facility] DISCHARGE CAREGIVER [3] Spouse [3] [phone] Patient Belongings The following personal items are in your possession at time of discharge: 
  Dental Appliances: Partials, Lowers, Uppers, With patient  Visual Aid: None      Home Medications: None   Jewelry: None  Clothing: With patient, Shirt, Undergarments, Socks, Footwear, Pants, Jacket/Coat, Other (comment) (prosthesis)    Other Valuables: Prosthesis  Personal Items Sent to Safe: none Please provide this summary of care documentation to your next provider. Signatures-by signing, you are acknowledging that this After Visit Summary has been reviewed with you and you have received a copy. Patient Signature:  ____________________________________________________________ Date:  ____________________________________________________________  
  
Jennifer Must Provider Signature:  ____________________________________________________________ Date:  ____________________________________________________________

## 2018-01-05 NOTE — PROCEDURES
Procedure: Cardiac Catheterization. Date: 1/5/2018   Moderate sedation start time: 1330  Moderate sedation stop time: 1355  Sedation used: versed/fentanyl. Sedation was administered by a cath lab nurse; I directly supervised the cath lab staff as the patient was monitored for the duration of the procedure. INDICATION/PreDx: Cardiomyopathy; CKD. HTN. DM.    PROCEDURES PERFORMED   1. Left heart catheterization. 2. Selective coronary angiography. (no LVgram due to CKD and known severe LV dysfunction). DESCRIPTION OF PROCEDURE: After informed consent of all potential complications, the patient was prepped and draped in the usual sterile manner. Lidocaine 1% was utilized for local anesthesia. Conscious sedation per flow sheet. The right femoral artery was entered and a 5-Dominican sheath placed without complication using modified Seldinger technique. The sheath was flushed at all catheter exchanges and all catheters were advanced over a guidewire under direct fluoroscopic visualization. Left coronary angiography was performed in multiple views using a 5-Dominican JL4 catheter. Right coronary angiography was performed in multiple views using a JR4 catheter. Left heart catheterization was performed in CHAPA projection using the JR4. No apparent complications. Estimated blood loss minimal. SPECIMENS: No specimens. FINDINGS   LEFT MAIN:   Large vessel; Angiographically normal.    LAD: Large vessel; minor luminal irregularities, no focal stenosis. Small high and mid diagonal vessels. CIRCUMFLEX: Medium vessel; minor luminal irregularities, no focal stenosis. Medium mid OM and small AV groove circ. RCA: Large, dominant vessel; 30-40% eccentric mid-vessel plaque with other  minor luminal irregularities. Medium PDA and small postero-lateral branch vessel. LV: no aortic stenosis on pullback. EDP 10. CONCLUSION/post procedure Dx:   1. Normal EDP. 2. Mild CAD.      PLAN: Medical management of non-ischemic CM; will need to consider ICD. Observation for continued hydration due to CKD.

## 2018-01-06 VITALS
SYSTOLIC BLOOD PRESSURE: 184 MMHG | WEIGHT: 280 LBS | OXYGEN SATURATION: 98 % | TEMPERATURE: 98 F | HEIGHT: 75 IN | DIASTOLIC BLOOD PRESSURE: 102 MMHG | RESPIRATION RATE: 18 BRPM | HEART RATE: 69 BPM | BODY MASS INDEX: 34.82 KG/M2

## 2018-01-06 LAB
ANION GAP SERPL CALC-SCNC: 7 MMOL/L (ref 5–15)
BACTERIA SPEC CULT: NORMAL
BACTERIA SPEC CULT: NORMAL
BUN SERPL-MCNC: 11 MG/DL (ref 6–20)
BUN/CREAT SERPL: 9 (ref 12–20)
CALCIUM SERPL-MCNC: 8.3 MG/DL (ref 8.5–10.1)
CHLORIDE SERPL-SCNC: 108 MMOL/L (ref 97–108)
CHOLEST SERPL-MCNC: 193 MG/DL
CO2 SERPL-SCNC: 24 MMOL/L (ref 21–32)
CREAT SERPL-MCNC: 1.17 MG/DL (ref 0.7–1.3)
GLUCOSE BLD STRIP.AUTO-MCNC: 161 MG/DL (ref 65–100)
GLUCOSE SERPL-MCNC: 181 MG/DL (ref 65–100)
HDLC SERPL-MCNC: 62 MG/DL
HDLC SERPL: 3.1 {RATIO} (ref 0–5)
LDLC SERPL CALC-MCNC: 106.6 MG/DL (ref 0–100)
LIPID PROFILE,FLP: ABNORMAL
POTASSIUM SERPL-SCNC: 3.9 MMOL/L (ref 3.5–5.1)
SERVICE CMNT-IMP: ABNORMAL
SERVICE CMNT-IMP: NORMAL
SODIUM SERPL-SCNC: 139 MMOL/L (ref 136–145)
TRIGL SERPL-MCNC: 122 MG/DL (ref ?–150)
VLDLC SERPL CALC-MCNC: 24.4 MG/DL

## 2018-01-06 PROCEDURE — 96375 TX/PRO/DX INJ NEW DRUG ADDON: CPT

## 2018-01-06 PROCEDURE — 74011636637 HC RX REV CODE- 636/637: Performed by: INTERNAL MEDICINE

## 2018-01-06 PROCEDURE — 80048 BASIC METABOLIC PNL TOTAL CA: CPT | Performed by: INTERNAL MEDICINE

## 2018-01-06 PROCEDURE — 74011250636 HC RX REV CODE- 250/636: Performed by: INTERNAL MEDICINE

## 2018-01-06 PROCEDURE — 80061 LIPID PANEL: CPT | Performed by: INTERNAL MEDICINE

## 2018-01-06 PROCEDURE — 99218 HC RM OBSERVATION: CPT

## 2018-01-06 PROCEDURE — 96361 HYDRATE IV INFUSION ADD-ON: CPT

## 2018-01-06 PROCEDURE — 74011250637 HC RX REV CODE- 250/637: Performed by: INTERNAL MEDICINE

## 2018-01-06 PROCEDURE — 82962 GLUCOSE BLOOD TEST: CPT

## 2018-01-06 PROCEDURE — 74011000250 HC RX REV CODE- 250: Performed by: INTERNAL MEDICINE

## 2018-01-06 PROCEDURE — 96376 TX/PRO/DX INJ SAME DRUG ADON: CPT

## 2018-01-06 PROCEDURE — 36415 COLL VENOUS BLD VENIPUNCTURE: CPT | Performed by: INTERNAL MEDICINE

## 2018-01-06 RX ORDER — PANTOPRAZOLE SODIUM 40 MG/1
40 TABLET, DELAYED RELEASE ORAL ONCE
Status: COMPLETED | OUTPATIENT
Start: 2018-01-06 | End: 2018-01-06

## 2018-01-06 RX ORDER — ISOSORBIDE MONONITRATE 30 MG/1
30 TABLET, EXTENDED RELEASE ORAL DAILY
Qty: 30 TAB | Refills: 12 | Status: SHIPPED | OUTPATIENT
Start: 2018-01-06 | End: 2018-09-12 | Stop reason: SDUPTHER

## 2018-01-06 RX ORDER — SPIRONOLACTONE 25 MG/1
12.5 TABLET ORAL DAILY
Qty: 90 TAB | Refills: 5 | Status: SHIPPED
Start: 2018-01-06 | End: 2018-09-12 | Stop reason: SDUPTHER

## 2018-01-06 RX ADMIN — INSULIN LISPRO 2 UNITS: 100 INJECTION, SOLUTION INTRAVENOUS; SUBCUTANEOUS at 09:14

## 2018-01-06 RX ADMIN — HYDRALAZINE HYDROCHLORIDE 25 MG: 25 TABLET, FILM COATED ORAL at 09:14

## 2018-01-06 RX ADMIN — CARVEDILOL 6.25 MG: 3.12 TABLET, FILM COATED ORAL at 09:14

## 2018-01-06 RX ADMIN — PANTOPRAZOLE SODIUM 40 MG: 40 TABLET, DELAYED RELEASE ORAL at 02:02

## 2018-01-06 RX ADMIN — LORAZEPAM 1 MG: 2 INJECTION INTRAMUSCULAR; INTRAVENOUS at 05:30

## 2018-01-06 RX ADMIN — DIPHENHYDRAMINE HYDROCHLORIDE 25 MG: 50 INJECTION, SOLUTION INTRAMUSCULAR; INTRAVENOUS at 02:29

## 2018-01-06 RX ADMIN — Medication 10 ML: at 05:30

## 2018-01-06 RX ADMIN — LIDOCAINE HYDROCHLORIDE 40 ML: 20 SOLUTION ORAL; TOPICAL at 02:29

## 2018-01-06 RX ADMIN — LORAZEPAM 1 MG: 2 INJECTION INTRAMUSCULAR; INTRAVENOUS at 00:01

## 2018-01-06 NOTE — PROGRESS NOTES
Bedside shift change report given to Olga Galdamez (oncoming nurse) by Zahida Crawford (offgoing nurse). Report included the following information SBAR, Kardex, Procedure Summary, Intake/Output, MAR and Recent Results.      0930-dicussed discharge instructions with pt, all questions were answered, MD is aware of BP, new medications were ordered upon discharge

## 2018-01-06 NOTE — PROGRESS NOTES
Problem: Falls - Risk of  Goal: *Absence of Falls  Document Lee Fall Risk and appropriate interventions in the flowsheet.    Outcome: Progressing Towards Goal  Fall Risk Interventions:  Mobility Interventions: Patient to call before getting OOB         Medication Interventions: Patient to call before getting OOB         History of Falls Interventions: Door open when patient unattended, Investigate reason for fall

## 2018-01-06 NOTE — DISCHARGE INSTRUCTIONS
[address]    [phone]  [place], [place] [address]    [email or web address]    Patient Discharge Instructions    Rickie Holt / [id number] : 1970    Admitted 2018 Discharged 2018     · It is important that you take the medication exactly as they are prescribed. · Keep your medication in the bottles provided by the pharmacist and keep a list of the medication names, dosages, and times to be taken in your wallet. · Do not take other medications without consulting your doctor. BRING ALL OF YOUR MEDICINES TO YOUR OFFICE VISIT with Dr. Daniel Arechiga. Cardiac Catheterization  Discharge Instructions     Do not drive, operate any machinery, or sign any legal documents for 24 hours after your procedure. You must have someone to drive you home.  You may take a shower 24 hours after your cardiac catheterization. Be sure to get the dressing wet and then remove it; gently wash the area with warm soapy water. Pat dry and leave open to air. To help prevent infections, be sure to keep the cath site clean and dry. No lotions, creams, powders, ointments, etc. in the cath site for approximately 1 week.  Do not take a tub bath, get in a hot tub or swimming pool for approximately 5 days or until the cath site is completely healed.  No strenuous activity or heavy lifting over 10 lbs. for 7 days.  Drink plenty of fluids for 24-48 hours after your cath to flush the contrast dye from your kidneys. No alcoholic beverages for 24 hours. You may resume your previous diet (low fat, low cholesterol) after your cath.  After your cath, some bruising or discomfort is common during the healing process. Tylenol, 1-2 tablets every 6 hours as needed, is recommended if you experience any discomfort.   If you experience any signs or symptoms of infection such as fever, chills, or poorly healing incision, persistent tenderness or swelling in the groin, redness and/or warmth to the touch, numbness, significant tingling or pain at the groin site or affected extremity, rash, drainage from the cath site, or if the leg feels tight or swollen, call your physician right away.  If bleeding at the cath site occurs, take a clean gauze pad and apply direct pressure to the groin just above the puncture site. Call 911 immediately, and continue to apply direct pressure until an ambulance gets to your location.  You may return to work  2  days after your cardiac cath if no groin bleeding. Heart Failure: Your Care Instructions  Heart failure occurs when your heart does not pump as much blood as the body needs. Failure does not mean that the heart has stopped pumping but rather that it is not pumping as well as it should. Over time, this causes fluid buildup in your lungs and other parts of your body. Fluid buildup can cause shortness of breath, fatigue, swollen ankles, and other problems. By taking medicines regularly, reducing sodium (salt) in your diet, checking your weight every day, and making lifestyle changes, you can feel better and live longer. Follow-up care is a key part of your treatment and safety. Be sure to make and go to all appointments, and call your doctor if you are having problems. You need to keep a list of the medicines you take and the medicine dosages. How can you care for yourself at home? Diet  · Limit sodium (salt) in your diet to less than 1800 mg per day. People get most of their sodium from table salt that is added to food and from processed foods. Fast food and restaurant meals also tend to be very high in sodium. Do not add salt to your food. · Limit your liquids to  1.5 quarts per day. Medicines  · Take your medicines exactly as prescribed. Call your doctor if you think you are having a problem with your medicine.   · Do not take any vitamins, over-the-counter medicine, or herbal products without talking to your doctor first. Liset Bernie not take ibuprofen (Advil or Motrin) and naproxen (Aleve) without talking to your doctor first. They could make your heart failure worse. · You may be taking some of the following medicine. ¨ Beta-blockers can slow heart rate, decrease blood pressure, and improve your condition. Taking a beta-blocker may lower your chance of needing to be hospitalized again. ¨ Angiotensin-converting enzyme inhibitors (ACEIs) reduce the heart's workload, lower blood pressure, and reduce swelling. Taking a  ¨ Diuretics, also called water pills, reduce swelling. ¨ Spironolactone is a diuretic that also keeps heart failure from getting worse. ¨ Digoxin reduces symptoms for some people with heart failure. ¨ Aspirin and other blood thinners prevent blood clots, which can cause a stroke or heart attack. ¨ Potassium supplements replace this important mineral, which is sometimes lost with diuretics. You cannot take acei nor arbs due to allergic reaction; You need to take hydralazine and imdur (isosorbide mononitrate) instead of an acei/arb to help your heart. When should you call for help? Call 911 if you have symptoms of sudden heart failure such as:  · You have severe trouble breathing. · You cough up pink, foamy mucus. · You have a new irregular or rapid heartbeat. Call your doctor now or seek immediate medical care if:  · You have new or increased shortness of breath. · You are dizzy or lightheaded, or you feel like you may faint. · You have sudden weight gain, such as 3 pounds or more in 2 to 3 days. · You have increased swelling in your legs, ankles, or feet. · You are suddenly so tired or weak that you cannot do your usual activities. Watch closely for changes in your health, and be sure to contact your doctor if:  · You develop new symptoms.     Follow-up:   Follow-up Information     Follow up With Details Comments [address], [place]   [facility] [place] [address]      Lianne Ruiz MD Schedule an appointment as soon as possible for a visit in 1 month  [address]  [id number]  [address] [phone]            Information obtained by :  I understand that if any problems occur once I am at home I am to contact my physician. I understand and acknowledge receipt of the instructions indicated above. R.N.'s Signature                                                                  Date/Time                                                                                                                                              Patient or Representative Signature                                                          Date/Time      Justin Tolliver MD      [address] [phone]  [address]    [email or web address]

## 2018-01-06 NOTE — DISCHARGE SUMMARY
1/6/2018 9:21 AM  Obs date 1/5/18; D/C 1/6/18. DX: Non-ischemic CM; CAD (mild); DM; CKD; dyslipidemia    Procedure: [facility]    Pt needed observation overnight for hydration due to cath/CKD. Patient without complaints. No complications overnight. Subjective: Rolene Martinez reports   Chest pain X none  consistent with:  Non-cardiac CP         Atypical CP     None now  On going  Anginal CP     Dyspnea X none  at rest  with exertion         improved  unchanged  worse              PND X none  overnight       Orthopnea X none  improved  unchanged  worse   Presyncope X none  improved  unchanged  worse     Ambulated in hallway without symptoms   Yes   Ambulated in room without symptoms  Yes     PE: Last VS:   Visit Vitals    BP (!) 184/102 (BP 1 Location: Right arm, BP Patient Position: At rest)    Pulse 69    Temp 98 °F (36.7 °C)    Resp 18    Ht 6' 3\" (1.905 m)    Wt 127 kg (280 lb)    SpO2 98%    BMI 35 kg/m2     CTA, normal resp effort  RRR no new murmur  abd benign  A/O, non-focal    Cath site without hematoma, bleeding or new bruit. Distal pulses at baseline. Telemetry independently reviewed x sinus  chronic afib  parox afib  NSVT     ECG independently reviewed  NSR  afib  no significant changes  NSST-Tw chgs   x no new ECG provided for review     Lab results reviewed and appropriate; Cr down to 1.17 from 1.5. Plan: D/C. Follow-up Information     Follow up With Details Comments [address], [place]   [address] [place] [address] [address] [place] [address]      Janell Hurtado MD Schedule an appointment as soon as possible for a visit in 1 month  [address]  [id number]  [address] [facility]  [phone]            Current Discharge Medication List      START taking these medications    Details   isosorbide mononitrate ER (IMDUR) 30 mg tablet Take 1 Tab by mouth daily.   Qty: 30 Tab, Refills: 12         CONTINUE these medications which have CHANGED    Details spironolactone (ALDACTONE) 25 mg tablet Take 0.5 Tabs by mouth daily. TAKE 1 TABLET EVERY DAY  Qty: 90 Tab, Refills: 5         CONTINUE these medications which have NOT CHANGED    Details   glucose blood VI test strips (BLOOD GLUCOSE TEST) strip Use BID DxE11.9  Qty: 200 Strip, Refills: 5    Associated Diagnoses: IDDM (insulin dependent diabetes mellitus) ([address]      Lancets misc Use as directed. Dx: E11.9  Qty: 1 Each, Refills: 11    Associated Diagnoses: IDDM (insulin dependent diabetes mellitus) ([address]      Blood-Glucose Meter monitoring kit Use as directed DxE11.9  Qty: 1 Kit, Refills: 0    Associated Diagnoses: IDDM (insulin dependent diabetes mellitus) ([facility])      esomeprazole (NEXIUM) 40 mg capsule Take 1 Cap by mouth daily. Qty: 90 Cap, Refills: 3      hydrALAZINE (APRESOLINE) 25 mg tablet TAKE 1 TAB BY MOUTH THREE (3) TIMES DAILY. Qty: 90 Tab, Refills: 5      ALPRAZolam (XANAX) 0.25 mg tablet Take 1-2 Tabs by mouth nightly as needed for Anxiety. Max Daily Amount: 0.5 mg.  Qty: 60 Tab, Refills: 0      oxyCODONE-acetaminophen (PERCOCET 10)  mg per tablet Take 1 Tab by mouth every eight (8) hours as needed. Max Daily Amount: 3 Tabs. Qty: 45 Tab, Refills: 0      metoclopramide HCl (REGLAN) 10 mg tablet Take 1 Tab by mouth four (4) times daily. before meals and at bed time  Qty: 240 Tab, Refills: 5      ergocalciferol (VITAMIN D2) 50,000 unit capsule Take 50,000 Units by mouth every Tuesday and Friday. 1.25 mg cap 2x/ wk (tues & fri) for 4 wks then 1x/ wk      carvedilol (COREG) 6.25 mg tablet TAKE 1 TAB BY MOUTH TWO (2) TIMES DAILY (WITH MEALS). Qty: 60 Tab, Refills: 1      metFORMIN (GLUCOPHAGE) 500 mg tablet TAKE 1 TABLET BY MOUTH TWICE A DAY WITH MEALS AND INCREASE AS DIRECTED  Qty: 75 Tab, Refills: 3    Associated Diagnoses: IDDM (insulin dependent diabetes mellitus) ([facility])      insulin lispro (HUMALOG) 100 unit/mL kwikpen 12 Units by SubCUTAneous route Before breakfast, lunch, and dinner.  DX: E11.65  Qty: 1 Package, Refills: 11      apixaban (ELIQUIS) 5 mg tablet Take 2 Tabs by mouth every twelve (12) hours. Qty: 120 Tab, Refills: 1      atorvastatin (LIPITOR) 40 mg tablet Take 1 Tab by mouth daily. Qty: 90 Tab, Refills: 3      insulin detemir (LEVEMIR FLEXTOUCH) 100 unit/mL (3 mL) inpn 50 Units by SubCUTAneous route nightly.  60 units QHS  Qty: 20 Pen, Refills: 11

## 2018-01-08 NOTE — TELEPHONE ENCOUNTER
Writer call patient @ [phone] no answer left message on voice  mail for patient to call the office @ [phone].

## 2018-01-09 ENCOUNTER — OFFICE VISIT (OUTPATIENT)
Dept: INTERNAL MEDICINE CLINIC | Age: 48
End: 2018-01-09

## 2018-01-09 VITALS
SYSTOLIC BLOOD PRESSURE: 169 MMHG | OXYGEN SATURATION: 100 % | HEART RATE: 54 BPM | RESPIRATION RATE: 20 BRPM | WEIGHT: 283 LBS | DIASTOLIC BLOOD PRESSURE: 98 MMHG | BODY MASS INDEX: 35.19 KG/M2 | TEMPERATURE: 98.5 F | HEIGHT: 75 IN

## 2018-01-09 DIAGNOSIS — I50.22 CHRONIC SYSTOLIC HEART FAILURE (HCC): ICD-10-CM

## 2018-01-09 DIAGNOSIS — E78.00 HYPERCHOLESTEREMIA: ICD-10-CM

## 2018-01-09 DIAGNOSIS — I11.0 HYPERTENSIVE HEART DISEASE WITH CHF (CONGESTIVE HEART FAILURE) (HCC): ICD-10-CM

## 2018-01-09 DIAGNOSIS — Z89.512 HX OF BKA, LEFT (HCC): ICD-10-CM

## 2018-01-09 LAB — GLUCOSE POC: 248 MG/DL

## 2018-01-09 NOTE — PROGRESS NOTES
Vicenta Trujillo is a 49 y.o. male and presents with Hypertension; Hospital Follow Up (1/5/18 [facility]); and Diabetes  . Subjective:    Since last visit, pt is s/p cardiac cath 1/5/18. Pt was instructed to hold some of his meds befor and after the catheterization. He got confused and help everything EXCEPT his insulin and just restarted yesterday (see BP). Cath demonstarted no sig coronary artery disease and mildly improved EF. Pt w c/o post prandial nausea and vomiting since d/c from hospital (on reglan for gastropathy)    S/p left BKA-pt is attending PT @ [address] [place] [address] which will be ending soon. He is ambulating w his prosthesis now. Type 2 DM-insulin dependent-pt relays his sugars fluctuate b/t 100-300 depending upon his meals. It is elevated if he misses his nighttime insulin    HTN w ckd 3-bp uncontrolled due to pt recently restarting his medication. CKD improved on recent labs (?due to hydration and ACE-I held)    Chronic systolic heart failure-EF 25-30% during hospitalization. Will recheck. Bilateral PE-stable on eliquis    Vitamin d deficiency-improved on recent labs    Pt relays he has [employer] right now and will soon have no insurance    Clinical depression-OBVIOULY.  But pt refuses eval or initiation of medication              Review of Systems  Constitutional: negative for fevers, chills, anorexia and weight loss  Respiratory:  negative for cough, hemoptysis, dyspnea,wheezing  CV:   negative for chest pain, palpitations, lower extremity edema  GI:   positive for nausea, vomiting,   Musculoskel: positive for myalgias, arthralgias,  joint pain  Neurological:  negative for headaches, dizziness, vertigo, memory problems and gait   Behavl/Psych: positive for feelings of  depression, mood changes    Past Medical History:   Diagnosis Date    Diabetes ([address]     since age 23    HTN (hypertension)     Hypercholesteremia     Hyperlipidemia      Past Surgical History:   Procedure Laterality Date    HX AMPUTATION      toes- left foot    HX BUNIONECTOMY      HX ORTHOPAEDIC      boutinerre deformity    HX ORTHOPAEDIC      fascia removed left foot    HX OTHER SURGICAL  03/2017    Skin graft Surgery    HX TONSILLECTOMY      HX WISDOM TEETH EXTRACTION       Social History     Social History    Marital status:      Spouse name: N/A    Number of children: N/A    Years of education: N/A     Social History Main Topics    Smoking status: Former Smoker     Types: Cigars     Quit date: 4/22/2011    Smokeless tobacco: Never Used    Alcohol use 0.0 oz/week     1 Glasses of wine, 0 Standard drinks or equivalent per week      Comment: stop drinking 5 months ago    Drug use: No    Sexual activity: Yes     Partners: Female     Birth control/ protection: None     Other Topics Concern    None     Social History Narrative    ** Merged History Encounter **          Family History   Problem Relation Age of Onset    Hypertension Mother     High Cholesterol Mother      Current Outpatient Prescriptions   Medication Sig Dispense Refill    spironolactone (ALDACTONE) 25 mg tablet Take 0.5 Tabs by mouth daily. TAKE 1 TABLET EVERY DAY 90 Tab 5    isosorbide mononitrate ER (IMDUR) 30 mg tablet Take 1 Tab by mouth daily. 30 Tab 12    hydrALAZINE (APRESOLINE) 25 mg tablet TAKE 1 TAB BY MOUTH THREE (3) TIMES DAILY. 90 Tab 5    glucose blood VI test strips (BLOOD GLUCOSE TEST) strip Use BID DxE11.9 200 Strip 5    Lancets misc Use as directed. Dx: E11.9 1 Each 11    oxyCODONE-acetaminophen (PERCOCET 10)  mg per tablet Take 1 Tab by mouth every eight (8) hours as needed. Max Daily Amount: 3 Tabs. 45 Tab 0    metoclopramide HCl (REGLAN) 10 mg tablet Take 1 Tab by mouth four (4) times daily. before meals and at bed time 240 Tab 5    ergocalciferol (VITAMIN D2) 50,000 unit capsule Take 50,000 Units by mouth every Tuesday and Friday.  1.25 mg cap 2x/ wk (tues & fri) for 4 wks then 1x/ wk      carvedilol (COREG) 6.25 mg tablet TAKE 1 TAB BY MOUTH TWO (2) TIMES DAILY (WITH MEALS). 60 Tab 1    metFORMIN (GLUCOPHAGE) 500 mg tablet TAKE 1 TABLET BY MOUTH TWICE A DAY WITH MEALS AND INCREASE AS DIRECTED 75 Tab 3    insulin lispro (HUMALOG) 100 unit/mL kwikpen 12 Units by SubCUTAneous route Before breakfast, lunch, and dinner. DX: E11.65 (Patient taking differently: 8 Units by SubCUTAneous route four (4) times daily (with meals). 2-8 units ) 1 Package 11    apixaban (ELIQUIS) 5 mg tablet Take 2 Tabs by mouth every twelve (12) hours. 120 Tab 1    atorvastatin (LIPITOR) 40 mg tablet Take 1 Tab by mouth daily. 90 Tab 3    insulin detemir (LEVEMIR FLEXTOUCH) 100 unit/mL (3 mL) inpn 50 Units by SubCUTAneous route nightly. 60 units QHS (Patient taking differently: 50 Units by SubCUTAneous route nightly.) 20 Pen 11    esomeprazole (NEXIUM) 40 mg capsule Take 1 Cap by mouth daily. 90 Cap 3     Allergies   Allergen Reactions    Ace Inhibitors Swelling     Facial swelling cough     Morphine Angioedema       Objective:  Visit Vitals    BP (!) 169/98 (BP 1 Location: Left arm, BP Patient Position: Sitting)  Comment (BP Patient Position): start back on BP MEDS today/ since 1/4/18    Pulse (!) 54    Temp 98.5 °F (36.9 °C) (Oral)    Resp 20    Ht 6' 3\" (1.905 m)    Wt 283 lb (128.4 kg)    SpO2 100%    BMI 35.37 kg/m2     Physical Exam:   General appearance - alert, obese, seems intermit agitated  Mental status - alert, oriented to person, place, and time  EYE-EOMI  Chest - clear to auscultation, no wheezes, rales or rhonchi, symmetric air entry   Heart - normal rate, regular rhythm, normal S1, S2,   Abdomen - obese  Ext-left prosthesis  Skin-Warm and dry.  no hyperpigmentation, vitiligo, or suspicious lesions  Neuro -alert, oriented, normal speech, no focal findings or movement disorder noted        Results for orders placed or performed in visit on 01/09/18   AMB POC GLUCOSE BLOOD, BY GLUCOSE MONITORING DEVICE   Result Value Ref Range    Glucose  mg/dL       Assessment/Plan:    ICD-10-CM ICD-9-CM    1. IDDM (insulin dependent diabetes mellitus) ([facility]) E11.9 250.00 AMB POC GLUCOSE BLOOD, BY GLUCOSE MONITORING DEVICE    Z79.4 V58.67 REFERRAL TO ENDOCRINOLOGY      CANCELED: AMB POC HEMOGLOBIN A1C   2. Hx of BKA, left ([facility] Z89.512 V49.75    3. Chronic systolic heart failure (HCC) I50.22 428.22    4. Hypercholesteremia E78.00 272.0    5. Hypertensive heart disease with CHF (congestive heart failure) ([facility] I11.0 402.91      428.0      Orders Placed This Encounter    REFERRAL TO ENDOCRINOLOGY     Referral Priority:   Routine     Referral Type:   Consultation     Referral Reason:   Specialty Services Required     Referred to Provider:   Joyce Starr MD     Requested Specialty:   Endocrinology     Number of Visits Requested:   1    AMB POC GLUCOSE BLOOD, BY GLUCOSE MONITORING DEVICE     Will hold gabapentin and imdur given good bp control and no pain  Check echo, vit d level, met panel  F/u 2 mths  Cont other meds  Pt declines flu and pneumonia vaccine  Pt declines psychiatric referral as he is OBVIOUSLY depressed  There are no Patient Instructions on file for this visit. Follow-up Disposition:  Return in about 2 months (around 3/9/2018). I have reviewed with the patient details of the assessment and plan and all questions were answered. Relevent patient education was performed. The most recent lab findings were reviewed with the patient. An After Visit Summary was printed and given to the patient.       Total encounter time was 30 minutes;>50% of time was spent counseling/coordinating care regarding s/p BKA and expectations/preventive health care/chf/depression/polypharmacy

## 2018-01-09 NOTE — TELEPHONE ENCOUNTER
Patient  came in today for his regular office visit and will discuss his sleeping problems with DR. Bridgette Gonzales.

## 2018-01-16 RX ORDER — WARFARIN SODIUM 5 MG/1
5 TABLET ORAL DAILY
Qty: 45 TAB | Refills: 5 | Status: SHIPPED | OUTPATIENT
Start: 2018-01-16 | End: 2018-09-12 | Stop reason: SDUPTHER

## 2018-01-23 RX ORDER — METFORMIN HYDROCHLORIDE 850 MG/1
850 TABLET ORAL 2 TIMES DAILY WITH MEALS
Qty: 60 TAB | Refills: 5 | Status: SHIPPED | OUTPATIENT
Start: 2018-01-23 | End: 2018-09-12 | Stop reason: SDUPTHER

## 2018-05-24 ENCOUNTER — TELEPHONE (OUTPATIENT)
Dept: INTERNAL MEDICINE CLINIC | Age: 48
End: 2018-05-24

## 2018-05-24 NOTE — TELEPHONE ENCOUNTER
Patient called and stated his med Levemir is no longer covered by his insurance his co pay was very expensive. Now they will cover for Basaglar and it need to be change.

## 2018-05-24 NOTE — TELEPHONE ENCOUNTER
Check telephone encounters, addressed yesterday already. He needs an OV, as he was supposed to return in March and he has conflicting orders for his current insulin dose. I do NOT feel comfortable changing it as I am unsure what Dr. Kecia Fernandez was regarding his diabetes.

## 2018-06-27 RX ORDER — INSULIN GLARGINE 100 [IU]/ML
60 INJECTION, SOLUTION SUBCUTANEOUS DAILY
Qty: 3 ADJUSTABLE DOSE PRE-FILLED PEN SYRINGE | Refills: 1 | Status: SHIPPED | OUTPATIENT
Start: 2018-06-27 | End: 2018-09-12 | Stop reason: SDUPTHER

## 2018-06-27 RX ORDER — INSULIN LISPRO 100 [IU]/ML
12 INJECTION, SOLUTION INTRAVENOUS; SUBCUTANEOUS
Qty: 1 PACKAGE | Refills: 1 | Status: SHIPPED | OUTPATIENT
Start: 2018-06-27 | End: 2018-09-12 | Stop reason: SDUPTHER

## 2018-08-30 ENCOUNTER — TELEPHONE (OUTPATIENT)
Dept: INTERNAL MEDICINE CLINIC | Age: 48
End: 2018-08-30

## 2018-08-30 NOTE — TELEPHONE ENCOUNTER
Dr. Buckley/Telephone  Received: Today       Caio MENDEZ [facility] Front Office Pool                     Pt is requesting a call regarding an annual check up and disability hearing appt.  Best contact [phone].

## 2018-08-31 NOTE — TELEPHONE ENCOUNTER
Called pt no answer left message for call back. Do pt need to schedule an annual appt? Disability hearing appt? ?

## 2018-09-11 ENCOUNTER — OFFICE VISIT (OUTPATIENT)
Dept: INTERNAL MEDICINE CLINIC | Age: 48
End: 2018-09-11

## 2018-09-11 VITALS
SYSTOLIC BLOOD PRESSURE: 140 MMHG | OXYGEN SATURATION: 99 % | DIASTOLIC BLOOD PRESSURE: 82 MMHG | HEIGHT: 75 IN | HEART RATE: 61 BPM | BODY MASS INDEX: 30.4 KG/M2 | TEMPERATURE: 98.3 F | RESPIRATION RATE: 18 BRPM | WEIGHT: 244.5 LBS

## 2018-09-11 DIAGNOSIS — I13.0 HYPERTENSIVE HEART AND RENAL DISEASE WITH RENAL FAILURE, STAGE 1 THROUGH STAGE 4 OR UNSPECIFIED CHRONIC KIDNEY DISEASE, WITH HEART FAILURE (HCC): ICD-10-CM

## 2018-09-11 DIAGNOSIS — Z79.01 LONG TERM CURRENT USE OF ANTICOAGULANTS WITH INR GOAL OF 2.0-3.0: ICD-10-CM

## 2018-09-11 DIAGNOSIS — Z91.199 NON-COMPLIANCE WITH TREATMENT: ICD-10-CM

## 2018-09-11 DIAGNOSIS — I73.9 PAD (PERIPHERAL ARTERY DISEASE) (HCC): ICD-10-CM

## 2018-09-11 DIAGNOSIS — I26.99 RECURRENT PULMONARY EMBOLI (HCC): ICD-10-CM

## 2018-09-11 DIAGNOSIS — I25.5 ISCHEMIC CARDIOMYOPATHY: ICD-10-CM

## 2018-09-11 DIAGNOSIS — N18.30 STAGE 3 CHRONIC KIDNEY DISEASE (HCC): ICD-10-CM

## 2018-09-11 DIAGNOSIS — I50.22 CHRONIC SYSTOLIC HEART FAILURE (HCC): ICD-10-CM

## 2018-09-11 DIAGNOSIS — K31.9 DIABETIC GASTROPATHY (HCC): ICD-10-CM

## 2018-09-11 DIAGNOSIS — I25.10 CORONARY ARTERY DISEASE INVOLVING NATIVE CORONARY ARTERY OF NATIVE HEART WITHOUT ANGINA PECTORIS: ICD-10-CM

## 2018-09-11 DIAGNOSIS — E11.69 DIABETIC GASTROPATHY (HCC): ICD-10-CM

## 2018-09-11 DIAGNOSIS — Z89.512 HX OF BKA, LEFT (HCC): ICD-10-CM

## 2018-09-11 PROBLEM — I13.10 HYPERTENSIVE HEART AND RENAL DISEASE: Status: ACTIVE | Noted: 2018-09-11

## 2018-09-11 LAB
HBA1C MFR BLD HPLC: 9.1 %
INR BLD: 1.1
PT POC: NORMAL SECONDS
VALID INTERNAL CONTROL?: YES

## 2018-09-11 NOTE — PROGRESS NOTES
Oscar Kauffman is a 51 y.o. male and presents with Physical  .  Subjective:    Last visit is 8 mths ago  Pt is angry that he is having issues obtaining his medication, issues w his insurance carrier, he was denied disability twice and now has the ais of a . He is requesting paperwork filled. Pt relays he cannot stand for prolonged periods of time due to swelling of his lle stump and discomfort. Hailey Rankin He cannot see his prosthesis provider at [address] [place] [address] due to ???. He cannot see his cardiologist (Dr. Marylen Mediate) due to ??? Financial issues. Pt has not had all of his medication due to insurance issues/no refills. Pt requests xanax for short temper/frustration issues. He was in a group therapy program for anger management, but stopped bc the  is often travelling for work and he dislikes the substitute . Diabetic gastropathy-pt relays he vomits DAILY, reglan is no longer working. S/p left BKA-    Type 2 DM-insulin dependent-pt is non-compliant w diet and medication     Lab Results   Component Value Date/Time    Hemoglobin A1c 11.1 (H) 07/12/2017 12:00 PM    Hemoglobin A1c (POC) 9.1 09/11/2018 11:08 AM     Lab Results   Component Value Date/Time    Cholesterol, total 193 01/06/2018 05:38 AM    Cholesterol (POC) 266 11/01/2013 12:40 PM    HDL Cholesterol 62 01/06/2018 05:38 AM    HDL Cholesterol (POC) 50 11/01/2013 12:40 PM    LDL Cholesterol (POC) 191 11/01/2013 12:40 PM    LDL, calculated 106.6 (H) 01/06/2018 05:38 AM    VLDL, calculated 24.4 01/06/2018 05:38 AM    Triglyceride 122 01/06/2018 05:38 AM    Triglycerides (POC) 123 11/01/2013 12:40 PM    CHOL/HDL Ratio 3.1 01/06/2018 05:38 AM         HTN w ckd 3-.  CKD improved on last labs    -  Lab Results   Component Value Date/Time    GFR est AA >60 01/06/2018 05:38 AM    GFR est non-AA >60 01/06/2018 05:38 AM    Creatinine (POC) 1.6 (H) 04/17/2017 10:36 AM    Creatinine 1.17 01/06/2018 05:38 AM    BUN 11 01/06/2018 05:38 AM BUN (POC) 24 (H) 04/17/2017 10:36 AM    Sodium (POC) 139 04/17/2017 10:36 AM    Sodium 139 01/06/2018 05:38 AM    Potassium 3.9 01/06/2018 05:38 AM    Potassium (POC) 3.7 04/17/2017 10:36 AM    Chloride (POC) 103 04/17/2017 10:36 AM    Chloride 108 01/06/2018 05:38 AM    CO2 24 01/06/2018 05:38 AM     BP Readings from Last 3 Encounters:   09/11/18 140/82   01/09/18 (!) 169/98   01/06/18 (!) 184/102         Chronic systolic heart failure-EF 25-30% during hospitalization. Has improved as per cardiology records    Recurrent PE-on warfarin, pt non-compliant as he says he has not had it in awhile    H/o vitamin d deficiency-    Clinical depression/anxiety-OBVIOULY.  But pt refuses referral to psychiatry      Wt Readings from Last 3 Encounters:   09/11/18 244 lb 8 oz (110.9 kg)   01/09/18 283 lb (128.4 kg)   01/05/18 280 lb (127 kg)             Review of Systems  Constitutional: negative for fevers, chills, anorexia and weight loss  Respiratory:  negative for cough, hemoptysis, dyspnea,wheezing  CV:   negative for chest pain, palpitations, lower extremity edema  GI:   positive for nausea, vomiting,   Musculoskel: positive for myalgias, arthralgias,  joint pain  Neurological:  negative for headaches, dizziness, vertigo, memory problems and gait   Behavl/Psych: positive for feelings of  depression, mood changes    Past Medical History:   Diagnosis Date    Diabetes ([address]     since age 23    HTN (hypertension)     Hypercholesteremia     Hyperlipidemia      Past Surgical History:   Procedure Laterality Date    HX AMPUTATION      toes- left foot    HX BUNIONECTOMY      HX ORTHOPAEDIC      boutinerre deformity    HX ORTHOPAEDIC      fascia removed left foot    HX OTHER SURGICAL  03/2017    Skin graft Surgery    HX TONSILLECTOMY      HX WISDOM TEETH EXTRACTION       Social History     Social History    Marital status:      Spouse name: N/A    Number of children: N/A    Years of education: N/A     Social History Main Topics    Smoking status: Former Smoker     Types: Cigars     Quit date: 4/22/2011    Smokeless tobacco: Never Used    Alcohol use 0.0 oz/week     1 Glasses of wine, 0 Standard drinks or equivalent per week      Comment: stop drinking 5 months ago    Drug use: No    Sexual activity: Yes     Partners: Female     Birth control/ protection: None     Other Topics Concern    None     Social History Narrative    ** Merged History Encounter **          Family History   Problem Relation Age of Onset    Hypertension Mother     High Cholesterol Mother      Current Outpatient Prescriptions   Medication Sig Dispense Refill    insulin lispro (HUMALOG) 100 unit/mL kwikpen 12 Units by SubCUTAneous route Before breakfast, lunch, and dinner. DX: E11.65 3 Adjustable Dose Pre-filled Pen Syringe 5    insulin glargine (BASAGLAR KWIKPEN U-100 INSULIN) 100 unit/mL (3 mL) inpn 60 Units by SubCUTAneous route daily. 5 Adjustable Dose Pre-filled Pen Syringe 5    metFORMIN (GLUCOPHAGE) 850 mg tablet Take 1 Tab by mouth two (2) times daily (with meals). 60 Tab 5    warfarin (COUMADIN) 5 mg tablet Take 1 Tab by mouth daily. Indications: pulmonary thromboembolism 45 Tab 5    atorvastatin (LIPITOR) 40 mg tablet Take 1 Tab by mouth daily. 30 Tab 5    carvedilol (COREG) 6.25 mg tablet TAKE 1 TAB BY MOUTH TWO (2) TIMES DAILY (WITH MEALS). 60 Tab 5    hydrALAZINE (APRESOLINE) 25 mg tablet TAKE 1 TAB BY MOUTH THREE (3) TIMES DAILY. 90 Tab 5    spironolactone (ALDACTONE) 25 mg tablet Take 1 Tab by mouth daily. TAKE 1 TABLET EVERY DAY 30 Tab 5    isosorbide mononitrate ER (IMDUR) 30 mg tablet Take 1 Tab by mouth daily. 30 Tab 5    busPIRone (BUSPAR) 7.5 mg tablet Take 1 Tab by mouth three (3) times daily. 90 Tab 5    glucose blood VI test strips (BLOOD GLUCOSE TEST) strip Use BID DxE11.9 200 Strip 5    Lancets misc Use as directed. Dx: E11.9 1 Each 11    esomeprazole (NEXIUM) 40 mg capsule Take 1 Cap by mouth daily.  [address] Cap [address]     Allergies   Allergen Reactions    Ace Inhibitors Swelling     Facial swelling cough     Morphine Angioedema    Arb-Angiotensin Receptor Antagonist Angioedema       Objective:  Visit Vitals    /82 (BP 1 Location: Left arm, BP Patient Position: Sitting)    Pulse 61    Temp 98.3 °F (36.8 °C) (Oral)    Resp 18    Ht 6' 3\" (1.905 m)    Wt 244 lb 8 oz (110.9 kg)    SpO2 99%    BMI 30.56 kg/m2     Physical Exam:   General appearance - alert, obese,  overtly agitated/abrupt  Mental status - alert, oriented to person, place, and time  EYE-EOMI  Chest - clear to auscultation, no wheezes, rales or rhonchi, symmetric air entry + gynecomastia  Heart - normal rate, regular rhythm, normal S1, S2  Abdomen - obese  Ext-left prosthesis  Skin-Warm and dry. no hyperpigmentation, vitiligo, or suspicious lesions  Neuro -alert, oriented, normal speech, no focal findings or movement disorder noted        Results for orders placed or performed in visit on 09/11/18   AMB POC HEMOGLOBIN A1C   Result Value Ref Range    Hemoglobin A1c (POC) 9.1 %   AMB POC PT/INR   Result Value Ref Range    VALID INTERNAL CONTROL POC Yes     Prothrombin time (POC)  seconds    INR POC 1.1        Assessment/Plan:    ICD-10-CM ICD-9-CM    1. IDDM (insulin dependent diabetes mellitus) (HCC) E11.9 250.00 AMB POC HEMOGLOBIN A1C    Z79.4 V58.67 insulin glargine (BASAGLAR KWIKPEN U-100 INSULIN) 100 unit/mL (3 mL) inpn      metFORMIN (GLUCOPHAGE) 850 mg tablet      CANCELED: REFERRAL TO CARDIOLOGY   2. Chronic systolic heart failure (HCC) I50.22 428.22 AMB POC HEMOGLOBIN A1C      carvedilol (COREG) 6.25 mg tablet      hydrALAZINE (APRESOLINE) 25 mg tablet      spironolactone (ALDACTONE) 25 mg tablet      isosorbide mononitrate ER (IMDUR) 30 mg tablet      CANCELED: REFERRAL TO CARDIOLOGY   3. Recurrent pulmonary emboli (HCC) I26.99 415.19 warfarin (COUMADIN) 5 mg tablet   4.  Long term current use of anticoagulants with INR goal of 2.0-3.0 Z79.01 V58.61 AMB POC PT/INR      warfarin (COUMADIN) 5 mg tablet   5. Ischemic cardiomyopathy I25.5 414.8    6. Hx of BKA, left ([address] Z89.512 V49.75    7. Non-compliance with treatment Z91.19 V15.81    8. Hypertensive heart and renal disease with renal failure, stage 1 through stage 4 or unspecified chronic kidney disease, with heart failure (HCC) I13.0 404.91 carvedilol (COREG) 6.25 mg tablet     428.9 hydrALAZINE (APRESOLINE) 25 mg tablet      spironolactone (ALDACTONE) 25 mg tablet      isosorbide mononitrate ER (IMDUR) 30 mg tablet   9. Stage 3 chronic kidney disease N18.3 585.3    10. Diabetic gastropathy ([facility]) E11.69 250.80     K31.9 537.9    11. PAD (peripheral artery disease) ([facility]) I73.9 443.9    12. Coronary artery disease involving native coronary artery of native heart without angina pectoris I25.10 414.01      Orders Placed This Encounter    AMB POC HEMOGLOBIN A1C    AMB POC PT/INR    insulin lispro (HUMALOG) 100 unit/mL kwikpen     Si Units by SubCUTAneous route Before breakfast, lunch, and dinner. DX: E11.65     Dispense:  3 Adjustable Dose Pre-filled Pen Syringe     Refill:  5    insulin glargine (BASAGLAR KWIKPEN U-100 INSULIN) 100 unit/mL (3 mL) inpn     Si Units by SubCUTAneous route daily. Dispense:  5 Adjustable Dose Pre-filled Pen Syringe     Refill:  5    metFORMIN (GLUCOPHAGE) 850 mg tablet     Sig: Take 1 Tab by mouth two (2) times daily (with meals). Dispense:  60 Tab     Refill:  5     TAKES THE PLACE OF 500mg tabs    warfarin (COUMADIN) 5 mg tablet     Sig: Take 1 Tab by mouth daily. Indications: pulmonary thromboembolism     Dispense:  45 Tab     Refill:  5    atorvastatin (LIPITOR) 40 mg tablet     Sig: Take 1 Tab by mouth daily. Dispense:  30 Tab     Refill:  5    carvedilol (COREG) 6.25 mg tablet     Sig: TAKE 1 TAB BY MOUTH TWO (2) TIMES DAILY (WITH MEALS).      Dispense:  60 Tab     Refill:  5    hydrALAZINE (APRESOLINE) 25 mg tablet     Sig: TAKE 1 TAB BY MOUTH THREE (3) TIMES DAILY. Dispense:  90 Tab     Refill:  5    spironolactone (ALDACTONE) 25 mg tablet     Sig: Take 1 Tab by mouth daily. TAKE 1 TABLET EVERY DAY     Dispense:  30 Tab     Refill:  5    isosorbide mononitrate ER (IMDUR) 30 mg tablet     Sig: Take 1 Tab by mouth daily. Dispense:  30 Tab     Refill:  5    busPIRone (BUSPAR) 7.5 mg tablet     Sig: Take 1 Tab by mouth three (3) times daily. Dispense:  90 Tab     Refill:  5       D/w pt compliance  F/u 2 weeks INR check ON warfarin  F/u w me in 3 mths  Start buspar  Refill all meds  Poor prognosis given poor compliance  There are no Patient Instructions on file for this visit. Follow-up Disposition:  Return in about 3 months (around 12/11/2018) for routine bp check w me and INR check w lpn in 2 weeks. I have reviewed with the patient details of the assessment and plan and all questions were answered. Relevent patient education was performed. The most recent lab findings were reviewed with the patient. An After Visit Summary was printed and given to the patient.

## 2018-09-11 NOTE — PROGRESS NOTES
Chief Complaint   Patient presents with    Physical     1. Have you been to the ER, urgent care clinic since your last visit? Hospitalized since your last visit? No    2. Have you seen or consulted any other health care providers outside of the [facility] since your last visit? Include any pap smears or colon screening.  Yes When: [facility], Dr Laxmi Casanova

## 2018-09-12 PROBLEM — A41.9 SEPSIS (HCC): Status: RESOLVED | Noted: 2017-05-27 | Resolved: 2018-09-12

## 2018-09-12 PROBLEM — Z86.19 HISTORY OF CLOSTRIDIUM DIFFICILE INFECTION: Status: RESOLVED | Noted: 2017-07-22 | Resolved: 2018-09-12

## 2018-09-12 PROBLEM — E87.6 HYPOKALEMIA: Status: RESOLVED | Noted: 2017-07-23 | Resolved: 2018-09-12

## 2018-09-12 RX ORDER — METFORMIN HYDROCHLORIDE 850 MG/1
850 TABLET ORAL 2 TIMES DAILY WITH MEALS
Qty: 60 TAB | Refills: 5 | Status: SHIPPED | OUTPATIENT
Start: 2018-09-12 | End: 2019-04-15 | Stop reason: SDUPTHER

## 2018-09-12 RX ORDER — BUSPIRONE HYDROCHLORIDE 7.5 MG/1
7.5 TABLET ORAL 3 TIMES DAILY
Qty: 90 TAB | Refills: 5 | Status: SHIPPED | OUTPATIENT
Start: 2018-09-12 | End: 2019-08-15

## 2018-09-12 RX ORDER — ATORVASTATIN CALCIUM 40 MG/1
40 TABLET, FILM COATED ORAL DAILY
Qty: 30 TAB | Refills: 5 | Status: SHIPPED | OUTPATIENT
Start: 2018-09-12 | End: 2019-04-15 | Stop reason: SDUPTHER

## 2018-09-12 RX ORDER — INSULIN GLARGINE 100 [IU]/ML
60 INJECTION, SOLUTION SUBCUTANEOUS DAILY
Qty: 5 ADJUSTABLE DOSE PRE-FILLED PEN SYRINGE | Refills: 5 | Status: SHIPPED | OUTPATIENT
Start: 2018-09-12 | End: 2019-04-15 | Stop reason: SDUPTHER

## 2018-09-12 RX ORDER — HYDRALAZINE HYDROCHLORIDE 25 MG/1
TABLET, FILM COATED ORAL
Qty: 90 TAB | Refills: 5 | Status: SHIPPED | OUTPATIENT
Start: 2018-09-12 | End: 2019-04-15 | Stop reason: SDUPTHER

## 2018-09-12 RX ORDER — SPIRONOLACTONE 25 MG/1
25 TABLET ORAL DAILY
Qty: 30 TAB | Refills: 5 | Status: SHIPPED | OUTPATIENT
Start: 2018-09-12 | End: 2019-04-15 | Stop reason: SDUPTHER

## 2018-09-12 RX ORDER — WARFARIN SODIUM 5 MG/1
5 TABLET ORAL DAILY
Qty: 45 TAB | Refills: 5 | Status: SHIPPED | OUTPATIENT
Start: 2018-09-12 | End: 2018-12-11 | Stop reason: CLARIF

## 2018-09-12 RX ORDER — CARVEDILOL 6.25 MG/1
TABLET ORAL
Qty: 60 TAB | Refills: 5 | Status: SHIPPED | OUTPATIENT
Start: 2018-09-12 | End: 2019-04-15 | Stop reason: SDUPTHER

## 2018-09-12 RX ORDER — ISOSORBIDE MONONITRATE 30 MG/1
30 TABLET, EXTENDED RELEASE ORAL DAILY
Qty: 30 TAB | Refills: 5 | Status: SHIPPED | OUTPATIENT
Start: 2018-09-12 | End: 2019-04-15 | Stop reason: SDUPTHER

## 2018-09-12 RX ORDER — INSULIN LISPRO 100 [IU]/ML
12 INJECTION, SOLUTION INTRAVENOUS; SUBCUTANEOUS
Qty: 3 ADJUSTABLE DOSE PRE-FILLED PEN SYRINGE | Refills: 5 | Status: SHIPPED | OUTPATIENT
Start: 2018-09-12 | End: 2019-07-16 | Stop reason: CLARIF

## 2018-10-02 NOTE — PERIOP NOTES
Called Dr Debbie Cunningham office for EKG, Daniela stated they did not have EKG and pt was to have EKG done AM DOS. Advised Daniela per anesthesia protocol we need EKG prior to day of surgery for pt safety. PAT will attempt to make pt apt if no EKG is located elsewhere once speaking to pt. [facility] called for last OV and EKG, will fax to PAT. Called Dr Debbie Cunningham office in regards to MRSA positive last year and to fax/inform PAT of any ABX changes. Also made Daniela aware of pt's last abnormal labs and lack of chemistry. PAT will call pt to make apt to come in and have anesthesia required chemistry.

## 2018-10-03 NOTE — PERIOP NOTES
Spoke to infection control/Beatriz concerning MRSA history/negative nares 01/18. Will deflag for contact precautions--repeat nares morning of surgery.

## 2018-10-03 NOTE — PERIOP NOTES
Follow up call to Dr. Leiva Other office to clarify antibiotic coverage/MRSA hx. Office will follow up/call back.

## 2018-10-03 NOTE — PERIOP NOTES
Phone order/Dr. Cherry Reid office/Vaibhav for ancef and vancomycin pre-op for surgery. Office to fax updated order sheet.

## 2018-10-03 NOTE — PERIOP NOTES
[facility]  Preoperative Instructions        Surgery Date 10/4/18          Time of Arrival 0800    1. On the day of your surgery, please report to the Surgical Services Registration Desk and sign in at your designated time. The Surgery Center is located to the right of the Emergency Room. 2. You must have someone with you to drive you home. You should not drive a car for 24 hours following surgery. Please make arrangements for a friend or family member to stay with you for the first 24 hours after your surgery. 3. Do not have anything to eat or drink (including water, gum, mints, coffee, juice) after midnight 10/3/18.?? [place] ? This may not apply to medications prescribed by your physician. ?(Please note below the special instructions with medications to take the morning of your procedure.)    4. We recommend you do not drink any alcoholic beverages for 24 hours before and after your surgery. 5. Contact your surgeons office for instructions on the following medications: non-steroidal anti-inflammatory drugs (i.e. Advil, Aleve), vitamins, and supplements. (Some surgeons will want you to stop these medications prior to surgery and others may allow you to take them)  **If you are currently taking Plavix, Coumadin, Aspirin and/or other blood-thinning agents, contact your surgeon for instructions. ** Your surgeon will partner with the physician prescribing these medications to determine if it is safe to stop or if you need to continue taking. Please do not stop taking these medications without instructions from your surgeon    6. Wear comfortable clothes. Wear glasses instead of contacts. Do not bring any money or jewelry. Please bring picture ID, insurance card, and any prearranged co-payment or hospital payment. Do not wear make-up, particularly mascara the morning of your surgery. Do not wear nail polish, particularly if you are having foot /hand surgery.   Wear your hair loose or down, no ponytails, buns, abebe pins or clips. All body piercings must be removed. Please shower with antibacterial soap for three consecutive days before and on the morning of surgery, but do not apply any lotions, powders or deodorants after the shower on the day of surgery. Please use a fresh towels after each shower. Please sleep in clean clothes and change bed linens the night before surgery. Please do not shave for 48 hours prior to surgery. Shaving of the face is acceptable. 7. You should understand that if you do not follow these instructions your surgery may be cancelled. If your physical condition changes (I.e. fever, cold or flu) please contact your surgeon as soon as possible. 8. It is important that you be on time. If a situation occurs where you may be late, please call [phone] (OR Holding Area). 9. If you have any questions and or problems, please call [phone] (Pre-admission Testing). 10. Your surgery time may be subject to change. You will receive a phone call the evening prior if your time changes. 11.  If having outpatient surgery, you must have someone to drive you here, stay with you during the duration of your stay, and to drive you home at time of discharge. 12.   In an effort to improve the efficiency, privacy, and safety for all of our Pre-op patients visitors are not allowed in the Holding area. Once you arrive and are registered your family/visitors will be asked to remain in the waiting room. The Pre-op staff will get you from the Surgical Waiting Area and will explain to you and your family/visitors that the Pre-op phase is beginning. The staff will answer any questions and provide instructions for tracking of the patient, by use of the existing tracking number and color-coded status board in the waiting room.   At this time the staff will also ask for your designated spokesperson information in the event that the physician or staff need to provide an update or obtain any pertinent information. The designated spokesperson will be notified if the physician needs to speak to family during the pre-operative phase. If at any time your family/visitors has questions or concerns they may approach the volunteer desk in the waiting area for assistance. Special Instructions:Coumadin per surgeon. MEDICATIONS TO TAKE THE MORNING OF SURGERY WITH A SIP OF WATER:Coreg,Hydralazine,Imdur,Buspar      I understand a pre-operative phone call will be made to verify my surgery time. In the event that I am not available, I give permission for a message to be left on my answering service and/or with another person?   {yes @ [phone]         ___________________      __________   _________    (Signature of Patient)             (Witness)                (Date and Time)

## 2018-10-03 NOTE — PERIOP NOTES
Patient is in a wheelchair,no transportation, unable to come in for labs. Will make office aware    Left vm for Daniela (Dr. Ruchi Rivas) about labs.     Left vm for Daniela re; discrepancy in hold dates for Coumadin & abx.

## 2018-10-04 ENCOUNTER — HOSPITAL ENCOUNTER (INPATIENT)
Age: 48
LOS: 3 days | Discharge: HOME HEALTH | DRG: 566 | End: 2018-10-07
Attending: SURGERY | Admitting: SURGERY
Payer: COMMERCIAL

## 2018-10-04 ENCOUNTER — ANESTHESIA EVENT (OUTPATIENT)
Dept: SURGERY | Age: 48
DRG: 566 | End: 2018-10-04
Payer: COMMERCIAL

## 2018-10-04 ENCOUNTER — ANESTHESIA (OUTPATIENT)
Dept: SURGERY | Age: 48
DRG: 566 | End: 2018-10-04
Payer: COMMERCIAL

## 2018-10-04 DIAGNOSIS — Z89.512 HX OF BKA, LEFT (HCC): Primary | ICD-10-CM

## 2018-10-04 PROBLEM — T87.9 COMPLICATION OF AMPUTATED STUMP (HCC): Status: ACTIVE | Noted: 2018-10-04

## 2018-10-04 LAB
ANION GAP SERPL CALC-SCNC: 8 MMOL/L (ref 5–15)
APPEARANCE UR: CLEAR
APTT PPP: 29.9 SEC (ref 22.1–32)
BACTERIA URNS QL MICRO: NEGATIVE /HPF
BILIRUB UR QL: NEGATIVE
BUN SERPL-MCNC: 23 MG/DL (ref 6–20)
BUN/CREAT SERPL: 14 (ref 12–20)
CALCIUM SERPL-MCNC: 8.5 MG/DL (ref 8.5–10.1)
CHLORIDE SERPL-SCNC: 107 MMOL/L (ref 97–108)
CO2 SERPL-SCNC: 23 MMOL/L (ref 21–32)
COLOR UR: ABNORMAL
CREAT SERPL-MCNC: 1.68 MG/DL (ref 0.7–1.3)
EPITH CASTS URNS QL MICRO: ABNORMAL /LPF
ERYTHROCYTE [DISTWIDTH] IN BLOOD BY AUTOMATED COUNT: 13.8 % (ref 11.5–14.5)
GLUCOSE BLD STRIP.AUTO-MCNC: 66 MG/DL (ref 65–100)
GLUCOSE BLD STRIP.AUTO-MCNC: 76 MG/DL (ref 65–100)
GLUCOSE BLD STRIP.AUTO-MCNC: 84 MG/DL (ref 65–100)
GLUCOSE BLD STRIP.AUTO-MCNC: 95 MG/DL (ref 65–100)
GLUCOSE BLD STRIP.AUTO-MCNC: 97 MG/DL (ref 65–100)
GLUCOSE SERPL-MCNC: 102 MG/DL (ref 65–100)
GLUCOSE UR STRIP.AUTO-MCNC: NEGATIVE MG/DL
HCT VFR BLD AUTO: 39 % (ref 36.6–50.3)
HGB BLD-MCNC: 12.2 G/DL (ref 12.1–17)
HGB UR QL STRIP: NEGATIVE
HYALINE CASTS URNS QL MICRO: ABNORMAL /LPF (ref 0–5)
INR PPP: 1.1 (ref 0.9–1.1)
KETONES UR QL STRIP.AUTO: NEGATIVE MG/DL
LEUKOCYTE ESTERASE UR QL STRIP.AUTO: NEGATIVE
MCH RBC QN AUTO: 25.8 PG (ref 26–34)
MCHC RBC AUTO-ENTMCNC: 31.3 G/DL (ref 30–36.5)
MCV RBC AUTO: 82.6 FL (ref 80–99)
NITRITE UR QL STRIP.AUTO: NEGATIVE
NRBC # BLD: 0 K/UL (ref 0–0.01)
NRBC BLD-RTO: 0 PER 100 WBC
PH UR STRIP: 6 [PH] (ref 5–8)
PLATELET # BLD AUTO: 285 K/UL (ref 150–400)
PMV BLD AUTO: 10 FL (ref 8.9–12.9)
POTASSIUM SERPL-SCNC: 4 MMOL/L (ref 3.5–5.1)
PROT UR STRIP-MCNC: 300 MG/DL
PROTHROMBIN TIME: 11.4 SEC (ref 9–11.1)
RBC # BLD AUTO: 4.72 M/UL (ref 4.1–5.7)
RBC #/AREA URNS HPF: ABNORMAL /HPF (ref 0–5)
SERVICE CMNT-IMP: NORMAL
SODIUM SERPL-SCNC: 138 MMOL/L (ref 136–145)
SP GR UR REFRACTOMETRY: 1.01 (ref 1–1.03)
THERAPEUTIC RANGE,PTTT: NORMAL SECS (ref 58–77)
UA: UC IF INDICATED,UAUC: ABNORMAL
UROBILINOGEN UR QL STRIP.AUTO: 0.2 EU/DL (ref 0.2–1)
WBC # BLD AUTO: 10.8 K/UL (ref 4.1–11.1)
WBC URNS QL MICRO: ABNORMAL /HPF (ref 0–4)

## 2018-10-04 PROCEDURE — 87075 CULTR BACTERIA EXCEPT BLOOD: CPT | Performed by: SURGERY

## 2018-10-04 PROCEDURE — 74011250637 HC RX REV CODE- 250/637: Performed by: SURGERY

## 2018-10-04 PROCEDURE — 85027 COMPLETE CBC AUTOMATED: CPT | Performed by: ANESTHESIOLOGY

## 2018-10-04 PROCEDURE — 74011250636 HC RX REV CODE- 250/636

## 2018-10-04 PROCEDURE — 36415 COLL VENOUS BLD VENIPUNCTURE: CPT | Performed by: SURGERY

## 2018-10-04 PROCEDURE — 74011250636 HC RX REV CODE- 250/636: Performed by: ANESTHESIOLOGY

## 2018-10-04 PROCEDURE — 87077 CULTURE AEROBIC IDENTIFY: CPT | Performed by: SURGERY

## 2018-10-04 PROCEDURE — 77030019934 HC DRSG VAC ASST KCON -B

## 2018-10-04 PROCEDURE — 74011000272 HC RX REV CODE- 272: Performed by: SURGERY

## 2018-10-04 PROCEDURE — 77030013575 HC DRSG HYDROFERA HOLL -B

## 2018-10-04 PROCEDURE — 87205 SMEAR GRAM STAIN: CPT | Performed by: SURGERY

## 2018-10-04 PROCEDURE — 77030019952 HC CANSTR VAC ASST KCON -B

## 2018-10-04 PROCEDURE — 74011000250 HC RX REV CODE- 250: Performed by: SURGERY

## 2018-10-04 PROCEDURE — 97605 NEG PRS WND THER DME<=50SQCM: CPT

## 2018-10-04 PROCEDURE — 85730 THROMBOPLASTIN TIME PARTIAL: CPT | Performed by: SURGERY

## 2018-10-04 PROCEDURE — 87147 CULTURE TYPE IMMUNOLOGIC: CPT | Performed by: SURGERY

## 2018-10-04 PROCEDURE — 82962 GLUCOSE BLOOD TEST: CPT

## 2018-10-04 PROCEDURE — 76210000016 HC OR PH I REC 1 TO 1.5 HR: Performed by: SURGERY

## 2018-10-04 PROCEDURE — 80048 BASIC METABOLIC PNL TOTAL CA: CPT | Performed by: ANESTHESIOLOGY

## 2018-10-04 PROCEDURE — 77030006835 HC BLD SAW SAG STRY -B: Performed by: SURGERY

## 2018-10-04 PROCEDURE — 87186 SC STD MICRODIL/AGAR DIL: CPT | Performed by: SURGERY

## 2018-10-04 PROCEDURE — 81001 URINALYSIS AUTO W/SCOPE: CPT | Performed by: ANESTHESIOLOGY

## 2018-10-04 PROCEDURE — 74011250636 HC RX REV CODE- 250/636: Performed by: SURGERY

## 2018-10-04 PROCEDURE — 77030011640 HC PAD GRND REM COVD -A: Performed by: SURGERY

## 2018-10-04 PROCEDURE — 0HBKXZZ EXCISION OF RIGHT LOWER LEG SKIN, EXTERNAL APPROACH: ICD-10-PCS | Performed by: SURGERY

## 2018-10-04 PROCEDURE — 77030018836 HC SOL IRR NACL ICUM -A: Performed by: SURGERY

## 2018-10-04 PROCEDURE — 74011000250 HC RX REV CODE- 250

## 2018-10-04 PROCEDURE — 76010000138 HC OR TIME 0.5 TO 1 HR: Performed by: SURGERY

## 2018-10-04 PROCEDURE — 76060000032 HC ANESTHESIA 0.5 TO 1 HR: Performed by: SURGERY

## 2018-10-04 PROCEDURE — 99218 HC RM OBSERVATION: CPT

## 2018-10-04 PROCEDURE — 85610 PROTHROMBIN TIME: CPT | Performed by: SURGERY

## 2018-10-04 PROCEDURE — 77030018836 HC SOL IRR NACL ICUM -A

## 2018-10-04 RX ORDER — MIDAZOLAM HYDROCHLORIDE 1 MG/ML
INJECTION, SOLUTION INTRAMUSCULAR; INTRAVENOUS AS NEEDED
Status: DISCONTINUED | OUTPATIENT
Start: 2018-10-04 | End: 2018-10-04 | Stop reason: HOSPADM

## 2018-10-04 RX ORDER — OXYCODONE AND ACETAMINOPHEN 5; 325 MG/1; MG/1
2 TABLET ORAL
Status: DISCONTINUED | OUTPATIENT
Start: 2018-10-04 | End: 2018-10-07 | Stop reason: HOSPADM

## 2018-10-04 RX ORDER — KETAMINE HYDROCHLORIDE 50 MG/ML
INJECTION, SOLUTION INTRAMUSCULAR; INTRAVENOUS AS NEEDED
Status: DISCONTINUED | OUTPATIENT
Start: 2018-10-04 | End: 2018-10-04 | Stop reason: HOSPADM

## 2018-10-04 RX ORDER — BUSPIRONE HYDROCHLORIDE 5 MG/1
7.5 TABLET ORAL 3 TIMES DAILY
Status: DISCONTINUED | OUTPATIENT
Start: 2018-10-04 | End: 2018-10-07 | Stop reason: HOSPADM

## 2018-10-04 RX ORDER — FACIAL-BODY WIPES
10 EACH TOPICAL DAILY PRN
Status: DISCONTINUED | OUTPATIENT
Start: 2018-10-04 | End: 2018-10-07 | Stop reason: HOSPADM

## 2018-10-04 RX ORDER — METFORMIN HYDROCHLORIDE 850 MG/1
850 TABLET ORAL 2 TIMES DAILY WITH MEALS
Status: DISCONTINUED | OUTPATIENT
Start: 2018-10-04 | End: 2018-10-07 | Stop reason: HOSPADM

## 2018-10-04 RX ORDER — SODIUM CHLORIDE, SODIUM LACTATE, POTASSIUM CHLORIDE, CALCIUM CHLORIDE 600; 310; 30; 20 MG/100ML; MG/100ML; MG/100ML; MG/100ML
25 INJECTION, SOLUTION INTRAVENOUS CONTINUOUS
Status: DISCONTINUED | OUTPATIENT
Start: 2018-10-04 | End: 2018-10-04

## 2018-10-04 RX ORDER — SPIRONOLACTONE 25 MG/1
25 TABLET ORAL DAILY
Status: DISCONTINUED | OUTPATIENT
Start: 2018-10-04 | End: 2018-10-07 | Stop reason: HOSPADM

## 2018-10-04 RX ORDER — OXYCODONE AND ACETAMINOPHEN 5; 325 MG/1; MG/1
1 TABLET ORAL
Status: DISCONTINUED | OUTPATIENT
Start: 2018-10-04 | End: 2018-10-07 | Stop reason: HOSPADM

## 2018-10-04 RX ORDER — PROPOFOL 10 MG/ML
INJECTION, EMULSION INTRAVENOUS
Status: DISCONTINUED | OUTPATIENT
Start: 2018-10-04 | End: 2018-10-04 | Stop reason: HOSPADM

## 2018-10-04 RX ORDER — HYDROMORPHONE HYDROCHLORIDE 1 MG/ML
0.5 INJECTION, SOLUTION INTRAMUSCULAR; INTRAVENOUS; SUBCUTANEOUS
Status: DISCONTINUED | OUTPATIENT
Start: 2018-10-04 | End: 2018-10-04 | Stop reason: HOSPADM

## 2018-10-04 RX ORDER — FENTANYL CITRATE 50 UG/ML
INJECTION, SOLUTION INTRAMUSCULAR; INTRAVENOUS AS NEEDED
Status: DISCONTINUED | OUTPATIENT
Start: 2018-10-04 | End: 2018-10-04 | Stop reason: HOSPADM

## 2018-10-04 RX ORDER — ONDANSETRON 2 MG/ML
4 INJECTION INTRAMUSCULAR; INTRAVENOUS
Status: DISCONTINUED | OUTPATIENT
Start: 2018-10-04 | End: 2018-10-07 | Stop reason: HOSPADM

## 2018-10-04 RX ORDER — INSULIN LISPRO 100 [IU]/ML
INJECTION, SOLUTION INTRAVENOUS; SUBCUTANEOUS
Status: DISCONTINUED | OUTPATIENT
Start: 2018-10-04 | End: 2018-10-07 | Stop reason: HOSPADM

## 2018-10-04 RX ORDER — WARFARIN SODIUM 5 MG/1
5 TABLET ORAL DAILY
Status: DISCONTINUED | OUTPATIENT
Start: 2018-10-04 | End: 2018-10-07 | Stop reason: HOSPADM

## 2018-10-04 RX ORDER — CEFAZOLIN SODIUM 1 G/3ML
2 INJECTION, POWDER, FOR SOLUTION INTRAMUSCULAR; INTRAVENOUS ONCE
Status: DISCONTINUED | OUTPATIENT
Start: 2018-10-04 | End: 2018-10-04 | Stop reason: SDUPTHER

## 2018-10-04 RX ORDER — HYDRALAZINE HYDROCHLORIDE 25 MG/1
25 TABLET, FILM COATED ORAL 3 TIMES DAILY
Status: DISCONTINUED | OUTPATIENT
Start: 2018-10-04 | End: 2018-10-07 | Stop reason: HOSPADM

## 2018-10-04 RX ORDER — MIDAZOLAM HYDROCHLORIDE 1 MG/ML
0.5 INJECTION, SOLUTION INTRAMUSCULAR; INTRAVENOUS
Status: DISCONTINUED | OUTPATIENT
Start: 2018-10-04 | End: 2018-10-04 | Stop reason: HOSPADM

## 2018-10-04 RX ORDER — INSULIN ASPART 100 [IU]/ML
INJECTION, SOLUTION INTRAVENOUS; SUBCUTANEOUS
Status: DISCONTINUED | OUTPATIENT
Start: 2018-10-04 | End: 2018-10-04

## 2018-10-04 RX ORDER — SODIUM CHLORIDE 9 MG/ML
25 INJECTION, SOLUTION INTRAVENOUS CONTINUOUS
Status: DISCONTINUED | OUTPATIENT
Start: 2018-10-04 | End: 2018-10-04 | Stop reason: HOSPADM

## 2018-10-04 RX ORDER — CARVEDILOL 6.25 MG/1
6.25 TABLET ORAL 2 TIMES DAILY WITH MEALS
Status: DISCONTINUED | OUTPATIENT
Start: 2018-10-04 | End: 2018-10-07 | Stop reason: HOSPADM

## 2018-10-04 RX ORDER — OXYCODONE AND ACETAMINOPHEN 5; 325 MG/1; MG/1
TABLET ORAL
COMMUNITY
End: 2018-10-07

## 2018-10-04 RX ORDER — HYDROMORPHONE HYDROCHLORIDE 2 MG/ML
0.5 INJECTION, SOLUTION INTRAMUSCULAR; INTRAVENOUS; SUBCUTANEOUS
Status: DISCONTINUED | OUTPATIENT
Start: 2018-10-04 | End: 2018-10-07 | Stop reason: HOSPADM

## 2018-10-04 RX ORDER — FENTANYL CITRATE 50 UG/ML
50 INJECTION, SOLUTION INTRAMUSCULAR; INTRAVENOUS AS NEEDED
Status: DISCONTINUED | OUTPATIENT
Start: 2018-10-04 | End: 2018-10-04 | Stop reason: HOSPADM

## 2018-10-04 RX ORDER — MAGNESIUM SULFATE 100 %
4 CRYSTALS MISCELLANEOUS AS NEEDED
Status: DISCONTINUED | OUTPATIENT
Start: 2018-10-04 | End: 2018-10-07 | Stop reason: HOSPADM

## 2018-10-04 RX ORDER — DIPHENHYDRAMINE HYDROCHLORIDE 50 MG/ML
12.5 INJECTION, SOLUTION INTRAMUSCULAR; INTRAVENOUS AS NEEDED
Status: DISCONTINUED | OUTPATIENT
Start: 2018-10-04 | End: 2018-10-04 | Stop reason: HOSPADM

## 2018-10-04 RX ORDER — DEXTROSE 50 % IN WATER (D50W) INTRAVENOUS SYRINGE
12.5-25 AS NEEDED
Status: DISCONTINUED | OUTPATIENT
Start: 2018-10-04 | End: 2018-10-07 | Stop reason: HOSPADM

## 2018-10-04 RX ORDER — HYDROMORPHONE HYDROCHLORIDE 1 MG/ML
INJECTION, SOLUTION INTRAMUSCULAR; INTRAVENOUS; SUBCUTANEOUS
Status: DISPENSED
Start: 2018-10-04 | End: 2018-10-04

## 2018-10-04 RX ORDER — CEFAZOLIN SODIUM/WATER 2 G/20 ML
2 SYRINGE (ML) INTRAVENOUS ONCE
Status: COMPLETED | OUTPATIENT
Start: 2018-10-04 | End: 2018-10-04

## 2018-10-04 RX ORDER — INSULIN GLARGINE 100 [IU]/ML
60 INJECTION, SOLUTION SUBCUTANEOUS DAILY
Status: DISCONTINUED | OUTPATIENT
Start: 2018-10-05 | End: 2018-10-07 | Stop reason: HOSPADM

## 2018-10-04 RX ORDER — GLYCOPYRROLATE 0.2 MG/ML
INJECTION INTRAMUSCULAR; INTRAVENOUS AS NEEDED
Status: DISCONTINUED | OUTPATIENT
Start: 2018-10-04 | End: 2018-10-04 | Stop reason: HOSPADM

## 2018-10-04 RX ORDER — ADHESIVE BANDAGE
30 BANDAGE TOPICAL DAILY PRN
Status: DISCONTINUED | OUTPATIENT
Start: 2018-10-04 | End: 2018-10-07 | Stop reason: HOSPADM

## 2018-10-04 RX ORDER — VANCOMYCIN/0.9 % SOD CHLORIDE 1.5G/250ML
1500 PLASTIC BAG, INJECTION (ML) INTRAVENOUS ONCE
Status: COMPLETED | OUTPATIENT
Start: 2018-10-04 | End: 2018-10-04

## 2018-10-04 RX ORDER — FENTANYL CITRATE 50 UG/ML
INJECTION, SOLUTION INTRAMUSCULAR; INTRAVENOUS
Status: COMPLETED
Start: 2018-10-04 | End: 2018-10-04

## 2018-10-04 RX ORDER — PROPOFOL 10 MG/ML
INJECTION, EMULSION INTRAVENOUS AS NEEDED
Status: DISCONTINUED | OUTPATIENT
Start: 2018-10-04 | End: 2018-10-04 | Stop reason: HOSPADM

## 2018-10-04 RX ORDER — FENTANYL CITRATE 50 UG/ML
25 INJECTION, SOLUTION INTRAMUSCULAR; INTRAVENOUS
Status: DISCONTINUED | OUTPATIENT
Start: 2018-10-04 | End: 2018-10-04 | Stop reason: HOSPADM

## 2018-10-04 RX ORDER — MIDAZOLAM HYDROCHLORIDE 1 MG/ML
1 INJECTION, SOLUTION INTRAMUSCULAR; INTRAVENOUS AS NEEDED
Status: DISCONTINUED | OUTPATIENT
Start: 2018-10-04 | End: 2018-10-04 | Stop reason: HOSPADM

## 2018-10-04 RX ORDER — HYDROCODONE BITARTRATE AND ACETAMINOPHEN 5; 325 MG/1; MG/1
1 TABLET ORAL AS NEEDED
Status: DISCONTINUED | OUTPATIENT
Start: 2018-10-04 | End: 2018-10-04 | Stop reason: HOSPADM

## 2018-10-04 RX ORDER — ISOSORBIDE MONONITRATE 30 MG/1
30 TABLET, EXTENDED RELEASE ORAL DAILY
Status: DISCONTINUED | OUTPATIENT
Start: 2018-10-04 | End: 2018-10-07 | Stop reason: HOSPADM

## 2018-10-04 RX ORDER — LIDOCAINE HYDROCHLORIDE 10 MG/ML
INJECTION, SOLUTION EPIDURAL; INFILTRATION; INTRACAUDAL; PERINEURAL AS NEEDED
Status: DISCONTINUED | OUTPATIENT
Start: 2018-10-04 | End: 2018-10-04 | Stop reason: HOSPADM

## 2018-10-04 RX ORDER — LIDOCAINE HYDROCHLORIDE 10 MG/ML
0.1 INJECTION, SOLUTION EPIDURAL; INFILTRATION; INTRACAUDAL; PERINEURAL AS NEEDED
Status: DISCONTINUED | OUTPATIENT
Start: 2018-10-04 | End: 2018-10-04 | Stop reason: HOSPADM

## 2018-10-04 RX ORDER — ATORVASTATIN CALCIUM 40 MG/1
40 TABLET, FILM COATED ORAL
Status: DISCONTINUED | OUTPATIENT
Start: 2018-10-04 | End: 2018-10-07 | Stop reason: HOSPADM

## 2018-10-04 RX ORDER — ONDANSETRON 2 MG/ML
4 INJECTION INTRAMUSCULAR; INTRAVENOUS AS NEEDED
Status: DISCONTINUED | OUTPATIENT
Start: 2018-10-04 | End: 2018-10-04 | Stop reason: HOSPADM

## 2018-10-04 RX ORDER — AMOXICILLIN 500 MG/1
500 CAPSULE ORAL 2 TIMES DAILY
COMMUNITY
End: 2018-10-07

## 2018-10-04 RX ADMIN — HYDROMORPHONE HYDROCHLORIDE 0.5 MG: 2 INJECTION INTRAMUSCULAR; INTRAVENOUS; SUBCUTANEOUS at 22:46

## 2018-10-04 RX ADMIN — Medication 2 G: at 10:15

## 2018-10-04 RX ADMIN — KETAMINE HYDROCHLORIDE 15 MG: 50 INJECTION, SOLUTION INTRAMUSCULAR; INTRAVENOUS at 10:15

## 2018-10-04 RX ADMIN — ISOSORBIDE MONONITRATE 30 MG: 30 TABLET, EXTENDED RELEASE ORAL at 13:18

## 2018-10-04 RX ADMIN — HYDRALAZINE HYDROCHLORIDE 25 MG: 25 TABLET, FILM COATED ORAL at 22:39

## 2018-10-04 RX ADMIN — FENTANYL CITRATE 25 MCG: 50 INJECTION, SOLUTION INTRAMUSCULAR; INTRAVENOUS at 11:10

## 2018-10-04 RX ADMIN — OXYCODONE HYDROCHLORIDE AND ACETAMINOPHEN 2 TABLET: 5; 325 TABLET ORAL at 16:23

## 2018-10-04 RX ADMIN — FENTANYL CITRATE 50 MCG: 50 INJECTION, SOLUTION INTRAMUSCULAR; INTRAVENOUS at 10:20

## 2018-10-04 RX ADMIN — ONDANSETRON 4 MG: 2 INJECTION INTRAMUSCULAR; INTRAVENOUS at 13:18

## 2018-10-04 RX ADMIN — HYDROMORPHONE HYDROCHLORIDE 0.5 MG: 1 INJECTION, SOLUTION INTRAMUSCULAR; INTRAVENOUS; SUBCUTANEOUS at 11:43

## 2018-10-04 RX ADMIN — PROPOFOL 80 MCG/KG/MIN: 10 INJECTION, EMULSION INTRAVENOUS at 10:13

## 2018-10-04 RX ADMIN — PROPOFOL 20 MG: 10 INJECTION, EMULSION INTRAVENOUS at 10:15

## 2018-10-04 RX ADMIN — BUSPIRONE HYDROCHLORIDE 7.5 MG: 5 TABLET ORAL at 22:40

## 2018-10-04 RX ADMIN — METFORMIN HYDROCHLORIDE 850 MG: 850 TABLET, FILM COATED ORAL at 16:23

## 2018-10-04 RX ADMIN — HYDROMORPHONE HYDROCHLORIDE 0.5 MG: 2 INJECTION INTRAMUSCULAR; INTRAVENOUS; SUBCUTANEOUS at 18:14

## 2018-10-04 RX ADMIN — FENTANYL CITRATE 25 MCG: 50 INJECTION, SOLUTION INTRAMUSCULAR; INTRAVENOUS at 11:13

## 2018-10-04 RX ADMIN — FENTANYL CITRATE 25 MCG: 50 INJECTION, SOLUTION INTRAMUSCULAR; INTRAVENOUS at 11:19

## 2018-10-04 RX ADMIN — PROPOFOL 20 MG: 10 INJECTION, EMULSION INTRAVENOUS at 10:13

## 2018-10-04 RX ADMIN — HYDRALAZINE HYDROCHLORIDE 25 MG: 25 TABLET, FILM COATED ORAL at 16:23

## 2018-10-04 RX ADMIN — ONDANSETRON 4 MG: 2 INJECTION INTRAMUSCULAR; INTRAVENOUS at 13:20

## 2018-10-04 RX ADMIN — BUSPIRONE HYDROCHLORIDE 7.5 MG: 5 TABLET ORAL at 18:09

## 2018-10-04 RX ADMIN — VANCOMYCIN HYDROCHLORIDE 1.5 G: 10 INJECTION, POWDER, LYOPHILIZED, FOR SOLUTION INTRAVENOUS at 10:05

## 2018-10-04 RX ADMIN — HYDROMORPHONE HYDROCHLORIDE 0.5 MG: 1 INJECTION, SOLUTION INTRAMUSCULAR; INTRAVENOUS; SUBCUTANEOUS at 11:23

## 2018-10-04 RX ADMIN — WARFARIN SODIUM 5 MG: 5 TABLET ORAL at 16:23

## 2018-10-04 RX ADMIN — KETAMINE HYDROCHLORIDE 5 MG: 50 INJECTION, SOLUTION INTRAMUSCULAR; INTRAVENOUS at 10:37

## 2018-10-04 RX ADMIN — FENTANYL CITRATE 50 MCG: 50 INJECTION, SOLUTION INTRAMUSCULAR; INTRAVENOUS at 10:08

## 2018-10-04 RX ADMIN — ATORVASTATIN CALCIUM 40 MG: 40 TABLET, FILM COATED ORAL at 22:39

## 2018-10-04 RX ADMIN — KETAMINE HYDROCHLORIDE 15 MG: 50 INJECTION, SOLUTION INTRAMUSCULAR; INTRAVENOUS at 10:21

## 2018-10-04 RX ADMIN — KETAMINE HYDROCHLORIDE 10 MG: 50 INJECTION, SOLUTION INTRAMUSCULAR; INTRAVENOUS at 10:18

## 2018-10-04 RX ADMIN — GLYCOPYRROLATE 0.1 MG: 0.2 INJECTION INTRAMUSCULAR; INTRAVENOUS at 10:28

## 2018-10-04 RX ADMIN — MAGNESIUM HYDROXIDE 30 ML: 400 SUSPENSION ORAL at 14:22

## 2018-10-04 RX ADMIN — FENTANYL CITRATE 25 MCG: 50 INJECTION, SOLUTION INTRAMUSCULAR; INTRAVENOUS at 11:16

## 2018-10-04 RX ADMIN — MIDAZOLAM HYDROCHLORIDE 1 MG: 1 INJECTION, SOLUTION INTRAMUSCULAR; INTRAVENOUS at 10:05

## 2018-10-04 RX ADMIN — SPIRONOLACTONE 25 MG: 25 TABLET ORAL at 14:22

## 2018-10-04 RX ADMIN — CARVEDILOL 6.25 MG: 6.25 TABLET, FILM COATED ORAL at 16:23

## 2018-10-04 RX ADMIN — SODIUM CHLORIDE, SODIUM LACTATE, POTASSIUM CHLORIDE, AND CALCIUM CHLORIDE 25 ML/HR: 600; 310; 30; 20 INJECTION, SOLUTION INTRAVENOUS at 09:55

## 2018-10-04 RX ADMIN — PROPOFOL 40 MG: 10 INJECTION, EMULSION INTRAVENOUS at 10:42

## 2018-10-04 RX ADMIN — VANCOMYCIN HYDROCHLORIDE 1500 MG: 10 INJECTION, POWDER, LYOPHILIZED, FOR SOLUTION INTRAVENOUS at 10:00

## 2018-10-04 NOTE — PROGRESS NOTES
Patient underwent procedure this date; will defer PT and f/u tomorrow per current guidelines.      Yolande Otero, PT, DPT   Board-Certified Geriatric Clinical Specialist   Certified Exercise Expert for Aging Adults

## 2018-10-04 NOTE — ANESTHESIA PREPROCEDURE EVALUATION
Anesthetic History   No history of anesthetic complications            Review of Systems / Medical History  Patient summary reviewed, nursing notes reviewed and pertinent labs reviewed    Pulmonary  Within defined limits                 Neuro/Psych         Psychiatric history     Cardiovascular    Hypertension              Exercise tolerance: >4 METS  Comments: EF25-30%   GI/Hepatic/Renal     GERD    Renal disease: ARF       Endo/Other    Diabetes: using insulin    Obesity and anemia     Other Findings   Comments: Osteomyelitis   Sepsis            Physical Exam    Airway  Mallampati: I  TM Distance: 4 - 6 cm  Neck ROM: normal range of motion   Mouth opening: Normal     Cardiovascular  Regular rate and rhythm,  S1 and S2 normal,  no murmur, click, rub, or gallop             Dental    Dentition: Upper partial plate and Lower partial plate     Pulmonary  Breath sounds clear to auscultation               Abdominal  GI exam deferred       Other Findings            Anesthetic Plan    ASA: 3  Anesthesia type: general and total IV anesthesia    Monitoring Plan: BIS      Induction: Intravenous  Anesthetic plan and risks discussed with: Patient      Propofol MAC

## 2018-10-04 NOTE — ANESTHESIA POSTPROCEDURE EVALUATION
Post-Anesthesia Evaluation and Assessment    Patient: Briseyda Rizo MRN: [id number]  SSN: [id number]    YOB: 1970  Age: 51 y.o. Sex: male       Cardiovascular Function/Vital Signs  Visit Vitals    /78    Pulse 68    Temp 36.4 °C (97.6 °F)    Resp 14    Ht 6' 3\" (1.905 m)    Wt 104.5 kg (230 lb 6.1 oz)    SpO2 100%    BMI 28.8 kg/m2       Patient is status post general, total IV anesthesia anesthesia for Procedure(s):  IRRIGATION AND DRAINAGE LEFT BELOW KNEE AMPUTATION STUMP . Nausea/Vomiting: None    Postoperative hydration reviewed and adequate. Pain:  Pain Scale 1: FLACC (10/04/18 1150)  Pain Intensity 1: 0 (10/04/18 1150)   Managed    Neurological Status:   Neuro (WDL): Exceptions to WDL (10/04/18 1104)  Neuro  Neurologic State: Drowsy (10/04/18 1104)  Orientation Level: Oriented X4 (10/04/18 1104)  Cognition: Follows commands (10/04/18 1104)  Speech: Clear (10/04/18 1104)  LUE Motor Response: Purposeful (10/04/18 1104)  LLE Motor Response: Weak (10/04/18 1104)  RUE Motor Response: Purposeful (10/04/18 1104)  RLE Motor Response: Purposeful (10/04/18 1104)   At baseline    Mental Status and Level of Consciousness: Arousable    Pulmonary Status:   O2 Device: Nasal cannula (10/04/18 1145)   Adequate oxygenation and airway patent    Complications related to anesthesia: None    Post-anesthesia assessment completed.  No concerns    Signed By: Antonio Wasserman MD     October 4, 2018

## 2018-10-04 NOTE — BRIEF OP NOTE
BRIEF OPERATIVE NOTE    Date of Procedure: 10/4/2018   Preoperative Diagnosis: SEROMA LEFT STUMP   Postoperative Diagnosis: SEROMA LEFT STUMP     Procedure(s):  IRRIGATION AND DRAINAGE LEFT BELOW KNEE AMPUTATION STUMP   Surgeon(s) and Role:     * Luis Thompson MD - Primary         Surgical Assistant: None    Surgical Staff:  Circ-1: Florinda Dubose  Scrub Tech-1: Cary Cormier  Surg Asst-1: Rodrigo Trinh Daniel Time In   Incision Start 1021   Incision Close      Anesthesia: MAC   Estimated Blood Loss: Large cavity w/ pseudoepithelium. Excisional debridement. Cx's sent.  Needs VAC  Specimens:   ID Type Source Tests Collected by Time Destination   1 :     Luis Thompson MD 10/4/2018 1013 Pathology   1 : left leg abscess cultures Wound Leg, Left ANAEROBIC/AEROBIC/GRAM STAIN Luis Thompson MD 10/4/2018 1013 Microbiology      Findings: None   Complications: None  Implants: * No implants in log *

## 2018-10-04 NOTE — PROGRESS NOTES
Patient coomplained of 7/10 pain, nausea, and heartburn. Dilaudid, zofran and milk of Mag were administered.

## 2018-10-04 NOTE — PERIOP NOTES
1102-Handoff Report from Operating Room to PACU    Report received from [address] and CRNA regarding Ricci Course. Surgeon(s): Oneyda Ku MD  And Procedure(s) (LRB):  IRRIGATION AND DRAINAGE LEFT BELOW KNEE AMPUTATION STUMP  (Left)  confirmed   with allergies and dressings discussed. Anesthesia type, drugs, patient history, complications, estimated blood loss, vital signs, intake and output, and last pain medication, lines and temperature were reviewed. 1200- No family in waiting area at this time. Pt states he will call his family when he gets to his room. 1208- TRANSFER - OUT REPORT:    Verbal report given to Josh Boateng RN(name) on Ricci Course  being transferred to gen surg(unit) for routine post - op       Report consisted of patients Situation, Background, Assessment and   Recommendations(SBAR). Information from the following report(s) SBAR, Kardex, OR Summary, Procedure Summary, Intake/Output, MAR and Recent Results was reviewed with the receiving nurse. Opportunity for questions and clarification was provided.       Patient transported with:   O2 @ 2 liters  Tech

## 2018-10-04 NOTE — WOUND CARE
Wound VAC/NPWT dressing application:  nurses consulted by dr Preeti Nunez to place a NPWT dressing to patients left BKA site s/p irrigation and drainage in OR today. Patient is a 50 y/o AAM diabetic AAM who had a left BKA with swelling and pain and unable to wear his prothesis. Past Medical History:   Diagnosis Date    Diabetes ([facility] [address]     since age 23    GERD (gastroesophageal reflux disease)     HTN (hypertension)     Hypercholesteremia     Hyperlipidemia     Psychiatric disorder     anxiety & depression    Thromboembolus ([facility] [address] [facility] 2017    bilateral legs     HgbA1c= 9.1 on 9/11/18    WC nurse introduced self and services to patient who has had NPWT in past when he stated with a DM wound to left foot and had multiple surgerys and amputations. Wound is clean - OR report not in chart yet. Wound Leg Left (Active)   DRESSING STATUS Removed 10/4/2018  3:18 PM   DRESSING TYPE Packing;Moist to dry 10/4/2018  3:18 PM   Incision site well approximated? No 10/4/2018  3:18 PM   Non-Pressure Injury Full thickness (subcut/muscle) 10/4/2018  3:18 PM   Wound Length (cm) 4 cm 10/4/2018  3:18 PM   Wound Width (cm) 7.5 cm 10/4/2018  3:18 PM   Wound Depth (cm) 1.8 10/4/2018  3:18 PM   Wound Surface area (cm^2) 30 cm^2 10/4/2018  3:18 PM   Condition of Base Granulation; Fascia exposed 10/4/2018  3:18 PM   Condition of Edges Rolled/curled 10/4/2018  3:18 PM   Tissue Type Red;Yellow 10/4/2018  3:18 PM   Tissue Type Percent Red 75 10/4/2018  3:18 PM   Tissue Type Percent Yellow 25 10/4/2018  3:18 PM   Direction of Undermining 4    oclock 10/4/2018  3:18 PM   Depth of Undermining (cm) 1.5 10/4/2018  3:18 PM   Drainage Amount  Small  10/4/2018  3:18 PM   Drainage Color Serosanguinous 10/4/2018  3:18 PM   Wound Odor None 10/4/2018  3:18 PM   Periwound Skin Condition Intact 10/4/2018  3:18 PM   Cleansing and Cleansing Agents  Normal saline 10/4/2018  3:18 PM   Dressing Type Applied Vacuum dressing 10/4/2018  3:18 PM   Procedure Tolerated Well 10/4/2018  3:18 PM   Number of days:0              NO bone or muscle exposed, just some fascia in the center of wound. Wound VAC application:    1. Wound and per wound skin cleaned with NS and skin prepped. 2. x1 piece of black foam placed in wound bed and tucked into undermining  3. Occlusively covered, Trac pad applied and suction set to 125 mm/hg, continuous as ordered. Plan: Dr Olamide Squires to recommend discharge with NPWT or not. If patient remains in hospital wound VAC will be changed again on Monday.     Jovanny Mena RN, [employer]

## 2018-10-04 NOTE — IP AVS SNAPSHOT
[address] [facility] 
[phone] Patient: Jose Cruz Rajan MRN: [id number] :[id number] About your hospitalization You were admitted on:  2018 You last received care in the:  [facility] 2 GENERAL SURGERY You were discharged on:  2018 Why you were hospitalized Your primary diagnosis was:  Not on File Your diagnoses also included:  Complication Of Amputated Stump (H[facility]) Follow-up Information Follow up With Details Comments Contact Info Mellisa Cordon MD Go on 10/12/2018 For hospital follow up appointment at 11:00AM  [address] [id number] 
[phone] Yoselin Santos MD  post surgical follow up [address] [facility] 
[phone] [address] [place] [address]  this is your home health agency, please call them directly if you do not hear from them within 24-48 hours [address] 
1st Floor [facility] [id number] [phone] Your Scheduled Appointments 2018 11:00 AM EDT TRANSITIONAL CARE MANAGEMENT with Mellisa Cordon MD  
[address] [place] [address] [address] [facility] [address] [phone]  
[phone] Discharge Orders None A check bharathi indicates which time of day the medication should be taken. My Medications START taking these medications Instructions Each Dose to Equal  
 Morning Noon Evening Bedtime  
 trimethoprim-sulfamethoxazole 160-800 mg per tablet Commonly known as:  BACTRIM DS, SEPTRA DS Your last dose was: Your next dose is: Take 1 Tab by mouth two (2) times a day. Indications: mrsa stump infection 1 Tab CHANGE how you take these medications Instructions Each Dose to Equal  
 Morning Noon Evening Bedtime oxyCODONE-acetaminophen 5-325 mg per tablet Commonly known as:  PERCOCET What changed:   
- how much to take 
- reasons to take this Your last dose was: Your next dose is: Take 2 Tabs by mouth every four (4) hours as needed. Max Daily Amount: 12 Tabs. 2 Tab CONTINUE taking these medications Instructions Each Dose to Equal  
 Morning Noon Evening Bedtime  
 atorvastatin 40 mg tablet Commonly known as:  LIPITOR Your last dose was: Your next dose is: Take 1 Tab by mouth daily. 40 mg  
    
   
   
   
  
 busPIRone 7.5 mg tablet Commonly known as:  BUSPAR Your last dose was: Your next dose is: Take 1 Tab by mouth three (3) times daily. 7.5 mg  
    
   
   
   
  
 carvedilol 6.25 mg tablet Commonly known as:  Alexia Carota Your last dose was: Your next dose is: TAKE 1 TAB BY MOUTH TWO (2) TIMES DAILY (WITH MEALS). glucose blood VI test strips strip Commonly known as:  blood glucose test  
   
Your last dose was: Your next dose is:    
   
   
 Use BID DxE11.9  
     
   
   
   
  
 hydrALAZINE 25 mg tablet Commonly known as:  APRESOLINE Your last dose was: Your next dose is: TAKE 1 TAB BY MOUTH THREE (3) TIMES DAILY. insulin glargine 100 unit/mL (3 mL) Inpn Commonly known as:  BASAGLAR KWIKPEN U-100 INSULIN Your last dose was: Your next dose is:    
   
   
 60 Units by SubCUTAneous route daily. 60 Units  
    
   
   
   
  
 insulin lispro 100 unit/mL kwikpen Commonly known as:  HUMALOG Your last dose was: Your next dose is:    
   
   
 12 Units by SubCUTAneous route Before breakfast, lunch, and dinner. DX: E11.65  
 12 Units  
    
   
   
   
  
 isosorbide mononitrate ER 30 mg tablet Commonly known as:  IMDUR Your last dose was: Your next dose is: Take 1 Tab by mouth daily. 30 mg Lancets Misc Your last dose was: Your next dose is:    
   
   
 Use as directed. Dx: E11.9  
     
   
   
   
  
 metFORMIN 850 mg tablet Commonly known as:  GLUCOPHAGE Your last dose was: Your next dose is: Take 1 Tab by mouth two (2) times daily (with meals). 850 mg  
    
   
   
   
  
 spironolactone 25 mg tablet Commonly known as:  ALDACTONE Your last dose was: Your next dose is: Take 1 Tab by mouth daily. TAKE 1 TABLET EVERY DAY 25 mg  
    
   
   
   
  
 warfarin 5 mg tablet Commonly known as:  COUMADIN Your last dose was: Your next dose is: Take 1 Tab by mouth daily. Indications: pulmonary thromboembolism 5 mg STOP taking these medications   
 amoxicillin 500 mg capsule Commonly known as:  AMOXIL Where to Get Your Medications Information on where to get these meds will be given to you by the nurse or doctor. ! Ask your nurse or doctor about these medications  
  oxyCODONE-acetaminophen 5-325 mg per tablet  
 trimethoprim-sulfamethoxazole 160-800 mg per tablet Opioid Education Prescription Opioids: What You Need to Know: 
 
Prescription opioids can be used to help relieve moderate-to-severe pain and are often prescribed following a surgery or injury, or for certain health conditions. These medications can be an important part of treatment but also come with serious risks. Opioids are strong pain medicines. Examples include hydrocodone, oxycodone, fentanyl, and morphine. Heroin is an example of an illegal opioid. It is important to work with your health care provider to make sure you are getting the safest, most effective care. WHAT ARE THE RISKS AND SIDE EFFECTS OF OPIOID USE? Prescription opioids carry serious risks of addiction and overdose, especially with prolonged use. An opioid overdose, often marked by slow breathing, can cause sudden death. The use of prescription opioids can have a number of side effects as well, even when taken as directed. · Tolerance-meaning you might need to take more of a medication for the same pain relief · Physical dependence-meaning you have symptoms of withdrawal when the medication is stopped. Withdrawal symptoms can include nausea, sweating, chills, diarrhea, stomach cramps, and muscle aches. Withdrawal can last up to several weeks, depending on which drug you took and how long you took it. · Increased sensitivity to pain · Constipation · Nausea, vomiting, and dry mouth · Sleepiness and dizziness · Confusion · Depression · Low levels of testosterone that can result in lower sex drive, energy, and strength · Itching and sweating RISKS ARE GREATER WITH:      
· History of drug misuse, substance use disorder, or overdose · Mental health conditions (such as depression or anxiety) · Sleep apnea · Older age (72 years or older) · Pregnancy Avoid alcohol while taking prescription opioids. Also, unless specifically advised by your health care provider, medications to avoid include: · Benzodiazepines (such as Xanax or Valium) · Muscle relaxants (such as Soma or Flexeril) · Hypnotics (such as Ambien or Lunesta) · Other prescription opioids KNOW YOUR OPTIONS Talk to your health care provider about ways to manage your pain that don't involve prescription opioids. Some of these options may actually work better and have fewer risks and side effects. Consult your physician before adding or stopping any medications, treatments, or physical activity. Options may include: 
· Pain relievers such as acetaminophen, ibuprofen, and naproxen · Some medications that are also used for depression or seizures · Physical therapy and exercise · Counseling to help patients learn how to cope better with triggers of pain and stress. · Application of heat or cold compress · Massage therapy · Relaxation techniques Be Informed Make sure you know the name of your medication, how much and how often to take it, and its potential risks & side effects. IF YOU ARE PRESCRIBED OPIOIDS FOR PAIN: 
· Never take opioids in greater amounts or more often than prescribed. Remember the goal is not to be pain-free but to manage your pain at a tolerable level. · Follow up with your primary care provider to: · Work together to create a plan on how to manage your pain. · Talk about ways to help manage your pain that don't involve prescription opioids. · Talk about any and all concerns and side effects. · Help prevent misuse and abuse. · Never sell or share prescription opioids · Help prevent misuse and abuse. · Store prescription opioids in a secure place and out of reach of others (this may include visitors, children, friends, and family). · Safely dispose of unused/unwanted prescription opioids: Find your community drug take-back program or your pharmacy mail-back program, or flush them down the toilet, following guidance from the Food and Drug Administration (www.fda.gov/Drugs/ResourcesForYou). · Visit www.cdc.gov/drugoverdose to learn about the risks of opioid abuse and overdose. · If you believe you may be struggling with addiction, tell your health care provider and ask for guidance or call [address] [place], [place] [address] at [phone]. Discharge Instructions Below-the-Knee Leg Amputation: What to Expect at [facility] Your Recovery A below-the-knee amputation is surgery to remove your leg below the knee. Your doctor removed the leg while keeping as much healthy bone, skin, blood vessel, and nerve tissue as possible. After a below-the-knee amputation, you will probably have bandages, a rigid dressing, or a cast over the remaining part of your leg (remaining limb). The leg will be swollen for at least 4 weeks after your surgery. If you have a rigid dressing or cast, your doctor will set up regular visits to change the dressing or cast and check the healing. If you have elastic bandages, your doctor will tell you how to change them. You may have pain in your remaining limb. You also may think you have feeling or pain where your leg was. This is called phantom pain. It is common and may come and go for a year or longer. Your doctor can give you medicine for both types of pain. You may have already started a rehabilitation program (rehab). You will continue this under the guidance of your doctor or physical therapist. Noel Flores will need to do a lot of work to recondition your muscles and relearn activities, balance, and coordination. The rehab can last as long as a year. You may have been fitted with a temporary artificial leg while you were still in the hospital. If this is the case, your doctor will teach you how to care for it. If you are getting an artificial leg, you may need to get used to it before you return to work and your other activities. You will probably not wear it all the time, so you will need to learn how to use a wheelchair, crutches, or other device. You will have to make changes in your home. Your workplace may be able to make allowances for you. Having your leg amputated is traumatic. Learning to live with new limitations can be hard and frustrating. You may feel depressed or grieve for your previous lifestyle. It is important to understand these feelings. Talking with your family, friends, and health professionals about your frustrations is an important part of your recovery. You may also find that it helps to talk with a person who has had an amputation. Remember that even though losing a limb is difficult, it does not change who you are or prevent you from enjoying life. You will have to adapt and learn new ways to do things, but you will still be able to work and take part in sports and activities. And you can still learn, love, play, and live life to its fullest. 
Many organizations can help you adjust to your new life. For example, you can find information at amputee-coalition.org. This care sheet gives you a general idea about how long it will take for you to recover. But each person recovers at a different pace. Follow the steps below to get better as quickly as possible. How can you care for yourself at home? Activity 
  · Be active. Talk to your doctor about what you can do. If you are active and use your remaining limb, it will heal faster.  
  · You may shower when your doctor okays it. Wash the remaining limb with soap and water, and pat it dry. You may need help doing this at first.  
  · You may need to adapt your car to your situation before you drive.  
  · You will probably be able to return to work and your usual routine when your remaining limb heals. This can be as soon as 4 to 8 weeks after surgery, but it may take longer. Diet 
  · You can eat your normal diet. If your stomach is upset, try bland, low-fat foods like plain rice, broiled chicken, toast, and yogurt.  
  · You may notice that your bowel movements are not regular right after your surgery. This is common. Try to avoid constipation and straining with bowel movements. Take a fiber supplement every day. If you have not had a bowel movement after a couple of days, ask your doctor about taking a mild laxative. Medicines 
  · Your doctor will tell you if and when you can restart your medicines.  He or she will also give you instructions about taking any new medicines.  
  · If you take blood thinners, such as warfarin (Coumadin), clopidogrel (Plavix), or aspirin, be sure to talk to your doctor. He or she will tell you if and when to start taking those medicines again. Make sure that you understand exactly what your doctor wants you to do.  
  · Be safe with medicines. Take pain medicines exactly as directed. ¨ If the doctor gave you a prescription medicine for pain, take it as prescribed. ¨ If you are not taking a prescription pain medicine, ask your doctor if you can take an over-the-counter medicine.  
  · If you think your pain medicine is making you sick to your stomach: 
¨ Take your medicine after meals (unless your doctor has told you not to). ¨ Ask your doctor for a different pain medicine.  
  · If your doctor prescribed antibiotics, take them as directed. Do not stop taking them just because you feel better. You need to take the full course of antibiotics.  
 Remaining limb care 
  · You may have bandages, a rigid dressing, or a cast on your remaining limb. Your doctor will tell you how to take care of it. Depending on your dressing and the doctor's instructions: 
¨ Check your remaining limb daily for irritation, skin breaks, and redness. Tell your doctor about any problems you see. ¨ Wash your remaining limb with mild soap and warm water every night. Pat it dry.  
  · If you have a temporary artificial leg, remove it before you go to sleep. Exercise 
  · Rehabilitation is a series of exercises you do after your surgery. This helps you learn to use your remaining limb and artificial leg. You will work with your doctor and physical therapist to plan this exercise program. To get the best results, you need to do the exercises correctly and as often and as long as your doctor tells you. Your rehab program will give you a number of exercises to do. Always do them as your therapist tells you. Other instructions 
  · Preventing contractures is very important.  A contracture occurs when a joint becomes stuck in one position. If this happens, it may be hard or impossible to straighten your remaining limb and use an artificial leg. ¨ Make sure you put equal weight on both hips when you sit. Use firm chairs, and sit up straight. ¨ Keep your remaining limb flat with your knees straight and your legs together while you are lying on your back. ¨ Lie on your stomach as much as possible to stretch your hip joint. ¨ Do not sit for more than an hour or two. Stand, or lie on your stomach now and then. ¨ Do not put pillows under your hips or knees or between your thighs. ¨ Do not rest your remaining limb on crutch handles or a wheelchair. Follow-up care is a key part of your treatment and safety. Be sure to make and go to all appointments, and call your doctor if you are having problems. It's also a good idea to know your test results and keep a list of the medicines you take. When should you call for help? Call 911 anytime you think you may need emergency care. For example, call if: 
  · You passed out (lost consciousness).  
  · You have chest pain, are short of breath, or you cough up blood.  
 Call your doctor now or seek immediate medical care if: 
  · You have pain that does not get better after you take pain medicine.  
  · You are sick to your stomach or cannot drink fluids.  
  · You have loose stitches, or your incision comes open.  
  · You have signs of a blood clot in your leg (called a deep vein thrombosis), such as: 
¨ Pain in your calf, back of the knee, thigh, or groin. ¨ Redness or swelling in your leg.  
  · You have signs of infection, such as: 
¨ Increased pain, swelling, warmth, or redness. ¨ Red streaks leading from the incision. ¨ Pus draining from the incision. ¨ A fever.  
  · You bleed through your bandage.  
 Watch closely for any changes in your health, and be sure to contact your doctor if you have any problems. Where can you learn more? Go to http://lori-bella.info/. Enter G386 in the search box to learn more about \"Below-the-Knee Leg Amputation: What to Expect at Home. \" Current as of: November 29, 2017 Content Version: 11.8 © 6624-9688 Healthwise, [employer]. Care instructions adapted under license by [employer] (which disclaims liability or warranty for this information). If you have questions about a medical condition or this instruction, always ask your healthcare professional. [address] any warranty or liability for your use of this information. Introducing [facility]! Dear Federica Moralez: Thank you for requesting a Echo[employer] account. Our records indicate that you already have an active Echo360 account. You can access your account anytime at [email or web address] Did you know that you can access your hospital and ER discharge instructions at any time in Echo360? You can also review all of your test results from your hospital stay or ER visit. Additional Information If you have questions, please visit the Frequently Asked Questions section of the Echo360 website at [email or web address] Remember, Echo360 is NOT to be used for urgent needs. For medical emergencies, dial 911. Now available from your iPhone and Android! Introducing Hema Jackson As a [employer] patient, I wanted to make you aware of our electronic visit tool called Hema Jackson. [employer] 24/7 allows you to connect within minutes with a medical provider 24 hours a day, seven days a week via a mobile device or tablet or logging into a secure website from your computer. You can access Hema Jackson from anywhere in the [place].  
 
A virtual visit might be right for you when you have a simple condition and feel like you just dont want to get out of bed, or cant get away from work for an appointment, when your regular [facility] provider is not available (evenings, weekends or holidays), or when youre out of town and need minor care. Electronic visits cost only $49 and if the [employer] [facility] 24/7 provider determines a prescription is needed to treat your condition, one can be electronically transmitted to a nearby pharmacy*. Please take a moment to enroll today if you have not already done so. The enrollment process is free and takes just a few minutes. To enroll, please download the [employer]Mod[employer] sandra to your tablet or phone, or visit [email or web address] to enroll on your computer. And, as an [address] [place] [address] patient with a [employer] account, the results of your visits will be scanned into your electronic medical record and your primary care provider will be able to view the scanned results. We urge you to continue to see your regular [facility] provider for your ongoing medical care. And while your primary care provider may not be the one available when you seek a [employer] virtual visit, the peace of mind you get from getting a real diagnosis real time can be priceless. For more information on [employer], view our Frequently Asked Questions (FAQs) at [email or web address]. Sincerely, 
 
Tani Wilde MD 
Chief Medical Officer Jonathon Aaron *:  certain medications cannot be prescribed via [employer] Providers Seen During Your Hospitalization Provider Specialty Primary office phone Svetlana Saavedra MD Vascular Surgery [phone] Your Primary Care Physician (PCP) Primary Care Physician Office Phone Office Fax Claudia Green [phone] You are allergic to the following Allergen Reactions Ace Inhibitors Swelling Facial swelling cough Morphine Angioedema Arb-Angiotensin Receptor Antagonist Angioedema Recent Documentation Height Weight BMI Smoking Status 1.905 m 104.5 kg 28.8 kg/m2 Former Smoker Emergency Contacts Name Discharge Info Relation Home Work Mobile [facility] DISCHARGE CAREGIVER [3] Spouse [3] [phone] Juan C Ambrocio [3] [phone] Patient Belongings The following personal items are in your possession at time of discharge: 
  Dental Appliances: Lowers, Uppers  Visual Aid: None   Hearing Aids/Status: Does not own  Home Medications: None   Jewelry: None  Clothing:  (clothing and shoes)    Other Valuables: Cell Phone, Lonn Laura, Other (comment) (lap top, speakers, prostetic)  Personal Items Sent to Safe: wallet, cell phone, id cards Please provide this summary of care documentation to your next provider. Signatures-by signing, you are acknowledging that this After Visit Summary has been reviewed with you and you have received a copy. Patient Signature:  ____________________________________________________________ Date:  ____________________________________________________________  
  
Julio C Slade Provider Signature:  ____________________________________________________________ Date:  ____________________________________________________________

## 2018-10-05 ENCOUNTER — HOME HEALTH ADMISSION (OUTPATIENT)
Dept: HOME HEALTH SERVICES | Facility: HOME HEALTH | Age: 48
End: 2018-10-05
Payer: COMMERCIAL

## 2018-10-05 LAB
BACTERIA SPEC CULT: NORMAL
BACTERIA SPEC CULT: NORMAL
GLUCOSE BLD STRIP.AUTO-MCNC: 144 MG/DL (ref 65–100)
GLUCOSE BLD STRIP.AUTO-MCNC: 163 MG/DL (ref 65–100)
GLUCOSE BLD STRIP.AUTO-MCNC: 180 MG/DL (ref 65–100)
GLUCOSE BLD STRIP.AUTO-MCNC: 64 MG/DL (ref 65–100)
GLUCOSE BLD STRIP.AUTO-MCNC: 65 MG/DL (ref 65–100)
GLUCOSE BLD STRIP.AUTO-MCNC: 80 MG/DL (ref 65–100)
INR PPP: 1.1 (ref 0.9–1.1)
PROTHROMBIN TIME: 11 SEC (ref 9–11.1)
SERVICE CMNT-IMP: ABNORMAL
SERVICE CMNT-IMP: NORMAL

## 2018-10-05 PROCEDURE — 74011250636 HC RX REV CODE- 250/636: Performed by: SURGERY

## 2018-10-05 PROCEDURE — G8979 MOBILITY GOAL STATUS: HCPCS

## 2018-10-05 PROCEDURE — 85610 PROTHROMBIN TIME: CPT | Performed by: SURGERY

## 2018-10-05 PROCEDURE — 74011636637 HC RX REV CODE- 636/637: Performed by: SURGERY

## 2018-10-05 PROCEDURE — G8978 MOBILITY CURRENT STATUS: HCPCS

## 2018-10-05 PROCEDURE — 65270000029 HC RM PRIVATE

## 2018-10-05 PROCEDURE — 82962 GLUCOSE BLOOD TEST: CPT

## 2018-10-05 PROCEDURE — 99218 HC RM OBSERVATION: CPT

## 2018-10-05 PROCEDURE — 97161 PT EVAL LOW COMPLEX 20 MIN: CPT

## 2018-10-05 PROCEDURE — 36415 COLL VENOUS BLD VENIPUNCTURE: CPT | Performed by: SURGERY

## 2018-10-05 PROCEDURE — G8980 MOBILITY D/C STATUS: HCPCS

## 2018-10-05 PROCEDURE — 74011250637 HC RX REV CODE- 250/637: Performed by: SURGERY

## 2018-10-05 RX ORDER — CEFAZOLIN SODIUM/WATER 2 G/20 ML
2 SYRINGE (ML) INTRAVENOUS EVERY 8 HOURS
Status: DISCONTINUED | OUTPATIENT
Start: 2018-10-05 | End: 2018-10-06

## 2018-10-05 RX ORDER — PANTOPRAZOLE SODIUM 40 MG/1
40 TABLET, DELAYED RELEASE ORAL
Status: DISCONTINUED | OUTPATIENT
Start: 2018-10-05 | End: 2018-10-07 | Stop reason: HOSPADM

## 2018-10-05 RX ORDER — MAG HYDROX/ALUMINUM HYD/SIMETH 200-200-20
30 SUSPENSION, ORAL (FINAL DOSE FORM) ORAL
Status: DISCONTINUED | OUTPATIENT
Start: 2018-10-05 | End: 2018-10-07 | Stop reason: HOSPADM

## 2018-10-05 RX ADMIN — ONDANSETRON 4 MG: 2 INJECTION INTRAMUSCULAR; INTRAVENOUS at 11:27

## 2018-10-05 RX ADMIN — PANTOPRAZOLE SODIUM 40 MG: 40 TABLET, DELAYED RELEASE ORAL at 11:27

## 2018-10-05 RX ADMIN — OXYCODONE HYDROCHLORIDE AND ACETAMINOPHEN 2 TABLET: 5; 325 TABLET ORAL at 21:28

## 2018-10-05 RX ADMIN — ISOSORBIDE MONONITRATE 30 MG: 30 TABLET, EXTENDED RELEASE ORAL at 09:47

## 2018-10-05 RX ADMIN — HYDRALAZINE HYDROCHLORIDE 25 MG: 25 TABLET, FILM COATED ORAL at 21:28

## 2018-10-05 RX ADMIN — Medication 2 G: at 21:29

## 2018-10-05 RX ADMIN — SPIRONOLACTONE 25 MG: 25 TABLET ORAL at 09:48

## 2018-10-05 RX ADMIN — HYDROMORPHONE HYDROCHLORIDE 0.5 MG: 2 INJECTION INTRAMUSCULAR; INTRAVENOUS; SUBCUTANEOUS at 18:24

## 2018-10-05 RX ADMIN — Medication 2 G: at 11:26

## 2018-10-05 RX ADMIN — METFORMIN HYDROCHLORIDE 850 MG: 850 TABLET, FILM COATED ORAL at 09:48

## 2018-10-05 RX ADMIN — HYDROMORPHONE HYDROCHLORIDE 0.5 MG: 2 INJECTION INTRAMUSCULAR; INTRAVENOUS; SUBCUTANEOUS at 05:59

## 2018-10-05 RX ADMIN — BUSPIRONE HYDROCHLORIDE 7.5 MG: 5 TABLET ORAL at 21:28

## 2018-10-05 RX ADMIN — INSULIN LISPRO 2 UNITS: 100 INJECTION, SOLUTION INTRAVENOUS; SUBCUTANEOUS at 18:12

## 2018-10-05 RX ADMIN — CARVEDILOL 6.25 MG: 6.25 TABLET, FILM COATED ORAL at 18:13

## 2018-10-05 RX ADMIN — BUSPIRONE HYDROCHLORIDE 7.5 MG: 5 TABLET ORAL at 11:56

## 2018-10-05 RX ADMIN — BUSPIRONE HYDROCHLORIDE 7.5 MG: 5 TABLET ORAL at 18:14

## 2018-10-05 RX ADMIN — HYDROMORPHONE HYDROCHLORIDE 0.5 MG: 2 INJECTION INTRAMUSCULAR; INTRAVENOUS; SUBCUTANEOUS at 09:48

## 2018-10-05 RX ADMIN — METFORMIN HYDROCHLORIDE 850 MG: 850 TABLET, FILM COATED ORAL at 18:14

## 2018-10-05 RX ADMIN — HYDRALAZINE HYDROCHLORIDE 25 MG: 25 TABLET, FILM COATED ORAL at 09:48

## 2018-10-05 RX ADMIN — CARVEDILOL 6.25 MG: 6.25 TABLET, FILM COATED ORAL at 09:48

## 2018-10-05 RX ADMIN — HYDRALAZINE HYDROCHLORIDE 25 MG: 25 TABLET, FILM COATED ORAL at 18:14

## 2018-10-05 RX ADMIN — ALUMINUM HYDROXIDE, MAGNESIUM HYDROXIDE, AND SIMETHICONE 30 ML: 200; 200; 20 SUSPENSION ORAL at 13:26

## 2018-10-05 RX ADMIN — ATORVASTATIN CALCIUM 40 MG: 40 TABLET, FILM COATED ORAL at 21:28

## 2018-10-05 RX ADMIN — HYDROMORPHONE HYDROCHLORIDE 0.5 MG: 2 INJECTION INTRAMUSCULAR; INTRAVENOUS; SUBCUTANEOUS at 13:27

## 2018-10-05 RX ADMIN — WARFARIN SODIUM 5 MG: 5 TABLET ORAL at 18:13

## 2018-10-05 NOTE — PROGRESS NOTES
Reason for Admission:   Complicated Left stump, drainage                   RRAT Score:      10               Plan for utilizing home health:  Pt is independent with ADL's but dependent on wife/friends for IADL's. Pt has used [facility] in past for SN,PT,OT. Would like [facility] this time for home needs. CM waiting on PT/OT recommendations. Pt going home with wound vac, I paperwork complete from MD DEV, awaiting wound care to finalize before faxing order. Wound team, dorota, phoned and voicemail message left. Pt has wc, walker, shower chair and blood sugar monitor at home Pt has stayed at [facility] in past but not SNF. Likelihood of Readmission:  low                       Transition of Care Plan:         Patient is a 51year old male. CM met with pt and friend. Introduced self and role. Pt alert/oriented and having some nausea. Completed assessment. Demographics verified. Pt showed CM his insurance card. Preferred pharmacy is [facility] on [address] Pt lives in a 3 story home with wife and daughter, 5 steps into entrance. Pt had a room on 1st floor converted to Bedroom and he uses downstairs bath. Pt friend will transport to home at discharge and will assist with follow up appt. Update @ 11:50am - spoke with Noemi in [facility]. Order written and referral sent for SN for home wound vac. Can add order for PT/OT later after evals if needed. Awaiting response for acceptance of service for pt from [facility]. Update @ 2pm -  phoned [facility] rep, Ofe Donohue @ [phone], she has seen fax, will review and get back to CM on status of wound vac. CM received phone call from [facility], need order amended. Done and sent to [facility]. Update @ 3:30pm - CM received call back from Ofe Donohue. Insurance was requesting what other options for care were done prior to ordering the wound vac. Discussed case.  Not sure what information they are requesting as they have H&P, Op note, wound care note, MD note and order. Martha Arriola was going to call insurance back and discuss and then call CM back with any further needs or approval.     Update @ 4:28 pm - CM phoned Martha Arriola back, left voicemail message for update on situation. Left her the phone number for weekend CM ext [phone]. Bedside nurse aware of situation. Update @ 4:40pm - CM received call back from Martha Arriola, insurance is still reviewing, will hopefully have an answer by tomorrow am at the latest Monday am. Martha Arriola has the number for weekend CM and will call as soon as she will have an answer. Care Management Interventions  PCP Verified by CM: Yes (sees dr Alonzo Hernandez)  Mode of Transport at Discharge: Other (see comment) (friend will transport to home by car at discharge)  Transition of Care Consult (CM Consult): Discharge Planning  Discharge Durable Medical Equipment: No (has wc, walker, shower chair, blood sugar monitor)  Physical Therapy Consult: Yes  Occupational Therapy Consult: Yes  Speech Therapy Consult: No  Current Support Network: Own Home, Lives with Spouse (lives in a 3 story home with 5 steps into entrance with wife, daughter)  Confirm Follow Up Transport: Friends (friend will assist with transport to follow up appt)  Plan discussed with Pt/Family/Caregiver:  Yes    CRUZ Landaverde, RN  Care Manager [facility]  [phone]

## 2018-10-05 NOTE — DIABETES MGMT
DTC Progress Note    Recommendations/ Comments:Noted lantus held this am secondary to hypoglycemia likely related to pt's home dose. Could consider further reduction to 30 units daily. Current hospital DM medication: lantus 40 units daily and humalog correction    Chart reviewed on [facility]. Patient is a 51 y.o. male with known Type 2 Diabetes on humalog 12 units ac tid, basalar 60 units daily at home. A1c:   Lab Results   Component Value Date/Time    Hemoglobin A1c 11.1 (H) 07/12/2017 12:00 PM    Hemoglobin A1c 12.5 (H) 05/28/2017 03:18 AM       Recent Glucose Results:   Lab Results   Component Value Date/Time    GLUCPOC 180 (H) 10/05/2018 10:43 AM    GLUCPOC 80 10/05/2018 08:05 AM    GLUCPOC 64 (L) 10/05/2018 07:46 AM        Lab Results   Component Value Date/Time    Creatinine 1.68 (H) 10/04/2018 09:46 AM     Estimated Creatinine Clearance: 69.6 mL/min (based on Cr of 1.68). Active Orders   Diet    DIET DIABETIC CONSISTENT CARB Regular        PO intake: No data found. Will continue to follow as needed.     Thank you  PEREZ HoangN, RN, [address]

## 2018-10-05 NOTE — PROGRESS NOTES
Lab stick attempted twice for PT/INR. Will retry with third nurse. Blood culture report received from [facility] in lab. Pt tested positive for MRSA by antigen. L stump wound. Collection taken on 10/04. Dr. Ron junior to advise.

## 2018-10-05 NOTE — WOUND CARE
LANETTE nurse completed home [employer] wound VAC application and gave back form to [facility] Almendarez RN, [facility]

## 2018-10-05 NOTE — PROGRESS NOTES
General Surgery End of Shift Nursing Note    Bedside shift change report given to Erik Graves (oncoming nurse) by Geovanna Sim (offgoing nurse). Report included the following information SBAR, Kardex, OR Summary, Procedure Summary, Intake/Output, MAR and Recent Results. Shift worked:   7a-7p   Summary of shift:    Patient reported 6-8/10 pain during the shift. His current pain level is 6/10. He had wound vac placed to the left BKA. Wound Vac pressure remains at 125 mmHg. He is on 2 L nasal cannula and his lungs are clear. He is voiding. Output >300 ccs during the shift. He had  an episode of nausea when he arrived to the unit, which was treated with IV Zofran. +BS. His blood glucose around dinner time was 66 and came up to 97 after he ate. He didn't have much of an appetite and I had to encourage him to eat. I showed him how to use the incentive spirometer. He is very weak and needs a lot of encouragement, but he appears to be stuck in his ways. Issues for physician to address:   Poor appetite and BG of 66 during the shift. Number times ambulated in hallway past shift: 0    Number of times OOB to chair past shift: 0    Pain Management:  Current medication: Dilaudid and Perocet  Patient states pain is manageable on current pain medication: YES    GI:    Current diet:  DIET DIABETIC CONSISTENT CARB Regular    Tolerating current diet: YES  Passing flatus: YES  Last Bowel Movement: yesterday   Appearance: en    Respiratory:    Incentive Spirometer at bedside: YES  Patient instructed on use: YES    Patient Safety:    Falls Score: 2  Bed Alarm On? No  Sitter?  No    Chery Pimentel

## 2018-10-05 NOTE — PROGRESS NOTES
Occupational Therapy Note 1225    Orders acknowledged, chart reviewed. Met with patient and visitor, sitting up in chair, fully dressed. Patient able to demonstrate full functional reach to BLEs for all ADLs, intact standing balance with RW (no prosthetic use d/t current wound vac needs), and overall good strength/coordination needed for ADLs. Patient able to ambulate around room with RW. Patient stating his wife and daughter are home with him and he has no further OT related concerns. Will complete orders at this time. Thank you.     Alicia Hernandez, OT   Time spent with patient: 7 minutes

## 2018-10-05 NOTE — ROUTINE PROCESS
General Surgery End of Shift Nursing Note    Bedside shift change report given to Toni Baldwin (oncoming nurse) by Sole Hoffman (offgoing nurse). Report included the following information SBAR, Kardex, Procedure Summary, Intake/Output, MAR and Recent Results. Shift worked:   7-730 days   Summary of shift:    Pt c/o pain x3 in L leg (stump) with rating of 5/10. Managed well with IV dilaudid. Pt tolerated meals without v. Zofran given once for nausea. Up without assist to chair. Pt placed on contact for MRSA. Pt upset about results as he states that's the reason he lost his foot. Blood sugar at beginning of shift was 65; reassess after final treatment was 80. Pt refused inuslin (lispro and lantus) for lunch. Agreed to take insulin for dinner. Issues for physician to address:   none     Number times ambulated in hallway past shift: 0    Number of times OOB to chair past shift: 3    Pain Management:  Current medication: see MAR  Patient states pain is manageable on current pain medication: YES    GI:    Current diet:  DIET DIABETIC CONSISTENT CARB Regular  DIET NUTRITIONAL SUPPLEMENTS No; Lunch, Dinner; Gelatein 20    Tolerating current diet: YES  Passing flatus: YES  Last Bowel Movement: several days ago   Appearance: not observed    Respiratory:    Incentive Spirometer at bedside: YES  Patient instructed on use: NO    Patient Safety:    Falls Score: 2  Bed Alarm On? No  Sitter?  Colette Dill

## 2018-10-05 NOTE — PROGRESS NOTES
Vascular    C/o indigestion  AF VSS  VAC on BKA wound  Cx suggests staph  Overall, doing well  Will start Ancef for infected BKA stump wound  Working on ins auth for home VAC  Protonix for GERD

## 2018-10-05 NOTE — PROGRESS NOTES
physical Therapy EVALUATION/DISCHARGE  Patient: Aishwarya Rawls (82 y.o. male)  Date: 10/5/2018  Primary Diagnosis: SEROMA LEFT STUMP   Complication of amputated stump ([facility] [address]  Complication of amputated stump (HCC)  Procedure(s) (LRB):  IRRIGATION AND DRAINAGE LEFT BELOW KNEE AMPUTATION STUMP  (Left) 1 Day Post-Op   Precautions:     ASSESSMENT :  Based on the objective data described below, the patient presents with good strength, intact balance, and overall baseline modified independent functional mobility. Pt received seated in bedside chair, agreeable to participation with therapy Pt performed all aspects of mobility at modified independent level including sit<>stand transfers and ambulation trial of 30ft w/ RW support. Pt utilized hop step gait pattern with RW support during ambulation trial, exhibiting steady, stable gait. No LOB/balance deficits noted throughout. Pt is functioning at a modified independent level and has no further skilled therapy needs. Pt is safe to ambulate with supervision of RN to assist with wound vac. DC PT. Skilled physical therapy is not indicated at this time. PLAN :  Discharge Recommendations: None  Further Equipment Recommendations for Discharge: None, owns necessary equipment     SUBJECTIVE:   Patient stated I've been doing this for years .     OBJECTIVE DATA SUMMARY:   HISTORY:    Past Medical History:   Diagnosis Date    Diabetes ([facility]     since age 23   [facility] GERD (gastroesophageal reflux disease)     HTN (hypertension)     Hypercholesteremia     Hyperlipidemia     Psychiatric disorder     anxiety & depression    Thromboembolus ([facility] [address] 2017    bilateral legs     Past Surgical History:   Procedure Laterality Date    HX AMPUTATION      toes- left foot    HX AMPUTATION Left 08/2017    BKA    HX BUNIONECTOMY      HX ORTHOPAEDIC      boutinerre deformity    HX ORTHOPAEDIC      fascia removed left foot    HX OTHER SURGICAL  03/2017    Skin graft Surgery    HX TONSILLECTOMY      HX WISDOM TEETH EXTRACTION       Prior Level of Function/Home Situation: Pt with hx of L BKA and ambulates at mod I level with use of prosthesis. Lives at home w/ wife and daughter. Independent w/ donning/doffing prosthesis. Hops up and down steps at home (3 story home)   Personal factors and/or comorbidities impacting plan of care:     Home Situation  Home Environment: Private residence  # Steps to Enter: 5  Rails to Enter: Yes  Hand Rails : Right  One/Two Story Residence: Two story, live on 1st floor  Living Alone: No  Support Systems: Spouse/Significant Other/Partner, Child(thierry)  Patient Expects to be Discharged to[de-identified] Private residence  Current DME Used/Available at Home: Wheelchair, Walker, rolling, [address] chair (prosthesis )    EXAMINATION/PRESENTATION/DECISION MAKING:   Critical Behavior:  Neurologic State: Alert  Orientation Level: Oriented X4  Cognition: Appropriate decision making, Appropriate for age attention/concentration, Appropriate safety awareness, Follows commands     Hearing: Auditory  Auditory Impairment: None  Hearing Aids/Status: Does not own  Skin:  Dressing clean, dry, intact  Edema: none noted   Range Of Motion:  AROM: Within functional limits                       Strength:    Strength: Within functional limits                    Tone & Sensation:   Tone: Normal              Sensation: Intact               Coordination:  Coordination: Within functional limits  Vision:      Functional Mobility:  Bed Mobility:  Rolling: Other (comment) (seated in bedside chair upon arrival)           Transfers:  Sit to Stand: Modified independent  Stand to Sit: Modified independent                       Balance:   Sitting: Intact  Standing: Intact; With support  Ambulation/Gait Training:  Distance (ft): 30 Feet (ft)  Assistive Device: Walker, rolling;Gait belt  Ambulation - Level of Assistance: Modified independent                                                     Functional Measure:  Barthel Index:    Bathin  Bladder: 10  Bowels: 10  Groomin  Dressing: 10  Feeding: 10  Mobility: 5  Stairs: 0  Toilet Use: 10  Transfer (Bed to Chair and Back): 15  Total: 80       Barthel and G-code impairment scale:  Percentage of impairment CH  0% CI  1-19% CJ  20-39% CK  40-59% CL  60-79% CM  80-99% CN  100%   Barthel Score 0-100 100 99-80 79-60 59-40 20-39 1-19   0   Barthel Score 0-20 20 17-19 13-16 9-12 5-8 1-4 0      The Barthel ADL Index: Guidelines  1. The index should be used as a record of what a patient does, not as a record of what a patient could do. 2. The main aim is to establish degree of independence from any help, physical or verbal, however minor and for whatever reason. 3. The need for supervision renders the patient not independent. 4. A patient's performance should be established using the best available evidence. Asking the patient, friends/relatives and nurses are the usual sources, but direct observation and common sense are also important. However direct testing is not needed. 5. Usually the patient's performance over the preceding 24-48 hours is important, but occasionally longer periods will be relevant. 6. Middle categories imply that the patient supplies over 50 per cent of the effort. 7. Use of aids to be independent is allowed. Javed Harris., Barthel, D.W. (4856). Functional evaluation: the Barthel Index. [address] [facility] (14)2. Alice Gutierrez justin JostinThe Rehabilitation Institute of St. Louisrosemarie, MICHAELJCarynMRANDAL, Jaqueline Swift., [facility]., [place], [address] [place] [address] (). Measuring the change indisability after inpatient rehabilitation; comparison of the responsiveness of the Barthel Index and Functional Dilliner Measure. Journal of Neurology, Neurosurgery, and Psychiatry, 66(4), 907-876. Ardean Prader, N.J.A, AWILDA Baum, & Augusta Bumpers, MEMILE. (2004.) Assessment of post-stroke quality of life in cost-effectiveness studies: The usefulness of the Barthel Index and the EuroQoL-5D.  Quality of Life Research, 13, 427-43       G codes: In compliance with CMSs Claims Based Outcome Reporting, the following G-code set was chosen for this patient based on their primary functional limitation being treated: The outcome measure chosen to determine the severity of the functional limitation was the Barthel Index with a score of 80/100 which was correlated with the impairment scale. ? Mobility - Walking and Moving Around:     - CURRENT STATUS: CI - 1%-19% impaired, limited or restricted    - GOAL STATUS: CI - 1%-19% impaired, limited or restricted    - D/C STATUS:  CI - 1%-19% impaired, limited or restricted        Physical Therapy Evaluation Charge Determination   History Examination Presentation Decision-Making   MEDIUM  Complexity : 1-2 comorbidities / personal factors will impact the outcome/ POC  HIGH Complexity : 4+ Standardized tests and measures addressing body structure, function, activity limitation and / or participation in recreation  LOW Complexity : Stable, uncomplicated  HIGH Complexity : FOTO score of 1- 25       Based on the above components, the patient evaluation is determined to be of the following complexity level: MEDIUM    Pain:  Pain Scale 1: Numeric (0 - 10)  Pain Intensity 1: 6  Pain Location 1: Leg  Activity Tolerance:   VSS  Please refer to the flowsheet for vital signs taken during this treatment. After treatment:   [x]   Patient left in no apparent distress sitting up in chair  []   Patient left in no apparent distress in bed  [x]   Call bell left within reach  [x]   Nursing notified  [x]   Caregiver present  []   Bed alarm activated    COMMUNICATION/EDUCATION:   Communication/Collaboration:  [x]   Fall prevention education was provided and the patient/caregiver indicated understanding. [x]   Patient/family have participated as able and agree with findings and recommendations. []   Patient is unable to participate in plan of care at this time.   Findings and recommendations were discussed with: Occupational Therapist and Registered Nurse    Thank you for this referral.  Jarred Stacy, PT, DPT   Time Calculation: 10 mins

## 2018-10-05 NOTE — PROGRESS NOTES
Initial Nutrition Assessment:    INTERVENTIONS/RECOMMENDATIONS:   · Continue current diet, will add soft solids due to chewing difficulties  · Gelatein BID (20 g protein, <1g CHO)    ASSESSMENT:   Chart reviewed, medically noted for IRRIGATION AND DRAINAGE LEFT BELOW KNEE AMPUTATION STUMP and PMH shown below. Nutrition referral triggered due to MST score. Pt reports a change in his diet after BKA on 8/28. His PO intake has also been reduced by nausea and vomiting as well as recent difficulty chewing after his upper partial was broken. His weights in documented history are all over the place. Pt states his recent weight at a Dr appointment, on a standing scale with his prosthetic was ~250 lbs. His current weight with bed scale is 230 lbs which he believes is not accurate. He is still experiencing nausea and vomiting and attributes this to medication. BG have been on the lower side due to poor PO intake. We discussed the role that nutrition plays in wound healing, specifically focusing on protein sources at each meal as well as nutrition supplements to help meet protein needs. Past Medical History:   Diagnosis Date    Diabetes [facility])     since age 23    GERD (gastroesophageal reflux disease)     HTN (hypertension)     Hypercholesteremia     Hyperlipidemia     Psychiatric disorder     anxiety & depression    Thromboembolus ([facility] [address] 2017    bilateral legs       Diet Order: Consistent carb  % Eaten:  No data found.     Pertinent Medications: [x]Reviewed: metformin, zofran, PPI, aldactone, warfarin  Pertinent Labs: [x]Reviewed:   Food Allergies: [x]NKFA  []Other   Last BM: 10/3  Edema:        []RUE   []LUE   []RLE   []LLE      Pressure Injury:      [] Stage I   [] Stage II   [] Stage III   [] Stage IV      Wt Readings from Last 30 Encounters:   10/04/18 104.5 kg (230 lb 6.1 oz)   09/11/18 110.9 kg (244 lb 8 oz)   01/09/18 128.4 kg (283 lb)   01/05/18 127 kg (280 lb)   09/02/17 131.5 kg (290 lb)   08/30/17 113.5 kg (250 lb 3.6 oz)   08/25/17 113.4 kg (250 lb)   08/08/17 126.3 kg (278 lb 6.4 oz)   07/31/17 129.4 kg (285 lb 3.2 oz)   07/24/17 131 kg (288 lb 14.4 oz)   07/18/17 127 kg (280 lb)   07/17/17 129.6 kg (285 lb 11.2 oz)   07/13/17 118.8 kg (262 lb)   07/12/17 122.7 kg (270 lb 6.4 oz)   07/03/17 123.7 kg (272 lb 12.8 oz)   06/14/17 135.4 kg (298 lb 9.6 oz)   06/04/17 131.5 kg (290 lb)   06/01/17 138.6 kg (305 lb 8 oz)   04/17/17 128.5 kg (283 lb 3.2 oz)   03/07/17 137.7 kg (303 lb 9.6 oz)   03/04/17 131.5 kg (290 lb)   02/24/17 132 kg (291 lb 0.1 oz)   02/16/17 134.4 kg (296 lb 4.8 oz)   02/17/17 133.4 kg (294 lb)   12/16/16 137.3 kg (302 lb 9.6 oz)   12/09/16 135 kg (297 lb 9.6 oz)   11/18/16 135.1 kg (297 lb 12.8 oz)   10/28/16 131.5 kg (290 lb)   10/06/16 131.8 kg (290 lb 8 oz)   04/22/16 138.3 kg (305 lb)       Anthropometrics:   Height: 6' 3\" (190.5 cm) Weight: 104.5 kg (230 lb 6.1 oz)   IBW (%IBW):   ( ) UBW (%UBW):   (  %)   Last Weight Metrics:  Weight Loss Metrics 10/4/2018 9/11/2018 1/9/2018 1/5/2018 9/2/2017 8/30/2017 8/8/2017   Today's Wt 230 lb 6.1 oz 244 lb 8 oz 283 lb 280 lb 290 lb 250 lb 3.6 oz 278 lb 6.4 oz   BMI 28.8 kg/m2 30.56 kg/m2 35.37 kg/m2 35 kg/m2 36.25 kg/m2 31.28 kg/m2 34.8 kg/m2       BMI: Body mass index is 28.8 kg/(m^2). This BMI is indicative of:   []Underweight    []Normal    [x]Overweight    [] Obesity   [] Extreme Obesity (BMI>40)     Estimated Nutrition Needs (Based on):   2375 Kcals/day (BMR: 2000 x 1.2) , 125 g (1.2 g/kg) Protein  Carbohydrate:  At Least 130 g/day  Fluids: 2375 mL/day (1ml/kcal) or per primary team    NUTRITION DIAGNOSES:   Problem:  Increased nutrient needs (protein)      Etiology: related to wound healing     Signs/Symptoms: as evidenced by VAC on BKA wound      NUTRITION INTERVENTIONS:  Meals/Snacks: General/healthful diet, Modify diet/texture/consistency/nutrients   Supplements: Commercial supplement              GOAL:   consume >50% of meals and ONS in 3-5 days    LEARNING NEEDS (Diet, Food/Nutrient-Drug Interaction):    [x] None Identified   [] Identified and Education Provided/Documented   [] Identified and Pt declined/was not appropriate     Cultureal, Bahai, OR Ethnic Dietary Needs:    [x] None Identified   [] Identified and Addressed     [x] Interdisciplinary Care Plan Reviewed/Documented    [x] Discharge Planning: Consistent carb diet, high protein, modified texture as needed until partial can be replaced      MONITORING /EVALUATION:      Food/Nutrient Intake Outcomes:  Total energy intake  Physical Signs/Symptoms Outcomes: Weight/weight change, Electrolyte and renal profile, Glucose profile, GI    NUTRITION RISK:    [] High              [x] Moderate           []  Low  []  Minimal/Uncompromised    PT SEEN FOR:    []  MD Consult: []Calorie Count      []Diabetic Diet Education        []Diet Education     []Electrolyte Management     []General Nutrition Management and Supplements     []Management of Tube Feeding     []TPN Recommendations    [x]  RN Referral:  [x]MST score >=2     []Enteral/Parenteral Nutrition PTA     []Pregnant: Gestational DM or Multigestation     []Pressure Ulcer/Wound Care needs        []  Low BMI  []  LOS Referral       Johnathan Koch RDN  Pager [phone]  Weekend Pager [phone] Post-Care Instructions: Patient instructed to apply ice to reduce swelling.

## 2018-10-06 LAB
BACTERIA SPEC CULT: ABNORMAL
BACTERIA SPEC CULT: NORMAL
GLUCOSE BLD STRIP.AUTO-MCNC: 123 MG/DL (ref 65–100)
GLUCOSE BLD STRIP.AUTO-MCNC: 128 MG/DL (ref 65–100)
GLUCOSE BLD STRIP.AUTO-MCNC: 144 MG/DL (ref 65–100)
GLUCOSE BLD STRIP.AUTO-MCNC: 171 MG/DL (ref 65–100)
GRAM STN SPEC: ABNORMAL
GRAM STN SPEC: ABNORMAL
INR PPP: 1.2 (ref 0.9–1.1)
PROTHROMBIN TIME: 11.9 SEC (ref 9–11.1)
SERVICE CMNT-IMP: ABNORMAL
SERVICE CMNT-IMP: NORMAL

## 2018-10-06 PROCEDURE — 74011250636 HC RX REV CODE- 250/636: Performed by: SURGERY

## 2018-10-06 PROCEDURE — 65270000029 HC RM PRIVATE

## 2018-10-06 PROCEDURE — 36415 COLL VENOUS BLD VENIPUNCTURE: CPT | Performed by: SURGERY

## 2018-10-06 PROCEDURE — 74011636637 HC RX REV CODE- 636/637: Performed by: SURGERY

## 2018-10-06 PROCEDURE — 85610 PROTHROMBIN TIME: CPT | Performed by: SURGERY

## 2018-10-06 PROCEDURE — 82962 GLUCOSE BLOOD TEST: CPT

## 2018-10-06 PROCEDURE — 74011250637 HC RX REV CODE- 250/637: Performed by: SURGERY

## 2018-10-06 RX ORDER — VANCOMYCIN 2 GRAM/500 ML IN 0.9 % SODIUM CHLORIDE INTRAVENOUS
2000
Status: COMPLETED | OUTPATIENT
Start: 2018-10-06 | End: 2018-10-06

## 2018-10-06 RX ADMIN — BUSPIRONE HYDROCHLORIDE 7.5 MG: 5 TABLET ORAL at 11:02

## 2018-10-06 RX ADMIN — HYDRALAZINE HYDROCHLORIDE 25 MG: 25 TABLET, FILM COATED ORAL at 21:18

## 2018-10-06 RX ADMIN — HYDRALAZINE HYDROCHLORIDE 25 MG: 25 TABLET, FILM COATED ORAL at 17:09

## 2018-10-06 RX ADMIN — Medication 2 G: at 05:51

## 2018-10-06 RX ADMIN — METFORMIN HYDROCHLORIDE 850 MG: 850 TABLET, FILM COATED ORAL at 17:11

## 2018-10-06 RX ADMIN — HYDRALAZINE HYDROCHLORIDE 25 MG: 25 TABLET, FILM COATED ORAL at 11:02

## 2018-10-06 RX ADMIN — CARVEDILOL 6.25 MG: 6.25 TABLET, FILM COATED ORAL at 17:09

## 2018-10-06 RX ADMIN — ISOSORBIDE MONONITRATE 30 MG: 30 TABLET, EXTENDED RELEASE ORAL at 11:02

## 2018-10-06 RX ADMIN — SPIRONOLACTONE 25 MG: 25 TABLET ORAL at 11:01

## 2018-10-06 RX ADMIN — METFORMIN HYDROCHLORIDE 850 MG: 850 TABLET, FILM COATED ORAL at 11:01

## 2018-10-06 RX ADMIN — BUSPIRONE HYDROCHLORIDE 7.5 MG: 5 TABLET ORAL at 17:26

## 2018-10-06 RX ADMIN — VANCOMYCIN HYDROCHLORIDE 2000 MG: 10 INJECTION, POWDER, LYOPHILIZED, FOR SOLUTION INTRAVENOUS at 17:09

## 2018-10-06 RX ADMIN — PANTOPRAZOLE SODIUM 40 MG: 40 TABLET, DELAYED RELEASE ORAL at 06:43

## 2018-10-06 RX ADMIN — OXYCODONE HYDROCHLORIDE AND ACETAMINOPHEN 2 TABLET: 5; 325 TABLET ORAL at 11:02

## 2018-10-06 RX ADMIN — Medication 2 G: at 12:54

## 2018-10-06 RX ADMIN — CARVEDILOL 6.25 MG: 6.25 TABLET, FILM COATED ORAL at 11:01

## 2018-10-06 RX ADMIN — HYDROMORPHONE HYDROCHLORIDE 0.5 MG: 2 INJECTION INTRAMUSCULAR; INTRAVENOUS; SUBCUTANEOUS at 01:57

## 2018-10-06 RX ADMIN — INSULIN GLARGINE 60 UNITS: 100 INJECTION, SOLUTION SUBCUTANEOUS at 11:10

## 2018-10-06 RX ADMIN — BUSPIRONE HYDROCHLORIDE 7.5 MG: 5 TABLET ORAL at 21:18

## 2018-10-06 RX ADMIN — OXYCODONE HYDROCHLORIDE AND ACETAMINOPHEN 2 TABLET: 5; 325 TABLET ORAL at 20:30

## 2018-10-06 RX ADMIN — INSULIN LISPRO 2 UNITS: 100 INJECTION, SOLUTION INTRAVENOUS; SUBCUTANEOUS at 12:55

## 2018-10-06 RX ADMIN — WARFARIN SODIUM 5 MG: 5 TABLET ORAL at 17:11

## 2018-10-06 RX ADMIN — ATORVASTATIN CALCIUM 40 MG: 40 TABLET, FILM COATED ORAL at 21:18

## 2018-10-06 NOTE — ROUTINE PROCESS
General Surgery End of Shift Nursing Note    Bedside shift change report given to Lina Guerra (oncoming nurse) by Keyla Ortiz (offgoing nurse). Report included the following information SBAR, Kardex, OR Summary, Procedure Summary, Intake/Output, MAR and Recent Results. Shift worked:   7-730 days   Summary of shift:    Pt very agitated at beginning of shift as he was told he would go home but still awaiting wound vac. Partnered with  Tatyana Villanueva to check status. Originally advised it was still waiting to be authorized but updated that it would be delivered today. Wound vac delivered but received call from lab that pt receiving Ancef for antibiotic that is resistent to MRSA. Dr. Juan Barrientos ( on call) paged to advise. See progress notes. Pt started on Vanc. No BM. Pt refused offer for laxative. Tolerated diet with no N/V. Insulin given as ordered. Pt c.o pain once which was managed with PO percacet. Issues for physician to address:   Antibiotic therapy for discharge     Number times ambulated in hallway past shift: 0    Number of times OOB to chair past shift: 3    Pain Management:  Current medication: see MAR  Patient states pain is manageable on current pain medication: YES    GI:    Current diet:  DIET DIABETIC CONSISTENT CARB Regular  DIET NUTRITIONAL SUPPLEMENTS No; Lunch, Dinner; Gelatein 20    Tolerating current diet: YES  Passing flatus: YES  Last Bowel Movement: several days ago   Appearance: not observed      Patient Safety:    Falls Score: 2  Bed Alarm On? No  Sitter?  No    Teodora Dill

## 2018-10-06 NOTE — PROGRESS NOTES
Vascular  Awaiting authorization for wound VAC  Iv antibiotics set up  Patient anxious to leave but understands that he would be responsible without auth  following

## 2018-10-06 NOTE — ROUTINE PROCESS
Dr. Edna Connor (on call for [facility])  paged to advise antibiotic needs to be changed as cefazolin is resitent to MRSA. Consult to hospitalist placed. 56 - Dr. Brian Lockwood on call for consults paged. 65 - Dr. Lr Lopez back and advised.

## 2018-10-06 NOTE — PROGRESS NOTES
Dr. Al Steel called back to advise he will start patient on IV vanc. Advised of creatnine level of 1.68. Pharm called for loading dose and trough level draw; consult placed.  Per Dr. Al Steel discontinued consult to hospitalist.

## 2018-10-06 NOTE — PROGRESS NOTES
Pharmacy Automatic Renal Dosing Protocol - Antimicrobials    Indication for Antimicrobials: SSTI - Infected BKA stump wound    Current Regimen of Each Antimicrobial:  Vancomycin consult - started 10/6 day 1    Previous Antimicrobial Therapy:  Cefazolin 2g IV q8h (Start Date 10/5 Day # 2)    Significant Cultures:   10/4 Leg Wound - MRSA - final  10/4 Anaerobic- none isolated  10/4 MRSA nares - negative    Radiology / Imaging results: (X-ray, CT scan or MRI):     Paralysis, amputations, malnutrition:     Labs:  Recent Labs      10/04/18   0946   CREA  1.68*   BUN  23*   WBC  10.8     Temp (24hrs), Av °F (36.7 °C), Min:96.1 °F (35.6 °C), Max:99 °F (37.2 °C)    Creatinine Clearance (mL/min) or Dialysis:  Estimated Creatinine Clearance: 69.6 mL/min (based on Cr of 1.68). Estimated Creatinine Clearance (using IBW):63.6 mL/min    Impression/Plan:   · Empiric Ancef is switch to Vancomycin due to C/s results resistant to Cefazolin. Vancomycin 2000mg IV loading dose follow by 1000mg IV q12h for a projected trough at Css of 16.4  · Antimicrobial stop date - pending     Pharmacy will follow daily and adjust medications as appropriate for renal function and/or serum levels.     Thank you,  Lit Arrieta, [facility]

## 2018-10-06 NOTE — PROGRESS NOTES
CM spoke with nursing, pt requesting status of wound vac. CM called [facility] regarding status of order. Wound Vac order is currently pending at this time, awaiting insurance authorization which will likely not happen until Monday or Tuesday (insurance may be closed for holiday. CM provided update to nursing.      Magda Tucker, MSW Super[employer]ise[employer] in Social Work, [address] [place] [address]  [phone]

## 2018-10-07 VITALS
WEIGHT: 230.38 LBS | HEART RATE: 61 BPM | TEMPERATURE: 98.1 F | DIASTOLIC BLOOD PRESSURE: 87 MMHG | HEIGHT: 75 IN | BODY MASS INDEX: 28.65 KG/M2 | RESPIRATION RATE: 17 BRPM | OXYGEN SATURATION: 98 % | SYSTOLIC BLOOD PRESSURE: 187 MMHG

## 2018-10-07 LAB
GLUCOSE BLD STRIP.AUTO-MCNC: 69 MG/DL (ref 65–100)
GLUCOSE BLD STRIP.AUTO-MCNC: 72 MG/DL (ref 65–100)
INR PPP: 1.4 (ref 0.9–1.1)
PROTHROMBIN TIME: 13.8 SEC (ref 9–11.1)
SERVICE CMNT-IMP: NORMAL
SERVICE CMNT-IMP: NORMAL

## 2018-10-07 PROCEDURE — 74011250637 HC RX REV CODE- 250/637: Performed by: SURGERY

## 2018-10-07 PROCEDURE — 36415 COLL VENOUS BLD VENIPUNCTURE: CPT | Performed by: SURGERY

## 2018-10-07 PROCEDURE — 82962 GLUCOSE BLOOD TEST: CPT

## 2018-10-07 PROCEDURE — 74011250636 HC RX REV CODE- 250/636: Performed by: SURGERY

## 2018-10-07 PROCEDURE — 85610 PROTHROMBIN TIME: CPT | Performed by: SURGERY

## 2018-10-07 RX ORDER — SULFAMETHOXAZOLE AND TRIMETHOPRIM 800; 160 MG/1; MG/1
1 TABLET ORAL 2 TIMES DAILY
Qty: 28 TAB | Refills: 1 | Status: SHIPPED | OUTPATIENT
Start: 2018-10-07 | End: 2019-04-15

## 2018-10-07 RX ORDER — OXYCODONE AND ACETAMINOPHEN 5; 325 MG/1; MG/1
2 TABLET ORAL
Qty: 40 TAB | Refills: 0 | Status: SHIPPED | OUTPATIENT
Start: 2018-10-07 | End: 2019-07-16

## 2018-10-07 RX ADMIN — ISOSORBIDE MONONITRATE 30 MG: 30 TABLET, EXTENDED RELEASE ORAL at 08:29

## 2018-10-07 RX ADMIN — PANTOPRAZOLE SODIUM 40 MG: 40 TABLET, DELAYED RELEASE ORAL at 08:30

## 2018-10-07 RX ADMIN — OXYCODONE HYDROCHLORIDE AND ACETAMINOPHEN 2 TABLET: 5; 325 TABLET ORAL at 08:29

## 2018-10-07 RX ADMIN — BUSPIRONE HYDROCHLORIDE 7.5 MG: 5 TABLET ORAL at 08:46

## 2018-10-07 RX ADMIN — VANCOMYCIN HYDROCHLORIDE 1000 MG: 1 INJECTION, POWDER, LYOPHILIZED, FOR SOLUTION INTRAVENOUS at 08:26

## 2018-10-07 RX ADMIN — SPIRONOLACTONE 25 MG: 25 TABLET ORAL at 08:29

## 2018-10-07 RX ADMIN — HYDRALAZINE HYDROCHLORIDE 25 MG: 25 TABLET, FILM COATED ORAL at 08:29

## 2018-10-07 RX ADMIN — CARVEDILOL 6.25 MG: 6.25 TABLET, FILM COATED ORAL at 08:29

## 2018-10-07 NOTE — ROUTINE PROCESS
Rested comfortable during the night. Irritable, withdrawn tonight. Request minimal disruptions during the night. IV vancomycin tolerated without problems. No s/s allergic reaction noted. VSS, afebrile. Pain level tolerable with current pain management therapy. Prn pain medication given x1 my shift. Able to voice needs and concerns. Safety precautions maintained. Bed low and locked. Call light within reach at all times. No acute distress during the night. Left in stable condition. Will report to on coming shift.

## 2018-10-07 NOTE — PROGRESS NOTES
Vascular  Wound vac approved  He has pump with him  Home on oral bactrim and percocet  Return to see Mauro Hernandez end of week

## 2018-10-07 NOTE — PROGRESS NOTES
Pt blood sugar was 69 at 0730. Treated with apple juice. Recheck at  was 67. Pt refused to treat or recheck. Requested to wait until breakfast. Called to have tray delivered early. Pt sitting on side of bed. Asymptomatic; looking at cell phone.

## 2018-10-07 NOTE — PROGRESS NOTES
Discharge orders acknowledged. Consult placed for [facility] wound vac\". [facility] previously accepted referral. [facility] called CM to confirm discharge for today. Pt to discharge home today by private vehicle with friend. PCP f/u appointment scheduled. Pt to schedule surgical follow-up appointment. [employer] delivered wound vac to pt yesterday. All information entered into pt AVS.     Pt has no additional CM needs at this time. Care Management Interventions  PCP Verified by CM: Yes (sees dr Victorino Walters)  Palliative Care Criteria Met (RRAT>21 & CHF Dx)?: No  Mode of Transport at Discharge: Other (see comment) (friend will transport to home by car at discharge)  Transition of Care Consult (CM Consult): Home Health [facility])  [facility] - INPATIENT: Yes  Discharge Durable Medical Equipment: Yes (has wc, walker, shower chair, blood sugar monitor;  Wound vac delivered by [facility])  Physical Therapy Consult: Yes  Occupational Therapy Consult: Yes  Speech Therapy Consult: No  Current Support Network: Own Home, Lives with Spouse (lives in a 3 story home with 5 steps into entrance with wife, daughter)  Confirm Follow Up Transport: Friends (friend will assist with transport to follow up appt)  Plan discussed with Pt/Family/Caregiver: Yes  Freedom of Choice Offered: Yes  Discharge Location  Discharge Placement: Home with home health    NIKKI Anaya Supervisee in Social Work, [employer]  [phone]

## 2018-10-07 NOTE — DISCHARGE INSTRUCTIONS
Below-the-Knee Leg Amputation: What to Expect at Home  Your Recovery  A below-the-knee amputation is surgery to remove your leg below the knee. Your doctor removed the leg while keeping as much healthy bone, skin, blood vessel, and nerve tissue as possible. After a below-the-knee amputation, you will probably have bandages, a rigid dressing, or a cast over the remaining part of your leg (remaining limb). The leg will be swollen for at least 4 weeks after your surgery. If you have a rigid dressing or cast, your doctor will set up regular visits to change the dressing or cast and check the healing. If you have elastic bandages, your doctor will tell you how to change them. You may have pain in your remaining limb. You also may think you have feeling or pain where your leg was. This is called phantom pain. It is common and may come and go for a year or longer. Your doctor can give you medicine for both types of pain. You may have already started a rehabilitation program (rehab). You will continue this under the guidance of your doctor or physical therapist. Jigna Martinez will need to do a lot of work to recondition your muscles and relearn activities, balance, and coordination. The rehab can last as long as a year. You may have been fitted with a temporary artificial leg while you were still in the hospital. If this is the case, your doctor will teach you how to care for it. If you are getting an artificial leg, you may need to get used to it before you return to work and your other activities. You will probably not wear it all the time, so you will need to learn how to use a wheelchair, crutches, or other device. You will have to make changes in your home. Your workplace may be able to make allowances for you. Having your leg amputated is traumatic. Learning to live with new limitations can be hard and frustrating. You may feel depressed or grieve for your previous lifestyle.  It is important to understand these feelings. Talking with your family, friends, and health professionals about your frustrations is an important part of your recovery. You may also find that it helps to talk with a person who has had an amputation. Remember that even though losing a limb is difficult, it does not change who you are or prevent you from enjoying life. You will have to adapt and learn new ways to do things, but you will still be able to work and take part in sports and activities. And you can still learn, love, play, and live life to its fullest.  Many organizations can help you adjust to your new life. For example, you can find information at amputee-coalition.org. This care sheet gives you a general idea about how long it will take for you to recover. But each person recovers at a different pace. Follow the steps below to get better as quickly as possible. How can you care for yourself at home? Activity    · Be active. Talk to your doctor about what you can do. If you are active and use your remaining limb, it will heal faster.     · You may shower when your doctor okays it. Wash the remaining limb with soap and water, and pat it dry. You may need help doing this at first.     · You may need to adapt your car to your situation before you drive.     · You will probably be able to return to work and your usual routine when your remaining limb heals. This can be as soon as 4 to 8 weeks after surgery, but it may take longer. Diet    · You can eat your normal diet. If your stomach is upset, try bland, low-fat foods like plain rice, broiled chicken, toast, and yogurt.     · You may notice that your bowel movements are not regular right after your surgery. This is common. Try to avoid constipation and straining with bowel movements. Take a fiber supplement every day. If you have not had a bowel movement after a couple of days, ask your doctor about taking a mild laxative.    Medicines    · Your doctor will tell you if and when you can restart your medicines. He or she will also give you instructions about taking any new medicines.     · If you take blood thinners, such as warfarin (Coumadin), clopidogrel (Plavix), or aspirin, be sure to talk to your doctor. He or she will tell you if and when to start taking those medicines again. Make sure that you understand exactly what your doctor wants you to do.     · Be safe with medicines. Take pain medicines exactly as directed. ¨ If the doctor gave you a prescription medicine for pain, take it as prescribed. ¨ If you are not taking a prescription pain medicine, ask your doctor if you can take an over-the-counter medicine.     · If you think your pain medicine is making you sick to your stomach:  ¨ Take your medicine after meals (unless your doctor has told you not to). ¨ Ask your doctor for a different pain medicine.     · If your doctor prescribed antibiotics, take them as directed. Do not stop taking them just because you feel better. You need to take the full course of antibiotics.    Remaining limb care    · You may have bandages, a rigid dressing, or a cast on your remaining limb. Your doctor will tell you how to take care of it. Depending on your dressing and the doctor's instructions:  ¨ Check your remaining limb daily for irritation, skin breaks, and redness. Tell your doctor about any problems you see. ¨ Wash your remaining limb with mild soap and warm water every night. Pat it dry.     · If you have a temporary artificial leg, remove it before you go to sleep. Exercise    · Rehabilitation is a series of exercises you do after your surgery. This helps you learn to use your remaining limb and artificial leg. You will work with your doctor and physical therapist to plan this exercise program. To get the best results, you need to do the exercises correctly and as often and as long as your doctor tells you. Your rehab program will give you a number of exercises to do.  Always do them as your therapist tells you. Other instructions    · Preventing contractures is very important. A contracture occurs when a joint becomes stuck in one position. If this happens, it may be hard or impossible to straighten your remaining limb and use an artificial leg. ¨ Make sure you put equal weight on both hips when you sit. Use firm chairs, and sit up straight. ¨ Keep your remaining limb flat with your knees straight and your legs together while you are lying on your back. ¨ Lie on your stomach as much as possible to stretch your hip joint. ¨ Do not sit for more than an hour or two. Stand, or lie on your stomach now and then. ¨ Do not put pillows under your hips or knees or between your thighs. ¨ Do not rest your remaining limb on crutch handles or a wheelchair. Follow-up care is a key part of your treatment and safety. Be sure to make and go to all appointments, and call your doctor if you are having problems. It's also a good idea to know your test results and keep a list of the medicines you take. When should you call for help? Call 911 anytime you think you may need emergency care. For example, call if:    · You passed out (lost consciousness).     · You have chest pain, are short of breath, or you cough up blood.    Call your doctor now or seek immediate medical care if:    · You have pain that does not get better after you take pain medicine.     · You are sick to your stomach or cannot drink fluids.     · You have loose stitches, or your incision comes open.     · You have signs of a blood clot in your leg (called a deep vein thrombosis), such as:  ¨ Pain in your calf, back of the knee, thigh, or groin. ¨ Redness or swelling in your leg.     · You have signs of infection, such as:  ¨ Increased pain, swelling, warmth, or redness. ¨ Red streaks leading from the incision. ¨ Pus draining from the incision.   ¨ A fever.     · You bleed through your bandage.    Watch closely for any changes in your health, and be sure to contact your doctor if you have any problems. Where can you learn more? Go to http://lori-bella.info/. Enter R124 in the search box to learn more about \"Below-the-Knee Leg Amputation: What to Expect at Home. \"  Current as of: November 29, 2017  Content Version: 11.8  © 0105-9751 [employer]. Care instructions adapted under license by [employer] (which disclaims liability or warranty for this information). If you have questions about a medical condition or this instruction, always ask your healthcare professional. [place] [address] any warranty or liability for your use of this information.

## 2018-10-07 NOTE — ROUTINE PROCESS
2 Prescriptions given. Discharge medications reviewed with patient and appropriate educational materials and side effects teaching were provided. IV removed and catheter intact. Follow up appointment info reviewed. Hospital wound vac removed and replaced with portable to be sent home with patient. Education teaching on canister removal and vac care completed. Signs and symptoms of infection reviewed. Patient escorted to main entrance by myself for discharge. All personal belongings with patient. After pt discharged Risa Castle from lab called to advise that prescriptions given form bactrim and warfarin have contraindications. Pts number given so Dr. Sheldon Tsang Can be called to advise. Risa Castle later called back to advise Dr has been advised and pt is ok until FU appointment with Dr. Vaibhav Robles on Tuesday.

## 2018-10-07 NOTE — PROGRESS NOTES
Problem: Falls - Risk of  Goal: *Absence of Falls  Document Lee Fall Risk and appropriate interventions in the flowsheet.    Outcome: Resolved/Met Date Met: 10/07/18  Fall Risk Interventions:  Mobility Interventions: Utilize walker, cane, or other assistive device         Medication Interventions: Teach patient to arise slowly    Elimination Interventions: Call light in reach, Urinal in reach

## 2018-10-08 ENCOUNTER — HOME CARE VISIT (OUTPATIENT)
Dept: HOME HEALTH SERVICES | Facility: HOME HEALTH | Age: 48
End: 2018-10-08

## 2018-10-08 NOTE — H&P
History and Physical    Subjective:     Abner Smart is a 51 y.o. male who has a painful draining fluid collection on his old left BKA stump. Past Medical History:   Diagnosis Date    Diabetes [facility])     since age 23   Jelena.Odilia GERD (gastroesophageal reflux disease)     HTN (hypertension)     Hypercholesteremia     Hyperlipidemia     Psychiatric disorder     anxiety & depression    Thromboembolus ([address] 2017    bilateral legs      Past Surgical History:   Procedure Laterality Date    HX AMPUTATION      toes- left foot    HX AMPUTATION Left 08/2017    BKA    HX BUNIONECTOMY      HX ORTHOPAEDIC      boutinerre deformity    HX ORTHOPAEDIC      fascia removed left foot    HX OTHER SURGICAL  03/2017    Skin graft Surgery    HX TONSILLECTOMY      HX WISDOM TEETH EXTRACTION       Family History   Problem Relation Age of Onset    Hypertension Mother     High Cholesterol Mother       Social History   Substance Use Topics    Smoking status: Former Smoker     Types: Cigars     Quit date: 4/22/2011    Smokeless tobacco: Never Used    Alcohol use 0.0 oz/week     1 Glasses of wine, 0 Standard drinks or equivalent per week      Comment: stop drinking 5 months ago       Prior to Admission medications    Medication Sig Start Date End Date Taking? Authorizing Provider   oxyCODONE-acetaminophen (PERCOCET) 5-325 mg per tablet Take 2 Tabs by mouth every four (4) hours as needed. Max Daily Amount: 12 Tabs. 10/7/18  Yes Gail Buitrago MD   trimethoprim-sulfamethoxazole (BACTRIM DS, SEPTRA DS) 160-800 mg per tablet Take 1 Tab by mouth two (2) times a day. Indications: mrsa stump infection 10/7/18  Yes Gail Buitrago MD   insulin lispro (HUMALOG) 100 unit/mL kwikpen 12 Units by SubCUTAneous route Before breakfast, lunch, and dinner. DX: E11.65 9/12/18  Yes Manuel Pate MD   insulin glargine (BASAGLAR KWIKPEN U-100 INSULIN) 100 unit/mL (3 mL) inpn 60 Units by SubCUTAneous route daily.  9/12/18  Yes Kellen Kearney MD   metFORMIN (GLUCOPHAGE) 850 mg tablet Take 1 Tab by mouth two (2) times daily (with meals). 9/12/18  Yes Kellen Kearney MD   atorvastatin (LIPITOR) 40 mg tablet Take 1 Tab by mouth daily. 9/12/18  Yes Kellen Kearney MD   carvedilol (COREG) 6.25 mg tablet TAKE 1 TAB BY MOUTH TWO (2) TIMES DAILY (WITH MEALS). 9/12/18  Yes Kellen Kearney MD   hydrALAZINE (APRESOLINE) 25 mg tablet TAKE 1 TAB BY MOUTH THREE (3) TIMES DAILY. 9/12/18  Yes Kellen Kearney MD   spironolactone (ALDACTONE) 25 mg tablet Take 1 Tab by mouth daily. TAKE 1 TABLET EVERY DAY 9/12/18  Yes Kellen Kearney MD   isosorbide mononitrate ER (IMDUR) 30 mg tablet Take 1 Tab by mouth daily. 9/12/18  Yes Kellen Kearney MD   busPIRone (BUSPAR) 7.5 mg tablet Take 1 Tab by mouth three (3) times daily. 9/12/18  Yes Kellen Kearney MD   warfarin (COUMADIN) 5 mg tablet Take 1 Tab by mouth daily. Indications: pulmonary thromboembolism 9/12/18   Kellen Kearney MD   glucose blood VI test strips (BLOOD GLUCOSE TEST) strip Use BID DxE11.9 10/18/17   Kellen Kearney MD   Lancets misc Use as directed. Dx: E11.9 10/18/17   Kellen Kearney MD     Allergies   Allergen Reactions    Ace Inhibitors Swelling     Facial swelling cough     Morphine Angioedema    Arb-Angiotensin Receptor Antagonist Angioedema        Review of Systems:  Denies CP/SOB    Objective:     Physical Exam:   Visit Vitals    /87 (BP 1 Location: Right arm, BP Patient Position: At rest)    Pulse 61    Temp 98.1 °F (36.7 °C)    Resp 17    Ht 6' 3\" (1.905 m)    Wt 104.5 kg (230 lb 6.1 oz)    SpO2 98%    BMI 28.8 kg/m2     General:  Alert, cooperative, no distress, appears stated age. Head:  Normocephalic, without obvious abnormality, atraumatic. Neck: Supple, symmetrical, trachea midline, no adenopathy, thyroid: no enlargement/tenderness/nodules, no carotid bruit and no JVD.        Lungs:   Clear to auscultation bilaterally. Heart:  Regular rate and rhythm, S1, S2 normal, no murmur, click, rub or gallop. Abdomen:   Soft, non-tender. Bowel sounds normal. No masses,  No organomegaly. Extremities: Lt BKA w/ large fluid collection over tibia w/ punctate opening w/ scant serous drainage. Pulses: 2+ and symmetric all extremities. Neurologic: Normal strength, sensation throughout.        Assessment:     Active Problems:    Complication of amputated stump ([address] (10/4/2018)    Seroma vs abscess    Plan:     Explore, debride, and drain    Signed By: Anastasiia Flores MD     October 8, 2018

## 2018-10-08 NOTE — OP NOTES
[address] REPORT    Ngera Caro  MR#: [id number]  : 1970  ACCOUNT #: [de-identified]   DATE OF SERVICE: 10/04/2018    SURGEON:  RAFFAELE Reynoso MD       ASSISTANT:  None. PREOPERATIVE DIAGNOSIS:  Seroma on left below-knee amputation stump. POSTOPERATIVE DIAGNOSIS:  Seroma on left below-knee amputation stump. PROCEDURE PERFORMED:  Excisional debridement of left below-knee amputation stump (7.5 x 4 x 1.5 cm). ANESTHESIA:  MAC.      ESTIMATED BLOOD LOSS:  Minimal.       SPECIMENS REMOVED:  Culture of fluid collection on left BKA stump. DRAINS:  None. IMPLANTS:  None. COMPLICATIONS:  None. INDICATIONS:  The patient is a 76-year-old diabetic with an old left below-knee amputation. He has a sizable fluid collection over the anterior tibia which is painful and occasionally drains scant serous fluid. It is unclear if this is a simple seroma or an abscess. The area prevents him from using a prosthesis so he now presents for surgical exploration. PROCEDURE:  After informed consent was obtained, the patient was given preoperative intravenous antibiotics within 1 hour of the incision. He was taken to the operating room, and after induction of adequate sedation, the left leg was prepped and draped as a sterile field. There was a small punctate opening to a large cavity. The punctate opening was explored with a hemostat. Aerobic and anaerobic culture swabs were inserted to culture the underlying fluid. The cavity was quite sizable. A horizontally oriented elliptical incision was made in order to provide enough access to the cavity to adequately debride it but without completely removing a large amount of overlying skin and subcutaneous fat to hopefully allow for healing by secondary intention while maintaining adequate soft tissue coverage to allow prosthetic use in the future.   The wound created by the elliptical incision was 7.5 x 4 cm. The overlying skin and subcutaneous fat was discarded. The underlying cavity had a pseudo epithelial lining. There was no karla pus. The cavity was quite extensive. As much as possible, this pseudo epithelial lining was excised with electrocautery. Parts of it, which could not be excised, were cauterized to disrupt the epithelial lining and prevent further secretion of fluid. There is no exposed bone. There is simply some exposed fascia in the center of the wound. After ensuring adequate hemostasis, the wound was packed with a wet to dry dressing. The patient was transferred to the recovery area in stable condition having tolerated the procedure well.       MD JAMIL Jin /   D: 10/08/2018 01:57     T: 10/08/2018 06:56  JOB #: [id number]

## 2018-10-09 ENCOUNTER — HOME CARE VISIT (OUTPATIENT)
Dept: SCHEDULING | Facility: HOME HEALTH | Age: 48
End: 2018-10-09
Payer: COMMERCIAL

## 2018-10-09 VITALS
BODY MASS INDEX: 33.92 KG/M2 | HEIGHT: 69 IN | HEART RATE: 82 BPM | WEIGHT: 229 LBS | SYSTOLIC BLOOD PRESSURE: 134 MMHG | OXYGEN SATURATION: 97 % | TEMPERATURE: 97.7 F | RESPIRATION RATE: 18 BRPM | DIASTOLIC BLOOD PRESSURE: 78 MMHG

## 2018-10-09 PROCEDURE — G0299 HHS/HOSPICE OF RN EA 15 MIN: HCPCS

## 2018-10-11 ENCOUNTER — HOME CARE VISIT (OUTPATIENT)
Dept: SCHEDULING | Facility: HOME HEALTH | Age: 48
End: 2018-10-11
Payer: COMMERCIAL

## 2018-10-11 VITALS
SYSTOLIC BLOOD PRESSURE: 142 MMHG | HEART RATE: 60 BPM | OXYGEN SATURATION: 99 % | DIASTOLIC BLOOD PRESSURE: 84 MMHG | TEMPERATURE: 98.2 F

## 2018-10-11 PROCEDURE — G0299 HHS/HOSPICE OF RN EA 15 MIN: HCPCS

## 2018-10-13 ENCOUNTER — HOME CARE VISIT (OUTPATIENT)
Dept: SCHEDULING | Facility: HOME HEALTH | Age: 48
End: 2018-10-13
Payer: COMMERCIAL

## 2018-10-13 VITALS
RESPIRATION RATE: 18 BRPM | HEIGHT: 75 IN | HEART RATE: 60 BPM | SYSTOLIC BLOOD PRESSURE: 144 MMHG | DIASTOLIC BLOOD PRESSURE: 82 MMHG | OXYGEN SATURATION: 99 % | TEMPERATURE: 97.9 F

## 2018-10-13 PROCEDURE — G0299 HHS/HOSPICE OF RN EA 15 MIN: HCPCS

## 2018-10-15 PROCEDURE — A4452 WATERPROOF TAPE: HCPCS

## 2018-10-15 PROCEDURE — A4216 STERILE WATER/SALINE, 10 ML: HCPCS

## 2018-10-15 PROCEDURE — A6252 ABSORPT DRG >16 <=48 W/O BDR: HCPCS

## 2018-10-17 ENCOUNTER — HOME CARE VISIT (OUTPATIENT)
Dept: SCHEDULING | Facility: HOME HEALTH | Age: 48
End: 2018-10-17
Payer: COMMERCIAL

## 2018-10-17 VITALS
SYSTOLIC BLOOD PRESSURE: 118 MMHG | HEART RATE: 77 BPM | OXYGEN SATURATION: 99 % | RESPIRATION RATE: 18 BRPM | TEMPERATURE: 98.5 F | DIASTOLIC BLOOD PRESSURE: 88 MMHG

## 2018-10-17 PROCEDURE — G0300 HHS/HOSPICE OF LPN EA 15 MIN: HCPCS

## 2018-10-19 ENCOUNTER — HOME CARE VISIT (OUTPATIENT)
Dept: SCHEDULING | Facility: HOME HEALTH | Age: 48
End: 2018-10-19
Payer: COMMERCIAL

## 2018-10-19 PROCEDURE — G0300 HHS/HOSPICE OF LPN EA 15 MIN: HCPCS

## 2018-10-20 ENCOUNTER — HOME CARE VISIT (OUTPATIENT)
Dept: HOME HEALTH SERVICES | Facility: HOME HEALTH | Age: 48
End: 2018-10-20
Payer: COMMERCIAL

## 2018-10-22 ENCOUNTER — HOME CARE VISIT (OUTPATIENT)
Dept: HOME HEALTH SERVICES | Facility: HOME HEALTH | Age: 48
End: 2018-10-22
Payer: COMMERCIAL

## 2018-10-22 PROCEDURE — A6446 CONFORM BAND S W>=3" <5"/YD: HCPCS

## 2018-10-22 PROCEDURE — A4927 NON-STERILE GLOVES: HCPCS

## 2018-10-22 PROCEDURE — A5120 SKIN BARRIER, WIPE OR SWAB: HCPCS

## 2018-10-22 PROCEDURE — A9270 NON-COVERED ITEM OR SERVICE: HCPCS

## 2018-10-22 PROCEDURE — A4452 WATERPROOF TAPE: HCPCS

## 2018-10-22 PROCEDURE — A6216 NON-STERILE GAUZE<=16 SQ IN: HCPCS

## 2018-10-22 PROCEDURE — A4216 STERILE WATER/SALINE, 10 ML: HCPCS

## 2018-10-24 ENCOUNTER — HOME CARE VISIT (OUTPATIENT)
Dept: SCHEDULING | Facility: HOME HEALTH | Age: 48
End: 2018-10-24
Payer: COMMERCIAL

## 2018-10-24 VITALS
TEMPERATURE: 98.3 F | SYSTOLIC BLOOD PRESSURE: 140 MMHG | DIASTOLIC BLOOD PRESSURE: 70 MMHG | OXYGEN SATURATION: 99 % | HEART RATE: 73 BPM

## 2018-10-24 PROCEDURE — G0299 HHS/HOSPICE OF RN EA 15 MIN: HCPCS

## 2018-10-26 ENCOUNTER — TELEPHONE (OUTPATIENT)
Dept: INTERNAL MEDICINE CLINIC | Age: 48
End: 2018-10-26

## 2018-10-26 ENCOUNTER — HOME CARE VISIT (OUTPATIENT)
Dept: SCHEDULING | Facility: HOME HEALTH | Age: 48
End: 2018-10-26
Payer: COMMERCIAL

## 2018-10-26 PROCEDURE — G0300 HHS/HOSPICE OF LPN EA 15 MIN: HCPCS

## 2018-10-26 NOTE — TELEPHONE ENCOUNTER
Spoke w/ [facility] nurse. She wanted provider to know that during his hospital visit his BS were in the low 100's  During his stay and that pt's antibiotic has been switched from Augmentin to Bactrim and that due to being on new antibiotic he was instructed to stop taking Warfarin due to contraindications.  HH stated that pt would be finished w/ antibiotic by the time he comes in for his MICK appt on Tuesday and wanted to know if Dr. Kimberly Jackson would like to put pt back on blood thinner or would like to wait until his MICK appt with our office. (message routed to Provider)

## 2018-10-26 NOTE — TELEPHONE ENCOUNTER
Martha @ [facility] LINDA Hunt. states she would like to receive a return call from the nurse ref to pt was hospitalized and his medications were changed. He was taken off the blood thinner and she has other information to discuss.  Martha's # [phone]

## 2018-10-29 ENCOUNTER — HOME CARE VISIT (OUTPATIENT)
Dept: SCHEDULING | Facility: HOME HEALTH | Age: 48
End: 2018-10-29
Payer: COMMERCIAL

## 2018-10-29 PROCEDURE — G0300 HHS/HOSPICE OF LPN EA 15 MIN: HCPCS

## 2018-10-30 VITALS
TEMPERATURE: 98.5 F | DIASTOLIC BLOOD PRESSURE: 80 MMHG | HEART RATE: 86 BPM | OXYGEN SATURATION: 99 % | RESPIRATION RATE: 12 BRPM | SYSTOLIC BLOOD PRESSURE: 142 MMHG

## 2018-10-30 NOTE — DISCHARGE SUMMARY
Physician Discharge Summary     Patient ID:  Neville Samaniego  [id number]  40 y.o.  1970    Admit Date: 10/4/2018    Discharge Date: 10/7/2018    Admission Diagnoses: SEROMA LEFT STUMP   Complication of amputated stump ([facility]  Complication of amputated stump [facility])    Discharge Diagnoses: Active Problems:    Complication of amputated stump ([facility] (10/4/2018)         Last Procedure: Procedure(s):  IRRIGATION AND DRAINAGE LEFT BELOW KNEE AMPUTATION STUMP      Hospital Course:   Normal hospital course for this procedure. Hospital course prolonged by severe pain requiring IV narcotics. Patient Instructions:   Cannot display discharge medications since this patient is not currently admitted.       Follow-up with Ting Scott MD      Signed:  Ting Scott MD  10/30/2018  1:04 AM

## 2018-10-31 ENCOUNTER — CLINICAL SUPPORT (OUTPATIENT)
Dept: INTERNAL MEDICINE CLINIC | Age: 48
End: 2018-10-31

## 2018-10-31 DIAGNOSIS — I26.99 RECURRENT PULMONARY EMBOLI (HCC): Primary | ICD-10-CM

## 2018-10-31 LAB
INR BLD: 1
PT POC: NORMAL SECONDS
VALID INTERNAL CONTROL?: YES

## 2018-10-31 NOTE — PROGRESS NOTES
Fabiana Sauer is a 51 y.o. male who presents today for Anticoagulation monitoring. Indication:Re current PE  INR Goal: 2-3. Current dose:  Coumadin 5 mg daily. Missed Coumadin Doses:  0  Medication Changes: No  Dietary Changes: No    Symptoms: taking coumadin appropriately. Latest INRs:  Lab Results   Component Value Date/Time    INR 1.4 (H) 10/07/2018 05:31 AM    INR 1.2 (H) 10/06/2018 05:48 AM    INR 1.1 10/05/2018 06:13 AM    INR POC 1.0 10/31/2018 08:02 AM    Prothrombin time 13.8 (H) 10/07/2018 05:31 AM    Prothrombin time 11.9 (H) 10/06/2018 05:48 AM    Prothrombin time 11.0 10/05/2018 06:13 AM        New Coumadin dose::\"current treatment plan is effective, no change in therapy\". Next check to be scheduled for 1 weeks.

## 2018-11-01 ENCOUNTER — TELEPHONE (OUTPATIENT)
Dept: INTERNAL MEDICINE CLINIC | Age: 48
End: 2018-11-01

## 2018-11-01 ENCOUNTER — HOME CARE VISIT (OUTPATIENT)
Dept: SCHEDULING | Facility: HOME HEALTH | Age: 48
End: 2018-11-01
Payer: COMMERCIAL

## 2018-11-01 VITALS
RESPIRATION RATE: 18 BRPM | DIASTOLIC BLOOD PRESSURE: 64 MMHG | SYSTOLIC BLOOD PRESSURE: 105 MMHG | TEMPERATURE: 98.7 F | OXYGEN SATURATION: 97 % | HEART RATE: 78 BPM

## 2018-11-01 PROCEDURE — G0300 HHS/HOSPICE OF LPN EA 15 MIN: HCPCS
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
## 2018-11-05 ENCOUNTER — HOME CARE VISIT (OUTPATIENT)
Dept: SCHEDULING | Facility: HOME HEALTH | Age: 48
End: 2018-11-05
Payer: COMMERCIAL

## 2018-11-05 VITALS
SYSTOLIC BLOOD PRESSURE: 144 MMHG | TEMPERATURE: 98.3 F | HEART RATE: 86 BPM | RESPIRATION RATE: 20 BRPM | DIASTOLIC BLOOD PRESSURE: 85 MMHG | OXYGEN SATURATION: 98 %

## 2018-11-05 PROCEDURE — G0299 HHS/HOSPICE OF RN EA 15 MIN: HCPCS

## 2018-11-08 ENCOUNTER — HOME CARE VISIT (OUTPATIENT)
Dept: SCHEDULING | Facility: HOME HEALTH | Age: 48
End: 2018-11-08
Payer: COMMERCIAL

## 2018-11-08 PROCEDURE — G0300 HHS/HOSPICE OF LPN EA 15 MIN: HCPCS

## 2018-11-08 PROCEDURE — A6223 GAUZE >16<=48 NO W/SAL W/O B: HCPCS

## 2018-11-08 PROCEDURE — A6446 CONFORM BAND S W>=3" <5"/YD: HCPCS

## 2018-11-08 PROCEDURE — A4216 STERILE WATER/SALINE, 10 ML: HCPCS

## 2018-11-08 PROCEDURE — A4452 WATERPROOF TAPE: HCPCS

## 2018-11-12 ENCOUNTER — HOME CARE VISIT (OUTPATIENT)
Dept: SCHEDULING | Facility: HOME HEALTH | Age: 48
End: 2018-11-12
Payer: COMMERCIAL

## 2018-11-12 PROCEDURE — G0300 HHS/HOSPICE OF LPN EA 15 MIN: HCPCS

## 2018-11-15 ENCOUNTER — HOME CARE VISIT (OUTPATIENT)
Dept: HOME HEALTH SERVICES | Facility: HOME HEALTH | Age: 48
End: 2018-11-15
Payer: COMMERCIAL

## 2018-11-16 ENCOUNTER — HOME CARE VISIT (OUTPATIENT)
Dept: HOME HEALTH SERVICES | Facility: HOME HEALTH | Age: 48
End: 2018-11-16
Payer: COMMERCIAL

## 2018-11-19 ENCOUNTER — HOME CARE VISIT (OUTPATIENT)
Dept: HOME HEALTH SERVICES | Facility: HOME HEALTH | Age: 48
End: 2018-11-19
Payer: COMMERCIAL

## 2018-11-19 ENCOUNTER — HOME CARE VISIT (OUTPATIENT)
Dept: SCHEDULING | Facility: HOME HEALTH | Age: 48
End: 2018-11-19
Payer: COMMERCIAL

## 2018-11-21 VITALS
TEMPERATURE: 98.2 F | HEART RATE: 84 BPM | SYSTOLIC BLOOD PRESSURE: 136 MMHG | OXYGEN SATURATION: 97 % | DIASTOLIC BLOOD PRESSURE: 72 MMHG | RESPIRATION RATE: 14 BRPM

## 2018-11-26 ENCOUNTER — HOME CARE VISIT (OUTPATIENT)
Dept: SCHEDULING | Facility: HOME HEALTH | Age: 48
End: 2018-11-26
Payer: COMMERCIAL

## 2018-12-11 ENCOUNTER — OFFICE VISIT (OUTPATIENT)
Dept: INTERNAL MEDICINE CLINIC | Age: 48
End: 2018-12-11

## 2018-12-11 VITALS
DIASTOLIC BLOOD PRESSURE: 84 MMHG | RESPIRATION RATE: 20 BRPM | OXYGEN SATURATION: 99 % | WEIGHT: 247.6 LBS | SYSTOLIC BLOOD PRESSURE: 170 MMHG | HEIGHT: 75 IN | HEART RATE: 63 BPM | TEMPERATURE: 98.6 F | BODY MASS INDEX: 30.79 KG/M2

## 2018-12-11 DIAGNOSIS — Z79.01 LONG TERM CURRENT USE OF ANTICOAGULANTS WITH INR GOAL OF 2.0-3.0: ICD-10-CM

## 2018-12-11 DIAGNOSIS — I26.99 RECURRENT PULMONARY EMBOLI (HCC): ICD-10-CM

## 2018-12-11 DIAGNOSIS — Z89.512 HX OF BKA, LEFT (HCC): ICD-10-CM

## 2018-12-11 DIAGNOSIS — T87.9 COMPLICATION OF AMPUTATED STUMP (HCC): ICD-10-CM

## 2018-12-11 LAB
INR BLD: 1.2
PT POC: NORMAL SECONDS
VALID INTERNAL CONTROL?: YES

## 2018-12-11 NOTE — PATIENT INSTRUCTIONS

## 2018-12-11 NOTE — PROGRESS NOTES
Chief Complaint   Patient presents with    Diabetes    Chronic Kidney Disease     1. Have you been to the ER, urgent care clinic since your last visit? Hospitalized since your last visit? No    2. Have you seen or consulted any other health care providers outside of the [address] [place] [address] since your last visit? Include any pap smears or colon screening.  Yes When: 11/2018 Where: surgeon Reason for visit: leg

## 2018-12-11 NOTE — PROGRESS NOTES
Janneth Ragsdale is a 51 y.o. male and presents with Diabetes and Chronic Kidney Disease  . Subjective:    Since last visit, pt has been hospitalized 10/4/18-10/7/18 w left stump seroma s/p drainage. Pt was d/c'ed home on the vacuum pump. Pt relays since d/c his sugars have been 114-145mg/dl. Pt relays he has changed his diet and is taking his medication regularly  Pt requests Pain Mngmnt referral bc surgeon will no longer rx percoset    Diabetic gastropathy-pt relays he vomits less since sugars have improved    S/p left BKA- as above    Type 2 DM-insulin dependent-pt is compliant w diet and medication     Lab Results   Component Value Date/Time    Hemoglobin A1c 11.1 (H) 07/12/2017 12:00 PM    Hemoglobin A1c (POC) 9.1 09/11/2018 11:08 AM     Lab Results   Component Value Date/Time    Cholesterol, total 193 01/06/2018 05:38 AM    Cholesterol (POC) 266 11/01/2013 12:40 PM    HDL Cholesterol 62 01/06/2018 05:38 AM    HDL Cholesterol (POC) 50 11/01/2013 12:40 PM    LDL Cholesterol (POC) 191 11/01/2013 12:40 PM    LDL, calculated 106.6 (H) 01/06/2018 05:38 AM    VLDL, calculated 24.4 01/06/2018 05:38 AM    Triglyceride 122 01/06/2018 05:38 AM    Triglycerides (POC) 123 11/01/2013 12:40 PM    CHOL/HDL Ratio 3.1 01/06/2018 05:38 AM         HTN w ckd 3-.  CKD istable   -  Lab Results   Component Value Date/Time    GFR est AA 53 (L) 10/04/2018 09:46 AM    GFR est non-AA 44 (L) 10/04/2018 09:46 AM    Creatinine (POC) 1.6 (H) 04/17/2017 10:36 AM    Creatinine 1.68 (H) 10/04/2018 09:46 AM    BUN 23 (H) 10/04/2018 09:46 AM    BUN (POC) 24 (H) 04/17/2017 10:36 AM    Sodium (POC) 139 04/17/2017 10:36 AM    Sodium 138 10/04/2018 09:46 AM    Potassium 4.0 10/04/2018 09:46 AM    Potassium (POC) 3.7 04/17/2017 10:36 AM    Chloride (POC) 103 04/17/2017 10:36 AM    Chloride 107 10/04/2018 09:46 AM    CO2 23 10/04/2018 09:46 AM     BP Readings from Last 3 Encounters:   12/11/18 170/84   11/12/18 136/72   11/05/18 144/85 Chronic systolic heart failure-EF 25-30% during hospitalization. Has improved as per cardiology records    Recurrent PE-on warfarin, pt would like to switch to eliquis if insurance will cover   -pt was on a low dose of warfarin due to being on abx as well, which increased the potency of the blood thinner     Lab Results   Component Value Date/Time    INR 1.4 (H) 10/07/2018 05:31 AM    INR 1.2 (H) 10/06/2018 05:48 AM    INR 1.1 10/05/2018 06:13 AM    INR POC 1.2 12/11/2018 10:02 AM    INR POC 1.0 10/31/2018 08:02 AM    Prothrombin time 13.8 (H) 10/07/2018 05:31 AM    Prothrombin time 11.9 (H) 10/06/2018 05:48 AM    Prothrombin time 11.0 10/05/2018 06:13 AM       H/o vitamin d deficiency-    Clinical depression/anxiety-OBVIOULY.  But pt refuses referral to psychiatry      Wt Readings from Last 3 Encounters:   12/11/18 247 lb 9.6 oz (112.3 kg)   10/09/18 229 lb (103.9 kg)   10/04/18 230 lb 6.1 oz (104.5 kg)             Review of Systems  Constitutional: negative for fevers, chills, anorexia and weight loss  Respiratory:  negative for cough, hemoptysis, dyspnea,wheezing  CV:   negative for chest pain, palpitations, lower extremity edema  GI:   positive for nausea, vomiting,   Musculoskel: positive for myalgias, arthralgias,  joint pain  Neurological:  negative for headaches, dizziness, vertigo, memory problems and gait   Behavl/Psych: positive for feelings of  depression, mood changes    Past Medical History:   Diagnosis Date    Diabetes ([facility]     since age 23   [facility] GERD (gastroesophageal reflux disease)     HTN (hypertension)     Hypercholesteremia     Hyperlipidemia     Psychiatric disorder     anxiety & depression    Thromboembolus ([facility] 2017    bilateral legs     Past Surgical History:   Procedure Laterality Date    HX AMPUTATION      toes- left foot    HX AMPUTATION Left 08/2017    BKA    HX BUNIONECTOMY      HX ORTHOPAEDIC      boutinerre deformity    HX ORTHOPAEDIC      fascia removed left foot  HX OTHER SURGICAL  2017    Skin graft Surgery    HX TONSILLECTOMY      HX WISDOM TEETH EXTRACTION       Social History     Socioeconomic History    Marital status:      Spouse name: Not on file    Number of children: Not on file    Years of education: Not on file    Highest education level: Not on file   Tobacco Use    Smoking status: Former Smoker     Types: Cigars     Last attempt to quit: 2011     Years since quittin.6    Smokeless tobacco: Never Used   Substance and Sexual Activity    Alcohol use: Yes     Alcohol/week: 0.0 oz     Types: 1 Glasses of wine per week     Comment: stop drinking 5 months ago    Drug use: No    Sexual activity: Yes     Partners: Female     Birth control/protection: None   Social History Narrative    ** Merged History Encounter **          Family History   Problem Relation Age of Onset    Hypertension Mother     High Cholesterol Mother      Current Outpatient Medications   Medication Sig Dispense Refill    apixaban (ELIQUIS) 2.5 mg tablet Take 1 Tab by mouth two (2) times a day. 60 Tab 11    insulin lispro (HUMALOG) 100 unit/mL kwikpen 12 Units by SubCUTAneous route Before breakfast, lunch, and dinner. DX: E11.65 3 Adjustable Dose Pre-filled Pen Syringe 5    insulin glargine (BASAGLAR KWIKPEN U-100 INSULIN) 100 unit/mL (3 mL) inpn 60 Units by SubCUTAneous route daily. 5 Adjustable Dose Pre-filled Pen Syringe 5    metFORMIN (GLUCOPHAGE) 850 mg tablet Take 1 Tab by mouth two (2) times daily (with meals). 60 Tab 5    atorvastatin (LIPITOR) 40 mg tablet Take 1 Tab by mouth daily. 30 Tab 5    carvedilol (COREG) 6.25 mg tablet TAKE 1 TAB BY MOUTH TWO (2) TIMES DAILY (WITH MEALS). 60 Tab 5    hydrALAZINE (APRESOLINE) 25 mg tablet TAKE 1 TAB BY MOUTH THREE (3) TIMES DAILY. 90 Tab 5    spironolactone (ALDACTONE) 25 mg tablet Take 1 Tab by mouth daily.  TAKE 1 TABLET EVERY DAY 30 Tab 5    isosorbide mononitrate ER (IMDUR) 30 mg tablet Take 1 Tab by mouth daily. 30 Tab 5    glucose blood VI test strips (BLOOD GLUCOSE TEST) strip Use BID DxE11.9 200 Strip 5    Lancets misc Use as directed. Dx: E11.9 1 Each 11    oxyCODONE-acetaminophen (PERCOCET) 5-325 mg per tablet Take 2 Tabs by mouth every four (4) hours as needed. Max Daily Amount: 12 Tabs. 40 Tab 0    trimethoprim-sulfamethoxazole (BACTRIM DS, SEPTRA DS) 160-800 mg per tablet Take 1 Tab by mouth two (2) times a day. Indications: mrsa stump infection 28 Tab 1    busPIRone (BUSPAR) 7.5 mg tablet Take 1 Tab by mouth three (3) times daily. 90 Tab 5     Allergies   Allergen Reactions    Ace Inhibitors Swelling     Facial swelling cough     Morphine Angioedema    Arb-Angiotensin Receptor Antagonist Angioedema       Objective:  Visit Vitals  /84 (BP 1 Location: Right arm, BP Patient Position: Sitting)   Pulse 63   Temp 98.6 °F (37 °C) (Oral)   Resp 20   Ht 6' 3\" (1.905 m)   Wt 247 lb 9.6 oz (112.3 kg)   SpO2 99%   BMI 30.95 kg/m²     Physical Exam:   General appearance - alert, obese,  pleasant  Mental status - alert, oriented to person, place, and time  EYE-EOMI  Chest - clear to auscultation, no wheezes, rales or rhonchi, symmetric air entry  Heart - normal rate, regular rhythm, normal S1, S2  Abdomen - obese  Ext-left BKA  Skin-Warm and dry. no hyperpigmentation, vitiligo, or suspicious lesions  Neuro -alert, oriented, normal speech, no focal findings or movement disorder noted        Results for orders placed or performed in visit on 12/11/18   AMB POC PT/INR   Result Value Ref Range    VALID INTERNAL CONTROL POC Yes     Prothrombin time (POC)  seconds    INR POC 1.2        Assessment/Plan:    ICD-10-CM ICD-9-CM    1. IDDM (insulin dependent diabetes mellitus) ([facility] E11.9 250.00     Z79.4 V58.67    2. Recurrent pulmonary emboli (HCC) I26.99 415.19 AMB POC PT/INR      apixaban (ELIQUIS) 2.5 mg tablet   3.  Long term current use of anticoagulants with INR goal of 2.0-3.0 Z79.01 V58.61 AMB POC PT/INR apixaban (ELIQUIS) 2.5 mg tablet   4. Complication of amputated stump (HCC) T87.9 997.60 REFERRAL TO PAIN MANAGEMENT   5. Hx of BKA, left ([facility] Z89.512 V49.75      Orders Placed This Encounter    REFERRAL TO PAIN MANAGEMENT     Referral Priority:   Routine     Referral Type:   Consultation     Referral Reason:   Specialty Services Required     Number of Visits Requested:   1    AMB POC PT/INR    apixaban (ELIQUIS) 2.5 mg tablet     Sig: Take 1 Tab by mouth two (2) times a day. Dispense:  60 Tab     Refill:  11     1. IDDM (insulin dependent diabetes mellitus) ([facility])  Check A1c NV    2. Recurrent pulmonary emboli ([facility])  Eliquis lower dose due to CKD  - AMB POC PT/INR  - apixaban (ELIQUIS) 2.5 mg tablet; Take 1 Tab by mouth two (2) times a day. Dispense: 60 Tab; Refill: 11    3. Long term current use of anticoagulants     - AMB POC PT/INR  - apixaban (ELIQUIS) 2.5 mg tablet; Take 1 Tab by mouth two (2) times a day. Dispense: 60 Tab; Refill: 11    4. Complication of amputated stump ([facility]    - REFERRAL TO PAIN MANAGEMENT    5. Hx of BKA, left [facility])        Patient Instructions        Learning About Diabetes Food Guidelines  Your Care Instructions    Meal planning is important to manage diabetes. It helps keep your blood sugar at a target level (which you set with your doctor). You don't have to eat special foods. You can eat what your family eats, including sweets once in a while. But you do have to pay attention to how often you eat and how much you eat of certain foods. You may want to work with a dietitian or a certified diabetes educator (CDE) to help you plan meals and snacks. A dietitian or CDE can also help you lose weight if that is one of your goals. What should you know about eating carbs? Managing the amount of carbohydrate (carbs) you eat is an important part of healthy meals when you have diabetes. Carbohydrate is found in many foods. · Learn which foods have carbs.  And learn the amounts of carbs in different foods. ? Bread, cereal, pasta, and rice have about 15 grams of carbs in a serving. A serving is 1 slice of bread (1 ounce), ½ cup of cooked cereal, or 1/3 cup of cooked pasta or rice. ? Fruits have 15 grams of carbs in a serving. A serving is 1 small fresh fruit, such as an apple or orange; ½ of a banana; ½ cup of cooked or canned fruit; ½ cup of fruit juice; 1 cup of melon or raspberries; or 2 tablespoons of dried fruit. ? Milk and no-sugar-added yogurt have 15 grams of carbs in a serving. A serving is 1 cup of milk or 2/3 cup of no-sugar-added yogurt. ? Starchy vegetables have 15 grams of carbs in a serving. A serving is ½ cup of mashed potatoes or sweet potato; 1 cup winter squash; ½ of a small baked potato; ½ cup of cooked beans; or ½ cup cooked corn or green peas. · Learn how much carbs to eat each day and at each meal. A dietitian or CDE can teach you how to keep track of the amount of carbs you eat. This is called carbohydrate counting. · If you are not sure how to count carbohydrate grams, use the Plate Method to plan meals. It is a good, quick way to make sure that you have a balanced meal. It also helps you spread carbs throughout the day. ? Divide your plate by types of foods. Put non-starchy vegetables on half the plate, meat or other protein food on one-quarter of the plate, and a grain or starchy vegetable in the final quarter of the plate. To this you can add a small piece of fruit and 1 cup of milk or yogurt, depending on how many carbs you are supposed to eat at a meal.  · Try to eat about the same amount of carbs at each meal. Do not \"save up\" your daily allowance of carbs to eat at one meal.  · Proteins have very little or no carbs per serving. Examples of proteins are beef, chicken, turkey, fish, eggs, tofu, cheese, cottage cheese, and peanut butter. A serving size of meat is 3 ounces, which is about the size of a deck of cards.  Examples of meat substitute serving sizes (equal to 1 ounce of meat) are 1/4 cup of cottage cheese, 1 egg, 1 tablespoon of peanut butter, and ½ cup of tofu. How can you eat out and still eat healthy? · Learn to estimate the serving sizes of foods that have carbohydrate. If you measure food at home, it will be easier to estimate the amount in a serving of restaurant food. · If the meal you order has too much carbohydrate (such as potatoes, corn, or baked beans), ask to have a low-carbohydrate food instead. Ask for a salad or green vegetables. · If you use insulin, check your blood sugar before and after eating out to help you plan how much to eat in the future. · If you eat more carbohydrate at a meal than you had planned, take a walk or do other exercise. This will help lower your blood sugar. What else should you know? · Limit saturated fat, such as the fat from meat and dairy products. This is a healthy choice because people who have diabetes are at higher risk of heart disease. So choose lean cuts of meat and nonfat or low-fat dairy products. Use olive or canola oil instead of butter or shortening when cooking. · Don't skip meals. Your blood sugar may drop too low if you skip meals and take insulin or certain medicines for diabetes. · Check with your doctor before you drink alcohol. Alcohol can cause your blood sugar to drop too low. Alcohol can also cause a bad reaction if you take certain diabetes medicines. Follow-up care is a key part of your treatment and safety. Be sure to make and go to all appointments, and call your doctor if you are having problems. It's also a good idea to know your test results and keep a list of the medicines you take. Where can you learn more? Go to http://lori-bella.info/. Enter F544 in the search box to learn more about \"Learning About Diabetes Food Guidelines. \"  Current as of: December 7, 2017  Content Version: 11.8  © 6252-3481 [employer].  Care instructions adapted under license by [address] (which disclaims liability or warranty for this information). If you have questions about a medical condition or this instruction, always ask your healthcare professional. [address] any warranty or liability for your use of this information. Follow-up Disposition:  Return in about 2 months (around 2/11/2019). I have reviewed with the patient details of the assessment and plan and all questions were answered. Relevent patient education was performed. The most recent lab findings were reviewed with the patient. An After Visit Summary was printed and given to the patient.

## 2019-04-08 ENCOUNTER — TELEPHONE (OUTPATIENT)
Dept: INTERNAL MEDICINE CLINIC | Age: 49
End: 2019-04-08

## 2019-04-08 NOTE — TELEPHONE ENCOUNTER
Two patient identifier confirmed   Called and spoke w/ pt and scheduled f/u DM and A1c appt for 04/16/19.

## 2019-04-08 NOTE — TELEPHONE ENCOUNTER
MD Kurt Bustos LPN   Caller: Unspecified (Today,  1:21 PM)             I cannot refill the med bc he has not been here, nor had INR check in 4 mths. He needs an appt      Called and informed pt that his requested meds can not be refilled until appt on 04/16/19. Patient verbalized understanding of information and instructions given and agrees w/ plan.

## 2019-04-08 NOTE — TELEPHONE ENCOUNTER
Pt requesting refill on coumdain 5 mg tablet. Requested medication not on current med list. Unable to send rx request to provider. (message routed to provider).

## 2019-04-12 NOTE — BRIEF OP NOTE
BRIEF OPERATIVE NOTE    Date of Procedure: 5/28/2017   Preoperative Diagnosis: Left foot infection  Postoperative Diagnosis: Left foot infection    Procedure(s):  FOOT DEBRIDEMENT  Surgeon(s) and Role:     * Miladys Whiting DPM - Primary         Assistant Staff:       Surgical Staff:  Circ-1: Sam Cross RN  Scrub Tech-1: Brian Rizo  Surg Asst-1: Maurice Board  Event Time In   Incision Start 1209   Incision Close 1226     Anesthesia: General   Estimated Blood Loss: less than 20ml  Specimens:   ID Type Source Tests Collected by Time Destination   1 : LEFT FIFTH METATARSAL Preservative Foot, left  Miladys Whiting DPM 5/28/2017 1220 Pathology   1 : LEFT FOOT ABSCESS Abscess Foot, left ANAEROBIC/AEROBIC/GRAM STAIN Miladys Whiting DPM 5/28/2017 1209 Microbiology   2 : LEFT FIFTH METATARSAL Bone Foot, left ANAEROBIC/AEROBIC/GRAM STAIN Miladys Whiting DPM 5/28/2017 1212 Microbiology      Findings: infected 5th metatarsal and abscess left foot   Complications: none  Implants: * No implants in log * Phone consent obtained by this RN and Yessenia SCHNEIDER MA. Pt allergies, medications, and history gone through with pt mother.

## 2019-04-15 ENCOUNTER — OFFICE VISIT (OUTPATIENT)
Dept: INTERNAL MEDICINE CLINIC | Age: 49
End: 2019-04-15

## 2019-04-15 VITALS
HEIGHT: 75 IN | RESPIRATION RATE: 19 BRPM | SYSTOLIC BLOOD PRESSURE: 190 MMHG | DIASTOLIC BLOOD PRESSURE: 100 MMHG | HEART RATE: 62 BPM | BODY MASS INDEX: 31.71 KG/M2 | WEIGHT: 255 LBS | TEMPERATURE: 98.7 F | OXYGEN SATURATION: 96 %

## 2019-04-15 DIAGNOSIS — I26.99 RECURRENT PULMONARY EMBOLI (HCC): ICD-10-CM

## 2019-04-15 DIAGNOSIS — I25.5 ISCHEMIC CARDIOMYOPATHY: ICD-10-CM

## 2019-04-15 DIAGNOSIS — N18.30 STAGE 3 CHRONIC KIDNEY DISEASE (HCC): ICD-10-CM

## 2019-04-15 DIAGNOSIS — Z91.199 NON-COMPLIANCE WITH TREATMENT: ICD-10-CM

## 2019-04-15 DIAGNOSIS — Z79.01 LONG TERM CURRENT USE OF ANTICOAGULANTS WITH INR GOAL OF 2.0-3.0: ICD-10-CM

## 2019-04-15 DIAGNOSIS — I50.22 CHRONIC SYSTOLIC HEART FAILURE (HCC): ICD-10-CM

## 2019-04-15 DIAGNOSIS — R45.4 EXCESSIVE ANGER: ICD-10-CM

## 2019-04-15 DIAGNOSIS — I13.0 HYPERTENSIVE HEART AND RENAL DISEASE WITH RENAL FAILURE, STAGE 1 THROUGH STAGE 4 OR UNSPECIFIED CHRONIC KIDNEY DISEASE, WITH HEART FAILURE (HCC): ICD-10-CM

## 2019-04-15 DIAGNOSIS — Z89.512 HX OF BKA, LEFT (HCC): ICD-10-CM

## 2019-04-15 DIAGNOSIS — E78.00 HYPERCHOLESTEREMIA: ICD-10-CM

## 2019-04-15 LAB
CHOLEST SERPL-MCNC: 336 MG/DL
GLUCOSE POC: 339 MG/DL
HBA1C MFR BLD HPLC: 10.1 %
HDLC SERPL-MCNC: 43 MG/DL
LDL CHOLESTEROL POC: 255 MG/DL
NON-HDL GOAL (POC): 293
TCHOL/HDL RATIO (POC): 6
TRIGL SERPL-MCNC: 188 MG/DL

## 2019-04-15 RX ORDER — METFORMIN HYDROCHLORIDE 850 MG/1
850 TABLET ORAL 2 TIMES DAILY WITH MEALS
Qty: 60 TAB | Refills: 5 | Status: SHIPPED | OUTPATIENT
Start: 2019-04-15 | End: 2020-06-29 | Stop reason: SDUPTHER

## 2019-04-15 RX ORDER — CARVEDILOL 6.25 MG/1
TABLET ORAL
Qty: 60 TAB | Refills: 5 | Status: SHIPPED | OUTPATIENT
Start: 2019-04-15 | End: 2019-08-15 | Stop reason: DRUGHIGH

## 2019-04-15 RX ORDER — INSULIN GLARGINE 100 [IU]/ML
60 INJECTION, SOLUTION SUBCUTANEOUS DAILY
Qty: 5 ADJUSTABLE DOSE PRE-FILLED PEN SYRINGE | Refills: 5 | Status: SHIPPED | OUTPATIENT
Start: 2019-04-15 | End: 2019-07-16 | Stop reason: CLARIF

## 2019-04-15 RX ORDER — HYDRALAZINE HYDROCHLORIDE 25 MG/1
TABLET, FILM COATED ORAL
Qty: 90 TAB | Refills: 5 | Status: SHIPPED | OUTPATIENT
Start: 2019-04-15 | End: 2020-06-29 | Stop reason: SDUPTHER

## 2019-04-15 RX ORDER — PEN NEEDLE, DIABETIC 30 GX3/16"
NEEDLE, DISPOSABLE MISCELLANEOUS
Qty: 100 PACKAGE | Refills: 11 | Status: SHIPPED | OUTPATIENT
Start: 2019-04-15

## 2019-04-15 RX ORDER — ATORVASTATIN CALCIUM 40 MG/1
40 TABLET, FILM COATED ORAL DAILY
Qty: 30 TAB | Refills: 5 | Status: SHIPPED | OUTPATIENT
Start: 2019-04-15 | End: 2020-06-29 | Stop reason: SDUPTHER

## 2019-04-15 RX ORDER — WARFARIN SODIUM 5 MG/1
TABLET ORAL
Qty: 90 TAB | Refills: 1 | Status: SHIPPED | OUTPATIENT
Start: 2019-04-15 | End: 2019-05-01 | Stop reason: DRUGHIGH

## 2019-04-15 RX ORDER — SPIRONOLACTONE 25 MG/1
25 TABLET ORAL DAILY
Qty: 30 TAB | Refills: 5 | Status: SHIPPED | OUTPATIENT
Start: 2019-04-15 | End: 2020-06-29 | Stop reason: SDUPTHER

## 2019-04-15 RX ORDER — ISOSORBIDE MONONITRATE 30 MG/1
30 TABLET, EXTENDED RELEASE ORAL DAILY
Qty: 30 TAB | Refills: 5 | Status: SHIPPED | OUTPATIENT
Start: 2019-04-15 | End: 2020-06-29 | Stop reason: SDUPTHER

## 2019-04-15 NOTE — PROGRESS NOTES
Chief Complaint   Patient presents with    Diabetes     1. Have you been to the ER, urgent care clinic since your last visit? Hospitalized since your last visit? No    2. Have you seen or consulted any other health care providers outside of the [address] [place] [address] since your last visit? Include any pap smears or colon screening.  No

## 2019-04-15 NOTE — PROGRESS NOTES
Chavez Godoy is a 51 y.o. male and presents with Diabetes  . Subjective:    Pt walked-in today for warfarin refill. Pt is obviously upset that his warfarin rx was not refilled  Pt has not been seen since 12/18. Pt has an appt scheduled for tomorrow. Pt STOPPED HIS HEART MEDS bc he was \"released\" by his cardiologist. As per pt, the cardiologist wrote a rtw letter and told him he was done. Pt has not returned since 11/18. Recurrent PE-pt was rx'ed eliquis at his last visit 12/18, but never started bc his copay/deductible is too high, so he restarted his warfarin   -he has been on warfarin x FOUR MONTHS w/o an INR check.     -when pt last requested a refill of his warfarin, I declined bc he has not had an INR check.   -pt relays he \"ran out\" of warfarin 4 days ago   -pt has NOT had a therapeutic INR since ? ?? Due to non-compliance w med and f/u appt (especially INRs)    Diabetic gastropathy-pt relays he vomits less since sugars have improved    S/p left BKA- as above    Type 2 DM-insulin dependent-pt is relatively compliant w diet and medication   Pt relays his fsg are normal when he is compliant w diet and high when he is not     Lab Results   Component Value Date/Time    Hemoglobin A1c 11.1 (H) 07/12/2017 12:00 PM    Hemoglobin A1c (POC) 10.1 04/15/2019 09:15 AM     Hyperlipidemia-  Lab Results   Component Value Date/Time    Cholesterol, total 193 01/06/2018 05:38 AM    Cholesterol (POC) 336 04/15/2019 09:17 AM    HDL Cholesterol 62 01/06/2018 05:38 AM    HDL Cholesterol (POC) 43 04/15/2019 09:17 AM    LDL Cholesterol (POC) 255 04/15/2019 09:17 AM    LDL, calculated 106.6 (H) 01/06/2018 05:38 AM    VLDL, calculated 24.4 01/06/2018 05:38 AM    Triglyceride 122 01/06/2018 05:38 AM    Triglycerides (POC) 188 04/15/2019 09:17 AM    CHOL/HDL Ratio 3.1 01/06/2018 05:38 AM         HTN w ckd 3-.    -  Lab Results   Component Value Date/Time    GFR est AA 53 (L) 10/04/2018 09:46 AM    GFR est non-AA 44 (L) 10/04/2018 09:46 AM    Creatinine (POC) 1.6 (H) 04/17/2017 10:36 AM    Creatinine 1.68 (H) 10/04/2018 09:46 AM    BUN 23 (H) 10/04/2018 09:46 AM    BUN (POC) 24 (H) 04/17/2017 10:36 AM    Sodium (POC) 139 04/17/2017 10:36 AM    Sodium 138 10/04/2018 09:46 AM    Potassium 4.0 10/04/2018 09:46 AM    Potassium (POC) 3.7 04/17/2017 10:36 AM    Chloride (POC) 103 04/17/2017 10:36 AM    Chloride 107 10/04/2018 09:46 AM    CO2 23 10/04/2018 09:46 AM     BP Readings from Last 3 Encounters:   04/15/19 (!) 190/100   12/11/18 170/84   11/12/18 136/72         Chronic systolic heart failure-EF 25-30% during hospitalization. Has improved as per cardiology records      Pt did not f/u w Pain Mng[facility]   Lab Results   Component Value Date/Time    INR 1.4 (H) 10/07/2018 05:31 AM    INR 1.2 (H) 10/06/2018 05:48 AM    INR 1.1 10/05/2018 06:13 AM    INR POC 1.2 12/11/2018 10:02 AM    INR POC 1.0 10/31/2018 08:02 AM    Prothrombin time 13.8 (H) 10/07/2018 05:31 AM    Prothrombin time 11.9 (H) 10/06/2018 05:48 AM    Prothrombin time 11.0 10/05/2018 06:13 AM       H/o vitamin d deficiency-    Clinical depression/anxiety-OBVIOULY.  But pt refuses referral to psychiatry      Wt Readings from Last 3 Encounters:   04/15/19 255 lb (115.7 kg)   12/11/18 247 lb 9.6 oz (112.3 kg)   10/09/18 229 lb (103.9 kg)             Review of Systems  Constitutional: negative for fevers, chills, anorexia and weight loss  Respiratory:  negative for cough, hemoptysis, dyspnea,wheezing  CV:   negative for chest pain, palpitations, lower extremity edema  GI:   positive for nausea, vomiting,   Musculoskel: positive for myalgias, arthralgias,  joint pain  Neurological:  negative for headaches, dizziness, vertigo, memory problems and gait   Behavl/Psych: positive for feelings of  depression, mood changes    Past Medical History:   Diagnosis Date    Diabetes ([address]     since age 23   [facility] GERD (gastroesophageal reflux disease)     HTN (hypertension)     Hypercholesteremia     Hyperlipidemia     Psychiatric disorder     anxiety & depression    Thromboembolus ([facility] 2017    bilateral legs     Past Surgical History:   Procedure Laterality Date    HX AMPUTATION      toes- left foot    HX AMPUTATION Left 2017    BKA    HX BUNIONECTOMY      HX ORTHOPAEDIC      boutinerre deformity    HX ORTHOPAEDIC      fascia removed left foot    HX OTHER SURGICAL  2017    Skin graft Surgery    HX TONSILLECTOMY      HX WISDOM TEETH EXTRACTION       Social History     Socioeconomic History    Marital status:      Spouse name: Not on file    Number of children: Not on file    Years of education: Not on file    Highest education level: Not on file   Tobacco Use    Smoking status: Former Smoker     Types: Cigars     Last attempt to quit: 2011     Years since quittin.9    Smokeless tobacco: Never Used   Substance and Sexual Activity    Alcohol use: Yes     Alcohol/week: 0.0 oz     Types: 1 Glasses of wine per week     Comment: stop drinking 5 months ago    Drug use: No    Sexual activity: Yes     Partners: Female     Birth control/protection: None   Social History Narrative    ** Merged History Encounter **          Family History   Problem Relation Age of Onset    Hypertension Mother     High Cholesterol Mother      Current Outpatient Medications   Medication Sig Dispense Refill    carvedilol (COREG) 6.25 mg tablet TAKE 1 TAB BY MOUTH TWO (2) TIMES DAILY (WITH MEALS). 60 Tab 5    hydrALAZINE (APRESOLINE) 25 mg tablet TAKE 1 TAB BY MOUTH THREE (3) TIMES DAILY. 90 Tab 5    isosorbide mononitrate ER (IMDUR) 30 mg tablet Take 1 Tab by mouth daily. 30 Tab 5    spironolactone (ALDACTONE) 25 mg tablet Take 1 Tab by mouth daily. TAKE 1 TABLET EVERY DAY 30 Tab 5    metFORMIN (GLUCOPHAGE) 850 mg tablet Take 1 Tab by mouth two (2) times daily (with meals). 60 Tab 5    atorvastatin (LIPITOR) 40 mg tablet Take 1 Tab by mouth daily.  30 Tab 5    insulin glargine [facility] U-100 INSULIN) 100 unit/mL (3 mL) inpn 60 Units by SubCUTAneous route daily. 5 Adjustable Dose Pre-filled Pen Syringe 5    Insulin Needles, Disposable, 31 gauge x 5/16\" ndle Use four timed daily w insulin 100 Package 11    warfarin (COUMADIN) 5 mg tablet 1.5 tabs daily 90 Tab 1    insulin lispro (HUMALOG) 100 unit/mL kwikpen 12 Units by SubCUTAneous route Before breakfast, lunch, and dinner. DX: E11.65 3 Adjustable Dose Pre-filled Pen Syringe 5    busPIRone (BUSPAR) 7.5 mg tablet Take 1 Tab by mouth three (3) times daily. 90 Tab 5    glucose blood VI test strips (BLOOD GLUCOSE TEST) strip Use BID DxE11.9 200 Strip 5    Lancets misc Use as directed. Dx: E11.9 1 Each 11    oxyCODONE-acetaminophen (PERCOCET) 5-325 mg per tablet Take 2 Tabs by mouth every four (4) hours as needed. Max Daily Amount: 12 Tabs. 40 Tab 0     Allergies   Allergen Reactions    Ace Inhibitors Swelling     Facial swelling cough     Morphine Angioedema    Arb-Angiotensin Receptor Antagonist Angioedema       Objective:  Visit Vitals  BP (!) 190/100 (BP 1 Location: Left arm, BP Patient Position: Sitting)   Pulse 62   Temp 98.7 °F (37.1 °C) (Oral)   Resp 19   Ht 6' 3\" (1.905 m)   Wt 255 lb (115.7 kg)   SpO2 96%   BMI 31.87 kg/m²     Physical Exam:   General appearance - alert, obese, agitated  Mental status - alert, oriented to person, place, and time  EYE-EOMI  Neck - supple,   Chest - symmetric air entry    Abdomen - obese  Ext-no pedal edema, no clubbing or cyanosis  Skin-Warm and dry.  no hyperpigmentation, vitiligo, or suspicious lesions  Neuro -alert, oriented, normal speech, no focal findings        Results for orders placed or performed in visit on 04/15/19   AMB POC GLUCOSE BLOOD, BY GLUCOSE MONITORING DEVICE   Result Value Ref Range    Glucose  mg/dL   AMB POC HEMOGLOBIN A1C   Result Value Ref Range    Hemoglobin A1c (POC) 10.1 %   AMB POC LIPID PROFILE   Result Value Ref Range    Cholesterol (POC) 336     Triglycerides (POC) 188     HDL Cholesterol (POC) 43     LDL Cholesterol (POC) 255 MG/DL    Non-HDL Goal (POC) 293     TChol/HDL Ratio (POC) 6.0        Assessment/Plan:    ICD-10-CM ICD-9-CM    1. IDDM (insulin dependent diabetes mellitus) (HCC) E11.9 250.00 AMB POC GLUCOSE BLOOD, BY GLUCOSE MONITORING DEVICE    Z79.4 V58.67 AMB POC HEMOGLOBIN A1C      AMB POC LIPID PROFILE      metFORMIN (GLUCOPHAGE) 850 mg tablet      insulin glargine (BASAGLAR KWIKPEN U-100 INSULIN) 100 unit/mL (3 mL) inpn      Insulin Needles, Disposable, 31 gauge x 5/16\" ndle   2. Non-compliance with treatment Z91.19 V15.81    3. Chronic systolic heart failure (HCC) I50.22 428.22 carvedilol (COREG) 6.25 mg tablet      hydrALAZINE (APRESOLINE) 25 mg tablet      isosorbide mononitrate ER (IMDUR) 30 mg tablet      spironolactone (ALDACTONE) 25 mg tablet   4. Hypertensive heart and renal disease with renal failure, stage 1 through stage 4 or unspecified chronic kidney disease, with heart failure (HCC) I13.0 404.91 carvedilol (COREG) 6.25 mg tablet     428.9 hydrALAZINE (APRESOLINE) 25 mg tablet      isosorbide mononitrate ER (IMDUR) 30 mg tablet      spironolactone (ALDACTONE) 25 mg tablet   5. Recurrent pulmonary emboli (HCC) I26.99 415.19    6. Hx of BKA, left ([address] Z89.512 V49.75    7. Stage 3 chronic kidney disease (HCC) N18.3 585.3    8. Long term current use of anticoagulants with INR goal of 2.0-3.0 Z79.01 V58.61    9. Ischemic cardiomyopathy I25.5 414.8    10. Hypercholesteremia E78.00 272.0    11. Excessive anger R45.4 312.00      Orders Placed This Encounter    AMB POC GLUCOSE BLOOD, BY GLUCOSE MONITORING DEVICE    AMB POC HEMOGLOBIN A1C    AMB POC LIPID PROFILE    carvedilol (COREG) 6.25 mg tablet     Sig: TAKE 1 TAB BY MOUTH TWO (2) TIMES DAILY (WITH MEALS). Dispense:  60 Tab     Refill:  5    hydrALAZINE (APRESOLINE) 25 mg tablet     Sig: TAKE 1 TAB BY MOUTH THREE (3) TIMES DAILY.      Dispense:  90 Tab     Refill:  5    isosorbide mononitrate ER (IMDUR) 30 mg tablet     Sig: Take 1 Tab by mouth daily. Dispense:  30 Tab     Refill:  5    spironolactone (ALDACTONE) 25 mg tablet     Sig: Take 1 Tab by mouth daily. TAKE 1 TABLET EVERY DAY     Dispense:  30 Tab     Refill:  5    metFORMIN (GLUCOPHAGE) 850 mg tablet     Sig: Take 1 Tab by mouth two (2) times daily (with meals). Dispense:  60 Tab     Refill:  5     TAKES THE PLACE OF 500mg tabs    atorvastatin (LIPITOR) 40 mg tablet     Sig: Take 1 Tab by mouth daily. Dispense:  30 Tab     Refill:  5    insulin glargine (BASAGLAR KWIKPEN U-100 INSULIN) 100 unit/mL (3 mL) inpn     Si Units by SubCUTAneous route daily. Dispense:  5 Adjustable Dose Pre-filled Pen Syringe     Refill:  5    Insulin Needles, Disposable, 31 gauge x 5/16\" ndle     Sig: Use four timed daily w insulin     Dispense:  100 Package     Refill:  11    warfarin (COUMADIN) 5 mg tablet     Si.5 tabs daily     Dispense:  90 Tab     Refill:  1     1. IDDM (insulin dependent diabetes mellitus) ([facility]  Pt has been non-compliant w humalog bc \"the pens dont work properly\"  - AMB POC GLUCOSE BLOOD, BY GLUCOSE MONITORING DEVICE  - AMB POC HEMOGLOBIN A1C  - AMB POC LIPID PROFILE  - metFORMIN (GLUCOPHAGE) 850 mg tablet; Take 1 Tab by mouth two (2) times daily (with meals). Dispense: 60 Tab; Refill: 5  - insulin glargine (BASAGLAR KWIKPEN U-100 INSULIN) 100 unit/mL (3 mL) inpn; 60 Units by SubCUTAneous route daily. Dispense: 5 Adjustable Dose Pre-filled Pen Syringe; Refill: 5  - Insulin Needles, Disposable, 31 gauge x 5/16\" ndle; Use four timed daily w insulin  Dispense: 100 Package; Refill: 11    2. Non-compliance with treatment  Overt    3. Chronic systolic heart failure (HCC)  D/w pt I can refer him to a different cardiologist, if he is uncomfortable returning to Dr. Jin Holt. Pt just stared at me. - carvedilol (COREG) 6.25 mg tablet; TAKE 1 TAB BY MOUTH TWO (2) TIMES DAILY (WITH MEALS).   Dispense: 60 Tab; Refill: 5  - hydrALAZINE (APRESOLINE) 25 mg tablet; TAKE 1 TAB BY MOUTH THREE (3) TIMES DAILY. Dispense: 90 Tab; Refill: 5  - isosorbide mononitrate ER (IMDUR) 30 mg tablet; Take 1 Tab by mouth daily. Dispense: 30 Tab; Refill: 5  - spironolactone (ALDACTONE) 25 mg tablet; Take 1 Tab by mouth daily. TAKE 1 TABLET EVERY DAY  Dispense: 30 Tab; Refill: 5    4. Hypertensive heart and renal disease with renal failure, stage 1 through stage 4 or unspecified chronic kidney disease, with heart failure (HCC)  Will refill his med and refer to [address]. Hopefully, pt will be in better spirits  - carvedilol (COREG) 6.25 mg tablet; TAKE 1 TAB BY MOUTH TWO (2) TIMES DAILY (WITH MEALS). Dispense: 60 Tab; Refill: 5  - hydrALAZINE (APRESOLINE) 25 mg tablet; TAKE 1 TAB BY MOUTH THREE (3) TIMES DAILY. Dispense: 90 Tab; Refill: 5  - isosorbide mononitrate ER (IMDUR) 30 mg tablet; Take 1 Tab by mouth daily. Dispense: 30 Tab; Refill: 5  - spironolactone (ALDACTONE) 25 mg tablet; Take 1 Tab by mouth daily. TAKE 1 TABLET EVERY DAY  Dispense: 30 Tab; Refill: 5    5. Recurrent pulmonary emboli (HCC)  D/w pt compliance w INR checks while on warfarin    6. Hx of BKA, left ([facility]  Noted    7. Stage 3 chronic kidney disease ([facility]  Noted    8. Long term current use of anticoagulants with INR goal of 2.0-3.0  F/u 10 days INR check on 7.5mg warfarin    9. Ischemic cardiomyopathy  Noted    10. Hypercholesteremia  Noted    11. Excessive anger  Pt was going to group therapy, but stopped due to ???          There are no Patient Instructions on file for this visit. Follow-up and Dispositions    · Return in about 10 days (around 4/25/2019) for INR check and 3 mths w me. I have reviewed with the patient details of the assessment and plan and all questions were answered. Relevent patient education was performed. The most recent lab findings were reviewed with the patient.     An After Visit Summary was printed and given to the patient.

## 2019-04-25 ENCOUNTER — CLINICAL SUPPORT (OUTPATIENT)
Dept: INTERNAL MEDICINE CLINIC | Age: 49
End: 2019-04-25

## 2019-04-25 DIAGNOSIS — I26.99 RECURRENT PULMONARY EMBOLI (HCC): Primary | ICD-10-CM

## 2019-04-25 LAB
INR BLD: 1.7
PT POC: NORMAL SECONDS
VALID INTERNAL CONTROL?: YES

## 2019-04-25 NOTE — PROGRESS NOTES
Wilfredo Albert is a 51 y.o. male who presents today for Anticoagulation monitoring. Indication: AVR  INR Goal: 2.0-3.0. Current dose:  Coumadin 7.5mg daily. Missed Coumadin Doses:  2 doses  Medication Changes:  no  Dietary Changes:  no    Symptoms: taking coumadin appropriately without any bleeding. Latest INRs:  Lab Results   Component Value Date/Time    INR 1.4 (H) 10/07/2018 05:31 AM    INR 1.2 (H) 10/06/2018 05:48 AM    INR 1.1 10/05/2018 06:13 AM    INR POC 1.7 04/25/2019 09:12 AM    INR POC 1.2 12/11/2018 10:02 AM    INR POC 1.0 10/31/2018 08:02 AM    Prothrombin time 13.8 (H) 10/07/2018 05:31 AM    Prothrombin time 11.9 (H) 10/06/2018 05:48 AM    Prothrombin time 11.0 10/05/2018 06:13 AM        New Coumadin dose:.current treatment plan is effective, no change in therapy. Next check to be scheduled for  1 weeks.

## 2019-05-01 ENCOUNTER — TELEPHONE (OUTPATIENT)
Dept: INTERNAL MEDICINE CLINIC | Age: 49
End: 2019-05-01

## 2019-05-01 ENCOUNTER — CLINICAL SUPPORT (OUTPATIENT)
Dept: INTERNAL MEDICINE CLINIC | Age: 49
End: 2019-05-01

## 2019-05-01 DIAGNOSIS — I26.99 RECURRENT PULMONARY EMBOLI (HCC): Primary | ICD-10-CM

## 2019-05-01 LAB
INR BLD: 1.4
PT POC: NORMAL SECONDS
VALID INTERNAL CONTROL?: YES

## 2019-05-01 RX ORDER — WARFARIN SODIUM 5 MG/1
5 TABLET ORAL DAILY
Qty: 30 TAB | Refills: 5 | Status: SHIPPED | OUTPATIENT
Start: 2019-05-01 | End: 2019-05-10 | Stop reason: SDUPTHER

## 2019-05-01 RX ORDER — WARFARIN 4 MG/1
4 TABLET ORAL DAILY
Qty: 30 TAB | Refills: 5 | Status: SHIPPED | OUTPATIENT
Start: 2019-05-01 | End: 2019-05-10 | Stop reason: DRUGHIGH

## 2019-05-01 NOTE — PROGRESS NOTES
Niko Bah is a 51 y.o. male who presents today for Anticoagulation monitoring. Indication: DVT  INR Goal: 2.0-3.0. Current dose:  Coumadin 7.5 mg daily. Missed Coumadin Doses:  None  Medication Changes:  yes - 9 mg daily  Dietary Changes:  no    Symptoms: taking coumadin appropriately without any bleeding. Latest INRs:  Lab Results   Component Value Date/Time    INR 1.4 (H) 10/07/2018 05:31 AM    INR 1.2 (H) 10/06/2018 05:48 AM    INR 1.1 10/05/2018 06:13 AM    INR POC 1.4 05/01/2019 09:09 AM    INR POC 1.7 04/25/2019 09:12 AM    INR POC 1.2 12/11/2018 10:02 AM    Prothrombin time 13.8 (H) 10/07/2018 05:31 AM    Prothrombin time 11.9 (H) 10/06/2018 05:48 AM    Prothrombin time 11.0 10/05/2018 06:13 AM        New Coumadin dose:.current treatment plan is effective, no change in therapy, the following changes are made - 9 mg daily.     Next check to be scheduled for 10 days

## 2019-05-10 ENCOUNTER — CLINICAL SUPPORT (OUTPATIENT)
Dept: INTERNAL MEDICINE CLINIC | Age: 49
End: 2019-05-10

## 2019-05-10 DIAGNOSIS — Z79.01 LONG TERM CURRENT USE OF ANTICOAGULANTS WITH INR GOAL OF 2.0-3.0: Primary | ICD-10-CM

## 2019-05-10 DIAGNOSIS — I26.99 RECURRENT PULMONARY EMBOLI (HCC): ICD-10-CM

## 2019-05-10 LAB
INR BLD: 1.5
PT POC: NORMAL SECONDS
VALID INTERNAL CONTROL?: YES

## 2019-05-10 RX ORDER — WARFARIN SODIUM 5 MG/1
10 TABLET ORAL DAILY
Qty: 60 TAB | Refills: 0
Start: 2019-05-10 | End: 2020-06-29

## 2019-05-10 NOTE — PROGRESS NOTES
Bill Franz is a 51 y.o. male who presents today for Anticoagulation monitoring. Indication: DVT  INR Goal: 2.0-3.0. Current dose:  Coumadin 9 mg daily. Missed Coumadin Doses:  None  Medication Changes:  yes - 10 mg   Dietary Changes:  no    Symptoms: taking coumadin appropriately without any bleeding. Latest INRs:  Lab Results   Component Value Date/Time    INR 1.4 (H) 10/07/2018 05:31 AM    INR 1.2 (H) 10/06/2018 05:48 AM    INR 1.1 10/05/2018 06:13 AM    INR POC 1.5 05/10/2019 08:21 AM    INR POC 1.4 05/01/2019 09:09 AM    INR POC 1.7 04/25/2019 09:12 AM    Prothrombin time 13.8 (H) 10/07/2018 05:31 AM    Prothrombin time 11.9 (H) 10/06/2018 05:48 AM    Prothrombin time 11.0 10/05/2018 06:13 AM        New Coumadin dose:.current treatment plan is effective, no change in therapy, the following changes are made - 10 mg daily. Next check to be scheduled for 1 weeks.

## 2019-05-17 NOTE — PROGRESS NOTES
1. Have you been to the ER, urgent care clinic since your last visit? Hospitalized since your last visit? Yes When: 1/5/18 [facility] observation /cath/CKD. 2. Have you seen or consulted any other health care providers outside of the [address] [place] [address] since your last visit? Include any pap smears or colon screening. No.  Blood glucose/A1C verbal order DR. Buckley/MARKELL Cronin Admit Date: 5/13/2019    Subjective:     Feels fine     Objective:     Patient Vitals for the past 8 hrs:   Weight   05/17/19 0421 245 lb 8 oz (111.4 kg)     I/O last 3 completed shifts: In: 2394 [P.O.:700; I.V.:1694]  Out: 0388 [Urine:1375]  No intake/output data recorded. HEENT: Normal  NECK: Thyroid normal. No carotid bruit. No lymphphadenopathy. CVS: RRR  RS: Clear. No wheeze. No rhonchi. Good airflow bilaterally. ABD: Soft. Non tender. No mass. Normal BS. EXT: No edema. Non tender. Pulses present.    NEURO: no focal deficit       Scheduled Meds:   linezolid  600 mg Oral 2 times per day    sodium chloride flush  10 mL Intravenous BID    sodium chloride flush  10 mL Intravenous 2 times per day    enoxaparin  40 mg Subcutaneous Daily    amLODIPine  10 mg Oral Daily    chlorproMAZINE  50 mg Oral TID    divalproex  500 mg Oral BID    FLUoxetine  80 mg Oral Daily    hydrOXYzine  25 mg Oral BID    cefTRIAXone (ROCEPHIN) IV  2 g Intravenous Q24H     Continuous Infusions:   sodium chloride 75 mL/hr at 05/17/19 0539       CBC with Differential:    Lab Results   Component Value Date    WBC 7.1 05/14/2019    RBC 4.13 05/14/2019    HGB 12.1 05/14/2019    HCT 37.2 05/14/2019     05/14/2019    MCV 90.1 05/14/2019    MCH 29.3 05/14/2019    MCHC 32.5 05/14/2019    RDW 15.8 05/14/2019    NRBC 0.0 07/08/2017    BANDSPCT 1 09/17/2014    METASPCT 1.7 07/08/2017    LYMPHOPCT 27.4 05/14/2019    MONOPCT 8.6 05/14/2019    BASOPCT 0.6 05/14/2019    MONOSABS 0.61 05/14/2019    LYMPHSABS 1.95 05/14/2019    EOSABS 0.56 05/14/2019    BASOSABS 0.04 05/14/2019     CMP:    Lab Results   Component Value Date     05/14/2019    K 3.7 05/14/2019    K 3.9 04/30/2019     05/14/2019    CO2 24 05/14/2019    BUN 21 05/14/2019    CREATININE 1.0 05/14/2019    GFRAA >60 05/14/2019    LABGLOM >60 05/14/2019    PROT 6.4 05/14/2019    LABALBU 3.5 05/14/2019    LABALBU 3.5 02/05/2011    CALCIUM 8.6 05/14/2019    BILITOT

## 2019-06-19 ENCOUNTER — TELEPHONE (OUTPATIENT)
Dept: INTERNAL MEDICINE CLINIC | Age: 49
End: 2019-06-19

## 2019-06-19 NOTE — TELEPHONE ENCOUNTER
Aracelis was trying to transfer patient call to me. Patient was calling to get a provider name for pain management. He wanted to speak with the nurse. Aracelis informed the patient that the nurse was in a room with a patient. Patient did not want to leave a message. However, patient hung the phone up. I called the patient back to see if I could help him. I informed patient because we do not know which pain management takes what insurance we supply a list. Patient states he does not want the list. Patient says will go in a different direction.

## 2019-07-16 ENCOUNTER — OFFICE VISIT (OUTPATIENT)
Dept: INTERNAL MEDICINE CLINIC | Age: 49
End: 2019-07-16

## 2019-07-16 VITALS
HEART RATE: 103 BPM | WEIGHT: 240 LBS | TEMPERATURE: 98.6 F | HEIGHT: 75 IN | SYSTOLIC BLOOD PRESSURE: 166 MMHG | DIASTOLIC BLOOD PRESSURE: 108 MMHG | OXYGEN SATURATION: 98 % | BODY MASS INDEX: 29.84 KG/M2 | RESPIRATION RATE: 19 BRPM

## 2019-07-16 DIAGNOSIS — R45.4 EXCESSIVE ANGER: ICD-10-CM

## 2019-07-16 DIAGNOSIS — I25.5 ISCHEMIC CARDIOMYOPATHY: ICD-10-CM

## 2019-07-16 DIAGNOSIS — Z91.199 NON-COMPLIANCE WITH TREATMENT: ICD-10-CM

## 2019-07-16 DIAGNOSIS — I13.0 HYPERTENSIVE HEART AND RENAL DISEASE WITH RENAL FAILURE, STAGE 1 THROUGH STAGE 4 OR UNSPECIFIED CHRONIC KIDNEY DISEASE, WITH HEART FAILURE (HCC): ICD-10-CM

## 2019-07-16 DIAGNOSIS — I50.22 CHRONIC SYSTOLIC HEART FAILURE (HCC): ICD-10-CM

## 2019-07-16 DIAGNOSIS — I26.99 RECURRENT PULMONARY EMBOLI (HCC): ICD-10-CM

## 2019-07-16 DIAGNOSIS — E11.43 DIABETIC GASTROPARALYSIS (HCC): ICD-10-CM

## 2019-07-16 DIAGNOSIS — Z79.01 LONG TERM CURRENT USE OF ANTICOAGULANTS WITH INR GOAL OF 2.0-3.0: ICD-10-CM

## 2019-07-16 DIAGNOSIS — N18.30 STAGE 3 CHRONIC KIDNEY DISEASE (HCC): ICD-10-CM

## 2019-07-16 DIAGNOSIS — K31.84 DIABETIC GASTROPARALYSIS (HCC): ICD-10-CM

## 2019-07-16 DIAGNOSIS — Z89.512 HX OF BKA, LEFT (HCC): ICD-10-CM

## 2019-07-16 LAB
CHOLEST SERPL-MCNC: 296 MG/DL
GLUCOSE POC: 145 MG/DL
HBA1C MFR BLD HPLC: 11.7 %
HDLC SERPL-MCNC: 42 MG/DL
INR BLD: 1.3
LDL CHOLESTEROL POC: 207 MG/DL
PT POC: NORMAL SECONDS
TCHOL/HDL RATIO (POC): 4.9
TRIGL SERPL-MCNC: 233 MG/DL
VALID INTERNAL CONTROL?: YES
VLDLC SERPL CALC-MCNC: 254 MG/DL

## 2019-07-16 RX ORDER — SYRINGE-NEEDLE,INSULIN,0.5 ML 27GX1/2"
SYRINGE, EMPTY DISPOSABLE MISCELLANEOUS
Qty: 100 SYRINGE | Refills: 2 | Status: SHIPPED | OUTPATIENT
Start: 2019-07-16 | End: 2020-06-29 | Stop reason: SDUPTHER

## 2019-07-16 NOTE — PROGRESS NOTES
Chief Complaint   Patient presents with    Diabetes    Leg Pain     and swelling to left leg     1. Have you been to the ER, urgent care clinic since your last visit? Hospitalized since your last visit? No    2. Have you seen or consulted any other health care providers outside of the [address] [place] [address] since your last visit? Include any pap smears or colon screening.  No      [facility] amb A1C, Glucose and Lipid Dr. Severa Ferretti, LPN

## 2019-07-16 NOTE — PROGRESS NOTES
Amena Foss is a 51 y.o. male and presents with Diabetes (Pt wants increase in Metformin) and Leg Pain (and swelling to left leg)  . Subjective:    Pt is upset bc he was unable to schedule an appt w CHF due to ???  Pt relays he takes his medication \"when he can afford it\"  Pt is also frustrated bc he was \"released by his previous cardiologist\" to rtw . He assumes that means he no longer needs to take his heart medications anymore. I previously explained to him that he has CHF w an EF ~30% and will need to take his medication indefinetly. He is questioning that recommendation  Pt is also displeased that I am \"sending to all these other doctors\" and not taking care of him myself. He is \"used to a doctor that can handle everything on their own\". Pt is mad bc noone will prescribe him the narcotic pain medication he needs for his stump pain. Pt was referred to CHF and endocrinology and has not scheduled either.     Recurrent PE-on warfarin due to NOAC $$$     Lab Results   Component Value Date/Time    INR 1.4 (H) 10/07/2018 05:31 AM    INR 1.2 (H) 10/06/2018 05:48 AM    INR 1.1 10/05/2018 06:13 AM    INR 1.1 10/04/2018 09:46 AM    INR 1.1 07/23/2017 04:30 AM    INR POC 1.3 07/16/2019 09:43 AM    INR POC 1.5 05/10/2019 08:21 AM    INR POC 1.4 05/01/2019 09:09 AM    INR POC 1.7 04/25/2019 09:12 AM    INR POC 1.2 12/11/2018 10:02 AM    Prothrombin time 13.8 (H) 10/07/2018 05:31 AM    Prothrombin time 11.9 (H) 10/06/2018 05:48 AM    Prothrombin time 11.0 10/05/2018 06:13 AM    Prothrombin time 11.4 (H) 10/04/2018 09:46 AM    Prothrombin time 11.3 (H) 07/23/2017 04:30 AM       Diabetic gastropathy-noted    S/p left BKA- as above    Type 2 DM-insulin dependent-doubt compliance w diet and meds     Lab Results   Component Value Date/Time    Hemoglobin A1c 11.1 (H) 07/12/2017 12:00 PM    Hemoglobin A1c (POC) 11.7 07/16/2019 09:51 AM     Hyperlipidemia-doubt compliance  Lab Results   Component Value Date/Time Cholesterol, total 193 01/06/2018 05:38 AM    Cholesterol (POC) 296 07/16/2019 09:52 AM    HDL Cholesterol 62 01/06/2018 05:38 AM    HDL Cholesterol (POC) 42 07/16/2019 09:52 AM    LDL Cholesterol (POC) 207 07/16/2019 09:52 AM    LDL, calculated 106.6 (H) 01/06/2018 05:38 AM    VLDL, calculated 24.4 01/06/2018 05:38 AM    Triglyceride 122 01/06/2018 05:38 AM    Triglycerides (POC) 233 07/16/2019 09:52 AM    CHOL/HDL Ratio 3.1 01/06/2018 05:38 AM         HTN w ckd 3-.    -  Lab Results   Component Value Date/Time    GFR est AA 53 (L) 10/04/2018 09:46 AM    GFR est non-AA 44 (L) 10/04/2018 09:46 AM    Creatinine (POC) 1.6 (H) 04/17/2017 10:36 AM    Creatinine 1.68 (H) 10/04/2018 09:46 AM    BUN 23 (H) 10/04/2018 09:46 AM    BUN (POC) 24 (H) 04/17/2017 10:36 AM    Sodium (POC) 139 04/17/2017 10:36 AM    Sodium 138 10/04/2018 09:46 AM    Potassium 4.0 10/04/2018 09:46 AM    Potassium (POC) 3.7 04/17/2017 10:36 AM    Chloride (POC) 103 04/17/2017 10:36 AM    Chloride 107 10/04/2018 09:46 AM    CO2 23 10/04/2018 09:46 AM     BP Readings from Last 3 Encounters:   07/16/19 (!) 166/108   04/15/19 (!) 190/100   12/11/18 170/84         Chronic systolic heart failure-EF 25-30% during hospitalization. Has improved as per cardiology records   Previous cardiologist Dr. Penny Solorzano      Pt did not f/u w Pain Mng[facility]     H/o vitamin d deficiency-    Clinical depression/anxiety-OBVIOULY.  But pt refuses referral to psychiatry      Wt Readings from Last 3 Encounters:   07/16/19 240 lb (108.9 kg)   04/15/19 255 lb (115.7 kg)   12/11/18 247 lb 9.6 oz (112.3 kg)       Anger Mngmnt -pt is no longer attending his group sessions      Review of Systems  Constitutional: negative for fevers, chills, anorexia and weight loss  Respiratory:  negative for cough, hemoptysis, dyspnea,wheezing  CV:   negative for chest pain, palpitations, lower extremity edema  GI:   positive for nausea, vomiting,   Musculoskel: positive for myalgias, arthralgias,  joint pain  Neurological:  negative for headaches, dizziness, vertigo, memory problems and gait   Behavl/Psych: positive for feelings of  depression, mood changes    Past Medical History:   Diagnosis Date    Diabetes ([facility] [address]     since age 23   Josue Oden GERD (gastroesophageal reflux disease)     HTN (hypertension)     Hypercholesteremia     Hyperlipidemia     Psychiatric disorder     anxiety & depression    Thromboembolus ([facility] [address] 2017    bilateral legs     Past Surgical History:   Procedure Laterality Date    HX AMPUTATION      toes- left foot    HX AMPUTATION Left 2017    BKA    HX BUNIONECTOMY      HX ORTHOPAEDIC      boutinerre deformity    HX ORTHOPAEDIC      fascia removed left foot    HX OTHER SURGICAL  2017    Skin graft Surgery    HX TONSILLECTOMY      HX WISDOM TEETH EXTRACTION       Social History     Socioeconomic History    Marital status:      Spouse name: Not on file    Number of children: Not on file    Years of education: Not on file    Highest education level: Not on file   Tobacco Use    Smoking status: Former Smoker     Types: Cigars     Last attempt to quit: 2011     Years since quittin.2    Smokeless tobacco: Never Used   Substance and Sexual Activity    Alcohol use: Yes     Alcohol/week: 0.0 oz     Types: 1 Glasses of wine per week     Comment: stop drinking 5 months ago    Drug use: No    Sexual activity: Yes     Partners: Female     Birth control/protection: None   Social History Narrative    ** Merged History Encounter **          Family History   Problem Relation Age of Onset    Hypertension Mother     High Cholesterol Mother      Current Outpatient Medications   Medication Sig Dispense Refill    warfarin (COUMADIN) 5 mg tablet Take 2 Tabs by mouth daily. 60 Tab 0    carvedilol (COREG) 6.25 mg tablet TAKE 1 TAB BY MOUTH TWO (2) TIMES DAILY (WITH MEALS). 60 Tab 5    hydrALAZINE (APRESOLINE) 25 mg tablet TAKE 1 TAB BY MOUTH THREE (3) TIMES DAILY.  90 Tab 5    isosorbide mononitrate ER (IMDUR) 30 mg tablet Take 1 Tab by mouth daily. 30 Tab 5    spironolactone (ALDACTONE) 25 mg tablet Take 1 Tab by mouth daily. TAKE 1 TABLET EVERY DAY 30 Tab 5    metFORMIN (GLUCOPHAGE) 850 mg tablet Take 1 Tab by mouth two (2) times daily (with meals). 60 Tab 5    atorvastatin (LIPITOR) 40 mg tablet Take 1 Tab by mouth daily. 30 Tab 5    insulin glargine (BASAGLAR KWIKPEN U-100 INSULIN) 100 unit/mL (3 mL) inpn 60 Units by SubCUTAneous route daily. 5 Adjustable Dose Pre-filled Pen Syringe 5    Insulin Needles, Disposable, 31 gauge x 5/16\" ndle Use four timed daily w insulin 100 Package 11    insulin lispro (HUMALOG) 100 unit/mL kwikpen 12 Units by SubCUTAneous route Before breakfast, lunch, and dinner. DX: E11.65 3 Adjustable Dose Pre-filled Pen Syringe 5    busPIRone (BUSPAR) 7.5 mg tablet Take 1 Tab by mouth three (3) times daily. 90 Tab 5    Lancets misc Use as directed. Dx: E11.9 1 Each 11    glucose blood VI test strips (BLOOD GLUCOSE TEST) strip Use BID DxE11.9 200 Strip 5     Allergies   Allergen Reactions    Ace Inhibitors Swelling     Facial swelling cough     Morphine Angioedema    Arb-Angiotensin Receptor Antagonist Angioedema       Objective:  Visit Vitals  BP (!) 166/108 (BP 1 Location: Right arm, BP Patient Position: Sitting)   Pulse (!) 103   Temp 98.6 °F (37 °C) (Oral)   Resp 19   Ht 6' 3\" (1.905 m)   Wt 240 lb (108.9 kg)   SpO2 98%   BMI 30.00 kg/m²     Physical Exam:   General appearance - alert, obese, agitated  Mental status - alert, oriented to person, place, and time  EYE-EOMI  Neck - supple,   Chest - symmetric air entry    Abdomen - obese  Ext-s/p lt BKA w prosthesis  Skin-Warm and dry.  no hyperpigmentation, vitiligo, or suspicious lesions  Neuro -alert, oriented, normal speech, no focal findings        Results for orders placed or performed in visit on 07/16/19   AMB POC HEMOGLOBIN A1C   Result Value Ref Range    Hemoglobin A1c (POC) 11.7 %   AMB POC GLUCOSE BLOOD, BY GLUCOSE MONITORING DEVICE   Result Value Ref Range    Glucose  mg/dL   AMB POC LIPID PROFILE   Result Value Ref Range    Cholesterol (POC) 296     Triglycerides (POC) 233     HDL Cholesterol (POC) 42     VLDL (POC) 254 MG/DL    LDL Cholesterol (POC) 207 MG/DL    TChol/HDL Ratio (POC) 4.9    AMB POC PT/INR   Result Value Ref Range    VALID INTERNAL CONTROL POC Yes     Prothrombin time (POC)  seconds    INR POC 1.3        Assessment/Plan:    ICD-10-CM ICD-9-CM    1. IDDM (insulin dependent diabetes mellitus) (HCC) E11.9 250.00 AMB POC HEMOGLOBIN A1C    Z79.4 V58.67 AMB POC GLUCOSE BLOOD, BY GLUCOSE MONITORING DEVICE      AMB POC LIPID PROFILE   2. Bilateral pulmonary embolism ([facility]) I26.99 415.19 AMB POC PT/INR     Orders Placed This Encounter    AMB POC HEMOGLOBIN A1C    AMB POC GLUCOSE BLOOD, BY GLUCOSE MONITORING DEVICE    AMB POC LIPID PROFILE    AMB POC PT/INR       1. IDDM (insulin dependent diabetes mellitus) ([facility] [address]  Decompensated and uncontrolled due to pts noncompliance  Will attempt to change insulin to a more affordable option and monitor results  Explained to pt that he will need to monitor his blood sugars closely on the new regimen and contact me if sugars are consistently <60 or >250mg/dl  - AMB POC HEMOGLOBIN A1C  - AMB POC GLUCOSE BLOOD, BY GLUCOSE MONITORING DEVICE  - AMB POC LIPID PROFILE  - Insulin Syringe-Needle U-100 (BD INSULIN SYRINGE) 1 mL 27 gauge x 1/2\" syrg; Use BID w novolog Dx E11.9  Dispense: 100 Syringe; Refill: 2  - insulin NPH/insulin regular (NOVOLIN 70/30, HUMULIN 70/30) 100 unit/mL (70-30) injection; Dx E11.9 40U sq BID  Dispense: 10 mL; Refill: 3    2. Excessive anger  Pt has been encouraged to establish w psychiatry, but declines    3. Non-compliance with treatment  Pt demonstrates mistrust in my management of his health. I think it is best for all parties involved that he establish w a PCP that he trusts and has a better repor with.    I will be mailing a certified letter of termination. 4. Recurrent pulmonary emboli (HCC)  Pt is non-compliant w AC and at risk for recurrence and/or death  - AMB POC PT/INR    5. Chronic systolic heart failure [facility])  My  will aid patient in scheduling an appt w Dr. Hari Amos office    6. Stage 3 chronic kidney disease ([facility] [address]  Noted    7. Ischemic cardiomyopathy  Noted    8. Hx of BKA, left ([facility] [address]  Noted    9. Long term current use of anticoagulants with INR goal of 2.0-3.0  Non compliant w AC    10. Hypertensive heart and renal disease with renal failure, stage 1 through stage 4 or unspecified chronic kidney disease, with heart failure (HCC)  Non compliant w medication          There are no Patient Instructions on file for this visit. I have reviewed with the patient details of the assessment and plan and all questions were answered. Relevent patient education was performed. The most recent lab findings were reviewed with the patient. An After Visit Summary was printed and given to the patient.

## 2019-07-17 PROBLEM — E11.43 DIABETIC GASTROPARALYSIS (HCC): Status: ACTIVE | Noted: 2019-07-17

## 2019-07-17 PROBLEM — I26.99 BILATERAL PULMONARY EMBOLISM (HCC): Status: RESOLVED | Noted: 2017-07-21 | Resolved: 2019-07-17

## 2019-07-17 PROBLEM — K31.84 DIABETIC GASTROPARALYSIS (HCC): Status: ACTIVE | Noted: 2019-07-17

## 2019-07-23 ENCOUNTER — HOSPITAL ENCOUNTER (EMERGENCY)
Age: 49
Discharge: HOME | End: 2019-07-23
Attending: EMERGENCY MEDICINE
Payer: COMMERCIAL

## 2019-07-23 ENCOUNTER — APPOINTMENT (OUTPATIENT)
Dept: ULTRASOUND IMAGING | Age: 49
End: 2019-07-23
Attending: PHYSICIAN ASSISTANT
Payer: COMMERCIAL

## 2019-07-23 ENCOUNTER — APPOINTMENT (OUTPATIENT)
Dept: CT IMAGING | Age: 49
End: 2019-07-23
Attending: PHYSICIAN ASSISTANT
Payer: COMMERCIAL

## 2019-07-23 ENCOUNTER — TELEPHONE (OUTPATIENT)
Dept: CARDIOLOGY CLINIC | Age: 49
End: 2019-07-23

## 2019-07-23 VITALS
TEMPERATURE: 98.1 F | OXYGEN SATURATION: 100 % | SYSTOLIC BLOOD PRESSURE: 183 MMHG | BODY MASS INDEX: 29.58 KG/M2 | RESPIRATION RATE: 18 BRPM | DIASTOLIC BLOOD PRESSURE: 112 MMHG | HEIGHT: 75 IN | HEART RATE: 84 BPM | WEIGHT: 237.88 LBS

## 2019-07-23 DIAGNOSIS — N18.30 STAGE 3 CHRONIC KIDNEY DISEASE (HCC): ICD-10-CM

## 2019-07-23 DIAGNOSIS — R79.1 SUBTHERAPEUTIC INTERNATIONAL NORMALIZED RATIO (INR): ICD-10-CM

## 2019-07-23 DIAGNOSIS — T14.8XXA INTRAMUSCULAR HEMATOMA: Primary | ICD-10-CM

## 2019-07-23 DIAGNOSIS — I10 HYPERTENSION, UNSPECIFIED TYPE: ICD-10-CM

## 2019-07-23 LAB
ALBUMIN SERPL-MCNC: 3 G/DL (ref 3.5–5)
ALBUMIN/GLOB SERPL: 0.7 {RATIO} (ref 1.1–2.2)
ALP SERPL-CCNC: 100 U/L (ref 45–117)
ALT SERPL-CCNC: 28 U/L (ref 12–78)
ANION GAP SERPL CALC-SCNC: 8 MMOL/L (ref 5–15)
AST SERPL-CCNC: 17 U/L (ref 15–37)
BASOPHILS # BLD: 0.1 K/UL (ref 0–0.1)
BASOPHILS NFR BLD: 0 % (ref 0–1)
BILIRUB SERPL-MCNC: 0.2 MG/DL (ref 0.2–1)
BUN SERPL-MCNC: 22 MG/DL (ref 6–20)
BUN/CREAT SERPL: 11 (ref 12–20)
CALCIUM SERPL-MCNC: 8.9 MG/DL (ref 8.5–10.1)
CHLORIDE SERPL-SCNC: 101 MMOL/L (ref 97–108)
CK SERPL-CCNC: 219 U/L (ref 39–308)
CO2 SERPL-SCNC: 25 MMOL/L (ref 21–32)
CREAT SERPL-MCNC: 1.99 MG/DL (ref 0.7–1.3)
DIFFERENTIAL METHOD BLD: ABNORMAL
EOSINOPHIL # BLD: 0 K/UL (ref 0–0.4)
EOSINOPHIL NFR BLD: 0 % (ref 0–7)
ERYTHROCYTE [DISTWIDTH] IN BLOOD BY AUTOMATED COUNT: 13.2 % (ref 11.5–14.5)
GLOBULIN SER CALC-MCNC: 4.3 G/DL (ref 2–4)
GLUCOSE BLD STRIP.AUTO-MCNC: 295 MG/DL (ref 65–100)
GLUCOSE BLD STRIP.AUTO-MCNC: 389 MG/DL (ref 65–100)
GLUCOSE SERPL-MCNC: 454 MG/DL (ref 65–100)
HCT VFR BLD AUTO: 41.8 % (ref 36.6–50.3)
HGB BLD-MCNC: 13.3 G/DL (ref 12.1–17)
IMM GRANULOCYTES # BLD AUTO: 0 K/UL (ref 0–0.04)
IMM GRANULOCYTES NFR BLD AUTO: 0 % (ref 0–0.5)
INR PPP: 1 (ref 0.9–1.1)
LYMPHOCYTES # BLD: 1.9 K/UL (ref 0.8–3.5)
LYMPHOCYTES NFR BLD: 17 % (ref 12–49)
MCH RBC QN AUTO: 26.5 PG (ref 26–34)
MCHC RBC AUTO-ENTMCNC: 31.8 G/DL (ref 30–36.5)
MCV RBC AUTO: 83.3 FL (ref 80–99)
MONOCYTES # BLD: 0.5 K/UL (ref 0–1)
MONOCYTES NFR BLD: 4 % (ref 5–13)
NEUTS SEG # BLD: 9 K/UL (ref 1.8–8)
NEUTS SEG NFR BLD: 79 % (ref 32–75)
NRBC # BLD: 0 K/UL (ref 0–0.01)
NRBC BLD-RTO: 0 PER 100 WBC
PLATELET # BLD AUTO: 225 K/UL (ref 150–400)
PMV BLD AUTO: 10.3 FL (ref 8.9–12.9)
POTASSIUM SERPL-SCNC: 4.5 MMOL/L (ref 3.5–5.1)
PROT SERPL-MCNC: 7.3 G/DL (ref 6.4–8.2)
PROTHROMBIN TIME: 10.2 SEC (ref 9–11.1)
RBC # BLD AUTO: 5.02 M/UL (ref 4.1–5.7)
SERVICE CMNT-IMP: ABNORMAL
SERVICE CMNT-IMP: ABNORMAL
SODIUM SERPL-SCNC: 134 MMOL/L (ref 136–145)
WBC # BLD AUTO: 11.5 K/UL (ref 4.1–11.1)

## 2019-07-23 PROCEDURE — 93971 EXTREMITY STUDY: CPT

## 2019-07-23 PROCEDURE — 82550 ASSAY OF CK (CPK): CPT

## 2019-07-23 PROCEDURE — 73701 CT LOWER EXTREMITY W/DYE: CPT

## 2019-07-23 PROCEDURE — 74011250636 HC RX REV CODE- 250/636: Performed by: PHYSICIAN ASSISTANT

## 2019-07-23 PROCEDURE — 36415 COLL VENOUS BLD VENIPUNCTURE: CPT

## 2019-07-23 PROCEDURE — 74011250637 HC RX REV CODE- 250/637: Performed by: PHYSICIAN ASSISTANT

## 2019-07-23 PROCEDURE — 82962 GLUCOSE BLOOD TEST: CPT

## 2019-07-23 PROCEDURE — 99284 EMERGENCY DEPT VISIT MOD MDM: CPT

## 2019-07-23 PROCEDURE — 74011636320 HC RX REV CODE- 636/320: Performed by: EMERGENCY MEDICINE

## 2019-07-23 PROCEDURE — 85610 PROTHROMBIN TIME: CPT

## 2019-07-23 PROCEDURE — 96361 HYDRATE IV INFUSION ADD-ON: CPT

## 2019-07-23 PROCEDURE — 74011636637 HC RX REV CODE- 636/637: Performed by: PHYSICIAN ASSISTANT

## 2019-07-23 PROCEDURE — 85025 COMPLETE CBC W/AUTO DIFF WBC: CPT

## 2019-07-23 PROCEDURE — 80053 COMPREHEN METABOLIC PANEL: CPT

## 2019-07-23 PROCEDURE — 96374 THER/PROPH/DIAG INJ IV PUSH: CPT

## 2019-07-23 RX ORDER — OXYCODONE AND ACETAMINOPHEN 5; 325 MG/1; MG/1
2 TABLET ORAL
Status: COMPLETED | OUTPATIENT
Start: 2019-07-23 | End: 2019-07-23

## 2019-07-23 RX ORDER — ONDANSETRON 4 MG/1
4 TABLET, ORALLY DISINTEGRATING ORAL
Status: COMPLETED | OUTPATIENT
Start: 2019-07-23 | End: 2019-07-23

## 2019-07-23 RX ORDER — OXYCODONE AND ACETAMINOPHEN 5; 325 MG/1; MG/1
1 TABLET ORAL
Qty: 15 TAB | Refills: 0 | Status: SHIPPED | OUTPATIENT
Start: 2019-07-23 | End: 2019-07-28

## 2019-07-23 RX ORDER — SODIUM CHLORIDE 0.9 % (FLUSH) 0.9 %
10 SYRINGE (ML) INJECTION
Status: COMPLETED | OUTPATIENT
Start: 2019-07-23 | End: 2019-07-23

## 2019-07-23 RX ADMIN — SODIUM CHLORIDE 1000 ML: 900 INJECTION, SOLUTION INTRAVENOUS at 09:52

## 2019-07-23 RX ADMIN — ONDANSETRON 4 MG: 4 TABLET, ORALLY DISINTEGRATING ORAL at 09:28

## 2019-07-23 RX ADMIN — Medication 10 ML: at 12:49

## 2019-07-23 RX ADMIN — IOPAMIDOL 100 ML: 755 INJECTION, SOLUTION INTRAVENOUS at 12:48

## 2019-07-23 RX ADMIN — SODIUM CHLORIDE 500 ML: 900 INJECTION, SOLUTION INTRAVENOUS at 15:21

## 2019-07-23 RX ADMIN — OXYCODONE AND ACETAMINOPHEN 2 TABLET: 5; 325 TABLET ORAL at 09:28

## 2019-07-23 RX ADMIN — HUMAN INSULIN 10 UNITS: 100 INJECTION, SOLUTION SUBCUTANEOUS at 09:53

## 2019-07-23 NOTE — ED PROVIDER NOTES
EMERGENCY DEPARTMENT HISTORY AND PHYSICAL EXAM      Date: 7/23/2019  Patient Name: Queen Jose    History of Presenting Illness     Chief Complaint   Patient presents with    Leg Pain     awoke this morning to pain in his inner right thigh, states that he is taking coumadin for hx of DVT and has not been getting his INR checked. Denies injury to right leg. Did not take any medicine for pain. History Provided By: Patient    HPI: Queen Jose, 51 y.o. male presents with his wife to the ED with cc of several hours of 8 out of 10 constant, aching right sided upper thigh pain and swelling that is worse with palpation and started suddenly this morning upon waking. He has a history of DVT/PE and takes warfarin. He tells me he has missed several doses recently but was able to fill prescription from his primary care. He denies any chest pain or shortness of breath. He denies any abdominal pain. There are no other complaints, changes, or physical findings at this time. PCP: Nicole Hurst MD    Current Facility-Administered Medications   Medication Dose Route Frequency Provider Last Rate Last Dose    sodium chloride 0.9 % bolus infusion 500 mL  500 mL IntraVENous ONCE LUCRECIA Matos 500 mL/hr at 07/23/19 1521 500 mL at 07/23/19 1521     Current Outpatient Medications   Medication Sig Dispense Refill    oxyCODONE-acetaminophen (PERCOCET) 5-325 mg per tablet Take 1 Tab by mouth every eight (8) hours as needed for Pain for up to 5 days. Max Daily Amount: 3 Tabs. 15 Tab 0    Insulin Syringe-Needle U-100 (BD INSULIN SYRINGE) 1 mL 27 gauge x 1/2\" syrg Use BID w novolog Dx E11.9 100 Syringe 2    insulin NPH/insulin regular (NOVOLIN 70/30, HUMULIN 70/30) 100 unit/mL (70-30) injection Dx E11.9 40U sq BID 10 mL 3    warfarin (COUMADIN) 5 mg tablet Take 2 Tabs by mouth daily. 60 Tab 0    carvedilol (COREG) 6.25 mg tablet TAKE 1 TAB BY MOUTH TWO (2) TIMES DAILY (WITH MEALS).  60 Tab 5    hydrALAZINE (APRESOLINE) 25 mg tablet TAKE 1 TAB BY MOUTH THREE (3) TIMES DAILY. 90 Tab 5    isosorbide mononitrate ER (IMDUR) 30 mg tablet Take 1 Tab by mouth daily. 30 Tab 5    spironolactone (ALDACTONE) 25 mg tablet Take 1 Tab by mouth daily. TAKE 1 TABLET EVERY DAY 30 Tab 5    metFORMIN (GLUCOPHAGE) 850 mg tablet Take 1 Tab by mouth two (2) times daily (with meals). 60 Tab 5    atorvastatin (LIPITOR) 40 mg tablet Take 1 Tab by mouth daily. 30 Tab 5    Insulin Needles, Disposable, 31 gauge x 5/16\" ndle Use four timed daily w insulin 100 Package 11    glucose blood VI test strips (BLOOD GLUCOSE TEST) strip Use BID DxE11.9 200 Strip 5    Lancets misc Use as directed. Dx: E11.9 1 Each 11    busPIRone (BUSPAR) 7.5 mg tablet Take 1 Tab by mouth three (3) times daily. 80 Tab 5     Past History     Past Medical History:  Past Medical History:   Diagnosis Date    Diabetes ([facility] [address]     since age 23   Lokesh Olivia GERD (gastroesophageal reflux disease)     HTN (hypertension)     Hypercholesteremia     Hyperlipidemia     Psychiatric disorder     anxiety & depression    Thromboembolus ([facility] [address] 2017    bilateral legs       Past Surgical History:  Past Surgical History:   Procedure Laterality Date    HX AMPUTATION      toes- left foot    HX AMPUTATION Left 2017    BKA    HX BUNIONECTOMY      HX ORTHOPAEDIC      boutinerre deformity    HX ORTHOPAEDIC      fascia removed left foot    HX OTHER SURGICAL  2017    Skin graft Surgery    HX TONSILLECTOMY      HX WISDOM TEETH EXTRACTION         Family History:  Family History   Problem Relation Age of Onset    Hypertension Mother     High Cholesterol Mother        Social History:  Social History     Tobacco Use    Smoking status: Former Smoker     Types: Cigars     Last attempt to quit: 2011     Years since quittin.2    Smokeless tobacco: Never Used   Substance Use Topics    Alcohol use:  Yes     Alcohol/week: 0.0 standard drinks     Types: 1 Glasses of wine per week     Comment: stop drinking 5 months ago    Drug use: No       Allergies: Allergies   Allergen Reactions    Ace Inhibitors Swelling     Facial swelling cough     Morphine Angioedema    Arb-Angiotensin Receptor Antagonist Angioedema     Review of Systems   Review of Systems   Constitutional: Negative for fatigue and fever. HENT: Negative for congestion, ear pain and rhinorrhea. Eyes: Negative for pain and redness. Respiratory: Negative for cough and wheezing. Cardiovascular: Negative for chest pain and palpitations. Gastrointestinal: Negative for abdominal pain, nausea and vomiting. Genitourinary: Negative for dysuria, frequency and urgency. Musculoskeletal: Negative for back pain, neck pain and neck stiffness. Right medial thigh tenderness and swelling   Skin: Negative for rash and wound. Neurological: Negative for weakness, light-headedness, numbness and headaches. Physical Exam   Physical Exam   Constitutional: He is oriented to person, place, and time. He appears well-developed and well-nourished. Non-toxic appearance. No distress. HENT:   Head: Normocephalic and atraumatic. Head is without right periorbital erythema and without left periorbital erythema. Right Ear: External ear normal.   Left Ear: External ear normal.   Nose: Nose normal.   Mouth/Throat: Uvula is midline. No trismus in the jaw. Eyes: Pupils are equal, round, and reactive to light. Conjunctivae and EOM are normal. No scleral icterus. Neck: Normal range of motion and full passive range of motion without pain. Cardiovascular: Normal rate, regular rhythm and normal heart sounds. Pulmonary/Chest: Effort normal and breath sounds normal. No accessory muscle usage. No tachypnea. No respiratory distress. He has no decreased breath sounds. He has no wheezes. Abdominal: Soft. There is no tenderness. There is no rigidity and no guarding. Musculoskeletal: Normal range of motion.    Left BKA    Right medial thigh is with swelling and tenderness. There is no redness or fluctuance. There is no lower extremity edema. There is no bruising. Neurological: He is alert and oriented to person, place, and time. He is not disoriented. No cranial nerve deficit or sensory deficit. GCS eye subscore is 4. GCS verbal subscore is 5. GCS motor subscore is 6. Skin: Skin is intact. No rash noted. Psychiatric: He has a normal mood and affect. His speech is normal.   Nursing note and vitals reviewed. Diagnostic Study Results     Labs -     Recent Results (from the past 12 hour(s))   CBC WITH AUTOMATED DIFF    Collection Time: 07/23/19  8:59 AM   Result Value Ref Range    WBC 11.5 (H) 4.1 - 11.1 K/uL    RBC 5.02 4.10 - 5.70 M/uL    HGB 13.3 12.1 - 17.0 g/dL    HCT 41.8 36.6 - 50.3 %    MCV 83.3 80.0 - 99.0 FL    MCH 26.5 26.0 - 34.0 PG    MCHC 31.8 30.0 - 36.5 g/dL    RDW 13.2 11.5 - 14.5 %    PLATELET 943 080 - 109 K/uL    MPV 10.3 8.9 - 12.9 FL    NRBC 0.0 0  WBC    ABSOLUTE NRBC 0.00 0.00 - 0.01 K/uL    NEUTROPHILS 79 (H) 32 - 75 %    LYMPHOCYTES 17 12 - 49 %    MONOCYTES 4 (L) 5 - 13 %    EOSINOPHILS 0 0 - 7 %    BASOPHILS 0 0 - 1 %    IMMATURE GRANULOCYTES 0 0.0 - 0.5 %    ABS. NEUTROPHILS 9.0 (H) 1.8 - 8.0 K/UL    ABS. LYMPHOCYTES 1.9 0.8 - 3.5 K/UL    ABS. MONOCYTES 0.5 0.0 - 1.0 K/UL    ABS. EOSINOPHILS 0.0 0.0 - 0.4 K/UL    ABS. BASOPHILS 0.1 0.0 - 0.1 K/UL    ABS. IMM.  GRANS. 0.0 0.00 - 0.04 K/UL    DF AUTOMATED     METABOLIC PANEL, COMPREHENSIVE    Collection Time: 07/23/19  8:59 AM   Result Value Ref Range    Sodium 134 (L) 136 - 145 mmol/L    Potassium 4.5 3.5 - 5.1 mmol/L    Chloride 101 97 - 108 mmol/L    CO2 25 21 - 32 mmol/L    Anion gap 8 5 - 15 mmol/L    Glucose 454 (H) 65 - 100 mg/dL    BUN 22 (H) 6 - 20 MG/DL    Creatinine 1.99 (H) 0.70 - 1.30 MG/DL    BUN/Creatinine ratio 11 (L) 12 - 20      GFR est AA 44 (L) >60 ml/min/1.73m2    GFR est non-AA 36 (L) >60 ml/min/1.73m2    Calcium 8.9 8.5 - 10.1 MG/DL    Bilirubin, total 0.2 0.2 - 1.0 MG/DL    ALT (SGPT) 28 12 - 78 U/L    AST (SGOT) 17 15 - 37 U/L    Alk. phosphatase 100 45 - 117 U/L    Protein, total 7.3 6.4 - 8.2 g/dL    Albumin 3.0 (L) 3.5 - 5.0 g/dL    Globulin 4.3 (H) 2.0 - 4.0 g/dL    A-G Ratio 0.7 (L) 1.1 - 2.2     PROTHROMBIN TIME + INR    Collection Time: 07/23/19  8:59 AM   Result Value Ref Range    INR 1.0 0.9 - 1.1      Prothrombin time 10.2 9.0 - 11.1 sec   CK    Collection Time: 07/23/19  8:59 AM   Result Value Ref Range     39 - 308 U/L   GLUCOSE, POC    Collection Time: 07/23/19 10:50 AM   Result Value Ref Range    Glucose (POC) 389 (H) 65 - 100 mg/dL    Performed by Nina Goode    GLUCOSE, POC    Collection Time: 07/23/19  3:40 PM   Result Value Ref Range    Glucose (POC) 295 (H) 65 - 100 mg/dL    Performed by Bulmaro Molina (PCT)        Radiologic Studies -   CT LOW EXT RT W CONT   Final Result   IMPRESSION:    1. Diffuse hypertrophy of the musculature of the right thigh in comparison to   the left. Recommend clinical correlation. 2. Vague hyperdensity in the mid sartorius muscle with a small focus of central   enhancement. There is mild stranding surrounding the left sartorius muscle. This   most likely represents a small focus of active extravasation and intramuscular   hematoma given the clinical history. This is more subtle than on the prior   ultrasound. Recommend direct compression. CT Results  (Last 48 hours)               07/23/19 1248  CT LOW EXT RT W CONT Final result    Impression:  IMPRESSION:    1. Diffuse hypertrophy of the musculature of the right thigh in comparison to   the left. Recommend clinical correlation. 2. Vague hyperdensity in the mid sartorius muscle with a small focus of central   enhancement. There is mild stranding surrounding the left sartorius muscle. This   most likely represents a small focus of active extravasation and intramuscular   hematoma given the clinical history.  This is more subtle than on the prior   ultrasound. Recommend direct compression. Narrative:  EXAM: CT LOW EXT RT W CONT       INDICATION: Right leg pain. Abnormal ultrasound. Patient on Coumadin       COMPARISON: Ultrasound 7/23/2019       CONTRAST: 100 mL of Isovue-300. TECHNIQUE: Helical CT of the right during uneventful rapid bolus intravenous   contrast administration. Coronal and Sagittal reformats were generated. Images   reviewed in soft tissue and bone windows. CT dose reduction was achieved through   the use of a standardized protocol tailored for this examination and automatic   exposure control for dose modulation. FINDINGS: Bones: Normal bone mineralization. No fracture, dislocation, or   periosteal reaction. There is an incidental 7 mm sclerotic lesion in the left   iliac bone likely representing a small bone island or fibrous lesion. Joint fluid: None. Articulations: No significant osteoarthritis. No evidence of inflammatory   arthritis. Tendons: No full-thickness tendon tear. Muscles: There is diffuse hypertrophy of the musculature of the right leg in   comparison to the left. There is mild stranding surrounding the left sartorius   muscle, more prominent distally. There is vague hyperdensity in the mid right   sartorius muscle on series 2 image 84 with a small focus of central enhancement. The prior ultrasound shows a fluid/fluid level which is not as well-seen on CT   but may represent a small area of hypoechoic density adjacent to the small focus   of enhancement on image 84. CXR Results  (Last 48 hours)    None        Medical Decision Making   I am the first provider for this patient. I reviewed the vital signs, available nursing notes, past medical history, past surgical history, family history and social history. Vital Signs-Reviewed the patient's vital signs.   Patient Vitals for the past 12 hrs:   Temp Pulse Resp BP SpO2   07/23/19 0744 98.1 °F (36.7 °C) 84 18 (!) 183/112 100 %     Pulse Oximetry Analysis - 100% on room air    Records Reviewed: Nursing Notes, Old Medical Records, Previous Radiology Studies, Previous Laboratory Studies and     Provider Notes (Medical Decision Making):   DDx: DVT, intramuscular hematoma, doubt abscess, contusion    9:30 AM  Case discussed with and patient examined by Dr. Shira Chris. Patient has elevated blood sugar that will require IV insulin and fluids. With it is recommended that we obtain a CT with contrast of the right thigh to better evaluate the hematoma/complex mass. Pain medication as ordered. We will continue to monitor. 3:53 PM  All results discussed with patient and his wife. Blood sugar is now less than 300. Patient's pain is well controlled. Management of the intramuscular hematoma should be compression, ice and elevation. An Ace wrap and ice pack are provided/applied. Patient is informed that his INR is 1.0. He insists that he has been taking his medication as prescribed until he ran out a few days ago. Case discussed with Dr. eldridge. Additional testing deferred. Anticipate discharge with primary care referral.  Return precautions. ED Course:   Initial assessment performed. The patients presenting problems have been discussed, and they are in agreement with the care plan formulated and outlined with them. I have encouraged them to ask questions as they arise throughout their visit. Disposition:  Discharge    PLAN:  1. Current Discharge Medication List      START taking these medications    Details   oxyCODONE-acetaminophen (PERCOCET) 5-325 mg per tablet Take 1 Tab by mouth every eight (8) hours as needed for Pain for up to 5 days. Max Daily Amount: 3 Tabs. Qty: 15 Tab, Refills: 0    Associated Diagnoses: Intramuscular hematoma           2.    Follow-up Information     Follow up With Specialties Details Why Contact Info    Alhaji Duran MD Internal Medicine Schedule an appointment as soon as possible for a visit PRIMARY CARE: call tomorrow to schedule follow up [address] [place] [address]  [phone]          Return to ED if worse     Diagnosis     Clinical Impression:   1. Intramuscular hematoma    2. Subtherapeutic international normalized ratio (INR)    3. Insulin dependent diabetes mellitus (HCC)    4. Stage 3 chronic kidney disease ([facility] [address]    5.  Hypertension, unspecified type

## 2019-07-23 NOTE — ED NOTES
Nasreen Ramírez DO   has done a bedside review of the discharge instructions. The patient is in understanding. The patients line(s) are removed. The patient is dressed, and belongings together for discharge.

## 2019-07-24 ENCOUNTER — TELEPHONE (OUTPATIENT)
Dept: INTERNAL MEDICINE CLINIC | Age: 49
End: 2019-07-24

## 2019-07-24 NOTE — TELEPHONE ENCOUNTER
Pt states he went ot ER @ [facility] yesterday for a bleeding bruise on his right thgh. He thinks it is the coumadin and he may need an INR check to get this medication adjusted or he may need to come in for a visit . He also states someone from the office was supposed to call him ref to pain management referral and he missed 2 calls from the office. Pt wants a return call asap please.    Pt # [phone]

## 2019-07-26 ENCOUNTER — CLINICAL SUPPORT (OUTPATIENT)
Dept: INTERNAL MEDICINE CLINIC | Age: 49
End: 2019-07-26

## 2019-07-26 DIAGNOSIS — I26.99 RECURRENT PULMONARY EMBOLI (HCC): Primary | ICD-10-CM

## 2019-07-26 LAB
INR BLD: 1.4
PT POC: NORMAL
VALID INTERNAL CONTROL?: YES

## 2019-08-01 NOTE — PROGRESS NOTES
John Mitchell is a 51 y.o. male who presents today for Anticoagulation monitoring. Indication: DVT  INR Goal: 2.0-3.0. Current dose:  Coumadin 10 mg daily. Missed Coumadin Doses: This week - 2-3  Medication Changes:  no  Dietary Changes:  no    Symptoms: taking coumadin appropriately without any bleeding. Latest INRs:  Lab Results   Component Value Date/Time    INR 1.0 07/23/2019 08:59 AM    INR 1.4 (H) 10/07/2018 05:31 AM    INR 1.2 (H) 10/06/2018 05:48 AM    INR POC 1.4 07/26/2019 09:23 AM    INR POC 1.3 07/16/2019 09:43 AM    INR POC 1.5 05/10/2019 08:21 AM    Prothrombin time 10.2 07/23/2019 08:59 AM    Prothrombin time 13.8 (H) 10/07/2018 05:31 AM    Prothrombin time 11.9 (H) 10/06/2018 05:48 AM        New Coumadin dose:.current treatment plan is effective, no change in therapy. Next check to be scheduled for  2 weeks.

## 2019-08-06 ENCOUNTER — TELEPHONE (OUTPATIENT)
Dept: CARDIOLOGY CLINIC | Age: 49
End: 2019-08-06

## 2019-08-06 NOTE — TELEPHONE ENCOUNTER
Patient returned our call to schedule his new patient appointment. Appointment is scheduled on Thursday August 15th at 10am with Dr. Brain Miller. Patient has been informed to arrive 10 min prior to appointment for our check in process.

## 2019-08-15 ENCOUNTER — OFFICE VISIT (OUTPATIENT)
Dept: CARDIOLOGY CLINIC | Age: 49
End: 2019-08-15

## 2019-08-15 VITALS
HEART RATE: 60 BPM | OXYGEN SATURATION: 98 % | SYSTOLIC BLOOD PRESSURE: 192 MMHG | RESPIRATION RATE: 20 BRPM | BODY MASS INDEX: 29.22 KG/M2 | HEIGHT: 75 IN | DIASTOLIC BLOOD PRESSURE: 106 MMHG | WEIGHT: 235 LBS | TEMPERATURE: 98 F

## 2019-08-15 DIAGNOSIS — I42.8 NICM (NONISCHEMIC CARDIOMYOPATHY) (HCC): ICD-10-CM

## 2019-08-15 DIAGNOSIS — I10 ESSENTIAL HYPERTENSION: ICD-10-CM

## 2019-08-15 DIAGNOSIS — N18.30 CKD (CHRONIC KIDNEY DISEASE), STAGE III (HCC): ICD-10-CM

## 2019-08-15 DIAGNOSIS — R40.0 DAYTIME SOMNOLENCE: ICD-10-CM

## 2019-08-15 DIAGNOSIS — I50.43 ACUTE ON CHRONIC COMBINED SYSTOLIC AND DIASTOLIC ACC/AHA STAGE C CONGESTIVE HEART FAILURE (HCC): Primary | ICD-10-CM

## 2019-08-15 DIAGNOSIS — I50.22 CHRONIC SYSTOLIC HEART FAILURE (HCC): ICD-10-CM

## 2019-08-15 DIAGNOSIS — T87.9 COMPLICATION OF AMPUTATED STUMP (HCC): ICD-10-CM

## 2019-08-15 DIAGNOSIS — I50.43 ACUTE ON CHRONIC COMBINED SYSTOLIC AND DIASTOLIC ACC/AHA STAGE C CONGESTIVE HEART FAILURE (HCC): ICD-10-CM

## 2019-08-15 RX ORDER — CARVEDILOL 12.5 MG/1
12.5 TABLET ORAL 2 TIMES DAILY WITH MEALS
Qty: 60 TAB | Refills: 2 | Status: SHIPPED | OUTPATIENT
Start: 2019-08-15 | End: 2020-06-29 | Stop reason: SDUPTHER

## 2019-08-15 RX ORDER — DULOXETIN HYDROCHLORIDE 30 MG/1
30 CAPSULE, DELAYED RELEASE ORAL DAILY
Qty: 30 CAP | Refills: 2 | Status: SHIPPED | OUTPATIENT
Start: 2019-08-15 | End: 2020-06-29 | Stop reason: SDUPTHER

## 2019-08-15 NOTE — PROGRESS NOTES
1. Do you Snore loudly (loud enough to be heart through closed doors or your bed-partner elbows you for snoring at night)? Yes    2. Do you often feel Tired, fatigued, or sleepy during the daytime (such as falling asleep during driving or talking to someone)? Yes    3. Has anyone Observed you stop breathing or choking/gasping during your sleep? No    4. Do you have or are being treated for high blood Pressure? Yes    5. Is your Body mass index more than 35? No    6. Age older than 48? No  7. Large Neck size? For male, is your shirt collar 17 inches / 43 cm or larger? For female, is your shirt collar 16 inches / 41 cm or larger? 17-17.5in    8. Gender = male? Yes      OCTAVIANO low risk - yes to 0 - 2 questions  OCTAVIANO - intermediate risk - yes to 3 - 4 questions  OCTAVIANO - high risk:  yes to 5-8 questions  Or yes to 2 or more of 4 STOP questions + male gender  Or yes to 2 or more of 4 STOP questions + BMI greater than 35  Or yes to 2 or more of 4 STOP questions + neck circumference 17 inches/43 cm in male or 16 inches/41 cm in female

## 2019-08-15 NOTE — PROGRESS NOTES
[facility] Consultation Note      DOS:   8/15/2019  NAME:  Mercedes Eduardo   MRN:   [id number]   REFERRING PROVIDER:  Carlie Anaya MD  PRIMARY CARE PHYSICIAN: Carlie Anaya MD  PRIMARY CARDIOLOGIST: none      Chief Complaint:   Chief Complaint   Patient presents with    New Patient       HPI: 51y.o. year old male with a history of HTN, HLD, T2DM, NICM, s/p left BKA, recurrent PEs who presents for further evaluation of his chronic systolic heart failure. He admits to a decline in his energy and dyspnea with moderate exertion and occasional right ankle edema. He denies palpitations, presyncope, syncope. He previously worked as an  and was recently placed on disability. He would like to improve his heart function and return to work as a . He is also troubled by chronic pain at the site of his left stump. He lives with his wife and 12year old daughter.     History:  Past Medical History:   Diagnosis Date    Diabetes ([facility]     since age 23   [facility] GERD (gastroesophageal reflux disease)     HTN (hypertension)     Hypercholesteremia     Hyperlipidemia     Psychiatric disorder     anxiety & depression    Thromboembolus ([facility] 2017    bilateral legs     Past Surgical History:   Procedure Laterality Date    HX AMPUTATION      toes- left foot    HX AMPUTATION Left 08/2017    BKA    HX BUNIONECTOMY      HX ORTHOPAEDIC      boutinerre deformity    HX ORTHOPAEDIC      fascia removed left foot    HX OTHER SURGICAL  03/2017    Skin graft Surgery    HX TONSILLECTOMY      HX WISDOM TEETH EXTRACTION       Social History     Socioeconomic History    Marital status:      Spouse name: Not on file    Number of children: Not on file    Years of education: Not on file    Highest education level: Not on file   Occupational History    Not on file   Social Needs    Financial resource strain: Not on file    Food insecurity:     Worry: Not on file Inability: Not on file    Transportation needs:     Medical: Not on file     Non-medical: Not on file   Tobacco Use    Smoking status: Former Smoker     Types: Cigars     Last attempt to quit: 2011     Years since quittin.3    Smokeless tobacco: Never Used   Substance and Sexual Activity    Alcohol use: Yes     Alcohol/week: 0.0 standard drinks     Types: 1 Glasses of wine per week     Comment: stop drinking 5 months ago    Drug use: No    Sexual activity: Yes     Partners: Female     Birth control/protection: None   Lifestyle    Physical activity:     Days per week: Not on file     Minutes per session: Not on file    Stress: Not on file   Relationships    Social connections:     Talks on phone: Not on file     Gets together: Not on file     Attends Taoist service: Not on file     Active member of club or organization: Not on file     Attends meetings of clubs or organizations: Not on file     Relationship status: Not on file    Intimate partner violence:     Fear of current or ex partner: Not on file     Emotionally abused: Not on file     Physically abused: Not on file     Forced sexual activity: Not on file   Other Topics Concern    Not on file   Social History Narrative    ** Merged History Encounter **          Family History   Problem Relation Age of Onset    Hypertension Mother     High Cholesterol Mother        Current Medications:   Current Outpatient Medications   Medication Sig Dispense Refill    Insulin Syringe-Needle U-100 (BD INSULIN SYRINGE) 1 mL 27 gauge x 1/2\" syrg Use BID w novolog Dx E11.9 100 Syringe 2    insulin NPH/insulin regular (NOVOLIN 70/30, HUMULIN 70/30) 100 unit/mL (70-30) injection Dx E11.9 40U sq BID (Patient taking differently: 6 Units by SubCUTAneous route two (2) times daily (with meals). ) 10 mL 3    warfarin (COUMADIN) 5 mg tablet Take 2 Tabs by mouth daily.  60 Tab 0    carvedilol (COREG) 6.25 mg tablet TAKE 1 TAB BY MOUTH TWO (2) TIMES DAILY (WITH MEALS). 60 Tab 5    hydrALAZINE (APRESOLINE) 25 mg tablet TAKE 1 TAB BY MOUTH THREE (3) TIMES DAILY. 90 Tab 5    isosorbide mononitrate ER (IMDUR) 30 mg tablet Take 1 Tab by mouth daily. 30 Tab 5    spironolactone (ALDACTONE) 25 mg tablet Take 1 Tab by mouth daily. TAKE 1 TABLET EVERY DAY 30 Tab 5    metFORMIN (GLUCOPHAGE) 850 mg tablet Take 1 Tab by mouth two (2) times daily (with meals). 60 Tab 5    atorvastatin (LIPITOR) 40 mg tablet Take 1 Tab by mouth daily. (Patient taking differently: Take 40 mg by mouth daily. Patient stated he does not take every day due to the number of medications he is taking.) 30 Tab 5    Insulin Needles, Disposable, 31 gauge x 5/16\" ndle Use four timed daily w insulin 100 Package 11    glucose blood VI test strips (BLOOD GLUCOSE TEST) strip Use BID DxE11.9 200 Strip 5    Lancets misc Use as directed. Dx: E11.9 1 Each 11       Allergies:    Allergies   Allergen Reactions    Ace Inhibitors Swelling     Facial swelling cough     Morphine Angioedema    Arb-Angiotensin Receptor Antagonist Angioedema       ROS:    General:  positive for  - fatigue  HEENT: negative  Pulmonary: positive for - shortness of breath  Cardiac: positive for fatigue, lower extremity edema, dyspnea on exertion  GI:  no abdominal pain, change in bowel habits, or black or bloody stools  Musculo: negative  Neuro: no TIA or stroke symptoms  Skin:  Positive for skin breakdown at site of left stump  Allergies: REMINDER:DOCUMENT ALLERGIES IN ALLERGY ACTIVITY  Psych: positive for - irritability    Admission Weight: Last Weight   Weight: 235 lb (106.6 kg) Weight: 235 lb (106.6 kg)       Physical Exam:   Vitals:    Visit Vitals  BP (!) 192/106 (BP 1 Location: Right arm, BP Patient Position: Sitting)   Pulse 60   Temp 98 °F (36.7 °C) (Oral)   Resp 20   Ht 6' 3\" (1.905 m)   Wt 235 lb (106.6 kg)   SpO2 98%   BMI 29.37 kg/m²       General:  alert, cooperative  HEENT: PERRLA, EOMI and Sclera clear, anicteric  Neck:  supple, no significant adenopathy, carotids upstroke normal bilaterally, no bruits  CVP:  8 cm  ( - ) HJR  Cardiac: regular rate, regular rhythm, S1, S2 normal, S4 present and systolic murmur present  Chest: breath sounds clear and equal bilaterally  Abdomen: soft, non-tender. Bowel sounds normal. No masses, no organomegaly. Extremity: No edema  Neuro: Alert and oriented to person, place, and time; normal strength and tone. Normal symmetric reflexes. Normal coordination and gait  Skin:   no rashes, no ecchymoses, no petechiae    Recent Labs:   Labs Latest Ref Rng & Units 2019   WBC 4.1 - 11.1 K/uL 11. 5(H)   RBC 4.10 - 5.70 M/uL 5.02   Hemoglobin 12.1 - 17.0 g/dL 13.3   Hematocrit 36.6 - 50.3 % 41.8   MCV 80.0 - 99.0 FL 83.3   Platelets 680 - 382 K/uL 225   Lymphocytes 12 - 49 % 17   Monocytes 5 - 13 % 4(L)   Eosinophils 0 - 7 % 0   Basophils 0 - 1 % 0   Albumin 3.5 - 5.0 g/dL 3. 0(L)   Calcium 8.5 - 10.1 MG/DL 8.9   SGOT 15 - 37 U/L 17   Glucose 65 - 100 mg/dL 454(H)   BUN 6 - 20 MG/DL 22(H)   Creatinine 0.70 - 1.30 MG/DL 1.99(H)   Sodium 136 - 145 mmol/L 134(L)   Potassium 3.5 - 5.1 mmol/L 4.5   Some recent data might be hidden     EK/15/19  Sinus  Rhythm  -With rate variation, rate 60 bpm   cv = 18. Voltage criteria for LVH  (S(V1)+R(V6) exceeds 3.50 mV)  -Voltage criteria w/o ST/T abnormality may be normal.    ms    Echocardiogram:   17  LVEF 25-30%, mod diffuse HK, mild cLVH, grade 3 diastolic dysfunction, PAPs 40 mmHg      Impression / Plan:   1. NICM - Stage C, NYHA Class II, LVEF 25-30%   Heart cath by Dr. Lanita Sacks at [facility] - no significant CAD   Request records from S   Continue spironolactone, hydralazine/imdur   Increase carvedilol to 12.5 mg, 1 tablet twice daily   No RAASi d/t angioedema   Recommend TTE   Consider CRT if LVEF < 35%   Check new HF labs, ATTR    2. Risk of SCD   Recommend AICD if LVEF < 35% - await echo results    3.  CKD3   No RAASi d/t angioedema   Serum Cr 1.99    4. T2DM on insulin, complicated by gastropathy   Hga1c 11.7 on 7/16/19   On metformin    5. HLD   On lipitor 40 mg qhs - not taking every day    6. HTN - Stage 3   Increase carvedilol to 12.5 mg, 1 tablet twice daily   Daily BP log   Sleep medicine referral    7. Vitamin D Deficiency   Check vitamin D level     8. S/p L BKA with chronic stump pain   Start cymbalta 30 mg qhs   Awaiting appointment with pain medicine clinic    9. STOP-BANG = high risk   Refer for sleep study at [facility].  Michael Sprague MD, [facility], [address] [place] [address]  Chief of Cardiology, [address] Director  [address] [place] [address]  [address] [facility], [address] [place] [address], [address] [place] [address]  Office [phone]  Fax [phone]

## 2019-08-15 NOTE — PROGRESS NOTES
Lab and echo ordered today. Labs drawn in clinic. Message sent to care coordination for scheduling of echo. Referral to cardiac rehab recommended by WESLEY Sheehan MD. Referral faxed to [facility]. Medication changes, plan for follow up and after visit summary reviewed with patient. Patient notified he will be contacted to schedule the echo, cardiac rehab and sleep study. Patient verbalized understanding and had no further questions at this time. Rafael Jay RN.

## 2019-08-15 NOTE — PATIENT INSTRUCTIONS
Medication changes:    INCREASE carvedilol (Coreg) to 12.5mg (you were previously taking 6.25mg) twice a day. You may take two 6.25mg tablets by mouth twice a day until you run out. The new prescription will be for one 12.5mg by mouth twice a day    START duloxetine (Cymbalta) 30mg- Take one capsule by mouth daily      Please monitor your blood pressures daily prior to medications and 2 hours after taking medications. Bring a written record of your blood pressures to your next appointment. Please monitor your weights daily upon waking and after using the bathroom. Keep a written records of your weights and bring to your next appointment. If you have a weight gain of 2 or more pounds overnight OR 5 or more pounds in one week, please contact our office. Testing Ordered:    Lab work drawn in clinic today    Echocardiogram ordered. You will be contacted for scheduling of this test    A sleep study has been ordered.  You will be contacted for scheduling of this test.     Our office will notify of test results    Follow up 3-4 weeks with Dr. Chin Escobar

## 2019-08-20 ENCOUNTER — TELEPHONE (OUTPATIENT)
Dept: CARDIOLOGY CLINIC | Age: 49
End: 2019-08-20

## 2019-08-20 LAB
25(OH)D3+25(OH)D2 SERPL-MCNC: 10.3 NG/ML (ref 30–100)
ALBUMIN SERPL ELPH-MCNC: 3.2 G/DL (ref 2.9–4.4)
ALBUMIN SERPL-MCNC: 3.4 G/DL (ref 3.5–5.5)
ALBUMIN/GLOB SERPL: 1.1 {RATIO} (ref 0.7–1.7)
ALBUMIN/GLOB SERPL: 1.2 {RATIO} (ref 1.2–2.2)
ALP SERPL-CCNC: 93 IU/L (ref 39–117)
ALPHA1 GLOB SERPL ELPH-MCNC: 0.2 G/DL (ref 0–0.4)
ALPHA2 GLOB SERPL ELPH-MCNC: 1 G/DL (ref 0.4–1)
ALT SERPL-CCNC: 17 IU/L (ref 0–44)
AST SERPL-CCNC: 13 IU/L (ref 0–40)
B-GLOBULIN SERPL ELPH-MCNC: 1.1 G/DL (ref 0.7–1.3)
BASOPHILS # BLD AUTO: 0.1 X10E3/UL (ref 0–0.2)
BASOPHILS NFR BLD AUTO: 1 %
BILIRUB SERPL-MCNC: 0.2 MG/DL (ref 0–1.2)
BUN SERPL-MCNC: 22 MG/DL (ref 6–24)
BUN/CREAT SERPL: 14 (ref 9–20)
CALCIUM SERPL-MCNC: 9.2 MG/DL (ref 8.7–10.2)
CHLORIDE SERPL-SCNC: 101 MMOL/L (ref 96–106)
CO2 SERPL-SCNC: 18 MMOL/L (ref 20–29)
CREAT SERPL-MCNC: 1.61 MG/DL (ref 0.76–1.27)
CRP SERPL HS-MCNC: 1.86 MG/L (ref 0–3)
EOSINOPHIL # BLD AUTO: 0.1 X10E3/UL (ref 0–0.4)
EOSINOPHIL NFR BLD AUTO: 1 %
ERYTHROCYTE [DISTWIDTH] IN BLOOD BY AUTOMATED COUNT: 13.9 % (ref 12.3–15.4)
FERRITIN SERPL-MCNC: 213 NG/ML (ref 30–400)
FT4I SERPL CALC-MCNC: 2.2 (ref 1.2–4.9)
GAMMA GLOB SERPL ELPH-MCNC: 0.8 G/DL (ref 0.4–1.8)
GLOBULIN SER CALC-MCNC: 2.8 G/DL (ref 1.5–4.5)
GLOBULIN SER-MCNC: 3 G/DL (ref 2.2–3.9)
GLUCOSE SERPL-MCNC: 405 MG/DL (ref 65–99)
HCT VFR BLD AUTO: 39.1 % (ref 37.5–51)
HGB BLD-MCNC: 12 G/DL (ref 13–17.7)
IGA SERPL-MCNC: 342 MG/DL (ref 90–386)
IGG SERPL-MCNC: 1147 MG/DL (ref 700–1600)
IGM SERPL-MCNC: 26 MG/DL (ref 20–172)
IMM GRANULOCYTES # BLD AUTO: 0 X10E3/UL (ref 0–0.1)
IMM GRANULOCYTES NFR BLD AUTO: 0 %
INR PPP: 1.3 (ref 0.8–1.2)
INTERPRETATION SERPL IEP-IMP: ABNORMAL
IRON SATN MFR SERPL: 33 % (ref 15–55)
IRON SERPL-MCNC: 84 UG/DL (ref 38–169)
KAPPA LC FREE SER-MCNC: 47.5 MG/L (ref 3.3–19.4)
KAPPA LC FREE/LAMBDA FREE SER: 1.62 {RATIO} (ref 0.26–1.65)
LAMBDA LC FREE SERPL-MCNC: 29.4 MG/L (ref 5.7–26.3)
LYMPHOCYTES # BLD AUTO: 3.1 X10E3/UL (ref 0.7–3.1)
LYMPHOCYTES NFR BLD AUTO: 33 %
M PROTEIN SERPL ELPH-MCNC: ABNORMAL G/DL
MCH RBC QN AUTO: 25.9 PG (ref 26.6–33)
MCHC RBC AUTO-ENTMCNC: 30.7 G/DL (ref 31.5–35.7)
MCV RBC AUTO: 84 FL (ref 79–97)
MONOCYTES # BLD AUTO: 0.4 X10E3/UL (ref 0.1–0.9)
MONOCYTES NFR BLD AUTO: 4 %
NEUTROPHILS # BLD AUTO: 5.8 X10E3/UL (ref 1.4–7)
NEUTROPHILS NFR BLD AUTO: 61 %
NT-PROBNP SERPL-MCNC: 52 PG/ML (ref 0–121)
PLATELET # BLD AUTO: 280 X10E3/UL (ref 150–450)
PLEASE NOTE:, 149534: ABNORMAL
POTASSIUM SERPL-SCNC: 4.7 MMOL/L (ref 3.5–5.2)
PREALB SERPL-MCNC: 27 MG/DL (ref 12–34)
PROT SERPL-MCNC: 6.2 G/DL (ref 6–8.5)
PROTHROMBIN TIME: 13.4 SEC (ref 9.1–12)
RBC # BLD AUTO: 4.63 X10E6/UL (ref 4.14–5.8)
SODIUM SERPL-SCNC: 135 MMOL/L (ref 134–144)
T3RU NFR SERPL: 31 % (ref 24–39)
T4 SERPL-MCNC: 7.2 UG/DL (ref 4.5–12)
TIBC SERPL-MCNC: 252 UG/DL (ref 250–450)
TROPONIN I SERPL-MCNC: 0.02 NG/ML (ref 0–0.04)
TSH SERPL DL<=0.005 MIU/L-ACNC: 1.18 UIU/ML (ref 0.45–4.5)
UIBC SERPL-MCNC: 168 UG/DL (ref 111–343)
URATE SERPL-MCNC: 5.4 MG/DL (ref 3.7–8.6)
WBC # BLD AUTO: 9.5 X10E3/UL (ref 3.4–10.8)

## 2019-08-22 DIAGNOSIS — N18.30 CKD (CHRONIC KIDNEY DISEASE), STAGE III (HCC): ICD-10-CM

## 2019-08-22 DIAGNOSIS — I42.8 NICM (NONISCHEMIC CARDIOMYOPATHY) (HCC): ICD-10-CM

## 2019-08-22 DIAGNOSIS — I50.43 ACUTE ON CHRONIC COMBINED SYSTOLIC AND DIASTOLIC ACC/AHA STAGE C CONGESTIVE HEART FAILURE (HCC): ICD-10-CM

## 2019-08-23 ENCOUNTER — HOSPITAL ENCOUNTER (OUTPATIENT)
Dept: NON INVASIVE DIAGNOSTICS | Age: 49
Discharge: HOME | End: 2019-08-23
Attending: INTERNAL MEDICINE
Payer: COMMERCIAL

## 2019-08-23 DIAGNOSIS — I42.8 NICM (NONISCHEMIC CARDIOMYOPATHY) (HCC): ICD-10-CM

## 2019-08-23 PROCEDURE — 93306 TTE W/DOPPLER COMPLETE: CPT

## 2019-08-26 LAB
ECHO AO ROOT DIAM: 3.51 CM
ECHO AV AREA PEAK VELOCITY: 1.6 CM2
ECHO AV AREA/BSA PEAK VELOCITY: 0.7 CM2/M2
ECHO AV CUSP MM: 0 CM
ECHO AV PEAK GRADIENT: 11.8 MMHG
ECHO AV PEAK VELOCITY: 171.85 CM/S
ECHO EST RA PRESSURE: 10 MMHG
ECHO LA AREA 4C: 16 CM2
ECHO LA MAJOR AXIS: 3.49 CM
ECHO LA TO AORTIC ROOT RATIO: 0.99
ECHO LA VOL 4C: 39.74 ML (ref 18–58)
ECHO LV EDV A2C: 34.3 ML
ECHO LV EDV A4C: 68.4 ML
ECHO LV EDV BP: 46.7 ML (ref 67–155)
ECHO LV EJECTION FRACTION A2C: 74 %
ECHO LV EJECTION FRACTION A4C: 57 %
ECHO LV EJECTION FRACTION BIPLANE: 62.2 % (ref 55–100)
ECHO LV ESV A2C: 8.8 ML
ECHO LV ESV A4C: 29.5 ML
ECHO LV ESV BP: 17.7 ML (ref 22–58)
ECHO LV INTERNAL DIMENSION DIASTOLIC: 4.75 CM (ref 4.2–5.9)
ECHO LV INTERNAL DIMENSION SYSTOLIC: 4.34 CM
ECHO LV IVSD: 1.59 CM (ref 0.6–1)
ECHO LV MASS 2D: 300 G (ref 88–224)
ECHO LV POSTERIOR WALL DIASTOLIC: 1.07 CM (ref 0.6–1)
ECHO LV POSTERIOR WALL SYSTOLIC: 1.21 CM
ECHO LVOT DIAM: 1.8 CM
ECHO LVOT PEAK GRADIENT: 4.8 MMHG
ECHO LVOT PEAK VELOCITY: 109.14 CM/S
ECHO LVOT SV: 58.1 ML
ECHO LVOT VTI: 22.78 CM
ECHO MV A VELOCITY: 62.07 CM/S
ECHO MV AREA PHT: 2.9 CM2
ECHO MV AREA VTI: 2.6 CM2
ECHO MV E DECELERATION TIME (DT): 259.5 MS
ECHO MV E VELOCITY: 62.87 CM/S
ECHO MV E/A RATIO: 1.01
ECHO MV MAX VELOCITY: 82.9 CM/S
ECHO MV MEAN GRADIENT: 1.4 MMHG
ECHO MV PEAK GRADIENT: 2.7 MMHG
ECHO MV PRESSURE HALF TIME (PHT): 75.2 MS
ECHO MV REGURGITANT PEAK GRADIENT: 9.3 MMHG
ECHO MV REGURGITANT PEAK VELOCITY: 152.26 CM/S
ECHO MV VTI: 22.09 CM
ECHO PULMONARY ARTERY SYSTOLIC PRESSURE (PASP): 45.7 MMHG
ECHO RA VOLUME: 42.1 ML
ECHO RIGHT VENTRICULAR SYSTOLIC PRESSURE (RVSP): 45.7 MMHG
ECHO TV MAX VELOCITY: 285.7 CM/S
ECHO TV PEAK GRADIENT: 32.7 MMHG
ECHO TV REGURGITANT MAX VELOCITY: 298.72 CM/S
ECHO TV REGURGITANT PEAK GRADIENT: 35.7 MMHG

## 2019-08-27 ENCOUNTER — TELEPHONE (OUTPATIENT)
Dept: CARDIOLOGY CLINIC | Age: 49
End: 2019-08-27

## 2019-08-27 RX ORDER — ERGOCALCIFEROL 1.25 MG/1
50000 CAPSULE ORAL
Qty: 12 CAP | Refills: 0 | Status: SHIPPED | OUTPATIENT
Start: 2019-08-27 | End: 2020-06-29 | Stop reason: SDUPTHER

## 2019-08-27 NOTE — TELEPHONE ENCOUNTER
----- Message from Heidi Barber MD sent at 8/26/2019  4:04 PM EDT -----  Malaika Hadley,    Please let Mr. Rm Edward know that his LVEF was borderline normal. He also has mild pulmonary hypertension which is concerning for sleep apnea. We discussed having a sleep study at his clinic visit - please encourage him to have the outpatient sleep study scheduled. Thank you    ----- Message -----  From: Tanya He MD  Sent: 8/26/2019  11:29 AM EDT  To: MD Aleida Rust MD Herbert Fey             Please let Mr. Rm Edward know that I sent a Rx to his pharmacy for vitamin D2. The remainder of his labs are stable - we cn discuss him at his next office visit. Attempted to contact patient to notify of lab results. Message left. Will await return call. Rogelio Romero RN.

## 2019-08-28 NOTE — TELEPHONE ENCOUNTER
Patient returned call. Notified him of results and recommendations. Patient verbalized understanding and had no further questions at this time. Mer Ling RN.

## 2019-08-30 ENCOUNTER — DOCUMENTATION ONLY (OUTPATIENT)
Dept: INTERNAL MEDICINE CLINIC | Age: 49
End: 2019-08-30

## 2019-11-26 ENCOUNTER — OFFICE VISIT (OUTPATIENT)
Dept: SLEEP MEDICINE | Age: 49
End: 2019-11-26

## 2019-11-26 VITALS
HEART RATE: 68 BPM | BODY MASS INDEX: 31.58 KG/M2 | SYSTOLIC BLOOD PRESSURE: 141 MMHG | RESPIRATION RATE: 17 BRPM | HEIGHT: 75 IN | WEIGHT: 254 LBS | DIASTOLIC BLOOD PRESSURE: 82 MMHG | OXYGEN SATURATION: 100 %

## 2019-11-26 DIAGNOSIS — Z72.821 INADEQUATE SLEEP HYGIENE: ICD-10-CM

## 2019-11-26 DIAGNOSIS — G47.33 OBSTRUCTIVE SLEEP APNEA (ADULT) (PEDIATRIC): Primary | ICD-10-CM

## 2019-11-26 DIAGNOSIS — I10 ESSENTIAL HYPERTENSION: ICD-10-CM

## 2019-11-26 NOTE — PATIENT INSTRUCTIONS
[address] [place]. [place], [address]  Tel.  [phone]  Fax. [address] [facility]  [place], [address] [facility]  Tel.  [phone]  Fax. [phone] [address]  Tel.  [phone]  Fax. [phone]     Sleep Apnea: After Your Visit  Your Care Instructions  Sleep apnea occurs when you frequently stop breathing for 10 seconds or longer during sleep. It can be mild to severe, based on the number of times per hour that you stop breathing or have slowed breathing. Blocked or narrowed airways in your nose, mouth, or throat can cause sleep apnea. Your airway can become blocked when your throat muscles and tongue relax during sleep. Sleep apnea is common, occurring in 1 out of 20 individuals. Individuals having any of the following characteristics should be evaluated and treated right away due to high risk and detrimental consequences from untreated sleep apnea:  1. Obesity  2. Congestive Heart failure  3. Atrial Fibrillation  4. Uncontrolled Hypertension  5. Type II Diabetes  6. Night-time Arrhythmias  7. Stroke  8. Pulmonary Hypertension  9. High-risk Driving Populations (pilots, truck drivers, etc.)  10. Patients Considering Weight-loss Surgery    How do you know you have sleep apnea? You probably have sleep apnea if you answer 'yes' to 3 or more of the following questions:  S - Have you been told that you Snore? T - Are you often Tired during the day? O - Has anyone Observed you stop breathing while sleeping? P- Do you have (or are being treated for) high blood Pressure? B - Are you obese (Body Mass Index > 35)? A - Is your Age 48years old or older? N - Is your Neck size greater than 16 inches? G - Are you male Gender? A sleep physician can prescribe a breathing device that prevents tissues in the throat from blocking your airway.  Or your doctor may recommend using a dental device (oral breathing device) to help keep your airway open. In some cases, surgery may be needed to remove enlarged tissues in the throat. Follow-up care is a key part of your treatment and safety. Be sure to make and go to all appointments, and call your doctor if you are having problems. It's also a good idea to know your test results and keep a list of the medicines you take. How can you care for yourself at home? · Lose weight, if needed. It may reduce the number of times you stop breathing or have slowed breathing. · Go to bed at the same time every night. · Sleep on your side. It may stop mild apnea. If you tend to roll onto your back, sew a pocket in the back of your pajama top. Put a tennis ball into the pocket, and stitch the pocket shut. This will help keep you from sleeping on your back. · Avoid alcohol and medicines such as sleeping pills and sedatives before bed. · Do not smoke. Smoking can make sleep apnea worse. If you need help quitting, talk to your doctor about stop-smoking programs and medicines. These can increase your chances of quitting for good. · Prop up the head of your bed 4 to 6 inches by putting bricks under the legs of the bed. · Treat breathing problems, such as a stuffy nose, caused by a cold or allergies. · Use a continuous positive airway pressure (CPAP) breathing machine if lifestyle changes do not help your apnea and your doctor recommends it. The machine keeps your airway from closing when you sleep. · If CPAP does not help you, ask your doctor whether you should try other breathing machines. A bilevel positive airway pressure machine has two types of air pressureâone for breathing in and one for breathing out. Another device raises or lowers air pressure as needed while you breathe. · If your nose feels dry or bleeds when using one of these machines, talk with your doctor about increasing moisture in the air. A humidifier may help.   · If your nose is runny or stuffy from using a breathing machine, talk with your doctor about using decongestants or a corticosteroid nasal spray. When should you call for help? Watch closely for changes in your health, and be sure to contact your doctor if:  · You still have sleep apnea even though you have made lifestyle changes. · You are thinking of trying a device such as CPAP. · You are having problems using a CPAP or similar machine. Where can you learn more? Go to [employer]. Enter P097 in the search box to learn more about \"Sleep Apnea: After Your Visit. \"   © 8496-9957 Healthwise, Incorporated. Care instructions adapted under license by [address] [place] [address] [employer] (which disclaims liability or warranty for this information). This care instruction is for use with your licensed healthcare professional. If you have questions about a medical condition or this instruction, always ask your healthcare professional. Margo Mckeon any warranty or liability for your use of this information. PROPER SLEEP HYGIENE    What to avoid  · Do not have drinks with caffeine, such as coffee or black tea, for 8 hours before bed. · Do not smoke or use other types of tobacco near bedtime. Nicotine is a stimulant and can keep you awake. · Avoid drinking alcohol late in the evening, because it can cause you to wake in the middle of the night. · Do not eat a big meal close to bedtime. If you are hungry, eat a light snack. · Do not drink a lot of water close to bedtime, because the need to urinate may wake you up during the night. · Do not read or watch TV in bed. Use the bed only for sleeping and sexual activity. What to try  · Go to bed at the same time every night, and wake up at the same time every morning. Do not take naps during the day. · Keep your bedroom quiet, dark, and cool. · Get regular exercise, but not within 3 to 4 hours of your bedtime. .  · Sleep on a comfortable pillow and mattress.   · If watching the clock makes you anxious, turn it facing away from you so you cannot see the time. · If you worry when you lie down, start a worry book. Well before bedtime, write down your worries, and then set the book and your concerns aside. · Try meditation or other relaxation techniques before you go to bed. · If you cannot fall asleep, get up and go to another room until you feel sleepy. Do something relaxing. Repeat your bedtime routine before you go to bed again. · Make your house quiet and calm about an hour before bedtime. Turn down the lights, turn off the TV, log off the computer, and turn down the volume on music. This can help you relax after a busy day. Drowsy Driving  The [address] [place] [address] Road Traffic Safety Administration cites drowsiness as a causing factor in more than 911,095 police reported crashes annually, resulting in 76,000 injuries and 1,500 deaths. Other surveys suggest 55% of people polled have driven while drowsy in the past year, 23% had fallen asleep but not crashed, 3% crashed, and 2% had and accident due to drowsy driving. Who is at risk? Young Drivers: One study of drowsy driving accidents states that 55% of the drivers were under 25 years. Of those, 75% were male. Shift Workers and Travelers: People who work overnight or travel across time zones frequently are at higher risk of experiencing Circadian Rhythm Disorders. They are trying to work and function when their body is programed to sleep. Sleep Deprived: Lack of sleep has a serious impact on your ability to pay attention or focus on a task. Consistently getting less than the average of 8 hours your body needs creates partial or cumulative sleep deprivation. Untreated Sleep Disorders: Sleep Apnea, Narcolepsy, R.L.S., and other sleep disorders (untreated) prevent a person from getting enough restful sleep. This leads to excessive daytime sleepiness and increases the risk for drowsy driving accidents by up to 7 times.   Medications / Alcohol: Even over the counter medications can cause drowsiness. Medications that impair a drivers attention should have a warning label. Alcohol naturally makes you sleepy and on its own can cause accidents. Combined with excessive drowsiness its effects are amplified. Signs of Drowsy Driving:   * You don't remember driving the last few miles   * You may drift out of your keke   * You are unable to focus and your thoughts wander   * You may yawn more often than normal   * You have difficulty keeping your eyes open / nodding off   * Missing traffic signs, speeding, or tailgating  Prevention-   Good sleep hygiene, lifestyle and behavioral choices have the most impact on drowsy driving. There is no substitute for sleep and the average person requires 8 hours nightly. If you find yourself driving drowsy, stop and sleep. Consider the sleep hygiene tips provided during your visit as well. Medication Refill Policy: Refills for all medications require 1 week advance notice. Please have your pharmacy fax a refill request. We are unable to fax, or call in \"controled substance\" medications and you will need to pick these prescriptions up from our office. [employer] Activation    Thank you for requesting access to [employer]. Please follow the instructions below to securely access and download your online medical record. [employer] allows you to send messages to your doctor, view your test results, renew your prescriptions, schedule appointments, and more. How Do I Sign Up? 1. In your internet browser, go to [email or web address]. 2. Click on the First Time User? Click Here link in the Sign In box. You will see the New Member Sign Up page. 3. Enter your [employer]IS[employer] Access Code exactly as it appears below. You will not need to use this code after youve completed the sign-up process. If you do not sign up before the expiration date, you must request a new code. [employer]IS[employer] Access Code:  Activation code not generated  Current LIS[employer] Status: Active (This is the date your [employer] access code will )    4. Enter the last four digits of your Social Security Number (xxxx) and Date of Birth (mm/dd/yyyy) as indicated and click Submit. You will be taken to the next sign-up page. 5. Create a [employer] ID. This will be your [employer] login ID and cannot be changed, so think of one that is secure and easy to remember. 6. Create a [employer] password. You can change your password at any time. 7. Enter your Password Reset Question and Answer. This can be used at a later time if you forget your password. 8. Enter your e-mail address. You will receive e-mail notification when new information is available in [address] 9. Click Sign Up. You can now view and download portions of your medical record. 10. Click the Download Summary menu link to download a portable copy of your medical information. Additional Information    If you have questions, please call [phone]. Remember, [employer] is NOT to be used for urgent needs. For medical emergencies, dial 911.

## 2019-11-26 NOTE — PROGRESS NOTES
[address] [place], [address]  Tel.  [phone]  Fax. [address]  [place], [address] [facility]  Tel.  [phone]  Fax. [phone] [address] [facility] [address]  Tel.  [phone]  Fax. [phone]         Subjective:      Fermin Taveras is an 47 y.o. male referred for evaluation for a sleep disorder. He complains of snoring, difficulty falling and staying asleep associated with excessive daytime sleepiness. Symptoms began a few years ago, unchanged since that time. He usually can fall asleep in variable minutes. He denies falling asleep while driving, during conversation. Fermin Taveras does wake up frequently at night. He is bothered by waking up too early and left unable to get back to sleep. He actually sleeps about 3 hours at night and wakes up about 3 times during the night. He does not work shifts: Carmen Lockwood indicates he does get too little sleep at night. His bedtime is 1000. He awakens at 0630. His bedtime and waketime are very irregular. Since the amputation of his foot about 2 years ago, he often sleeps on the couch downstairs with the tv on. He sleeps for a couple of hours then awakens for a couple of hours and then may doze. He will fall asleep in front of the tv during the daytime. He does take naps. He takes 4 naps a week lasting 3, Hour(s). He has the following observed behaviors: Loud snoring, Light snoring, Grinding teeth; Nightmares. Other remarks:      Arnold Sleepiness Score: 12   which reflect mild daytime drowsiness. Allergies   Allergen Reactions    Ace Inhibitors Swelling     Facial swelling cough     Morphine Angioedema    Arb-Angiotensin Receptor Antagonist Angioedema         Current Outpatient Medications:     ergocalciferol (ERGOCALCIFEROL) 50,000 unit capsule, Take 1 Cap by mouth every seven (7) days. , Disp: 12 Cap, Rfl: 0    carvedilol (COREG) 12.5 mg tablet, Take 1 Tab by mouth two (2) times daily (with meals). , Disp: 60 Tab, Rfl: 2    DULoxetine (CYMBALTA) 30 mg capsule, Take 1 Cap by mouth daily. , Disp: 30 Cap, Rfl: 2    Insulin Syringe-Needle U-100 (BD INSULIN SYRINGE) 1 mL 27 gauge x 1/2\" syrg, Use BID w novolog Dx E11.9, Disp: 100 Syringe, Rfl: 2    insulin NPH/insulin regular (NOVOLIN 70/30, HUMULIN 70/30) 100 unit/mL (70-30) injection, Dx E11.9 40U sq BID (Patient taking differently: 6 Units by SubCUTAneous route two (2) times daily (with meals). ), Disp: 10 mL, Rfl: 3    warfarin (COUMADIN) 5 mg tablet, Take 2 Tabs by mouth daily. , Disp: 60 Tab, Rfl: 0    hydrALAZINE (APRESOLINE) 25 mg tablet, TAKE 1 TAB BY MOUTH THREE (3) TIMES DAILY. , Disp: 90 Tab, Rfl: 5    isosorbide mononitrate ER (IMDUR) 30 mg tablet, Take 1 Tab by mouth daily. , Disp: 30 Tab, Rfl: 5    spironolactone (ALDACTONE) 25 mg tablet, Take 1 Tab by mouth daily. TAKE 1 TABLET EVERY DAY, Disp: 30 Tab, Rfl: 5    metFORMIN (GLUCOPHAGE) 850 mg tablet, Take 1 Tab by mouth two (2) times daily (with meals). , Disp: 60 Tab, Rfl: 5    atorvastatin (LIPITOR) 40 mg tablet, Take 1 Tab by mouth daily. (Patient taking differently: Take 40 mg by mouth daily. Patient stated he does not take every day due to the number of medications he is taking.), Disp: 30 Tab, Rfl: 5    Insulin Needles, Disposable, 31 gauge x 5/16\" ndle, Use four timed daily w insulin, Disp: 100 Package, Rfl: 11    glucose blood VI test strips (BLOOD GLUCOSE TEST) strip, Use BID DxE11.9, Disp: 200 Strip, Rfl: 5    Lancets misc, Use as directed. Dx: E11.9, Disp: 1 Each, Rfl: 11     He  has a past medical history of Diabetes ([facility] [address] GERD (gastroesophageal reflux disease), HTN (hypertension), Hypercholesteremia, Hyperlipidemia, Psychiatric disorder, and Thromboembolus ([facility] [address] (2017).     He  has a past surgical history that includes hx bunionectomy; hx tonsillectomy; hx wisdom teeth extraction; hx other surgical (03/2017); hx orthopaedic; hx amputation; hx orthopaedic; and hx amputation (Left, 08/2017). He family history includes High Cholesterol in his mother; Hypertension in his mother. He  reports that he quit smoking about 8 years ago. His smoking use included cigars. He has never used smokeless tobacco. He reports current alcohol use. He reports that he does not use drugs. Review of Systems:  Constitutional:  No significant weight loss or weight gain. Eyes:  No blurred vision. CVS:  No significant chest pain  Pulm:  No significant shortness of breath  GI:  No significant nausea or vomiting  :  No significant nocturia  Musculoskeletal:  + significant pain that bothers him all the time (night and day)  Skin:  No significant rashes  Neuro:  No significant dizziness   Psych:  No active mood issues    Sleep Review of Systems: notable for no difficulty falling asleep; infrequent awakenings at night;  regular dreaming noted; + nightmares ; no early morning headaches; + memory problems;      + concentration issues; no history of any automobile or occupational accidents due to daytime drowsiness. Objective:     Visit Vitals  /82 (BP 1 Location: Right arm, BP Patient Position: Sitting)   Pulse 68   Resp 17   Ht 6' 3\" (1.905 m)   Wt 254 lb (115.2 kg)   SpO2 100%   BMI 31.75 kg/m²         General:   Not in acute distress   Eyes:  Anicteric sclerae, no obvious strabismus   Nose:  No obvious nasal septum deviation    Oropharynx:   Class 3 oropharyngeal outlet, thick tongue base, enlarged and boggy uvula, low-lying soft palate, narrow tonsilo-pharyngeal pilars   Tonsils:   tonsils are absent   Neck:   Neck circ. in \"inches\": 16; midline trachea   Chest/Lungs:  Equal lung expansion, clear on auscultation    CVS:  Normal rate, regular rhythm; no JVD   Skin:  Warm to touch; no obvious rashes   Neuro:  No focal deficits ; no obvious tremor    Psych:  Normal affect,  normal countenance;          Assessment:       ICD-10-CM ICD-9-CM    1.  Obstructive sleep apnea (adult) (pediatric) G47.33 327.23 POLYSOMNOGRAPHY 1 NIGHT   2. Essential hypertension I10 401.9    3. IDDM (insulin dependent diabetes mellitus) ([facility]) E11.9 250.00     Z79.4 V58.67    4. Inadequate sleep hygiene Z72.821 307.49          Plan:     * The patient currently has a Moderate Risk for having sleep apnea. STOP-BANG score 5.  * PSG was ordered for initial evaluation. We will follow the American Academy of Sleep Medicine protocol regarding split-night procedures and offering a trial of Positive Airway Pressure (CPAP, BPAP, etc.)  * He was provided information on sleep apnea including coresponding risk factors and the importance of proper treatment. * Counseling was provided regarding proper sleep hygiene and safe driving. * Treatment options for sleep apnea were reviewed. he is not against a trial of PAP if found to have significant sleep apnea. The treatment plan was reviewed with the patient in detail and reviewed with the patient and the lead technologist. he understands that the lead technologist will be calling him  with the results and assisting with the next step in the treatment plan as outlined today during the consultation with me. All of his questions were addressed. 2. Hypertension - he continues on his current regimen. I have reviewed the relationship between hypertension as it relates to sleep-disordered breathing. 3. IDDM - he continues on his current regimen. I have reviewed the relationship between sleep disordered breathing as it relates to diabetes. 4. Inadquate sleep hygiene - I have advised a regular sleep wake cycle. he  was advised to avoid looking at the clock during the night. Ideally, the clock face should be turned away. he was advised to minimize caffeine use and to avoid caffeine-containing beverages 8 hours prior to bedtime. Prolonged aps were discouraged. Increasing activity during the daytime would be helpful as well as a regular daily routine.   Watching TV, using laptops, tablets and smartphones in the evening was discouraged. he  was advised to keep the bedroom cool and dark. All of his questions were addressed. Thank you for allowing us to participate in your patient's medical care. We'll keep you updated on these investigations.   Electronically signed by    Dimas Travis MD  Diplomate in Sleep Medicine  [facility]

## 2020-01-17 ENCOUNTER — DOCUMENTATION ONLY (OUTPATIENT)
Dept: SLEEP MEDICINE | Age: 50
End: 2020-01-17

## 2020-01-18 NOTE — PROGRESS NOTES
PSG cancelled due to pending [facility] despite expedited request.  Left voicemail informing patient of cancellation and office to call him next week to reschedule.

## 2020-01-27 ENCOUNTER — TELEPHONE (OUTPATIENT)
Dept: SLEEP MEDICINE | Age: 50
End: 2020-01-27

## 2020-01-27 DIAGNOSIS — G47.33 OBSTRUCTIVE SLEEP APNEA (ADULT) (PEDIATRIC): Primary | ICD-10-CM

## 2020-01-27 NOTE — TELEPHONE ENCOUNTER
Patient not meeting medical necessity for in lab study. Insurance suggests HSAT. P2P to be scheduled by calling [phone]. Patients insurance ends on 1/31. Please advise. Do you wish to schedule P2P at this time? Calling patient to see what insurance they will have after this week.

## 2020-02-04 ENCOUNTER — TELEPHONE (OUTPATIENT)
Dept: SLEEP MEDICINE | Age: 50
End: 2020-02-04

## 2020-06-23 ENCOUNTER — HOSPITAL ENCOUNTER (OUTPATIENT)
Dept: CARDIAC REHAB | Age: 50
Discharge: HOME | End: 2020-06-23
Payer: COMMERCIAL

## 2020-06-23 VITALS — WEIGHT: 242 LBS | BODY MASS INDEX: 30.25 KG/M2

## 2020-06-23 PROCEDURE — 93797 PHYS/QHP OP CAR RHAB WO ECG: CPT

## 2020-06-23 PROCEDURE — 93798 PHYS/QHP OP CAR RHAB W/ECG: CPT

## 2020-06-23 NOTE — CARDIO/PULMONARY
Brendan Russ   47 y.o.    presented to cardiac rehab for orientation and exercise tolerance test today with a primary diagnosis of Heart Failure. Brendan Russ has a history of HF. Cardiac risk factors include smoking/ tobacco exposure, family history, dyslipidemia, diabetes mellitus, hypertension, stress. Brendan Russ is  and lives with.his wife and daughter. PHQ9, depression score, is 15 and this is considered high. Patient is having difficulty coping with his recent BKA. He states that he is having pain from a callus. This pain is preventing him from exercise and social activities. The result was discussed with patient who affirms score to be accurate and results were faxed to Dr. Yulia Sanz office. Patient is not regularly checking his blood glucose levels due to a broken glucometer. He is in need of a new PCP. Patient denied chest pain or SOB during 6 minute walk test and was in Sinus rhythm with frequest PAC's. Exercise prescription developed around patient stated goals, to be supplemented with home exercise recommendations. EF is 20-25%. Education manual given to patient and reviewed. Patient will attend cardiac rehab 2-3 times/week and education. Will arrange for 1:1 visit with dietitian.

## 2020-06-24 LAB
GLUCOSE BLD STRIP.AUTO-MCNC: 107 MG/DL (ref 65–100)
SERVICE CMNT-IMP: ABNORMAL

## 2020-06-25 ENCOUNTER — HOSPITAL ENCOUNTER (OUTPATIENT)
Dept: CARDIAC REHAB | Age: 50
Discharge: HOME | End: 2020-06-25
Payer: COMMERCIAL

## 2020-06-25 VITALS — WEIGHT: 247 LBS | BODY MASS INDEX: 30.87 KG/M2

## 2020-06-25 LAB
GLUCOSE BLD STRIP.AUTO-MCNC: 221 MG/DL (ref 65–100)
SERVICE CMNT-IMP: ABNORMAL

## 2020-06-25 PROCEDURE — 93798 PHYS/QHP OP CAR RHAB W/ECG: CPT

## 2020-06-29 ENCOUNTER — VIRTUAL VISIT (OUTPATIENT)
Dept: FAMILY MEDICINE CLINIC | Age: 50
End: 2020-06-29

## 2020-06-29 DIAGNOSIS — E11.22 TYPE 2 DIABETES MELLITUS WITH STAGE 3 CHRONIC KIDNEY DISEASE, WITH LONG-TERM CURRENT USE OF INSULIN (HCC): Primary | ICD-10-CM

## 2020-06-29 DIAGNOSIS — Z12.11 COLON CANCER SCREENING: ICD-10-CM

## 2020-06-29 DIAGNOSIS — N18.30 STAGE 3 CHRONIC KIDNEY DISEASE (HCC): ICD-10-CM

## 2020-06-29 DIAGNOSIS — Z91.199 NON-COMPLIANCE WITH TREATMENT: ICD-10-CM

## 2020-06-29 DIAGNOSIS — R29.818 SUSPECTED SLEEP APNEA: ICD-10-CM

## 2020-06-29 DIAGNOSIS — N18.30 TYPE 2 DIABETES MELLITUS WITH STAGE 3 CHRONIC KIDNEY DISEASE, WITH LONG-TERM CURRENT USE OF INSULIN (HCC): Primary | ICD-10-CM

## 2020-06-29 DIAGNOSIS — I25.5 ISCHEMIC CARDIOMYOPATHY: ICD-10-CM

## 2020-06-29 DIAGNOSIS — G54.6 PHANTOM LIMB PAIN (HCC): ICD-10-CM

## 2020-06-29 DIAGNOSIS — I50.22 CHRONIC SYSTOLIC HEART FAILURE (HCC): ICD-10-CM

## 2020-06-29 DIAGNOSIS — E78.00 HYPERCHOLESTEREMIA: ICD-10-CM

## 2020-06-29 DIAGNOSIS — Z89.512 HX OF BKA, LEFT (HCC): ICD-10-CM

## 2020-06-29 DIAGNOSIS — I42.8 NICM (NONISCHEMIC CARDIOMYOPATHY) (HCC): ICD-10-CM

## 2020-06-29 DIAGNOSIS — Z00.00 WELL ADULT EXAM: ICD-10-CM

## 2020-06-29 DIAGNOSIS — Z79.4 TYPE 2 DIABETES MELLITUS WITH STAGE 3 CHRONIC KIDNEY DISEASE, WITH LONG-TERM CURRENT USE OF INSULIN (HCC): Primary | ICD-10-CM

## 2020-06-29 DIAGNOSIS — I26.99 RECURRENT PULMONARY EMBOLI (HCC): ICD-10-CM

## 2020-06-29 DIAGNOSIS — E11.65 UNCONTROLLED TYPE 2 DIABETES MELLITUS WITH HYPERGLYCEMIA (HCC): ICD-10-CM

## 2020-06-29 DIAGNOSIS — T87.9 COMPLICATION OF AMPUTATED STUMP (HCC): ICD-10-CM

## 2020-06-29 DIAGNOSIS — E55.9 VITAMIN D DEFICIENCY: ICD-10-CM

## 2020-06-29 DIAGNOSIS — I10 ESSENTIAL HYPERTENSION: ICD-10-CM

## 2020-06-29 DIAGNOSIS — I13.0 HYPERTENSIVE HEART AND RENAL DISEASE WITH RENAL FAILURE, STAGE 1 THROUGH STAGE 4 OR UNSPECIFIED CHRONIC KIDNEY DISEASE, WITH HEART FAILURE (HCC): ICD-10-CM

## 2020-06-29 RX ORDER — METFORMIN HYDROCHLORIDE 850 MG/1
850 TABLET ORAL 2 TIMES DAILY WITH MEALS
Qty: 60 TAB | Refills: 5 | Status: SHIPPED | OUTPATIENT
Start: 2020-06-29 | End: 2021-07-02

## 2020-06-29 RX ORDER — ATORVASTATIN CALCIUM 40 MG/1
40 TABLET, FILM COATED ORAL DAILY
Qty: 30 TAB | Refills: 5 | Status: SHIPPED | OUTPATIENT
Start: 2020-06-29 | End: 2022-03-10 | Stop reason: SINTOL

## 2020-06-29 RX ORDER — LANCETS
EACH MISCELLANEOUS
Qty: 1 EACH | Refills: 11 | Status: SHIPPED | OUTPATIENT
Start: 2020-06-29

## 2020-06-29 RX ORDER — DULOXETIN HYDROCHLORIDE 30 MG/1
30 CAPSULE, DELAYED RELEASE ORAL DAILY
Qty: 30 CAP | Refills: 2 | Status: SHIPPED | OUTPATIENT
Start: 2020-06-29 | End: 2020-10-05 | Stop reason: SDUPTHER

## 2020-06-29 RX ORDER — SPIRONOLACTONE 25 MG/1
25 TABLET ORAL DAILY
Qty: 30 TAB | Refills: 5 | Status: SHIPPED | OUTPATIENT
Start: 2020-06-29 | End: 2021-07-02

## 2020-06-29 RX ORDER — HYDRALAZINE HYDROCHLORIDE 25 MG/1
TABLET, FILM COATED ORAL
Qty: 90 TAB | Refills: 5 | Status: SHIPPED | OUTPATIENT
Start: 2020-06-29 | End: 2021-07-02

## 2020-06-29 RX ORDER — CARVEDILOL 12.5 MG/1
12.5 TABLET ORAL 2 TIMES DAILY WITH MEALS
Qty: 60 TAB | Refills: 2 | Status: SHIPPED | OUTPATIENT
Start: 2020-06-29 | End: 2020-10-05 | Stop reason: DRUGHIGH

## 2020-06-29 RX ORDER — ERGOCALCIFEROL 1.25 MG/1
50000 CAPSULE ORAL
Qty: 12 CAP | Refills: 0 | Status: SHIPPED | OUTPATIENT
Start: 2020-06-29 | End: 2020-10-05

## 2020-06-29 RX ORDER — INSULIN PUMP SYRINGE, 3 ML
EACH MISCELLANEOUS
Qty: 1 KIT | Refills: 0 | Status: SHIPPED | OUTPATIENT
Start: 2020-06-29 | End: 2020-07-31 | Stop reason: SDUPTHER

## 2020-06-29 RX ORDER — HYDROGEN PEROXIDE 3 %
20 SOLUTION, NON-ORAL MISCELLANEOUS DAILY
Qty: 90 CAP | Refills: 0 | Status: SHIPPED | OUTPATIENT
Start: 2020-06-29 | End: 2021-07-02

## 2020-06-29 RX ORDER — SYRINGE-NEEDLE,INSULIN,0.5 ML 27GX1/2"
SYRINGE, EMPTY DISPOSABLE MISCELLANEOUS
Qty: 100 SYRINGE | Refills: 2 | Status: SHIPPED | OUTPATIENT
Start: 2020-06-29

## 2020-06-29 RX ORDER — ISOSORBIDE MONONITRATE 30 MG/1
30 TABLET, EXTENDED RELEASE ORAL DAILY
Qty: 30 TAB | Refills: 5 | Status: SHIPPED | OUTPATIENT
Start: 2020-06-29 | End: 2022-03-10 | Stop reason: ALTCHOICE

## 2020-06-29 NOTE — PROGRESS NOTES
Jessie Nettles is a 47 y.o. male who was seen by synchronous (real-time) audio-video technology on 6/29/2020. el  Consent: Jessie Nettles, who was seen by synchronous (real-time) audio-video technology, and/or his healthcare decision maker, is aware that this patient-initiated, Telehealth encounter on 6/29/2020 is a billable service, with coverage as determined by his insurance carrier. He is aware that he may receive a bill and has provided verbal consent to proceed: Yes. Assessment & Plan:   Diagnoses and all orders for this visit:    1. Type 2 diabetes mellitus with stage 3 chronic kidney disease, with long-term current use of insulin (HCC) restarting medication and checking labs. Given longstanding uncontrolled diabetes with some complications, sending to Endo for help with management of sugars  -     atorvastatin (LIPITOR) 40 mg tablet; Take 1 Tab by mouth daily.  -     Blood-Glucose Meter monitoring kit; Please check your sugars 3-4x daily  -     lancets misc; Please check your sugars 3-4x daily  -     REFERRAL TO ENDOCRINOLOGY  -     HEMOGLOBIN A1C WITH EAG  -     LIPID PANEL  -     METABOLIC PANEL, COMPREHENSIVE  -     TSH 3RD GENERATION  -     MICROALBUMIN, UR, RAND W/ MICROALB/CREAT RATIO  -     glucose blood VI test strips (ASCENSIA AUTODISC VI, ONE TOUCH ULTRA TEST VI) strip; Please check your sugars 3-4x daily  -     Insulin Syringe-Needle U-100 (BD Insulin Syringe) 1 mL 27 gauge x 1/2\" syrg; Use BID w novolog Dx E11.9  -     insulin NPH/insulin regular (NOVOLIN 70/30, HUMULIN 70/30) 100 unit/mL (70-30) injection; 40 Units by SubCUTAneous route Before breakfast and dinner. 2. Ischemic cardiomyopathy  3. Chronic systolic heart failure ([facility]  4. NICM (nonischemic cardiomyopathy) ([facility] [address]   Has been followed by Dr. Laila Gao, follow-up echo with improvement in EF. Encouraged medication compliance and rechecking labs.   We will plan to have him check back in with heart failure clinic  - carvediloL (COREG) 12.5 mg tablet; Take 1 Tab by mouth two (2) times daily (with meals). -     hydrALAZINE (APRESOLINE) 25 mg tablet; TAKE 1 TAB BY MOUTH THREE (3) TIMES DAILY. -     spironolactone (ALDACTONE) 25 mg tablet; Take 1 Tab by mouth daily. TAKE 1 TABLET EVERY DAY  -     HEMOGLOBIN A1C WITH EAG  -     LIPID PANEL  -     METABOLIC PANEL, COMPREHENSIVE  -     TSH 3RD GENERATION  -     CBC W/O DIFF  -     URINALYSIS W/ RFLX MICROSCOPIC    5. Essential hypertension  6. Hypertensive heart and renal disease with renal failure, stage 1 through stage 4 or unspecified chronic kidney disease, with heart failure ([facility] [address]   has had chronic issues with this. Refilling medications and will check BP in office at next appointment  -     carvediloL (COREG) 12.5 mg tablet; Take 1 Tab by mouth two (2) times daily (with meals). -     hydrALAZINE (APRESOLINE) 25 mg tablet; TAKE 1 TAB BY MOUTH THREE (3) TIMES DAILY. -     spironolactone (ALDACTONE) 25 mg tablet; Take 1 Tab by mouth daily. TAKE 1 TABLET EVERY DAY  -     isosorbide mononitrate ER (IMDUR) 30 mg tablet; Take 1 Tab by mouth daily.  -     METABOLIC PANEL, COMPREHENSIVE  -     URINALYSIS W/ RFLX MICROSCOPIC    7. Stage 3 chronic kidney disease (HCC)needs significant improvement in DM and HTN control. Checking labs  -     carvediloL (COREG) 12.5 mg tablet; Take 1 Tab by mouth two (2) times daily (with meals). -     HEMOGLOBIN A1C WITH EAG  -     LIPID PANEL  -     METABOLIC PANEL, COMPREHENSIVE  -     TSH 3RD GENERATION  -     CBC W/O DIFF  -     URINALYSIS W/ RFLX MICROSCOPIC  -     PSA, DIAGNOSTIC (PROSTATE SPECIFIC AG)  -     HIV 1/2 AG/AB, 4TH GENERATION,W RFLX CONFIRM  -     OCCULT BLOOD IMMUNOASSAY,DIAGNOSTIC  -     MICROALBUMIN, UR, RAND W/ MICROALB/CREAT RATIO    8. Recurrent pulmonary emboli (HCC) discussed importance of restarting anticoagulation with Coumadin or DOAC.   Will switch to Xarelto daily to help with compliance  -     rivaroxaban (Osie Guy) 20 mg tab tablet; Take 1 Tab by mouth daily. 9. Hx of BKA, left ([facility] [address]  10. Complication of amputated stump ([facility] [address]  11. Phantom limb pain ([facility] [address]   Ongoing issue. Will restart Cymbalta to help with pain control  -     DULoxetine (CYMBALTA) 30 mg capsule; Take 1 Cap by mouth daily. 12. Suspected sleep apneasending back to Dr. Isabelle Del Castillo for [address] [place] [address]    13. Vitamin D deficiencyrestarting vitamin D and will eventually recheck labs  -     ergocalciferol (ERGOCALCIFEROL) 1,250 mcg (50,000 unit) capsule; Take 1 Cap by mouth every seven (7) days. 14. Non-compliance with treatment    15. Hypercholesteremia  -     HEMOGLOBIN A1C WITH EAG  -     LIPID PANEL  -     METABOLIC PANEL, COMPREHENSIVE  -     TSH 3RD GENERATION    16. Well adult exam checking labs and will plan for exam at next appointment  -     HEMOGLOBIN A1C WITH EAG  -     LIPID PANEL  -     METABOLIC PANEL, COMPREHENSIVE  -     TSH 3RD GENERATION  -     CBC W/O DIFF  -     URINALYSIS W/ RFLX MICROSCOPIC  -     PSA, DIAGNOSTIC (PROSTATE SPECIFIC AG)  -     HIV 1/2 AG/AB, 4TH GENERATION,W RFLX CONFIRM  -     OCCULT BLOOD IMMUNOASSAY,DIAGNOSTIC  -     MICROALBUMIN, UR, RAND W/ MICROALB/CREAT RATIO    17. Colon cancer screening  -     OCCULT BLOOD IMMUNOASSAY,DIAGNOSTIC    18. Uncontrolled type 2 diabetes mellitus with hyperglycemia (HCC)  -     Insulin Syringe-Needle U-100 (BD Insulin Syringe) 1 mL 27 gauge x 1/2\" syrg; Use BID w novolog Dx E11.9  -     insulin NPH/insulin regular (NOVOLIN 70/30, HUMULIN 70/30) 100 unit/mL (70-30) injection; 40 Units by SubCUTAneous route Before breakfast and dinner.  -     metFORMIN (GLUCOPHAGE) 850 mg tablet; Take 1 Tab by mouth two (2) times daily (with meals). Other orders  -     esomeprazole (NexIUM) 20 mg capsule; Take 1 Cap by mouth daily. Follow-up and Dispositions    · Return in about 6 weeks (around 8/10/2020).            Subjective:   Mariluz De La Cruz is a 47 y.o. male who was seen for New Patient (Diabetes Type 2 /Medication refill)    New pt to est care. Has been seeing Dr. Carmen Sherman. No major concerns. PMH sig for uncontrolled DM 2 with neuropathy, NICM, CHF, osteomyelitis s/p L BKA, HTN, HLD, and recurrent PEs. [facility].    Saw Dr. Katerin Gómez for sleep eval in 11/2019 and Dr. [facility] [place] for CHF and NICM in 8/2019 but has been lost to follow-up since then. He was supposed to have an HSAT in 2/2020 but never picked up the equipment. Last Echo 8/2019 - EF 50-55%    Diabetes Mellitus:Dx around age 23. Was sent to Dr. Nidhi Mckinney but missed appointment in 11/2019  Taking meds, home glucose monitoring: is not performed  Reports no numbness, tingling or pain in extremities   History of amputation. History of gastroparesis/gastropathy. Not exercising much - issues with amputation  Working on diet  Pt is a non smoker. Patient reports his weight is down about 60 pounds while working on this by making major dietary changes and doing some exercise at cardiac rehab. Lab Results   Component Value Date/Time    Hemoglobin A1c (POC) 11.7 07/16/2019 09:51 AM    Hemoglobin A1c (POC) 10.1 04/15/2019 09:15 AM    Hemoglobin A1c 11.1 (H) 07/12/2017 12:00 PM    LDL, calculated 106.6 (H) 01/06/2018 05:38 AM    Creatinine (POC) 1.6 (H) 04/17/2017 10:36 AM    Creatinine 1.61 (H) 08/15/2019 12:00 AM      Lab Results   Component Value Date/Time    GFR est AA 57 (L) 08/15/2019 12:00 AM    GFR est non-AA 49 (L) 08/15/2019 12:00 AM      Lab Results   Component Value Date/Time    TSH 1.180 08/15/2019 12:00 AM       Hyperlipidemia & HTN  Pt is doing well on current meds with no medication side effects noted  No new myalgias, no joint pains, no weakness  No TIA's, no chest pain on exertion, no dyspnea on exertion, no swelling of ankles.      Lab Results   Component Value Date/Time    Cholesterol, total 193 01/06/2018 05:38 AM    HDL Cholesterol 62 01/06/2018 05:38 AM    LDL, calculated 106.6 (H) 01/06/2018 05:38 AM    Triglyceride 122 01/06/2018 05:38 AM       Prior to Admission medications    Medication Sig Start Date End Date Taking? Authorizing Provider   rivaroxaban (XARELTO) 20 mg tab tablet Take 1 Tab by mouth daily. 6/29/20  Yes Beatriz Siegel MD   carvediloL (COREG) 12.5 mg tablet Take 1 Tab by mouth two (2) times daily (with meals). 6/29/20  Morris Siegel MD   hydrALAZINE (APRESOLINE) 25 mg tablet TAKE 1 TAB BY MOUTH THREE (3) TIMES DAILY. 6/29/20  Morris Siegel MD   spironolactone (ALDACTONE) 25 mg tablet Take 1 Tab by mouth daily. TAKE 1 TABLET EVERY DAY 6/29/20  Yes Beatriz Siegel MD   DULoxetine (CYMBALTA) 30 mg capsule Take 1 Cap by mouth daily. 6/29/20  Morris Siegel MD   ergocalciferol (ERGOCALCIFEROL) 1,250 mcg (50,000 unit) capsule Take 1 Cap by mouth every seven (7) days. 6/29/20  Morris Siegel MD   isosorbide mononitrate ER (IMDUR) 30 mg tablet Take 1 Tab by mouth daily. 6/29/20  Morris Siegel MD   atorvastatin (LIPITOR) 40 mg tablet Take 1 Tab by mouth daily. 6/29/20  Morris Siegel MD   Blood-Glucose Meter monitoring kit Please check your sugars 3-4x daily 6/29/20  Morris Siegel MD   lancets misc Please check your sugars 3-4x daily 6/29/20  Morris Siegel MD   glucose blood VI test strips (ASCENSIA AUTODISC VI, ONE TOUCH ULTRA TEST VI) strip Please check your sugars 3-4x daily 6/29/20  Morris Siegel MD   esomeprazole (NexIUM) 20 mg capsule Take 1 Cap by mouth daily. 6/29/20  Morris Siegel MD   Insulin Syringe-Needle U-100 (BD Insulin Syringe) 1 mL 27 gauge x 1/2\" syrg Use BID w novolog Dx E11.9 6/29/20  Morris Siegel MD   insulin NPH/insulin regular (NOVOLIN 70/30, HUMULIN 70/30) 100 unit/mL (70-30) injection 40 Units by SubCUTAneous route Before breakfast and dinner.  6/29/20  Yes Beatriz Siegel MD   metFORMIN (GLUCOPHAGE) 850 mg tablet Take 1 Tab by mouth two (2) times daily (with meals). 6/29/20  Yes Elayne Gilford, MD   esomeprazole magnesium (NEXIUM PO) Take 1 Tab by mouth daily. 6/29/20  Provider, Historical   ergocalciferol (ERGOCALCIFEROL) 50,000 unit capsule Take 1 Cap by mouth every seven (7) days. 8/27/19 6/29/20  Wayne Hankins MD   carvedilol (COREG) 12.5 mg tablet Take 1 Tab by mouth two (2) times daily (with meals). 8/15/19 6/29/20  Wayne Hankins MD   DULoxetine (CYMBALTA) 30 mg capsule Take 1 Cap by mouth daily. 8/15/19 6/29/20  Wayne Hankins MD   Insulin Syringe-Needle U-100 (BD INSULIN SYRINGE) 1 mL 27 gauge x 1/2\" syrg Use BID w novolog Dx E11.9 7/16/19 6/29/20  Maria Eugenia Cervantes MD   insulin NPH/insulin regular (NOVOLIN 70/30, HUMULIN 70/30) 100 unit/mL (70-30) injection Dx E11.9 40U sq BID  Patient taking differently: 40 Units by SubCUTAneous route three (3) times daily (with meals). 7/16/19 6/29/20  Maria Eugenia Cervantes MD   warfarin (COUMADIN) 5 mg tablet Take 2 Tabs by mouth daily. 5/10/19 6/29/20  Maria Eugenia Cervantes MD   Insulin Needles, Disposable, 31 gauge x 5/16\" ndle Use four timed daily w insulin 4/15/19   Maria Eugenia Cervantes MD   hydrALAZINE (APRESOLINE) 25 mg tablet TAKE 1 TAB BY MOUTH THREE (3) TIMES DAILY. 4/15/19 6/29/20  Maria Eugenia Cervantes MD   isosorbide mononitrate ER (IMDUR) 30 mg tablet Take 1 Tab by mouth daily. 4/15/19 6/29/20  Maria Eugenia Cervantes MD   spironolactone (ALDACTONE) 25 mg tablet Take 1 Tab by mouth daily. TAKE 1 TABLET EVERY DAY 4/15/19 6/29/20  Maria Eugenia Cervantes MD   metFORMIN (GLUCOPHAGE) 850 mg tablet Take 1 Tab by mouth two (2) times daily (with meals). Patient taking differently: Take 850 mg by mouth daily. 4/15/19 6/29/20  Maria Eugenia Cervantes MD   atorvastatin (LIPITOR) 40 mg tablet Take 1 Tab by mouth daily. Patient taking differently: Take 40 mg by mouth daily. Patient stated he does not take every day due to the number of medications he is taking.  4/15/19 6/29/20  Nelli Bashir, Lynn Gage MD   glucose blood VI test strips (BLOOD GLUCOSE TEST) strip Use BID DxE11.9 10/18/17 6/29/20  Kailey Adrian MD   Lancets misc Use as directed. Dx: E11.9 10/18/17 6/29/20  Kailey Adrian MD     Allergies   Allergen Reactions    Ace Inhibitors Swelling     Facial swelling cough     Morphine Angioedema    Arb-Angiotensin Receptor Antagonist Angioedema       Patient Active Problem List    Diagnosis Date Noted    Diabetic gastroparalysis ([facility] [address] 07/17/2019    Excessive anger 48/29/9664    Complication of amputated stump ([address] 10/04/2018    Recurrent pulmonary emboli ([address] 09/11/2018    Long term current use of anticoagulants with INR goal of 2.0-3.0 09/11/2018    Ischemic cardiomyopathy 09/11/2018    Hypertensive heart and renal disease 09/11/2018    Cardiomyopathy ([facility] [address] 01/05/2018    Hx of BKA, left ([facility] [address] 11/02/2017    Stage 3 chronic kidney disease ([facility] [address] 10/02/2017    GERD (gastroesophageal reflux disease) 08/24/2017    Chronic systolic heart failure ([facility] [address] 07/31/2017    Non-compliance with treatment 07/22/2017    IDDM (insulin dependent diabetes mellitus) 10/06/2016    Hypercholesteremia     HTN (hypertension) 07/23/2013     Past Medical History:   Diagnosis Date    Diabetes ([facility] [address]     since age 23   Adriana.Mihai GERD (gastroesophageal reflux disease)     HTN (hypertension)     Hypercholesteremia     Hyperlipidemia     Psychiatric disorder     anxiety & depression    Thromboembolus ([facility] [address] 2017    bilateral legs       ROS  Gen - no fever/chills  Resp - no dyspnea or cough  CV - no chest pain or GARCIA  H/O - Hx of PE after amputation. Ended up having a second PE after hospitalization from amputation. He was put on Coumadin after this and went back and forth between Coumadin and Eliquis. He has not been on either of these since he last saw Dr. Mia Linda back almost a year ago. He denies any additional issues with his breathing since then.   Rest per HPI    Objective:   Vital Signs: (As obtained by patient/caregiver at home)  There were no vitals taken for this visit. Physical exam:  General appearance - alert, well appearing, and in no distress  Eyes -sclera anicteric, no discharge  HEENT normocephalic, atraumatic, moist mucous membranes, no visualized neck mass  Chest -normal respiratory effort, no visualized signs of respiratory distress  Neurological - alert, awake, normal speech, no focal findings or movement disorder noted  Psych - normal mood and affect  Skin no apparent lesions    We discussed the expected course, resolution and complications of the diagnosis(es) in detail. Medication risks, benefits, costs, interactions, and alternatives were discussed as indicated. I advised him to contact the office if his condition worsens, changes or fails to improve as anticipated. He expressed understanding with the diagnosis(es) and plan. Yoselin Cr is a 47 y.o. male who was evaluated by a video visit encounter for concerns as above. Patient identification was verified prior to start of the visit. A caregiver was present when appropriate. Due to this being a TeleHealth encounter (During [facility]- public health emergency), evaluation of the following organ systems was limited: Vitals/Constitutional/EENT/Resp/CV/GI//MS/Neuro/Skin/Heme-Lymph-Imm. Pursuant to the emergency declaration under the [facility], [facility]5 waiver authority and the [employer] Act, this Virtual  Visit was conducted, with patient's (and/or legal guardian's) consent, to reduce the patient's risk of exposure to COVID-19 and provide necessary medical care. Services were provided through a video synchronous discussion virtually to substitute for in-person clinic visit. Patient and provider were located at their individual homes.       Brooke Dixon MD

## 2020-07-08 ENCOUNTER — HOSPITAL ENCOUNTER (OUTPATIENT)
Dept: CARDIAC REHAB | Age: 50
Discharge: HOME | End: 2020-07-08
Payer: COMMERCIAL

## 2020-07-08 VITALS — WEIGHT: 240 LBS | BODY MASS INDEX: 30 KG/M2

## 2020-07-08 PROCEDURE — 93798 PHYS/QHP OP CAR RHAB W/ECG: CPT

## 2020-07-08 NOTE — CARDIO/PULMONARY
Cardiopulmonary Rehab Nursing Entry:    Pt with run of PVCs without symptoms, sent to Dr. Saritha Del Valle at [facility].

## 2020-07-09 ENCOUNTER — TELEPHONE (OUTPATIENT)
Dept: CARDIOLOGY CLINIC | Age: 50
End: 2020-07-09

## 2020-07-09 NOTE — TELEPHONE ENCOUNTER
Noted patient overdue for follow up. Message sent to AVRIL Bolanos and SARA Carlson to request patient be contacted for scheduling. Lianna Arroyo RN.

## 2020-07-10 ENCOUNTER — HOSPITAL ENCOUNTER (OUTPATIENT)
Dept: CARDIAC REHAB | Age: 50
Discharge: HOME | End: 2020-07-10
Payer: COMMERCIAL

## 2020-07-10 VITALS — WEIGHT: 238 LBS | BODY MASS INDEX: 29.75 KG/M2

## 2020-07-10 PROCEDURE — 93797 PHYS/QHP OP CAR RHAB WO ECG: CPT | Performed by: DIETITIAN, REGISTERED

## 2020-07-11 NOTE — PROGRESS NOTES
Cardiac Rehab Nutrition Assessment - 1:1 Evaluation       NAME: Norah Waters : 1970 AGE: 47 y.o. GENDER: male  CARDIAC REHAB ADMITTING DIAGNOSIS: heart failure    Relevant Comorbidites: HTN, dyslipidemia, DM      LABS:   Lab Results   Component Value Date/Time    Hemoglobin A1c 11.1 (H) 2017 12:00 PM    Hemoglobin A1c (POC) 11.7 2019 09:51 AM     Lab Results   Component Value Date/Time    Cholesterol, total 193 2018 05:38 AM    Cholesterol (POC) 296 2019 09:52 AM    HDL Cholesterol 62 2018 05:38 AM    HDL Cholesterol (POC) 42 2019 09:52 AM    LDL Cholesterol (POC) 207 2019 09:52 AM    LDL, calculated 106.6 (H) 2018 05:38 AM    VLDL, calculated 24.4 2018 05:38 AM    Triglyceride 122 2018 05:38 AM    Triglycerides (POC) 233 2019 09:52 AM    CHOL/HDL Ratio 3.1 2018 05:38 AM         MEDICATIONS/SUPPLEMENTS:   [unfilled]  Prior to Admission medications    Medication Sig Start Date End Date Taking? Authorizing Provider   rivaroxaban (XARELTO) 20 mg tab tablet Take 1 Tab by mouth daily. 20   Milton Scott MD   carvediloL (COREG) 12.5 mg tablet Take 1 Tab by mouth two (2) times daily (with meals). 20   Milton Scott MD   hydrALAZINE (APRESOLINE) 25 mg tablet TAKE 1 TAB BY MOUTH THREE (3) TIMES DAILY. 20   Milton Scott MD   spironolactone (ALDACTONE) 25 mg tablet Take 1 Tab by mouth daily. TAKE 1 TABLET EVERY DAY 20   Milton cSott MD   DULoxetine (CYMBALTA) 30 mg capsule Take 1 Cap by mouth daily. 20   Milton Scott MD   ergocalciferol (ERGOCALCIFEROL) 1,250 mcg (50,000 unit) capsule Take 1 Cap by mouth every seven (7) days. 20   Milton Scott MD   isosorbide mononitrate ER (IMDUR) 30 mg tablet Take 1 Tab by mouth daily. 20   Milton Scott MD   atorvastatin (LIPITOR) 40 mg tablet Take 1 Tab by mouth daily.  20   Milton Scott MD   Blood-Glucose Meter monitoring kit Please check your sugars 3-4x daily 6/29/20   Abundio Adams MD   lancets misc Please check your sugars 3-4x daily 6/29/20   Abundio Adams MD   glucose blood VI test strips (ASCENSIA AUTODISC VI, ONE TOUCH ULTRA TEST VI) strip Please check your sugars 3-4x daily 6/29/20   Abundio Adams MD   esomeprazole (NexIUM) 20 mg capsule Take 1 Cap by mouth daily. 6/29/20   Abundio Adams MD   Insulin Syringe-Needle U-100 (BD Insulin Syringe) 1 mL 27 gauge x 1/2\" syrg Use BID w novolog Dx E11.9 6/29/20   Abundio Adams MD   insulin NPH/insulin regular (NOVOLIN 70/30, HUMULIN 70/30) 100 unit/mL (70-30) injection 40 Units by SubCUTAneous route Before breakfast and dinner. 6/29/20   Abundio Adams MD   metFORMIN (GLUCOPHAGE) 850 mg tablet Take 1 Tab by mouth two (2) times daily (with meals). 6/29/20   Abundio Adams MD   Insulin Needles, Disposable, 31 gauge x 5/16\" ndle Use four timed daily w insulin 4/15/19   Maribel Cantu MD         ANTHROPOMETRICS:    Ht Readings from Last 1 Encounters:   06/23/20 (P) 6' 3\" (1.905 m)      Wt Readings from Last 1 Encounters:   07/10/20 108 kg (238 lb)      IBW: 196 # +/- 10%  %IBW: 121 % +/- 10%    BMI: 29.8 kg/M2 Category:  Overweight  Waist: 40 inches    Reported Wt Hx:  Wt Readings from Last 10 Encounters:   07/10/20 108 kg (238 lb)   07/08/20 108.9 kg (240 lb)   06/25/20 112 kg (247 lb)   06/23/20 109.8 kg (242 lb)   11/26/19 115.2 kg (254 lb)   08/15/19 106.6 kg (235 lb)   07/23/19 107.9 kg (237 lb 14 oz)   07/16/19 108.9 kg (240 lb)   04/15/19 115.7 kg (255 lb)   12/11/18 112.3 kg (247 lb 9.6 oz)     Reported Diet Hx:    Rate Your Plate Score: 58  (Score 58-72: Making many healthy choices; 41-57: Some choices need improving 24-40: many choices need improving)     24 Hour Diet Recall  Breakfast Skips or omelet w/veggies or fruit smoothie   Lunch Out most days- Chipotle   Dinner Vance or tacs or nachos or BBQ chicken, mac/cheese, cornbread   Snacks Beverages Diet green tea          Environmental/Social:  Pt currently not working; lives with wife and blanca      NUTRITION INTERVENTION:  Nutrition 60 minute one-on-one education & goal setting with Norah Waters    Reviewed with Norah Waters relevant labs compared to ideals. Reviewed weight history and patient's verbalized weight goal as well as any real or perceived barriers to obtaining the goal. Collaborated with patient to set a specific short and long term weight goal.     Reviewed Rate Your Plate and conducted a verbal diet recall. Assessed for environmental, financial, psychosocial, physical and comorbidities that may impact the food and eating patterns / behaviors of Froy Johansen Anyi    Collaborated with patient to set specific nutrient goals as well as specific food / behavior changes that will help patient meet the overall goal of following a heart healthy eating pattern (using guidelines as set forth by the American Heart Association and modeled after healthful eating patterns as recognized by the USDA Dietary Guidelines such as DASH, Mediterranean or plant-based). Briefly reviewed with Norah Waters the nutrition information in the Cardiac Rehab patient education book and encouraged Norah Waters to read thoroughly, ask questions as needed, and use for future reference for heart healthy nutrition information. Norah Waters is not scheduled to participate in Cardiac Rehab group nutrition classes. PATIENT GOALS:    Weight Goals:  Short Term Weight Goal: 220 lbs  Long Term Weight Goal: 200 lbs    Nutrition Goals:  Daily Recommendations:  Calories: 4495-2051 /day  (using Nina [place] with AF x1.2-1.3)    Saturated Fat: no more than 14 g/day  Trans Fat: 0 g/day  Sodium: no more than 4609-9183 mg/day  Fruit: 2 cups / day  Vegetables: 3-4 cups/day    Other:  1. Read food labels for carbs and limit to 45-60g per meal  2.  Limit sodium and saturated fat to above recommendations  3. Increase veggies at meals    Keeping a food diary was recommended. Questions addressed. Follow-up plans discussed. Kay Reynoso verbalized understanding.             Paul Shaver RD

## 2020-07-31 DIAGNOSIS — E11.22 TYPE 2 DIABETES MELLITUS WITH STAGE 3 CHRONIC KIDNEY DISEASE, WITH LONG-TERM CURRENT USE OF INSULIN (HCC): ICD-10-CM

## 2020-07-31 DIAGNOSIS — Z79.4 TYPE 2 DIABETES MELLITUS WITH STAGE 3 CHRONIC KIDNEY DISEASE, WITH LONG-TERM CURRENT USE OF INSULIN (HCC): ICD-10-CM

## 2020-07-31 DIAGNOSIS — N18.30 TYPE 2 DIABETES MELLITUS WITH STAGE 3 CHRONIC KIDNEY DISEASE, WITH LONG-TERM CURRENT USE OF INSULIN (HCC): ICD-10-CM

## 2020-07-31 RX ORDER — INSULIN PUMP SYRINGE, 3 ML
EACH MISCELLANEOUS
Qty: 1 KIT | Refills: 0 | Status: SHIPPED | OUTPATIENT
Start: 2020-07-31

## 2020-07-31 NOTE — TELEPHONE ENCOUNTER
[address] requesting script. For Generic meter , One Touch Meters Ultra Blue are on back order. Routed to Dr. Nelia Murray out of office. Patient last seen:  6/29/2020 for Virtual Visit.

## 2020-10-05 ENCOUNTER — OFFICE VISIT (OUTPATIENT)
Dept: CARDIOLOGY CLINIC | Age: 50
End: 2020-10-05
Payer: COMMERCIAL

## 2020-10-05 VITALS
SYSTOLIC BLOOD PRESSURE: 158 MMHG | RESPIRATION RATE: 16 BRPM | HEIGHT: 75 IN | BODY MASS INDEX: 28.7 KG/M2 | WEIGHT: 230.8 LBS | DIASTOLIC BLOOD PRESSURE: 94 MMHG | TEMPERATURE: 98.3 F | HEART RATE: 87 BPM | OXYGEN SATURATION: 99 %

## 2020-10-05 DIAGNOSIS — T87.9 COMPLICATION OF AMPUTATED STUMP (HCC): ICD-10-CM

## 2020-10-05 DIAGNOSIS — R40.0 DAYTIME SOMNOLENCE: ICD-10-CM

## 2020-10-05 DIAGNOSIS — E55.9 VITAMIN D DEFICIENCY: ICD-10-CM

## 2020-10-05 DIAGNOSIS — R53.83 FATIGUE, UNSPECIFIED TYPE: ICD-10-CM

## 2020-10-05 DIAGNOSIS — G54.6 PHANTOM LIMB PAIN (HCC): ICD-10-CM

## 2020-10-05 DIAGNOSIS — R06.02 SHORTNESS OF BREATH: ICD-10-CM

## 2020-10-05 DIAGNOSIS — I42.8 NICM (NONISCHEMIC CARDIOMYOPATHY) (HCC): Primary | ICD-10-CM

## 2020-10-05 DIAGNOSIS — I50.43 ACUTE ON CHRONIC COMBINED SYSTOLIC AND DIASTOLIC ACC/AHA STAGE C CONGESTIVE HEART FAILURE (HCC): ICD-10-CM

## 2020-10-05 PROCEDURE — 99215 OFFICE O/P EST HI 40 MIN: CPT | Performed by: INTERNAL MEDICINE

## 2020-10-05 RX ORDER — DULOXETIN HYDROCHLORIDE 60 MG/1
60 CAPSULE, DELAYED RELEASE ORAL DAILY
Qty: 30 CAP | Refills: 3 | Status: SHIPPED | OUTPATIENT
Start: 2020-10-05 | End: 2021-01-15

## 2020-10-05 RX ORDER — CARVEDILOL 25 MG/1
25 TABLET ORAL 2 TIMES DAILY WITH MEALS
Qty: 60 TAB | Refills: 2 | Status: SHIPPED | OUTPATIENT
Start: 2020-10-05 | End: 2022-03-10 | Stop reason: ALTCHOICE

## 2020-10-05 RX ORDER — ACETAMINOPHEN 500 MG
2000 TABLET ORAL DAILY
COMMUNITY
End: 2020-10-13 | Stop reason: DRUGHIGH

## 2020-10-05 NOTE — PATIENT INSTRUCTIONS
Medication changes:    INCREASE carvedilol (Coreg) to 25mg- Take one 25mg tablet by mouth twice daily. You may take two 12.5mg tablets by mouth twice daily until you run out. The new prescription is for one 25mg tablet by mouth twice daily. INCREASE duloxetine (Cymbalta) to 60mg- Take one 60mg tablet by mouth daily      Testing Ordered:    Lab work drawn today. Our office will notify you of any abnormal results. An order for an ECHOCARDIOGRAM has been placed to be done NEXT AVAILABLE. You will be receiving an automated call from Coordination of Care to schedule this test. If you are unavailable to receive the call or would like to contact coordination of care yourself you may contact [phone] to schedule. A referral to sleep medicine has been placed. You will be contacted for scheduling. If you would like to contact the office to schedule you may call [phone]. Other Recommendations:      Ensure your drinking an adequate amount of water with a goal of 6-8 eight ounce glasses (1.5-2 liters) of fluid daily. Your urine should be clear and light yellow straw colored. If your blood pressure begins to consistently run below 90/60 and/or you begin to experience dizziness or lightheadedness, please contact the [facility] at [phone]. Follow up  with [facility]      Please monitor your blood pressures daily prior to medications and 2 hours after taking medications. Bring a written record of your blood pressures to your next appointment. Please monitor your weights daily upon waking and after using the bathroom. Keep a written records of your weights and bring to your next appointment. If you have a weight gain of 3 or more pounds overnight OR 5 or more pounds in one week please contact our office.        Thank you for allowing us the privilege of being a part of your healthcare team! Please do not hesitate to contact our office at [phone] with any questions or concerns. Mental Health Resources    [email or web address]  [address], [address]  Phone [phone]  Fax [phone]  Takes Medicaid, [place], [place], [place]    [email or web address]  [facility] [address] [facility]  Phone [phone]  Fax [phone]  Takes Medicaid, [place], [place], [place] [employer]  Also have NPs who will prescribe medications  Provide Individual therapy, group therapy, virtual therapy and IOP for substance abuse treatment    [email or web address]  [facility] Phone [phone]  [facility]) [phone]  Provides Primary care, including Wellness Visits, diabetes testing/management, asthma/nebulizer treatments, lab testing, family planning, immunizations and vaccines, comprehensive care & chronic disease management,  Mental health care, Including: Mental health and substance use counseling (group, individual, and family), psychiatric evaluations, medication management. And Dental care, including: Cleanings, fillings, extractions, crowns, and dentures. Superior: A guiding principle of [address] is our belief that ALL citizens should have access to quality health services regardless of their financial, housing or insurance status. Our philosophy is that healthcare is a right for all and not a privilege for the few. They even have application assistants who can help you complete and submit the Medicaid application!!!    [email or web address]  [facility], Julio [address], [facility]  [phone], ext [phone] provided include medication management, therapy, neuropsychological assessment, Bariatric surgery evaluations, and Substance abuse assessment and treatment    [facility].  [address] [facility], [address] [place] [address] Crisis Response, Mental Health Services, Substance Abuse and Prevention Services, Developmental Services, Criminal Justice Services, Primary Medical with a Clinic and Pharmacy for those clients using both, Office based Opioid treatment, Withdrawal Management and Psychiatric management.      [address] [facility], [facility] [address]      Private Practice: Elba Montana, [address] [facility]  [phone]    General acute hospital [address] SundayCaryn Lara  [facility] [place] [address]  [phone]

## 2020-10-05 NOTE — PROGRESS NOTES
[facility] Consultation Note      DOS:   10/5/2020  NAME:  Jazmín Mares   MRN:   [id number]   REFERRING PROVIDER:  Sienna Nguyen MD  PRIMARY CARE PHYSICIAN: Carlos Flowers MD  PRIMARY CARDIOLOGIST: none      Chief Complaint:   No chief complaint on file. HPI: 45y.o. year old male with a history of HTN, HLD, T2DM, NICM, s/p left BKA, recurrent PEs who presents for further evaluation of his chronic systolic heart failure. He previously worked as an  and was recently placed on disability. Cherry Victoria returns to clinic for follow up. He has been compliant with his medications. He denies GARCIA, PND, orthopnea, edema, palpitations, presyncope. He continues to have chronic stump and phantom limb pain. Cherry Victoria states that he showed up for his outpatient sleep study but was told that his insurance denied the sleep study and required a HSAT. He missed several attempts by the sleep clinic to arrange for a HSAT but wishes to follow through. He requests a referral to psychiatry for further evaluation of his irritability. His wife, who is a LPN, is concerned that he is bipolar. Cherry Victoria is currently  from his wife but wants to save his marriage. He lives with his wife and 12year old daughter.     History:  Past Medical History:   Diagnosis Date    Diabetes ([address]     since age 23   Elia Balderrama GERD (gastroesophageal reflux disease)     HTN (hypertension)     Hypercholesteremia     Hyperlipidemia     Psychiatric disorder     anxiety & depression    Thromboembolus ([address] 2017    bilateral legs     Past Surgical History:   Procedure Laterality Date    HX AMPUTATION      toes- left foot    HX AMPUTATION Left 08/2017    BKA    HX BUNIONECTOMY      HX ORTHOPAEDIC      boutinerre deformity    HX ORTHOPAEDIC      fascia removed left foot    HX OTHER SURGICAL  03/2017    Skin graft Surgery    HX TONSILLECTOMY      HX WISDOM TEETH EXTRACTION       Social History     Socioeconomic History    Marital status:      Spouse name: Not on file    Number of children: 3    Years of education: Not on file    Highest education level: Bachelor's degree (e.g., BA, AB, BS)   Occupational History    Not on file   Social Needs    Financial resource strain: Not hard at all   Suzie-Medardo insecurity     Worry: Patient refused     Inability: Patient refused    Transportation needs     Medical: Patient refused     Non-medical: Patient refused   Tobacco Use    Smoking status: Current Some Day Smoker     Packs/day: 0.25     Years: 3.00     Pack years: 0.75     Types: Cigars     Last attempt to quit: 2011     Years since quittin.4    Smokeless tobacco: Never Used   Substance and Sexual Activity    Alcohol use:  Yes     Alcohol/week: 0.0 standard drinks     Types: 1 Glasses of wine per week     Comment: weekly    Drug use: Yes     Types: Marijuana     Comment: current marijuana for pain/anxiety/1-2 week    Sexual activity: Not Currently     Partners: Female     Birth control/protection: None   Lifestyle    Physical activity     Days per week: 1 day     Minutes per session: 30 min    Stress: Very much   Relationships    Social connections     Talks on phone: More than three times a week     Gets together: Once a week     Attends Restorationism service: Never     Active member of club or organization: No     Attends meetings of clubs or organizations: Never     Relationship status:     Intimate partner violence     Fear of current or ex partner: No     Emotionally abused: No     Physically abused: No     Forced sexual activity: No   Other Topics Concern     Service No    Blood Transfusions No    Caffeine Concern No    Occupational Exposure No    Hobby Hazards No    Sleep Concern Yes    Stress Concern Yes    Weight Concern No    Special Diet Yes     Comment: diabetes    Back Care No    Exercise No    Bike Helmet No    Seat Belt No    Self-Exams No   Social History Narrative    ** Merged History Encounter **          Family History   Problem Relation Age of Onset    Hypertension Mother     High Cholesterol Mother        Current Medications:   Current Outpatient Medications   Medication Sig Dispense Refill    Blood-Glucose Meter monitoring kit Please check your sugars 3-4x daily 1 Kit 0    rivaroxaban (XARELTO) 20 mg tab tablet Take 1 Tab by mouth daily. 30 Tab 1    carvediloL (COREG) 12.5 mg tablet Take 1 Tab by mouth two (2) times daily (with meals). 60 Tab 2    hydrALAZINE (APRESOLINE) 25 mg tablet TAKE 1 TAB BY MOUTH THREE (3) TIMES DAILY. 90 Tab 5    spironolactone (ALDACTONE) 25 mg tablet Take 1 Tab by mouth daily. TAKE 1 TABLET EVERY DAY 30 Tab 5    DULoxetine (CYMBALTA) 30 mg capsule Take 1 Cap by mouth daily. 30 Cap 2    ergocalciferol (ERGOCALCIFEROL) 1,250 mcg (50,000 unit) capsule Take 1 Cap by mouth every seven (7) days. 12 Cap 0    isosorbide mononitrate ER (IMDUR) 30 mg tablet Take 1 Tab by mouth daily. 30 Tab 5    atorvastatin (LIPITOR) 40 mg tablet Take 1 Tab by mouth daily. 30 Tab 5    lancets misc Please check your sugars 3-4x daily 1 Each 11    glucose blood VI test strips (ASCENSIA AUTODISC VI, ONE TOUCH ULTRA TEST VI) strip Please check your sugars 3-4x daily 100 Strip 5    esomeprazole (NexIUM) 20 mg capsule Take 1 Cap by mouth daily. 90 Cap 0    Insulin Syringe-Needle U-100 (BD Insulin Syringe) 1 mL 27 gauge x 1/2\" syrg Use BID w novolog Dx E11.9 100 Syringe 2    insulin NPH/insulin regular (NOVOLIN 70/30, HUMULIN 70/30) 100 unit/mL (70-30) injection 40 Units by SubCUTAneous route Before breakfast and dinner. 72 mL 0    metFORMIN (GLUCOPHAGE) 850 mg tablet Take 1 Tab by mouth two (2) times daily (with meals). 60 Tab 5    Insulin Needles, Disposable, 31 gauge x 5/16\" ndle Use four timed daily w insulin 100 Package 11       Allergies:    Allergies   Allergen Reactions    Ace Inhibitors Swelling     Facial swelling cough     Morphine Angioedema    Arb-Angiotensin Receptor Antagonist Angioedema       ROS:    General:  positive for  - fatigue  HEENT: negative  Pulmonary: positive for - shortness of breath  Cardiac: positive for fatigue, lower extremity edema, dyspnea on exertion  GI:  no abdominal pain, change in bowel habits, or black or bloody stools  Musculo: negative  Neuro: no TIA or stroke symptoms  Skin:  Positive for skin breakdown at site of left stump  Allergies: REMINDER:DOCUMENT ALLERGIES IN ALLERGY ACTIVITY  Psych: positive for - irritability    Admission Weight: Last Weight             Physical Exam:   Vitals: There were no vitals taken for this visit. General:  alert, cooperative  HEENT: PERRLA, EOMI and Sclera clear, anicteric  Neck:  supple, no significant adenopathy, carotids upstroke normal bilaterally, no bruits  CVP:  8 cm  ( - ) HJR  Cardiac: regular rate, regular rhythm, S1, S2 normal, S4 present and systolic murmur present  Chest: breath sounds clear and equal bilaterally  Abdomen: soft, non-tender. Bowel sounds normal. No masses, no organomegaly. Extremity: No edema  Neuro: Alert and oriented to person, place, and time; normal strength and tone. Normal symmetric reflexes. Normal coordination and gait  Skin:   no rashes, no ecchymoses, no petechiae    Recent Labs:   No flowsheet data found. EK/15/19  Sinus  Rhythm  -With rate variation, rate 60 bpm   cv = 18. Voltage criteria for LVH  (S(V1)+R(V6) exceeds 3.50 mV)  -Voltage criteria w/o ST/T abnormality may be normal.    ms    Echocardiogram:   2019  · Left Ventricle: Normal cavity size. Mild concentric hypertrophy. Low normal systolic function. Estimated left ventricular ejection fraction is 51 - 55%. Age-appropriate left ventricular diastolic function.   · Pulmonary Artery: Mild pulmonary hypertension    17  LVEF 25-30%, mod diffuse HK, mild cLVH, grade 3 diastolic dysfunction, PAPs 40 mmHg      Impression / Plan: 1. NICM - Stage C, NYHA Class II, LVEF 25-30%, improved to 51-55%   Heart cath by Dr. Ramon Pac at [address] - no significant CAD   Request records from [facility]   Continue spironolactone, hydralazine/imdur   Increase carvedilol 25 mg, 1 tablet twice daily   No RAASi d/t angioedema   Recommend TTE   Check CMP, NT proBNP, Mg, CBC, TFTs, vitamin D     2. STOP-BANG high risk   Has not responded to multiple attempts by sleep medicine   Will send another referral to Dr. Feliz Thurston for HSAT    3. CKD3   No RAASi d/t angioedema   Serum Cr 1.61    4. T2DM on insulin, complicated by gastropathy   Hga1c 11.7 on 7/16/19   On metformin   Consider SGLT2i    5. HLD   On lipitor 40 mg qhs - not taking every day    6. HTN - Stage 2   Increase carvedilol to 25 mg, 1 tablet twice daily   Daily BP log   Sleep medicine referral    7. Vitamin D Deficiency   Check vitamin D level     8. S/p L BKA with chronic stump pain   Increase cymbalta to 60 mg qhs     9. Recurrent Pulmonary Emboli   On xarelto 20 mg daily                 Katey Celaya MD, [facility]  Chief of Cardiology, [address] [place] [address] Director  [facility]  [address] [facility], [address] [place] [address], [address] [place] [address]  Office [phone]  Fax [phone]

## 2020-10-06 ENCOUNTER — TELEPHONE (OUTPATIENT)
Dept: CARDIOLOGY CLINIC | Age: 50
End: 2020-10-06

## 2020-10-06 LAB
25(OH)D3+25(OH)D2 SERPL-MCNC: 11.3 NG/ML (ref 30–100)
ALBUMIN SERPL-MCNC: 3.6 G/DL (ref 3.8–4.9)
ALBUMIN/GLOB SERPL: 1.3 {RATIO} (ref 1.2–2.2)
ALP SERPL-CCNC: 86 IU/L (ref 39–117)
ALT SERPL-CCNC: 12 IU/L (ref 0–44)
AST SERPL-CCNC: 13 IU/L (ref 0–40)
BILIRUB SERPL-MCNC: 0.2 MG/DL (ref 0–1.2)
BUN SERPL-MCNC: 18 MG/DL (ref 6–24)
BUN/CREAT SERPL: 9 (ref 9–20)
CALCIUM SERPL-MCNC: 9.1 MG/DL (ref 8.7–10.2)
CHLORIDE SERPL-SCNC: 104 MMOL/L (ref 96–106)
CO2 SERPL-SCNC: 23 MMOL/L (ref 20–29)
CREAT SERPL-MCNC: 2.01 MG/DL (ref 0.76–1.27)
ERYTHROCYTE [DISTWIDTH] IN BLOOD BY AUTOMATED COUNT: 12.7 % (ref 11.6–15.4)
EST. AVERAGE GLUCOSE BLD GHB EST-MCNC: 200 MG/DL
GLOBULIN SER CALC-MCNC: 2.8 G/DL (ref 1.5–4.5)
GLUCOSE SERPL-MCNC: 208 MG/DL (ref 65–99)
HBA1C MFR BLD: 8.6 % (ref 4.8–5.6)
HCT VFR BLD AUTO: 36.8 % (ref 37.5–51)
HGB BLD-MCNC: 11.6 G/DL (ref 13–17.7)
MAGNESIUM SERPL-MCNC: 2.1 MG/DL (ref 1.6–2.3)
MCH RBC QN AUTO: 26.4 PG (ref 26.6–33)
MCHC RBC AUTO-ENTMCNC: 31.5 G/DL (ref 31.5–35.7)
MCV RBC AUTO: 84 FL (ref 79–97)
NT-PROBNP SERPL-MCNC: 213 PG/ML (ref 0–121)
PLATELET # BLD AUTO: 272 X10E3/UL (ref 150–450)
POTASSIUM SERPL-SCNC: 4.6 MMOL/L (ref 3.5–5.2)
PROT SERPL-MCNC: 6.4 G/DL (ref 6–8.5)
RBC # BLD AUTO: 4.39 X10E6/UL (ref 4.14–5.8)
SODIUM SERPL-SCNC: 138 MMOL/L (ref 134–144)
T3FREE SERPL-MCNC: 2.8 PG/ML (ref 2–4.4)
T4 SERPL-MCNC: 7 UG/DL (ref 4.5–12)
TSH SERPL DL<=0.005 MIU/L-ACNC: 1.26 UIU/ML (ref 0.45–4.5)
WBC # BLD AUTO: 10 X10E3/UL (ref 3.4–10.8)

## 2020-10-06 NOTE — TELEPHONE ENCOUNTER
Sleep Medicine referral faxed to [address] [place] [address] ([phone]). They will review this and contact patient to schedule his appointment.

## 2020-10-08 ENCOUNTER — TELEPHONE (OUTPATIENT)
Dept: SLEEP MEDICINE | Age: 50
End: 2020-10-08

## 2020-10-12 ENCOUNTER — TELEPHONE (OUTPATIENT)
Dept: CARDIOLOGY CLINIC | Age: 50
End: 2020-10-12

## 2020-10-12 DIAGNOSIS — E11.65 TYPE 2 DIABETES MELLITUS WITH HYPERGLYCEMIA, UNSPECIFIED WHETHER LONG TERM INSULIN USE (HCC): ICD-10-CM

## 2020-10-12 DIAGNOSIS — N18.30 STAGE 3 CHRONIC KIDNEY DISEASE, UNSPECIFIED WHETHER STAGE 3A OR 3B CKD (HCC): ICD-10-CM

## 2020-10-12 DIAGNOSIS — E55.9 VITAMIN D DEFICIENCY: Primary | ICD-10-CM

## 2020-10-12 NOTE — TELEPHONE ENCOUNTER
Lab work reviewed by Dr. Lennice Frankel who recommended V.O.R.B patient be seen by endocrinology (for diabetes management) and nephrology for creatinine of 2.01. Per Dr. Lennice Frankel V.O.R.B patient also to start ergocalciferol 50,000 units by mouth once weekly for 12 weeks due to vitamin d deficiency. Contacted patient to notify. He requested referrals be placed to endocrine and nephrology to reestablish. Referrals placed. Patient informed he will be contacted for scheduling. He verbalized understanding and had no further questions. Anabelle Gibson RN.

## 2020-10-13 RX ORDER — ERGOCALCIFEROL 1.25 MG/1
50000 CAPSULE ORAL
Qty: 12 CAP | Refills: 0 | Status: SHIPPED | OUTPATIENT
Start: 2020-10-13 | End: 2021-01-15

## 2020-10-20 ENCOUNTER — TELEPHONE (OUTPATIENT)
Dept: CARDIOLOGY CLINIC | Age: 50
End: 2020-10-20

## 2020-10-20 NOTE — TELEPHONE ENCOUNTER
Patient is scheduled to see   Dr. Yanique Flannery (endocrinology)   1-15-21  1:30pm  [address] [place] [address]  [phone]    This information was sent to patient through [employer] messages per patient request.

## 2020-12-01 ENCOUNTER — TRANSCRIBE ORDER (OUTPATIENT)
Dept: SCHEDULING | Age: 50
End: 2020-12-01

## 2020-12-01 DIAGNOSIS — N18.32 CHRONIC KIDNEY DISEASE (CKD) STAGE G3B/A1, MODERATELY DECREASED GLOMERULAR FILTRATION RATE (GFR) BETWEEN 30-44 ML/MIN/1.73 SQUARE METER AND ALBUMINURIA CREATININE RATIO LESS THAN 30 MG/G (HCC): Primary | ICD-10-CM

## 2021-01-15 ENCOUNTER — VIRTUAL VISIT (OUTPATIENT)
Dept: ENDOCRINOLOGY | Age: 51
End: 2021-01-15
Payer: COMMERCIAL

## 2021-01-15 DIAGNOSIS — Z79.4 TYPE 2 DIABETES MELLITUS WITH STAGE 3 CHRONIC KIDNEY DISEASE, WITH LONG-TERM CURRENT USE OF INSULIN, UNSPECIFIED WHETHER STAGE 3A OR 3B CKD (HCC): Primary | ICD-10-CM

## 2021-01-15 DIAGNOSIS — E11.22 TYPE 2 DIABETES MELLITUS WITH STAGE 3 CHRONIC KIDNEY DISEASE, WITH LONG-TERM CURRENT USE OF INSULIN, UNSPECIFIED WHETHER STAGE 3A OR 3B CKD (HCC): Primary | ICD-10-CM

## 2021-01-15 DIAGNOSIS — N18.30 TYPE 2 DIABETES MELLITUS WITH STAGE 3 CHRONIC KIDNEY DISEASE, WITH LONG-TERM CURRENT USE OF INSULIN, UNSPECIFIED WHETHER STAGE 3A OR 3B CKD (HCC): Primary | ICD-10-CM

## 2021-01-15 DIAGNOSIS — E78.5 HYPERLIPIDEMIA, UNSPECIFIED HYPERLIPIDEMIA TYPE: ICD-10-CM

## 2021-01-15 PROCEDURE — 99204 OFFICE O/P NEW MOD 45 MIN: CPT | Performed by: INTERNAL MEDICINE

## 2021-01-15 NOTE — PROGRESS NOTES
**DUE TO PANDEMIC AND HEALTH CONCERNS IN THE COMMUNITY, THIS PATIENT WAS EITHER ILL OR FOUND TO BE HIGH RISK FOR IN-PERSON EVALUATION WITHIN THE CLINIC. THE FOLLOWING IS A VIRTUAL TELEMEDICINE VIDEO ENCOUNTER VIA [employer], TO WHICH THE PATIENT AGREED. THE PURPOSE IS TO LIMIT INTERRUPTIONS IN HEALTHCARE AND TO PROVIDE FOR ONGOING URGENT NEEDS UNDER THE CURRENT CONDITIONS. CONSULTATION REQUESTED BY: Dr. Emily Travis,    PCP on file: Merle Arriola MD     REASON FOR CONSULT:  Uncontrolled type 2 diabetes    CHIEF COMPLAINT: evaluation of type 2 diabetes    HISTORY OF PRESENT ILLNESS:   David Washington is a 45 y.o. male with a PMHx as noted below who presents for evaluation of uncontrolled type 2 diabetes.     Diabetes History:  Diabetes was diagnosed approx 15-20 years,  Family History of diabetes is neg,   Last A1c prior to initial visit was 8.6% last October, prev 10-12%,  Reports he lost 100 lbs which helped,   Has CKD3, not following with a nephrologist,  Has L BKA about 3 years ago,  Follows with cardiology for NICM, Dr. Janet Murry,    Current Home Regimen:  Humulin 70/30: 40 units before breakfast and dinner  Metformin 850mg BID    Review of home glucose:  No log with him at the present time  AM:  100-270, but mostly 200  Dinner: 140-150   Bedtime: not checked    Review of most recent diabetes-related labs:  Lab Results   Component Value Date    HBA1C 8.6 (H) 10/05/2020    HBA1C 11.1 (H) 07/12/2017    HBA1C 12.5 (H) 05/28/2017    FZF8CLVO 11.7 07/16/2019    BZG1HESF 10.1 04/15/2019    STT0XMJR 9.1 09/11/2018    CHOL 193 01/06/2018    LDLC 106.6 (H) 01/06/2018    GFRAA 43 (L) 10/05/2020    GFRNA 37 (L) 10/05/2020    CREATEXT 1.27 03/23/2017    TSH 1.260 10/05/2020    VITD3 11.3 (L) 10/05/2020    B12LT 744 10/06/2016     Lab Key:  [id number] = IA-2 pancreatic islet cell autoantibody  CPEPL = C-peptide level  :EXT = External Lab  GADLT = ZACH-65 autoantibody   INSUL = Insulin level  MCACR (or MALBEXT) = Urine Microalbumin (or External UM)  B12LT = B12 level    PAST MEDICAL/SURGICAL HISTORY:   Past Medical History:   Diagnosis Date    Diabetes ([facility] [address]     since age 23   Carmella Stabs GERD (gastroesophageal reflux disease)     HTN (hypertension)     Hypercholesteremia     Hyperlipidemia     Psychiatric disorder     anxiety & depression    Thromboembolus ([facility] [address] 2017    bilateral legs     Past Surgical History:   Procedure Laterality Date    HX AMPUTATION      toes- left foot    HX AMPUTATION Left 08/2017    BKA    HX BUNIONECTOMY      HX ORTHOPAEDIC      boutinerre deformity    HX ORTHOPAEDIC      fascia removed left foot    HX OTHER SURGICAL  03/2017    Skin graft Surgery    HX TONSILLECTOMY      HX WISDOM TEETH EXTRACTION         ALLERGIES:   Allergies   Allergen Reactions    Ace Inhibitors Swelling     Facial swelling cough     Morphine Angioedema    Arb-Angiotensin Receptor Antagonist Angioedema       MEDICATIONS ON ADMISSION:     Current Outpatient Medications:     carvediloL (COREG) 25 mg tablet, Take 1 Tab by mouth two (2) times daily (with meals). , Disp: 60 Tab, Rfl: 2    rivaroxaban (XARELTO) 20 mg tab tablet, Take 1 Tab by mouth daily. , Disp: 30 Tab, Rfl: 1    hydrALAZINE (APRESOLINE) 25 mg tablet, TAKE 1 TAB BY MOUTH THREE (3) TIMES DAILY. , Disp: 90 Tab, Rfl: 5    spironolactone (ALDACTONE) 25 mg tablet, Take 1 Tab by mouth daily. TAKE 1 TABLET EVERY DAY, Disp: 30 Tab, Rfl: 5    isosorbide mononitrate ER (IMDUR) 30 mg tablet, Take 1 Tab by mouth daily. , Disp: 30 Tab, Rfl: 5    atorvastatin (LIPITOR) 40 mg tablet, Take 1 Tab by mouth daily. , Disp: 30 Tab, Rfl: 5    esomeprazole (NexIUM) 20 mg capsule, Take 1 Cap by mouth daily. , Disp: 90 Cap, Rfl: 0    insulin NPH/insulin regular (NOVOLIN 70/30, HUMULIN 70/30) 100 unit/mL (70-30) injection, 40 Units by SubCUTAneous route Before breakfast and dinner., Disp: 72 mL, Rfl: 0    metFORMIN (GLUCOPHAGE) 850 mg tablet, Take 1 Tab by mouth two (2) times daily (with meals). , Disp: 60 Tab, Rfl: 5    ergocalciferol (ERGOCALCIFEROL) 1,250 mcg (50,000 unit) capsule, Take 1 Cap by mouth every seven (7) days. , Disp: 12 Cap, Rfl: 0    DULoxetine (CYMBALTA) 60 mg capsule, Take 1 Cap by mouth daily. , Disp: 30 Cap, Rfl: 3    Blood-Glucose Meter monitoring kit, Please check your sugars 3-4x daily, Disp: 1 Kit, Rfl: 0    lancets misc, Please check your sugars 3-4x daily, Disp: 1 Each, Rfl: 11    glucose blood VI test strips (ASCENSIA AUTODISC VI, ONE TOUCH ULTRA TEST VI) strip, Please check your sugars 3-4x daily, Disp: 100 Strip, Rfl: 5    Insulin Syringe-Needle U-100 (BD Insulin Syringe) 1 mL 27 gauge x 1/2\" syrg, Use BID w novolog Dx E11.9, Disp: 100 Syringe, Rfl: 2    Insulin Needles, Disposable, 31 gauge x 5/16\" ndle, Use four timed daily w insulin, Disp: 100 Package, Rfl: 11    SOCIAL HISTORY:   Social History     Socioeconomic History    Marital status:      Spouse name: Not on file    Number of children: 3    Years of education: Not on file    Highest education level: Bachelor's degree (e.g., BA, AB, BS)   Occupational History    Not on file   Social Needs    Financial resource strain: Not hard at all   Suzie-Medardo insecurity     Worry: Patient refused     Inability: Patient refused    Transportation needs     Medical: Patient refused     Non-medical: Patient refused   Tobacco Use    Smoking status: Current Some Day Smoker     Packs/day: 0.25     Years: 3.00     Pack years: 0.75     Types: Cigars     Last attempt to quit: 2011     Years since quittin.7    Smokeless tobacco: Never Used   Substance and Sexual Activity    Alcohol use:  Yes     Alcohol/week: 0.0 standard drinks     Types: 1 Glasses of wine per week     Comment: weekly    Drug use: Yes     Types: Marijuana     Comment: current marijuana for pain/anxiety/1-2 week    Sexual activity: Not Currently     Partners: Female     Birth control/protection: None   Lifestyle    Physical activity Days per week: 1 day     Minutes per session: 30 min    Stress: Very much   Relationships    Social connections     Talks on phone: More than three times a week     Gets together: Once a week     Attends Jewish service: Never     Active member of club or organization: No     Attends meetings of clubs or organizations: Never     Relationship status:     Intimate partner violence     Fear of current or ex partner: No     Emotionally abused: No     Physically abused: No     Forced sexual activity: No   Other Topics Concern     Service No    Blood Transfusions No    Caffeine Concern No    Occupational Exposure No    Hobby Hazards No    Sleep Concern Yes    Stress Concern Yes    Weight Concern No    Special Diet Yes     Comment: diabetes    Back Care No    Exercise No    Bike Helmet No    Seat Belt No    Self-Exams No   Social History Narrative    ** Merged History Encounter **            FAMILY HISTORY:  Family History   Problem Relation Age of Onset    Hypertension Mother     High Cholesterol Mother        REVIEW OF SYSTEMS: Complete ROS assessed and noted for that which is described above, all else are negative. Eyes: normal  ENT: normal  CVS: normal  Resp: normal  GI: normal  : normal  GYN: normal  Endocrine: normal  Integument: normal  Musculoskeletal: normal  Neuro: normal  Psych: normal    PHYSICAL EXAMINATION:  Telemedicine Visit    GENERAL: NCAT, Appears well nourished  EYES: EOMI, non-icteric, no proptosis  Ear/Nose/Throat: NCAT, no visible inflammation or masses  CARDIOVASCULAR: no cyanosis, no visible JVD  RESPIRATORY: comfortable respirations observed, no cyanosis  MUSCULOSKELETAL: Normal ROM of upper extremities observed  SKIN: No edema, rash, or other significant changes observed  NEUROLOGIC:  AAOx3  PSYCHIATRIC: Normal affect, Normal insight and judgement    REVIEW OF LABORATORY AND RADIOLOGY DATA:   Labs and documentation have been reviewed as described above. ASSESSMENT AND PLAN:   Edith Woo is a 45 y.o. male with a PMHx as noted above who presents for evaluation of uncontrolled type 2 diabetes. Problems:  Type 2 diabetes Uncontrolled  Hyperlipidemia  Hypertension    We had the pleasure of reviewing together the basics of diabetes including basic pathophysiology and diabetes care. We further discussed the importance of checking home glucose regularly and takin all of their scheduled medications in order to have the best possible outcome. I was able to answer any questions they had in clinic today and they are invited to reach me if they have any further questions. Based upon our discussion together today we have decided to make the following changes:    Patient with history of poorly controlled DM2 for many years complicated by CKD and vascular disease s/p L BKA. We discussed goals of treatment which include not only improved DM control but also the avoidance of any further diabetes related complications the best that we can. Reviewing his sugars per his memory, he notes that most of his sugars in the AM are in the 200's. It would be ideal to know his bedtime sugars and we discussed the reason for this with regards to adjusting his dinnertime insulin dose, however based on the information that we have, the best next step is to increase the dinner dose of insulin as noted below. I have invited him to reach out to me through [employer] with questions or concerns. PLAN  Type 2 Diabetes  Medications:  Humulin 70/30:    40 units before breakfast    50 units before dinner  Metformin 850mg twice per day  Advised to check glucose   Provided with glucose log sheets for later review.     HTN: following with cardiology, BP treated with them, telemedicine visit today, taking his meds,   HLD: Fasting lipids reviewed, last checked in 2018, on atorvastatin 40mg, will recheck level    RTC: May 27 at 10:30 AM,    45 minutes spent toward telemedicine visit today of which >50% of this time was spent in counseling and coordination of care. Kamilla Lemus.  [id number] [facility]

## 2021-01-29 DIAGNOSIS — I26.99 RECURRENT PULMONARY EMBOLI (HCC): ICD-10-CM

## 2021-01-29 RX ORDER — RIVAROXABAN 20 MG/1
TABLET, FILM COATED ORAL
Qty: 30 TAB | Refills: 0 | Status: SHIPPED | OUTPATIENT
Start: 2021-01-29 | End: 2022-03-10 | Stop reason: ALTCHOICE

## 2021-02-09 ENCOUNTER — TRANSCRIBE ORDER (OUTPATIENT)
Dept: SCHEDULING | Age: 51
End: 2021-02-09

## 2021-02-09 DIAGNOSIS — N18.32 STAGE 3B CHRONIC KIDNEY DISEASE (HCC): Primary | ICD-10-CM

## 2021-04-06 ENCOUNTER — DOCUMENTATION ONLY (OUTPATIENT)
Dept: SLEEP MEDICINE | Age: 51
End: 2021-04-06

## 2021-04-06 NOTE — PROGRESS NOTES
Tried to call patient to schedule sleep consult, unable to LVM per mailbox full, message sent via [employer] ref'd by Mary Ku MD

## 2021-06-29 ENCOUNTER — APPOINTMENT (OUTPATIENT)
Dept: CT IMAGING | Age: 51
DRG: 683 | End: 2021-06-29
Attending: EMERGENCY MEDICINE
Payer: COMMERCIAL

## 2021-06-29 ENCOUNTER — APPOINTMENT (OUTPATIENT)
Dept: ULTRASOUND IMAGING | Age: 51
DRG: 683 | End: 2021-06-29
Attending: EMERGENCY MEDICINE
Payer: COMMERCIAL

## 2021-06-29 ENCOUNTER — APPOINTMENT (OUTPATIENT)
Dept: GENERAL RADIOLOGY | Age: 51
DRG: 683 | End: 2021-06-29
Attending: EMERGENCY MEDICINE
Payer: COMMERCIAL

## 2021-06-29 ENCOUNTER — HOSPITAL ENCOUNTER (INPATIENT)
Age: 51
LOS: 3 days | Discharge: HOME | DRG: 683 | End: 2021-07-02
Attending: EMERGENCY MEDICINE | Admitting: INTERNAL MEDICINE
Payer: COMMERCIAL

## 2021-06-29 DIAGNOSIS — E11.65 UNCONTROLLED TYPE 2 DIABETES MELLITUS WITH HYPERGLYCEMIA (HCC): ICD-10-CM

## 2021-06-29 DIAGNOSIS — G89.29 CHRONIC LOW BACK PAIN WITHOUT SCIATICA, UNSPECIFIED BACK PAIN LATERALITY: ICD-10-CM

## 2021-06-29 DIAGNOSIS — N17.9 ACUTE RENAL FAILURE, UNSPECIFIED ACUTE RENAL FAILURE TYPE (HCC): ICD-10-CM

## 2021-06-29 DIAGNOSIS — E11.22 TYPE 2 DIABETES MELLITUS WITH STAGE 3 CHRONIC KIDNEY DISEASE, WITH LONG-TERM CURRENT USE OF INSULIN (HCC): ICD-10-CM

## 2021-06-29 DIAGNOSIS — M54.50 CHRONIC LOW BACK PAIN WITHOUT SCIATICA, UNSPECIFIED BACK PAIN LATERALITY: ICD-10-CM

## 2021-06-29 DIAGNOSIS — N18.30 TYPE 2 DIABETES MELLITUS WITH STAGE 3 CHRONIC KIDNEY DISEASE, WITH LONG-TERM CURRENT USE OF INSULIN (HCC): ICD-10-CM

## 2021-06-29 DIAGNOSIS — I16.1 HYPERTENSIVE EMERGENCY: Primary | ICD-10-CM

## 2021-06-29 DIAGNOSIS — Z79.4 TYPE 2 DIABETES MELLITUS WITH STAGE 3 CHRONIC KIDNEY DISEASE, WITH LONG-TERM CURRENT USE OF INSULIN (HCC): ICD-10-CM

## 2021-06-29 LAB
ALBUMIN SERPL-MCNC: 3.2 G/DL (ref 3.5–5)
ALBUMIN/GLOB SERPL: 0.7 {RATIO} (ref 1.1–2.2)
ALP SERPL-CCNC: 113 U/L (ref 45–117)
ALT SERPL-CCNC: 36 U/L (ref 12–78)
AMPHET UR QL SCN: NEGATIVE
ANION GAP SERPL CALC-SCNC: 7 MMOL/L (ref 5–15)
APPEARANCE UR: ABNORMAL
AST SERPL-CCNC: 34 U/L (ref 15–37)
ATRIAL RATE: 113 BPM
BACTERIA URNS QL MICRO: NEGATIVE /HPF
BARBITURATES UR QL SCN: NEGATIVE
BASE DEFICIT BLD-SCNC: 1.1 MMOL/L
BASOPHILS # BLD: 0.1 K/UL (ref 0–0.1)
BASOPHILS NFR BLD: 1 % (ref 0–1)
BENZODIAZ UR QL: NEGATIVE
BILIRUB SERPL-MCNC: 0.5 MG/DL (ref 0.2–1)
BILIRUB UR QL CFM: NEGATIVE
BUN SERPL-MCNC: 25 MG/DL (ref 6–20)
BUN/CREAT SERPL: 6 (ref 12–20)
CA-I BLD-MCNC: 1.08 MMOL/L (ref 1.12–1.32)
CALCIUM SERPL-MCNC: 9.1 MG/DL (ref 8.5–10.1)
CALCULATED P AXIS, ECG09: 70 DEGREES
CALCULATED R AXIS, ECG10: 57 DEGREES
CALCULATED T AXIS, ECG11: 143 DEGREES
CANNABINOIDS UR QL SCN: POSITIVE
CHLORIDE BLD-SCNC: 106 MMOL/L (ref 100–108)
CHLORIDE SERPL-SCNC: 106 MMOL/L (ref 97–108)
CK MB CFR SERPL CALC: 0.9 % (ref 0–2.5)
CK MB SERPL-MCNC: 4.5 NG/ML (ref 5–25)
CK SERPL-CCNC: 482 U/L (ref 39–308)
CO2 BLD-SCNC: 22 MMOL/L (ref 19–24)
CO2 SERPL-SCNC: 24 MMOL/L (ref 21–32)
COCAINE UR QL SCN: NEGATIVE
COLOR UR: ABNORMAL
CREAT SERPL-MCNC: 3.97 MG/DL (ref 0.7–1.3)
CREAT UR-MCNC: 219 MG/DL
CREAT UR-MCNC: 3.8 MG/DL (ref 0.6–1.3)
DIAGNOSIS, 93000: NORMAL
DIFFERENTIAL METHOD BLD: ABNORMAL
DRUG SCRN COMMENT,DRGCM: ABNORMAL
EOSINOPHIL # BLD: 0.1 K/UL (ref 0–0.4)
EOSINOPHIL NFR BLD: 0 % (ref 0–7)
EPITH CASTS URNS QL MICRO: ABNORMAL /LPF
ERYTHROCYTE [DISTWIDTH] IN BLOOD BY AUTOMATED COUNT: 13.5 % (ref 11.5–14.5)
GLOBULIN SER CALC-MCNC: 4.4 G/DL (ref 2–4)
GLUCOSE BLD STRIP.AUTO-MCNC: 118 MG/DL (ref 65–117)
GLUCOSE BLD STRIP.AUTO-MCNC: 166 MG/DL (ref 74–106)
GLUCOSE BLD STRIP.AUTO-MCNC: 175 MG/DL (ref 65–117)
GLUCOSE SERPL-MCNC: 171 MG/DL (ref 65–100)
GLUCOSE UR STRIP.AUTO-MCNC: 100 MG/DL
HCO3 BLDA-SCNC: 22 MMOL/L
HCT VFR BLD AUTO: 43.5 % (ref 36.6–50.3)
HGB BLD-MCNC: 13.7 G/DL (ref 12.1–17)
HGB UR QL STRIP: ABNORMAL
IMM GRANULOCYTES # BLD AUTO: 0 K/UL (ref 0–0.04)
IMM GRANULOCYTES NFR BLD AUTO: 0 % (ref 0–0.5)
KETONES UR QL STRIP.AUTO: 15 MG/DL
LACTATE BLD-SCNC: 1.28 MMOL/L (ref 0.4–2)
LEUKOCYTE ESTERASE UR QL STRIP.AUTO: NEGATIVE
LYMPHOCYTES # BLD: 2.7 K/UL (ref 0.8–3.5)
LYMPHOCYTES NFR BLD: 24 % (ref 12–49)
MCH RBC QN AUTO: 26.3 PG (ref 26–34)
MCHC RBC AUTO-ENTMCNC: 31.5 G/DL (ref 30–36.5)
MCV RBC AUTO: 83.5 FL (ref 80–99)
METHADONE UR QL: NEGATIVE
MONOCYTES # BLD: 0.6 K/UL (ref 0–1)
MONOCYTES NFR BLD: 5 % (ref 5–13)
NEUTS SEG # BLD: 7.7 K/UL (ref 1.8–8)
NEUTS SEG NFR BLD: 70 % (ref 32–75)
NITRITE UR QL STRIP.AUTO: NEGATIVE
NRBC # BLD: 0 K/UL (ref 0–0.01)
NRBC BLD-RTO: 0 PER 100 WBC
OPIATES UR QL: NEGATIVE
P-R INTERVAL, ECG05: 148 MS
PCO2 BLDV: 30.8 MMHG (ref 41–51)
PCP UR QL: NEGATIVE
PH BLDV: 7.46 [PH] (ref 7.32–7.42)
PH UR STRIP: 6 [PH] (ref 5–8)
PLATELET # BLD AUTO: 262 K/UL (ref 150–400)
PMV BLD AUTO: 10.5 FL (ref 8.9–12.9)
PO2 BLDV: 51 MMHG (ref 25–40)
POTASSIUM BLD-SCNC: 4.2 MMOL/L (ref 3.5–5.5)
POTASSIUM SERPL-SCNC: 4.4 MMOL/L (ref 3.5–5.1)
PROT SERPL-MCNC: 7.6 G/DL (ref 6.4–8.2)
PROT UR STRIP-MCNC: >300 MG/DL
PROT UR-MCNC: 1242 MG/DL (ref 0–11.9)
PROT/CREAT UR-RTO: 5.7
Q-T INTERVAL, ECG07: 322 MS
QRS DURATION, ECG06: 84 MS
QTC CALCULATION (BEZET), ECG08: 441 MS
RBC # BLD AUTO: 5.21 M/UL (ref 4.1–5.7)
RBC #/AREA URNS HPF: ABNORMAL /HPF (ref 0–5)
SERVICE CMNT-IMP: ABNORMAL
SERVICE CMNT-IMP: ABNORMAL
SODIUM BLD-SCNC: 140 MMOL/L (ref 136–145)
SODIUM SERPL-SCNC: 137 MMOL/L (ref 136–145)
SP GR UR REFRACTOMETRY: 1.02 (ref 1–1.03)
SPECIMEN SITE: ABNORMAL
TSH SERPL DL<=0.05 MIU/L-ACNC: 2.06 UIU/ML (ref 0.36–3.74)
UROBILINOGEN UR QL STRIP.AUTO: 0.2 EU/DL (ref 0.2–1)
VENTRICULAR RATE, ECG03: 113 BPM
WBC # BLD AUTO: 11.2 K/UL (ref 4.1–11.1)
WBC URNS QL MICRO: ABNORMAL /HPF (ref 0–4)

## 2021-06-29 PROCEDURE — 94762 N-INVAS EAR/PLS OXIMTRY CONT: CPT

## 2021-06-29 PROCEDURE — 71045 X-RAY EXAM CHEST 1 VIEW: CPT

## 2021-06-29 PROCEDURE — 74011250636 HC RX REV CODE- 250/636: Performed by: INTERNAL MEDICINE

## 2021-06-29 PROCEDURE — 74011250637 HC RX REV CODE- 250/637: Performed by: NURSE PRACTITIONER

## 2021-06-29 PROCEDURE — 93005 ELECTROCARDIOGRAM TRACING: CPT

## 2021-06-29 PROCEDURE — 74011250637 HC RX REV CODE- 250/637: Performed by: INTERNAL MEDICINE

## 2021-06-29 PROCEDURE — 80307 DRUG TEST PRSMV CHEM ANLYZR: CPT

## 2021-06-29 PROCEDURE — 99285 EMERGENCY DEPT VISIT HI MDM: CPT

## 2021-06-29 PROCEDURE — 74011000258 HC RX REV CODE- 258: Performed by: EMERGENCY MEDICINE

## 2021-06-29 PROCEDURE — 84443 ASSAY THYROID STIM HORMONE: CPT

## 2021-06-29 PROCEDURE — 85025 COMPLETE CBC W/AUTO DIFF WBC: CPT

## 2021-06-29 PROCEDURE — 82570 ASSAY OF URINE CREATININE: CPT

## 2021-06-29 PROCEDURE — 74011000250 HC RX REV CODE- 250: Performed by: EMERGENCY MEDICINE

## 2021-06-29 PROCEDURE — 82962 GLUCOSE BLOOD TEST: CPT

## 2021-06-29 PROCEDURE — 81001 URINALYSIS AUTO W/SCOPE: CPT

## 2021-06-29 PROCEDURE — 73502 X-RAY EXAM HIP UNI 2-3 VIEWS: CPT

## 2021-06-29 PROCEDURE — 74011250637 HC RX REV CODE- 250/637: Performed by: EMERGENCY MEDICINE

## 2021-06-29 PROCEDURE — 84295 ASSAY OF SERUM SODIUM: CPT

## 2021-06-29 PROCEDURE — 80353 DRUG SCREENING COCAINE: CPT

## 2021-06-29 PROCEDURE — 74176 CT ABD & PELVIS W/O CONTRAST: CPT

## 2021-06-29 PROCEDURE — 36415 COLL VENOUS BLD VENIPUNCTURE: CPT

## 2021-06-29 PROCEDURE — 96374 THER/PROPH/DIAG INJ IV PUSH: CPT

## 2021-06-29 PROCEDURE — 65660000001 HC RM ICU INTERMED STEPDOWN

## 2021-06-29 PROCEDURE — 80053 COMPREHEN METABOLIC PANEL: CPT

## 2021-06-29 PROCEDURE — 74011250636 HC RX REV CODE- 250/636: Performed by: EMERGENCY MEDICINE

## 2021-06-29 PROCEDURE — 96375 TX/PRO/DX INJ NEW DRUG ADDON: CPT

## 2021-06-29 PROCEDURE — 76770 US EXAM ABDO BACK WALL COMP: CPT

## 2021-06-29 PROCEDURE — 82553 CREATINE MB FRACTION: CPT

## 2021-06-29 RX ORDER — CARVEDILOL 12.5 MG/1
25 TABLET ORAL 2 TIMES DAILY WITH MEALS
Status: DISCONTINUED | OUTPATIENT
Start: 2021-06-30 | End: 2021-07-02 | Stop reason: HOSPADM

## 2021-06-29 RX ORDER — ONDANSETRON 2 MG/ML
4 INJECTION INTRAMUSCULAR; INTRAVENOUS
Status: DISCONTINUED | OUTPATIENT
Start: 2021-06-29 | End: 2021-07-02 | Stop reason: HOSPADM

## 2021-06-29 RX ORDER — SPIRONOLACTONE 25 MG/1
25 TABLET ORAL DAILY
Status: DISCONTINUED | OUTPATIENT
Start: 2021-06-30 | End: 2021-06-30

## 2021-06-29 RX ORDER — HYDRALAZINE HYDROCHLORIDE 25 MG/1
25 TABLET, FILM COATED ORAL 3 TIMES DAILY
Status: DISCONTINUED | OUTPATIENT
Start: 2021-06-29 | End: 2021-07-01

## 2021-06-29 RX ORDER — CLONIDINE HYDROCHLORIDE 0.1 MG/1
0.1 TABLET ORAL 3 TIMES DAILY
Status: DISCONTINUED | OUTPATIENT
Start: 2021-06-29 | End: 2021-07-01

## 2021-06-29 RX ORDER — ACETAMINOPHEN 500 MG
1000 TABLET ORAL ONCE
Status: COMPLETED | OUTPATIENT
Start: 2021-06-29 | End: 2021-06-29

## 2021-06-29 RX ORDER — OXYCODONE HYDROCHLORIDE 5 MG/1
5 TABLET ORAL
Status: DISCONTINUED | OUTPATIENT
Start: 2021-06-29 | End: 2021-07-02 | Stop reason: HOSPADM

## 2021-06-29 RX ORDER — POLYETHYLENE GLYCOL 3350 17 G/17G
17 POWDER, FOR SOLUTION ORAL DAILY PRN
Status: DISCONTINUED | OUTPATIENT
Start: 2021-06-29 | End: 2021-07-02 | Stop reason: HOSPADM

## 2021-06-29 RX ORDER — HYDRALAZINE HYDROCHLORIDE 20 MG/ML
10 INJECTION INTRAMUSCULAR; INTRAVENOUS
Status: DISCONTINUED | OUTPATIENT
Start: 2021-06-29 | End: 2021-07-02 | Stop reason: HOSPADM

## 2021-06-29 RX ORDER — ACETAMINOPHEN 325 MG/1
650 TABLET ORAL
Status: DISCONTINUED | OUTPATIENT
Start: 2021-06-29 | End: 2021-06-29

## 2021-06-29 RX ORDER — ACETAMINOPHEN 325 MG/1
650 TABLET ORAL
Status: DISCONTINUED | OUTPATIENT
Start: 2021-06-29 | End: 2021-07-02 | Stop reason: HOSPADM

## 2021-06-29 RX ORDER — ONDANSETRON 4 MG/1
4 TABLET, ORALLY DISINTEGRATING ORAL
Status: DISCONTINUED | OUTPATIENT
Start: 2021-06-29 | End: 2021-07-02 | Stop reason: HOSPADM

## 2021-06-29 RX ORDER — ACETAMINOPHEN 650 MG/1
650 SUPPOSITORY RECTAL
Status: DISCONTINUED | OUTPATIENT
Start: 2021-06-29 | End: 2021-06-29

## 2021-06-29 RX ORDER — SODIUM CHLORIDE 9 MG/ML
75 INJECTION, SOLUTION INTRAVENOUS CONTINUOUS
Status: DISCONTINUED | OUTPATIENT
Start: 2021-06-29 | End: 2021-06-30

## 2021-06-29 RX ORDER — OXYCODONE HYDROCHLORIDE 5 MG/1
10 TABLET ORAL
Status: DISCONTINUED | OUTPATIENT
Start: 2021-06-29 | End: 2021-07-02 | Stop reason: HOSPADM

## 2021-06-29 RX ORDER — FENTANYL CITRATE 50 UG/ML
75 INJECTION, SOLUTION INTRAMUSCULAR; INTRAVENOUS
Status: COMPLETED | OUTPATIENT
Start: 2021-06-29 | End: 2021-06-29

## 2021-06-29 RX ORDER — SODIUM CHLORIDE 0.9 % (FLUSH) 0.9 %
5-40 SYRINGE (ML) INJECTION AS NEEDED
Status: DISCONTINUED | OUTPATIENT
Start: 2021-06-29 | End: 2021-07-02 | Stop reason: HOSPADM

## 2021-06-29 RX ORDER — HYDRALAZINE HYDROCHLORIDE 50 MG/1
25 TABLET, FILM COATED ORAL 3 TIMES DAILY
Status: DISCONTINUED | OUTPATIENT
Start: 2021-06-29 | End: 2021-06-29

## 2021-06-29 RX ORDER — ATORVASTATIN CALCIUM 40 MG/1
40 TABLET, FILM COATED ORAL DAILY
Status: DISCONTINUED | OUTPATIENT
Start: 2021-06-30 | End: 2021-07-02

## 2021-06-29 RX ORDER — PANTOPRAZOLE SODIUM 40 MG/1
40 TABLET, DELAYED RELEASE ORAL
Status: DISCONTINUED | OUTPATIENT
Start: 2021-06-30 | End: 2021-06-29

## 2021-06-29 RX ORDER — KETOROLAC TROMETHAMINE 30 MG/ML
30 INJECTION, SOLUTION INTRAMUSCULAR; INTRAVENOUS
Status: COMPLETED | OUTPATIENT
Start: 2021-06-29 | End: 2021-06-29

## 2021-06-29 RX ORDER — PANTOPRAZOLE SODIUM 40 MG/1
40 TABLET, DELAYED RELEASE ORAL
Status: DISCONTINUED | OUTPATIENT
Start: 2021-06-29 | End: 2021-07-02 | Stop reason: HOSPADM

## 2021-06-29 RX ORDER — ISOSORBIDE MONONITRATE 30 MG/1
30 TABLET, EXTENDED RELEASE ORAL DAILY
Status: DISCONTINUED | OUTPATIENT
Start: 2021-06-30 | End: 2021-07-02 | Stop reason: HOSPADM

## 2021-06-29 RX ORDER — CARVEDILOL 12.5 MG/1
25 TABLET ORAL 2 TIMES DAILY WITH MEALS
Status: DISCONTINUED | OUTPATIENT
Start: 2021-06-29 | End: 2021-06-29

## 2021-06-29 RX ORDER — SODIUM CHLORIDE 0.9 % (FLUSH) 0.9 %
5-40 SYRINGE (ML) INJECTION EVERY 8 HOURS
Status: DISCONTINUED | OUTPATIENT
Start: 2021-06-29 | End: 2021-07-02 | Stop reason: HOSPADM

## 2021-06-29 RX ORDER — LANOLIN ALCOHOL/MO/W.PET/CERES
6 CREAM (GRAM) TOPICAL
Status: DISCONTINUED | OUTPATIENT
Start: 2021-06-29 | End: 2021-07-02 | Stop reason: HOSPADM

## 2021-06-29 RX ADMIN — KETOROLAC TROMETHAMINE 30 MG: 30 INJECTION, SOLUTION INTRAMUSCULAR; INTRAVENOUS at 14:23

## 2021-06-29 RX ADMIN — ONDANSETRON 4 MG: 2 INJECTION INTRAMUSCULAR; INTRAVENOUS at 21:52

## 2021-06-29 RX ADMIN — ACETAMINOPHEN 1000 MG: 500 TABLET ORAL at 14:24

## 2021-06-29 RX ADMIN — SODIUM CHLORIDE 7.5 MG/HR: 9 INJECTION, SOLUTION INTRAVENOUS at 18:34

## 2021-06-29 RX ADMIN — SODIUM CHLORIDE 10 MG/HR: 9 INJECTION, SOLUTION INTRAVENOUS at 18:49

## 2021-06-29 RX ADMIN — MELATONIN 3 MG: at 21:07

## 2021-06-29 RX ADMIN — HYDRALAZINE HYDROCHLORIDE 25 MG: 50 TABLET, FILM COATED ORAL at 17:03

## 2021-06-29 RX ADMIN — SODIUM CHLORIDE 5 MG/HR: 9 INJECTION, SOLUTION INTRAVENOUS at 22:20

## 2021-06-29 RX ADMIN — CLONIDINE HYDROCHLORIDE 0.1 MG: 0.1 TABLET ORAL at 17:04

## 2021-06-29 RX ADMIN — PANTOPRAZOLE SODIUM 40 MG: 40 TABLET, DELAYED RELEASE ORAL at 19:42

## 2021-06-29 RX ADMIN — ACETAMINOPHEN 650 MG: 325 TABLET ORAL at 19:42

## 2021-06-29 RX ADMIN — CARVEDILOL 25 MG: 12.5 TABLET, FILM COATED ORAL at 17:03

## 2021-06-29 RX ADMIN — OXYCODONE 10 MG: 5 TABLET ORAL at 21:07

## 2021-06-29 RX ADMIN — HYDRALAZINE HYDROCHLORIDE 25 MG: 25 TABLET, FILM COATED ORAL at 21:07

## 2021-06-29 RX ADMIN — Medication 10 ML: at 21:08

## 2021-06-29 RX ADMIN — CLONIDINE HYDROCHLORIDE 0.1 MG: 0.1 TABLET ORAL at 21:07

## 2021-06-29 RX ADMIN — FENTANYL CITRATE 75 MCG: 50 INJECTION INTRAMUSCULAR; INTRAVENOUS at 14:24

## 2021-06-29 RX ADMIN — SODIUM CHLORIDE 75 ML/HR: 9 INJECTION, SOLUTION INTRAVENOUS at 18:16

## 2021-06-29 RX ADMIN — SODIUM CHLORIDE 5 MG/HR: 9 INJECTION, SOLUTION INTRAVENOUS at 18:16
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
## 2021-06-29 NOTE — ED NOTES
Attempted to stick pt at this time to get blood work and IV placed; unsuccesful; contacted lead tech for ultrasound IV

## 2021-06-29 NOTE — PROGRESS NOTES
1900 TRANSFER - IN REPORT:    Verbal report received from Kalen Cloud RN(name) on Shandra Ruano  being received from ER(unit) for routine progression of care      Report consisted of patients Situation, Background, Assessment and   Recommendations(SBAR). Information from the following report(s) SBAR was reviewed with the receiving nurse. Opportunity for questions and clarification was provided. Assessment completed upon patients arrival to unit and care assumed.
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
## 2021-06-29 NOTE — ED NOTES
Patient is being transferred to [address], Room # 2286. Report given to Jhon Frances RN on Jenna Milks for routine progression of care. Report consisted of the following information SBAR, ED Summary, Intake/Output, MAR and Recent Results. Patient transferred to receiving unit by: [address] (RN or tech name). Outstanding consults needed: No     Next labs due: No     The following personal items will be sent with the patient during transfer to the floor: All valuables:    Cardiac monitoring ordered: Yes    The following CURRENT information was reported to the receiving RN:    Code status: Full Code at time of transfer    Last set of vital signs:  Vital Signs  Temp: 98.1 °F (36.7 °C) (06/29/21 1303)  Temp Source: Oral (06/29/21 1303)  Pulse (Heart Rate): 74 (06/29/21 1900)  Resp Rate: 20 (06/29/21 1900)  BP: (!) 191/106 (06/29/21 1900)  MAP (Monitor): 129 (06/29/21 1900)  MAP (Calculated): 134 (06/29/21 1900)         Oxygen Therapy  O2 Sat (%): 98 % (06/29/21 1900)  Pulse via Oximetry: 72 beats per minute (06/29/21 1900)      Last pain assessment:  Pain 1  Pain Scale 1: Numeric (0 - 10)  Pain Intensity 1: 9  Patient Stated Pain Goal: 0  Pain Intervention(s) 1: Medication (see MAR)      Wounds: No     Urinary catheter:   Is there a almendarez order: No     LDAs:       Peripheral IV 06/29/21 Anterior;Left;Proximal Forearm (Active)         Opportunity for questions and clarification was provided.     Roxana Deluca RN

## 2021-06-30 LAB
25(OH)D3 SERPL-MCNC: 30.9 NG/ML (ref 30–100)
ALBUMIN SERPL-MCNC: 2.8 G/DL (ref 3.5–5)
ALBUMIN/GLOB SERPL: 0.7 {RATIO} (ref 1.1–2.2)
ALP SERPL-CCNC: 97 U/L (ref 45–117)
ALT SERPL-CCNC: 28 U/L (ref 12–78)
ANION GAP SERPL CALC-SCNC: 9 MMOL/L (ref 5–15)
AST SERPL-CCNC: 31 U/L (ref 15–37)
BILIRUB SERPL-MCNC: 0.4 MG/DL (ref 0.2–1)
BUN SERPL-MCNC: 28 MG/DL (ref 6–20)
BUN/CREAT SERPL: 7 (ref 12–20)
CALCIUM SERPL-MCNC: 8.4 MG/DL (ref 8.5–10.1)
CALCIUM SERPL-MCNC: 8.5 MG/DL (ref 8.5–10.1)
CHLORIDE SERPL-SCNC: 105 MMOL/L (ref 97–108)
CK SERPL-CCNC: 390 U/L (ref 39–308)
CO2 SERPL-SCNC: 21 MMOL/L (ref 21–32)
CREAT SERPL-MCNC: 3.81 MG/DL (ref 0.7–1.3)
GLOBULIN SER CALC-MCNC: 3.9 G/DL (ref 2–4)
GLUCOSE BLD STRIP.AUTO-MCNC: 161 MG/DL (ref 65–117)
GLUCOSE SERPL-MCNC: 136 MG/DL (ref 65–100)
PHOSPHATE SERPL-MCNC: 3.2 MG/DL (ref 2.6–4.7)
POTASSIUM SERPL-SCNC: 4.5 MMOL/L (ref 3.5–5.1)
PROT SERPL-MCNC: 6.7 G/DL (ref 6.4–8.2)
PTH-INTACT SERPL-MCNC: 64.3 PG/ML (ref 18.4–88)
SERVICE CMNT-IMP: ABNORMAL
SODIUM SERPL-SCNC: 135 MMOL/L (ref 136–145)
URATE SERPL-MCNC: 7 MG/DL (ref 3.5–7.2)

## 2021-06-30 PROCEDURE — 36415 COLL VENOUS BLD VENIPUNCTURE: CPT

## 2021-06-30 PROCEDURE — 82550 ASSAY OF CK (CPK): CPT

## 2021-06-30 PROCEDURE — 82306 VITAMIN D 25 HYDROXY: CPT

## 2021-06-30 PROCEDURE — 65660000001 HC RM ICU INTERMED STEPDOWN

## 2021-06-30 PROCEDURE — 74011000258 HC RX REV CODE- 258: Performed by: EMERGENCY MEDICINE

## 2021-06-30 PROCEDURE — 74011250637 HC RX REV CODE- 250/637: Performed by: INTERNAL MEDICINE

## 2021-06-30 PROCEDURE — 74011250637 HC RX REV CODE- 250/637: Performed by: NURSE PRACTITIONER

## 2021-06-30 PROCEDURE — 74011250636 HC RX REV CODE- 250/636: Performed by: INTERNAL MEDICINE

## 2021-06-30 PROCEDURE — 84550 ASSAY OF BLOOD/URIC ACID: CPT

## 2021-06-30 PROCEDURE — 80053 COMPREHEN METABOLIC PANEL: CPT

## 2021-06-30 PROCEDURE — 84100 ASSAY OF PHOSPHORUS: CPT

## 2021-06-30 PROCEDURE — 83970 ASSAY OF PARATHORMONE: CPT

## 2021-06-30 PROCEDURE — 74011000250 HC RX REV CODE- 250: Performed by: EMERGENCY MEDICINE

## 2021-06-30 PROCEDURE — 82962 GLUCOSE BLOOD TEST: CPT

## 2021-06-30 RX ORDER — DEXTROSE 50 % IN WATER (D50W) INTRAVENOUS SYRINGE
12.5-25 AS NEEDED
Status: DISCONTINUED | OUTPATIENT
Start: 2021-06-30 | End: 2021-07-02 | Stop reason: HOSPADM

## 2021-06-30 RX ORDER — MAGNESIUM SULFATE 100 %
4 CRYSTALS MISCELLANEOUS AS NEEDED
Status: DISCONTINUED | OUTPATIENT
Start: 2021-06-30 | End: 2021-07-02 | Stop reason: HOSPADM

## 2021-06-30 RX ORDER — INSULIN LISPRO 100 [IU]/ML
INJECTION, SOLUTION INTRAVENOUS; SUBCUTANEOUS
Status: DISCONTINUED | OUTPATIENT
Start: 2021-06-30 | End: 2021-07-02 | Stop reason: HOSPADM

## 2021-06-30 RX ADMIN — SODIUM CHLORIDE 2.5 MG/HR: 9 INJECTION, SOLUTION INTRAVENOUS at 04:22

## 2021-06-30 RX ADMIN — RIVAROXABAN 20 MG: 20 TABLET, FILM COATED ORAL at 09:04

## 2021-06-30 RX ADMIN — SODIUM CHLORIDE 75 ML/HR: 9 INJECTION, SOLUTION INTRAVENOUS at 15:12

## 2021-06-30 RX ADMIN — PANTOPRAZOLE SODIUM 40 MG: 40 TABLET, DELAYED RELEASE ORAL at 16:55

## 2021-06-30 RX ADMIN — Medication 10 ML: at 03:11

## 2021-06-30 RX ADMIN — OXYCODONE 10 MG: 5 TABLET ORAL at 15:07

## 2021-06-30 RX ADMIN — Medication 10 ML: at 15:10

## 2021-06-30 RX ADMIN — PANTOPRAZOLE SODIUM 40 MG: 40 TABLET, DELAYED RELEASE ORAL at 09:02

## 2021-06-30 RX ADMIN — CARVEDILOL 25 MG: 12.5 TABLET, FILM COATED ORAL at 09:04

## 2021-06-30 RX ADMIN — HYDRALAZINE HYDROCHLORIDE 25 MG: 25 TABLET, FILM COATED ORAL at 09:03

## 2021-06-30 RX ADMIN — ATORVASTATIN CALCIUM 40 MG: 40 TABLET, FILM COATED ORAL at 09:04

## 2021-06-30 RX ADMIN — OXYCODONE 10 MG: 5 TABLET ORAL at 09:03

## 2021-06-30 RX ADMIN — CARVEDILOL 25 MG: 12.5 TABLET, FILM COATED ORAL at 16:55

## 2021-06-30 RX ADMIN — MELATONIN 6 MG: at 21:00

## 2021-06-30 RX ADMIN — HYDRALAZINE HYDROCHLORIDE 25 MG: 25 TABLET, FILM COATED ORAL at 15:08

## 2021-06-30 RX ADMIN — ISOSORBIDE MONONITRATE 30 MG: 30 TABLET, EXTENDED RELEASE ORAL at 09:03

## 2021-06-30 RX ADMIN — CLONIDINE HYDROCHLORIDE 0.1 MG: 0.1 TABLET ORAL at 15:07

## 2021-06-30 RX ADMIN — CLONIDINE HYDROCHLORIDE 0.1 MG: 0.1 TABLET ORAL at 21:02

## 2021-06-30 RX ADMIN — OXYCODONE 10 MG: 5 TABLET ORAL at 03:11

## 2021-06-30 RX ADMIN — Medication 10 ML: at 21:04

## 2021-06-30 RX ADMIN — HYDRALAZINE HYDROCHLORIDE 25 MG: 25 TABLET, FILM COATED ORAL at 21:01

## 2021-06-30 NOTE — PROGRESS NOTES
End of Shift Note    Bedside shift change report given to Roge Thomas (oncoming nurse) by Zuleika Cook RN (offgoing nurse). Report included the following information SBAR    Shift worked:  night     Shift summary and any significant changes:     Pt medicated for pain twice during night; had vomiting episode x 1; zofran given with relief. Cardene gtt titrated down to 2.5 mg/lhr. 's. Concerns for physician to address:  Pt is diabetic but has no isulin orders or blood sugar checks ordered. Do you want to order?      Zone phone for oncoming shift:

## 2021-06-30 NOTE — PROGRESS NOTES
End of Shift Note    Bedside shift change report given to Sushant Paniagua (oncoming nurse) by Raul Ovalles (offgoing nurse). Report included the following information SBAR, Kardex and Cardiac Rhythm NSR    Shift worked:  Day     Shift summary and any significant changes:     Cardene titrated to off, fluids discontinued, uneventful shift. Concerns for physician to address:  Discharge planning     Zone phone for oncoming shift:          Activity:  Activity Level: Up ad mona  Number times ambulated in hallways past shift: 0  Number of times OOB to chair past shift: 0    Cardiac:   Cardiac Monitoring: Yes      Cardiac Rhythm: Sinus Rhythm    Access:   Current line(s): PIV     Genitourinary:   Urinary status: voiding    Respiratory:   O2 Device: None (Room air)  Chronic home O2 use?: NO  Incentive spirometer at bedside: NO     GI:     Current diet:  ADULT DIET Regular; 4 carb choices (60 gm/meal); Low Fat/Low Chol/High Fiber/VICKY; No Salt Added (3-4 gm)  Passing flatus: YES  Tolerating current diet: YES       Pain Management:   Patient states pain is manageable on current regimen: YES    Skin:  Noé Score: 20  Interventions: float heels and increase time out of bed    Patient Safety:  Fall Score:  Total Score: 1  Interventions: bed/chair alarm, gripper socks and pt to call before getting OOB       Length of Stay:  Expected LOS: 3d 2h  Actual LOS: 1      Raul Ovalles

## 2021-06-30 NOTE — PROGRESS NOTES
Hospitalist Progress Note    NAME: Alexa Echevarria   :  1970   MRN:  [id number]       Assessment / Plan:  Hypertensive  emergency  -Wean Cardene drip as tolerated  -We will continue Coreg, clonidine, hydralazine and Imdur  -As needed hydralazine    Acute on chronic kidney disease  -Baseline creatinine is around 1 currently creatinine is 3.81.  -CT abdomen shows normal kidneys  -Renal is following  -Hold all nephrotoxic medications. Monitor urine output  -Per nephrology, patient has missed at least 4 appointments on outpatient basis with nephrology    Pulmonary embolism  Continue Xarelto. Discussed with social work if patient can afford this medication. Otherwise patient has to go out with Coumadin. Social work may find free for a month  or discounts on anticoagulation. Diabetes type 2  -Discontinue home Metformin due to CKD. Add insulin sliding scale with blood sugar checks    Hypercholesteremia   On statins    GERD (gastroesophageal reflux disease)   On PPIs    Disposition:  Discharge likely on  if okay with nephrology  Barriers: None    Code Status: full   DVT Prophylaxis:  Xarelto  Body mass index is 27.83 kg/m².: 25.0 - 29.9 Overweight                       Body mass index is 27.83 kg/m². Subjective:     Chief Complaint / Reason for Physician Visit   blood pressure is controlled  On low-dose of Cardene drip  Does not report any chest pain or shortness of breath      Review of Systems:  Symptom Y/N Comments  Symptom Y/N Comments   Fever/Chills    Chest Pain     Poor Appetite    Edema     Cough    Abdominal Pain     Sputum    Joint Pain     SOB/GARCIA    Pruritis/Rash     Nausea/vomit    Tolerating PT/OT     Diarrhea    Tolerating Diet     Constipation    Other       Could NOT obtain due to:      Objective:     VITALS:   Last 24hrs VS reviewed since prior progress note.  Most recent are:  Patient Vitals for the past 24 hrs:   Temp Pulse Resp BP SpO2   21 1514 98.2 °F (36.8 °C) 75 18 (!) 163/88 100 %   06/30/21 1101 98.3 °F (36.8 °C) 76 17 (!) 153/83 100 %   06/30/21 0756 98 °F (36.7 °C) 80 20 (!) 145/86 99 %   06/30/21 0421 -- 84 -- (!) 159/82 --   06/30/21 0308 97.8 °F (36.6 °C) 80 22 (!) 150/78 98 %   06/29/21 2217 98.4 °F (36.9 °C) 77 19 (!) 145/83 98 %   06/29/21 2031 98.7 °F (37.1 °C) 83 16 (!) 154/92 99 %   06/29/21 1900 -- 74 20 (!) 191/106 98 %   06/29/21 1850 -- 74 15 (!) 192/95 99 %   06/29/21 1849 -- 72 -- (!) 188/97 --   06/29/21 1840 -- 74 13 (!) 188/97 99 %   06/29/21 1834 -- 74 -- (!) 197/91 --   06/29/21 1826 -- 75 18 (!) 177/99 98 %   06/29/21 1816 -- 90 -- (!) 192/89 --   06/29/21 1815 -- -- -- (!) 192/89 98 %   06/29/21 1700 -- -- -- (!) 208/137 99 %   06/29/21 1600 -- -- -- (!) 222/109 100 %       Intake/Output Summary (Last 24 hours) at 6/30/2021 1556  Last data filed at 6/30/2021 0912  Gross per 24 hour   Intake 120 ml   Output 950 ml   Net -830 ml        PHYSICAL EXAM:    General: WD, WN. Alert, cooperative, no acute distress    EENT:  EOMI. Anicteric sclerae. MMM  Resp:  CTA bilaterally, no wheezing or rales. No accessory muscle use  CV:  Regular  rhythm,  No edema  GI:  Soft, Non distended, Non tender.  +Bowel sounds  Neurologic:  Alert and oriented X 3, normal speech, left bka  Psych:   Not anxious nor agitated  Skin:  No rashes.   No jaundice    Reviewed most current lab test results and cultures  YES  Reviewed most current radiology test results   YES  Review and summation of old records today    NO  Reviewed patient's current orders and MAR    YES  PMH/SH reviewed - no change compared to H&P          Current Facility-Administered Medications:     cloNIDine HCL (CATAPRES) tablet 0.1 mg, 0.1 mg, Oral, TID, Gabriela Null MD, 0.1 mg at 06/30/21 1507    hydrALAZINE (APRESOLINE) 20 mg/mL injection 10 mg, 10 mg, IntraVENous, Q6H PRN, Gabriela Dumas MD    0.9% sodium chloride infusion, 75 mL/hr, IntraVENous, CONTINUOUS, Gabriela Dumas MD, Last Rate: 75 mL/hr at 06/30/21 1512, 75 mL/hr at 06/30/21 1512    niCARdipine (CARDENE) 25 mg in 0.9% sodium chloride 250 mL infusion, 0-15 mg/hr, IntraVENous, TITRATE, Jerome Alvarez MD, Stopped at 06/30/21 0800    rivaroxaban (XARELTO) tablet 20 mg, 20 mg, Oral, DAILY WITH BREAKFAST, Maciej Kaplan MD, 20 mg at 06/30/21 3232    isosorbide mononitrate ER (IMDUR) tablet 30 mg, 30 mg, Oral, DAILY, Maciej Padron MD, 30 mg at 06/30/21 7001    hydrALAZINE (APRESOLINE) tablet 25 mg, 25 mg, Oral, TID, Maciej Padron MD, 25 mg at 06/30/21 1508    carvediloL (COREG) tablet 25 mg, 25 mg, Oral, BID WITH MEALS, Maciej Padron MD, 25 mg at 06/30/21 2168    atorvastatin (LIPITOR) tablet 40 mg, 40 mg, Oral, DAILY, Maciej Kaplan MD, 40 mg at 06/30/21 3445    sodium chloride (NS) flush 5-40 mL, 5-40 mL, IntraVENous, Q8H, Maciej Kaplan MD, 10 mL at 06/30/21 1510    sodium chloride (NS) flush 5-40 mL, 5-40 mL, IntraVENous, PRN, Agnes Padron MD    polyethylene glycol (MIRALAX) packet 17 g, 17 g, Oral, DAILY PRN, Maciej Padron MD    ondansetron (ZOFRAN ODT) tablet 4 mg, 4 mg, Oral, Q8H PRN **OR** ondansetron (ZOFRAN) injection 4 mg, 4 mg, IntraVENous, Q6H PRN, Maciej Padron MD, 4 mg at 06/29/21 2152    pantoprazole (PROTONIX) tablet 40 mg, 40 mg, Oral, ACB&D, Maciej Padron MD, 40 mg at 06/30/21 0902    acetaminophen (TYLENOL) tablet 650 mg, 650 mg, Oral, Q4H PRN, Maciej Padron MD, 650 mg at 06/29/21 1942    oxyCODONE IR (ROXICODONE) tablet 5 mg, 5 mg, Oral, Q4H PRN, Purveyor, Atoosa, ACNP    oxyCODONE IR (ROXICODONE) tablet 10 mg, 10 mg, Oral, Q4H PRN, Purveyor, Atoosa, ACNP, 10 mg at 06/30/21 1507    melatonin tablet 6 mg, 6 mg, Oral, QHS PRN, Purveyor, Atoosa, ACNP, 3 mg at 06/29/21 2107  ________________________________________________________________________  Care Plan discussed with:    Comments   Patient y Family      RN y    Care Manager     Consultant                        Multidiciplinary team rounds were held today with , nursing, pharmacist and clinical coordinator. Patient's plan of care was discussed; medications were reviewed and discharge planning was addressed. ________________________________________________________________________  Total NON critical care TIME:  35   Minutes    Total CRITICAL CARE TIME Spent:   Minutes non procedure based      Comments   >50% of visit spent in counseling and coordination of care     ________________________________________________________________________  Nayeli Tavarez MD     Procedures: see electronic medical records for all procedures/Xrays and details which were not copied into this note but were reviewed prior to creation of Plan. LABS:  I reviewed today's most current labs and imaging studies.   Pertinent labs include:  Recent Labs     06/29/21  1338   WBC 11.2*   HGB 13.7   HCT 43.5        Recent Labs     06/30/21  0102 06/29/21  1338   * 137   K 4.5 4.4    106   CO2 21 24   * 171*   BUN 28* 25*   CREA 3.81* 3.97*   CA 8.4*  8.5 9.1   PHOS 3.2  --    ALB 2.8* 3.2*   TBILI 0.4 0.5   ALT 28 36       Signed: Nayeli Tavarez MD

## 2021-06-30 NOTE — PROGRESS NOTES
Bedside, Verbal and Written shift change report given to [facility] (oncoming nurse) by [facility] Report included the following information SBAR, Kardex and Cardiac Rhythm NSR.     0800: Cardene drip titrated to off; P.O. BP medications resumed.

## 2021-06-30 NOTE — PROGRESS NOTES
[address] INTERDISCIPLINARY ROUNDS    Cardiopulmonary Care Interdisciplinary Rounds were held today to discuss patient's plan of care and outcomes. The following members were present: NP/Physician, Pharmacy, Nursing and Case Management.     PLAN OF CARE:   Continue current treatment plan    Expected Length of Stay:  - - -

## 2021-06-30 NOTE — PROGRESS NOTES
Received message from patient's nurse Rebekah De La Torre stating / informing that    \"Pt was recently admitted from ER. Just received patient in room. Pt complaining of severe of left hip pain. Patient has nothing for pain control. (He came in with this pain). It is not time for Tylenol. Can he have something for pain control? (He had fentanyl and Toradol in ER). Also, can he have something to help him sleep tonight? \"           Discussion / orders:     Ordered Oxycodone 5 mg by mouth every 4 hours as needed for moderate pain and 10 mg by mouth every 4 hours as needed for severe pain   Ordered melatonin 6 mg by mouth daily at bedtime as needed for sleep. Please note that this note was dictated using Dragon computer voice recognition software. Quite often unanticipated grammatical, syntax, homophones, and other interpretive errors are inadvertently transcribed by the computer software. Please disregard these errors. Please excuse any errors that have escaped final proofreading.

## 2021-06-30 NOTE — PROGRESS NOTES
Received message from patient's nurse stating / informing that    Pt is diabetic and takes insulin at home. He has not been on any insulin coverage or blood sugar checks since admission yesterday. Do you want to order any type of insulin coverage for patient? Discussion / orders: · Before meals and at bedtime blood glucose checks with sliding scale coverage           Please note that this note was dictated using Dragon computer voice recognition software. Quite often unanticipated grammatical, syntax, homophones, and other interpretive errors are inadvertently transcribed by the computer software. Please disregard these errors. Please excuse any errors that have escaped final proofreading.

## 2021-06-30 NOTE — PROGRESS NOTES
Renal-aldactone stopped-do not give with his degree of renal dysfunction. see initial consult.   Vivi Echeverria MD

## 2021-07-01 LAB
ANION GAP SERPL CALC-SCNC: 7 MMOL/L (ref 5–15)
BUN SERPL-MCNC: 34 MG/DL (ref 6–20)
BUN/CREAT SERPL: 9 (ref 12–20)
CALCIUM SERPL-MCNC: 8.4 MG/DL (ref 8.5–10.1)
CHLORIDE SERPL-SCNC: 108 MMOL/L (ref 97–108)
CO2 SERPL-SCNC: 21 MMOL/L (ref 21–32)
CREAT SERPL-MCNC: 3.9 MG/DL (ref 0.7–1.3)
ERYTHROCYTE [DISTWIDTH] IN BLOOD BY AUTOMATED COUNT: 13.2 % (ref 11.5–14.5)
GLUCOSE BLD STRIP.AUTO-MCNC: 115 MG/DL (ref 65–117)
GLUCOSE BLD STRIP.AUTO-MCNC: 123 MG/DL (ref 65–117)
GLUCOSE BLD STRIP.AUTO-MCNC: 126 MG/DL (ref 65–117)
GLUCOSE BLD STRIP.AUTO-MCNC: 151 MG/DL (ref 65–117)
GLUCOSE SERPL-MCNC: 142 MG/DL (ref 65–100)
HCT VFR BLD AUTO: 35.2 % (ref 36.6–50.3)
HGB BLD-MCNC: 10.9 G/DL (ref 12.1–17)
MCH RBC QN AUTO: 26 PG (ref 26–34)
MCHC RBC AUTO-ENTMCNC: 31 G/DL (ref 30–36.5)
MCV RBC AUTO: 83.8 FL (ref 80–99)
NRBC # BLD: 0 K/UL (ref 0–0.01)
NRBC BLD-RTO: 0 PER 100 WBC
PLATELET # BLD AUTO: 215 K/UL (ref 150–400)
PMV BLD AUTO: 10.5 FL (ref 8.9–12.9)
POTASSIUM SERPL-SCNC: 4.4 MMOL/L (ref 3.5–5.1)
RBC # BLD AUTO: 4.2 M/UL (ref 4.1–5.7)
SERVICE CMNT-IMP: ABNORMAL
SERVICE CMNT-IMP: NORMAL
SODIUM SERPL-SCNC: 136 MMOL/L (ref 136–145)
WBC # BLD AUTO: 9.2 K/UL (ref 4.1–11.1)

## 2021-07-01 PROCEDURE — 74011636637 HC RX REV CODE- 636/637: Performed by: NURSE PRACTITIONER

## 2021-07-01 PROCEDURE — 80048 BASIC METABOLIC PNL TOTAL CA: CPT

## 2021-07-01 PROCEDURE — 82962 GLUCOSE BLOOD TEST: CPT

## 2021-07-01 PROCEDURE — 85027 COMPLETE CBC AUTOMATED: CPT

## 2021-07-01 PROCEDURE — 74011250637 HC RX REV CODE- 250/637: Performed by: INTERNAL MEDICINE

## 2021-07-01 PROCEDURE — 36415 COLL VENOUS BLD VENIPUNCTURE: CPT

## 2021-07-01 PROCEDURE — 74011250636 HC RX REV CODE- 250/636: Performed by: INTERNAL MEDICINE

## 2021-07-01 PROCEDURE — 74011250637 HC RX REV CODE- 250/637: Performed by: NURSE PRACTITIONER

## 2021-07-01 PROCEDURE — 65660000001 HC RM ICU INTERMED STEPDOWN

## 2021-07-01 RX ORDER — HYDRALAZINE HYDROCHLORIDE 50 MG/1
50 TABLET, FILM COATED ORAL 3 TIMES DAILY
Status: DISCONTINUED | OUTPATIENT
Start: 2021-07-01 | End: 2021-07-02

## 2021-07-01 RX ORDER — CLONIDINE HYDROCHLORIDE 0.1 MG/1
0.2 TABLET ORAL 3 TIMES DAILY
Status: DISCONTINUED | OUTPATIENT
Start: 2021-07-01 | End: 2021-07-02 | Stop reason: HOSPADM

## 2021-07-01 RX ADMIN — Medication 10 ML: at 15:37

## 2021-07-01 RX ADMIN — OXYCODONE 10 MG: 5 TABLET ORAL at 08:47

## 2021-07-01 RX ADMIN — PANTOPRAZOLE SODIUM 40 MG: 40 TABLET, DELAYED RELEASE ORAL at 08:47

## 2021-07-01 RX ADMIN — MELATONIN 6 MG: at 23:16

## 2021-07-01 RX ADMIN — OXYCODONE 10 MG: 5 TABLET ORAL at 15:28

## 2021-07-01 RX ADMIN — HYDRALAZINE HYDROCHLORIDE 50 MG: 50 TABLET, FILM COATED ORAL at 15:28

## 2021-07-01 RX ADMIN — INSULIN LISPRO 2 UNITS: 100 INJECTION, SOLUTION INTRAVENOUS; SUBCUTANEOUS at 12:53

## 2021-07-01 RX ADMIN — RIVAROXABAN 20 MG: 20 TABLET, FILM COATED ORAL at 08:47

## 2021-07-01 RX ADMIN — PANTOPRAZOLE SODIUM 40 MG: 40 TABLET, DELAYED RELEASE ORAL at 18:20

## 2021-07-01 RX ADMIN — OXYCODONE 10 MG: 5 TABLET ORAL at 03:16

## 2021-07-01 RX ADMIN — HYDRALAZINE HYDROCHLORIDE 50 MG: 50 TABLET, FILM COATED ORAL at 21:44

## 2021-07-01 RX ADMIN — CLONIDINE HYDROCHLORIDE 0.2 MG: 0.1 TABLET ORAL at 21:43

## 2021-07-01 RX ADMIN — OXYCODONE 5 MG: 5 TABLET ORAL at 21:43

## 2021-07-01 RX ADMIN — CLONIDINE HYDROCHLORIDE 0.1 MG: 0.1 TABLET ORAL at 08:47

## 2021-07-01 RX ADMIN — OXYCODONE 10 MG: 5 TABLET ORAL at 23:15

## 2021-07-01 RX ADMIN — Medication 10 ML: at 06:07

## 2021-07-01 RX ADMIN — CLONIDINE HYDROCHLORIDE 0.2 MG: 0.1 TABLET ORAL at 15:32

## 2021-07-01 RX ADMIN — CARVEDILOL 25 MG: 12.5 TABLET, FILM COATED ORAL at 08:47

## 2021-07-01 RX ADMIN — Medication 10 ML: at 21:45

## 2021-07-01 RX ADMIN — HYDRALAZINE HYDROCHLORIDE 25 MG: 25 TABLET, FILM COATED ORAL at 08:46

## 2021-07-01 RX ADMIN — HYDRALAZINE HYDROCHLORIDE 10 MG: 20 INJECTION INTRAMUSCULAR; INTRAVENOUS at 09:50

## 2021-07-01 RX ADMIN — ATORVASTATIN CALCIUM 40 MG: 40 TABLET, FILM COATED ORAL at 08:47

## 2021-07-01 RX ADMIN — ISOSORBIDE MONONITRATE 30 MG: 30 TABLET, EXTENDED RELEASE ORAL at 08:47

## 2021-07-01 RX ADMIN — CARVEDILOL 25 MG: 12.5 TABLET, FILM COATED ORAL at 18:20

## 2021-07-01 RX ADMIN — POLYETHYLENE GLYCOL 3350 17 G: 17 POWDER, FOR SOLUTION ORAL at 23:16

## 2021-07-01 NOTE — PROGRESS NOTES
Hospitalist Progress Note    NAME: Manohar Blackman   :  1970   MRN:  [id number]       Assessment / Plan:  Hypertensive  emergency  -off cardene drip  -continue Coreg, clonidine, hydralazine and Imdur  -appreciate Nephrology help with med adjustments    ALEX on chronic kidney disease  -Baseline creatinine is around 1 currently creatinine is 3.81.  -CT abdomen shows normal kidneys  -Per nephrology, patient has missed at least 4 appointments on outpatient basis. -BP control. Follow Cr and UOP    Pulmonary embolism 2017  Continue Xarelto. May need switch to coumadin if he cannot continue on xarelto on a consistent basis. Will discuss with CM    Diabetes type 2  -Discontinue home Metformin due to CKD  -SSI prn. -160 today. On  at home    Hypercholesteremia   On statins    GERD (gastroesophageal reflux disease)   On PPIs    Chronic back and left leg pain  -on oxycodone prn    Disposition:  Discharge likely on  if okay with nephrology  Barriers: None    Code Status: full   DVT Prophylaxis:  Xarelto  Body mass index is 27.83 kg/m².: 25.0 - 29.9 Overweight                       Body mass index is 27.83 kg/m². Subjective:     Chief Complaint / Reason for Physician Visit  Follow up BP, ALEX, CKD back pain    Review of Systems:  Symptom Y/N Comments  Symptom Y/N Comments   Fever/Chills    Chest Pain     Poor Appetite    Edema     Cough    Abdominal Pain     Sputum    Joint Pain     SOB/GARCIA    Pruritis/Rash     Nausea/vomit    Tolerating PT/OT     Diarrhea    Tolerating Diet     Constipation    Other       Could NOT obtain due to:      Objective:     VITALS:   Last 24hrs VS reviewed since prior progress note.  Most recent are:  Patient Vitals for the past 24 hrs:   Temp Pulse Resp BP SpO2   21 1445 98.1 °F (36.7 °C) 93 19 (!) 192/109 100 %   21 1125 97.9 °F (36.6 °C) 73 19 (!) 176/85 97 %   21 0950 -- 71 -- (!) 178/93 --   21 0841 -- 69 -- (!) 190/90 --   21 8785 97.9 °F (36.6 °C) 71 25 (!) 197/89 97 %   07/01/21 0307 98.1 °F (36.7 °C) 64 19 (!) 154/99 97 %   06/30/21 2239 99 °F (37.2 °C) 71 24 (!) 168/88 98 %   06/30/21 1958 98.3 °F (36.8 °C) 72 23 (!) 169/94 99 %   06/30/21 1936 98.2 °F (36.8 °C) 70 22 (!) 169/94 98 %   06/30/21 1656 -- -- -- (!) 160/86 --       Intake/Output Summary (Last 24 hours) at 7/1/2021 1617  Last data filed at 7/1/2021 0316  Gross per 24 hour   Intake --   Output 1700 ml   Net -1700 ml        PHYSICAL EXAM:    General: WD, WN. Alert, cooperative, no acute distress    EENT:  EOMI. Anicteric sclerae. MMM  Resp:  CTA bilaterally, no wheezing or rales. No accessory muscle use  CV:  Regular  rhythm,  No edema  GI:  Soft, Non distended, Non tender.  +Bowel sounds  Neurologic:  Alert and oriented X 3, normal speech, left bka  Psych:   Not anxious nor agitated  Skin:  No rashes.   No jaundice    Reviewed most current lab test results and cultures  YES  Reviewed most current radiology test results   YES  Review and summation of old records today    NO  Reviewed patient's current orders and MAR    YES  PMH/SH reviewed - no change compared to H&P          Current Facility-Administered Medications:     cloNIDine HCL (CATAPRES) tablet 0.2 mg, 0.2 mg, Oral, TID, Gabriela Dumas MD, 0.2 mg at 07/01/21 1532    hydrALAZINE (APRESOLINE) tablet 50 mg, 50 mg, Oral, TID, Gabriela Dumas MD, 50 mg at 07/01/21 1528    insulin lispro (HUMALOG) injection, , SubCUTAneous, AC&HS, Ghislaineor Atoosa, ACNP, 2 Units at 07/01/21 1253    glucose chewable tablet 16 g, 4 Tablet, Oral, PRN, Ghislaineor, Atoosa, ACNP    dextrose (D50W) injection syrg 12.5-25 g, 12.5-25 g, IntraVENous, PRN, Purveyor, Atoosa, ACNP    glucagon (GLUCAGEN) injection 1 mg, 1 mg, IntraMUSCular, PRN, Nettie Licona, LAMP    hydrALAZINE (APRESOLINE) 20 mg/mL injection 10 mg, 10 mg, IntraVENous, Q6H PRN, Gabriela Avila MD, 10 mg at 07/01/21 0950    rivaroxaban (XARELTO) tablet 20 mg, 20 mg, Oral, DAILY WITH BREAKFAST, Nadia Mcleod MD, 20 mg at 07/01/21 7174    isosorbide mononitrate ER (IMDUR) tablet 30 mg, 30 mg, Oral, DAILY, Maciej Alvarado MD, 30 mg at 07/01/21 0847    carvediloL (COREG) tablet 25 mg, 25 mg, Oral, BID WITH MEALS, Macije Alvarado MD, 25 mg at 07/01/21 0847    atorvastatin (LIPITOR) tablet 40 mg, 40 mg, Oral, DAILY, Maciej Kaplan MD, 40 mg at 07/01/21 0847    sodium chloride (NS) flush 5-40 mL, 5-40 mL, IntraVENous, Q8H, Maciej Kaplan MD, 10 mL at 07/01/21 1537    sodium chloride (NS) flush 5-40 mL, 5-40 mL, IntraVENous, PRN, Jocelyne Alvarado MD    polyethylene glycol (MIRALAX) packet 17 g, 17 g, Oral, DAILY PRN, Maicej Alvarado MD    ondansetron (ZOFRAN ODT) tablet 4 mg, 4 mg, Oral, Q8H PRN **OR** ondansetron (ZOFRAN) injection 4 mg, 4 mg, IntraVENous, Q6H PRN, Maciej Alvarado MD, 4 mg at 06/29/21 2152    pantoprazole (PROTONIX) tablet 40 mg, 40 mg, Oral, ACB&D, Maciej Alvarado MD, 40 mg at 07/01/21 0847    acetaminophen (TYLENOL) tablet 650 mg, 650 mg, Oral, Q4H PRN, Maciej Alvarado MD, 650 mg at 06/29/21 1942    oxyCODONE IR (ROXICODONE) tablet 5 mg, 5 mg, Oral, Q4H PRN, Purgarfield Atkimberly, ACNP    oxyCODONE IR (ROXICODONE) tablet 10 mg, 10 mg, Oral, Q4H PRN, Livan Atkimberly, ACNP, 10 mg at 07/01/21 1528    melatonin tablet 6 mg, 6 mg, Oral, QHS PRN, Pursahilor, Nettie, ACNP, 6 mg at 06/30/21 2100  ________________________________________________________________________  Care Plan discussed with:    Comments   Patient y    Family      RN y    Care Manager     Consultant                        Multidiciplinary team rounds were held today with , nursing, pharmacist and clinical coordinator. Patient's plan of care was discussed; medications were reviewed and discharge planning was addressed.      ________________________________________________________________________  Total NON critical care TIME:  20   Minutes    Total CRITICAL CARE TIME Spent:   Minutes non procedure based      Comments   >50% of visit spent in counseling and coordination of care     ________________________________________________________________________  Roberto Narayan MD     Procedures: see electronic medical records for all procedures/Xrays and details which were not copied into this note but were reviewed prior to creation of Plan. LABS:  I reviewed today's most current labs and imaging studies.   Pertinent labs include:  Recent Labs     07/01/21 0451 06/29/21  1338   WBC 9.2 11.2*   HGB 10.9* 13.7   HCT 35.2* 43.5    262     Recent Labs     07/01/21 0451 06/30/21  0102 06/29/21  1338    135* 137   K 4.4 4.5 4.4    105 106   CO2 21 21 24   * 136* 171*   BUN 34* 28* 25*   CREA 3.90* 3.81* 3.97*   CA 8.4* 8.4*  8.5 9.1   PHOS  --  3.2  --    ALB  --  2.8* 3.2*   TBILI  --  0.4 0.5   ALT  --  28 36       Signed: Roberto Narayan MD

## 2021-07-01 NOTE — PROGRESS NOTES
Transition of Care Plan:    RUR:18%  Disposition: Home with spouse  Follow up appointments: PCP, Cardiology, Nephrology,  DME needed: Pt has a prosthesis. Transportation at Discharge: Pt will drive himself home at d/c  [place] or means to access home:    yes  IM Medicare letter: n/a  Caregiver Contact: Brian Torres [phone]  Discharge Caregiver contacted prior to discharge? CM will contact prior to d/c if pt desires. Reason for Admission:  Hypertensive Emergency                     RUR Score:      18%               Plan for utilizing home health:     As needed     PCP: First and Last name:  Peter Cunha MD     Name of Practice:    Are you a current patient: Yes/No: yes   Approximate date of last visit: \"it has been a while\"   Can you participate in a virtual visit with your PCP: prefers in office                    Current Advanced Directive/Advance Care Plan: Full Code      [place] [address] (ACP) Conversation      Date of Conversation: 7/1/21  Conducted with: Patient with Leslie 153:     Click here to complete [address] including selection of the [address] Relationship (ie \"Primary\")      Content/Action Overview:   DECLINED ACP conversation - will revisit periodically   Reviewed DNR/DNI and patient elects Full Code (Attempt Resuscitation)    Length of Voluntary ACP Conversation in minutes:  <16 minutes (Non-Billable)    Kuldeep Sepulveda                                 Transition of Care Plan:   Home with family    2:26 p.m.  DEV called pt's pharmacy to inquire about the costs of pt's Xarelto. His medication will be $99.00 a month after insurance and does not require a pre-authorization. DEV informed pt. Pt in agreement and has discount cards. CM met with pt at bedside to introduce role, verify demographics, insurance and PCP. CM also discussed d/c plan.     Pt is a 46 yo,  American,  male who was admitted to [facility] on 6/29/21 with a dx of Hypertensive Emergency. Pt sees his PCP whenever there is an issue. Pt uses the [address] [place] [address]. Pt lives with his wife and daughter in a 3 fl home with 21 ILIA. Pt also has a supportive friend. Pt uses a prosthesis. Pt does drive. Pt can complete his ADLs/IADLs independently. Pt has a hx of HH but is unsure of the company. Pt denied a hx of SNF. Pt has been in [facility]. Pt will drive himself at d/c.    CM provided pt a Xarelto 30 day card as well as a $10 copay card. CM will continue to follow. Care Management Interventions  PCP Verified by CM:  Yes (Pt sees Dr. Dario Kirkland.)  Palliative Care Criteria Met (RRAT>21 & CHF Dx)?: No  Mode of Transport at Discharge: Self  Transition of Care Consult (CM Consult): Discharge Planning  Discharge Durable Medical Equipment: No (Pt wears a prosthesis.)  Physical Therapy Consult: No  Occupational Therapy Consult: No  Speech Therapy Consult: No  Current Support Network: Lives with Spouse, Own Home (Pt lives with his spouse and daughter in a three fl home with 21 ILIA.)  Confirm Follow Up Transport: Self  The Patient and/or Patient Representative was Provided with a Choice of Provider and Agrees with the Discharge Plan?: Yes  Name of the Patient Representative Who was Provided with a Choice of Provider and Agrees with the Discharge Plan: Feroz Jenkins  Discharge Location  Discharge Placement: Home with outpatient services    Mary Young,   Care Management, [facility]

## 2021-07-01 NOTE — PROGRESS NOTES
Problem: Falls - Risk of  Goal: *Absence of Falls  Description: Document Iris Mortonsultana Fall Risk and appropriate interventions in the flowsheet.   Outcome: Progressing Towards Goal  Note: Fall Risk Interventions:            Medication Interventions: Patient to call before getting OOB

## 2021-07-02 VITALS
DIASTOLIC BLOOD PRESSURE: 73 MMHG | HEIGHT: 75 IN | TEMPERATURE: 98 F | SYSTOLIC BLOOD PRESSURE: 181 MMHG | HEART RATE: 70 BPM | WEIGHT: 222.66 LBS | RESPIRATION RATE: 13 BRPM | BODY MASS INDEX: 27.69 KG/M2 | OXYGEN SATURATION: 97 %

## 2021-07-02 LAB
ANION GAP SERPL CALC-SCNC: 8 MMOL/L (ref 5–15)
BUN SERPL-MCNC: 30 MG/DL (ref 6–20)
BUN/CREAT SERPL: 8 (ref 12–20)
CALCIUM SERPL-MCNC: 8.7 MG/DL (ref 8.5–10.1)
CHLORIDE SERPL-SCNC: 109 MMOL/L (ref 97–108)
CO2 SERPL-SCNC: 21 MMOL/L (ref 21–32)
CREAT SERPL-MCNC: 3.54 MG/DL (ref 0.7–1.3)
GLUCOSE BLD STRIP.AUTO-MCNC: 119 MG/DL (ref 65–117)
GLUCOSE BLD STRIP.AUTO-MCNC: 142 MG/DL (ref 65–117)
GLUCOSE SERPL-MCNC: 115 MG/DL (ref 65–100)
POTASSIUM SERPL-SCNC: 4.3 MMOL/L (ref 3.5–5.1)
SERVICE CMNT-IMP: ABNORMAL
SERVICE CMNT-IMP: ABNORMAL
SODIUM SERPL-SCNC: 138 MMOL/L (ref 136–145)

## 2021-07-02 PROCEDURE — 74011250637 HC RX REV CODE- 250/637: Performed by: NURSE PRACTITIONER

## 2021-07-02 PROCEDURE — 36415 COLL VENOUS BLD VENIPUNCTURE: CPT

## 2021-07-02 PROCEDURE — 74011250637 HC RX REV CODE- 250/637: Performed by: INTERNAL MEDICINE

## 2021-07-02 PROCEDURE — 82962 GLUCOSE BLOOD TEST: CPT

## 2021-07-02 PROCEDURE — 80048 BASIC METABOLIC PNL TOTAL CA: CPT

## 2021-07-02 RX ORDER — HYDRALAZINE HYDROCHLORIDE 100 MG/1
100 TABLET, FILM COATED ORAL 3 TIMES DAILY
Qty: 90 TABLET | Refills: 1 | Status: SHIPPED | OUTPATIENT
Start: 2021-07-02 | End: 2021-08-31

## 2021-07-02 RX ORDER — HYDRALAZINE HYDROCHLORIDE 50 MG/1
100 TABLET, FILM COATED ORAL 3 TIMES DAILY
Status: DISCONTINUED | OUTPATIENT
Start: 2021-07-02 | End: 2021-07-02 | Stop reason: HOSPADM

## 2021-07-02 RX ORDER — ATORVASTATIN CALCIUM 40 MG/1
40 TABLET, FILM COATED ORAL
Status: DISCONTINUED | OUTPATIENT
Start: 2021-07-02 | End: 2021-07-02 | Stop reason: HOSPADM

## 2021-07-02 RX ORDER — OXYCODONE HYDROCHLORIDE 5 MG/1
5 TABLET ORAL
Qty: 20 TABLET | Refills: 0 | Status: SHIPPED | OUTPATIENT
Start: 2021-07-02 | End: 2021-07-05

## 2021-07-02 RX ORDER — OXYCODONE HYDROCHLORIDE 10 MG/1
10 TABLET ORAL
Qty: 30 TABLET | Refills: 0 | Status: SHIPPED | OUTPATIENT
Start: 2021-07-02 | End: 2021-07-02

## 2021-07-02 RX ORDER — CLONIDINE HYDROCHLORIDE 0.2 MG/1
0.2 TABLET ORAL 3 TIMES DAILY
Qty: 90 TABLET | Refills: 1 | Status: SHIPPED | OUTPATIENT
Start: 2021-07-02 | End: 2021-08-31

## 2021-07-02 RX ADMIN — OXYCODONE 10 MG: 5 TABLET ORAL at 13:28

## 2021-07-02 RX ADMIN — PANTOPRAZOLE SODIUM 40 MG: 40 TABLET, DELAYED RELEASE ORAL at 09:23

## 2021-07-02 RX ADMIN — RIVAROXABAN 20 MG: 20 TABLET, FILM COATED ORAL at 09:23

## 2021-07-02 RX ADMIN — CLONIDINE HYDROCHLORIDE 0.2 MG: 0.1 TABLET ORAL at 09:23

## 2021-07-02 RX ADMIN — CARVEDILOL 25 MG: 12.5 TABLET, FILM COATED ORAL at 09:23

## 2021-07-02 RX ADMIN — ISOSORBIDE MONONITRATE 30 MG: 30 TABLET, EXTENDED RELEASE ORAL at 09:23

## 2021-07-02 RX ADMIN — ATORVASTATIN CALCIUM 40 MG: 40 TABLET, FILM COATED ORAL at 09:23

## 2021-07-02 RX ADMIN — OXYCODONE 10 MG: 5 TABLET ORAL at 09:23

## 2021-07-02 RX ADMIN — HYDRALAZINE HYDROCHLORIDE 50 MG: 50 TABLET, FILM COATED ORAL at 09:23

## 2021-07-02 RX ADMIN — Medication 10 ML: at 09:25

## 2021-07-02 RX ADMIN — OXYCODONE 5 MG: 5 TABLET ORAL at 04:58

## 2021-07-02 RX ADMIN — HYDRALAZINE HYDROCHLORIDE 100 MG: 50 TABLET, FILM COATED ORAL at 13:28

## 2021-07-02 NOTE — PROGRESS NOTES
Renal-bp meds adjusted. OK for dc from a renal standpoint. See OP appointment time   8/20 at 1:15 with dr. Mary Lou Judd.   Mark Dunne MD

## 2021-07-02 NOTE — PROGRESS NOTES
Transition of Care Plan:     RUR:16%  Disposition: Home with spouse  Follow up appointments: PCP, Nephrology,  DME needed: Pt has a prosthesis. Transportation at Discharge: Pt will drive himself home at d/c  [place] or means to access home:    yes  IM Medicare letter: n/a  Caregiver Contact: Juan Sidhu [phone]  Discharge Caregiver contacted prior to discharge? CM will contact prior to d/c if pt desires. Pt is clear for d/c from a CM standpoint. CM met with pt at bedside to discuss d/c. Pt in agreement and is planning on driving himself home. CM scheduled follow up appointments. CM stated he will contact his spouse. Care Management Interventions  PCP Verified by CM:  Yes (Pt sees Dr. Roy Staff.)  Palliative Care Criteria Met (RRAT>21 & CHF Dx)?: No  Mode of Transport at Discharge: Self  Transition of Care Consult (CM Consult): Discharge Planning  Discharge Durable Medical Equipment: No  Physical Therapy Consult: No  Occupational Therapy Consult: No  Speech Therapy Consult: No  Current Support Network: Lives with Spouse, Own Home  Confirm Follow Up Transport: Self  The Patient and/or Patient Representative was Provided with a Choice of Provider and Agrees with the Discharge Plan?: Yes  Name of the Patient Representative Who was Provided with a Choice of Provider and Agrees with the Discharge Plan: Rachna Durbin  Discharge Location  Discharge Placement: Home with family assistance    Nancy Duvall,   Care Management, [address] [place] [address]

## 2021-07-02 NOTE — DISCHARGE INSTRUCTIONS
HOSPITALIST DISCHARGE INSTRUCTIONS    NAME: Ines Sprague   :  1970   MRN:  [id number]     Date/Time:  2021 12:44 PM    ADMIT DATE: 2021   DISCHARGE DATE: 2021         · It is important that you take the medication exactly as they are prescribed. · Keep your medication in the bottles provided by the pharmacist and keep a list of the medication names, dosages, and times to be taken in your wallet. · Do not take other medications without consulting your doctor. What to do at [address]:  Cardiac Diet    Recommended activity: Activity as tolerated      If you have questions regarding the hospital related prescriptions or hospital related issues please call [facility] at [phone]. You can always direct your questions to your primary care doctor if you are unable to reach your hospital physician; your PCP works as an extension of your hospital doctor just like your hospital doctor is an extension of your PCP for your time at the hospital [facility], [facility])    If you experience any of the following symptoms then please call your primary care physician or return to the emergency room if you cannot get hold of your doctor:    Fever, chills, nausea, vomiting, or persistent diarrhea  Worsening weakness or new problems with your speech or balance  Dark stools or visible blood in your stools  New Leg swelling or shortness of breath as these could be signs of a clot    Additional Instructions:      Bring these papers with you to your follow up appointments. The papers will help your doctors be sure to continue the care plan from the hospital.              Information obtained by :  I understand that if any problems occur once I am at home I am to contact my physician. I understand and acknowledge receipt of the instructions indicated above. Physician's or R.N.'s Signature                                                                  Date/Time                                                                                                                                              Patient or Representative Signature
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
## 2021-07-02 NOTE — PROGRESS NOTES
Bedside shift change report given to Emiliana Ross RN (oncoming nurse) by Marco Andersen RN (offgoing nurse). Report included the following information SBAR.      Patient requesting the need for pain management specialist to see as outpatient

## 2021-07-03 NOTE — PROGRESS NOTES
Problem: Falls - Risk of  Goal: *Absence of Falls  Description: Document Iris Reddy Fall Risk and appropriate interventions in the flowsheet. Outcome: Resolved/Met     Problem: Patient Education: Go to Patient Education Activity  Goal: Patient/Family Education  Outcome: Resolved/Met     Problem: Hypertension  Goal: *Blood pressure within specified parameters  Outcome: Resolved/Met  Goal: *Fluid volume balance  Outcome: Resolved/Met  Goal: *Labs within defined limits  Outcome: Resolved/Met     Problem: Patient Education: Go to Patient Education Activity  Goal: Patient/Family Education  Outcome: Resolved/Met     Problem: Risk for Spread of Infection  Goal: Prevent transmission of infectious organism to others  Description: Prevent the transmission of infectious organisms to other patients, staff members, and visitors.   Outcome: Resolved/Met     Problem: Patient Education:  Go to Education Activity  Goal: Patient/Family Education  Outcome: Resolved/Met

## 2021-07-03 NOTE — PROGRESS NOTES
DISCHARGE SUMMARY FROM [facility] NURSE    The patient is stable for discharge. I have reviewed the discharge instructions with the patient. The patient verbalized understanding. All questions were fully answered. The patient verbalized no complaints. Scripts sent to patient pharmacy and medication handouts were given and reviewed with the patient. Appropriate educational materials and medication side effects teaching were provided also provided. Cardiac monitor and IV line(s) were removed. The following personal items collected during admission were returned to the patient/family  Home medications:   Dental Appliance: Dental Appliances: At bedside, Lowers, Uppers  Vision: Visual Aid: None  Hearing Aid:    Jewelry: Jewelry: None  Clothing: Clothing: Footwear, Shirt, Shorts, Undergarments  Other Valuables: Other Valuables: Cell Phone, Prosthesis, Wallet  Valuables sent to safe:      OR _________________________________________________________________________________________    There were no personal belongings, valuables or home medications left at patient's bedside,  or safe.

## 2021-07-07 LAB
BENZOYLECGONINE [PRESENCE] IN SERUM OR PLASMA BY CONFIRMATORY METHOD: NORMAL
BENZOYLECGONINE [PRESENCE] IN SERUM OR PLASMA BY CONFIRMATORY METHOD: NORMAL
BZE SERPL-MCNC: NEGATIVE NG/ML
COCAINE UR-MCNC: NEGATIVE NG/ML
COCAINE [PRESENCE] IN SERUM OR PLASMA BY CONFIRMATORY METHOD: NORMAL

## 2021-07-10 ENCOUNTER — APPOINTMENT (OUTPATIENT)
Dept: GENERAL RADIOLOGY | Age: 51
End: 2021-07-10
Attending: EMERGENCY MEDICINE
Payer: COMMERCIAL

## 2021-07-10 ENCOUNTER — HOSPITAL ENCOUNTER (EMERGENCY)
Age: 51
Discharge: HOME | End: 2021-07-10
Attending: EMERGENCY MEDICINE
Payer: COMMERCIAL

## 2021-07-10 VITALS
RESPIRATION RATE: 16 BRPM | HEART RATE: 100 BPM | SYSTOLIC BLOOD PRESSURE: 166 MMHG | TEMPERATURE: 98.7 F | BODY MASS INDEX: 27.11 KG/M2 | WEIGHT: 218.03 LBS | HEIGHT: 75 IN | OXYGEN SATURATION: 100 % | DIASTOLIC BLOOD PRESSURE: 103 MMHG

## 2021-07-10 DIAGNOSIS — G89.18 ACUTE POSTOPERATIVE PAIN: ICD-10-CM

## 2021-07-10 DIAGNOSIS — M54.32 SCIATICA OF LEFT SIDE: Primary | ICD-10-CM

## 2021-07-10 LAB
GLUCOSE BLD STRIP.AUTO-MCNC: 288 MG/DL (ref 65–117)
SERVICE CMNT-IMP: ABNORMAL

## 2021-07-10 PROCEDURE — 74011250637 HC RX REV CODE- 250/637: Performed by: EMERGENCY MEDICINE

## 2021-07-10 PROCEDURE — 82962 GLUCOSE BLOOD TEST: CPT

## 2021-07-10 PROCEDURE — 99284 EMERGENCY DEPT VISIT MOD MDM: CPT

## 2021-07-10 PROCEDURE — 72100 X-RAY EXAM L-S SPINE 2/3 VWS: CPT

## 2021-07-10 RX ORDER — OXYCODONE HYDROCHLORIDE 5 MG/1
10 TABLET ORAL
Status: COMPLETED | OUTPATIENT
Start: 2021-07-10 | End: 2021-07-10

## 2021-07-10 RX ORDER — DIAZEPAM 5 MG/1
5 TABLET ORAL
Status: COMPLETED | OUTPATIENT
Start: 2021-07-10 | End: 2021-07-10

## 2021-07-10 RX ORDER — SODIUM CHLORIDE 0.9 % (FLUSH) 0.9 %
5-40 SYRINGE (ML) INJECTION AS NEEDED
Status: DISCONTINUED | OUTPATIENT
Start: 2021-07-10 | End: 2021-07-10 | Stop reason: HOSPADM

## 2021-07-10 RX ORDER — SODIUM CHLORIDE 0.9 % (FLUSH) 0.9 %
5-40 SYRINGE (ML) INJECTION EVERY 8 HOURS
Status: DISCONTINUED | OUTPATIENT
Start: 2021-07-10 | End: 2021-07-10 | Stop reason: HOSPADM

## 2021-07-10 RX ORDER — GABAPENTIN 300 MG/1
300 CAPSULE ORAL 3 TIMES DAILY
Status: DISCONTINUED | OUTPATIENT
Start: 2021-07-10 | End: 2021-07-10 | Stop reason: HOSPADM

## 2021-07-10 RX ORDER — GABAPENTIN 300 MG/1
300 CAPSULE ORAL 3 TIMES DAILY
Qty: 90 CAPSULE | Refills: 0 | Status: SHIPPED | OUTPATIENT
Start: 2021-07-10 | End: 2022-03-10 | Stop reason: ALTCHOICE

## 2021-07-10 RX ORDER — FENTANYL CITRATE 50 UG/ML
100 INJECTION, SOLUTION INTRAMUSCULAR; INTRAVENOUS
Status: DISCONTINUED | OUTPATIENT
Start: 2021-07-10 | End: 2021-07-10

## 2021-07-10 RX ORDER — ONDANSETRON 2 MG/ML
4 INJECTION INTRAMUSCULAR; INTRAVENOUS
Status: DISCONTINUED | OUTPATIENT
Start: 2021-07-10 | End: 2021-07-10

## 2021-07-10 RX ADMIN — OXYCODONE 10 MG: 5 TABLET ORAL at 10:54

## 2021-07-10 RX ADMIN — DIAZEPAM 5 MG: 5 TABLET ORAL at 13:40

## 2021-07-10 RX ADMIN — GABAPENTIN 300 MG: 300 CAPSULE ORAL at 10:54

## 2021-07-10 NOTE — DISCHARGE INSTRUCTIONS
On Gabapentin for two days take 300mg at bedtime, if tolerating well and still having pain you can add a dose in the morning, if after two days you care continuing to have sig pain, than you can add a dose after lunch, or you can increase the dose to 600mg at bedtime    If this medication is working you should see your Primary care physician to continue    If you are not requiring more than 1 dose a day, after two weeks you can hold and see if you will need the medication     This medication should not be stopped abruptly

## 2021-07-10 NOTE — ED NOTES
Pt a difficult IV stick. Able to obtain blood but now IV.  Notified Dr. Uyen Dela Cruz who changed meds to PO

## 2021-07-11 NOTE — ED PROVIDER NOTES
EMERGENCY DEPARTMENT HISTORY AND PHYSICAL EXAM      Date: 7/10/2021  Patient Name: Sarah Lerma    History of Presenting Illness     Chief Complaint   Patient presents with    Leg Pain     x 2 week Left leg (hip, lower back and leg above prostetic). New prostetic placed 4 months ago. Pt reports he was seen for same 1 week ago. Pt denies known injury       History Provided By: Patient    HPI: Sarah Lerma, 45 y.o. male presents to the ED with cc of leg pain. Pt states recent AKA prosthesis. Over the past two weeks he I=has been having pain from the lower back that radiated down to the end of his stump. He states he has been taking prescribed Oxycodone bu this has not helped his pain at all. His pain is currently related 9/10, dull throbbing ache, worse with certain  Movements. There has been no recent falls. He denies any CP or SOA. He states decrease PO intake due to pain. There has been no swelling or drainage form stump. He denies any fever or chills. There are no other complaints, changes, or physical findings at this time. PCP: Gris Renner MD    No current facility-administered medications on file prior to encounter. Current Outpatient Medications on File Prior to Encounter   Medication Sig Dispense Refill    hydrALAZINE (APRESOLINE) 100 mg tablet Take 1 Tablet by mouth three (3) times daily for 60 days. 90 Tablet 1    cloNIDine HCL (CATAPRES) 0.2 mg tablet Take 1 Tablet by mouth three (3) times daily for 60 days. 90 Tablet 1    insulin NPH/insulin regular (NOVOLIN 70/30, HUMULIN 70/30) 100 unit/mL (70-30) injection 10 Units by SubCUTAneous route Before breakfast and dinner. 72 mL 0    Xarelto 20 mg tab tablet Take 1 tablet by mouth once daily 30 Tab 0    carvediloL (COREG) 25 mg tablet Take 1 Tab by mouth two (2) times daily (with meals).  60 Tab 2    Blood-Glucose Meter monitoring kit Please check your sugars 3-4x daily 1 Kit 0    isosorbide mononitrate ER (IMDUR) 30 mg tablet Take 1 Tab by mouth daily. 30 Tab 5    atorvastatin (LIPITOR) 40 mg tablet Take 1 Tab by mouth daily. 30 Tab 5    lancets misc Please check your sugars 3-4x daily 1 Each 11    glucose blood VI test strips (ASCENSIA AUTODISC VI, ONE TOUCH ULTRA TEST VI) strip Please check your sugars 3-4x daily 100 Strip 5    Insulin Syringe-Needle U-100 (BD Insulin Syringe) 1 mL 27 gauge x 1/2\" syrg Use BID w novolog Dx E11.9 100 Syringe 2    Insulin Needles, Disposable, 31 gauge x 5/16\" ndle Use four timed daily w insulin 100 Package 11       Past History     Past Medical History:  Past Medical History:   Diagnosis Date    Diabetes ([facility] [address]     since age 23   Stephanie Tobar GERD (gastroesophageal reflux disease)     HTN (hypertension)     Hypercholesteremia     Hyperlipidemia     Psychiatric disorder     anxiety & depression    Thromboembolus ([facility] [address] 2017    bilateral legs       Past Surgical History:  Past Surgical History:   Procedure Laterality Date    HX AMPUTATION      toes- left foot    HX AMPUTATION Left 08/2017    BKA    HX BUNIONECTOMY      HX ORTHOPAEDIC      boutinerre deformity    HX ORTHOPAEDIC      fascia removed left foot    HX OTHER SURGICAL  03/2017    Skin graft Surgery    HX TONSILLECTOMY      HX WISDOM TEETH EXTRACTION         Family History:  Family History   Problem Relation Age of Onset    Hypertension Mother     High Cholesterol Mother        Social History:  Social History     Tobacco Use    Smoking status: Current Some Day Smoker     Packs/day: 0.25     Years: 3.00     Pack years: 0.75     Types: Cigars     Last attempt to quit: 4/22/2011     Years since quitting: 10.2    Smokeless tobacco: Never Used   Substance Use Topics    Alcohol use: Yes     Alcohol/week: 0.0 standard drinks     Types: 1 Glasses of wine per week     Comment: weekly    Drug use: Yes     Types: Marijuana     Comment: current marijuana for pain/anxiety/1-2 week       Allergies:   Allergies   Allergen Reactions    Ace Inhibitors Swelling     Facial swelling cough     Morphine Angioedema    Arb-Angiotensin Receptor Antagonist Angioedema         Review of Systems   Review of Systems   Constitutional: Negative. Negative for appetite change, chills, fatigue and fever. HENT: Negative. Negative for congestion, rhinorrhea, sinus pressure and sore throat. Eyes: Negative. Respiratory: Negative. Negative for cough, choking, chest tightness, shortness of breath and wheezing. Cardiovascular: Negative. Negative for chest pain, palpitations and leg swelling. Gastrointestinal: Negative for abdominal pain, constipation, diarrhea, nausea and vomiting. Endocrine: Negative. Genitourinary: Negative. Negative for difficulty urinating, dysuria, flank pain and urgency. Musculoskeletal:        Left lower back pain radiating down his left leg      Skin: Negative. Neurological: Negative. Negative for dizziness, speech difficulty, weakness, light-headedness, numbness and headaches. Psychiatric/Behavioral: Negative. All other systems reviewed and are negative. Physical Exam   Physical Exam  Vitals and nursing note reviewed. Constitutional:       General: He is not in acute distress. Appearance: Normal appearance. He is well-developed. He is not diaphoretic. HENT:      Head: Normocephalic and atraumatic. Mouth/Throat:      Mouth: Mucous membranes are moist.      Pharynx: No oropharyngeal exudate. Eyes:      Conjunctiva/sclera: Conjunctivae normal.      Pupils: Pupils are equal, round, and reactive to light. Neck:      Vascular: No JVD. Trachea: No tracheal deviation. Cardiovascular:      Rate and Rhythm: Normal rate and regular rhythm. Heart sounds: Normal heart sounds. No murmur heard. Pulmonary:      Effort: Pulmonary effort is normal. No respiratory distress. Breath sounds: Normal breath sounds. No stridor. No wheezing or rales. Abdominal:      General: There is no distension. Palpations: Abdomen is soft. Tenderness: There is no abdominal tenderness. There is no guarding or rebound. Musculoskeletal:         General: No tenderness. Normal range of motion. Cervical back: Normal range of motion and neck supple. Comments: Right AKA      Skin:     General: Skin is warm and dry. Capillary Refill: Capillary refill takes less than 2 seconds. Neurological:      Mental Status: He is alert and oriented to person, place, and time. Cranial Nerves: No cranial nerve deficit. Comments: No gross motor or sensory deficits    Psychiatric:         Behavior: Behavior normal.         Diagnostic Study Results     Labs -  Recent Results (from the past 24 hour(s))   GLUCOSE, POC    Collection Time: 07/10/21 11:08 AM   Result Value Ref Range    Glucose (POC) 288 (H) 65 - 117 mg/dL    Performed by Valdo Martínez      Pt very difficult stick, after several attempts he wished to defer at this time. Radiologic Studies -   XR SPINE LUMB 2 OR 3 V   Final Result   No acute findings. Mild degenerative disc disease. CT Results  (Last 48 hours)    None        CXR Results  (Last 48 hours)    None          Medical Decision Making   I am the first provider for this patient. I reviewed the vital signs, available nursing notes, past medical history, past surgical history, family history and social history. Vital Signs-Reviewed the patient's vital signs. Records Reviewed: Nursing Notes, Old Medical Records, Previous Radiology Studies and Previous Laboratory Studies    Provider Notes (Medical Decision Making):   DDx- Sciatica, phantom pain, neuropathy    ED Course:   Initial assessment performed. The patients presenting problems have been discussed, and they are in agreement with the care plan formulated and outlined with them. I have encouraged them to ask questions as they arise throughout their visit.        With neurontin pt did have sig relief, will write Rx, discussed if this medicine is working he needs set up PCP appt, given this is sz med, would not want him to stop suddenly and should be weaned off if not helping     Disposition:  DC home     DISCHARGE PLAN:  1. Discharge Medication List as of 7/10/2021  1:37 PM      START taking these medications    Details   gabapentin (Neurontin) 300 mg capsule Take 1 Capsule by mouth three (3) times daily. Max Daily Amount: 900 mg., Normal, Disp-90 Capsule, R-0         CONTINUE these medications which have NOT CHANGED    Details   hydrALAZINE (APRESOLINE) 100 mg tablet Take 1 Tablet by mouth three (3) times daily for 60 days. , Normal, Disp-90 Tablet, R-1      cloNIDine HCL (CATAPRES) 0.2 mg tablet Take 1 Tablet by mouth three (3) times daily for 60 days. , Normal, Disp-90 Tablet, R-1      insulin NPH/insulin regular (NOVOLIN 70/30, HUMULIN 70/30) 100 unit/mL (70-30) injection 10 Units by SubCUTAneous route Before breakfast and dinner., Normal, Disp-72 mL, R-0      Xarelto 20 mg tab tablet Take 1 tablet by mouth once daily, Normal, Disp-30 Tab, R-0      carvediloL (COREG) 25 mg tablet Take 1 Tab by mouth two (2) times daily (with meals). , Normal, Disp-60 Tab,R-2Dose increase 10/5/20      Blood-Glucose Meter monitoring kit Please check your sugars 3-4x daily, Normal, Disp-1 Kit,R-0      isosorbide mononitrate ER (IMDUR) 30 mg tablet Take 1 Tab by mouth daily. , Normal, Disp-30 Tab, R-5      atorvastatin (LIPITOR) 40 mg tablet Take 1 Tab by mouth daily. , Normal, Disp-30 Tab, R-5      lancets misc Please check your sugars 3-4x daily, Normal, Disp-1 Each, R-11      glucose blood VI test strips (ASCENSIA AUTODISC VI, ONE TOUCH ULTRA TEST VI) strip Please check your sugars 3-4x daily, Normal, Disp-100 Strip, R-5      Insulin Syringe-Needle U-100 (BD Insulin Syringe) 1 mL 27 gauge x 1/2\" syrg Use BID w novolog Dx E11.9, Normal, Disp-100 Syringe, R-2      Insulin Needles, Disposable, 31 gauge x 5/16\" ndle Use four timed daily w insulin, Normal, Disp-100 Package, R-11           2. Follow-up Information    None       3. Return to ED if worse     Diagnosis     Clinical Impression:   1. Sciatica of left side    2. Acute postoperative pain        Attestations:    Lynda James, DO    Please note that this dictation was completed with [email or web address], the computer voice recognition software. Quite often unanticipated grammatical, syntax, homophones, and other interpretive errors are inadvertently transcribed by the computer software. Please disregard these errors. Please excuse any errors that have escaped final proofreading. Thank you.

## 2021-07-19 ENCOUNTER — TELEPHONE (OUTPATIENT)
Dept: FAMILY MEDICINE CLINIC | Age: 51
End: 2021-07-19

## 2021-07-19 ENCOUNTER — APPOINTMENT (OUTPATIENT)
Dept: GENERAL RADIOLOGY | Age: 51
End: 2021-07-19
Attending: EMERGENCY MEDICINE
Payer: COMMERCIAL

## 2021-07-19 ENCOUNTER — APPOINTMENT (OUTPATIENT)
Dept: CT IMAGING | Age: 51
End: 2021-07-19
Attending: EMERGENCY MEDICINE
Payer: COMMERCIAL

## 2021-07-19 ENCOUNTER — APPOINTMENT (OUTPATIENT)
Dept: ULTRASOUND IMAGING | Age: 51
End: 2021-07-19
Attending: EMERGENCY MEDICINE
Payer: COMMERCIAL

## 2021-07-19 ENCOUNTER — APPOINTMENT (OUTPATIENT)
Dept: NUCLEAR MEDICINE | Age: 51
End: 2021-07-19
Attending: EMERGENCY MEDICINE
Payer: COMMERCIAL

## 2021-07-19 ENCOUNTER — HOSPITAL ENCOUNTER (OUTPATIENT)
Age: 51
Setting detail: OBSERVATION
Discharge: LEFT BEFORE DISCHARGE | End: 2021-07-20
Attending: EMERGENCY MEDICINE | Admitting: GENERAL ACUTE CARE HOSPITAL
Payer: COMMERCIAL

## 2021-07-19 DIAGNOSIS — R77.8 ELEVATED TROPONIN: ICD-10-CM

## 2021-07-19 DIAGNOSIS — M25.562 ARTHRALGIA OF LEFT LOWER LEG: ICD-10-CM

## 2021-07-19 DIAGNOSIS — I44.7 LEFT BUNDLE BRANCH BLOCK (LBBB): ICD-10-CM

## 2021-07-19 DIAGNOSIS — R11.2 NON-INTRACTABLE VOMITING WITH NAUSEA, UNSPECIFIED VOMITING TYPE: Primary | ICD-10-CM

## 2021-07-19 PROBLEM — R06.02 SOB (SHORTNESS OF BREATH): Status: ACTIVE | Noted: 2021-07-19

## 2021-07-19 PROBLEM — M79.606 LEG PAIN: Status: ACTIVE | Noted: 2021-07-19

## 2021-07-19 LAB
ALBUMIN SERPL-MCNC: 2.9 G/DL (ref 3.5–5)
ALBUMIN/GLOB SERPL: 0.6 {RATIO} (ref 1.1–2.2)
ALP SERPL-CCNC: 105 U/L (ref 45–117)
ALT SERPL-CCNC: 26 U/L (ref 12–78)
ANION GAP SERPL CALC-SCNC: 9 MMOL/L (ref 5–15)
AST SERPL-CCNC: 19 U/L (ref 15–37)
ATRIAL RATE: 121 BPM
BASOPHILS # BLD: 0.1 K/UL (ref 0–0.1)
BASOPHILS NFR BLD: 1 % (ref 0–1)
BILIRUB SERPL-MCNC: 0.4 MG/DL (ref 0.2–1)
BUN SERPL-MCNC: 24 MG/DL (ref 6–20)
BUN/CREAT SERPL: 6 (ref 12–20)
CALCIUM SERPL-MCNC: 9.4 MG/DL (ref 8.5–10.1)
CALCULATED P AXIS, ECG09: 48 DEGREES
CALCULATED R AXIS, ECG10: 0 DEGREES
CALCULATED T AXIS, ECG11: 127 DEGREES
CHLORIDE SERPL-SCNC: 103 MMOL/L (ref 97–108)
CO2 SERPL-SCNC: 25 MMOL/L (ref 21–32)
CREAT SERPL-MCNC: 3.89 MG/DL (ref 0.7–1.3)
DIAGNOSIS, 93000: NORMAL
DIFFERENTIAL METHOD BLD: ABNORMAL
EOSINOPHIL # BLD: 0.1 K/UL (ref 0–0.4)
EOSINOPHIL NFR BLD: 1 % (ref 0–7)
ERYTHROCYTE [DISTWIDTH] IN BLOOD BY AUTOMATED COUNT: 13.2 % (ref 11.5–14.5)
GLOBULIN SER CALC-MCNC: 4.8 G/DL (ref 2–4)
GLUCOSE BLD STRIP.AUTO-MCNC: 156 MG/DL (ref 65–117)
GLUCOSE BLD STRIP.AUTO-MCNC: 243 MG/DL (ref 65–117)
GLUCOSE SERPL-MCNC: 260 MG/DL (ref 65–100)
HCT VFR BLD AUTO: 42.2 % (ref 36.6–50.3)
HGB BLD-MCNC: 13.1 G/DL (ref 12.1–17)
IMM GRANULOCYTES # BLD AUTO: 0.1 K/UL (ref 0–0.04)
IMM GRANULOCYTES NFR BLD AUTO: 1 % (ref 0–0.5)
LIPASE SERPL-CCNC: 114 U/L (ref 73–393)
LYMPHOCYTES # BLD: 2.2 K/UL (ref 0.8–3.5)
LYMPHOCYTES NFR BLD: 15 % (ref 12–49)
MCH RBC QN AUTO: 25.9 PG (ref 26–34)
MCHC RBC AUTO-ENTMCNC: 31 G/DL (ref 30–36.5)
MCV RBC AUTO: 83.6 FL (ref 80–99)
MONOCYTES # BLD: 0.7 K/UL (ref 0–1)
MONOCYTES NFR BLD: 4 % (ref 5–13)
NEUTS SEG # BLD: 11.6 K/UL (ref 1.8–8)
NEUTS SEG NFR BLD: 78 % (ref 32–75)
NRBC # BLD: 0 K/UL (ref 0–0.01)
NRBC BLD-RTO: 0 PER 100 WBC
P-R INTERVAL, ECG05: 128 MS
PLATELET # BLD AUTO: 280 K/UL (ref 150–400)
PMV BLD AUTO: 10.8 FL (ref 8.9–12.9)
POTASSIUM SERPL-SCNC: 3.9 MMOL/L (ref 3.5–5.1)
PROT SERPL-MCNC: 7.7 G/DL (ref 6.4–8.2)
Q-T INTERVAL, ECG07: 380 MS
QRS DURATION, ECG06: 164 MS
QTC CALCULATION (BEZET), ECG08: 539 MS
RBC # BLD AUTO: 5.05 M/UL (ref 4.1–5.7)
SERVICE CMNT-IMP: ABNORMAL
SERVICE CMNT-IMP: ABNORMAL
SODIUM SERPL-SCNC: 137 MMOL/L (ref 136–145)
TROPONIN I SERPL-MCNC: 0.19 NG/ML
VENTRICULAR RATE, ECG03: 121 BPM
WBC # BLD AUTO: 14.7 K/UL (ref 4.1–11.1)

## 2021-07-19 PROCEDURE — 83690 ASSAY OF LIPASE: CPT

## 2021-07-19 PROCEDURE — 96374 THER/PROPH/DIAG INJ IV PUSH: CPT

## 2021-07-19 PROCEDURE — 74011250636 HC RX REV CODE- 250/636: Performed by: EMERGENCY MEDICINE

## 2021-07-19 PROCEDURE — 84484 ASSAY OF TROPONIN QUANT: CPT

## 2021-07-19 PROCEDURE — 96375 TX/PRO/DX INJ NEW DRUG ADDON: CPT

## 2021-07-19 PROCEDURE — 82962 GLUCOSE BLOOD TEST: CPT

## 2021-07-19 PROCEDURE — 96372 THER/PROPH/DIAG INJ SC/IM: CPT

## 2021-07-19 PROCEDURE — 74011250637 HC RX REV CODE- 250/637: Performed by: EMERGENCY MEDICINE

## 2021-07-19 PROCEDURE — 99285 EMERGENCY DEPT VISIT HI MDM: CPT

## 2021-07-19 PROCEDURE — 93970 EXTREMITY STUDY: CPT

## 2021-07-19 PROCEDURE — 74011250636 HC RX REV CODE- 250/636: Performed by: GENERAL ACUTE CARE HOSPITAL

## 2021-07-19 PROCEDURE — 74011000250 HC RX REV CODE- 250: Performed by: INTERNAL MEDICINE

## 2021-07-19 PROCEDURE — 74011250637 HC RX REV CODE- 250/637: Performed by: GENERAL ACUTE CARE HOSPITAL

## 2021-07-19 PROCEDURE — 85025 COMPLETE CBC W/AUTO DIFF WBC: CPT

## 2021-07-19 PROCEDURE — 99218 HC RM OBSERVATION: CPT

## 2021-07-19 PROCEDURE — 80053 COMPREHEN METABOLIC PANEL: CPT

## 2021-07-19 PROCEDURE — 71046 X-RAY EXAM CHEST 2 VIEWS: CPT

## 2021-07-19 PROCEDURE — 36415 COLL VENOUS BLD VENIPUNCTURE: CPT

## 2021-07-19 PROCEDURE — 78580 LUNG PERFUSION IMAGING: CPT

## 2021-07-19 PROCEDURE — 93005 ELECTROCARDIOGRAM TRACING: CPT

## 2021-07-19 RX ORDER — HYDRALAZINE HYDROCHLORIDE 20 MG/ML
10 INJECTION INTRAMUSCULAR; INTRAVENOUS
Status: DISCONTINUED | OUTPATIENT
Start: 2021-07-19 | End: 2021-07-20 | Stop reason: HOSPADM

## 2021-07-19 RX ORDER — KETOROLAC TROMETHAMINE 30 MG/ML
30 INJECTION, SOLUTION INTRAMUSCULAR; INTRAVENOUS
Status: COMPLETED | OUTPATIENT
Start: 2021-07-19 | End: 2021-07-20

## 2021-07-19 RX ORDER — ENOXAPARIN SODIUM 100 MG/ML
1 INJECTION SUBCUTANEOUS EVERY 24 HOURS
Status: DISCONTINUED | OUTPATIENT
Start: 2021-07-19 | End: 2021-07-20 | Stop reason: HOSPADM

## 2021-07-19 RX ORDER — ONDANSETRON 4 MG/1
4 TABLET, ORALLY DISINTEGRATING ORAL
Status: DISCONTINUED | OUTPATIENT
Start: 2021-07-19 | End: 2021-07-20 | Stop reason: HOSPADM

## 2021-07-19 RX ORDER — CARVEDILOL 12.5 MG/1
25 TABLET ORAL 2 TIMES DAILY WITH MEALS
Status: DISCONTINUED | OUTPATIENT
Start: 2021-07-19 | End: 2021-07-20 | Stop reason: HOSPADM

## 2021-07-19 RX ORDER — SODIUM CHLORIDE 0.9 % (FLUSH) 0.9 %
5-40 SYRINGE (ML) INJECTION EVERY 8 HOURS
Status: DISCONTINUED | OUTPATIENT
Start: 2021-07-19 | End: 2021-07-20 | Stop reason: HOSPADM

## 2021-07-19 RX ORDER — HYDRALAZINE HYDROCHLORIDE 50 MG/1
100 TABLET, FILM COATED ORAL 3 TIMES DAILY
Status: DISCONTINUED | OUTPATIENT
Start: 2021-07-19 | End: 2021-07-20 | Stop reason: HOSPADM

## 2021-07-19 RX ORDER — ACETAMINOPHEN 650 MG/1
650 SUPPOSITORY RECTAL
Status: DISCONTINUED | OUTPATIENT
Start: 2021-07-19 | End: 2021-07-20 | Stop reason: HOSPADM

## 2021-07-19 RX ORDER — POLYETHYLENE GLYCOL 3350 17 G/17G
17 POWDER, FOR SOLUTION ORAL DAILY PRN
Status: DISCONTINUED | OUTPATIENT
Start: 2021-07-19 | End: 2021-07-20 | Stop reason: HOSPADM

## 2021-07-19 RX ORDER — ATORVASTATIN CALCIUM 40 MG/1
40 TABLET, FILM COATED ORAL DAILY
Status: DISCONTINUED | OUTPATIENT
Start: 2021-07-20 | End: 2021-07-20 | Stop reason: HOSPADM

## 2021-07-19 RX ORDER — ISOSORBIDE MONONITRATE 30 MG/1
30 TABLET, EXTENDED RELEASE ORAL DAILY
Status: DISCONTINUED | OUTPATIENT
Start: 2021-07-20 | End: 2021-07-20 | Stop reason: HOSPADM

## 2021-07-19 RX ORDER — PANTOPRAZOLE SODIUM 40 MG/1
40 TABLET, DELAYED RELEASE ORAL 2 TIMES DAILY
COMMUNITY
End: 2022-03-10 | Stop reason: ALTCHOICE

## 2021-07-19 RX ORDER — METOPROLOL TARTRATE 5 MG/5ML
5 INJECTION INTRAVENOUS
Status: DISCONTINUED | OUTPATIENT
Start: 2021-07-19 | End: 2021-07-20 | Stop reason: HOSPADM

## 2021-07-19 RX ORDER — FENTANYL CITRATE 50 UG/ML
50 INJECTION, SOLUTION INTRAMUSCULAR; INTRAVENOUS
Status: COMPLETED | OUTPATIENT
Start: 2021-07-19 | End: 2021-07-19

## 2021-07-19 RX ORDER — SODIUM CHLORIDE 0.9 % (FLUSH) 0.9 %
5-40 SYRINGE (ML) INJECTION AS NEEDED
Status: DISCONTINUED | OUTPATIENT
Start: 2021-07-19 | End: 2021-07-20 | Stop reason: HOSPADM

## 2021-07-19 RX ORDER — GABAPENTIN 300 MG/1
300 CAPSULE ORAL 3 TIMES DAILY
Status: DISCONTINUED | OUTPATIENT
Start: 2021-07-19 | End: 2021-07-20 | Stop reason: HOSPADM

## 2021-07-19 RX ORDER — ASPIRIN 325 MG
325 TABLET ORAL
Status: COMPLETED | OUTPATIENT
Start: 2021-07-19 | End: 2021-07-19

## 2021-07-19 RX ORDER — CLONIDINE HYDROCHLORIDE 0.1 MG/1
0.2 TABLET ORAL 3 TIMES DAILY
Status: DISCONTINUED | OUTPATIENT
Start: 2021-07-19 | End: 2021-07-20 | Stop reason: HOSPADM

## 2021-07-19 RX ORDER — DEXTROSE 50 % IN WATER (D50W) INTRAVENOUS SYRINGE
12.5-25 AS NEEDED
Status: DISCONTINUED | OUTPATIENT
Start: 2021-07-19 | End: 2021-07-20 | Stop reason: HOSPADM

## 2021-07-19 RX ORDER — ACETAMINOPHEN 325 MG/1
650 TABLET ORAL
Status: DISCONTINUED | OUTPATIENT
Start: 2021-07-19 | End: 2021-07-20 | Stop reason: HOSPADM

## 2021-07-19 RX ORDER — ONDANSETRON 2 MG/ML
4 INJECTION INTRAMUSCULAR; INTRAVENOUS
Status: DISCONTINUED | OUTPATIENT
Start: 2021-07-19 | End: 2021-07-20 | Stop reason: HOSPADM

## 2021-07-19 RX ORDER — MAGNESIUM SULFATE 100 %
4 CRYSTALS MISCELLANEOUS AS NEEDED
Status: DISCONTINUED | OUTPATIENT
Start: 2021-07-19 | End: 2021-07-20 | Stop reason: HOSPADM

## 2021-07-19 RX ORDER — INSULIN LISPRO 100 [IU]/ML
INJECTION, SOLUTION INTRAVENOUS; SUBCUTANEOUS
Status: DISCONTINUED | OUTPATIENT
Start: 2021-07-19 | End: 2021-07-20 | Stop reason: HOSPADM

## 2021-07-19 RX ADMIN — CLONIDINE HYDROCHLORIDE 0.2 MG: 0.1 TABLET ORAL at 21:22

## 2021-07-19 RX ADMIN — SODIUM CHLORIDE 1000 ML: 9 INJECTION, SOLUTION INTRAVENOUS at 15:16

## 2021-07-19 RX ADMIN — HYDRALAZINE HYDROCHLORIDE 10 MG: 20 INJECTION INTRAMUSCULAR; INTRAVENOUS at 19:14

## 2021-07-19 RX ADMIN — FENTANYL CITRATE 50 MCG: 50 INJECTION, SOLUTION INTRAMUSCULAR; INTRAVENOUS at 15:16

## 2021-07-19 RX ADMIN — ENOXAPARIN SODIUM 100 MG: 100 INJECTION SUBCUTANEOUS at 18:35

## 2021-07-19 RX ADMIN — ONDANSETRON 4 MG: 2 INJECTION INTRAMUSCULAR; INTRAVENOUS at 20:50

## 2021-07-19 RX ADMIN — GABAPENTIN 300 MG: 300 CAPSULE ORAL at 21:22

## 2021-07-19 RX ADMIN — HYDRALAZINE HYDROCHLORIDE 100 MG: 50 TABLET, FILM COATED ORAL at 21:22

## 2021-07-19 RX ADMIN — CARVEDILOL 25 MG: 12.5 TABLET, FILM COATED ORAL at 18:32

## 2021-07-19 RX ADMIN — METOROPROLOL TARTRATE 5 MG: 5 INJECTION, SOLUTION INTRAVENOUS at 22:32

## 2021-07-19 RX ADMIN — ASPIRIN 325 MG ORAL TABLET 325 MG: 325 PILL ORAL at 17:06

## 2021-07-19 NOTE — Clinical Note
Patient Class[de-identified] OBSERVATION [104]   Type of Bed: Telemetry [19]   Cardiac Monitoring Required?: Yes   Reason for Observation: Leg pain   Admitting Diagnosis: Leg pain [[id number]]   Admitting Physician: Quentin Sweet   Attending Physician: Antoine Haney [[id number]]

## 2021-07-19 NOTE — TELEPHONE ENCOUNTER
Pt showed up at the desk     States in pain for a week, cant eat without getting sick, and he is sweating profusely     Nurse Rhett Jacob assessed him and told him to please go to the ER

## 2021-07-19 NOTE — CONSULTS
CARDIOLOGY CONSULT    Patient ID:  Patient: Kezia Montiel  MRN: [id number]  Age: 45 y.o.  : 1970    Date of  Admission: 2021  2:07 PM   PCP:  Miles Weller MD    Assessment: 1. New LBBB. 2. Faintly positive troponin without evidence of ACS/MI. 3. Chronic nonischemic cardiomyopathy EF 50-55% by echo 2019, was 25-30% in 2017. 4. Hypertensive heart disease with CKD stage 4. Severely elevated BP in the ER. 5. DM type 2 with gastropathy. 6. L leg BKA, walks with prosthesis. 7. History of bilateral lower extremity DVT, not on anticoagulation PTA. 8. Full code. Plan:     1. I would check an echo tomorrow. 2. Continue carvedilol. 3. Continue hydralazine/nitrate. 4. Both metoprolol and hydralazine IV are available PRN severe hypertension. 5. Not on ACEI or ARB due to CKD. 6. Agree with anticoagulation. Will have to see if either Eliquis or Xarelto is affordable for him, he prefers this to chronic warfarin. Conservative cardiac management at this time. [x]       High complexity decision making was performed in this patient at high risk for decompensation    Kezia Montiel is a 45 y.o. male with a history of 2 weeks of weight loss, nausea/vomiting, unable to reliably keep his food and pills down. He is here for further evaluation and treatment. He denies chest, jaw, neck, shoulder, or arm pain. No orthopnea, PND, or edema. 3 days a week he walks at least a mile he says. No TIA or stroke like symptoms. No dizziness, palpitations, or syncope.       Past Medical History:   Diagnosis Date    Diabetes [facility])     since age 23    GERD (gastroesophageal reflux disease)     HTN (hypertension)     Hypercholesteremia     Hyperlipidemia     Psychiatric disorder     anxiety & depression    Thromboembolus ([facility] [address] 2017    bilateral legs        Past Surgical History:   Procedure Laterality Date    HX AMPUTATION      toes- left foot    HX AMPUTATION Left 08/2017    BKA    HX BUNIONECTOMY      HX ORTHOPAEDIC      boutinerre deformity    HX ORTHOPAEDIC      fascia removed left foot    HX OTHER SURGICAL  03/2017    Skin graft Surgery    HX TONSILLECTOMY      HX WISDOM TEETH EXTRACTION         Social History     Tobacco Use    Smoking status: Current Some Day Smoker     Packs/day: 0.25     Years: 3.00     Pack years: 0.75     Types: Cigars     Last attempt to quit: 4/22/2011     Years since quitting: 10.2    Smokeless tobacco: Never Used   Substance Use Topics    Alcohol use:  Yes     Alcohol/week: 0.0 standard drinks     Types: 1 Glasses of wine per week     Comment: weekly        Family History   Problem Relation Age of Onset    Hypertension Mother     High Cholesterol Mother         Allergies   Allergen Reactions    Ace Inhibitors Swelling     Facial swelling cough     Morphine Angioedema    Arb-Angiotensin Receptor Antagonist Angioedema          Current Facility-Administered Medications   Medication Dose Route Frequency    enoxaparin (LOVENOX) injection 100 mg  1 mg/kg SubCUTAneous Q24H    carvediloL (COREG) tablet 25 mg  25 mg Oral BID WITH MEALS    [START ON 7/20/2021] atorvastatin (LIPITOR) tablet 40 mg  40 mg Oral DAILY    cloNIDine HCL (CATAPRES) tablet 0.2 mg  0.2 mg Oral TID    gabapentin (NEURONTIN) capsule 300 mg  300 mg Oral TID    hydrALAZINE (APRESOLINE) tablet 100 mg  100 mg Oral TID    [START ON 7/20/2021] isosorbide mononitrate ER (IMDUR) tablet 30 mg  30 mg Oral DAILY    sodium chloride (NS) flush 5-40 mL  5-40 mL IntraVENous Q8H    sodium chloride (NS) flush 5-40 mL  5-40 mL IntraVENous PRN    acetaminophen (TYLENOL) tablet 650 mg  650 mg Oral Q6H PRN    Or    acetaminophen (TYLENOL) suppository 650 mg  650 mg Rectal Q6H PRN    polyethylene glycol (MIRALAX) packet 17 g  17 g Oral DAILY PRN    ondansetron (ZOFRAN ODT) tablet 4 mg  4 mg Oral Q8H PRN    Or    ondansetron (ZOFRAN) injection 4 mg  4 mg IntraVENous Q6H PRN    hydrALAZINE (APRESOLINE) 20 mg/mL injection 10 mg  10 mg IntraVENous Q6H PRN    insulin lispro (HUMALOG) injection   SubCUTAneous AC&HS    glucose chewable tablet 16 g  4 Tablet Oral PRN    dextrose (D50W) injection syrg 12.5-25 g  12.5-25 g IntraVENous PRN    glucagon (GLUCAGEN) injection 1 mg  1 mg IntraMUSCular PRN     Current Outpatient Medications   Medication Sig    gabapentin (Neurontin) 300 mg capsule Take 1 Capsule by mouth three (3) times daily. Max Daily Amount: 900 mg.  hydrALAZINE (APRESOLINE) 100 mg tablet Take 1 Tablet by mouth three (3) times daily for 60 days.  cloNIDine HCL (CATAPRES) 0.2 mg tablet Take 1 Tablet by mouth three (3) times daily for 60 days.  insulin NPH/insulin regular (NOVOLIN 70/30, HUMULIN 70/30) 100 unit/mL (70-30) injection 10 Units by SubCUTAneous route Before breakfast and dinner.  Xarelto 20 mg tab tablet Take 1 tablet by mouth once daily    carvediloL (COREG) 25 mg tablet Take 1 Tab by mouth two (2) times daily (with meals).  Blood-Glucose Meter monitoring kit Please check your sugars 3-4x daily    isosorbide mononitrate ER (IMDUR) 30 mg tablet Take 1 Tab by mouth daily.  atorvastatin (LIPITOR) 40 mg tablet Take 1 Tab by mouth daily.  lancets misc Please check your sugars 3-4x daily    glucose blood VI test strips (ASCENSIA AUTODISC VI, ONE TOUCH ULTRA TEST VI) strip Please check your sugars 3-4x daily    Insulin Syringe-Needle U-100 (BD Insulin Syringe) 1 mL 27 gauge x 1/2\" syrg Use BID w novolog Dx E11.9    Insulin Needles, Disposable, 31 gauge x 5/16\" ndle Use four timed daily w insulin       Review of Symptoms:  See HPI as well.   General: negative for fever, chills, sweats, weight loss   Eyes: negative for blurred vision, eye pain, loss of vision, diplopia   Ear Nose and Throat: negative for rhinorrhea, pharyngitis, otalgia, tinnitus, speech or swallowing difficulties   Respiratory: negative for SOB, cough, sputum production, wheezing, GARCIA, pleuritic pain   Cardiology: negative for chest pain, palpitations, orthopnea, PND, edema, syncope   Gastrointestinal: negative for abdominal pain, dysphagia, bleeding   Genitourinary: negative for frequency, urgency, dysuria  Muskuloskeletal : negative for arthralgia, myalgia   Hematology: negative for easy bruising, bleeding, lymphadenopathy   Dermatological: negative for rash, ulceration, mole change, new lesion   Endocrine: negative for hot flashes or polydipsia   Neurological: negative for headache, dizziness, confusion, focal weakness, paresthesia, memory loss, gait disturbance   Psychological: negative for anxiety, depression, agitation       Objective:      Physical Exam:  Temp (24hrs), Av.9 °F (36.6 °C), Min:97.9 °F (36.6 °C), Max:97.9 °F (36.6 °C)    Patient Vitals for the past 8 hrs:   Pulse   21 1914 90   21 1834 88   21 1832 88   21 1709 88   21 1530 98   21 1500 (!) 111   21 1430 (!) 105   21 1330 (!) 136    Patient Vitals for the past 8 hrs:   Resp   21 1834 13   21 1709 20   21 1530 16   21 1500 21   21 1430 17   21 1330 20    Patient Vitals for the past 8 hrs:   BP   21 1914 (!) 202/90   21 1834 (!) 183/110   21 1832 (!) 183/110   21 1709 (!) 221/122   21 1530 (!) 193/109   21 1500 (!) 185/109   21 1430 (!) 167/103   21 1330 (!) 140/109          Intake/Output Summary (Last 24 hours) at 2021  Last data filed at 2021 1832  Gross per 24 hour   Intake 800 ml   Output --   Net 800 ml       Nondiaphoretic, not in acute distress. No scleral icterus, mucous membranes moist, conjuctivae pink, no xanthelasma. Unlabored, clear to auscultation bilaterally anteriorly, symmetric air movement. Regular rate and rhythm, + ejection murmur, pericardial rub, knock, or gallop. No JVD or peripheral edema. Palpable radial pulses bilaterally.   Abdomen, soft, nontender, nondistended. +BS. Extremities without cyanosis or clubbing. Left BKA noted. Skin warm and dry. No rashes or ulcers. Neuro grossly nonfocal.  No tremor. Awake and appropriate. CARDIOGRAPHICS and STUDIES, I reviewed:    Telemetry:  Sinus rhythm 80's currently. ECG:  Sinus tachycardia 121 bpm with LBBB. Echo 2019:  Left Ventricle Normal cavity size. Mild concentric hypertrophy. Low normal systolic function. The estimated ejection fraction is 51 - 55%. There is age-appropriate left ventricular diastolic function. Left Atrium Normal cavity size. Right Ventricle Normal cavity size and global systolic function. Right Atrium Normal cavity size. Aortic Valve Trileaflet valve structure, no stenosis and no regurgitation. Mitral Valve Normal valve structure and no stenosis. Trace regurgitation. Tricuspid Valve Normal valve structure and no stenosis. Trace tricuspid valve regurgitation. Pulmonary arterial systolic pressure is 13.9 mmHg. Mild pulmonary hypertension. Pulmonic Valve Pulmonic valve not well visualized. Aorta Normal aortic root. Pericardium Insignificant pericardial effusion or fat. CXR:         Labs:  Recent Labs     07/19/21  1445   TROIQ 0.19*     Lab Results   Component Value Date/Time    Cholesterol, total 193 01/06/2018 05:38 AM    HDL Cholesterol 62 01/06/2018 05:38 AM    LDL, calculated 106.6 (H) 01/06/2018 05:38 AM    Triglyceride 122 01/06/2018 05:38 AM    CHOL/HDL Ratio 3.1 01/06/2018 05:38 AM     No results for input(s): INR, PTP, APTT, INREXT, INREXT in the last 72 hours. Recent Labs     07/19/21  1445      K 3.9      CO2 25   BUN 24*   CREA 3.89*   *   CA 9.4   ALB 2.9*   WBC 14.7*   HGB 13.1   HCT 42.2        Recent Labs     07/19/21  1445      TP 7.7   ALB 2.9*   GLOB 4.8*   LPSE 114     No components found for: GLPOC  No results for input(s): PH, PCO2, PO2 in the last 72 hours.         Mayur Cardoza MD  7/19/2021

## 2021-07-19 NOTE — H&P
Hospitalist Admission Note    NAME: Manohar Blackman   :  1970   MRN:  [id number]     Date/Time:  2021 5:44 PM    Patient PCP: Orly Dill MD  ______________________________________________________________________  Given the patient's current clinical presentation, I have a high level of concern for decompensation if discharged from the emergency department. Complex decision making was performed, which includes reviewing the patient's available past medical records, laboratory results, and x-ray films. My assessment of this patient's clinical condition and my plan of care is as follows. Assessment / Plan:  History of b/l DVT, no longer taking Eliquis due to cost  Initial concern was for PE in the ER - pt already completed his V/Q scan and was noted to be negative. Will start on therapeutic Lovenox and have CM see tmr to determine if pt can be placed on another form of [address] [place] [address] Road that could perhaps be more affordable for him. If renal function precludes use of Lovenox, will need Heparin drip    Left leg pain  This was the main reason he came to the hospital  Pt with history of L BKA that he says was done in  or . He endorses pain in his leg ever since. Will ask Palliative to see in the hopes that he can be connected to a Pain Management Clinic. Percocet PRN    New LBBB noted on EKG  Pt without any CP, SOB or syncope. Trop minimally elevated. Pt with CKD4. ER has a page out to Cardiology - will await their recommendations here    Uncontrolled BP  HTN  HLD  DM  CKD4  Pt clearly noncompliant with his medications  Will resume as listed. PRN BP meds also ordered. Code Status: Full  Surrogate Decision Maker:   DVT Prophylaxis: Lovenox  GI Prophylaxis: None  Baseline: Independent      Subjective:   CHIEF COMPLAINT: left leg pain    HISTORY OF PRESENT ILLNESS:     Elicia Robledo is a 45 y.o.  male who presents with CC listed above.  As noted above, pt had L BKA years ago and states that he has been trying to have his pain controlled ever since. When asked what he takes at home for pain, he states \"whatever is around. \" When asked about following with his PCP, he states that his is \"on vacation. \" He states that he stopped taking his Xarelto \"a while ago\" because the cost of $100 was too much. He states that he does not want to be on Coumadin because of the visits required for INR check. Advised him that depending on his insurance coverage, Coumadin may be the only option. He denies any CP, fever, chills, or syncope. We were asked to admit for work up and evaluation of the above problems. Past Medical History:   Diagnosis Date    Diabetes [facility])     since age 23   Manan Meraz GERD (gastroesophageal reflux disease)     HTN (hypertension)     Hypercholesteremia     Hyperlipidemia     Psychiatric disorder     anxiety & depression    Thromboembolus ([address] 2017    bilateral legs        Past Surgical History:   Procedure Laterality Date    HX AMPUTATION      toes- left foot    HX AMPUTATION Left 08/2017    BKA    HX BUNIONECTOMY      HX ORTHOPAEDIC      boutinerre deformity    HX ORTHOPAEDIC      fascia removed left foot    HX OTHER SURGICAL  03/2017    Skin graft Surgery    HX TONSILLECTOMY      HX WISDOM TEETH EXTRACTION         Social History     Tobacco Use    Smoking status: Current Some Day Smoker     Packs/day: 0.25     Years: 3.00     Pack years: 0.75     Types: Cigars     Last attempt to quit: 4/22/2011     Years since quitting: 10.2    Smokeless tobacco: Never Used   Substance Use Topics    Alcohol use:  Yes     Alcohol/week: 0.0 standard drinks     Types: 1 Glasses of wine per week     Comment: weekly        Family History   Problem Relation Age of Onset    Hypertension Mother     High Cholesterol Mother      Allergies   Allergen Reactions    Ace Inhibitors Swelling     Facial swelling cough     Morphine Angioedema    Arb-Angiotensin Receptor Antagonist Angioedema        Prior to Admission medications    Medication Sig Start Date End Date Taking? Authorizing Provider   gabapentin (Neurontin) 300 mg capsule Take 1 Capsule by mouth three (3) times daily. Max Daily Amount: 900 mg. 7/10/21   Yeimi Lorenzo DO   hydrALAZINE (APRESOLINE) 100 mg tablet Take 1 Tablet by mouth three (3) times daily for 60 days. 7/2/21 8/31/21  Dawood Rojas MD   cloNIDine HCL (CATAPRES) 0.2 mg tablet Take 1 Tablet by mouth three (3) times daily for 60 days. 7/2/21 8/31/21  Dawood Rojas MD   insulin NPH/insulin regular (NOVOLIN 70/30, HUMULIN 70/30) 100 unit/mL (70-30) injection 10 Units by SubCUTAneous route Before breakfast and dinner. 7/2/21   Dawood Rojas MD   Xarelto 20 mg tab tablet Take 1 tablet by mouth once daily 1/29/21   Gris Renner MD   carvediloL (COREG) 25 mg tablet Take 1 Tab by mouth two (2) times daily (with meals). 10/5/20   Daryle Legato, MD   Blood-Glucose Meter monitoring kit Please check your sugars 3-4x daily 7/31/20   Lizbet Blackman MD   isosorbide mononitrate ER (IMDUR) 30 mg tablet Take 1 Tab by mouth daily. 6/29/20   Gris Renner MD   atorvastatin (LIPITOR) 40 mg tablet Take 1 Tab by mouth daily. 6/29/20   Gris Renner MD   lancets misc Please check your sugars 3-4x daily 6/29/20   Gris Renner MD   glucose blood VI test strips (ASCENSIA AUTODISC VI, ONE TOUCH ULTRA TEST VI) strip Please check your sugars 3-4x daily 6/29/20   Gris Renner MD   Insulin Syringe-Needle U-100 (BD Insulin Syringe) 1 mL 27 gauge x 1/2\" syrg Use BID w novolog Dx E11.9 6/29/20   Gris Renner MD   Insulin Needles, Disposable, 31 gauge x 5/16\" ndle Use four timed daily w insulin 4/15/19   Yakelin Islas MD       REVIEW OF SYSTEMS:     I am not able to complete the review of systems because:    The patient is intubated and sedated    The patient has altered mental status due to his acute medical problems    The patient has baseline aphasia from prior stroke(s)    The patient has baseline dementia and is not reliable historian    The patient is in acute medical distress and unable to provide information           Total of 12 systems reviewed as follows:       POSITIVE= underlined text  Negative = text not underlined  General:  fever, chills, sweats, generalized weakness, weight loss/gain,      loss of appetite   Eyes:    blurred vision, eye pain, loss of vision, double vision  ENT:    rhinorrhea, pharyngitis   Respiratory:   cough, sputum production, SOB, GARCIA, wheezing, pleuritic pain   Cardiology:   chest pain, palpitations, orthopnea, PND, edema, syncope   Gastrointestinal:  abdominal pain , N/V, diarrhea, dysphagia, constipation, bleeding   Genitourinary:  frequency, urgency, dysuria, hematuria, incontinence   Muskuloskeletal :  arthralgia, myalgia, back pain  Hematology:  easy bruising, nose or gum bleeding, lymphadenopathy   Dermatological: rash, ulceration, pruritis, color change / jaundice  Endocrine:   hot flashes or polydipsia   Neurological:  headache, dizziness, confusion, focal weakness, paresthesia,     Speech difficulties, memory loss, gait difficulty  Psychological: Feelings of anxiety, depression, agitation    Objective:   VITALS:    Visit Vitals  BP (!) 221/122   Pulse 88   Temp 97.9 °F (36.6 °C)   Resp 20   Ht 6' 3\" (1.905 m)   Wt 95.4 kg (210 lb 5.1 oz)   SpO2 100%   BMI 26.29 kg/m²       PHYSICAL EXAM:    General:    Alert, cooperative, no distress, appears stated age. HEENT: Atraumatic, anicteric sclerae, pink conjunctivae  Neck:  Supple, symmetrical  Lungs:   Clear to auscultation bilaterally. No Wheezing or Rhonchi. No rales. Chest wall:  No tenderness  No Accessory muscle use. Heart:   Regular  rhythm,  No  murmur   No edema  Abdomen:   Soft, non-tender. Not distended. Bowel sounds normal  Extremities: L BKA  Skin:     Not pale. Not Jaundiced  No rashes   Psych:  Good insight. Not depressed.   Not anxious or agitated. Neurologic: EOMs intact. No facial asymmetry. No aphasia or slurred speech. Symmetrical strength, Sensation grossly intact. Alert and oriented X 4.     _______________________________________________________________________  Care Plan discussed with:    Comments   Patient x    Family      RN x    Care Manager                    Consultant:      _______________________________________________________________________  Expected  Disposition:   Home with Family x   HH/PT/OT/RN    SNF/LTC    CHARLIE    ________________________________________________________________________  TOTAL TIME:  54 Minutes    Critical Care Provided     Minutes non procedure based      Comments     Reviewed previous records   >50% of visit spent in counseling and coordination of care  Discussion with patient and/or family and questions answered       ________________________________________________________________________  Signed: Gigi Fields MD    Procedures: see electronic medical records for all procedures/Xrays and details which were not copied into this note but were reviewed prior to creation of Plan.     LAB DATA REVIEWED:    Recent Results (from the past 24 hour(s))   GLUCOSE, POC    Collection Time: 07/19/21  1:29 PM   Result Value Ref Range    Glucose (POC) 243 (H) 65 - 117 mg/dL    Performed by Amadeo Gomez (NIKIAT)    EKG, 12 LEAD, INITIAL    Collection Time: 07/19/21  1:44 PM   Result Value Ref Range    Ventricular Rate 121 BPM    Atrial Rate 121 BPM    P-R Interval 128 ms    QRS Duration 164 ms    Q-T Interval 380 ms    QTC Calculation (Bezet) 539 ms    Calculated P Axis 48 degrees    Calculated R Axis 0 degrees    Calculated T Axis 127 degrees    Diagnosis       Sinus tachycardia  Possible Left atrial enlargement  Left bundle branch block  When compared with ECG of 29-JUN-2021 13:12,  Left bundle branch block is now present  Confirmed by Karen Zafar, P.V. ([id number]) on 7/19/2021 3:50:29 PM     CBC WITH AUTOMATED DIFF    Collection Time: 07/19/21  2:45 PM   Result Value Ref Range    WBC 14.7 (H) 4.1 - 11.1 K/uL    RBC 5.05 4. 10 - 5.70 M/uL    HGB 13.1 12.1 - 17.0 g/dL    HCT 42.2 36.6 - 50.3 %    MCV 83.6 80.0 - 99.0 FL    MCH 25.9 (L) 26.0 - 34.0 PG    MCHC 31.0 30.0 - 36.5 g/dL    RDW 13.2 11.5 - 14.5 %    PLATELET 135 683 - 982 K/uL    MPV 10.8 8.9 - 12.9 FL    NRBC 0.0 0  WBC    ABSOLUTE NRBC 0.00 0.00 - 0.01 K/uL    NEUTROPHILS 78 (H) 32 - 75 %    LYMPHOCYTES 15 12 - 49 %    MONOCYTES 4 (L) 5 - 13 %    EOSINOPHILS 1 0 - 7 %    BASOPHILS 1 0 - 1 %    IMMATURE GRANULOCYTES 1 (H) 0.0 - 0.5 %    ABS. NEUTROPHILS 11.6 (H) 1.8 - 8.0 K/UL    ABS. LYMPHOCYTES 2.2 0.8 - 3.5 K/UL    ABS. MONOCYTES 0.7 0.0 - 1.0 K/UL    ABS. EOSINOPHILS 0.1 0.0 - 0.4 K/UL    ABS. BASOPHILS 0.1 0.0 - 0.1 K/UL    ABS. IMM. GRANS. 0.1 (H) 0.00 - 0.04 K/UL    DF AUTOMATED     METABOLIC PANEL, COMPREHENSIVE    Collection Time: 07/19/21  2:45 PM   Result Value Ref Range    Sodium 137 136 - 145 mmol/L    Potassium 3.9 3.5 - 5.1 mmol/L    Chloride 103 97 - 108 mmol/L    CO2 25 21 - 32 mmol/L    Anion gap 9 5 - 15 mmol/L    Glucose 260 (H) 65 - 100 mg/dL    BUN 24 (H) 6 - 20 MG/DL    Creatinine 3.89 (H) 0.70 - 1.30 MG/DL    BUN/Creatinine ratio 6 (L) 12 - 20      GFR est AA 20 (L) >60 ml/min/1.73m2    GFR est non-AA 16 (L) >60 ml/min/1.73m2    Calcium 9.4 8.5 - 10.1 MG/DL    Bilirubin, total 0.4 0.2 - 1.0 MG/DL    ALT (SGPT) 26 12 - 78 U/L    AST (SGOT) 19 15 - 37 U/L    Alk.  phosphatase 105 45 - 117 U/L    Protein, total 7.7 6.4 - 8.2 g/dL    Albumin 2.9 (L) 3.5 - 5.0 g/dL    Globulin 4.8 (H) 2.0 - 4.0 g/dL    A-G Ratio 0.6 (L) 1.1 - 2.2     TROPONIN I    Collection Time: 07/19/21  2:45 PM   Result Value Ref Range    Troponin-I, Qt. 0.19 (H) <0.05 ng/mL   LIPASE    Collection Time: 07/19/21  2:45 PM   Result Value Ref Range    Lipase 114 73 - 393 U/L

## 2021-07-19 NOTE — ED NOTES
Pt told ED tech that if she did not get him help right now that \"I am a cannibal and I will bite you. \"  Pt was sent back out to the waiting room. When pt called for blood work, pt refused to sit for it and demanded to know when he could get some help. He wanted to know where the nearest place was to get help.

## 2021-07-19 NOTE — ED NOTES
Assumed care of pt from triage, pt ambulatory w/ cane. Pt reporting L stabbing hip pain x 4 weeks and L leg pain for about 2-3 weeks. Pt reporting his LLE prosthesis is new, he began using it a few weeks ago. Pt reporting that he was evaluated last week for issue and was prescribed Gabapentin without relief. Pt additionally reporting weakness, nausea/vomiting x 1 week and poor PO intake. 1445 MD at bedside     1540 Pt US IV infiltrated, fluids paused, tech at bedside to obtain another line     1600 This RN did not receive a call from the lab regarding the elevated troponin, MD was already aware, no orders received at this time. 2263 Accountable IV infiltrated. Pt reporting that he has not taken his home medication in 2 days d/t vomiting. ED MD notified of HTN, per MD will wait for hospitalist evaluation    [phone] Hospitalist notified of persistent HTN, per hospitalist will give home HTN meds and then consider using prn hydralazine. 1830 Pt medicated with carvedilol, waiting on verification of other home meds.  Additionally, pt reporting 8/10 boss has returned and is refusing prn Tylenol - will notify MD for other pain meds     [phone] Pt reporting that he uses oxycodone at home for pain management without angioedema, hospitalist notified

## 2021-07-20 ENCOUNTER — HOSPITAL ENCOUNTER (EMERGENCY)
Age: 51
Discharge: LEFT BEFORE DISCHARGE | End: 2021-07-20
Attending: STUDENT IN AN ORGANIZED HEALTH CARE EDUCATION/TRAINING PROGRAM
Payer: COMMERCIAL

## 2021-07-20 VITALS
OXYGEN SATURATION: 100 % | BODY MASS INDEX: 26.11 KG/M2 | HEIGHT: 75 IN | RESPIRATION RATE: 18 BRPM | HEART RATE: 94 BPM | SYSTOLIC BLOOD PRESSURE: 148 MMHG | DIASTOLIC BLOOD PRESSURE: 90 MMHG | WEIGHT: 210 LBS | TEMPERATURE: 98.6 F

## 2021-07-20 VITALS
RESPIRATION RATE: 16 BRPM | BODY MASS INDEX: 26.15 KG/M2 | HEIGHT: 75 IN | SYSTOLIC BLOOD PRESSURE: 165 MMHG | DIASTOLIC BLOOD PRESSURE: 92 MMHG | HEART RATE: 84 BPM | TEMPERATURE: 97.9 F | WEIGHT: 210.32 LBS | OXYGEN SATURATION: 95 %

## 2021-07-20 PROCEDURE — 74011250636 HC RX REV CODE- 250/636: Performed by: NURSE PRACTITIONER

## 2021-07-20 PROCEDURE — 75810000275 HC EMERGENCY DEPT VISIT NO LEVEL OF CARE

## 2021-07-20 RX ADMIN — KETOROLAC TROMETHAMINE 30 MG: 30 INJECTION, SOLUTION INTRAMUSCULAR at 00:02

## 2021-07-20 NOTE — ED NOTES
Pt is alert and oriented. Pt is tachypneic and agitated. Pt states \"I need you to help me. \" Pt reporting left hip pain that radiates to lower back and down to the end of his stump. Pt has a left BKA. Pt reports pain x 3weeks. Pt states he was seen at [facility] but they wanted to admit him for his BP. States only wants to be admitted for pain management. Pt reports receiving gabapentin for the pain but it does not help. Emergency Department Nursing Plan of Care       The Nursing Plan of Care is developed from the Nursing assessment and Emergency Department Attending provider initial evaluation. The plan of care may be reviewed in the ED Provider note.     The Plan of Care was developed with the following considerations:   Patient / Family readiness to learn indicated by:verbalized understanding  Persons(s) to be included in education: patient  Barriers to Learning/Limitations:No    Signed     Oanh Clancy RN    7/20/2021   2:25 AM

## 2021-07-20 NOTE — ED NOTES
Pt stated that he is nauseaus, Pt given iv zofran so he can tolerate his PO BP meds. Pt was given IV hydralazine prior, will monitor BP. Pt stated that his left hip is hurting. Nurse asked Pt what med does he take at home for pain, Pt was unable to give any information. Pt was given his gabapentin for pain, but reports no pain relief. Nurse notified NP Purveyor for Pt's pain management, IV toradol ordered and given. Pt stated that he is not happy with the care. Charge Nurse Ayde went to the room and spoke to Pt. As per Nurse Messer, Pt wants to leave AMA. Pt took his IV out and walked out of his room. Pt refused to sign AMA form.

## 2021-07-20 NOTE — ED NOTES
Pt ambulated from room, when asked where he was going he stated \"somewhere safe. \" and exited the department. Pt left ED without being seen by provider. MD aware.

## 2021-07-20 NOTE — ED NOTES
Hourly rounding completed on this pt. Offered assistance for toileting or hygiene at this time. Provided opportunity for snack nourishment or PO fluid hydration. Pt is up-to-date on plan of care. No pain interventions required at this time. Warm blanket offered, call bell within reach, safety precautions in place, bed locked and in the lowest position. Pt still waiting to be seen by MD at this time.

## 2021-07-20 NOTE — ED TRIAGE NOTES
Pt reporting left sided back pain, left leg pain x 1mo. Pt is upset because he does not feel like he got appropriate care at [facility] or from hospitals in general. Last Gabapentin dose around 2000 yesterday. Has left sided BKA.

## 2021-07-20 NOTE — PROGRESS NOTES
Received notification from bedside RN about patient with regards to: leg pain with no relief from Gabapentin.   VS: /87, HR 85, RR 13, O2 sat 100% on RA    Intervention given: Toradol IV x 1 dose ordered

## 2021-10-08 ENCOUNTER — TRANSCRIBE ORDER (OUTPATIENT)
Dept: SCHEDULING | Age: 51
End: 2021-10-08

## 2021-10-08 DIAGNOSIS — D47.2 MONOCLONAL GAMMOPATHY: Primary | ICD-10-CM

## 2021-10-08 DIAGNOSIS — D64.9 ANEMIA, UNSPECIFIED: ICD-10-CM

## 2022-01-03 NOTE — TELEPHONE ENCOUNTER
"Nicolas Rocha,  You are listed in [facility] as Obed's Transplant Coordinator.    Obed SALONI Viramontes has an upcoming lab appointment:    Future Appointments   Date Time Provider Department Center   1/5/2022  1:00 PM [facility] LAB [facility]     The Appointment Note reads as: \"Heart transplant labs\" and was made via [employer].    There is only 1 order available for use: PRA Donor Specific Antibody.    Please review and place either future orders or HMPO (Review of Health Maintenance Protocol Orders), as appropriate.    Health Maintenance Due   Topic     ANNUAL REVIEW OF HM ORDERS      HIV SCREENING      HEPATITIS C SCREENING      Thank You,  Violeta Lechuga MLT (ASCP)    "
Patient is scheduled to see   Dr. Sheryle Printers  11-26-19  2:00pm  [facility]    Patient was given this information. He stated understanding and had no questions.
Yes

## 2022-01-04 NOTE — PATIENT INSTRUCTIONS
Below-the-Knee Leg Amputation: What to Expect at Home  Your Recovery  A below-the-knee amputation is surgery to remove your leg below the knee. Your doctor removed the leg while keeping as much healthy bone, skin, blood vessel, and nerve tissue as possible. After a below-the-knee amputation, you will probably have bandages, a rigid dressing, or a cast over the remaining part of your leg (remaining limb). The leg will be swollen for at least 4 weeks after your surgery. If you have a rigid dressing or cast, your doctor will set up regular visits to change the dressing or cast and check the healing. If you have elastic bandages, your doctor will tell you how to change them. You may have pain in your remaining limb. You also may think you have feeling or pain where your leg was. This is called phantom pain. It is common and may come and go for a year or longer. Your doctor can give you medicine for both types of pain. You may have already started a rehabilitation program (rehab). You will continue this under the guidance of your doctor or physical therapist. Mary Lou Delatorre will need to do a lot of work to recondition your muscles and relearn activities, balance, and coordination. The rehab can last as long as a year. You may have been fitted with a temporary artificial leg while you were still in the hospital. If this is the case, your doctor will teach you how to care for it. If you are getting an artificial leg, you may need to get used to it before you return to work and your other activities. You will probably not wear it all the time, so you will need to learn how to use a wheelchair, crutches, or other device. You will have to make changes in your home. Your workplace may be able to make allowances for you. Having your leg amputated is traumatic. Learning to live with new limitations can be hard and frustrating. You may feel depressed or grieve for your previous lifestyle.  It is important to understand these feelings. Talking with your family, friends, and health professionals about your frustrations is an important part of your recovery. You may also find that it helps to talk with a person who has had an amputation. Remember that even though losing a limb is difficult, it does not change who you are or prevent you from enjoying life. You will have to adapt and learn new ways to do things, but you will still be able to work and take part in sports and activities. And you can still learn, love, play, and live life to its fullest.  Many organizations can help you adjust to your new life. For example, you can find information at amputee-coalition.org. This care sheet gives you a general idea about how long it will take for you to recover. But each person recovers at a different pace. Follow the steps below to get better as quickly as possible. How can you care for yourself at home? Activity  · Be active. Talk to your doctor about what you can do. If you are active and use your remaining limb, it will heal faster. · You may shower when your doctor okays it. Wash the remaining limb with soap and water, and pat it dry. You may need help doing this at first.  · You may need to adapt your car to your situation before you drive. · You will probably be able to return to work and your usual routine when your remaining limb heals. This can be as soon as 4 to 8 weeks after surgery, but it may take longer. Diet  · You can eat your normal diet. If your stomach is upset, try bland, low-fat foods like plain rice, broiled chicken, toast, and yogurt. · You may notice that your bowel movements are not regular right after your surgery. This is common. Try to avoid constipation and straining with bowel movements. Take a fiber supplement every day. If you have not had a bowel movement after a couple of days, ask your doctor about taking a mild laxative.   Medicines  · Your doctor will tell you if and when you can restart your medicines. He or she will also give you instructions about taking any new medicines. · If you take blood thinners, such as warfarin (Coumadin), clopidogrel (Plavix), or aspirin, be sure to talk to your doctor. He or she will tell you if and when to start taking those medicines again. Make sure that you understand exactly what your doctor wants you to do. · Be safe with medicines. Take pain medicines exactly as directed. ¨ If the doctor gave you a prescription medicine for pain, take it as prescribed. ¨ If you are not taking a prescription pain medicine, ask your doctor if you can take an over-the-counter medicine. · If you think your pain medicine is making you sick to your stomach:  ¨ Take your medicine after meals (unless your doctor has told you not to). ¨ Ask your doctor for a different pain medicine. · If your doctor prescribed antibiotics, take them as directed. Do not stop taking them just because you feel better. You need to take the full course of antibiotics. Remaining limb care  · You may have bandages, a rigid dressing, or a cast on your remaining limb. Your doctor will tell you how to take care of it. Depending on your dressing and the doctor's instructions:  ¨ Check your remaining limb daily for irritation, skin breaks, and redness. Tell your doctor about any problems you see. ¨ Wash your remaining limb with mild soap and warm water every night. Pat it dry. · If you have a temporary artificial leg, remove it before you go to sleep. Exercise  · Rehabilitation is a series of exercises you do after your surgery. This helps you learn to use your remaining limb and artificial leg. You will work with your doctor and physical therapist to plan this exercise program. To get the best results, you need to do the exercises correctly and as often and as long as your doctor tells you. Your rehab program will give you a number of exercises to do. Always do them as your therapist tells you.   Other instructions  · Preventing contractures is very important. A contracture occurs when a joint becomes stuck in one position. If this happens, it may be hard or impossible to straighten your remaining limb and use an artificial leg. ¨ Make sure you put equal weight on both hips when you sit. Use firm chairs, and sit up straight. ¨ Keep your remaining limb flat with your knees straight and your legs together while you are lying on your back. ¨ Lie on your stomach as much as possible to stretch your hip joint. ¨ Do not sit for more than an hour or two. Stand, or lie on your stomach now and then. ¨ Do not put pillows under your hips or knees or between your thighs. ¨ Do not rest your remaining limb on crutch handles or a wheelchair. Follow-up care is a key part of your treatment and safety. Be sure to make and go to all appointments, and call your doctor if you are having problems. It's also a good idea to know your test results and keep a list of the medicines you take. When should you call for help? Call 911 anytime you think you may need emergency care. For example, call if:  · You passed out (lost consciousness). · You have sudden chest pain and shortness of breath, or you cough up blood. · You have severe trouble breathing. Call your doctor now or seek immediate medical care if:  · You have loose stitches, or your incision comes open. · You have bleeding from the incision in your remaining limb that suddenly increases or does not stop when your doctor said it should. · You have signs of infection, such as:  ¨ Increased pain, swelling, warmth, or redness. ¨ Red streaks leading from the incision. ¨ Pus draining from the incision. ¨ A fever. · You are sick to your stomach or cannot keep fluids down. · You have pain that does not get better after you take pain medicine.   Watch closely for any changes in your health, and be sure to contact your doctor if:  · You do not have a bowel movement after taking a laxative. Where can you learn more? Go to http://lori-bella.info/. Enter E077 in the search box to learn more about \"Below-the-Knee Leg Amputation: What to Expect at Home. \"  Current as of: March 21, 2017  Content Version: 11.3  © 5033-4810 [employer]. Care instructions adapted under license by [employer] (which disclaims liability or warranty for this information). If you have questions about a medical condition or this instruction, always ask your healthcare professional. [address] any warranty or liability for your use of this information. - Patient with negative COVID test on admission, then positive COVID test 12/30, satting well on RA since admission. No URI symptoms.   - c/w airborne and isolation precautions  - not requiring supplemental oxygen  - downtrending leukocytosis, now wnl  - CXR (12/26): clear lungs  - pending repeat COVID-19 pcr

## 2022-03-18 PROBLEM — N18.30 STAGE 3 CHRONIC KIDNEY DISEASE (HCC): Status: ACTIVE | Noted: 2017-10-02

## 2022-03-18 PROBLEM — K31.84 DIABETIC GASTROPARALYSIS (HCC): Status: ACTIVE | Noted: 2019-07-17

## 2022-03-18 PROBLEM — E11.43 DIABETIC GASTROPARALYSIS (HCC): Status: ACTIVE | Noted: 2019-07-17

## 2022-03-18 PROBLEM — I25.5 ISCHEMIC CARDIOMYOPATHY: Status: ACTIVE | Noted: 2018-09-11

## 2022-03-19 PROBLEM — R45.4 EXCESSIVE ANGER: Status: ACTIVE | Noted: 2019-04-15

## 2022-03-19 PROBLEM — Z91.199 NON-COMPLIANCE WITH TREATMENT: Status: ACTIVE | Noted: 2017-07-22

## 2022-03-19 PROBLEM — M79.606 LEG PAIN: Status: ACTIVE | Noted: 2021-07-19

## 2022-03-19 PROBLEM — I42.9 CARDIOMYOPATHY (HCC): Status: ACTIVE | Noted: 2018-01-05

## 2022-03-19 PROBLEM — I50.22 CHRONIC SYSTOLIC HEART FAILURE (HCC): Status: ACTIVE | Noted: 2017-07-31

## 2022-03-19 PROBLEM — K21.9 GERD (GASTROESOPHAGEAL REFLUX DISEASE): Status: ACTIVE | Noted: 2017-08-24

## 2022-03-19 PROBLEM — R06.02 SOB (SHORTNESS OF BREATH): Status: ACTIVE | Noted: 2021-07-19

## 2022-03-19 PROBLEM — I26.99 RECURRENT PULMONARY EMBOLI (HCC): Status: ACTIVE | Noted: 2018-09-11

## 2022-03-19 PROBLEM — Z79.01 LONG TERM CURRENT USE OF ANTICOAGULANTS WITH INR GOAL OF 2.0-3.0: Status: ACTIVE | Noted: 2018-09-11

## 2022-03-20 PROBLEM — I13.10 HYPERTENSIVE HEART AND RENAL DISEASE: Status: ACTIVE | Noted: 2018-09-11

## 2022-03-20 PROBLEM — Z89.512 HX OF BKA, LEFT (HCC): Status: ACTIVE | Noted: 2017-11-02

## 2022-03-20 PROBLEM — T87.9 COMPLICATION OF AMPUTATED STUMP (HCC): Status: ACTIVE | Noted: 2018-10-04

## 2022-03-20 PROBLEM — I16.1 HYPERTENSIVE EMERGENCY: Status: ACTIVE | Noted: 2021-06-29

## 2022-07-19 ENCOUNTER — HOSPITAL ENCOUNTER (EMERGENCY)
Age: 52
Discharge: HOME | End: 2022-07-19
Attending: EMERGENCY MEDICINE
Payer: COMMERCIAL

## 2022-07-19 VITALS
OXYGEN SATURATION: 100 % | DIASTOLIC BLOOD PRESSURE: 87 MMHG | SYSTOLIC BLOOD PRESSURE: 191 MMHG | RESPIRATION RATE: 18 BRPM | TEMPERATURE: 98.3 F | HEART RATE: 74 BPM

## 2022-07-19 DIAGNOSIS — T14.8XXA WOUND OF SKIN: Primary | ICD-10-CM

## 2022-07-19 LAB
ALBUMIN SERPL-MCNC: 3.6 G/DL (ref 3.5–5)
ALBUMIN/GLOB SERPL: 0.9 {RATIO} (ref 1.1–2.2)
ALP SERPL-CCNC: 76 U/L (ref 45–117)
ALT SERPL-CCNC: 26 U/L (ref 12–78)
ANION GAP SERPL CALC-SCNC: 7 MMOL/L (ref 5–15)
AST SERPL-CCNC: 16 U/L (ref 15–37)
BASOPHILS # BLD: 0 K/UL (ref 0–0.1)
BASOPHILS NFR BLD: 1 % (ref 0–1)
BILIRUB SERPL-MCNC: 0.3 MG/DL (ref 0.2–1)
BUN SERPL-MCNC: 52 MG/DL (ref 6–20)
BUN/CREAT SERPL: 9 (ref 12–20)
CALCIUM SERPL-MCNC: 9.3 MG/DL (ref 8.5–10.1)
CHLORIDE SERPL-SCNC: 110 MMOL/L (ref 97–108)
CO2 SERPL-SCNC: 23 MMOL/L (ref 21–32)
COMMENT, HOLDF: NORMAL
CREAT SERPL-MCNC: 5.56 MG/DL (ref 0.7–1.3)
DIFFERENTIAL METHOD BLD: ABNORMAL
EOSINOPHIL # BLD: 0.1 K/UL (ref 0–0.4)
EOSINOPHIL NFR BLD: 1 % (ref 0–7)
ERYTHROCYTE [DISTWIDTH] IN BLOOD BY AUTOMATED COUNT: 15.3 % (ref 11.5–14.5)
GLOBULIN SER CALC-MCNC: 4.1 G/DL (ref 2–4)
GLUCOSE SERPL-MCNC: 152 MG/DL (ref 65–100)
HCT VFR BLD AUTO: 33.2 % (ref 36.6–50.3)
HGB BLD-MCNC: 10.1 G/DL (ref 12.1–17)
IMM GRANULOCYTES # BLD AUTO: 0 K/UL (ref 0–0.04)
IMM GRANULOCYTES NFR BLD AUTO: 0 % (ref 0–0.5)
LYMPHOCYTES # BLD: 2.2 K/UL (ref 0.8–3.5)
LYMPHOCYTES NFR BLD: 27 % (ref 12–49)
MCH RBC QN AUTO: 25.6 PG (ref 26–34)
MCHC RBC AUTO-ENTMCNC: 30.4 G/DL (ref 30–36.5)
MCV RBC AUTO: 84.3 FL (ref 80–99)
MONOCYTES # BLD: 0.4 K/UL (ref 0–1)
MONOCYTES NFR BLD: 5 % (ref 5–13)
NEUTS SEG # BLD: 5.6 K/UL (ref 1.8–8)
NEUTS SEG NFR BLD: 66 % (ref 32–75)
NRBC # BLD: 0 K/UL (ref 0–0.01)
NRBC BLD-RTO: 0 PER 100 WBC
PLATELET # BLD AUTO: 260 K/UL (ref 150–400)
PMV BLD AUTO: 10.9 FL (ref 8.9–12.9)
POTASSIUM SERPL-SCNC: 4.8 MMOL/L (ref 3.5–5.1)
PROT SERPL-MCNC: 7.7 G/DL (ref 6.4–8.2)
RBC # BLD AUTO: 3.94 M/UL (ref 4.1–5.7)
SAMPLES BEING HELD,HOLD: NORMAL
SODIUM SERPL-SCNC: 140 MMOL/L (ref 136–145)
WBC # BLD AUTO: 8.3 K/UL (ref 4.1–11.1)

## 2022-07-19 PROCEDURE — 36415 COLL VENOUS BLD VENIPUNCTURE: CPT

## 2022-07-19 PROCEDURE — 85025 COMPLETE CBC W/AUTO DIFF WBC: CPT

## 2022-07-19 PROCEDURE — 99283 EMERGENCY DEPT VISIT LOW MDM: CPT

## 2022-07-19 PROCEDURE — 80053 COMPREHEN METABOLIC PANEL: CPT

## 2022-07-19 NOTE — DISCHARGE INSTRUCTIONS
You have an appointment at [address] [place] [address] on Friday, July 22, 2022 at 1:[address] [facility]    Thank you for allowing us to provide you with medical care today. We realize that you have many choices for your emergency care needs. We thank you for choosing [employer]. Please choose us in the future for any continued health care needs. The exam and treatment you received in the Emergency Department were for an emergent problem and are not intended as complete care. It is important that you follow up with a doctor, nurse practitioner, or physician's assistant for ongoing care. If your symptoms worsen or you do not improve as expected and you are unable to reach your usual health care provider, you should return to the Emergency Department. We are available 24 hours a day. Please make an appointment with your health care provider(s) for follow up of your Emergency Department visit. Take this sheet with you when you go to your follow-up visit.

## 2022-07-19 NOTE — ED TRIAGE NOTES
Pt stated he has a wound on his stump and he is diabetic, pt stated he lost over 100 lbs in the last year and wants his weight monitored, denies fever, unable to visualize wound in triage , pt wants a prescription to get his prosthesis refitted

## 2022-07-19 NOTE — ED PROVIDER NOTES
50-year-old male with a history of diabetes presents with a chief complaint of a skin wound. Patient has a left BKA with a prosthesis. He has been walking frequently and feels as though his prosthesis is no longer fitting due to his weight loss. He has noticed a small area on the anterior aspect of the leg that has           Past Medical History:   Diagnosis Date    Anxiety and depression     Diabetes ([facility] [address]     since age 23; on insulin     GERD (gastroesophageal reflux disease)     HTN (hypertension)     Hypercholesteremia     Hyperlipidemia     Thromboembolus ([facility] [address]        Past Surgical History:   Procedure Laterality Date    HX AMPUTATION Left     toes- left foot, then MRSA     HX AMPUTATION Left 2017    BKA    HX BUNIONECTOMY      HX ORTHOPAEDIC      boutinerre deformity    HX ORTHOPAEDIC      fascia removed left foot    HX OTHER SURGICAL  2017    Skin graft Surgery    HX TONSILLECTOMY      HX WISDOM TEETH EXTRACTION           Family History:   Problem Relation Age of Onset    Hypertension Mother     High Cholesterol Mother     Seizures Brother     Heart Disease Brother     Other Brother         polyps        Social History     Socioeconomic History    Marital status:      Spouse name: Not on file    Number of children: 3    Years of education: Not on file    Highest education level:  Bachelor's degree (e.g., BA, AB, BS)   Occupational History    Occupation: disability   Tobacco Use    Smoking status: Former Smoker     Packs/day: 0.25     Years: 3.00     Pack years: 0.75     Types: Cigars     Quit date: 2011     Years since quittin.2    Smokeless tobacco: Never Used   Vaping Use    Vaping Use: Never used   Substance and Sexual Activity    Alcohol use: Not Currently     Types: 1 Glasses of wine per week     Comment: holidays    Drug use: Yes     Types: Marijuana     Comment: current marijuana for pain/anxiety/1-2 week    Sexual activity: Not Currently Partners: Female     Birth control/protection: None     Comment: Brianda Wei   Other Topics Concern     Service No    Blood Transfusions No    Caffeine Concern No    Occupational Exposure No    Hobby Hazards No    Sleep Concern Yes    Stress Concern Yes    Weight Concern No    Special Diet Yes     Comment: diabetes    Back Care No    Exercise No    Bike Helmet No    Seat Belt No    Self-Exams No   Social History Narrative    ** Merged History Encounter **          Social Determinants of Health     Financial Resource Strain:     Difficulty of Paying Living Expenses: Not on file   Food Insecurity:     Worried About Running Out of Food in the Last Year: Not on file    Anu of Food in the Last Year: Not on file   Transportation Needs:     Lack of Transportation (Medical): Not on file    Lack of Transportation (Non-Medical): Not on file   Physical Activity:     Days of Exercise per Week: Not on file    Minutes of Exercise per Session: Not on file   Stress:     Feeling of Stress : Not on file   Social Connections:     Frequency of Communication with Friends and Family: Not on file    Frequency of Social Gatherings with Friends and Family: Not on file    Attends Mandaeism Services: Not on file    Active Member of [address] [place] [address] or Organizations: Not on file    Attends Club or Organization Meetings: Not on file    Marital Status: Not on file   Intimate Partner Violence:     Fear of Current or Ex-Partner: Not on file    Emotionally Abused: Not on file    Physically Abused: Not on file    Sexually Abused: Not on file   Housing Stability:     Unable to Pay for Housing in the Last Year: Not on file    Number of [place] in the Last Year: Not on file    Unstable Housing in the Last Year: Not on file         ALLERGIES: Ace inhibitors, Morphine, Arb-angiotensin receptor antagonist, and Lipitor [atorvastatin]    Review of Systems   Constitutional: Negative for fever.    HENT: Negative for rhinorrhea. Respiratory: Negative for cough. Cardiovascular: Negative for chest pain. Gastrointestinal: Negative for abdominal pain. Genitourinary: Negative for dysuria. Musculoskeletal: Negative for back pain. Skin: Positive for wound. Neurological: Negative for headaches. Psychiatric/Behavioral: Negative for confusion. Vitals:    07/19/22 1030   BP: (!) 186/81   Pulse: 70   Resp: 16   Temp: 97 °F (36.1 °C)   SpO2: 95%            Physical Exam  Vitals and nursing note reviewed. Constitutional:       General: He is not in acute distress. Appearance: Normal appearance. He is not ill-appearing, toxic-appearing or diaphoretic. HENT:      Head: Normocephalic. Eyes:      Extraocular Movements: Extraocular movements intact. Cardiovascular:      Rate and Rhythm: Normal rate. Pulses: Normal pulses. Pulmonary:      Effort: Pulmonary effort is normal. No respiratory distress. Abdominal:      General: There is no distension. Musculoskeletal:         General: Normal range of motion. Cervical back: Normal range of motion. Skin:     General: Skin is dry. Capillary Refill: Capillary refill takes less than 2 seconds. Comments: Small, 1 cm wound to the proximal tibia. No surrounding erythema. Neurological:      Mental Status: He is alert and oriented to person, place, and time. Psychiatric:         Mood and Affect: Mood normal.          MDM  Number of Diagnoses or Management Options  Wound of skin  Diagnosis management comments:       Patient presents with a small, noninfected wound on his stump. Patient was set up with appointments for the wound center and [facility]. Discussed my clinical impression(s), any labs and/or radiology results with the patient. I answered any questions and addressed any concerns. Discussed the importance of following up with their primary care physician and/or specialist(s).  Discussed signs or symptoms that would warrant return back to the ER for further evaluation. The patient is agreeable with discharge.          Procedures

## 2022-07-27 ENCOUNTER — HOSPITAL ENCOUNTER (OUTPATIENT)
Dept: WOUND CARE | Age: 52
Discharge: HOME | End: 2022-07-27
Payer: MEDICARE

## 2022-07-27 VITALS
TEMPERATURE: 97.6 F | SYSTOLIC BLOOD PRESSURE: 117 MMHG | HEART RATE: 80 BPM | DIASTOLIC BLOOD PRESSURE: 53 MMHG | RESPIRATION RATE: 15 BRPM

## 2022-07-27 PROCEDURE — 11042 DBRDMT SUBQ TIS 1ST 20SQCM/<: CPT

## 2022-07-27 PROCEDURE — 99213 OFFICE O/P EST LOW 20 MIN: CPT

## 2022-07-27 PROCEDURE — 99203 OFFICE O/P NEW LOW 30 MIN: CPT

## 2022-07-27 RX ORDER — INSULIN LISPRO 100 [IU]/ML
20 INJECTION, SOLUTION INTRAVENOUS; SUBCUTANEOUS
COMMUNITY

## 2022-07-27 NOTE — WOUND CARE
07/27/22 1535   Wound Leg lower Left; Anterior stump   Date First Assessed/Time First Assessed: 07/27/22 1534   Present on Hospital Admission: Yes  Wound Approximate Age at First Assessment (Weeks): 2 weeks  Primary Wound Type: Blister/bullae  Location: Leg lower  Wound Location Orientation: Left; Anterior. ..    Wound Image    Wound Etiology Traumatic   Dressing Status Intact   Cleansed Cleansed with saline   Wound Length (cm) 0.6 cm   Wound Width (cm) 0.4 cm   Wound Depth (cm) 0.1 cm   Wound Surface Area (cm^2) 0.24 cm^2   Wound Volume (cm^3) 0.024 cm^3   Wound Assessment Pink/red   Drainage Amount Small   Drainage Description Serosanguinous   Wound Odor None   Sharon-Wound/Incision Assessment Intact   Edges Defined edges   Wound Thickness Description Full thickness   Visit Vitals  BP (!) 117/53 (BP 1 Location: Left upper arm, BP Patient Position: At rest)   Pulse 80   Temp 97.6 °F (36.4 °C)   Resp 15

## 2022-07-27 NOTE — DISCHARGE INSTRUCTIONS
Discharge Instructions/Wound Orders  [address], [address]  Telephone: [phone]  NAME:  Maddie Mcgraw OF BIRTH:  1970  MEDICAL RECORD NUMBER:  [id number]  DATE:  7/27/22  Wound Care Orders:  Left lower leg amputation site: Cleanse with mild soap and water, pat dry. Apply Endoform to wound bed. Cover with gauze then nonborder foam. Change dressing every other day. Moisturize callous on distal end of stump with lotion daily. See prosthetist for modification of prosthetic. Decrease walking until it is modified. Obtain appointment with new Primary Care Provider. Dietary:  [] Diet as tolerated: [x] Calorie Diabetic Diet:Low carb and no Sugar [] No Added Salt:[] Increase Protein: [] Other:Limit the amount of liquid you are drinking and avoid drinking in between meals   Activity:  [] Activity as tolerated:  [] Patient has no activity restrictions     [] Strict Bedrest: [] Remain off Work:     [] May return to full duty work:                                   [] Return to work with restrictions:             Return Appointment:   [x] Return Appointment: With Dr Marian Tran in  [facility])  [] Ordered tests:    Electronically signed Shilpa Weber RN on 7/27/2022 at 3:54 PM     [address]: Should you experience any significant changes in your wound(s) or have questions about your wound care, please contact the [facility] at [address] [place] [address] 8:00 am - 4:30. If you need help with your wound outside these hours and cannot wait until we are again available, contact your PCP or go to the hospital emergency room. PLEASE NOTE: IF YOU ARE UNABLE TO OBTAIN WOUND SUPPLIES, CONTINUE TO USE THE SUPPLIES YOU HAVE AVAILABLE UNTIL YOU ARE ABLE TO REACH US. IT IS MOST IMPORTANT TO KEEP THE WOUND COVERED AT ALL TIMES.      Physician Signature:_______________________    Date: ___________ Time:  ____________

## 2022-07-27 NOTE — WOUND CARE
[address]   Progress Note and Procedure Note     Dona Shannon  MEDICAL RECORD NUMBER:  [id number]  AGE: 45 y.o. RACE BLACK/  GENDER: male  : 1970  EPISODE DATE:  2022    Subjective:     Chief Complaint   Patient presents with    Follow-up         HISTORY of PRESENT ILLNESS HPI    Dona Shannon is a 45 y.o. male who presents today for wound/ulcer evaluation.    History of Wound Context: Blister from Bka prosthesis  Wound/Ulcer Pain Timing/Severity: none  Quality of pain: n/a  Severity:  0 / 10   Modifying Factors: None  Associated Signs/Symptoms: none    Ulcer Identification:  Ulcer Type: diabetic, pressure, and neuropathic    Contributing Factors: diabetes, chronic pressure, and loose prosthesis    Wound: left anterior calf wound         PAST MEDICAL HISTORY    Past Medical History:   Diagnosis Date    Anxiety and depression     Diabetes ([address]     since age 23; on insulin     GERD (gastroesophageal reflux disease)     Heart failure (HCC)     HTN (hypertension)     Hypercholesteremia     Hyperlipidemia     Thromboembolus ([address]         PAST SURGICAL HISTORY    Past Surgical History:   Procedure Laterality Date    HX AMPUTATION Left     toes- left foot, then MRSA     HX AMPUTATION Left 2017    BKA    HX BUNIONECTOMY      HX ORTHOPAEDIC      boutinerre deformity    HX ORTHOPAEDIC      fascia removed left foot    HX OTHER SURGICAL  2017    Skin graft Surgery    HX TONSILLECTOMY      HX WISDOM TEETH EXTRACTION         FAMILY HISTORY    Family History   Problem Relation Age of Onset    Hypertension Mother     High Cholesterol Mother     Seizures Brother     Heart Disease Brother     Other Brother         polyps        SOCIAL HISTORY    Social History     Tobacco Use    Smoking status: Former     Packs/day: 0.25     Years: 3.00     Pack years: 0.75     Types: Cigars, Cigarettes     Quit date: 2011     Years since quittin.2    Smokeless tobacco: Never   Vaping Use    Vaping Use: Never used   Substance Use Topics    Alcohol use: Not Currently     Types: 1 Glasses of wine per week     Comment: holidays    Drug use: Yes     Types: Marijuana     Comment: current marijuana for pain/anxiety/1-2 week       ALLERGIES    Allergies   Allergen Reactions    Ace Inhibitors Swelling     Facial swelling cough     Morphine Angioedema    Arb-Angiotensin Receptor Antagonist Angioedema    Lipitor [Atorvastatin] Swelling       MEDICATIONS    Current Outpatient Medications on File Prior to Encounter   Medication Sig Dispense Refill    insulin lispro (HumaLOG U-100 Insulin) 100 unit/mL injection 20 Units by SubCUTAneous route. Prior to meals      hydrALAZINE (APRESOLINE) 100 mg tablet Take 1 Tablet by mouth three (3) times daily. 270 Tablet 4    cloNIDine HCL (CATAPRES) 0.2 mg tablet Take 1 Tablet by mouth three (3) times daily. 270 Tablet 4    atorvastatin (LIPITOR) 20 mg tablet Take 1 Tablet by mouth daily. 90 Tablet 4    sodium bicarbonate 650 mg tablet Take 1 Tablet by mouth three (3) times daily. 90 Tablet 0    cholecalciferol (VITAMIN D3) (5000 Units /125 mcg) capsule Take 1 Capsule by mouth daily. 30 Capsule 0    esomeprazole (NEXIUM) 20 mg capsule Take  by mouth daily. ferrous sulfate 325 mg (65 mg iron) tablet Take  by mouth Daily (before breakfast). Blood-Glucose Meter monitoring kit Please check your sugars 3-4x daily 1 Kit 0    lancets misc Please check your sugars 3-4x daily 1 Each 11    glucose blood VI test strips (ASCENSIA AUTODISC VI, ONE TOUCH ULTRA TEST VI) strip Please check your sugars 3-4x daily 100 Strip 5    Insulin Syringe-Needle U-100 (BD Insulin Syringe) 1 mL 27 gauge x 1/2\" syrg Use BID w novolog Dx E11.9 100 Syringe 2    Insulin Needles, Disposable, 31 gauge x 5/16\" ndle Use four timed daily w insulin 100 Package 11     No current facility-administered medications on file prior to encounter.        REVIEW OF SYSTEMS    A comprehensive review of systems was negative except for that written in the HPI. Objective:     Visit Vitals  BP (!) 117/53 (BP 1 Location: Left upper arm, BP Patient Position: At rest)   Pulse 80   Temp 97.6 °F (36.4 °C)   Resp 15       Wt Readings from Last 3 Encounters:   04/01/22 93.4 kg (206 lb)   03/10/22 93.4 kg (206 lb)   07/20/21 95.3 kg (210 lb)       PHYSICAL EXAM    Pertinent items are noted in HPI. Objective:  RLE focused, BKA left  Vascular:  LE  DP 2/4; PT 1/4  capillary fill time brisk, pitting edema is present, skin temperature is cool, varicosities are present. Dermatological:    Wound: left anterior calf at stump  Measurements: per RN note  Margins: intact  Drainage: scant serous  Odor: none  Wound base: granular  Lymphangitic streaking? No.  Undermining? No.  Sinus tracts? No.  Exposed bone? No.  Subcutaneous crepitation on palpation? No.        There is no maceration of the interspaces of the right foot Lesions are present consisting of hyperkeratosis at left distal BKA stump. Neurological:  DTR are present, protective sensation per 5.07 [place] monofilament is absent, patient is AAOx3, mood is normal. Epicritic sensation is intact. Orthopedic:  B/L LE are symmetric, ROM of ankle, STJ, 1st MTPJ is limited, MMT 5 out of 5 for B/L LE. There has been an amputation of left BKa. Constitutional: Pt is a well developed, male. Lymphatics: negative tenderness to palpation of neck/axillary/inguinal nodes. Imaging / Labs / Cx / Px:  none     Assessment:      Ulcer left calf fat  Diabetes t1 with skin ulcer      Wound Leg Left (Active)   Number of days: 1794       Wound Leg Left (Active)   Number of days: 1392       Wound Leg lower Left; Anterior stump (Active)   Wound Image   07/27/22 1535   Wound Etiology Traumatic 07/27/22 1535   Dressing Status Intact 07/27/22 1535   Cleansed Cleansed with saline 07/27/22 1535   Wound Length (cm) 0.6 cm 07/27/22 1535   Wound Width (cm) 0.4 cm 07/27/22 1535 Wound Depth (cm) 0.1 cm 07/27/22 1535   Wound Surface Area (cm^2) 0.24 cm^2 07/27/22 1535   Wound Volume (cm^3) 0.024 cm^3 07/27/22 1535   Wound Assessment Pink/red 07/27/22 1535   Drainage Amount Small 07/27/22 1535   Drainage Description Serosanguinous 07/27/22 1535   Wound Odor None 07/27/22 1535   Sharon-Wound/Incision Assessment Intact 07/27/22 1535   Edges Defined edges 07/27/22 1535   Wound Thickness Description Full thickness 07/27/22 1535   Number of days: 0       Debridement Wound Care        Problem List Items Addressed This Visit    None      Procedure Note  Indications:  Based on my examination of this patient's wound(s)/ulcer(s) today, debridement is required to promote healing and evaluate the wound base. Performed by: Celso Peter DPM    Consent obtained: Yes    Time out taken: Yes    Debridement: Excisional    Using curette the wound(s)/ulcer(s) was/were sharply debrided down through and including the removal of    subcutaneous tissue    Devitalized Tissue Debrided: slough    Pre Debridement Measurements:  Are located in the Wound/Ulcer Documentation Flow Sheet    Non-Pressure ulcer, fat layer exposed    Wound/Ulcer #: 1    Post Debridement Measurements:  Wound/Ulcer Descriptions are Pre Debridement except measurements:    Wound Leg Left (Active)   Number of days: 1794       Wound Leg Left (Active)   Number of days: 1392       Wound Leg lower Left; Anterior stump (Active)   Wound Image   07/27/22 1535   Wound Etiology Traumatic 07/27/22 1535   Dressing Status Intact 07/27/22 1535   Cleansed Cleansed with saline 07/27/22 1535   Wound Length (cm) 0.6 cm 07/27/22 1535   Wound Width (cm) 0.4 cm 07/27/22 1535   Wound Depth (cm) 0.1 cm 07/27/22 1535   Wound Surface Area (cm^2) 0.24 cm^2 07/27/22 1535   Wound Volume (cm^3) 0.024 cm^3 07/27/22 1535   Wound Assessment Pink/red 07/27/22 1535   Drainage Amount Small 07/27/22 1535   Drainage Description Serosanguinous 07/27/22 1535   Wound Odor None 07/27/22 1535   Sharon-Wound/Incision Assessment Intact 07/27/22 1535   Edges Defined edges 07/27/22 1535   Wound Thickness Description Full thickness 07/27/22 1535   Number of days: 0        Total Surface Area Debrided:  <20 sq cm     Estimated Blood Loss:  None    Hemostasis Achieved: Pressure    Procedural Pain: 0 / 10     Post Procedural Pain: 1 / 10     Response to treatment: Patient tolerated treatment with mild discomfort but relieved after procedure was completed        Plan:     Treatment Note please see attached Discharge Instructions    Written patient dismissal instructions given to patient or POA. Endoform/gauze/foam pad/dsd  Change every other day    Rx new BKA prosthesis  This is medically necessary for ambulation and treatment of ulceration due to ill fitting prosthesis to prevent hospitalization, infection, loss of limb/life  Expected K level : K3  Agree with this level and activities that support it like walking with variable angelita, walking greater than 400 yards, walking on uneven  terrain.  I agree with assessment by Pedrito on 8/15/22    Fu one week    Electronically signed by Cheri Ervin DPM on 7/27/2022 at 4:02 PM

## 2023-02-15 ENCOUNTER — ANESTHESIA EVENT (OUTPATIENT)
Dept: ENDOSCOPY | Age: 53
End: 2023-02-15
Payer: MEDICARE

## 2023-02-15 ENCOUNTER — HOSPITAL ENCOUNTER (OUTPATIENT)
Age: 53
Setting detail: OUTPATIENT SURGERY
Discharge: HOME | End: 2023-02-15
Attending: INTERNAL MEDICINE | Admitting: INTERNAL MEDICINE
Payer: MEDICARE

## 2023-02-15 ENCOUNTER — ANESTHESIA (OUTPATIENT)
Dept: ENDOSCOPY | Age: 53
End: 2023-02-15
Payer: MEDICARE

## 2023-02-15 VITALS
TEMPERATURE: 97.9 F | OXYGEN SATURATION: 100 % | SYSTOLIC BLOOD PRESSURE: 165 MMHG | WEIGHT: 206 LBS | BODY MASS INDEX: 25.61 KG/M2 | HEART RATE: 66 BPM | HEIGHT: 75 IN | RESPIRATION RATE: 18 BRPM | DIASTOLIC BLOOD PRESSURE: 84 MMHG

## 2023-02-15 LAB
GLUCOSE BLD STRIP.AUTO-MCNC: 79 MG/DL (ref 65–117)
GLUCOSE BLD STRIP.AUTO-MCNC: 86 MG/DL (ref 65–117)
SERVICE CMNT-IMP: NORMAL
SERVICE CMNT-IMP: NORMAL

## 2023-02-15 PROCEDURE — 74011250636 HC RX REV CODE- 250/636: Performed by: NURSE ANESTHETIST, CERTIFIED REGISTERED

## 2023-02-15 PROCEDURE — 88305 TISSUE EXAM BY PATHOLOGIST: CPT

## 2023-02-15 PROCEDURE — 76040000007: Performed by: INTERNAL MEDICINE

## 2023-02-15 PROCEDURE — 74011000250 HC RX REV CODE- 250: Performed by: NURSE ANESTHETIST, CERTIFIED REGISTERED

## 2023-02-15 PROCEDURE — 2709999900 HC NON-CHARGEABLE SUPPLY: Performed by: INTERNAL MEDICINE

## 2023-02-15 PROCEDURE — 82962 GLUCOSE BLOOD TEST: CPT

## 2023-02-15 PROCEDURE — 77030038604 HC SNR ENDO EXACTO USEN -B: Performed by: INTERNAL MEDICINE

## 2023-02-15 PROCEDURE — 76060000032 HC ANESTHESIA 0.5 TO 1 HR: Performed by: INTERNAL MEDICINE

## 2023-02-15 RX ORDER — PROPOFOL 10 MG/ML
INJECTION, EMULSION INTRAVENOUS AS NEEDED
Status: DISCONTINUED | OUTPATIENT
Start: 2023-02-15 | End: 2023-02-15 | Stop reason: HOSPADM

## 2023-02-15 RX ORDER — EPINEPHRINE 0.1 MG/ML
1 INJECTION INTRACARDIAC; INTRAVENOUS
Status: DISCONTINUED | OUTPATIENT
Start: 2023-02-15 | End: 2023-02-15 | Stop reason: HOSPADM

## 2023-02-15 RX ORDER — SODIUM CHLORIDE 9 MG/ML
INJECTION, SOLUTION INTRAVENOUS
Status: DISCONTINUED | OUTPATIENT
Start: 2023-02-15 | End: 2023-02-15 | Stop reason: HOSPADM

## 2023-02-15 RX ORDER — FENTANYL CITRATE 50 UG/ML
200 INJECTION, SOLUTION INTRAMUSCULAR; INTRAVENOUS
Status: DISCONTINUED | OUTPATIENT
Start: 2023-02-15 | End: 2023-02-15 | Stop reason: HOSPADM

## 2023-02-15 RX ORDER — MIDAZOLAM HYDROCHLORIDE 1 MG/ML
.25-5 INJECTION, SOLUTION INTRAMUSCULAR; INTRAVENOUS
Status: DISCONTINUED | OUTPATIENT
Start: 2023-02-15 | End: 2023-02-15 | Stop reason: HOSPADM

## 2023-02-15 RX ORDER — DEXTROMETHORPHAN/PSEUDOEPHED 2.5-7.5/.8
1.2 DROPS ORAL
Status: DISCONTINUED | OUTPATIENT
Start: 2023-02-15 | End: 2023-02-15 | Stop reason: HOSPADM

## 2023-02-15 RX ORDER — ATROPINE SULFATE 0.1 MG/ML
0.5 INJECTION INTRAVENOUS
Status: DISCONTINUED | OUTPATIENT
Start: 2023-02-15 | End: 2023-02-15 | Stop reason: HOSPADM

## 2023-02-15 RX ORDER — SODIUM CHLORIDE 9 MG/ML
150 INJECTION, SOLUTION INTRAVENOUS CONTINUOUS
Status: DISCONTINUED | OUTPATIENT
Start: 2023-02-15 | End: 2023-02-15 | Stop reason: HOSPADM

## 2023-02-15 RX ORDER — LIDOCAINE HYDROCHLORIDE 20 MG/ML
INJECTION, SOLUTION EPIDURAL; INFILTRATION; INTRACAUDAL; PERINEURAL AS NEEDED
Status: DISCONTINUED | OUTPATIENT
Start: 2023-02-15 | End: 2023-02-15 | Stop reason: HOSPADM

## 2023-02-15 RX ORDER — SODIUM CHLORIDE 0.9 % (FLUSH) 0.9 %
5-40 SYRINGE (ML) INJECTION AS NEEDED
Status: DISCONTINUED | OUTPATIENT
Start: 2023-02-15 | End: 2023-02-15 | Stop reason: HOSPADM

## 2023-02-15 RX ORDER — SODIUM CHLORIDE 0.9 % (FLUSH) 0.9 %
5-40 SYRINGE (ML) INJECTION EVERY 8 HOURS
Status: DISCONTINUED | OUTPATIENT
Start: 2023-02-15 | End: 2023-02-15 | Stop reason: HOSPADM

## 2023-02-15 RX ADMIN — PROPOFOL 40 MG: 10 INJECTION, EMULSION INTRAVENOUS at 14:51

## 2023-02-15 RX ADMIN — PROPOFOL 40 MG: 10 INJECTION, EMULSION INTRAVENOUS at 14:49

## 2023-02-15 RX ADMIN — PROPOFOL 40 MG: 10 INJECTION, EMULSION INTRAVENOUS at 14:46

## 2023-02-15 RX ADMIN — PROPOFOL 70 MG: 10 INJECTION, EMULSION INTRAVENOUS at 14:39

## 2023-02-15 RX ADMIN — PROPOFOL 20 MG: 10 INJECTION, EMULSION INTRAVENOUS at 15:01

## 2023-02-15 RX ADMIN — PROPOFOL 50 MG: 10 INJECTION, EMULSION INTRAVENOUS at 14:41

## 2023-02-15 RX ADMIN — PROPOFOL 40 MG: 10 INJECTION, EMULSION INTRAVENOUS at 14:53

## 2023-02-15 RX ADMIN — PROPOFOL 40 MG: 10 INJECTION, EMULSION INTRAVENOUS at 14:43

## 2023-02-15 RX ADMIN — PROPOFOL 20 MG: 10 INJECTION, EMULSION INTRAVENOUS at 14:58

## 2023-02-15 RX ADMIN — PROPOFOL 40 MG: 10 INJECTION, EMULSION INTRAVENOUS at 15:04

## 2023-02-15 RX ADMIN — PROPOFOL 40 MG: 10 INJECTION, EMULSION INTRAVENOUS at 14:55

## 2023-02-15 RX ADMIN — LIDOCAINE HYDROCHLORIDE 50 MG: 20 INJECTION, SOLUTION EPIDURAL; INFILTRATION; INTRACAUDAL; PERINEURAL at 14:39

## 2023-02-15 RX ADMIN — SODIUM CHLORIDE: 900 INJECTION, SOLUTION INTRAVENOUS at 14:21

## 2023-02-15 NOTE — ANESTHESIA POSTPROCEDURE EVALUATION
Post-Anesthesia Evaluation and Assessment    Patient: Flaquito Davies MRN: [id number]  SSN: [id number]    YOB: 1970  Age: 47 y.o. Sex: male      I have evaluated the patient and they are stable and ready for discharge from the PACU. Cardiovascular Function/Vital Signs  Visit Vitals  BP (!) 155/79   Pulse 61   Temp 36.6 °C (97.9 °F)   Resp 24   Ht 6' 3\" (1.905 m)   Wt 93.4 kg (206 lb)   SpO2 100%   BMI 25.75 kg/m²       Patient is status post MAC anesthesia for Procedure(s):  COLONOSCOPY  ENDOSCOPIC POLYPECTOMY. Nausea/Vomiting: None    Postoperative hydration reviewed and adequate. Pain:  Pain Scale 1: Numeric (0 - 10) (02/15/23 1525)  Pain Intensity 1: 0 (02/15/23 1525)   Managed    Neurological Status: At baseline    Mental Status, Level of Consciousness: Alert and  oriented to person, place, and time    Pulmonary Status:   O2 Device: None (Room air) (02/15/23 1525)   Adequate oxygenation and airway patent    Complications related to anesthesia: None    Post-anesthesia assessment completed.  No concerns    Signed By: Vickie Chapman MD     February 15, 2023

## 2023-02-15 NOTE — DISCHARGE INSTRUCTIONS
Saurabh Wilson [address] Dana[address] De La Cruz M.D.  Jaspreet Coreas, [address] [facility]  [phone]          COLONOSCOPY DISCHARGE INSTRUCTIONS    Cheyenne Curtis  [id number]  1970    DISCOMFORT:  Redness at IV site- apply warm compress to area; if redness or soreness persist- contact your physician  There may be a slight amount of blood passed from the rectum  Gaseous discomfort- walking, belching will help relieve any discomfort  You may not operate a vehicle for 12 hours  You may not engage in an occupation involving machinery or appliances for the  rest of today  You may not drink alcoholic beverages for at least 12 hours  Avoid making any critical decisions for at least 24 hours    DIET:   You may resume your normal diet, but some patients find that heavy or large  meals may lead to indigestion or vomiting. I suggest a light meal as first food  intake. I recommend a whole food, plant-based diet for your overall health. ACTIVITY:  You may resume your normal daily activities. It is recommended that you spend the remainder of the day resting - avoid any strenuous activity. CALL M.D. IF ANY SIGN OF:   Increasing pain, nausea, vomiting  Abdominal distension (swelling)  Significant bleeding (oral or rectal)  Fever   Pain in chest area  Shortness of breath    Additional Instructions:   Call Dr. Danae De La Cruz if any questions or problems at [phone]   You should receive the biopsy results by phone or mail within 3 weeks, if not, call  my office for the results      Should have a repeat colonoscopy in 5-10 years based on pathology with Dr. Audra Espitia. Colonoscopy showed 2 polyps removed and diverticulosis.

## 2023-02-15 NOTE — PROGRESS NOTES

## 2023-02-15 NOTE — PROCEDURES
Saurabh Tavares [facility] [address] Giovana Hartman M.D.  Ian Holman, [address] [facility]  [phone]               Colonoscopy Procedure Note    NAME: Jon Colon  :  1970  MRN:  [id number]    Indications:   Screening colonoscopy     : Susy Parisi MD    Referring Provider:  None    Staff: Endoscopy Elvis Boer: Naren Lopez  Endoscopy RN-1: Rogerio Ahn RN    Prosthetic devices, grafts, tissues, transplant, or devices implanted: none    Medicines:  MAC anesthesia      Procedure Details:  After informed consent was obtained with all risks and benefits of the procedure explained and preprocedure exam completed, the patient was placed in the left lateral decubitus position. Universal protocol for patient identification was performed and documented in the nursing notes. Throughout the procedure, the patient's blood pressure was monitored at least every five minutes; pulse, and oxygen saturations were monitored continuously. All vital signs were documented in the nursing notes. A digital rectal exam was performed and was normal.  The Olympus videocolonoscope  was inserted in the rectum and carefully advanced to the cecum, which was identified by the ileocecal valve and appendiceal orifice. The colonoscope was slowly withdrawn with careful evaluation between folds. Retroflexion in the rectum was performed; findings and interventions are described below. Procedure start time, extent reached time/cecum time, and procedure end time are documented in the nursing notes. The quality of preparation was adequate.        Findings:   2 sessile polyps with greatest diameter of 5 mm (1 in the ascending, 1 in the sigmoid colon) s/p cold snare polypectomy  Redundant colon  Mild sigmoid diverticulosis    Interventions:    2 complete polypectomy were performed using cold snare  and the polyps were  retrieved    Specimens:   ID Type Source Tests Collected by Time Destination   1 : ascending colon polyp Preservative Colon, Ascending  Daisha Coleman MD 2/15/2023 1457 Pathology   2 : sigmoid colon polyp Preservative Sigmoid  Daisha Coleman MD 2/15/2023 1505 Pathology       EBL:  None. Complications:   No immediate complications     Impression:  -See post-procedure diagnoses. Recommendations:   -If adenoma is present, repeat colonoscopy in 5-7 years with Golytely bowel prep with Dr. Audra Espitia. If < 10 years, reason:  above average risk patient    Resume normal medication(s). Signed by:  Lobito Mcdowell MD          2/15/2023  3:11 PM

## 2023-02-15 NOTE — H&P
[address] [place] [address]          Pre-procedure History and Physical       NAME:  Yeni Garland   :   1970   MRN:   [id number]     CHIEF COMPLAINT/HPI: crcs    PMH:  Past Medical History:   Diagnosis Date    Anxiety and depression     Diabetes ([facility]     since age 23; on insulin     GERD (gastroesophageal reflux disease)     Heart failure ([facility]     HTN (hypertension)     Hypercholesteremia     Hyperlipidemia     Thromboembolus ([facility]        PSH:  Past Surgical History:   Procedure Laterality Date    HX AMPUTATION Left     toes- left foot, then MRSA     HX AMPUTATION Left 2017    BKA    HX BUNIONECTOMY      HX ORTHOPAEDIC      boutinerre deformity    HX ORTHOPAEDIC      fascia removed left foot    HX OTHER SURGICAL  2017    Skin graft Surgery    HX TONSILLECTOMY      HX WISDOM TEETH EXTRACTION         Allergies: Allergies   Allergen Reactions    Ace Inhibitors Swelling     Facial swelling cough     Morphine Angioedema    Arb-Angiotensin Receptor Antagonist Angioedema    Lipitor [Atorvastatin] Swelling       Home Medications:  Prior to Admission Medications   Prescriptions Last Dose Informant Patient Reported? Taking? Blood-Glucose Meter monitoring kit   No No   Sig: Please check your sugars 3-4x daily   Insulin Needles, Disposable, 31 gauge x 5/16\" ndle   No No   Sig: Use four timed daily w insulin   Insulin Syringe-Needle U-100 (BD Insulin Syringe) 1 mL 27 gauge x 1/2\" syrg   No No   Sig: Use BID w novolog Dx E11.9   atorvastatin (LIPITOR) 20 mg tablet Not Taking  No No   Sig: Take 1 Tablet by mouth daily. Patient not taking: Reported on 2/15/2023   cholecalciferol (VITAMIN D3) (5000 Units /125 mcg) capsule 2023  No Yes   Sig: Take 1 Capsule by mouth daily. cloNIDine HCL (CATAPRES) 0.2 mg tablet 2023  No Yes   Sig: Take 1 Tablet by mouth three (3) times daily.    esomeprazole (NEXIUM) 20 mg capsule Not Taking  Yes No   Sig: Take  by mouth daily. Patient not taking: Reported on 2/15/2023   ferrous sulfate 325 mg (65 mg iron) tablet 2023  Yes Yes   Sig: Take  by mouth Daily (before breakfast). glucose blood VI test strips (ASCENSIA AUTODISC VI, ONE TOUCH ULTRA TEST VI) strip   No No   Sig: Please check your sugars 3-4x daily   hydrALAZINE (APRESOLINE) 100 mg tablet 2023  No Yes   Sig: Take 1 Tablet by mouth three (3) times daily. insulin lispro (HUMALOG) 100 unit/mL injection 2023  Yes Yes   Si Units by SubCUTAneous route. Prior to meals   lancets misc   No No   Sig: Please check your sugars 3-4x daily   sodium bicarbonate 650 mg tablet 2023  No Yes   Sig: Take 1 Tablet by mouth three (3) times daily.       Facility-Administered Medications: None       Hospital Medications:  Current Facility-Administered Medications   Medication Dose Route Frequency    0.9% sodium chloride infusion  150 mL/hr IntraVENous CONTINUOUS    sodium chloride (NS) flush 5-40 mL  5-40 mL IntraVENous Q8H    sodium chloride (NS) flush 5-40 mL  5-40 mL IntraVENous PRN    midazolam (VERSED) injection 0.25-5 mg  0.25-5 mg IntraVENous Multiple    fentaNYL citrate (PF) injection 200 mcg  200 mcg IntraVENous Multiple    simethicone (MYLICON) 02YM/5.3YH oral drops 80 mg  1.2 mL Oral Multiple    atropine injection 0.5 mg  0.5 mg IntraVENous ONCE PRN    EPINEPHrine (ADRENALIN) 0.1 mg/mL syringe 1 mg  1 mg Endoscopically ONCE PRN     Facility-Administered Medications Ordered in Other Encounters   Medication Dose Route Frequency    0.9% sodium chloride infusion   IntraVENous CONTINUOUS       Family History:  Family History   Problem Relation Age of Onset    Hypertension Mother     High Cholesterol Mother     Seizures Brother     Heart Disease Brother     Other Brother         polyps        Social History:  Social History     Tobacco Use    Smoking status: Former     Packs/day: 0.25     Years: 3.00     Pack years: 0.75     Types: Cigars, Cigarettes     Quit date: 2011     Years since quittin.8    Smokeless tobacco: Never   Substance Use Topics    Alcohol use: Not Currently     Types: 1 Glasses of wine per week     Comment: holidays       The patient was counseled at length about the risks of wendy Covid-19 in the benajmin-operative and post-operative states including the recovery window of their procedure. The patient was made aware that wendy Covid-19 after a surgical procedure may worsen their prognosis for recovering from the virus and lend to a higher morbidity and or mortality risk. The patient was given the options of postponing their procedure. All of the risks, benefits, and alternatives were discussed. The patient does  wish to proceed with the procedure. PHYSICAL EXAM PRIOR TO SEDATION:  General: Alert, in no acute distress    Lungs:            CTA bilaterally  Heart:  Normal S1, S2    Abdomen: Soft, Non distended, Non tender. Normoactive bowel sounds. Assessment:   Stable for sedation administration.   Date of last colonoscopy: none, Polyps  No    Plan:     Endoscopic procedure with sedation     Signed By: Jennifer Clancy MD     2/15/2023  2:37 PM

## 2023-02-15 NOTE — ANESTHESIA PREPROCEDURE EVALUATION
Anesthetic History   No history of anesthetic complications            Review of Systems / Medical History  Patient summary reviewed, nursing notes reviewed and pertinent labs reviewed    Pulmonary  Within defined limits                 Neuro/Psych         Psychiatric history     Cardiovascular    Hypertension      CHF    Hyperlipidemia    Exercise tolerance: >4 METS     GI/Hepatic/Renal     GERD    Renal disease: ARF       Endo/Other    Diabetes: using insulin    Obesity and anemia     Other Findings   Comments: Osteomyelitis   Sepsis            Physical Exam    Airway  Mallampati: I  TM Distance: 4 - 6 cm  Neck ROM: normal range of motion   Mouth opening: Normal     Cardiovascular  Regular rate and rhythm,  S1 and S2 normal,  no murmur, click, rub, or gallop             Dental    Dentition: Upper partial plate and Lower partial plate     Pulmonary  Breath sounds clear to auscultation               Abdominal  GI exam deferred       Other Findings            Anesthetic Plan    ASA: 3  Anesthesia type: MAC          Induction: Intravenous  Anesthetic plan and risks discussed with: Patient      Propofol MAC

## 2023-03-07 NOTE — ED PROVIDER NOTES
HPI Comments: David Vitale is a 51 y.o. male, pmhx significant for DM, HLD, HTN, L metatarsectomy, DVT (no currently on xarelto) who presents ambulatory to the ED c/o gradually worsening dyspnea on exertion with associated BLE swelling (L worse than at baseline, R new onset), dry cough and wheezing x 2 days. Pt reports that his SOB are resolved by laying down. Pt states that he was admitted for 4 days in May 2017 for osteomyelitis and diabetic ulcer to his L foot for which he was placed on PICC line abx. Pt does not have a PICC line any more and is no longer taking any abx. He does still have an open wound to his L foot for which he changes the dressing for frequently. Of note, pt reports that his BGL has been fluctuating recently. He additionally reports that his weight has also been fluctuating recently within a 30lb window. Pt specifically denies hx of similar sxs, hx of asthma, hx of COPD, cardiac hx, CP, fever, taking blood thinners. PCP: Viri Lee MD      Social Hx: -tobacco (former, quit date: 4/22/2011), +EtOH (1 glass of wine/week), -Illicit Drugs   FHx: no pertinent family hx   Medication Allergies: morphine      There are no other complaints, changes, or physical findings at this time. The history is provided by the patient.         Past Medical History:   Diagnosis Date    Diabetes ([facility] [address]     since age 23    DM (diabetes mellitus) ([facility]     HTN (hypertension)     Hypercholesteremia     Hyperlipidemia        Past Surgical History:   Procedure Laterality Date    HX AMPUTATION      toes- left foot    HX BUNIONECTOMY      HX ORTHOPAEDIC      boutinerre deformity    HX ORTHOPAEDIC      fascia removed left foot    HX OTHER SURGICAL  03/2017    Skin graft Surgery    HX TONSILLECTOMY      HX WISDOM TEETH EXTRACTION           Family History:   Problem Relation Age of Onset    Hypertension Mother     High Cholesterol Mother        Social History     Social History    Marital Plan of care reviewed with pt. Verbalizes understanding.   status:      Spouse name: N/A    Number of children: N/A    Years of education: N/A     Occupational History    Not on file. Social History Main Topics    Smoking status: Former Smoker     Types: Cigars     Quit date: 4/22/2011    Smokeless tobacco: Never Used    Alcohol use 0.0 oz/week     1 Glasses of wine, 0 Standard drinks or equivalent per week      Comment: stop drinking 5 months ago    Drug use: No    Sexual activity: Yes     Partners: Female     Birth control/ protection: None     Other Topics Concern    Not on file     Social History Narrative    ** Merged History Encounter **              ALLERGIES: Ace inhibitors and Morphine    Review of Systems   Constitutional: Negative for chills, fatigue and fever. HENT: Negative for congestion and rhinorrhea. Eyes: Negative for visual disturbance. Respiratory: Positive for cough (dry), shortness of breath (on exertion) and wheezing. Cardiovascular: Positive for leg swelling (BLE, L is greater than at baseline, R is new onset). Negative for chest pain and palpitations. Gastrointestinal: Negative for abdominal distention, abdominal pain, constipation, diarrhea, nausea and vomiting. Endocrine: Negative. Genitourinary: Negative for difficulty urinating and dysuria. Musculoskeletal: Negative. Skin: Negative for rash. Neurological: Negative for dizziness, weakness and light-headedness. Psychiatric/Behavioral: Negative for suicidal ideas. All other systems reviewed and are negative. Patient Vitals for the past 12 hrs:   Temp Pulse Resp BP SpO2   07/21/17 1715 - 97 18 (!) 144/93 93 %   07/21/17 1700 - 98 26 (!) 149/102 95 %   07/21/17 1658 - 98 26 (!) 138/95 95 %   07/21/17 1643 98.7 °F (37.1 °C) - 26 (!) 147/94 100 %       Physical Exam   Constitutional: He is oriented to person, place, and time. He appears well-developed and well-nourished. No distress. HENT:   Head: Normocephalic and atraumatic.    Mouth/Throat: Oropharynx is clear and moist.   Eyes: Conjunctivae and EOM are normal.   Neck: Neck supple. No JVD present. No tracheal deviation present. Cardiovascular: Normal rate, regular rhythm and intact distal pulses. Exam reveals no gallop and no friction rub. No murmur heard. Pulmonary/Chest: Effort normal and breath sounds normal. No stridor. Tachypnea noted. No respiratory distress. He has no wheezes. Appears SOB   Abdominal: Soft. Bowel sounds are normal. He exhibits no distension and no mass. There is no tenderness. There is no guarding. Musculoskeletal: Normal range of motion. He exhibits edema (3+ edema BLE). He exhibits no tenderness. No deformity  S/p L metatarsectomy with dressings clean dry and in place    Neurological: He is alert and oriented to person, place, and time. He has normal strength. No focal deficits   Skin: Skin is warm, dry and intact. No rash noted. Psychiatric: He has a normal mood and affect. His behavior is normal. Judgment and thought content normal.   Nursing note and vitals reviewed. MDM  Number of Diagnoses or Management Options  Elevated troponin:   Pulmonary embolism, other [facility]):   Diagnosis management comments: Pt presenting with shortness of breath and bilateral peripheral edema for the past 2 days. Pt with a hx of DVT, has no cardiac hx. DDx includes congestive heart failure, acs, pneumonia, dvt, PE. Will check labs, cardiac enzymes, ekg, CXR, CTA chest.        Amount and/or Complexity of Data Reviewed  Tests in the radiology section of CPT®: reviewed  Tests in the medicine section of CPT®: reviewed and ordered  Review and summarize past medical records: yes  Discuss the patient with other providers: yes (Cardiology )  Independent visualization of images, tracings, or specimens: yes    Patient Progress  Patient progress: stable    ED Course       Procedures   EKG interpretation: (Preliminary) 1652  Rhythm: normal sinus rhythm; and regular .  Rate (approx.): 99 bpm; Axis: normal; NE interval: normal; QRS interval: normal ; ST/T wave: T wave inverted; in anterior leads. Written by Marlys Rhoades ED Scribe as dictated by Zoraida Carbone DO      CONSULT NOTE:   6:51 PM  Zoraida Carbone DO spoke with Dr. Cindy Barry,   Specialty: Cardiology  Discussed pt's hx, disposition, and available diagnostic and imaging results. Reviewed care plans. Consultant recommends admit to hospitalist and rule out PE. Written by Marlys Rhoades, ED Scribe, as dictated by Zoraida Carbone DO.      CONSULT NOTE:   9:00 PM  Zoraida Carbone DO spoke with Dr. Lisa Horn,   Specialty: Hospitalist  Discussed pt's hx, disposition, and available diagnostic and imaging results. Reviewed care plans. Consultant will evaluate pt for admission. Written by Marlys Rhoades ED Scribe, as dictated by Zoraida Carbone DO.    9:01 PM  Pt has been updated on the plan of care. Written by Marlys Rhoades ED Scribe as dictated by Zoraida Carbone DO    CRITICAL CARE NOTE :      IMPENDING DETERIORATION -Respiratory and Cardiovascular    ASSOCIATED RISK FACTORS - Hypotension, Hypoxia and Dysrhythmia    MANAGEMENT- Bedside Assessment and Supervision of Care    INTERPRETATION -  Xrays, CT Scan, ECG and Blood Pressure    INTERVENTIONS - hemodynamic mngmt and vascular control    CASE REVIEW - Hospitalist, Medical Sub-Specialist and Nursing    TREATMENT RESPONSE -Unchanged     PERFORMED BY - Self        NOTES   :      I have spent 65 minutes of critical care time involved in lab review, consultations with specialist, family decision- making, bedside attention and documentation. During this entire length of time I was immediately available to the patient .     Zoraida Carbone DO                                                  ADMIT NOTE:  LABORATORY TESTS:  Recent Results (from the past 12 hour(s))   EKG, 12 LEAD, INITIAL    Collection Time: 07/21/17  4:52 PM   Result Value Ref Range    Ventricular Rate 99 BPM    Atrial Rate 99 BPM    P-R Interval 132 ms    QRS Duration 86 ms    Q-T Interval 386 ms    QTC Calculation (Bezet) 495 ms    Calculated P Axis 55 degrees    Calculated R Axis 50 degrees    Calculated T Axis 52 degrees    Diagnosis       Normal sinus rhythm  Minimal voltage criteria for LVH, may be normal variant  ST & T wave abnormality, consider anterior ischemia  Prolonged QT  Abnormal ECG  When compared with ECG of 28-MAY-2017 09:58,  T wave inversion now evident in Anterior leads  QT has lengthened     METABOLIC PANEL, COMPREHENSIVE    Collection Time: 07/21/17  5:09 PM   Result Value Ref Range    Sodium 137 136 - 145 mmol/L    Potassium 3.2 (L) 3.5 - 5.1 mmol/L    Chloride 101 97 - 108 mmol/L    CO2 25 21 - 32 mmol/L    Anion gap 11 5 - 15 mmol/L    Glucose 163 (H) 65 - 100 mg/dL    BUN 13 6 - 20 MG/DL    Creatinine 1.45 (H) 0.70 - 1.30 MG/DL    BUN/Creatinine ratio 9 (L) 12 - 20      GFR est AA >60 >60 ml/min/1.73m2    GFR est non-AA 52 (L) >60 ml/min/1.73m2    Calcium 7.7 (L) 8.5 - 10.1 MG/DL    Bilirubin, total 0.2 0.2 - 1.0 MG/DL    ALT (SGPT) 22 12 - 78 U/L    AST (SGOT) 15 15 - 37 U/L    Alk.  phosphatase 127 (H) 45 - 117 U/L    Protein, total 6.6 6.4 - 8.2 g/dL    Albumin 1.7 (L) 3.5 - 5.0 g/dL    Globulin 4.9 (H) 2.0 - 4.0 g/dL    A-G Ratio 0.3 (L) 1.1 - 2.2     CK W/ CKMB & INDEX    Collection Time: 07/21/17  5:09 PM   Result Value Ref Range     39 - 308 U/L    CK - MB 1.7 <3.6 NG/ML    CK-MB Index 1.2 0 - 2.5     CBC WITH AUTOMATED DIFF    Collection Time: 07/21/17  5:09 PM   Result Value Ref Range    WBC 13.0 (H) 4.1 - 11.1 K/uL    RBC 4.43 4.10 - 5.70 M/uL    HGB 9.9 (L) 12.1 - 17.0 g/dL    HCT 32.4 (L) 36.6 - 50.3 %    MCV 73.1 (L) 80.0 - 99.0 FL    MCH 22.3 (L) 26.0 - 34.0 PG    MCHC 30.6 30.0 - 36.5 g/dL    RDW 16.6 (H) 11.5 - 14.5 %    PLATELET 609 337 - 949 K/uL    NEUTROPHILS 76 (H) 32 - 75 %    LYMPHOCYTES 20 12 - 49 %    MONOCYTES 3 (L) 5 - 13 %    EOSINOPHILS 1 0 - 7 % BASOPHILS 0 0 - 1 %    ABS. NEUTROPHILS 9.9 (H) 1.8 - 8.0 K/UL    ABS. LYMPHOCYTES 2.6 0.8 - 3.5 K/UL    ABS. MONOCYTES 0.4 0.0 - 1.0 K/UL    ABS. EOSINOPHILS 0.1 0.0 - 0.4 K/UL    ABS. BASOPHILS 0.0 0.0 - 0.1 K/UL    DF SMEAR SCANNED      RBC COMMENTS ANISOCYTOSIS  1+        RBC COMMENTS MICROCYTOSIS  1+        RBC COMMENTS HYPOCHROMIA  1+        RBC COMMENTS SCHISTOCYTES  1+       NT-PRO BNP    Collection Time: 07/21/17  5:09 PM   Result Value Ref Range    NT pro-BNP 4074 (H) 0 - 125 PG/ML   TROPONIN I    Collection Time: 07/21/17  5:09 PM   Result Value Ref Range    Troponin-I, Qt. 0.10 (H) <0.05 ng/mL       IMAGING RESULTS:  CXR Results  (Last 48 hours)               07/21/17 1956  XR CHEST PA LAT Final result    Impression:  IMPRESSION: No acute process           Narrative:  INDICATION:  SOB        COMPARISON: Earlier today       FINDINGS: PA and lateral views of the chest demonstrate a stable   cardiomediastinal silhouette and clear lungs bilaterally. The visualized osseous   structures are unremarkable. 07/21/17 1718  XR CHEST PORT Final result    Impression:  IMPRESSION: No evidence of acute cardiopulmonary process. Narrative:  INDICATION:  dyspnea        COMPARISON: 5/28/2017       FINDINGS: Single AP portable view of the chest obtained at 1707 demonstrates a   stable cardiomediastinal silhouette. The lungs are hypoinspiratory but clear   bilaterally. No osseous abnormalities are seen. CT Results  (Last 48 hours)               07/21/17 2018  CTA CHEST W OR W WO CONT Final result    Impression:  IMPRESSION:   1. Extensive bilateral PE.   2.  Clear lungs. Narrative:  INDICATION:   Shortness of breath        COMPARISON:  None       TECHNIQUE:  Following the uneventful intravenous administration of 100 cc Isovue   929, thin helical axial images were obtained through the chest. Postprocessing   was performed. 3D image postprocessing was performed.        CT dose reduction was achieved through the use of a standardized protocol   tailored for this examination and automatic exposure control for dose   modulation. FINDINGS:       There are no enlarged mediastinal or hilar lymph nodes. The heart size is   normal.  There is no pericardial effusion. There is extensive bilateral PE with a large clot burden. This extends into the   mid to distal aspects of the main right lateral pulmonary arteries and extends   into the lobar and segmental branches of both lower lobes, both upper lobes, and   the right middle lobe. The aorta is normal in caliber. There is no focal air space disease. No pulmonary nodule or mass is seen. There   are no pleural effusions. Limited evaluation of the upper abdomen demonstrates no abnormality. The osseous   structures are unremarkable. MEDICATIONS GIVEN:  Medications   enoxaparin (LOVENOX) 140 mg (not administered)   aspirin (ASPIRIN) tablet 325 mg (325 mg Oral Given 7/21/17 1839)   0.9% sodium chloride infusion (50 mL/hr IntraVENous New Bag 7/21/17 2019)   iopamidol (ISOVUE-370) 76 % injection 100 mL (100 mL IntraVENous Given 7/21/17 2019)   sodium chloride (NS) flush 10 mL (10 mL IntraVENous Given 7/21/17 2019)       IMPRESSION:  1. Pulmonary embolism, other (HCC)    2. Elevated troponin        PLAN: Admit to Hospitalist    9:00 PM  Patient is being admitted to the hospital by Dr. Kris Argueta. The results of their tests and reasons for their admission have been discussed with them and/or available family. They convey agreement and understanding for the need to be admitted and for their admission diagnosis. Written by Cristhian Longo ED Scribe, as dictated by Froylan Guerra DO. This note is prepared by Lolly Carrillo acting as scribe for eDirdre Hoover DO : The scribe's documentation has been prepared under my direction and personally reviewed by me in its entirety.  I confirm that the note above accurately reflects all work, treatment, procedures, and medical decision making performed by me.

## 2023-03-13 ENCOUNTER — TRANSCRIBE ORDER (OUTPATIENT)
Dept: SCHEDULING | Age: 53
End: 2023-03-13

## 2023-03-13 DIAGNOSIS — N18.5 CHRONIC KIDNEY DISEASE, STAGE V (HCC): Primary | ICD-10-CM

## 2023-03-13 DIAGNOSIS — I10 ESSENTIAL HYPERTENSION, MALIGNANT: ICD-10-CM

## 2023-04-23 DIAGNOSIS — N18.5 CHRONIC KIDNEY DISEASE, STAGE V (HCC): Primary | ICD-10-CM

## 2023-04-25 ENCOUNTER — HOSPITAL ENCOUNTER (OUTPATIENT)
Dept: PREADMISSION TESTING | Age: 53
Discharge: HOME | End: 2023-04-25
Payer: MEDICARE

## 2023-04-25 VITALS
TEMPERATURE: 98.8 F | DIASTOLIC BLOOD PRESSURE: 71 MMHG | HEIGHT: 75 IN | HEART RATE: 65 BPM | RESPIRATION RATE: 18 BRPM | WEIGHT: 199.52 LBS | BODY MASS INDEX: 24.81 KG/M2 | SYSTOLIC BLOOD PRESSURE: 160 MMHG | OXYGEN SATURATION: 100 %

## 2023-04-25 LAB
ALBUMIN SERPL-MCNC: 3.1 G/DL (ref 3.5–5)
ALBUMIN/GLOB SERPL: 0.8 (ref 1.1–2.2)
ALP SERPL-CCNC: 79 U/L (ref 45–117)
ALT SERPL-CCNC: 17 U/L (ref 12–78)
ANION GAP SERPL CALC-SCNC: 8 MMOL/L (ref 5–15)
AST SERPL-CCNC: 10 U/L (ref 15–37)
BILIRUB SERPL-MCNC: 0.4 MG/DL (ref 0.2–1)
BUN SERPL-MCNC: 61 MG/DL (ref 6–20)
BUN/CREAT SERPL: 10 (ref 12–20)
CALCIUM SERPL-MCNC: 8.2 MG/DL (ref 8.5–10.1)
CHLORIDE SERPL-SCNC: 106 MMOL/L (ref 97–108)
CO2 SERPL-SCNC: 24 MMOL/L (ref 21–32)
CREAT SERPL-MCNC: 5.96 MG/DL (ref 0.7–1.3)
ERYTHROCYTE [DISTWIDTH] IN BLOOD BY AUTOMATED COUNT: 14.4 % (ref 11.5–14.5)
GLOBULIN SER CALC-MCNC: 4.1 G/DL (ref 2–4)
GLUCOSE SERPL-MCNC: 210 MG/DL (ref 65–100)
HCT VFR BLD AUTO: 29.7 % (ref 36.6–50.3)
HGB BLD-MCNC: 8.9 G/DL (ref 12.1–17)
MCH RBC QN AUTO: 24.8 PG (ref 26–34)
MCHC RBC AUTO-ENTMCNC: 30 G/DL (ref 30–36.5)
MCV RBC AUTO: 82.7 FL (ref 80–99)
NRBC # BLD: 0 K/UL (ref 0–0.01)
NRBC BLD-RTO: 0 PER 100 WBC
PLATELET # BLD AUTO: 269 K/UL (ref 150–400)
PMV BLD AUTO: 10.6 FL (ref 8.9–12.9)
POTASSIUM SERPL-SCNC: 4.3 MMOL/L (ref 3.5–5.1)
PROT SERPL-MCNC: 7.2 G/DL (ref 6.4–8.2)
RBC # BLD AUTO: 3.59 M/UL (ref 4.1–5.7)
SODIUM SERPL-SCNC: 138 MMOL/L (ref 136–145)
WBC # BLD AUTO: 7.7 K/UL (ref 4.1–11.1)

## 2023-04-25 PROCEDURE — 36415 COLL VENOUS BLD VENIPUNCTURE: CPT

## 2023-04-25 PROCEDURE — 85027 COMPLETE CBC AUTOMATED: CPT

## 2023-04-25 PROCEDURE — 80053 COMPREHEN METABOLIC PANEL: CPT

## 2023-04-25 RX ORDER — VANCOMYCIN/0.9 % SOD CHLORIDE 1.5G/250ML
1500 PLASTIC BAG, INJECTION (ML) INTRAVENOUS ONCE
Status: CANCELLED | OUTPATIENT
Start: 2023-04-27 | End: 2023-04-27

## 2023-04-25 NOTE — PERIOP NOTES
[facility]  Preoperative Instructions        Surgery Date 04/27/23          Time of Arrival to be called @[phone]     1. On the day of your surgery, please report to the Surgical Services Registration Desk and sign in at your designated time. The Surgery Center is located to the right of the Emergency Room. 2. You must have someone with you to drive you home. You should not drive a car for 24 hours following surgery. Please make arrangements for a friend or family member to stay with you for the first 24 hours after your surgery. 3. Do not have anything to eat or drink (including water, gum, mints, coffee, juice) after midnight . This may not apply to medications prescribed by your physician. ?(Please note below the special instructions with medications to take the morning of your procedure.)    4. We recommend you do not drink any alcoholic beverages for 24 hours before and after your surgery. 5. Contact your surgeons office for instructions on the following medications: non-steroidal anti-inflammatory drugs (i.e. Advil, Aleve), vitamins, and supplements. (Some surgeons will want you to stop these medications prior to surgery and others may allow you to take them)  **If you are currently taking Plavix, Coumadin, Aspirin and/or other blood-thinning agents, contact your surgeon for instructions. ** Your surgeon will partner with the physician prescribing these medications to determine if it is safe to stop or if you need to continue taking. Please do not stop taking these medications without instructions from your surgeon    6. Wear comfortable clothes. Wear glasses instead of contacts. Do not bring any money or jewelry. Please bring picture ID, insurance card, and any prearranged co-payment or hospital payment. Do not wear make-up, particularly mascara the morning of your surgery. Do not wear nail polish, particularly if you are having foot /hand surgery.   Wear your hair loose or down, no ponytails, buns, abebe pins or clips. All body piercings must be removed. Please shower with antibacterial soap for three consecutive days before and on the morning of surgery, but do not apply any lotions, powders or deodorants after the shower on the day of surgery. Please use a fresh towels after each shower. Please sleep in clean clothes and change bed linens the night before surgery. Please do not shave for 48 hours prior to surgery. Shaving of the face is acceptable. 7. You should understand that if you do not follow these instructions your surgery may be cancelled. If your physical condition changes (I.e. fever, cold or flu) please contact your surgeon as soon as possible. 8. It is important that you be on time. If a situation occurs where you may be late, please call [phone] (OR Holding Area). 9. If you have any questions and or problems, please call [phone] (Pre-admission Testing). 10. Your surgery time may be subject to change. You will receive a phone call the evening prior if your time changes. 11.  If having outpatient surgery, you must have someone to drive you here, stay with you during the duration of your stay, and to drive you home at time of discharge. Hold all vitamins, supplements, aspirin and NSAIDS 5 days prior to surgery     TAKE ALL MEDICATIONS DAY OF SURGERY. I understand a pre-operative phone call will be made to verify my surgery time. In the event that I am not available, I give permission for a message to be left on my answering service and/or with another person?   yes         ___________________      __________   _________    (Signature of Patient)             (Witness)                (Date and Time)

## 2023-04-27 ENCOUNTER — HOSPITAL ENCOUNTER (OUTPATIENT)
Age: 53
Setting detail: OUTPATIENT SURGERY
Discharge: HOME | End: 2023-04-27
Attending: SURGERY | Admitting: SURGERY
Payer: MEDICARE

## 2023-04-27 ENCOUNTER — ANESTHESIA EVENT (OUTPATIENT)
Dept: SURGERY | Age: 53
End: 2023-04-27
Payer: MEDICARE

## 2023-04-27 ENCOUNTER — ANESTHESIA (OUTPATIENT)
Dept: SURGERY | Age: 53
End: 2023-04-27
Payer: MEDICARE

## 2023-04-27 VITALS
HEIGHT: 75 IN | RESPIRATION RATE: 17 BRPM | OXYGEN SATURATION: 100 % | HEART RATE: 72 BPM | SYSTOLIC BLOOD PRESSURE: 141 MMHG | WEIGHT: 195.11 LBS | TEMPERATURE: 98 F | BODY MASS INDEX: 24.26 KG/M2 | DIASTOLIC BLOOD PRESSURE: 86 MMHG

## 2023-04-27 DIAGNOSIS — N18.6 ESRD (END STAGE RENAL DISEASE) (HCC): Primary | ICD-10-CM

## 2023-04-27 LAB
GLUCOSE BLD STRIP.AUTO-MCNC: 125 MG/DL (ref 65–117)
GLUCOSE BLD STRIP.AUTO-MCNC: 141 MG/DL (ref 65–117)
SERVICE CMNT-IMP: ABNORMAL
SERVICE CMNT-IMP: ABNORMAL

## 2023-04-27 PROCEDURE — 74011250637 HC RX REV CODE- 250/637: Performed by: SURGERY

## 2023-04-27 PROCEDURE — 77030038692 HC WND DEB SYS IRMX -B: Performed by: SURGERY

## 2023-04-27 PROCEDURE — 74011250636 HC RX REV CODE- 250/636: Performed by: SURGERY

## 2023-04-27 PROCEDURE — 77030002924 HC SUT GORTX WLGO -B: Performed by: SURGERY

## 2023-04-27 PROCEDURE — 74011250636 HC RX REV CODE- 250/636

## 2023-04-27 PROCEDURE — 74011000250 HC RX REV CODE- 250

## 2023-04-27 PROCEDURE — 76210000016 HC OR PH I REC 1 TO 1.5 HR: Performed by: SURGERY

## 2023-04-27 PROCEDURE — C1768 GRAFT, VASCULAR: HCPCS | Performed by: SURGERY

## 2023-04-27 PROCEDURE — 82962 GLUCOSE BLOOD TEST: CPT

## 2023-04-27 PROCEDURE — 76010000153 HC OR TIME 1.5 TO 2 HR: Performed by: SURGERY

## 2023-04-27 PROCEDURE — 77030031139 HC SUT VCRL2 J&J -A: Performed by: SURGERY

## 2023-04-27 PROCEDURE — 77030014008 HC SPNG HEMSTAT J&J -C: Performed by: SURGERY

## 2023-04-27 PROCEDURE — 76060000034 HC ANESTHESIA 1.5 TO 2 HR: Performed by: SURGERY

## 2023-04-27 PROCEDURE — 2709999900 HC NON-CHARGEABLE SUPPLY: Performed by: SURGERY

## 2023-04-27 PROCEDURE — 74011250636 HC RX REV CODE- 250/636: Performed by: ANESTHESIOLOGY

## 2023-04-27 PROCEDURE — 77030002916 HC SUT ETHLN J&J -A: Performed by: SURGERY

## 2023-04-27 PROCEDURE — 77030002987 HC SUT PROL J&J -B: Performed by: SURGERY

## 2023-04-27 PROCEDURE — 77030018673: Performed by: SURGERY

## 2023-04-27 PROCEDURE — 77030002986 HC SUT PROL J&J -A: Performed by: SURGERY

## 2023-04-27 PROCEDURE — 77030008463 HC STPLR SKN PROX J&J -B: Performed by: SURGERY

## 2023-04-27 PROCEDURE — 77030011992 HC AIRWY NASOPHGL TELE -A: Performed by: STUDENT IN AN ORGANIZED HEALTH CARE EDUCATION/TRAINING PROGRAM

## 2023-04-27 DEVICE — GRAFT VASC L40CM DIA7MM EPTFE HEP THN WALLED PROPATEN: Type: IMPLANTABLE DEVICE | Site: ARM | Status: FUNCTIONAL

## 2023-04-27 RX ORDER — SODIUM CHLORIDE 9 MG/ML
25 INJECTION, SOLUTION INTRAVENOUS CONTINUOUS
Status: DISCONTINUED | OUTPATIENT
Start: 2023-04-28 | End: 2023-04-27 | Stop reason: HOSPADM

## 2023-04-27 RX ORDER — PROPOFOL 10 MG/ML
INJECTION, EMULSION INTRAVENOUS
Status: DISCONTINUED | OUTPATIENT
Start: 2023-04-27 | End: 2023-04-27 | Stop reason: HOSPADM

## 2023-04-27 RX ORDER — LIDOCAINE HYDROCHLORIDE 20 MG/ML
INJECTION, SOLUTION EPIDURAL; INFILTRATION; INTRACAUDAL; PERINEURAL AS NEEDED
Status: DISCONTINUED | OUTPATIENT
Start: 2023-04-27 | End: 2023-04-27 | Stop reason: HOSPADM

## 2023-04-27 RX ORDER — PROPOFOL 10 MG/ML
INJECTION, EMULSION INTRAVENOUS AS NEEDED
Status: DISCONTINUED | OUTPATIENT
Start: 2023-04-27 | End: 2023-04-27 | Stop reason: HOSPADM

## 2023-04-27 RX ORDER — FENTANYL CITRATE 50 UG/ML
INJECTION, SOLUTION INTRAMUSCULAR; INTRAVENOUS AS NEEDED
Status: DISCONTINUED | OUTPATIENT
Start: 2023-04-27 | End: 2023-04-27 | Stop reason: HOSPADM

## 2023-04-27 RX ORDER — CEFAZOLIN SODIUM 1 G/3ML
2 INJECTION, POWDER, FOR SOLUTION INTRAMUSCULAR; INTRAVENOUS
Status: COMPLETED | OUTPATIENT
Start: 2023-04-27 | End: 2023-04-27

## 2023-04-27 RX ORDER — MIDAZOLAM HYDROCHLORIDE 1 MG/ML
INJECTION, SOLUTION INTRAMUSCULAR; INTRAVENOUS AS NEEDED
Status: DISCONTINUED | OUTPATIENT
Start: 2023-04-27 | End: 2023-04-27 | Stop reason: HOSPADM

## 2023-04-27 RX ORDER — ROPIVACAINE HYDROCHLORIDE 5 MG/ML
INJECTION, SOLUTION EPIDURAL; INFILTRATION; PERINEURAL AS NEEDED
Status: DISCONTINUED | OUTPATIENT
Start: 2023-04-27 | End: 2023-04-27 | Stop reason: HOSPADM

## 2023-04-27 RX ORDER — PHENYLEPHRINE HCL IN 0.9% NACL 0.4MG/10ML
SYRINGE (ML) INTRAVENOUS AS NEEDED
Status: DISCONTINUED | OUTPATIENT
Start: 2023-04-27 | End: 2023-04-27 | Stop reason: HOSPADM

## 2023-04-27 RX ORDER — SODIUM CHLORIDE, SODIUM LACTATE, POTASSIUM CHLORIDE, CALCIUM CHLORIDE 600; 310; 30; 20 MG/100ML; MG/100ML; MG/100ML; MG/100ML
25 INJECTION, SOLUTION INTRAVENOUS CONTINUOUS
Status: DISCONTINUED | OUTPATIENT
Start: 2023-04-27 | End: 2023-04-27 | Stop reason: HOSPADM

## 2023-04-27 RX ORDER — EPHEDRINE SULFATE/0.9% NACL/PF 50 MG/5 ML
SYRINGE (ML) INTRAVENOUS AS NEEDED
Status: DISCONTINUED | OUTPATIENT
Start: 2023-04-27 | End: 2023-04-27 | Stop reason: HOSPADM

## 2023-04-27 RX ORDER — FENTANYL CITRATE 50 UG/ML
25 INJECTION, SOLUTION INTRAMUSCULAR; INTRAVENOUS
Status: DISCONTINUED | OUTPATIENT
Start: 2023-04-27 | End: 2023-04-27 | Stop reason: HOSPADM

## 2023-04-27 RX ORDER — SODIUM CHLORIDE 0.9 % (FLUSH) 0.9 %
5-40 SYRINGE (ML) INJECTION EVERY 8 HOURS
Status: DISCONTINUED | OUTPATIENT
Start: 2023-04-27 | End: 2023-04-27 | Stop reason: HOSPADM

## 2023-04-27 RX ORDER — HEPARIN SODIUM 1000 [USP'U]/ML
INJECTION, SOLUTION INTRAVENOUS; SUBCUTANEOUS AS NEEDED
Status: DISCONTINUED | OUTPATIENT
Start: 2023-04-27 | End: 2023-04-27 | Stop reason: HOSPADM

## 2023-04-27 RX ORDER — SODIUM CHLORIDE 0.9 % (FLUSH) 0.9 %
5-40 SYRINGE (ML) INJECTION AS NEEDED
Status: DISCONTINUED | OUTPATIENT
Start: 2023-04-27 | End: 2023-04-27 | Stop reason: HOSPADM

## 2023-04-27 RX ORDER — VANCOMYCIN/0.9 % SOD CHLORIDE 1.5G/250ML
1500 PLASTIC BAG, INJECTION (ML) INTRAVENOUS ONCE
Status: COMPLETED | OUTPATIENT
Start: 2023-04-27 | End: 2023-04-27

## 2023-04-27 RX ORDER — DIPHENHYDRAMINE HYDROCHLORIDE 50 MG/ML
12.5 INJECTION, SOLUTION INTRAMUSCULAR; INTRAVENOUS AS NEEDED
Status: DISCONTINUED | OUTPATIENT
Start: 2023-04-27 | End: 2023-04-27 | Stop reason: HOSPADM

## 2023-04-27 RX ORDER — LIDOCAINE HYDROCHLORIDE 10 MG/ML
0.1 INJECTION, SOLUTION EPIDURAL; INFILTRATION; INTRACAUDAL; PERINEURAL AS NEEDED
Status: DISCONTINUED | OUTPATIENT
Start: 2023-04-27 | End: 2023-04-27 | Stop reason: HOSPADM

## 2023-04-27 RX ORDER — DEXMEDETOMIDINE HYDROCHLORIDE 100 UG/ML
INJECTION, SOLUTION INTRAVENOUS AS NEEDED
Status: DISCONTINUED | OUTPATIENT
Start: 2023-04-27 | End: 2023-04-27 | Stop reason: HOSPADM

## 2023-04-27 RX ORDER — PROTAMINE SULFATE 10 MG/ML
INJECTION, SOLUTION INTRAVENOUS AS NEEDED
Status: DISCONTINUED | OUTPATIENT
Start: 2023-04-27 | End: 2023-04-27 | Stop reason: HOSPADM

## 2023-04-27 RX ORDER — SODIUM CHLORIDE 9 MG/ML
INJECTION, SOLUTION INTRAVENOUS
Status: DISCONTINUED | OUTPATIENT
Start: 2023-04-27 | End: 2023-04-27 | Stop reason: HOSPADM

## 2023-04-27 RX ORDER — HYDROCODONE BITARTRATE AND ACETAMINOPHEN 5; 325 MG/1; MG/1
1 TABLET ORAL
Qty: 25 TABLET | Refills: 0 | Status: SHIPPED | OUTPATIENT
Start: 2023-04-27 | End: 2023-05-04

## 2023-04-27 RX ADMIN — HEPARIN SODIUM 7500 UNITS: 1000 INJECTION, SOLUTION INTRAVENOUS; SUBCUTANEOUS at 14:37

## 2023-04-27 RX ADMIN — VANCOMYCIN HYDROCHLORIDE 1500 MG: 10 INJECTION, POWDER, LYOPHILIZED, FOR SOLUTION INTRAVENOUS at 12:49

## 2023-04-27 RX ADMIN — DEXMEDETOMIDINE HYDROCHLORIDE 6 MCG: 100 INJECTION, SOLUTION, CONCENTRATE INTRAVENOUS at 14:08

## 2023-04-27 RX ADMIN — ROPIVACAINE HYDROCHLORIDE 10 ML: 5 INJECTION, SOLUTION EPIDURAL; INFILTRATION; PERINEURAL at 13:00

## 2023-04-27 RX ADMIN — PROPOFOL 50 MG: 10 INJECTION, EMULSION INTRAVENOUS at 14:00

## 2023-04-27 RX ADMIN — MIDAZOLAM HYDROCHLORIDE 2 MG: 1 INJECTION, SOLUTION INTRAMUSCULAR; INTRAVENOUS at 12:54

## 2023-04-27 RX ADMIN — PROPOFOL 125 MCG/KG/MIN: 10 INJECTION, EMULSION INTRAVENOUS at 14:00

## 2023-04-27 RX ADMIN — LIDOCAINE HYDROCHLORIDE 100 MG: 20 INJECTION, SOLUTION EPIDURAL; INFILTRATION; INTRACAUDAL; PERINEURAL at 14:00

## 2023-04-27 RX ADMIN — Medication 160 MCG: at 15:36

## 2023-04-27 RX ADMIN — Medication 3 AMPULE: at 12:49

## 2023-04-27 RX ADMIN — PROPOFOL 40 MG: 10 INJECTION, EMULSION INTRAVENOUS at 15:14

## 2023-04-27 RX ADMIN — PROPOFOL 20 MG: 10 INJECTION, EMULSION INTRAVENOUS at 15:13

## 2023-04-27 RX ADMIN — SODIUM CHLORIDE: 0.9 INJECTION, SOLUTION INTRAVENOUS at 13:59

## 2023-04-27 RX ADMIN — PROTAMINE SULFATE 25 MG: 10 INJECTION, SOLUTION INTRAVENOUS at 15:38

## 2023-04-27 RX ADMIN — FENTANYL CITRATE 100 MCG: 50 INJECTION, SOLUTION INTRAMUSCULAR; INTRAVENOUS at 12:54

## 2023-04-27 RX ADMIN — ROPIVACAINE HYDROCHLORIDE 30 ML: 5 INJECTION, SOLUTION EPIDURAL; INFILTRATION; PERINEURAL at 12:58

## 2023-04-27 RX ADMIN — SODIUM CHLORIDE 25 ML/HR: 9 INJECTION, SOLUTION INTRAVENOUS at 12:49

## 2023-04-27 RX ADMIN — CEFAZOLIN 2 G: 1 INJECTION, POWDER, FOR SOLUTION INTRAMUSCULAR; INTRAVENOUS; PARENTERAL at 14:07

## 2023-04-27 RX ADMIN — DEXMEDETOMIDINE HYDROCHLORIDE 6 MCG: 100 INJECTION, SOLUTION, CONCENTRATE INTRAVENOUS at 14:15

## 2023-04-27 RX ADMIN — Medication 20 MG: at 15:36

## 2023-04-27 NOTE — ANESTHESIA PREPROCEDURE EVALUATION
Anesthetic History   No history of anesthetic complications            Review of Systems / Medical History  Patient summary reviewed, nursing notes reviewed and pertinent labs reviewed    Pulmonary  Within defined limits                 Neuro/Psych         Psychiatric history (Anxiety, Depression, Excessive anger)     Cardiovascular    Hypertension      CHF (NICM (nonischemic cardiomyopathy) )  Dysrhythmias   Hyperlipidemia    Exercise tolerance: >4 METS  Comments: Recurrent pulmonary emboli     ECG (2/24/23): Sinus Rhythm  Left Bundle Branch Block  Abnormal ECG    TTE (8/23/19)  Left Ventricle: Normal cavity size. Mild concentric hypertrophy. Low normal systolic function. Estimated left ventricular ejection fraction is 51 - 55%. Age-appropriate left ventricular diastolic function. Pulmonary Artery: Mild pulmonary hypertension.         GI/Hepatic/Renal     GERD    Renal disease: ESRD      Comments: Diabetic gastroparalysis  Endo/Other    Diabetes: type 1, using insulin    Obesity and anemia (Hgb = 8.9 on 4/25/23)     Other Findings   Comments: Non-compliance with treatment    Hx Left BKA (8/28/17)         Physical Exam    Airway  Mallampati: I  TM Distance: 4 - 6 cm  Neck ROM: normal range of motion   Mouth opening: Normal     Cardiovascular  Regular rate and rhythm,  S1 and S2 normal,  no murmur, click, rub, or gallop             Dental    Dentition: Lower partial plate and Full upper dentures     Pulmonary  Breath sounds clear to auscultation               Abdominal  GI exam deferred       Other Findings            Anesthetic Plan    ASA: 4  Anesthesia type: regional and MAC - supraclavicular block          Induction: Intravenous  Anesthetic plan and risks discussed with: Patient

## 2023-04-27 NOTE — PERIOP NOTES
Patient was unable to remove dentures after attempting to remove. He had glued them in this morning. Per CRNA it is ok to leave in & patient was taken to OR with upper & lower dentures in place. Melinda Gonzalez

## 2023-04-27 NOTE — ANESTHESIA PROCEDURE NOTES
Peripheral Block    Start time: 4/27/2023 12:54 PM  End time: 4/27/2023 12:58 PM  Performed by: Rosana Arevalo MD  Authorized by: Rosana Arevalo MD       Pre-procedure: Indications: at surgeon's request and post-op pain management    Preanesthetic Checklist: risks and benefits discussed, site marked and timeout performed    Timeout Time: 12:54 EDT      Block Type:   Block Type:  Supraclavicular  Laterality:  Right  Monitoring:  Standard ASA monitoring, frequent vital sign checks, responsive to questions and oxygen  Injection Technique:  Single shot  Procedures: ultrasound guided    Prep: chlorhexidine    Location:  Supraclavicular  Needle Type:  Stimuplex  Needle Gauge:  22 G  Needle Localization:  Ultrasound guidance  Med Admin Time: 4/27/2023 12:58 PM    Assessment:  Number of attempts:  1  Injection Assessment:  Incremental injection every 5 mL, local visualized surrounding nerve on ultrasound, negative aspiration for blood, ultrasound image on chart, no intravascular symptoms and no paresthesia  Patient tolerance:  Patient tolerated the procedure well with no immediate complications  Supraclavicular Nerve Block Note     Right Supraclavicular nerve block:    Risks, benefits, alternatives explained at length and patient agrees to proceed. Time out performed and site for block/surgery identified. Standard monitors applied, 3 L NC O2, and sedation given as recorded by RN so as to achieve patient comfort and anxiolysis, but maintain meaningful verbal contact. Sterile prep followed by 1-2 mL local at insertion site. A 40 mm, 22G insulated stimiplex needle was inserted in the interscalene groove, approximately one centimeter from the mid-clavicle. The nerve bundle was identified under [address] [facility] guidance. Local anesthetic injected without resistance and with gentle aspiration in 3-5 ml increments. An extremity ring block was performed with 10 ml of 0.5% ropivacaine. Patient tolerated procedure well. No pain, paresthesia, or blood noted. VSS throughout. No complications noted.

## 2023-04-27 NOTE — ANESTHESIA POSTPROCEDURE EVALUATION
Procedure(s):  RIGHT ARM ARTERIO VENOUS GRAFT.    regional, MAC    Anesthesia Post Evaluation      Multimodal analgesia: multimodal analgesia used between 6 hours prior to anesthesia start to PACU discharge  Patient location during evaluation: PACU  Patient participation: complete - patient participated  Level of consciousness: awake and alert  Pain management: adequate  Airway patency: patent  Anesthetic complications: no  Cardiovascular status: acceptable, hemodynamically stable and blood pressure returned to baseline  Respiratory status: acceptable and room air  Hydration status: euvolemic  Post anesthesia nausea and vomiting:  none  Final Post Anesthesia Temperature Assessment:  Normothermia (36.0-37.5 degrees C)      INITIAL Post-op Vital signs:   Vitals Value Taken Time   /86 04/27/23 1701   Temp 36.7 °C (98 °F) 04/27/23 1555   Pulse 71 04/27/23 1717   Resp 8 04/27/23 1707   SpO2 100 % 04/27/23 1715   Vitals shown include unvalidated device data.

## 2023-04-27 NOTE — PERIOP NOTES
1217 - PT DENIES FEVER, COLD, COUGH, SOB, N/V, DIARRHEA. ... PRE-OP TCHING DONE - PT VERBALIZES UNDERSTANDING. STRETCHER IN LOWEST POSITION, CB IN PLACE AND SR UP X2.    1254 - TIMEOUT DONE - DR. Meir Ndiaye AT BEDSIDE TO PLACE RIGHT ARM BLOCK.    1320 - REPORT GIVEN TO Olga Salcido RN

## 2023-04-27 NOTE — PERIOP NOTES
Handoff Report from Operating Room to PACU    Report received from Zuleika Rodriguez RN and MARIN Thomas CRNA regarding Sofi Client. Surgeon(s):  David Leblanc MD  And Procedure(s) (LRB):  RIGHT ARM ARTERIO VENOUS GRAFT (Right)  confirmed   with dressings discussed. Anesthesia type, drugs, patient history, complications, estimated blood loss, vital signs, intake and output, and lines were reviewed. Phase II completed in PACU.      3504 Discharge instructions provided; Reviewed DC instructions with patient. Opportunity for questions and clarifications was provided; verbalized understanding. Follow-up appointments reviewed/written for patient. Pt stable and ambulatory for discharge; Juma Liu (friend) to provide transportation to home. Clarified all personal belongings sent with patient.     IV removed (without difficulty/pt tolerated well) all other ROS negative except as per HPI

## 2023-04-27 NOTE — H&P
History and Physical    Subjective:     Jenny Freitas is a 47 y.o. male who has ESRD and needs AV access. Past Medical History:   Diagnosis Date    Anxiety and depression     Chronic kidney disease     CKD stage IV, hx of ALEX     Diabetes ([facility]     since age 23; on insulin     GERD (gastroesophageal reflux disease)     Heart failure (HCC)     HTN (hypertension)     Hypercholesteremia     Hyperlipidemia     Left bundle branch block     NICM (nonischemic cardiomyopathy) ([facility]     Recurrent pulmonary emboli ([facility]     Thromboembolus ([facility] 2017    following a surgery      Past Surgical History:   Procedure Laterality Date    COLONOSCOPY N/A 2/15/2023    COLONOSCOPY performed by Samia Harrison MD at [facility] ENDOSCOPY    HX AMPUTATION Left     toes- left foot, then MRSA     HX AMPUTATION Left 2017    BKA    HX BUNIONECTOMY      HX ORTHOPAEDIC      boutinerre deformity    HX ORTHOPAEDIC      fascia removed left foot    HX OTHER SURGICAL  2017    Skin graft Surgery    HX TONSILLECTOMY      HX WISDOM TEETH EXTRACTION       Family History   Problem Relation Age of Onset    Hypertension Mother     High Cholesterol Mother     Seizures Brother     Heart Disease Brother     Other Brother         polyps       Social History     Tobacco Use    Smoking status: Former     Packs/day: 0.25     Years: 3.00     Pack years: 0.75     Types: Cigars, Cigarettes     Quit date: 2011     Years since quittin.0    Smokeless tobacco: Never   Substance Use Topics    Alcohol use: Not Currently     Types: 1 Glasses of wine per week     Comment: holidays       Prior to Admission medications    Medication Sig Start Date End Date Taking? Authorizing Provider   sodium bicarbonate 650 mg tablet Take 1 Tablet by mouth three (3) times daily. 23  Yes Spring James PA   hydrALAZINE (APRESOLINE) 100 mg tablet Take 1 Tablet by mouth three (3) times daily.  22  Yes sLeilani MD   cloNIDine HCL (CATAPRES) 0.2 mg tablet Take 1 Tablet by mouth three (3) times daily. 4/1/22  Yes Masters, Gilford Nani, MD   cholecalciferol (VITAMIN D3) (5000 Units /125 mcg) capsule Take 1 Capsule by mouth daily. 3/23/22  Yes Spring James PA   ferrous sulfate 325 mg (65 mg iron) tablet Take  by mouth Daily (before breakfast). Yes Provider, Historical   Blood-Glucose Meter monitoring kit Please check your sugars 3-4x daily 7/31/20  Yes Nicky Osei MD   glucose blood VI test strips (ASCENSIA AUTODISC VI, ONE TOUCH ULTRA TEST VI) strip Please check your sugars 3-4x daily 6/29/20  Yes Jefferson Wilde MD   lancets misc Please check your sugars 3-4x daily  Patient not taking: Reported on 4/27/2023 6/29/20   Jefferson Wilde MD   Insulin Syringe-Needle U-100 (BD Insulin Syringe) 1 mL 27 gauge x 1/2\" syrg Use BID w novolog Dx E11.9  Patient not taking: Reported on 4/27/2023 6/29/20   Jefferson Wilde MD   Insulin Needles, Disposable, 31 gauge x 5/16\" ndle Use four timed daily w insulin  Patient not taking: Reported on 4/27/2023 4/15/19   Roshan Bowman MD     Allergies   Allergen Reactions    Ace Inhibitors Swelling     Facial swelling cough     Morphine Angioedema    Arb-Angiotensin Receptor Antagonist Angioedema    Lipitor [Atorvastatin] Swelling        Review of Systems:  Denies CP/SOB    Objective:     Physical Exam:   Visit Vitals  BP (!) 177/84 (BP 1 Location: Left upper arm, BP Patient Position: At rest)   Pulse 69   Temp 98.6 °F (37 °C)   Resp 15   Ht 6' 3\" (1.905 m)   Wt 88.5 kg (195 lb 1.7 oz)   SpO2 100%   BMI 24.39 kg/m²     General:  Alert, cooperative, no distress, appears stated age. Head:  Normocephalic, without obvious abnormality, atraumatic. Neck: Supple, symmetrical, trachea midline, no adenopathy, thyroid: no enlargement/tenderness/nodules, no carotid bruit and no JVD. Lungs:   Clear to auscultation bilaterally.        Heart:  Regular rate and rhythm, S1, S2 normal, no murmur, click, rub or gallop. Abdomen:   Soft, non-tender. Bowel sounds normal. No masses,  No organomegaly. Extremities: Extremities normal, atraumatic, no cyanosis or edema. Pulses: 2+ and symmetric radial.           Neurologic: CNormal strength, sensation throughout.        Assessment:     ESRD    Plan:     RUE AVG/AVF    Signed By: Faisal Espana MD     April 27, 2023

## 2023-04-27 NOTE — DISCHARGE INSTRUCTIONS
Patient Discharge Instructions    Viky Jc / [id number] : 1970    Admitted 2023 Discharged: 2023     Take Home Medications     It is important that you take the medication exactly as they are prescribed. Keep your medication in the bottles provided by the pharmacist and keep a list of the medication names, dosages, and times to be taken in your wallet. Do not take other medications without consulting your doctor. What to do at [address] [place] [address] [place]: Remove current bandage in 2 days then apply dry dressing to incisions daily or every other day. Recommended diet: Renal    Recommended activity: As Tolerated. No Strenuous activity or heavy lifting with right arm    If you experience any of the following symptoms severe right hand pain, weakness, numbness, or discoloration, please follow up with Dr Shaun Blackmon immediately. Follow-up with Dr Shaun Blackmon in 2-3 weeks at the [place] [address] [phone]        Information obtained by :  I understand that if any problems occur once I am at home I am to contact my physician. I understand and acknowledge receipt of the instructions indicated above.                                                                                                                                            Physician's or R.N.'s Signature                                                                  Date/Time                                                                                                                                              Patient or Representative Signature                                                          Date/Time       DISCHARGE SUMMARY from Nurse    PATIENT INSTRUCTIONS:    After general anesthesia or intravenous sedation, for 24 hours or while taking prescription narcotics:    Have someone responsible help you with your care  Limit your activities  Do not drive and operate hazardous machinery  Do not make important personal, legal or business decisions  Do not drink alcoholic beverages  If you have not urinated within 8 hours after discharge, please contact your surgeon on call  Resume your medications unless otherwise instructed    From general anesthesia, intravenous sedation, or while taking prescription narcotics, you may experience:    Drowsiness, dizziness, sleepiness, or confusion  Difficulty remembering or delayed reaction times  Difficulty with your balance, especially while walking, move slowly and carefully, do not make sudden position changes  Difficulty focusing or blurred vision    You may not be aware of slight changes in your behavior and/or your reaction time because of the medication used during and after your procedure. Report the following to your surgeon:  Excessive pain, swelling, redness or odor of or around the surgical area  Temperature over 100.5  Nausea and vomiting lasting longer than 4 hours or if unable to take medications  Any signs of decreased circulation or nerve impairment to extremity: change in color, persistent numbness, tingling, coldness or increase pain  Any questions or concerns         IF YOU REPORT TO AN EMERGENCY ROOM, DOCTOR'S OFFICE OR HOSPITAL WITHIN 24 HOURS AFTER YOUR PROCEDURE, BRING THIS SHEET AND YOUR AFTER VISIT SUMMARY WITH YOU AND GIVE IT TO THE PHYSICIAN OR NURSE ATTENDING YOU. These are general instructions for a healthy lifestyle (if applicable): No smoking/ No tobacco products/ Avoid exposure to secondhand smoke  Surgeon General's Warning:  Quitting smoking now greatly reduces serious risk to your health.     Obesity, smoking, and sedentary lifestyle greatly increases your risk for illness    A healthy diet, regular physical exercise & weight monitoring are important for maintaining a healthy lifestyle    You may be retaining fluid if you have a history of heart failure or if you experience any of the following symptoms:  Weight gain of 3 pounds or more overnight or 5 pounds in a week, increased swelling in our hands or feet or shortness of breath while lying flat in bed. Please call your doctor as soon as you notice any of these symptoms; do not wait until your next office visit. A common side effect of anesthesia following surgery is nausea and/or vomiting. In order to decrease symptoms, it is wise to avoid foods that are high in fat, greasy foods, milk products, and spicy foods for the first 24 hours. Acceptable foods for the first 24 hours following surgery include but are not limited to:    soup  broth  toast   crackers   applesauce  bananas   mashed potatoes,  soft or scrambled eggs  oatmeal  jello    It is important to eat when taking your pain medication. This will help to prevent nausea. If possible, please try to time your meals with your medications. It is very important to stay hydrated following surgery. Sip fluids frequently while awake. Avoid acidic drinks such as citrus juices and soda for 24 hours. Carbonated beverages may cause bloating and gas. Acceptable fluids include:    water (flavor packets may add variety)  coffee or tea (in moderation)  Gatorade  Phil-Aid  apple juice  cranberry juice    You are encouraged to cough and deep breathe every hour when awake. This will help to prevent respiratory complications following anesthesia. You may want to hug a pillow when coughing and sneezing to add additional support to the surgical area and to decrease discomfort if you had abdominal or chest surgery. If you are discharged home with support stockings, you may remove them after 24 hours. Support stockings are used to help prevent blood clots in the legs following surgery. TO PREVENT AN INFECTION      [address] YOUR HANDS    To prevent infection, good handwashing is the most important thing you or your caregiver can do.       Wash your hands with soap and water or use the hand  we gave you before you touch any wounds. SHOWER    Use the antibacterial soap we gave you when you take a shower. Shower with this soap until your wounds are healed. To reach all areas of your body, you may need someone to help you. Dont forget to clean your belly button with every shower. USE CLEAN SHEETS    Use freshly cleaned sheets on your bed after surgery. To keep the surgery site clean, do not allow pets to sleep with you while your wound is still healing. STOP SMOKING    Stop smoking, or at least cut back on smoking    Smoking slows your healing. CONTROL YOUR BLOOD SUGAR    High blood sugars slow wound healing. If you are diabetic, control your blood sugar levels before and after your surgery. Please take time to review all of your Home Care Instructions and Medication Information sheets provided in your discharge packet. If you have any questions, please contact your surgeon's office. Thank you. The discharge information has been reviewed with the patient and instruction recipient. The patient and instruction recipient verbalized understanding. Discharge medications reviewed with the patient and instruction recipient and appropriate educational materials and side effects teaching were provided. Please provide this summary of care documentation to your next provider. Narcotic-Analgesic/Acetaminophen (Percocet, Norco, Lorcet HD, Lortab 10/325) - (By mouth)   Why this medicine is used:   Relieves pain.   Contact a nurse or doctor right away if you have:  Extreme weakness, shallow breathing, slow heartbeat  Severe confusion, lightheadedness, dizziness, fainting  Yellow skin or eyes, dark urine or pale stools  Severe constipation, severe stomach pain, nausea, vomiting, loss of appetite  Sweating or cold, clammy skin     Common side effects:  Mild constipation, nausea, vomiting  Sleepiness, tiredness  Itching, rash  © 2017 [facility] Information is for End User's use only and may not be sold, redistributed or otherwise used for commercial purposes.

## 2023-04-27 NOTE — ROUTINE PROCESS
Irrisept Wound Debridement and Cleansing System  Ref: [id number]; LOT: [id number] Expiration Date: 02/28/2025

## 2023-04-27 NOTE — BRIEF OP NOTE
Brief Postoperative Note    Patient: Autumn Danish  YOB: 1970  MRN: [id number]    Date of Procedure: 4/27/2023     Pre-Op Diagnosis: END STAGE RENAL DISEASE    Post-Op Diagnosis: Same as preoperative diagnosis. Procedure(s):  RIGHT ARM ARTERIO VENOUS GRAFT    Surgeon(s):  Cy Kauffman MD    Surgical Assistant: Surg Asst-1: Holger CARLTON    Anesthesia: MAC     Estimated Blood Loss (mL): less than 50     Complications: None    Specimens: * No specimens in log *     Implants:   Implant Name Type Inv. Item Serial No.  Lot No. LRB No. Used Action   GRAFT TW STRTCH [id number] --  - [id number] Graft GRAFT TW STRTCH [id number] --  [id number]  [employer] NA Right 1 Implanted       Drains: * No LDAs found *    Findings: RUE Loop graft. Arterial side of AVG is lateral but vein is medial to artery at level of anastomoses. Good thrill and radial pulse.     Electronically Signed by Marlin Nam MD on 4/27/2023 at 4:15 PM

## 2023-04-28 NOTE — PROGRESS NOTES
IP consult for inpatient rehab complete. Referral sent to [facility] for their review. CM will continue to follow and assist with additional discharge needs.     Dorcas Dow, MSW  Ext [id number] Bilobed Transposition Flap Text: The defect edges were debeveled with a #15 scalpel blade.  Given the location of the defect and the proximity to free margins a bilobed transposition flap was deemed most appropriate.  Using a sterile surgical marker, an appropriate bilobe flap drawn around the defect.    The area thus outlined was incised deep to adipose tissue with a #15 scalpel blade.  The skin margins were undermined to an appropriate distance in all directions utilizing iris scissors.

## 2023-05-30 PROBLEM — D63.1 ANEMIA OF CHRONIC RENAL FAILURE, STAGE 3 (MODERATE) (HCC): Status: ACTIVE | Noted: 2023-05-30

## 2023-05-30 PROBLEM — N18.30 ANEMIA OF CHRONIC RENAL FAILURE, STAGE 3 (MODERATE) (HCC): Status: ACTIVE | Noted: 2023-05-30

## 2025-06-30 ENCOUNTER — HOSPITAL ENCOUNTER (EMERGENCY)
Facility: HOSPITAL | Age: 55
Discharge: HOME | End: 2025-06-30
Attending: EMERGENCY MEDICINE
Payer: MEDICARE

## 2025-06-30 ENCOUNTER — APPOINTMENT (OUTPATIENT)
Facility: HOSPITAL | Age: 55
End: 2025-06-30
Payer: MEDICARE

## 2025-06-30 VITALS
RESPIRATION RATE: 20 BRPM | DIASTOLIC BLOOD PRESSURE: 97 MMHG | OXYGEN SATURATION: 100 % | WEIGHT: 190.04 LBS | SYSTOLIC BLOOD PRESSURE: 140 MMHG | HEIGHT: 75 IN | TEMPERATURE: 98.4 F | HEART RATE: 92 BPM | BODY MASS INDEX: 23.63 KG/M2

## 2025-06-30 DIAGNOSIS — N18.6 ESRD (END STAGE RENAL DISEASE) (HCC): ICD-10-CM

## 2025-06-30 DIAGNOSIS — E87.1 HYPONATREMIA: ICD-10-CM

## 2025-06-30 DIAGNOSIS — F41.0 PANIC ATTACK: Primary | ICD-10-CM

## 2025-06-30 DIAGNOSIS — J81.0 ACUTE PULMONARY EDEMA (HCC): ICD-10-CM

## 2025-06-30 DIAGNOSIS — R79.89 ELEVATED TROPONIN LEVEL: ICD-10-CM

## 2025-06-30 DIAGNOSIS — I50.43 ACUTE ON CHRONIC COMBINED SYSTOLIC AND DIASTOLIC CHF (CONGESTIVE HEART FAILURE) (HCC): ICD-10-CM

## 2025-06-30 LAB
ALBUMIN SERPL-MCNC: 2.8 G/DL (ref 3.5–5)
ALBUMIN/GLOB SERPL: 0.8 (ref 1.1–2.2)
ALP SERPL-CCNC: 116 U/L (ref 45–117)
ALT SERPL-CCNC: 266 U/L (ref 12–78)
ANION GAP SERPL CALC-SCNC: 11 MMOL/L (ref 2–12)
AST SERPL-CCNC: 97 U/L (ref 15–37)
BASOPHILS # BLD: 0.07 K/UL (ref 0–0.1)
BASOPHILS NFR BLD: 0.8 % (ref 0–1)
BILIRUB SERPL-MCNC: 0.6 MG/DL (ref 0.2–1)
BUN SERPL-MCNC: 34 MG/DL (ref 6–20)
BUN/CREAT SERPL: 5 (ref 12–20)
CALCIUM SERPL-MCNC: 8 MG/DL (ref 8.5–10.1)
CHLORIDE SERPL-SCNC: 95 MMOL/L (ref 97–108)
CO2 SERPL-SCNC: 23 MMOL/L (ref 21–32)
CREAT SERPL-MCNC: 6.19 MG/DL (ref 0.7–1.3)
DIFFERENTIAL METHOD BLD: ABNORMAL
EKG ATRIAL RATE: 78 BPM
EKG DIAGNOSIS: NORMAL
EKG P AXIS: 71 DEGREES
EKG P-R INTERVAL: 192 MS
EKG Q-T INTERVAL: 436 MS
EKG QRS DURATION: 182 MS
EKG QTC CALCULATION (BAZETT): 533 MS
EKG R AXIS: 22 DEGREES
EKG T AXIS: 138 DEGREES
EKG VENTRICULAR RATE: 90 BPM
EOSINOPHIL # BLD: 0.17 K/UL (ref 0–0.4)
EOSINOPHIL NFR BLD: 2 % (ref 0–7)
ERYTHROCYTE [DISTWIDTH] IN BLOOD BY AUTOMATED COUNT: 15.6 % (ref 11.5–14.5)
GLOBULIN SER CALC-MCNC: 3.7 G/DL (ref 2–4)
GLUCOSE SERPL-MCNC: 133 MG/DL (ref 65–100)
HCT VFR BLD AUTO: 27.8 % (ref 36.6–50.3)
HGB BLD-MCNC: 8.7 G/DL (ref 12.1–17)
IMM GRANULOCYTES # BLD AUTO: 0.04 K/UL (ref 0–0.04)
IMM GRANULOCYTES NFR BLD AUTO: 0.5 % (ref 0–0.5)
LIPASE SERPL-CCNC: 79 U/L (ref 13–75)
LYMPHOCYTES # BLD: 1.6 K/UL (ref 0.8–3.5)
LYMPHOCYTES NFR BLD: 18.7 % (ref 12–49)
MCH RBC QN AUTO: 28.2 PG (ref 26–34)
MCHC RBC AUTO-ENTMCNC: 31.3 G/DL (ref 30–36.5)
MCV RBC AUTO: 90.3 FL (ref 80–99)
MONOCYTES # BLD: 0.69 K/UL (ref 0–1)
MONOCYTES NFR BLD: 8.1 % (ref 5–13)
NEUTS SEG # BLD: 5.97 K/UL (ref 1.8–8)
NEUTS SEG NFR BLD: 69.9 % (ref 32–75)
NRBC # BLD: 0 K/UL (ref 0–0.01)
NRBC BLD-RTO: 0 PER 100 WBC
NT PRO BNP: ABNORMAL PG/ML
PLATELET # BLD AUTO: 200 K/UL (ref 150–400)
PMV BLD AUTO: 10 FL (ref 8.9–12.9)
POTASSIUM SERPL-SCNC: 3.5 MMOL/L (ref 3.5–5.1)
PROT SERPL-MCNC: 6.5 G/DL (ref 6.4–8.2)
RBC # BLD AUTO: 3.08 M/UL (ref 4.1–5.7)
SODIUM SERPL-SCNC: 129 MMOL/L (ref 136–145)
TROPONIN I SERPL HS-MCNC: 112 NG/L (ref 0–76)
TROPONIN I SERPL HS-MCNC: 99 NG/L (ref 0–76)
WBC # BLD AUTO: 8.5 K/UL (ref 4.1–11.1)

## 2025-06-30 PROCEDURE — 36415 COLL VENOUS BLD VENIPUNCTURE: CPT

## 2025-06-30 PROCEDURE — 84484 ASSAY OF TROPONIN QUANT: CPT

## 2025-06-30 PROCEDURE — 96375 TX/PRO/DX INJ NEW DRUG ADDON: CPT

## 2025-06-30 PROCEDURE — 96374 THER/PROPH/DIAG INJ IV PUSH: CPT

## 2025-06-30 PROCEDURE — 83690 ASSAY OF LIPASE: CPT

## 2025-06-30 PROCEDURE — 93005 ELECTROCARDIOGRAM TRACING: CPT | Performed by: EMERGENCY MEDICINE

## 2025-06-30 PROCEDURE — 71045 X-RAY EXAM CHEST 1 VIEW: CPT

## 2025-06-30 PROCEDURE — 6360000002 HC RX W HCPCS: Performed by: EMERGENCY MEDICINE

## 2025-06-30 PROCEDURE — 85025 COMPLETE CBC W/AUTO DIFF WBC: CPT

## 2025-06-30 PROCEDURE — 6370000000 HC RX 637 (ALT 250 FOR IP): Performed by: EMERGENCY MEDICINE

## 2025-06-30 PROCEDURE — 99285 EMERGENCY DEPT VISIT HI MDM: CPT

## 2025-06-30 PROCEDURE — 80053 COMPREHEN METABOLIC PANEL: CPT

## 2025-06-30 PROCEDURE — 83880 ASSAY OF NATRIURETIC PEPTIDE: CPT

## 2025-06-30 RX ORDER — HYDROXYZINE HYDROCHLORIDE 25 MG/1
25 TABLET, FILM COATED ORAL EVERY 8 HOURS PRN
Qty: 30 TABLET | Refills: 0 | Status: SHIPPED | OUTPATIENT
Start: 2025-06-30 | End: 2025-07-10

## 2025-06-30 RX ORDER — ONDANSETRON 2 MG/ML
4 INJECTION INTRAMUSCULAR; INTRAVENOUS ONCE
Status: COMPLETED | OUTPATIENT
Start: 2025-06-30 | End: 2025-06-30

## 2025-06-30 RX ORDER — DIAZEPAM 10 MG/2ML
2 INJECTION, SOLUTION INTRAMUSCULAR; INTRAVENOUS ONCE
Status: COMPLETED | OUTPATIENT
Start: 2025-06-30 | End: 2025-06-30

## 2025-06-30 RX ORDER — HYDROXYZINE HYDROCHLORIDE 25 MG/1
50 TABLET, FILM COATED ORAL 3 TIMES DAILY PRN
Status: DISCONTINUED | OUTPATIENT
Start: 2025-06-30 | End: 2025-06-30 | Stop reason: HOSPADM

## 2025-06-30 RX ADMIN — DIAZEPAM 2 MG: 5 INJECTION, SOLUTION INTRAMUSCULAR; INTRAVENOUS at 01:20

## 2025-06-30 RX ADMIN — HYDROXYZINE HYDROCHLORIDE 50 MG: 25 TABLET ORAL at 02:57

## 2025-06-30 RX ADMIN — ONDANSETRON 4 MG: 2 INJECTION, SOLUTION INTRAMUSCULAR; INTRAVENOUS at 02:57

## 2025-06-30 ASSESSMENT — PAIN - FUNCTIONAL ASSESSMENT: PAIN_FUNCTIONAL_ASSESSMENT: NONE - DENIES PAIN

## 2025-06-30 NOTE — ED TRIAGE NOTES
Patient ambulatory to triage from waiting room with complaint of hiccups, insomnia, and shortness of breath. Patient reports hiccups began after his dialysis on Saturday. Patient reports shortness of breath began on Sunday morning. Patient states he has not slept in over a week - denies history of insomnia.

## 2025-06-30 NOTE — ED NOTES
At bedside to speak with pt as primary RN relayed that he is requesting to have his IV removed so that he can leave. Spoke with pt and encouraged him to stay to complete his evaluation. He states he is feeling short of breath. Oxygen saturation assessed, 100% on RA. Continues to express desire to depart. Spoke with Dr. Jc and made her aware.

## 2025-06-30 NOTE — ED NOTES
Pt requested IV removal with plans to leave the ED himself AMA.  RN made aware and to bedside. MD Jc also at bedside. Pt reports he can't breathe and states, \"I call for help and no one comes immediately.\" Pt has been educated numerous times about call light and refuses to use it reporting that he prefers to yell. Pt has no difficulty using bilateral upper extremities. Pt has been yelling that he can't breathe, and he has been rounded on multiple times. He is 100% on RA each time.

## 2025-07-06 NOTE — ED PROVIDER NOTES
[facility] EMERGENCY DEPARTMENT  EMERGENCY DEPARTMENT ENCOUNTER         Pt Name: Sven Pires  MRN: [id number]  Birthdate 1970  Date of evaluation: 6/30/2025  Provider: Elmira Jc MD   PCP: Vicki Finnegan MD  Note Started: 3:57 AM 7/6/25     CHIEF COMPLAINT       Chief Complaint   Patient presents with    Insomnia     Patient reports he hasn't slept in over a week    Hiccups     Patient states he's been having hiccups since Saturday after dialysis    Shortness of Breath     Patient reports he's been short of breath since Sunday morning        HISTORY OF PRESENT ILLNESS: 1 or more elements      History From: Patient  HPI Limitations: None     Sven Pires is a 54 y.o. male whose medical history is listed below and presents with intractable hiccups which he has had since Saturday. Hiccups are preventing him from sleeping. Sleep deprivation is making him more anxious so he has been increasingly anxious and frustrated over not being able to terminate the hiccups.   They started after dialysis on Saturday. No prior history of similar symptoms.   He says he also feels short of breath. Symptoms are not worse with deep breathing or exertion. No chest pain. Chronically has some leg edema, this has worsened slightly over the past few days. Reports also orthopnea and sleeps with extra back support. Feels that the sob is mostly related to his anxiety and if he if he could get the hiccups under control and get some sleep, he would feel better.   No fever, chills, cough or abd pain. No nausea or vomiting.   No other symptoms or concerns and ROS is otherwise negative.   Pt denies any other exacerbating or ameliorating factors. There are no other complaints, changes or physical findings pertinent to the HPI at this time.      PAST HISTORY     Past Medical History:  Past Medical History:   Diagnosis Date    Anxiety and depression     Chronic kidney disease     CKD stage IV, hx of THOMAS 2021    Diabetes (HCC)

## 2025-07-12 ASSESSMENT — ENCOUNTER SYMPTOMS
CONSTIPATION: 0
COUGH: 0
DIARRHEA: 0
SORE THROAT: 0
VOMITING: 0
STRIDOR: 0
BACK PAIN: 0
WHEEZING: 0
ABDOMINAL PAIN: 0
CHEST TIGHTNESS: 0
NAUSEA: 0
SINUS PRESSURE: 0
COLOR CHANGE: 0
TROUBLE SWALLOWING: 0
SHORTNESS OF BREATH: 1

## (undated) DEVICE — NEEDLE HYPO 18GA L1.5IN PNK S STL HUB POLYPR SHLD REG BVL

## (undated) DEVICE — CULTURETTE SGL EVAC TUBE PALL -- 100/CA

## (undated) DEVICE — SUTURE PERMAHAND SZ 2-0 L18IN NONABSORBABLE BLK L26MM SH C012D

## (undated) DEVICE — 3M™ IOBAN™ 2 ANTIMICROBIAL INCISE DRAPE 6650EZ: Brand: IOBAN™ 2

## (undated) DEVICE — SWAB CULT LIQ STUART AGR AERB MOD IN BRK SGL RAYON TIP PLAS 220099] BECTON DICKINSON MICRO]

## (undated) DEVICE — (D)STRIP SKN CLSR 0.5X4IN WHT --

## (undated) DEVICE — INFECTION CONTROL KIT SYS

## (undated) DEVICE — SUTURE ETHLN SZ 2-0 L18IN NONABSORBABLE BLK L19MM PS-2 PRIM 593H

## (undated) DEVICE — Device

## (undated) DEVICE — HANDLE LT SNAP ON ULT DURABLE LENS FOR TRUMPF ALC DISPOSABLE

## (undated) DEVICE — (D)PREP SKN CHLRAPRP APPL 26ML -- CONVERT TO ITEM 371833

## (undated) DEVICE — 450 ML BOTTLE OF 0.05% CHLORHEXIDINE GLUCONATE IN 99.95% STERILE WATER FOR IRRIGATION, USP AND APPLICATOR.: Brand: IRRISEPT ANTIMICROBIAL WOUND LAVAGE

## (undated) DEVICE — REM POLYHESIVE ADULT PATIENT RETURN ELECTRODE: Brand: VALLEYLAB

## (undated) DEVICE — SYR IRR BLB 2OZ DISP BLU STRL -- CONVERT TO ITEM 357637

## (undated) DEVICE — BAG RED 3PLY 2MIL 30X40 IN

## (undated) DEVICE — INTENDED FOR TISSUE SEPARATION, AND OTHER PROCEDURES THAT REQUIRE A SHARP SURGICAL BLADE TO PUNCTURE OR CUT.: Brand: BARD-PARKER ® CARBON RIB-BACK BLADES

## (undated) DEVICE — MASTISOL ADHESIVE LIQ 2/3ML

## (undated) DEVICE — STRETCH BANDAGE ROLL: Brand: DERMACEA

## (undated) DEVICE — SET 2ND L34IN N DEHP THE QUEENS MED CNTR VALUELINK

## (undated) DEVICE — SUTURE VCRL SZ 3-0 L27IN ABSRB UD L26MM SH 1/2 CIR J416H

## (undated) DEVICE — CONTINU-FLO SOLUTION SET, 2 INJECTION SITES, MALE LUER LOCK ADAPTER WITH RETRACTABLE COLLAR, LARGE BORE STOPCOCK WITH ROTATING MALE LUER LOCK EXTENSION SET, 2 INJECTION SITES, MALE LUER LOCK ADAPTER WITH RETRACTABLE COLLAR: Brand: INTERLINK/CONTINU-FLO

## (undated) DEVICE — TIP SUCT BLU PLAS BLB W/O CTRL VENT YANK

## (undated) DEVICE — SOLUTION IRRIG 1000ML H2O STRL BLT

## (undated) DEVICE — SYR 10ML LUER LOK 1/5ML GRAD --

## (undated) DEVICE — KENDALL DL ECG CABLE AND LEAD WIRE SYSTEM, 3-LEAD, SINGLE PATIENT USE: Brand: KENDALL

## (undated) DEVICE — SOLUTION IV 1000ML 0.9% SOD CHL

## (undated) DEVICE — BASIC PACK: Brand: CONVERTORS

## (undated) DEVICE — GAUZE SPONGES,12 PLY: Brand: CURITY

## (undated) DEVICE — STERILE POLYISOPRENE POWDER-FREE SURGICAL GLOVES: Brand: PROTEXIS

## (undated) DEVICE — SUTURE PROL SZ 5-0 L24IN NONABSORBABLE BLU RB-2 L13IN 1/2 8554H

## (undated) DEVICE — BANDAGE COMPR 9 FTX4 IN SMOOTH COMFORTABLE SYNTH ESMRK LF

## (undated) DEVICE — SURGICAL PROCEDURE PACK BASIN MAJ SET CUST NO CAUT

## (undated) DEVICE — AGENT HEMSTAT W4XL4IN OXIDIZED REGENERATED CELOS ABSRB SFT

## (undated) DEVICE — NEEDLE HYPO 25GA L1.5IN BVL ORIENTED ECLIPSE

## (undated) DEVICE — DRESSING PETRO GZ XRFRM CURAD ST OVERWRAP 5 X 9 IN

## (undated) DEVICE — SUTURE PROL SZ 6-0 L24IN NONABSORBABLE BLU L9.3MM BV-1 3/8 8805H

## (undated) DEVICE — STAPLER SKIN H3.9MM WIRE DIA0.58MM CRWN 6.9MM 35 STPL ROT

## (undated) DEVICE — PENCIL ES L3M BTTN SWCH S STL HEX LOK BLDE ELECTRD HOLSTER

## (undated) DEVICE — GOWN,SIRUS,NONRNF,SETINSLV,2XL,18/CS: Brand: MEDLINE

## (undated) DEVICE — TABLE COVER: Brand: CONVERTORS

## (undated) DEVICE — DRAPE,EXTREMITY,89X128,STERILE: Brand: MEDLINE

## (undated) DEVICE — SNARE SURG 2.4 MMX230 CM EXACTO CLD

## (undated) DEVICE — IMMOBILIZER KNEE 3 PNL 19 IN

## (undated) DEVICE — TRAY PREP DRY W/ PREM GLV 2 APPL 6 SPNG 2 UNDPD 1 OVERWRAP

## (undated) DEVICE — SUT ETHLN 4-0 18IN PS2 BLK --

## (undated) DEVICE — TOWEL SURG W17XL27IN STD BLU COT NONFENESTRATED PREWASHED

## (undated) DEVICE — SPONGE GZ W4XL4IN COT 12 PLY TYP VII WVN C FLD DSGN STERILE

## (undated) DEVICE — ROCKER SWITCH PENCIL BLADE ELECTRODE, HOLSTER: Brand: EDGE

## (undated) DEVICE — ABDOMINAL PAD: Brand: DERMACEA

## (undated) DEVICE — KERLIX BANDAGE ROLL: Brand: KERLIX

## (undated) DEVICE — STERILE POLYISOPRENE POWDER-FREE SURGICAL GLOVES WITH EMOLLIENT COATING: Brand: PROTEXIS

## (undated) DEVICE — SUTURE VCRL SZ 3-0 L27IN ABSRB VLT L26MM SH 1/2 CIR J316H

## (undated) DEVICE — Device: Brand: JELCO

## (undated) DEVICE — TRAP SURG QUAD PARABOLA SLOT DSGN SFTY SCRN TRAPEASE

## (undated) DEVICE — HOOK LOCK LATEX FREE ELASTIC BANDAGE 4INX5YD

## (undated) DEVICE — DEVON™ KNEE AND BODY STRAP 60" X 3" (1.5 M X 7.6 CM): Brand: DEVON

## (undated) DEVICE — SUTURE VCRL SZ 3-0 L27IN ABSRB VLT L36MM CT-1 1/2 CIR J338H

## (undated) DEVICE — DRAPE,REIN 53X77,STERILE: Brand: MEDLINE

## (undated) DEVICE — EXTREMITY III-LF: Brand: MEDLINE INDUSTRIES, INC.

## (undated) DEVICE — GLOVE SURG SZ 8 L12IN FNGR THK94MIL STD WHT LTX FREE

## (undated) DEVICE — KIT INFECTION CTRL ST FRAN --

## (undated) DEVICE — SLIM BODY SKIN STAPLER: Brand: APPOSE ULC

## (undated) DEVICE — MEDI-VAC NON-CONDUCTIVE SUCTION TUBING: Brand: CARDINAL HEALTH

## (undated) DEVICE — PROBE VASC 8MHZ WTRPRF

## (undated) DEVICE — SOLUTION IRRIG 3000ML 0.9% SOD CHL FLX CONT 0797208] ICU MEDICAL INC]

## (undated) DEVICE — 2108 SERIES SAGITTAL BLADE (24.8 X 0.88 X 73.8MM)

## (undated) DEVICE — SUTURE VCRL SZ 3-0 L36IN ABSRB VLT CT L40MM 1/2 CIR J356H

## (undated) DEVICE — YANKAUER BULB TIP, NO VENT: Brand: ARGYLE

## (undated) DEVICE — CUSTOM CAST PD STR

## (undated) DEVICE — 3M™ TEGADERM™ TRANSPARENT FILM DRESSING FRAME STYLE, 1624W, 2-3/8 IN X 2-3/4 IN (6 CM X 7 CM), 100/CT 4CT/CASE: Brand: 3M™ TEGADERM™

## (undated) DEVICE — SUTURE PERMAHAND SZ 2-0 L30IN NONABSORBABLE BLK SILK W/O A305H

## (undated) DEVICE — BLADE ASSEMB CLP HAIR FINE --

## (undated) DEVICE — SPONGE LAP 18X18IN STRL -- 5/PK

## (undated) DEVICE — (D)SYR 10ML 1/5ML GRAD NSAF -- PKGING CHANGE USE ITEM 338027

## (undated) DEVICE — AV FISTULA - MRMC: Brand: MEDLINE INDUSTRIES, INC.

## (undated) DEVICE — SUTURE VCRL SZ 4-0 L27IN ABSRB UD L19MM FS-2 3/8 CIR REV J422H

## (undated) DEVICE — ARGYLE FRAZIER SURGICAL SUCTION INSTRUMENT 10 FR/CH (3.3 MM): Brand: ARGYLE

## (undated) DEVICE — TUBING IRRIG L77IN DIA0.241IN L BOR FOR CYSTO W/ NVENT

## (undated) DEVICE — DRESSING TRNSPAR W3XL4IN SILIC1 POLYUR RECT NET W/ SAFETAC

## (undated) DEVICE — COVER,TABLE,60X90,STERILE: Brand: MEDLINE

## (undated) DEVICE — DRSG GZ OIL EMUL CURAD 3X3 --

## (undated) DEVICE — CONVERTORS STOCKINETTE: Brand: CONVERTORS

## (undated) DEVICE — SOL INJ SOD CL 0.9% 500ML BG --

## (undated) DEVICE — CURITY IDOFORM PACKING STRIP: Brand: CURITY

## (undated) DEVICE — LIGHT HANDLE: Brand: DEVON

## (undated) DEVICE — BONE MARROW KIT ASPIR 11 GA

## (undated) DEVICE — 3M™ MEDIPORE™ H SOFT CLOTH SURGICAL TAPE 2864, 4 INCH X 10 YARD (10CM X 9,14M), 12 ROLLS/CASE: Brand: 3M™ MEDIPORE™

## (undated) DEVICE — SUTURE VCRL SZ 0 L36IN ABSRB VLT L36MM CT-1 1/2 CIR J346H

## (undated) DEVICE — BANDAGE,GAUZE,BULKEE II,4.5"X4.1YD,STRL: Brand: MEDLINE

## (undated) DEVICE — SUTURE PERMAHAND SZ 0 L30IN NONABSORBABLE BLK SILK BRAID A306H

## (undated) DEVICE — 4-PORT MANIFOLD: Brand: NEPTUNE 2

## (undated) DEVICE — SOLUTION LACTATED RINGERS INJECTION USP

## (undated) DEVICE — SUTURE VCRL SZ 2-0 L36IN ABSRB VLT L36MM CT-1 1/2 CIR J345H

## (undated) DEVICE — STERILE HOOK LOCK ELASTIC BANDAGE 6IN X 10 YARD: Brand: HOOK LOCK™

## (undated) DEVICE — 3M™ STERI-STRIP™ REINFORCED ADHESIVE SKIN CLOSURES, R1546, 1/4 IN X 4 IN (6 MM X 100 MM), 10 STRIPS/ENVELOPE: Brand: 3M™ STERI-STRIP™